# Patient Record
Sex: FEMALE | Race: WHITE | Employment: OTHER | URBAN - METROPOLITAN AREA
[De-identification: names, ages, dates, MRNs, and addresses within clinical notes are randomized per-mention and may not be internally consistent; named-entity substitution may affect disease eponyms.]

---

## 2017-01-12 ENCOUNTER — OFFICE VISIT (OUTPATIENT)
Dept: CARDIOLOGY CLINIC | Age: 77
End: 2017-01-12

## 2017-01-12 VITALS
DIASTOLIC BLOOD PRESSURE: 52 MMHG | SYSTOLIC BLOOD PRESSURE: 150 MMHG | HEART RATE: 54 BPM | WEIGHT: 190 LBS | BODY MASS INDEX: 33.66 KG/M2 | HEIGHT: 63 IN

## 2017-01-12 DIAGNOSIS — I27.20 PULMONARY HTN (HCC): ICD-10-CM

## 2017-01-12 DIAGNOSIS — R91.1 LUNG NODULE: ICD-10-CM

## 2017-01-12 DIAGNOSIS — C73 PAPILLARY THYROID CARCINOMA (HCC): ICD-10-CM

## 2017-01-12 DIAGNOSIS — I42.1 MILD HOCM (HYPERTROPHIC OBSTRUCTIVE CARDIOMYOPATHY) (HCC): Primary | ICD-10-CM

## 2017-01-12 DIAGNOSIS — I35.0 NONRHEUMATIC AORTIC VALVE STENOSIS: ICD-10-CM

## 2017-01-12 RX ORDER — LISINOPRIL 20 MG/1
TABLET ORAL DAILY
Status: ON HOLD | COMMUNITY
End: 2017-05-19

## 2017-01-12 NOTE — MR AVS SNAPSHOT
Visit Information Date & Time Provider Department Dept. Phone Encounter #  
 1/12/2017 12:15 PM Elisha Finley MD [facility] [address] [place] [address] [id number] Follow-up Instructions Return in about 1 week (around 1/19/2017). Your Appointments 1/13/2017  7:30 AM  
PROCEDURE with [place] ECHO [facility] [place] ([facility]) Appt Note: limited Dr Julio C Gonzalez [place] [address] [facility] [facility], [address] [facility] [address]  
[address], [address] [facility] [address] [place] [address] 1/17/2017 12:15 PM  
Office Visit with Elisha Finley MD  
[facility]) Appt Note: post Limited Echo  
 [address] [facility], [address] [facility] [address]  
[address], [address] [address] Upcoming Health Maintenance Date Due FOBT Q 1 YEAR AGE 50-75 3/12/1992 ZOSTER VACCINE AGE 60> 3/12/2002 GLAUCOMA SCREENING Q2Y 3/12/2007 Pneumococcal 65+ High/Highest Risk (1 of 2 - PCV13) 3/12/2007 MEDICARE YEARLY EXAM 3/12/2007 INFLUENZA AGE 9 TO ADULT 8/1/2016 DTaP/Tdap/Td series (2 - Td) 12/1/2022 Allergies as of 1/12/2017  Review Complete On: 1/12/2017 By: Elisha Finley MD  
 No Known Allergies Current Immunizations  Never Reviewed Name Date TDAP Vaccine 12/1/2012  4:50 PM  
  
 Not reviewed this visit You Were Diagnosed With   
  
 Codes Comments Mild HOCM (hypertrophic obstructive cardiomyopathy) ([facility] [address]    -  Primary ICD-10-CM: I42.1 ICD-9-CM: 425.11 Pulmonary HTN ([facility] [address]     ICD-10-CM: I27.2 ICD-9-CM: 416.8 Papillary thyroid carcinoma ([facility] [address]     ICD-10-CM: Z34 ICD-9-CM: 999 Lung nodule     ICD-10-CM: R91.1 ICD-9-CM: 793.11 Nonrheumatic aortic valve stenosis     ICD-10-CM: I35.0 ICD-9-CM: 424.1 mild to moderate Vitals BP Pulse Height(growth percentile) Weight(growth percentile) BMI Smoking Status 150/52 (!) 54 5' 3\" (1.6 m) 190 lb (86.2 kg) 33.66 kg/m2 Never Smoker Vitals History BMI and BSA Data Body Mass Index Body Surface Area  
 33.66 kg/m 2 1.96 m 2 Preferred Pharmacy Pharmacy Name Phone [facility] [phone] Your Updated Medication List  
  
   
This list is accurate as of: 1/12/17  1:05 PM.  Always use your most recent med list.  
  
  
  
  
 CeleBREX 200 mg capsule Generic drug:  celecoxib Take 100 mg by mouth daily. FISH OIL 1,000 mg Cap Generic drug:  omega-3 fatty acids-vitamin e Take 1 capsule by mouth. folic acid 1 mg tablet Commonly known as:  Keith Take  by mouth daily. hydroCHLOROthiazide 25 mg tablet Commonly known as:  HYDRODIURIL Take 25 mg by mouth daily. lisinopril 20 mg tablet Commonly known as:  Mis Flores Take  by mouth daily. losartan 50 mg tablet Commonly known as:  COZAAR Take  by mouth daily. meloxicam 15 mg tablet Commonly known as:  MOBIC  
TAKE 1 TABLET DAILY WITH FOOD  
  
 metoprolol succinate 50 mg XL tablet Commonly known as:  TOPROL-XL Take 75 mg by mouth two (2) times a day. montelukast 10 mg tablet Commonly known as:  SINGULAIR Take 10 mg by mouth daily. SIMVASTATIN PO Take  by mouth. 80 mg  
  
 VITAMIN B-12 500 mcg tablet Generic drug:  cyanocobalamin Take 500 mcg by mouth daily. ZYRTEC PO Take  by mouth. Follow-up Instructions Return in about 1 week (around 1/19/2017). To-Do List   
 01/19/2017 Cardiac Services:  2D ECHO LIMITED ADULT   
  
 05/10/2017 10:30 AM  
  Appointment with [facility] CT RM 2 at [facility] RAD CT ([phone]) GENERAL INSTRUCTIONS  This study does not require you to drink contrast prior to your study.   RELATED STUDY INFORMATION  Bring any films, CDs, and reports related with you on the day of your exam.  This only includes studies done outside of [facility], \[facility]", [facility], and [facility]. QUESTIONS  Notify the CT Department if you have any questions concerning your study. [place] - [phone] [facility] [place] - [phone] Please provide this summary of care documentation to your next provider. Your primary care clinician is listed as Juancarlos Pickens. If you have any questions after today's visit, please call [phone].

## 2017-01-12 NOTE — LETTER
Kenny Daugherty 1940 
 
1/12/2017 Dear Tania Balbuena MD 
 
I had the pleasure of evaluating  Ms. Daugherty in office today. Below are the relevant portions of my assessment and plan of care. ICD-10-CM ICD-9-CM 1. Mild HOCM (hypertrophic obstructive cardiomyopathy) (HCC) I42.1 425.11   
2. Pulmonary HTN (HCC) I27.2 416.8 2D ECHO LIMITED ADULT 3. Papillary thyroid carcinoma ([address] C73 193 4. Lung nodule R91.1 793.11   
5. Nonrheumatic aortic valve stenosis I35.0 424.1   
 mild to moderate Current Outpatient Prescriptions Medication Sig Dispense Refill  lisinopril (PRINIVIL, ZESTRIL) 20 mg tablet Take  by mouth daily.  meloxicam (MOBIC) 15 mg tablet TAKE 1 TABLET DAILY WITH FOOD 90 Tab 0  
 losartan (COZAAR) 50 mg tablet Take  by mouth daily.  montelukast (SINGULAIR) 10 mg tablet Take 10 mg by mouth daily.  cyanocobalamin (VITAMIN B-12) 500 mcg tablet Take 500 mcg by mouth daily.  folic acid (FOLVITE) 1 mg tablet Take  by mouth daily.  metoprolol succinate (TOPROL-XL) 50 mg XL tablet Take 75 mg by mouth two (2) times a day.  hydrochlorothiazide (HYDRODIURIL) 25 mg tablet Take 25 mg by mouth daily.  SIMVASTATIN PO Take  by mouth. 80 mg    
 CETIRIZINE HCL (ZYRTEC PO) Take  by mouth.  omega-3 fatty acids-vitamin e (FISH OIL) 1,000 mg cap Take 1 capsule by mouth.  celecoxib (CELEBREX) 200 mg capsule Take 100 mg by mouth daily. Orders Placed This Encounter  2D ECHO LIMITED ADULT Standing Status:   Future Standing Expiration Date:   7/12/2017 Order Specific Question:   Reason for Exam: Answer:   to evaluate pulm htn  lisinopril (PRINIVIL, ZESTRIL) 20 mg tablet Sig: Take  by mouth daily. If you have questions, please do not hesitate to call me. I look forward to following Ms. Daugherty along with you.  
 
Sincerely, 
Greg Tafoya MD

## 2017-01-13 ENCOUNTER — CLINICAL SUPPORT (OUTPATIENT)
Dept: CARDIOLOGY CLINIC | Age: 77
End: 2017-01-13

## 2017-01-13 DIAGNOSIS — I27.20 PULMONARY HTN (HCC): ICD-10-CM

## 2017-01-14 NOTE — PROGRESS NOTES
HISTORY OF PRESENT ILLNESS  Mariela Hardy is a 78 y.o. female. New Patient   The history is provided by the patient. This is a chronic problem. The current episode started more than 1 week ago. The problem occurs daily. The problem has not changed since onset. Associated symptoms include shortness of breath. Pertinent negatives include no chest pain, no abdominal pain and no headaches. Pre-op Exam   The history is provided by the patient. The problem has not changed since onset. Associated symptoms include shortness of breath. Pertinent negatives include no chest pain, no abdominal pain and no headaches. Shortness of Breath   The history is provided by the patient. This is a chronic problem. The problem occurs intermittently. The current episode started more than 1 week ago. The problem has not changed since onset. Pertinent negatives include no fever, no headaches, no ear pain, no neck pain, no cough, no sputum production, no hemoptysis, no wheezing, no PND, no orthopnea, no chest pain, no syncope, no vomiting, no abdominal pain, no rash, no leg pain, no leg swelling and no claudication. Associated medical issues do not include CAD or heart failure. Review of Systems   Constitutional: Positive for malaise/fatigue. Negative for chills, diaphoresis, fever and weight loss. HENT: Negative for ear discharge, ear pain, hearing loss, nosebleeds and tinnitus. Eyes: Negative for blurred vision. Respiratory: Positive for shortness of breath. Negative for cough, hemoptysis, sputum production, wheezing and stridor. Cardiovascular: Negative for chest pain, palpitations, orthopnea, claudication, leg swelling, syncope and PND. Gastrointestinal: Negative for abdominal pain, heartburn, nausea and vomiting. Musculoskeletal: Negative for myalgias and neck pain. Skin: Negative for itching and rash.    Neurological: Negative for dizziness, tingling, tremors, focal weakness, loss of consciousness, weakness and headaches. Psychiatric/Behavioral: Negative for depression and suicidal ideas. Family History   Problem Relation Age of Onset    Cancer Other     Heart Disease Other     Cancer Mother     Heart Disease Mother        Past Medical History   Diagnosis Date    Arthritis     Heart murmur     History of seasonal allergies     HTN (hypertension)     Hypercholesteremia        Past Surgical History   Procedure Laterality Date    Hx orthopaedic       right ankle    Hx knee arthroscopy       left and right       Social History   Substance Use Topics    Smoking status: Never Smoker    Smokeless tobacco: Never Used    Alcohol use No       No Known Allergies    Outpatient Prescriptions Marked as Taking for the 1/12/17 encounter (Office Visit) with Neelam Taylor MD   Medication Sig Dispense Refill    lisinopril (PRINIVIL, ZESTRIL) 20 mg tablet Take  by mouth daily.  meloxicam (MOBIC) 15 mg tablet TAKE 1 TABLET DAILY WITH FOOD 90 Tab 0    losartan (COZAAR) 50 mg tablet Take  by mouth daily.  montelukast (SINGULAIR) 10 mg tablet Take 10 mg by mouth daily.  cyanocobalamin (VITAMIN B-12) 500 mcg tablet Take 500 mcg by mouth daily.  folic acid (FOLVITE) 1 mg tablet Take  by mouth daily.  metoprolol succinate (TOPROL-XL) 50 mg XL tablet Take 75 mg by mouth two (2) times a day.  hydrochlorothiazide (HYDRODIURIL) 25 mg tablet Take 25 mg by mouth daily.  SIMVASTATIN PO Take  by mouth. 80 mg      CETIRIZINE HCL (ZYRTEC PO) Take  by mouth. Visit Vitals    /52    Pulse (!) 54    Ht 5' 3\" (1.6 m)    Wt 86.2 kg (190 lb)    BMI 33.66 kg/m2     Physical Exam   Constitutional: She is oriented to person, place, and time. She appears well-developed and well-nourished. No distress. HENT:   Head: Atraumatic. Mouth/Throat: No oropharyngeal exudate. Eyes: Conjunctivae are normal. Right eye exhibits no discharge. Left eye exhibits no discharge.  No scleral icterus. Neck: Normal range of motion. Neck supple. No JVD present. No tracheal deviation present. No thyromegaly present. Cardiovascular: Normal rate and regular rhythm. Exam reveals no gallop. Murmur (2/6 holosystolic murmur best heard at apex and left parasternal area with no radiation) heard. Pulmonary/Chest: Effort normal and breath sounds normal. No stridor. No respiratory distress. She has no wheezes. She has no rales. She exhibits no tenderness. Abdominal: Soft. There is no tenderness. There is no rebound and no guarding. Musculoskeletal: Normal range of motion. She exhibits no edema or tenderness. Lymphadenopathy:     She has no cervical adenopathy. Neurological: She is alert and oriented to person, place, and time. She exhibits normal muscle tone. Skin: Skin is warm. She is not diaphoretic. Psychiatric: She has a normal mood and affect. Her behavior is normal.     ekg sinus bradycardia with poor r wave progression. Echo report reviewed. ASSESSMENT and PLAN    ICD-10-CM ICD-9-CM    1. Mild HOCM (hypertrophic obstructive cardiomyopathy) (HCC) I42.1 425.11    2. Pulmonary HTN (HCC) I27.2 416.8 2D ECHO LIMITED ADULT   3. Papillary thyroid carcinoma ([facility] [address] C73 193    4. Lung nodule R91.1 793.11    5. Nonrheumatic aortic valve stenosis I35.0 424.1     mild to moderate     Orders Placed This Encounter    2D ECHO LIMITED ADULT     Standing Status:   Future     Number of Occurrences:   1     Standing Expiration Date:   7/12/2017     Order Specific Question:   Reason for Exam:     Answer:   to evaluate pulm htn     Follow-up Disposition:  Return in about 1 week (around 1/19/2017). current treatment plan is effective, no change in therapy  reviewed diet, exercise and weight control  use of aspirin to prevent MI and TIA's discussed. Patient seen for pre op evaluation prior to thyroid surgery for possible Ca. Had recent echo which revealed severe pulm htn- here for second opinion.   Has mild dyspnea with no signs of fluid overload. No chest pain. Will repeat limited echo to assess pulm htn. Meanwhile continue current meds.

## 2017-01-17 ENCOUNTER — OFFICE VISIT (OUTPATIENT)
Dept: CARDIOLOGY CLINIC | Age: 77
End: 2017-01-17

## 2017-01-17 VITALS
DIASTOLIC BLOOD PRESSURE: 49 MMHG | SYSTOLIC BLOOD PRESSURE: 149 MMHG | WEIGHT: 189 LBS | HEIGHT: 63 IN | BODY MASS INDEX: 33.49 KG/M2 | HEART RATE: 54 BPM

## 2017-01-17 DIAGNOSIS — I35.0 NONRHEUMATIC AORTIC VALVE STENOSIS: ICD-10-CM

## 2017-01-17 DIAGNOSIS — I42.1 MILD HOCM (HYPERTROPHIC OBSTRUCTIVE CARDIOMYOPATHY) (HCC): ICD-10-CM

## 2017-01-17 DIAGNOSIS — E66.9 OBESITY (BMI 30.0-34.9): ICD-10-CM

## 2017-01-17 DIAGNOSIS — C73 PAPILLARY THYROID CARCINOMA (HCC): ICD-10-CM

## 2017-01-17 DIAGNOSIS — I27.20 PULMONARY HTN (HCC): Primary | ICD-10-CM

## 2017-01-17 NOTE — PROGRESS NOTES
1. Have you been to the ER, urgent care clinic since your last visit? Hospitalized since your last visit? No    2. Have you seen or consulted any other health care providers outside of the [address] [place] [address] since your last visit? Include any pap smears or colon screening. No     3. Since your last visit, have you had any of the following symptoms? None    4. Have you had any blood work, X-rays or cardiac testing? None     5. Where do you normally have your labs drawn? 6. Do you need any refills today?   No    Medications confirmed today by patient

## 2017-01-17 NOTE — LETTER
Clif Daugherty 1940 
 
1/17/2017 Dear Jadyn Taylor MD 
 
I had the pleasure of evaluating  Ms. Daugherty in office today. Below are the relevant portions of my assessment and plan of care. {No Diagnosis Found} Current Outpatient Prescriptions Medication Sig Dispense Refill  lisinopril (PRINIVIL, ZESTRIL) 20 mg tablet Take  by mouth daily.  meloxicam (MOBIC) 15 mg tablet TAKE 1 TABLET DAILY WITH FOOD 90 Tab 0  
 losartan (COZAAR) 50 mg tablet Take  by mouth daily.  montelukast (SINGULAIR) 10 mg tablet Take 10 mg by mouth daily.  cyanocobalamin (VITAMIN B-12) 500 mcg tablet Take 500 mcg by mouth daily.  folic acid (FOLVITE) 1 mg tablet Take  by mouth daily.  metoprolol succinate (TOPROL-XL) 50 mg XL tablet Take 75 mg by mouth two (2) times a day.  hydrochlorothiazide (HYDRODIURIL) 25 mg tablet Take 25 mg by mouth daily.  SIMVASTATIN PO Take  by mouth. 80 mg    
 CETIRIZINE HCL (ZYRTEC PO) Take  by mouth.  omega-3 fatty acids-vitamin e (FISH OIL) 1,000 mg cap Take 1 capsule by mouth.  celecoxib (CELEBREX) 200 mg capsule Take 100 mg by mouth daily. No orders of the defined types were placed in this encounter. If you have questions, please do not hesitate to call me. I look forward to following Ms. Daugherty along with you.  
 
Sincerely, 
Danie Valdez MD

## 2017-01-17 NOTE — MR AVS SNAPSHOT
Visit Information Date & Time Provider Department Dept. Phone Encounter #  
 1/17/2017 12:15 PM Derik Eason MD [facility] [address] [place] [address] [id number] Follow-up Instructions Return in about 1 month (around 2/17/2017). Your Appointments 2/13/2017 11:00 AM  
ESTABLISHED PATIENT with Derik Eason MD  
[facility]) Appt Note: 1 month [address] [facility], [address] [place], [address] [place] [address] Upcoming Health Maintenance Date Due FOBT Q 1 YEAR AGE 50-75 3/12/1992 ZOSTER VACCINE AGE 60> 3/12/2002 GLAUCOMA SCREENING Q2Y 3/12/2007 Pneumococcal 65+ High/Highest Risk (1 of 2 - PCV13) 3/12/2007 MEDICARE YEARLY EXAM 3/12/2007 INFLUENZA AGE 9 TO ADULT 8/1/2016 DTaP/Tdap/Td series (2 - Td) 12/1/2022 Allergies as of 1/17/2017  Review Complete On: 1/17/2017 By: Lamin Cook LPN No Known Allergies Current Immunizations  Never Reviewed Name Date TDAP Vaccine 12/1/2012  4:50 PM  
  
 Not reviewed this visit Vitals BP Pulse Height(growth percentile) Weight(growth percentile) BMI Smoking Status 149/49 (!) 54 5' 3\" (1.6 m) 189 lb (85.7 kg) 33.48 kg/m2 Never Smoker Vitals History BMI and BSA Data Body Mass Index Body Surface Area  
 33.48 kg/m 2 1.95 m 2 [phone] Your Updated Medication List  
  
   
This list is accurate as of: 1/17/17  1:40 PM.  Always use your most recent med list.  
  
  
  
  
 CeleBREX 200 mg capsule Generic drug:  celecoxib Take 100 mg by mouth daily. FISH OIL 1,000 mg Cap Generic drug:  omega-3 fatty acids-vitamin e Take 1 capsule by mouth. folic acid 1 mg tablet Commonly known as:  Keith  
 Take  by mouth daily. hydroCHLOROthiazide 25 mg tablet Commonly known as:  HYDRODIURIL Take 25 mg by mouth daily. lisinopril 20 mg tablet Commonly known as:  Mis Flores Take  by mouth daily. losartan 50 mg tablet Commonly known as:  COZAAR Take  by mouth daily. meloxicam 15 mg tablet Commonly known as:  MOBIC  
TAKE 1 TABLET DAILY WITH FOOD  
  
 metoprolol succinate 50 mg XL tablet Commonly known as:  TOPROL-XL Take 75 mg by mouth two (2) times a day. montelukast 10 mg tablet Commonly known as:  SINGULAIR Take 10 mg by mouth daily. SIMVASTATIN PO Take  by mouth. 80 mg  
  
 VITAMIN B-12 500 mcg tablet Generic drug:  cyanocobalamin Take 500 mcg by mouth daily. ZYRTEC PO Take  by mouth. Follow-up Instructions Return in about 1 month (around 2/17/2017). To-Do List   
 05/10/2017 10:30 AM  
  Appointment with [facility] CT RM 2 at [facility] RAD CT ([phone]) GENERAL INSTRUCTIONS  This study does not require you to drink contrast prior to your study. RELATED STUDY INFORMATION  Bring any films, CDs, and reports related with you on the day of your exam.  This only includes studies done outside of [address] [place] [address], [facility], [address] [address], and [facility]. QUESTIONS  Notify the CT Department if you have any questions concerning your study. [address] - [phone] [facility] [place] - [phone] Please provide this summary of care documentation to your next provider. Your primary care clinician is listed as Karla North. If you have any questions after today's visit, please call [phone].

## 2017-01-17 NOTE — PROGRESS NOTES
1/17 Echocardiogram   SUMMARY:  Procedure information: The study included limited 2D imaging, M-mode,  limited spectral Doppler, and color Doppler. Left ventricle: Ejection fraction was estimated to be 65 %. There were no  regional wall motion abnormalities. There was asymmetric hypertrophy of  the septum. Right ventricle: The ventricle was dilated. Systolic function was normal.    Left atrium: The atrium was dilated. LA volume index was 60.20 ml/mï¾². Right atrium: Size was normal.    Mitral valve: There was moderate-marked thickening. Aortic valve: The valve was trileaflet. Leaflets exhibited calcification. There was mild to moderate regurgitation. Tricuspid valve: There was moderate regurgitation. Pulmonary artery  systolic pressure: 96 mmHg. There was severe pulmonary hypertension. Pulmonic valve: There was mild regurgitation. COMPARISONS:  Comparison was made with the previous study of 01-Dec-2016. Pulmonary  artery pressure has increased.

## 2017-01-24 ENCOUNTER — TELEPHONE (OUTPATIENT)
Dept: CARDIOLOGY CLINIC | Age: 77
End: 2017-01-24

## 2017-01-24 NOTE — PROGRESS NOTES
HISTORY OF PRESENT ILLNESS  Elder Arnie is a 78 y.o. female. Pre-op Exam   The history is provided by the patient. The problem has not changed since onset. Associated symptoms include shortness of breath. Pertinent negatives include no chest pain. Shortness of Breath   The history is provided by the patient. This is a chronic problem. The problem occurs intermittently. The current episode started more than 1 week ago. The problem has not changed since onset. Pertinent negatives include no fever, no ear pain, no neck pain, no cough, no sputum production, no hemoptysis, no wheezing, no PND, no orthopnea, no chest pain, no syncope, no vomiting, no rash, no leg pain, no leg swelling and no claudication. Associated medical issues do not include CAD or heart failure. Review of Systems   Constitutional: Positive for malaise/fatigue. Negative for chills, diaphoresis, fever and weight loss. HENT: Negative for ear discharge, ear pain, hearing loss, nosebleeds and tinnitus. Eyes: Negative for blurred vision. Respiratory: Positive for shortness of breath. Negative for cough, hemoptysis, sputum production, wheezing and stridor. Cardiovascular: Negative for chest pain, palpitations, orthopnea, claudication, leg swelling, syncope and PND. Gastrointestinal: Negative for heartburn, nausea and vomiting. Musculoskeletal: Negative for myalgias and neck pain. Skin: Negative for itching and rash. Neurological: Negative for dizziness, tingling, tremors, focal weakness, loss of consciousness and weakness. Psychiatric/Behavioral: Negative for depression and suicidal ideas.      Family History   Problem Relation Age of Onset    Cancer Other     Heart Disease Other     Cancer Mother     Heart Disease Mother        Past Medical History   Diagnosis Date    Arthritis     Heart murmur     History of seasonal allergies     HTN (hypertension)     Hypercholesteremia        Past Surgical History   Procedure Laterality Date    Hx orthopaedic       right ankle    Hx knee arthroscopy       left and right       Social History   Substance Use Topics    Smoking status: Never Smoker    Smokeless tobacco: Never Used    Alcohol use No       No Known Allergies    Outpatient Prescriptions Marked as Taking for the 1/17/17 encounter (Office Visit) with Ritesh Bailon MD   Medication Sig Dispense Refill    lisinopril (PRINIVIL, ZESTRIL) 20 mg tablet Take  by mouth daily.  meloxicam (MOBIC) 15 mg tablet TAKE 1 TABLET DAILY WITH FOOD 90 Tab 0    losartan (COZAAR) 50 mg tablet Take  by mouth daily.  montelukast (SINGULAIR) 10 mg tablet Take 10 mg by mouth daily.  cyanocobalamin (VITAMIN B-12) 500 mcg tablet Take 500 mcg by mouth daily.  folic acid (FOLVITE) 1 mg tablet Take  by mouth daily.  metoprolol succinate (TOPROL-XL) 50 mg XL tablet Take 75 mg by mouth two (2) times a day.  hydrochlorothiazide (HYDRODIURIL) 25 mg tablet Take 25 mg by mouth daily.  SIMVASTATIN PO Take  by mouth. 80 mg      CETIRIZINE HCL (ZYRTEC PO) Take  by mouth. Visit Vitals    /49    Pulse (!) 54    Ht 5' 3\" (1.6 m)    Wt 85.7 kg (189 lb)    BMI 33.48 kg/m2     Physical Exam   Constitutional: She is oriented to person, place, and time. She appears well-developed and well-nourished. No distress. HENT:   Head: Atraumatic. Mouth/Throat: No oropharyngeal exudate. Eyes: Conjunctivae are normal. Right eye exhibits no discharge. Left eye exhibits no discharge. No scleral icterus. Neck: Normal range of motion. Neck supple. No JVD present. No tracheal deviation present. No thyromegaly present. Cardiovascular: Normal rate and regular rhythm. Exam reveals no gallop. Murmur (2/6 holosystolic murmur best heard at apex and left parasternal area with no radiation) heard. Pulmonary/Chest: Effort normal and breath sounds normal. No stridor. No respiratory distress. She has no wheezes.  She has no rales. She exhibits no tenderness. Abdominal: Soft. There is no tenderness. There is no rebound and no guarding. Musculoskeletal: Normal range of motion. She exhibits no edema or tenderness. Lymphadenopathy:     She has no cervical adenopathy. Neurological: She is alert and oriented to person, place, and time. She exhibits normal muscle tone. Skin: Skin is warm. She is not diaphoretic. Psychiatric: She has a normal mood and affect. Her behavior is normal.     ekg sinus bradycardia with poor r wave progression. Echo report reviewed. ASSESSMENT and PLAN    ICD-10-CM ICD-9-CM    1. Pulmonary HTN (HCC) I27.2 416.8     severe pulmonary htn   2. Mild HOCM (hypertrophic obstructive cardiomyopathy) (HCC) I42.1 425.11    3. Nonrheumatic aortic valve stenosis I35.0 424.1    4. Obesity (BMI 30.0-34. 9) E66.9 278.00    5. Papillary thyroid carcinoma ([facility]) C73 193      No orders of the defined types were placed in this encounter. Follow-up Disposition:  Return in about 1 month (around 2/17/2017). current treatment plan is effective, no change in therapy  reviewed diet, exercise and weight control  use of aspirin to prevent MI and TIA's discussed. Patient seen for pre op evaluation prior to thyroid surgery for possible Ca. Had recent echo which revealed severe pulm htn- here for second opinion. Has mild dyspnea with no signs of fluid overload. No chest pain. Repeat echo reveals severe pulmonary htn- will need Pulmonary evaluation prior to surgery. Report discussed with patient.

## 2017-01-25 ENCOUNTER — TELEPHONE (OUTPATIENT)
Dept: PULMONOLOGY | Age: 77
End: 2017-01-25

## 2017-01-25 NOTE — TELEPHONE ENCOUNTER
Pt states that she went to Cardiology and they cleared her for surgery. Will Dr. Olivia Pena write something stating she is now cleared from pulm standpoint? Please call pt and let her know. They are trying to do surgery possibly Friday.

## 2017-01-26 NOTE — TELEPHONE ENCOUNTER
Contacted Dr. Delphia Lundborg office and spoke with Maury Rojas and she stated they will have to let Dr. Corey Roe know patient will be switching with Dr. Rakesh Cassidy    Earliest appointment will be 2/24

## 2017-02-08 ENCOUNTER — OFFICE VISIT (OUTPATIENT)
Dept: PULMONOLOGY | Age: 77
End: 2017-02-08

## 2017-02-08 VITALS
OXYGEN SATURATION: 96 % | HEART RATE: 68 BPM | WEIGHT: 188 LBS | DIASTOLIC BLOOD PRESSURE: 70 MMHG | SYSTOLIC BLOOD PRESSURE: 110 MMHG | TEMPERATURE: 97.9 F | HEIGHT: 63 IN | BODY MASS INDEX: 33.31 KG/M2 | RESPIRATION RATE: 16 BRPM

## 2017-02-08 DIAGNOSIS — I42.1 MILD HOCM (HYPERTROPHIC OBSTRUCTIVE CARDIOMYOPATHY) (HCC): ICD-10-CM

## 2017-02-08 DIAGNOSIS — I35.0 AORTIC VALVE STENOSIS, UNSPECIFIED ETIOLOGY: ICD-10-CM

## 2017-02-08 DIAGNOSIS — R91.1 LUNG NODULE: ICD-10-CM

## 2017-02-08 DIAGNOSIS — E66.9 OBESITY (BMI 30.0-34.9): ICD-10-CM

## 2017-02-08 DIAGNOSIS — C73 PAPILLARY THYROID CARCINOMA (HCC): ICD-10-CM

## 2017-02-08 DIAGNOSIS — I27.20 PULMONARY HTN (HCC): Primary | ICD-10-CM

## 2017-02-08 NOTE — PROGRESS NOTES
Chief Complaint   Patient presents with    Surgical Clearance    Hypertension     Pulmonary     Patient here for surgical clearance. Patient to have throid surgery. Not scheduled yet.

## 2017-02-08 NOTE — PROGRESS NOTES
HISTORY OF PRESENT ILLNESS  Lenore Reynolds is a 78 y.o. female. HPI Comments: Follow up for Pulmonary HTN found incidentally on CT chest. Pt with Papillary thyroid carcinoma. Pt starting complaining of hoarseness and pain in the R ear 10 weeks ago, unresponsive to antibiotics. She was sent to ENT where workup included CT chest which showed probable Pulmonary HTN and a solitary lung nodule. Rest of details are below. U/S of the thyroid also showed thyroid nodules, one with suspicious features. FNAB was done and showed Papillary thyroid carcinoma. Surgery is being planned for the end of the month. Pt noted some improvement in hoarseness. She does note SOB with moderate exertion, occasional orthopnea causing her to sleep in a recliner at times. She also has pedal edema but attributes this to varicose veins. Pt denies PND. Denies loud snoring , fractured sleep, daytime hypersomnolence, am headaches or non refreshing sleep. Hypertension    Associated symptoms include orthopnea, malaise/fatigue and shortness of breath. Pertinent negatives include no chest pain, no PND, no headaches, no tinnitus, no neck pain, no dizziness, no nausea and no vomiting. Shortness of Breath   The history is provided by the patient. This is a chronic problem. The problem occurs intermittently. The current episode started more than 1 week ago. The problem has not changed since onset. Associated symptoms include orthopnea and leg swelling. Pertinent negatives include no fever, no headaches, no coryza, no rhinorrhea, no sore throat, no swollen glands, no ear pain, no neck pain, no cough, no sputum production, no hemoptysis, no wheezing, no PND, no chest pain, no syncope, no vomiting, no abdominal pain, no rash, no leg pain and no claudication. The problem's precipitants include exercise. She has tried nothing for the symptoms. She has had no prior hospitalizations. She has had no prior ED visits. She has had no prior ICU admissions. Past Medical History   Diagnosis Date    Arthritis     Heart murmur     History of seasonal allergies     HTN (hypertension)     Hypercholesteremia      Past Surgical History   Procedure Laterality Date    Hx orthopaedic       right ankle    Hx knee arthroscopy       left and right     Current Outpatient Prescriptions on File Prior to Visit   Medication Sig Dispense Refill    lisinopril (PRINIVIL, ZESTRIL) 20 mg tablet Take  by mouth daily.  meloxicam (MOBIC) 15 mg tablet TAKE 1 TABLET DAILY WITH FOOD 90 Tab 0    losartan (COZAAR) 50 mg tablet Take  by mouth daily.  montelukast (SINGULAIR) 10 mg tablet Take 10 mg by mouth daily.  cyanocobalamin (VITAMIN B-12) 500 mcg tablet Take 500 mcg by mouth daily.  folic acid (FOLVITE) 1 mg tablet Take  by mouth daily.  metoprolol succinate (TOPROL-XL) 50 mg XL tablet Take 75 mg by mouth two (2) times a day.  hydrochlorothiazide (HYDRODIURIL) 25 mg tablet Take 25 mg by mouth daily.  SIMVASTATIN PO Take  by mouth. 80 mg      CETIRIZINE HCL (ZYRTEC PO) Take  by mouth.  omega-3 fatty acids-vitamin e (FISH OIL) 1,000 mg cap Take 1 capsule by mouth.  celecoxib (CELEBREX) 200 mg capsule Take 100 mg by mouth daily. No current facility-administered medications on file prior to visit. No Known Allergies  Family History   Problem Relation Age of Onset    Cancer Other     Heart Disease Other     Cancer Mother     Heart Disease Mother      Social History     Social History    Marital status:      Spouse name: N/A    Number of children: N/A    Years of education: N/A     Occupational History    Not on file.      Social History Main Topics    Smoking status: Never Smoker    Smokeless tobacco: Never Used    Alcohol use No    Drug use: No    Sexual activity: Not on file     Other Topics Concern    Not on file     Social History Narrative    Pt has a dog    Born in [facility] and moved to [place] years ago          Review of Systems   Constitutional: Positive for malaise/fatigue. Negative for chills, diaphoresis and fever. HENT: Negative for congestion, ear discharge, ear pain, hearing loss, nosebleeds, rhinorrhea, sore throat and tinnitus. Respiratory: Positive for shortness of breath. Negative for cough, hemoptysis, sputum production, wheezing and stridor. Cardiovascular: Positive for orthopnea and leg swelling. Negative for chest pain, claudication, syncope and PND. Gastrointestinal: Negative for abdominal pain, blood in stool, constipation, diarrhea, heartburn, melena, nausea and vomiting. Genitourinary: Negative for dysuria, frequency, hematuria and urgency. Musculoskeletal: Positive for joint pain. Negative for back pain, falls, myalgias and neck pain. Skin: Negative for itching and rash. Neurological: Negative for dizziness, tingling, tremors, sensory change, speech change, focal weakness, seizures, loss of consciousness, weakness and headaches. Endo/Heme/Allergies: Negative for environmental allergies. Bruises/bleeds easily. Psychiatric/Behavioral: Negative for depression, hallucinations, substance abuse and suicidal ideas. The patient is not nervous/anxious and does not have insomnia. Blood pressure 110/70, pulse 68, temperature 97.9 °F (36.6 °C), temperature source Oral, resp. rate 16, height 5' 3\" (1.6 m), weight 85.3 kg (188 lb), SpO2 96 %. ambulatory oximetry per nurse note    Physical Exam   Constitutional: She is oriented to person, place, and time. She appears well-developed. No distress. Overweight    HENT:   Head: Normocephalic and atraumatic. Ecchymosis, new at anterior base of neck    Eyes: Conjunctivae and EOM are normal. Pupils are equal, round, and reactive to light. Right eye exhibits no discharge. Left eye exhibits no discharge. No scleral icterus. Neck: No JVD present. Thyromegaly present.    Cardiovascular: Normal rate, regular rhythm and intact distal pulses. Exam reveals no gallop. Murmur (3/6 systolic radiating to apex) heard. Pulmonary/Chest: Effort normal and breath sounds normal. No stridor. No respiratory distress. She has no wheezes. She has no rales. Abdominal: Soft. She exhibits no mass. There is no tenderness. Musculoskeletal: She exhibits no tenderness. Edema: trace. Lymphadenopathy:     She has no cervical adenopathy. Neurological: She is alert and oriented to person, place, and time. Skin: Skin is warm and dry. No rash noted. She is not diaphoretic. No erythema. Psychiatric: She has a normal mood and affect. Her behavior is normal. Judgment and thought content normal.     CT Results (most recent):    Results from Hospital Encounter encounter on 10/24/16   CT CHEST WO CONT   Narrative CT CHEST WITHOUT CONTRAST        CPT CODE: 85133    HISTORY: Pulmonary hypertension, lung nodule follow-up. CORRELATION: CT 7/14/2016. TECHNIQUE: Axial images of the chest were obtained without intravenous contrast.  Coronal and sagittal reformations. CT dose reduction was achieved through use of  a standardized protocol tailored for this examination and automatic exposure  control for dose modulation. FINDINGS:     Visualized thyroid gland suggests there may be underlying nodules. Low-attenuation nodule in the right thyroid gland appears smaller from prior  examination and would be compatible with interval cyst aspiration. No axillary adenopathy. No gross mediastinal or hilar adenopathy. Dilated main pulmonary artery measuring up to 4.8 cm on image 22, stable. Atherosclerotic calcifications of the thoracic aorta. Slightly prominent  ascending aorta diameter of 3.7 cm is stable. Calcifications of the aortic and  mitral valves. There may be coronary artery calcifications as well. No  pericardial effusion. Slightly lobulated contour of the left ventricular apex. No associated  calcification.  Correlate with history for prior myocardial infarction, finding  could represent a small ventricular aneurysm. Nodule in the posterior medial left upper lobe on image 14 measures 5x4 mm,  stable. Image 14. No new lung nodules identified. Improved groundglass  attenuation to the upper lobes. Residual groundglass attenuation in the lingula. Septal thickening at the lung bases are grossly unchanged. No effusion. No  endobronchial filling defects. Left adrenal nodule not significantly changed in size measuring 2.0 x 1.9 cm. This has a Hounsfield unit measurement of 13 suggestive of an adenoma. Retrocrural lymph node on the right is stable measuring 1.6 x 0.9 cm. Nonspecific. Impression IMPRESSION:    Left upper lung nodule is stable. No new lung nodules identified. Continued  follow-up as per guidelines below. Some improved aeration of the lungs, with marked improvement of groundglass lung  densities. Relatively stable dilated main pulmonary artery indicative of pulmonary  hypertension. Subtle lobulated contour of the left ventricular apex. Correlate with history  for prior myocardial infarction, finding suggestive of a ventricular aneurysm. Consider echocardiogram correlation if clinically indicated. Atherosclerotic disease of the aorta. Suspect calcifications of the mitral and  aortic valve. Stable left adrenal nodule. Finding is favored to be benign, probable adenoma. [employer] RECOMMENDATIONS:    1. LOW SMOKING OR OTHER EXPOSURE RISK:   4 - 6mm: Follow-up CT at 12 months; if stable, no further follow-up. 2. HIGH SMOKING OR OTHER EXPOSURE RISK:   4 - 6mm: Follow-up CT at 6 - 12months: if stable, follow-up at 24 months. If  stable at 24 months, no further follow-up. US Results (most recent):    Results from [place] encounter on 10/12/16   US GUIDE FINE NDL ASP THYROID   Narrative PREOPERATIVE DIAGNOSIS: Right thyroid lobe nodule.     POSTOPERATIVE DIAGNOSIS: Same    INDICATION: 78years old female with history of right thyroid lobe nodule. ATTENDING: Dr. Ani Hernandez M.D.    Lalitha Snowden: None. PROCEDURES: Ultrasound guided fine needle aspiration biopsy of right thyroid  lobe nodule. ANESTHESIA: Local 1% lidocaine. CONTRAST: None. COMPLICATIONS: None    DRAIN: No    CATHETER: None. EBL: Minimal.    SPECIMEN: 5 passes were performed. Specimens were given to pathology on site. Sampling was adequate. TECHNIQUE: The risks, benefits, and alternatives were discussed. Written and  verbal consent obtained. Patient was brought to the ultrasonography suite and  placed supine on the table. Right neck region was prepped and draped in usual  sterile fashion. Maximum sterile barrier technique was used. 1% lidocaine was  given into the skin and subcutaneous soft tissues of the right lower neck. Under  direct ultrasonographic guidance with an assistance of 22-gauge needle 5 passes  were performed. Specimen were given to pathology on site. Sampling was adequate  per pathology. Postbiopsy imaging was obtained. FINDINGS: Ultrasonographic examination of the right thyroid lobe demonstrated  large heterogenously echogenic mass lesion with minimal peripheral as well as  internal flow. Sparse calcifications were seen. Good position of the needle was  also demonstrated. Cord biopsy imaging demonstrated no evidence of bleeding. Impression IMPRESSION:      1.  Successful, uncomplicated, ultrasound guided fine needle aspiration biopsy of  the right thyroid lobe nodule.            10/25/2016  2:46 PM - Omar, Card Result In       [facility], [address]  [phone]    Transthoracic Echocardiogram    Patient: Jericho Suárez  MRN: [id number]  ACCT #: [de-identified]  : 1940  Age: 78 years  Gender: Female  Height: 63 in  Weight: 182.6 lb  BSA: 1.86 mï¾²  BP: 150 / 60 mmHg  Study date: 24-Oct-2016  Status: Routine  Location: [facility] ACC #: [id number]    Allergies: NO KNOWN ALLERGIES    Referring_Ordering Physician: Heladio Lee MD  Interpreting Group:  [facility]  Interpreting Physician: Nickie Jauregui MD  Technologist: ALETHA Burleson    SUMMARY:  Left ventricle: Systolic function was normal by visual assessment. Ejection fraction was estimated in the range of 55 % to 60 %. No obvious  wall motion abnormalities identified in the views obtained. Wall thickness  was mildly increased. Right ventricle: Systolic pressure was markedly increased. Estimated peak  pressure was at least 75 mmHg. Left atrium: The atrium was severely dilated. Right atrium: The atrium was dilated. Mitral valve: There was marked annular calcification. The findings were  consistent with mild mitral stenosis. Peak transmitral gradient was 10  mmHg. Mean transmitral gradient was 5 mmHg. Aortic valve: The valve was trileaflet. Leaflets exhibited moderately  increased thickness and reduced mobility. There was mild to moderate  stenosis. There was mild to moderate regurgitation. Valve peak gradient  was 40 mmHg. Valve mean gradient was 26 mmHg. Estimated aortic valve area  (by VTI) was 1.5 cmï¾². INDICATIONS: Pulmonary hypertension. HISTORY: Prior history: Risk factors: hypertension, medication-treated  hypercholesterolemia, and morbid obesity. PROCEDURE: This was a routine study. The study included complete 2D  imaging, M-mode, complete spectral Doppler, and color Doppler. The heart  rate was 68 bpm, at the start of the study. Systolic blood pressure was  150 mmHg, at the start of the study. Diastolic blood pressure was 60 mmHg,  at the start of the study. Image quality was adequate. LEFT VENTRICLE: Size was normal. Systolic function was normal by visual  assessment. Ejection fraction was estimated in the range of 55 % to 60 %.   No obvious wall motion abnormalities identified in the views obtained. Wall thickness was mildly increased. DOPPLER: Indeterminate diastolic  function. VENTRICULAR SEPTUM: There was basal septal prominence. RIGHT VENTRICLE: The size was normal. Systolic function was normal.  DOPPLER: Systolic pressure was markedly increased. Estimated peak pressure  was at least 75 mmHg. LEFT ATRIUM: The atrium was severely dilated. LA volume index was 62  ml/mï¾². RIGHT ATRIUM: The atrium was dilated. MITRAL VALVE: There was marked annular calcification. Normal valve  structure. There was normal leaflet separation. DOPPLER: The findings were  consistent with mild mitral stenosis. There was no significant  regurgitation. AORTIC VALVE: The valve was trileaflet. Leaflets exhibited moderately  increased thickness and reduced mobility. DOPPLER: There was mild to  moderate stenosis. There was mild to moderate regurgitation. TRICUSPID VALVE: Normal valve structure. There was normal leaflet  separation. DOPPLER: There was no evidence for tricuspid stenosis. There  was mild regurgitation. Tricuspid regurgitation peak velocity: 4.2 m/sec. Right atrial pressure estimate: 3 mmHg. PULMONIC VALVE: Not well visualized, but normal Doppler findings. AORTA: The root exhibited normal size. SYSTEMIC VEINS: IVC: The inferior vena cava was normal in size. The  respirophasic change in diameter was more than 50%. PERICARDIUM: There was no pericardial effusion. MEASUREMENT TABLES    2D measurements  Right atrium   (Reference normals)  Area sys   24 cmï¾²   (8.3-19. 5)    Doppler measurements  Aortic valve   (Reference normals)  Peak gradient   40 mmHg   (--)  Mean gradient   26 mmHg   (--)  Valve area, VTI   1.5 cmï¾²   (--)  Mitral valve   (Reference normals)  Peak gradient   10 mmHg   (--)  Mean gradient   5 mmHg   (--)    SYSTEM MEASUREMENT TABLES    2D mode  AoR Diam (2D): 3 cm  IVS/LVPW (2D): 1.75  IVSd (2D): 2.1 cm  LVIDd (2D): 3.7 cm  LVIDs (2D): 2.6 cm  LVOT Area (2D): 3.14 cmï¾²  LVPWd (2D): 1.2 cm  SV (2D-Cubed): 33.1 cm3  RVIDd (2D): 4.3 cm            ASSESSMENT and PLAN  Encounter Diagnoses   Name Primary?  Pulmonary HTN ([address] Yes    Aortic valve stenosis, unspecified etiology     Mild HOCM (hypertrophic obstructive cardiomyopathy) (HCC)     Papillary thyroid carcinoma (HCC)     Lung nodule     Obesity (BMI 30.0-34. 9)         Pt with severe Pulmonary HTN likely due to valvular heart disease rather than Pulmonary causes. Differential diagnosis also includes CTD, chronic hypoxemia, HIV although less likely. CTD and HIV serology ordered, as well as overnight oximetry. Will discuss possible right and left heart cath with Dr. Carol Bhatt, also to guide possible therapy. Will repeat CT in about 6 months to follow lung nodule.

## 2017-02-08 NOTE — MR AVS SNAPSHOT
Visit Information Date & Time Provider Department Dept. Phone Encounter #  
 2/8/2017  8:45 AM Mundo Clark MD [facility] [place] [phone] [id number] Your Appointments 2/13/2017 11:00 AM  
ESTABLISHED PATIENT with Neelam Taylor MD  
[facility]) Appt Note: 1 month [address] [facility], [address], [address] [place] [address] Upcoming Health Maintenance Date Due FOBT Q 1 YEAR AGE 50-75 3/12/1992 ZOSTER VACCINE AGE 60> 3/12/2002 GLAUCOMA SCREENING Q2Y 3/12/2007 Pneumococcal 65+ High/Highest Risk (1 of 2 - PCV13) 3/12/2007 MEDICARE YEARLY EXAM 3/12/2007 INFLUENZA AGE 9 TO ADULT 8/1/2016 DTaP/Tdap/Td series (2 - Td) 12/1/2022 Allergies as of 2/8/2017  Review Complete On: 2/8/2017 By: Mundo Clark MD  
 No Known Allergies Current Immunizations  Never Reviewed Name Date TDAP Vaccine 12/1/2012  4:50 PM  
  
 Not reviewed this visit You Were Diagnosed With   
  
 Codes Comments [facility] ([facility]    -  Primary ICD-10-CM: I27.2 ICD-9-CM: 416.8 Aortic valve stenosis, unspecified etiology     ICD-10-CM: I35.0 ICD-9-CM: 424.1 Mild HOCM (hypertrophic obstructive cardiomyopathy) (HCC)     ICD-10-CM: I42.1 ICD-9-CM: 425.11 Papillary thyroid carcinoma ([facility]     ICD-10-CM: X08 ICD-9-CM: 160 Lung nodule     ICD-10-CM: R91.1 ICD-9-CM: 793.11 Obesity (BMI 30.0-34.9)     ICD-10-CM: U44.4 ICD-9-CM: 278.00 Vitals BP Pulse Temp Resp Height(growth percentile) Weight(growth percentile) 110/70 (BP 1 Location: Left arm, BP Patient Position: Sitting) 68 97.9 °F (36.6 °C) (Oral) 16 5' 3\" (1.6 m) 188 lb (85.3 kg) SpO2 BMI Smoking Status 96% 33.3 kg/m2 Never Smoker BMI and BSA Data  Body Mass Index Body Surface Area  
 33.3 kg/m 2 1.95 m 2  
  
  
 Preferred Pharmacy Pharmacy Name Phone [address], [facility] [phone] Your Updated Medication List  
  
   
This list is accurate as of: 2/8/17  9:31 AM.  Always use your most recent med list.  
  
  
  
  
 CeleBREX 200 mg capsule Generic drug:  celecoxib Take 100 mg by mouth daily. FISH OIL 1,000 mg Cap Generic drug:  omega-3 fatty acids-vitamin e Take 1 capsule by mouth. folic acid 1 mg tablet Commonly known as:  [employer] Take  by mouth daily. hydroCHLOROthiazide 25 mg tablet Commonly known as:  HYDRODIURIL Take 25 mg by mouth daily. lisinopril 20 mg tablet Commonly known as:  Arnold Files Take  by mouth daily. losartan 50 mg tablet Commonly known as:  COZAAR Take  by mouth daily. meloxicam 15 mg tablet Commonly known as:  MOBIC  
TAKE 1 TABLET DAILY WITH FOOD  
  
 metoprolol succinate 50 mg XL tablet Commonly known as:  TOPROL-XL Take 75 mg by mouth two (2) times a day. montelukast 10 mg tablet Commonly known as:  SINGULAIR Take 10 mg by mouth daily. SIMVASTATIN PO Take  by mouth. 80 mg  
  
 VITAMIN B-12 500 mcg tablet Generic drug:  cyanocobalamin Take 500 mcg by mouth daily. ZYRTEC PO Take  by mouth. To-Do List   
 02/08/2017 Lab:  RAJI QL, W/REFLEX CASCADE   
  
 02/08/2017 Lab:  ANCA PANEL   
  
 02/08/2017 Lab:  ANGIOTENSIN CONVERTING ENZYME   
  
 02/08/2017 Lab:  C REACTIVE PROTEIN, QT   
  
 02/08/2017 Lab:  CK   
  
 02/08/2017 Lab:  HIV 1/2 AG/AB, 4TH GENERATION,W RFLX CONFIRM   
  
 02/08/2017 Lab:  RHEUMATOID FACTOR, QL   
  
 02/08/2017 Lab:  SCLERODERMA (SCL-70) AB, IGG   
  
 02/08/2017 Lab:  SED RATE (ESR)   
  
 02/08/2017 Lab:  SJOGREN'S ABS, SSA AND SSB   
  
 02/09/2017 Procedures:   AMB POC PULSE OXIMETRY, CONTINUOUS   
  
 05/10/2017 10:30 AM  
 Appointment with [facility] CT RM 2 at [facility] RAD CT ([phone]) GENERAL INSTRUCTIONS  This study does not require you to drink contrast prior to your study. RELATED STUDY INFORMATION  Bring any films, CDs, and reports related with you on the day of your exam.  This only includes studies done outside of [address] [place] [address], [facility], [place], and [place]. QUESTIONS  Notify the CT Department if you have any questions concerning your study. [place] - [phone] [facility] - [phone] Please provide this summary of care documentation to your next provider. Your primary care clinician is listed as Sharad Medina. If you have any questions after today's visit, please call [phone].

## 2017-02-13 ENCOUNTER — OFFICE VISIT (OUTPATIENT)
Dept: CARDIOLOGY CLINIC | Age: 77
End: 2017-02-13

## 2017-02-13 VITALS
HEIGHT: 63 IN | SYSTOLIC BLOOD PRESSURE: 150 MMHG | HEART RATE: 50 BPM | DIASTOLIC BLOOD PRESSURE: 45 MMHG | BODY MASS INDEX: 32.96 KG/M2 | WEIGHT: 186 LBS

## 2017-02-13 DIAGNOSIS — R06.02 SOB (SHORTNESS OF BREATH): ICD-10-CM

## 2017-02-13 DIAGNOSIS — I27.20 PULMONARY HTN (HCC): ICD-10-CM

## 2017-02-13 DIAGNOSIS — I35.0 NONRHEUMATIC AORTIC VALVE STENOSIS: ICD-10-CM

## 2017-02-13 DIAGNOSIS — C73 PAPILLARY THYROID CARCINOMA (HCC): ICD-10-CM

## 2017-02-13 DIAGNOSIS — I42.1 MILD HOCM (HYPERTROPHIC OBSTRUCTIVE CARDIOMYOPATHY) (HCC): Primary | ICD-10-CM

## 2017-02-13 NOTE — LETTER
Laurie Daugherty 1940 
 
2/13/2017 Dear Malinda Myers MD 
 
I had the pleasure of evaluating  Ms. Daugherty in office today. Below are the relevant portions of my assessment and plan of care. ICD-10-CM ICD-9-CM 1. Mild HOCM (hypertrophic obstructive cardiomyopathy) (HCC) I42.1 425.11   
2. Nonrheumatic aortic valve stenosis I35.0 424.1 ECHO TRANSESOPHAGEAL (ANAND) W OR WO CONTR 3. Pulmonary HTN (HCC) I27.2 416.8 CARDIAC CATHETERIZATION 4. Papillary thyroid [facility] ([facility] C73 193   
5. SOB (shortness of breath) R06.02 786.05 CARDIAC CATHETERIZATION Current Outpatient Prescriptions Medication Sig Dispense Refill  lisinopril (PRINIVIL, ZESTRIL) 20 mg tablet Take  by mouth daily.  meloxicam (MOBIC) 15 mg tablet TAKE 1 TABLET DAILY WITH FOOD 90 Tab 0  
 losartan (COZAAR) 50 mg tablet Take  by mouth daily.  montelukast (SINGULAIR) 10 mg tablet Take 10 mg by mouth daily.  cyanocobalamin (VITAMIN B-12) 500 mcg tablet Take 500 mcg by mouth daily.  folic acid (FOLVITE) 1 mg tablet Take  by mouth daily.  metoprolol succinate (TOPROL-XL) 50 mg XL tablet Take 75 mg by mouth two (2) times a day.  celecoxib (CELEBREX) 200 mg capsule Take 100 mg by mouth daily.  hydrochlorothiazide (HYDRODIURIL) 25 mg tablet Take 25 mg by mouth daily.  SIMVASTATIN PO Take  by mouth. 80 mg    
 CETIRIZINE HCL (ZYRTEC PO) Take  by mouth.  omega-3 fatty acids-vitamin e (FISH OIL) 1,000 mg cap Take 1 capsule by mouth. Orders Placed This Encounter  CARDIAC CATHETERIZATION Standing Status:   Future Standing Expiration Date:   8/13/2017 Order Specific Question:   Reason for Exam: Answer:   sob/VHD  ECHO TRANSESOPHAGEAL (ANAND) W OR WO CONTR Standing Status:   Future Standing Expiration Date:   8/13/2017 Order Specific Question:   Reason for Exam: Answer:   VHD/ sob/ pulmonary htn Order Specific Question:   Contrast Enhancement (Bubble Study, Definity, Optison) may be used if criteria listed in established evidence-based protocol has been identified. Answer:   Yes If you have questions, please do not hesitate to call me. I look forward to following Ms. Daugherty along with you.  
 
Sincerely, 
Ronald Chi MD

## 2017-02-13 NOTE — PROGRESS NOTES
HISTORY OF PRESENT ILLNESS  Alice Silva is a 78 y.o. female. Valvular Heart Disease   This is a chronic problem. The current episode started more than 1 week ago. The problem occurs daily. The problem has been gradually worsening. Associated symptoms include shortness of breath. Pertinent negatives include no chest pain. Pre-op Exam   The history is provided by the patient. The problem has not changed since onset. Associated symptoms include shortness of breath. Pertinent negatives include no chest pain. Shortness of Breath   The history is provided by the patient. This is a chronic problem. The problem occurs intermittently. The current episode started more than 1 week ago. The problem has not changed since onset. Pertinent negatives include no fever, no ear pain, no neck pain, no cough, no sputum production, no hemoptysis, no wheezing, no PND, no orthopnea, no chest pain, no syncope, no vomiting, no rash, no leg pain, no leg swelling and no claudication. Associated medical issues do not include CAD or heart failure. Review of Systems   Constitutional: Positive for malaise/fatigue. Negative for chills, diaphoresis, fever and weight loss. HENT: Negative for ear discharge, ear pain, hearing loss, nosebleeds and tinnitus. Eyes: Negative for blurred vision. Respiratory: Positive for shortness of breath. Negative for cough, hemoptysis, sputum production, wheezing and stridor. Cardiovascular: Negative for chest pain, palpitations, orthopnea, claudication, leg swelling, syncope and PND. Gastrointestinal: Negative for heartburn, nausea and vomiting. Musculoskeletal: Negative for myalgias and neck pain. Skin: Negative for itching and rash. Neurological: Negative for dizziness, tingling, tremors, focal weakness, loss of consciousness and weakness. Psychiatric/Behavioral: Negative for depression and suicidal ideas.      Family History   Problem Relation Age of Onset    Cancer Other     Heart Disease Other     Cancer Mother     Heart Disease Mother        Past Medical History   Diagnosis Date    Arthritis     Heart murmur     History of seasonal allergies     HTN (hypertension)     Hypercholesteremia        Past Surgical History   Procedure Laterality Date    Hx orthopaedic       right ankle    Hx knee arthroscopy       left and right       Social History   Substance Use Topics    Smoking status: Never Smoker    Smokeless tobacco: Never Used    Alcohol use No       No Known Allergies    Outpatient Prescriptions Marked as Taking for the 2/13/17 encounter (Office Visit) with Cara Hector MD   Medication Sig Dispense Refill    lisinopril (PRINIVIL, ZESTRIL) 20 mg tablet Take  by mouth daily.  meloxicam (MOBIC) 15 mg tablet TAKE 1 TABLET DAILY WITH FOOD 90 Tab 0    losartan (COZAAR) 50 mg tablet Take  by mouth daily.  montelukast (SINGULAIR) 10 mg tablet Take 10 mg by mouth daily.  cyanocobalamin (VITAMIN B-12) 500 mcg tablet Take 500 mcg by mouth daily.  folic acid (FOLVITE) 1 mg tablet Take  by mouth daily.  metoprolol succinate (TOPROL-XL) 50 mg XL tablet Take 75 mg by mouth two (2) times a day.  celecoxib (CELEBREX) 200 mg capsule Take 100 mg by mouth daily.  hydrochlorothiazide (HYDRODIURIL) 25 mg tablet Take 25 mg by mouth daily.  SIMVASTATIN PO Take  by mouth. 80 mg      CETIRIZINE HCL (ZYRTEC PO) Take  by mouth. Visit Vitals    /45    Pulse (!) 50    Ht 5' 3\" (1.6 m)    Wt 84.4 kg (186 lb)    BMI 32.95 kg/m2     Physical Exam   Constitutional: She is oriented to person, place, and time. She appears well-developed and well-nourished. No distress. HENT:   Head: Atraumatic. Mouth/Throat: No oropharyngeal exudate. Eyes: Conjunctivae are normal. Right eye exhibits no discharge. Left eye exhibits no discharge. No scleral icterus. Neck: Normal range of motion. Neck supple. No JVD present.  No tracheal deviation present. No thyromegaly present. Cardiovascular: Normal rate and regular rhythm. Exam reveals no gallop. Murmur: 3/6 holosystolic murmur best heard at apex and left parasternal area with no radiationl. Pulmonary/Chest: Effort normal and breath sounds normal. No stridor. No respiratory distress. She has no wheezes. She has no rales. She exhibits no tenderness. Abdominal: Soft. There is no tenderness. There is no rebound and no guarding. Musculoskeletal: Normal range of motion. She exhibits no edema or tenderness. Lymphadenopathy:     She has no cervical adenopathy. Neurological: She is alert and oriented to person, place, and time. She exhibits normal muscle tone. Skin: Skin is warm. She is not diaphoretic. Psychiatric: She has a normal mood and affect. Her behavior is normal.     ekg sinus bradycardia with poor r wave progression. Echo report reviewed. ASSESSMENT and PLAN    ICD-10-CM ICD-9-CM    1. Mild HOCM (hypertrophic obstructive cardiomyopathy) (HCC) I42.1 425.11    2. Nonrheumatic aortic valve stenosis I35.0 424.1 ECHO TRANSESOPHAGEAL (ANAND) W OR WO CONTR   3. Pulmonary HTN (HCC) I27.2 416.8 CARDIAC CATHETERIZATION   4. Papillary thyroid carcinoma ([facility] C73 193    5. SOB (shortness of breath) R06.02 786.05 CARDIAC CATHETERIZATION     Orders Placed This Encounter    CARDIAC CATHETERIZATION     Standing Status:   Future     Standing Expiration Date:   8/13/2017     Order Specific Question:   Reason for Exam:     Answer:   sob/VHD    ECHO TRANSESOPHAGEAL (ANAND) W OR WO CONTR     Standing Status:   Future     Standing Expiration Date:   8/13/2017     Order Specific Question:   Reason for Exam:     Answer:   VHD/ sob/ pulmonary htn     Order Specific Question:   Contrast Enhancement (Bubble Study, Definity, Optison) may be used if criteria listed in established evidence-based protocol has been identified.      Answer:   Yes     Follow-up Disposition:  Return in about 3 weeks (around 3/6/2017). current treatment plan is effective, no change in therapy  reviewed diet, exercise and weight control  use of aspirin to prevent MI and TIA's discussed. Patient seen for pre op evaluation prior to thyroid surgery for possible Ca. Had recent echo which revealed severe pulm htn- here for second opinion. Has mild dyspnea with no signs of fluid overload. Repeat echo reveals severe pulmonary htn- will need Pulmonary evaluation prior to surgery. Patient seen by pulmonary - recommend LHC/RHC. Will also obtain ANAND to evaluate VHD.

## 2017-02-13 NOTE — PROGRESS NOTES
Patient didn't bring medications, verbally reviewed    1. Have you been to the ER, urgent care clinic since your last visit? Hospitalized since your last visit? No    2. Have you seen or consulted any other health care providers outside of the [address] [place] [address] since your last visit? Include any pap smears or colon screening. Yes Where: [facility]     3. Since your last visit, have you had any of the following symptoms? No    4. Have you had any blood work, X-rays or cardiac testing? No    5. Where do you normally have your labs drawn? [facility]    6. Do you need any refills today?    NO

## 2017-02-14 ENCOUNTER — HOSPITAL ENCOUNTER (OUTPATIENT)
Dept: LAB | Age: 77
Discharge: HOME | End: 2017-02-14
Payer: MEDICARE

## 2017-02-14 LAB
CK SERPL-CCNC: 60 U/L (ref 26–192)
CRP SERPL-MCNC: 0.4 MG/DL (ref 0–0.3)
ERYTHROCYTE [SEDIMENTATION RATE] IN BLOOD: 37 MM/HR (ref 0–30)
RHEUMATOID FACT SER QL LA: POSITIVE
RHEUMATOID FACT TITR SER LA: ABNORMAL {TITER}

## 2017-02-14 PROCEDURE — 86431 RHEUMATOID FACTOR QUANT: CPT | Performed by: INTERNAL MEDICINE

## 2017-02-14 PROCEDURE — 83520 IMMUNOASSAY QUANT NOS NONAB: CPT | Performed by: INTERNAL MEDICINE

## 2017-02-14 PROCEDURE — 86235 NUCLEAR ANTIGEN ANTIBODY: CPT | Performed by: INTERNAL MEDICINE

## 2017-02-14 PROCEDURE — 82164 ANGIOTENSIN I ENZYME TEST: CPT | Performed by: INTERNAL MEDICINE

## 2017-02-14 PROCEDURE — 86140 C-REACTIVE PROTEIN: CPT | Performed by: INTERNAL MEDICINE

## 2017-02-14 PROCEDURE — 86430 RHEUMATOID FACTOR TEST QUAL: CPT | Performed by: INTERNAL MEDICINE

## 2017-02-14 PROCEDURE — 85652 RBC SED RATE AUTOMATED: CPT | Performed by: INTERNAL MEDICINE

## 2017-02-14 PROCEDURE — 82550 ASSAY OF CK (CPK): CPT | Performed by: INTERNAL MEDICINE

## 2017-02-14 PROCEDURE — 86038 ANTINUCLEAR ANTIBODIES: CPT | Performed by: INTERNAL MEDICINE

## 2017-02-14 PROCEDURE — 86225 DNA ANTIBODY NATIVE: CPT | Performed by: INTERNAL MEDICINE

## 2017-02-14 PROCEDURE — 87389 HIV-1 AG W/HIV-1&-2 AB AG IA: CPT | Performed by: INTERNAL MEDICINE

## 2017-02-14 PROCEDURE — 36415 COLL VENOUS BLD VENIPUNCTURE: CPT | Performed by: INTERNAL MEDICINE

## 2017-02-15 LAB
ACE SERPL-CCNC: < 15 U/L (ref 14–82)
ANA SER QL: POSITIVE
ANTICHROMATIN ABS, ACHRLT: <0.2 AI (ref 0–0.9)
DSDNA AB SER-ACNC: 2 IU/ML (ref 0–9)
ENA SCL70 AB SER-ACNC: 0.3 AI (ref 0–0.9)
ENA SM AB SER-ACNC: <0.2 AI (ref 0–0.9)
ENA SM+RNP AB SER-ACNC: 0.2 AI (ref 0–0.9)
ENA SS-A AB SER-ACNC: >8 AI (ref 0–0.9)
ENA SS-A AB SER-ACNC: >8 AI (ref 0–0.9)
ENA SS-B AB SER-ACNC: 0.5 AI (ref 0–0.9)
HIV 1+2 AB+HIV1 P24 AG SERPL QL IA: NON REACTIVE
RNP ABS, RNPRLT: <0.2 AI (ref 0–0.9)
SEE BELOW:, 164879: ABNORMAL

## 2017-02-15 RX ORDER — MELOXICAM 15 MG/1
TABLET ORAL
Qty: 90 TAB | Refills: 2 | Status: SHIPPED | OUTPATIENT
Start: 2017-02-15 | End: 2018-05-04

## 2017-02-16 LAB
C-ANCA TITR SER IF: NORMAL TITER
MYELOPEROXIDASE AB SER IA-ACNC: <9 U/ML (ref 0–9)
P-ANCA ATYPICAL TITR SER IF: NORMAL TITER
P-ANCA TITR SER IF: NORMAL TITER
PROTEINASE3 AB SER IA-ACNC: <3.5 U/ML (ref 0–3.5)

## 2017-02-17 ENCOUNTER — CLINICAL SUPPORT (OUTPATIENT)
Dept: PULMONOLOGY | Age: 77
End: 2017-02-17

## 2017-02-17 DIAGNOSIS — I27.20 PULMONARY HTN (HCC): ICD-10-CM

## 2017-02-17 LAB
O2 AMOUNT, POCO2: NORMAL
POC PULSE OXIMETRY, POCSPO2: NORMAL %

## 2017-05-02 ENCOUNTER — OFFICE VISIT (OUTPATIENT)
Dept: CARDIOLOGY CLINIC | Age: 77
End: 2017-05-02

## 2017-05-02 VITALS
BODY MASS INDEX: 32.6 KG/M2 | WEIGHT: 184 LBS | DIASTOLIC BLOOD PRESSURE: 55 MMHG | HEIGHT: 63 IN | HEART RATE: 54 BPM | SYSTOLIC BLOOD PRESSURE: 182 MMHG

## 2017-05-02 DIAGNOSIS — R06.02 SOB (SHORTNESS OF BREATH): ICD-10-CM

## 2017-05-02 DIAGNOSIS — I42.1 MILD HOCM (HYPERTROPHIC OBSTRUCTIVE CARDIOMYOPATHY) (HCC): Primary | ICD-10-CM

## 2017-05-02 DIAGNOSIS — I35.0 NONRHEUMATIC AORTIC VALVE STENOSIS: ICD-10-CM

## 2017-05-02 DIAGNOSIS — C73 PAPILLARY THYROID CARCINOMA (HCC): ICD-10-CM

## 2017-05-02 DIAGNOSIS — I27.20 PULMONARY HTN (HCC): ICD-10-CM

## 2017-05-02 NOTE — LETTER
Josiah Daugherty 1940 5/2/2017 Dear MD Cassie Bingham MD Erna Costa, MD 
 
I had the pleasure of evaluating  Ms. Daugherty in office today. Below are the relevant portions of my assessment and plan of care. ICD-10-CM ICD-9-CM 1. Mild HOCM (hypertrophic obstructive cardiomyopathy) (HCC) I42.1 425.11   
2. Nonrheumatic aortic valve stenosis I35.0 424.1 ECHO TRANSESOPHAGEAL (ANAND) W OR WO CONTR 3. Pulmonary HTN (HCC) I27.2 416.8 CARDIAC CATHETERIZATION  
   MAGNESIUM  
   METABOLIC PANEL, COMPREHENSIVE  
   CBC W/O DIFF PROTHROMBIN TIME + INR  
   PTT  
   TSH 3RD GENERATION 4. Papillary thyroid carcinoma ([address] C73 193   
5. SOB (shortness of breath) R06.02 786.05   
 
 
Current Outpatient Prescriptions Medication Sig Dispense Refill  meloxicam (MOBIC) 15 mg tablet TAKE 1 TABLET DAILY WITH FOOD 90 Tab 2  
 losartan (COZAAR) 50 mg tablet Take  by mouth daily.  montelukast (SINGULAIR) 10 mg tablet Take 10 mg by mouth daily.  cyanocobalamin (VITAMIN B-12) 500 mcg tablet Take 500 mcg by mouth daily.  folic acid (FOLVITE) 1 mg tablet Take  by mouth daily.  omega-3 fatty acids-vitamin e (FISH OIL) 1,000 mg cap Take 1 capsule by mouth.  metoprolol succinate (TOPROL-XL) 50 mg XL tablet Take 50 mg by mouth two (2) times a day.  celecoxib (CELEBREX) 200 mg capsule Take 100 mg by mouth daily.  hydrochlorothiazide (HYDRODIURIL) 25 mg tablet Take 25 mg by mouth daily.  SIMVASTATIN PO Take  by mouth. 80 mg    
 CETIRIZINE HCL (ZYRTEC PO) Take  by mouth.  lisinopril (PRINIVIL, ZESTRIL) 20 mg tablet Take  by mouth daily. Orders Placed This Encounter  MAGNESIUM Standing Status:   Future Standing Expiration Date:   5/3/2018  METABOLIC PANEL, COMPREHENSIVE Standing Status:   Future Standing Expiration Date:   5/3/2018  CBC W/O DIFF Standing Status:   Future Standing Expiration Date:   5/3/2018  PROTHROMBIN TIME + INR Standing Status:   Future Standing Expiration Date:   5/3/2018  PTT Standing Status:   Future Standing Expiration Date:   5/3/2018  TSH 3RD GENERATION Standing Status:   Future Standing Expiration Date:   5/3/2018  CARDIAC CATHETERIZATION Standing Status:   Future Standing Expiration Date:   11/2/2017 Order Specific Question:   Reason for Exam: Answer:   sob/ pulmonary htn  ECHO TRANSESOPHAGEAL (ANAND) W OR WO CONTR Standing Status:   Future Standing Expiration Date:   11/2/2017 Order Specific Question:   Reason for Exam: Answer:   valvular heart disease Order Specific Question:   Contrast Enhancement (Bubble Study, Definity, Optison) may be used if criteria listed in established evidence-based protocol has been identified. Answer:   Yes If you have questions, please do not hesitate to call me. I look forward to following MsVarsha Willow along with you.  
 
Sincerely, 
Robert Bianchi MD

## 2017-05-02 NOTE — MR AVS SNAPSHOT
Visit Information Date & Time Provider Department Dept. Phone Encounter #  
 5/2/2017 11:15 AM Fernanda Poon MD [facility] [address] [place] [address] [id number] Follow-up Instructions Return in about 3 weeks (around 5/23/2017). Your Appointments 6/13/2017  1:00 PM  
ESTABLISHED PATIENT with Fernanda Poon MD  
[facility]) Appt Note: post cath/josh [address], [address] [place] [id number]  
[address], [address] [place] [address] Upcoming Health Maintenance Date Due ZOSTER VACCINE AGE 60> 3/12/2002 GLAUCOMA SCREENING Q2Y 3/12/2007 Pneumococcal 65+ High/Highest Risk (1 of 2 - PCV13) 3/12/2007 MEDICARE YEARLY EXAM 3/12/2007 INFLUENZA AGE 9 TO ADULT 8/1/2017 DTaP/Tdap/Td series (2 - Td) 12/1/2022 Allergies as of 5/2/2017  Review Complete On: 5/2/2017 By: [place] File No Known Allergies Current Immunizations  Never Reviewed Name Date TDAP Vaccine 12/1/2012  4:50 PM  
  
 Not reviewed this visit You Were Diagnosed With   
  
 Codes Comments Mild HOCM (hypertrophic obstructive cardiomyopathy) ([facility]    -  Primary ICD-10-CM: I42.1 ICD-9-CM: 425.11 Nonrheumatic aortic valve stenosis     ICD-10-CM: I35.0 ICD-9-CM: 424.1 Pulmonary HTN ([facility] [facility]     ICD-10-CM: I27.2 ICD-9-CM: 416.8 Papillary thyroid carcinoma ([facility] [address]     ICD-10-CM: J81 ICD-9-CM: 241 SOB (shortness of breath)     ICD-10-CM: R06.02 
ICD-9-CM: 786.05 Vitals BP Pulse Height(growth percentile) Weight(growth percentile) BMI Smoking Status 182/55 (!) 54 5' 3\" (1.6 m) 184 lb (83.5 kg) 32.59 kg/m2 Never Smoker Vitals History BMI and BSA Data Body Mass Index Body Surface Area 32.59 kg/m 2 1.93 m 2 Preferred Pharmacy Pharmacy Name Phone [address] [facility] [phone] Your Updated Medication List  
  
   
This list is accurate as of: 5/2/17  1:06 PM.  Always use your most recent med list.  
  
  
  
  
 CeleBREX 200 mg capsule Generic drug:  celecoxib Take 100 mg by mouth daily. FISH OIL 1,000 mg Cap Generic drug:  omega-3 fatty acids-vitamin e Take 1 capsule by mouth. folic acid 1 mg tablet Commonly known as:  [employer] Take  by mouth daily. hydroCHLOROthiazide 25 mg tablet Commonly known as:  HYDRODIURIL Take 25 mg by mouth daily. lisinopril 20 mg tablet Commonly known as:  Katty Shawl Take  by mouth daily. losartan 50 mg tablet Commonly known as:  COZAAR Take  by mouth daily. meloxicam 15 mg tablet Commonly known as:  MOBIC  
TAKE 1 TABLET DAILY WITH FOOD  
  
 metoprolol succinate 50 mg XL tablet Commonly known as:  TOPROL-XL Take 50 mg by mouth two (2) times a day. montelukast 10 mg tablet Commonly known as:  SINGULAIR Take 10 mg by mouth daily. SIMVASTATIN PO Take  by mouth. 80 mg  
  
 VITAMIN B-12 500 mcg tablet Generic drug:  cyanocobalamin Take 500 mcg by mouth daily. ZYRTEC PO Take  by mouth. Follow-up Instructions Return in about 3 weeks (around 5/23/2017). To-Do List   
 05/02/2017 Lab:  CBC W/O DIFF   
  
 05/02/2017 Lab:  MAGNESIUM   
  
 05/02/2017 Lab:  METABOLIC PANEL, COMPREHENSIVE   
  
 05/02/2017 Lab:  PROTHROMBIN TIME + INR   
  
 05/02/2017 Lab:  PTT   
  
 05/02/2017 Lab:  TSH 3RD GENERATION   
  
 05/10/2017 10:00 AM  
  Appointment with [facility] CT RM [facility] at [facility] ([phone]) GENERAL INSTRUCTIONS  This study does not require you to drink contrast prior to your study.   RELATED STUDY INFORMATION  Bring any films, CDs, and reports related with you on the day of your exam.  This only includes studies done outside of [facility], Ron[facility] Childress, and [facility]. QUESTIONS  Notify the CT Department if you have any questions concerning your study. Reilly Rasmussen [phone]  
  
 05/18/2017 8:00 AM  
  Appointment with [facility] ANESTHESIA 2; [facility] CATH SPECIAL PROCEDURE ROOM; [facility] CATH HOLDING; [facility] [address] LAB RM 1 at [facility] NON-INVASIVE CARD ([phone])  
  
 05/19/2017 Cardiac Services:  CARDIAC CATHETERIZATION   
  
 05/19/2017 ECHO:  ECHO TRANSESOPHAGEAL (ANAND) W OR WO CONTR Please provide this summary of care documentation to your next provider. Your primary care clinician is listed as Tom Rojas. If you have any questions after today's visit, please call [phone].

## 2017-05-03 NOTE — PROGRESS NOTES
HISTORY OF PRESENT ILLNESS  Elijah Fitzgerald is a 78 y.o. female. Valvular Heart Disease   This is a chronic problem. The current episode started more than 1 week ago. The problem occurs daily. The problem has been gradually worsening. Associated symptoms include shortness of breath. Pertinent negatives include no chest pain. Pre-op Exam   The history is provided by the patient. The problem has not changed since onset. Associated symptoms include shortness of breath. Pertinent negatives include no chest pain. Shortness of Breath   The history is provided by the patient. This is a chronic problem. The problem occurs intermittently. The current episode started more than 1 week ago. The problem has not changed since onset. Pertinent negatives include no fever, no ear pain, no neck pain, no cough, no sputum production, no hemoptysis, no wheezing, no PND, no orthopnea, no chest pain, no syncope, no vomiting, no rash, no leg pain, no leg swelling and no claudication. Associated medical issues do not include CAD or heart failure. Review of Systems   Constitutional: Positive for malaise/fatigue. Negative for chills, diaphoresis, fever and weight loss. HENT: Negative for ear discharge, ear pain, hearing loss, nosebleeds and tinnitus. Eyes: Negative for blurred vision. Respiratory: Positive for shortness of breath. Negative for cough, hemoptysis, sputum production, wheezing and stridor. Cardiovascular: Negative for chest pain, palpitations, orthopnea, claudication, leg swelling, syncope and PND. Gastrointestinal: Negative for heartburn, nausea and vomiting. Musculoskeletal: Negative for myalgias and neck pain. Skin: Negative for itching and rash. Neurological: Negative for dizziness, tingling, tremors, focal weakness, loss of consciousness and weakness. Psychiatric/Behavioral: Negative for depression and suicidal ideas.      Family History   Problem Relation Age of Onset    Cancer Other     Heart Disease Other     Cancer Mother     Heart Disease Mother        Past Medical History:   Diagnosis Date    Arthritis     Heart murmur     History of seasonal allergies     HTN (hypertension)     Hypercholesteremia        Past Surgical History:   Procedure Laterality Date    HX KNEE ARTHROSCOPY      left and right    HX ORTHOPAEDIC      right ankle       Social History   Substance Use Topics    Smoking status: Never Smoker    Smokeless tobacco: Never Used    Alcohol use No       No Known Allergies    Outpatient Prescriptions Marked as Taking for the 5/2/17 encounter (Office Visit) with Burak Kaur MD   Medication Sig Dispense Refill    meloxicam (MOBIC) 15 mg tablet TAKE 1 TABLET DAILY WITH FOOD 90 Tab 2    losartan (COZAAR) 50 mg tablet Take  by mouth daily.  montelukast (SINGULAIR) 10 mg tablet Take 10 mg by mouth daily.  cyanocobalamin (VITAMIN B-12) 500 mcg tablet Take 500 mcg by mouth daily.  folic acid (FOLVITE) 1 mg tablet Take  by mouth daily.  omega-3 fatty acids-vitamin e (FISH OIL) 1,000 mg cap Take 1 capsule by mouth.  metoprolol succinate (TOPROL-XL) 50 mg XL tablet Take 50 mg by mouth two (2) times a day.  celecoxib (CELEBREX) 200 mg capsule Take 100 mg by mouth daily.  hydrochlorothiazide (HYDRODIURIL) 25 mg tablet Take 25 mg by mouth daily.  SIMVASTATIN PO Take  by mouth. 80 mg      CETIRIZINE HCL (ZYRTEC PO) Take  by mouth. Visit Vitals    /55    Pulse (!) 54    Ht 5' 3\" (1.6 m)    Wt 83.5 kg (184 lb)    BMI 32.59 kg/m2     Physical Exam   Constitutional: She is oriented to person, place, and time. She appears well-developed and well-nourished. No distress. HENT:   Head: Atraumatic. Mouth/Throat: No oropharyngeal exudate. Eyes: Conjunctivae are normal. Right eye exhibits no discharge. Left eye exhibits no discharge. No scleral icterus. Neck: Normal range of motion. Neck supple. No JVD present.  No tracheal deviation present. No thyromegaly present. Cardiovascular: Normal rate and regular rhythm. Exam reveals no gallop. Murmur: 3/6 holosystolic murmur best heard at apex and left parasternal area with no radiationl. Pulmonary/Chest: Effort normal and breath sounds normal. No stridor. No respiratory distress. She has no wheezes. She has no rales. She exhibits no tenderness. Abdominal: Soft. There is no tenderness. There is no rebound and no guarding. Musculoskeletal: Normal range of motion. She exhibits no edema or tenderness. Lymphadenopathy:     She has no cervical adenopathy. Neurological: She is alert and oriented to person, place, and time. She exhibits normal muscle tone. Skin: Skin is warm. She is not diaphoretic. Psychiatric: She has a normal mood and affect. Her behavior is normal.     ekg sinus bradycardia with poor r wave progression. Echo report reviewed. ASSESSMENT and PLAN    ICD-10-CM ICD-9-CM    1. Mild HOCM (hypertrophic obstructive cardiomyopathy) (HCC) I42.1 425.11    2. Nonrheumatic aortic valve stenosis I35.0 424.1 ECHO TRANSESOPHAGEAL (ANAND) W OR WO CONTR   3. Pulmonary HTN (HCC) I27.2 416.8 CARDIAC CATHETERIZATION      MAGNESIUM      METABOLIC PANEL, COMPREHENSIVE      CBC W/O DIFF      PROTHROMBIN TIME + INR      PTT      TSH 3RD GENERATION   4. Papillary thyroid carcinoma ([address] C73 193    5.  SOB (shortness of breath) R06.02 786.05      Orders Placed This Encounter    MAGNESIUM     Standing Status:   Future     Standing Expiration Date:   7/8/2243    METABOLIC PANEL, COMPREHENSIVE     Standing Status:   Future     Standing Expiration Date:   5/3/2018    CBC W/O DIFF     Standing Status:   Future     Standing Expiration Date:   5/3/2018    PROTHROMBIN TIME + INR     Standing Status:   Future     Standing Expiration Date:   5/3/2018    PTT     Standing Status:   Future     Standing Expiration Date:   5/3/2018    TSH 3RD GENERATION     Standing Status:   Future Standing Expiration Date:   5/3/2018    CARDIAC CATHETERIZATION     Standing Status:   Future     Standing Expiration Date:   11/2/2017     Order Specific Question:   Reason for Exam:     Answer:   sob/ pulmonary htn    ECHO TRANSESOPHAGEAL (ANAND) W OR WO CONTR     Standing Status:   Future     Standing Expiration Date:   11/2/2017     Order Specific Question:   Reason for Exam:     Answer:   valvular heart disease     Order Specific Question:   Contrast Enhancement (Bubble Study, Definity, Optison) may be used if criteria listed in established evidence-based protocol has been identified. Answer:   Yes     Follow-up Disposition:  Return in about 3 weeks (around 5/23/2017). current treatment plan is effective, no change in therapy  reviewed diet, exercise and weight control  use of aspirin to prevent MI and TIA's discussed. Patient seen for pre op evaluation prior to thyroid surgery for possible Ca. Had recent echo which revealed severe pulm htn. Has mild dyspnea with no signs of fluid overload. Patient seen by pulmonary - recommend LHC/RHC. Will also obtain ANAND to evaluate VHD.

## 2017-05-09 ENCOUNTER — TELEPHONE (OUTPATIENT)
Dept: CARDIOLOGY CLINIC | Age: 77
End: 2017-05-09

## 2017-05-09 ENCOUNTER — HOSPITAL ENCOUNTER (OUTPATIENT)
Dept: LAB | Age: 77
Discharge: HOME | End: 2017-05-09
Payer: MEDICARE

## 2017-05-09 DIAGNOSIS — I27.20 PULMONARY HTN (HCC): ICD-10-CM

## 2017-05-09 LAB
ALBUMIN SERPL BCP-MCNC: 3.7 G/DL (ref 3.4–5)
ALBUMIN/GLOB SERPL: 0.8 {RATIO} (ref 0.8–1.7)
ALP SERPL-CCNC: 141 U/L (ref 45–117)
ALT SERPL-CCNC: 24 U/L (ref 13–56)
ANION GAP BLD CALC-SCNC: 3 MMOL/L (ref 3–18)
APTT PPP: 36.6 SEC (ref 23–36.4)
AST SERPL W P-5'-P-CCNC: 25 U/L (ref 15–37)
BILIRUB SERPL-MCNC: 0.7 MG/DL (ref 0.2–1)
BUN SERPL-MCNC: 42 MG/DL (ref 7–18)
BUN/CREAT SERPL: 31 (ref 12–20)
CALCIUM SERPL-MCNC: 10.1 MG/DL (ref 8.5–10.1)
CHLORIDE SERPL-SCNC: 105 MMOL/L (ref 100–108)
CO2 SERPL-SCNC: 31 MMOL/L (ref 21–32)
CREAT SERPL-MCNC: 1.36 MG/DL (ref 0.6–1.3)
ERYTHROCYTE [DISTWIDTH] IN BLOOD BY AUTOMATED COUNT: 15 % (ref 11.6–14.5)
GLOBULIN SER CALC-MCNC: 4.5 G/DL (ref 2–4)
GLUCOSE SERPL-MCNC: 100 MG/DL (ref 74–99)
HCT VFR BLD AUTO: 37.2 % (ref 35–45)
HGB BLD-MCNC: 11.7 G/DL (ref 12–16)
INR PPP: 1.1 (ref 0.8–1.2)
MAGNESIUM SERPL-MCNC: 2.3 MG/DL (ref 1.6–2.6)
MCH RBC QN AUTO: 27.8 PG (ref 24–34)
MCHC RBC AUTO-ENTMCNC: 31.5 G/DL (ref 31–37)
MCV RBC AUTO: 88.4 FL (ref 74–97)
PLATELET # BLD AUTO: 169 K/UL (ref 135–420)
PMV BLD AUTO: 10.8 FL (ref 9.2–11.8)
POTASSIUM SERPL-SCNC: 5 MMOL/L (ref 3.5–5.5)
PROT SERPL-MCNC: 8.2 G/DL (ref 6.4–8.2)
PROTHROMBIN TIME: 14.1 SEC (ref 11.5–15.2)
RBC # BLD AUTO: 4.21 M/UL (ref 4.2–5.3)
SODIUM SERPL-SCNC: 139 MMOL/L (ref 136–145)
TSH SERPL DL<=0.05 MIU/L-ACNC: 2.67 UIU/ML (ref 0.36–3.74)
WBC # BLD AUTO: 5.3 K/UL (ref 4.6–13.2)

## 2017-05-09 PROCEDURE — 85730 THROMBOPLASTIN TIME PARTIAL: CPT | Performed by: INTERNAL MEDICINE

## 2017-05-09 PROCEDURE — 36415 COLL VENOUS BLD VENIPUNCTURE: CPT | Performed by: INTERNAL MEDICINE

## 2017-05-09 PROCEDURE — 80053 COMPREHEN METABOLIC PANEL: CPT | Performed by: INTERNAL MEDICINE

## 2017-05-09 PROCEDURE — 85610 PROTHROMBIN TIME: CPT | Performed by: INTERNAL MEDICINE

## 2017-05-09 PROCEDURE — 83735 ASSAY OF MAGNESIUM: CPT | Performed by: INTERNAL MEDICINE

## 2017-05-09 PROCEDURE — 84443 ASSAY THYROID STIM HORMONE: CPT | Performed by: INTERNAL MEDICINE

## 2017-05-09 PROCEDURE — 85027 COMPLETE CBC AUTOMATED: CPT | Performed by: INTERNAL MEDICINE

## 2017-05-09 NOTE — TELEPHONE ENCOUNTER
----- Message from Tuyet Tran MD sent at 5/9/2017  2:55 PM EDT -----  Patient needs hydration prior to cardiac cath

## 2017-05-11 NOTE — TELEPHONE ENCOUNTER
Cath and ANAND scheduled for 5/18 @ 8AM  Due to IV hydration prior to cath, please confirm if we need to move cath to another day     5/18 you have scheduled patient's in the office starting at 1 - 3:45PM

## 2017-05-12 NOTE — TELEPHONE ENCOUNTER
This has been fully explained to the patient, who indicates understanding.   Patient is aware to arrive at 730 AM 5/18 for IV hydration

## 2017-05-18 RX ORDER — SODIUM CHLORIDE 0.9 % (FLUSH) 0.9 %
5-10 SYRINGE (ML) INJECTION AS NEEDED
Status: CANCELLED | OUTPATIENT
Start: 2017-05-18

## 2017-05-18 RX ORDER — LIDOCAINE HYDROCHLORIDE 10 MG/ML
0.1 INJECTION, SOLUTION EPIDURAL; INFILTRATION; INTRACAUDAL; PERINEURAL AS NEEDED
Status: CANCELLED | OUTPATIENT
Start: 2017-05-18

## 2017-05-18 RX ORDER — SODIUM CHLORIDE, SODIUM LACTATE, POTASSIUM CHLORIDE, CALCIUM CHLORIDE 600; 310; 30; 20 MG/100ML; MG/100ML; MG/100ML; MG/100ML
75 INJECTION, SOLUTION INTRAVENOUS CONTINUOUS
Status: CANCELLED | OUTPATIENT
Start: 2017-05-18 | End: 2017-05-19

## 2017-05-18 RX ORDER — SODIUM CHLORIDE 0.9 % (FLUSH) 0.9 %
5-10 SYRINGE (ML) INJECTION EVERY 8 HOURS
Status: CANCELLED | OUTPATIENT
Start: 2017-05-18

## 2017-05-18 RX ORDER — FAMOTIDINE 20 MG/1
20 TABLET, FILM COATED ORAL ONCE
Status: CANCELLED | OUTPATIENT
Start: 2017-05-18 | End: 2017-05-18

## 2017-05-18 RX ORDER — INSULIN LISPRO 100 [IU]/ML
INJECTION, SOLUTION INTRAVENOUS; SUBCUTANEOUS ONCE
Status: CANCELLED | OUTPATIENT
Start: 2017-05-18 | End: 2017-05-18

## 2017-05-19 ENCOUNTER — ANESTHESIA (OUTPATIENT)
Dept: CARDIAC CATH/INVASIVE PROCEDURES | Age: 77
End: 2017-05-19
Payer: MEDICARE

## 2017-05-19 ENCOUNTER — ANESTHESIA EVENT (OUTPATIENT)
Dept: CARDIAC CATH/INVASIVE PROCEDURES | Age: 77
End: 2017-05-19
Payer: MEDICARE

## 2017-05-19 ENCOUNTER — HOSPITAL ENCOUNTER (OUTPATIENT)
Dept: NON INVASIVE DIAGNOSTICS | Age: 77
Discharge: HOME | End: 2017-05-19
Attending: INTERNAL MEDICINE | Admitting: INTERNAL MEDICINE
Payer: MEDICARE

## 2017-05-19 VITALS
RESPIRATION RATE: 23 BRPM | SYSTOLIC BLOOD PRESSURE: 140 MMHG | OXYGEN SATURATION: 92 % | HEIGHT: 61 IN | TEMPERATURE: 98 F | BODY MASS INDEX: 35.3 KG/M2 | WEIGHT: 187 LBS | HEART RATE: 66 BPM | DIASTOLIC BLOOD PRESSURE: 47 MMHG

## 2017-05-19 VITALS
OXYGEN SATURATION: 100 % | DIASTOLIC BLOOD PRESSURE: 75 MMHG | SYSTOLIC BLOOD PRESSURE: 140 MMHG | RESPIRATION RATE: 26 BRPM | HEART RATE: 54 BPM

## 2017-05-19 PROCEDURE — 74011250637 HC RX REV CODE- 250/637: Performed by: INTERNAL MEDICINE

## 2017-05-19 PROCEDURE — 76060000032 HC ANESTHESIA 0.5 TO 1 HR

## 2017-05-19 PROCEDURE — 74011000250 HC RX REV CODE- 250

## 2017-05-19 PROCEDURE — 74011000250 HC RX REV CODE- 250: Performed by: INTERNAL MEDICINE

## 2017-05-19 PROCEDURE — 74011636320 HC RX REV CODE- 636/320: Performed by: INTERNAL MEDICINE

## 2017-05-19 PROCEDURE — 74011250636 HC RX REV CODE- 250/636: Performed by: INTERNAL MEDICINE

## 2017-05-19 PROCEDURE — 93460 R&L HRT ART/VENTRICLE ANGIO: CPT

## 2017-05-19 PROCEDURE — 74011250636 HC RX REV CODE- 250/636

## 2017-05-19 PROCEDURE — 93312 ECHO TRANSESOPHAGEAL: CPT

## 2017-05-19 RX ORDER — FAMOTIDINE 20 MG/1
20 TABLET, FILM COATED ORAL ONCE
Status: DISCONTINUED | OUTPATIENT
Start: 2017-05-19 | End: 2017-05-19 | Stop reason: HOSPADM

## 2017-05-19 RX ORDER — LIDOCAINE HYDROCHLORIDE 10 MG/ML
0.1 INJECTION, SOLUTION EPIDURAL; INFILTRATION; INTRACAUDAL; PERINEURAL AS NEEDED
Status: DISCONTINUED | OUTPATIENT
Start: 2017-05-19 | End: 2017-05-19 | Stop reason: HOSPADM

## 2017-05-19 RX ORDER — FENTANYL CITRATE 50 UG/ML
12.5-1 INJECTION, SOLUTION INTRAMUSCULAR; INTRAVENOUS
Status: DISCONTINUED | OUTPATIENT
Start: 2017-05-19 | End: 2017-05-19 | Stop reason: HOSPADM

## 2017-05-19 RX ORDER — GUAIFENESIN 100 MG/5ML
81 LIQUID (ML) ORAL ONCE
Status: COMPLETED | OUTPATIENT
Start: 2017-05-19 | End: 2017-05-19

## 2017-05-19 RX ORDER — SODIUM CHLORIDE 0.9 % (FLUSH) 0.9 %
5-10 SYRINGE (ML) INJECTION EVERY 8 HOURS
Status: DISCONTINUED | OUTPATIENT
Start: 2017-05-19 | End: 2017-05-19 | Stop reason: HOSPADM

## 2017-05-19 RX ORDER — LIDOCAINE HYDROCHLORIDE 10 MG/ML
1-90 INJECTION, SOLUTION EPIDURAL; INFILTRATION; INTRACAUDAL; PERINEURAL
Status: DISCONTINUED | OUTPATIENT
Start: 2017-05-19 | End: 2017-05-19 | Stop reason: HOSPADM

## 2017-05-19 RX ORDER — SODIUM CHLORIDE 0.9 % (FLUSH) 0.9 %
5-10 SYRINGE (ML) INJECTION AS NEEDED
Status: DISCONTINUED | OUTPATIENT
Start: 2017-05-19 | End: 2017-05-19 | Stop reason: HOSPADM

## 2017-05-19 RX ORDER — HEPARIN SODIUM 200 [USP'U]/100ML
500 INJECTION, SOLUTION INTRAVENOUS ONCE
Status: COMPLETED | OUTPATIENT
Start: 2017-05-19 | End: 2017-05-19

## 2017-05-19 RX ORDER — SODIUM CHLORIDE 9 MG/ML
75 INJECTION, SOLUTION INTRAVENOUS CONTINUOUS
Status: DISCONTINUED | OUTPATIENT
Start: 2017-05-19 | End: 2017-05-19 | Stop reason: HOSPADM

## 2017-05-19 RX ORDER — METOPROLOL SUCCINATE 100 MG/1
100 TABLET, EXTENDED RELEASE ORAL 2 TIMES DAILY
Qty: 60 TAB | Refills: 3 | Status: SHIPPED | OUTPATIENT
Start: 2017-05-19 | End: 2019-10-01 | Stop reason: ALTCHOICE

## 2017-05-19 RX ORDER — MIDAZOLAM HYDROCHLORIDE 1 MG/ML
.5-2 INJECTION, SOLUTION INTRAMUSCULAR; INTRAVENOUS
Status: DISCONTINUED | OUTPATIENT
Start: 2017-05-19 | End: 2017-05-19 | Stop reason: HOSPADM

## 2017-05-19 RX ORDER — INSULIN LISPRO 100 [IU]/ML
INJECTION, SOLUTION INTRAVENOUS; SUBCUTANEOUS ONCE
Status: DISCONTINUED | OUTPATIENT
Start: 2017-05-19 | End: 2017-05-19 | Stop reason: HOSPADM

## 2017-05-19 RX ORDER — PROPOFOL 10 MG/ML
INJECTION, EMULSION INTRAVENOUS AS NEEDED
Status: DISCONTINUED | OUTPATIENT
Start: 2017-05-19 | End: 2017-05-19 | Stop reason: HOSPADM

## 2017-05-19 RX ORDER — SODIUM CHLORIDE 9 MG/ML
250 INJECTION, SOLUTION INTRAVENOUS ONCE
Status: COMPLETED | OUTPATIENT
Start: 2017-05-19 | End: 2017-05-19

## 2017-05-19 RX ORDER — LABETALOL HCL 20 MG/4 ML
10 SYRINGE (ML) INTRAVENOUS ONCE
Status: COMPLETED | OUTPATIENT
Start: 2017-05-19 | End: 2017-05-19

## 2017-05-19 RX ORDER — HEPARIN SODIUM 1000 [USP'U]/ML
1000-10000 INJECTION, SOLUTION INTRAVENOUS; SUBCUTANEOUS
Status: DISCONTINUED | OUTPATIENT
Start: 2017-05-19 | End: 2017-05-19 | Stop reason: HOSPADM

## 2017-05-19 RX ORDER — LABETALOL HYDROCHLORIDE 5 MG/ML
INJECTION, SOLUTION INTRAVENOUS
Status: DISCONTINUED
Start: 2017-05-19 | End: 2017-05-19 | Stop reason: HOSPADM

## 2017-05-19 RX ORDER — HYDRALAZINE HYDROCHLORIDE 20 MG/ML
10 INJECTION INTRAMUSCULAR; INTRAVENOUS
Status: COMPLETED | OUTPATIENT
Start: 2017-05-19 | End: 2017-05-19

## 2017-05-19 RX ORDER — SODIUM CHLORIDE, SODIUM LACTATE, POTASSIUM CHLORIDE, CALCIUM CHLORIDE 600; 310; 30; 20 MG/100ML; MG/100ML; MG/100ML; MG/100ML
75 INJECTION, SOLUTION INTRAVENOUS CONTINUOUS
Status: DISCONTINUED | OUTPATIENT
Start: 2017-05-19 | End: 2017-05-19 | Stop reason: HOSPADM

## 2017-05-19 RX ORDER — LABETALOL HYDROCHLORIDE 5 MG/ML
10 INJECTION, SOLUTION INTRAVENOUS
Status: DISCONTINUED | OUTPATIENT
Start: 2017-05-19 | End: 2017-05-19 | Stop reason: HOSPADM

## 2017-05-19 RX ADMIN — MIDAZOLAM HYDROCHLORIDE 1 MG: 1 INJECTION, SOLUTION INTRAMUSCULAR; INTRAVENOUS at 14:29

## 2017-05-19 RX ADMIN — SODIUM CHLORIDE 250 ML: 900 INJECTION, SOLUTION INTRAVENOUS at 08:58

## 2017-05-19 RX ADMIN — PROPOFOL 20 MG: 10 INJECTION, EMULSION INTRAVENOUS at 11:04

## 2017-05-19 RX ADMIN — SODIUM CHLORIDE 75 ML/HR: 900 INJECTION, SOLUTION INTRAVENOUS at 09:14

## 2017-05-19 RX ADMIN — FENTANYL CITRATE 25 MCG: 50 INJECTION INTRAMUSCULAR; INTRAVENOUS at 14:28

## 2017-05-19 RX ADMIN — PROPOFOL 20 MG: 10 INJECTION, EMULSION INTRAVENOUS at 11:09

## 2017-05-19 RX ADMIN — PROPOFOL 30 MG: 10 INJECTION, EMULSION INTRAVENOUS at 10:59

## 2017-05-19 RX ADMIN — IOPAMIDOL 70 ML: 612 INJECTION, SOLUTION INTRAVENOUS at 15:34

## 2017-05-19 RX ADMIN — PROPOFOL 20 MG: 10 INJECTION, EMULSION INTRAVENOUS at 11:14

## 2017-05-19 RX ADMIN — HYDRALAZINE HYDROCHLORIDE 10 MG: 20 INJECTION INTRAMUSCULAR; INTRAVENOUS at 15:47

## 2017-05-19 RX ADMIN — HEPARIN SODIUM 1000 UNITS: 200 INJECTION, SOLUTION INTRAVENOUS at 14:29

## 2017-05-19 RX ADMIN — LABETALOL HYDROCHLORIDE 10 MG: 5 INJECTION, SOLUTION INTRAVENOUS at 15:35

## 2017-05-19 RX ADMIN — ASPIRIN 81 MG 81 MG: 81 TABLET ORAL at 14:45

## 2017-05-19 RX ADMIN — LIDOCAINE HYDROCHLORIDE 30 ML: 10 INJECTION, SOLUTION EPIDURAL; INFILTRATION; INTRACAUDAL; PERINEURAL at 14:31

## 2017-05-19 NOTE — ANESTHESIA PREPROCEDURE EVALUATION
Anesthetic History   No history of anesthetic complications       Comments:   Risk Factors for Postoperative nausea/vomiting:       History of postoperative nausea/vomiting? NO       Female? YES       Motion sickness? NO       Intended opioid administration for postoperative analgesia? NO      Smoking Abstinence  Current Smoker? NO  Elective Surgery? YES  Seen preoperatively by anesthesiologist or proxy prior to day of surgery? YES  Pt abstained from smoking 24 hours prior to anesthesia? YES     Review of Systems / Medical History  Patient summary reviewed and pertinent labs reviewed    Pulmonary          Shortness of breath and undiagnosed apnea      Comments: Pulmonary HT     Left upper lung nodule is stable. No new lung nodules identified. Continued follow-up as per guidelines below.     Some improved aeration of the lungs, with marked improvement of groundglass lung densities.     Relatively stable dilated main pulmonary artery indicative of pulmonary hypertension.     Subtle lobulated contour of the left ventricular apex. Correlate with history for prior myocardial infarction, finding suggestive of a ventricular aneurysm. Consider echocardiogram correlation if clinically indicated.     Atherosclerotic disease of the aorta. Suspect calcifications of the mitral and  aortic valve.     Stable left adrenal nodule. Finding is favored to be benign, probable adenoma   Neuro/Psych   Within defined limits           Cardiovascular    Hypertension: poorly controlled  Valvular problems/murmurs: aortic stenosis    CHF: NYHA Classification III, dyspnea on exertion    Hyperlipidemia    Exercise tolerance: <4 METS  Comments: Left ventricle: Systolic function was normal by visual assessment. Ejection fraction was estimated in the range of 55 % to 60 %. No obvious  wall motion abnormalities identified in the views obtained. Wall thickness  was mildly increased.     Right ventricle: Systolic pressure was markedly increased. Estimated peak  pressure was at least 75 mmHg. Left atrium: The atrium was severely dilated. Right atrium: The atrium was dilated. Mitral valve: There was marked annular calcification. The findings were  consistent with mild mitral stenosis. Peak transmitral gradient was 10  mmHg. Mean transmitral gradient was 5 mmHg. Aortic valve: The valve was trileaflet. Leaflets exhibited moderately  increased thickness and reduced mobility. There was mild to moderate  stenosis. There was mild to moderate regurgitation. Valve peak gradient  was 40 mmHg. Valve mean gradient was 26 mmHg. Estimated aortic valve area  (by VTI) was 1.5 cmï¾². GI/Hepatic/Renal         Renal disease: CRI       Endo/Other      Hypothyroidism: well controlled  Obesity, arthritis and anemia     Other Findings   Comments: 1. Multilevel degenerative disc disease as described above with resulting  spinal canal stenosis and foraminal narrowing.  Most severely affected level is  L3-4.  2. Anterolisthesis of L3 on L4 as described above         Physical Exam    Airway  Mallampati: I  TM Distance: 4 - 6 cm  Neck ROM: normal range of motion   Mouth opening: Normal     Cardiovascular    Rhythm: regular  Rate: normal  JVD  Murmur: Grade 4, Aortic area     Dental    Dentition: Edentulous, Full lower dentures and Full upper dentures     Pulmonary            Prolonged expiration     Abdominal  GI exam deferred       Other Findings            Anesthetic Plan    ASA: 4  Anesthesia type: MAC          Induction: Intravenous  Anesthetic plan and risks discussed with: Patient      Very high anesthetic risk   PA pressures 96 mmHg - severe pulmonary HTN - not addressed

## 2017-05-19 NOTE — PROGRESS NOTES
1615: SHEATH PULL NOTE:    Patient informed of procedure and questions answered with review. 6Fr  in RFV and 4Fr in RFA pulled at 1545 by Shellie Maldonado. Hand hold and good hemostasis, with manual compression to site. Handhold for 15 minutes. Gauze and transparent dressing applied to site. No bleeding, no hematoma, no pain/discomfort at site. Groin instructions provided with review. Patient verbalized understanding. 1700: Right groin site remains CDI. Pt tolerating PO liquids with no difficulty. 1755: Bedside and Verbal shift change report given to Estefanía Rob RN (oncoming nurse) by JORDEN Flores (offgoing nurse). Report included the following information SBAR, Intake/Output and MAR.

## 2017-05-19 NOTE — ANESTHESIA POSTPROCEDURE EVALUATION
Post-Anesthesia Evaluation and Assessment    Patient: Alysia Ortiz MRN: [id number]  SSN: [id number]    YOB: 1940  Age: 78 y.o. Sex: female       Cardiovascular Function/Vital Signs  Visit Vitals    /67    Pulse (!) 55    Temp 36.7 °C (98 °F)    Resp 16    Ht 5' 1\" (1.549 m)    Wt 84.8 kg (187 lb)    SpO2 100%    Breastfeeding No    BMI 35.33 kg/m2       Patient is status post MAC anesthesia for * No procedures listed *. Nausea/Vomiting: None    Postoperative hydration reviewed and adequate. Pain:  Pain Scale 1: Numeric (0 - 10) (05/19/17 0858)  Pain Intensity 1: 0 (05/19/17 0858)   Managed    Neurological Status: At baseline    Mental Status and Level of Consciousness: Arousable    Pulmonary Status:   O2 Device: Nasal cannula (05/19/17 1129)   Adequate oxygenation and airway patent    Complications related to anesthesia: None    Post-anesthesia assessment completed.  No concerns    Signed By: Gertrude Fox MD     May 19, 2017

## 2017-05-19 NOTE — H&P
H&P update    No new symptoms  Risks, benefits and alternatives of LHC/RHC explained to patient.   All questions answered

## 2017-05-19 NOTE — ROUTINE PROCESS
TRANSFER - OUT REPORT:    Verbal report given to [facility], RN. (name) on Dixie Broderick  being transferred to [facility] ([facility] for routine progression of care       Report consisted of patients Situation, Background, Assessment and   Recommendations(SBAR). Information from the following report(s) SBAR, Procedure Summary and MAR was reviewed with the receiving nurse. Lines:   Peripheral IV 05/19/17 Left Antecubital (Active)   Site Assessment Clean, dry, & intact 5/19/2017  9:02 AM   Phlebitis Assessment 0 5/19/2017  9:02 AM   Infiltration Assessment 0 5/19/2017  9:02 AM   Dressing Status Clean, dry, & intact 5/19/2017  9:02 AM   Dressing Type Transparent 5/19/2017  9:02 AM   Hub Color/Line Status Pink; Infusing 5/19/2017  9:02 AM        Opportunity for questions and clarification was provided.       Patient transported with:   [employer]

## 2017-05-19 NOTE — IP AVS SNAPSHOT
Jojo Leary 
 
 
 [address] Patient: Sonia Best MRN: [id number] TJZ:[id number] You are allergic to the following No active allergies Recent Documentation Height Weight Breastfeeding? BMI Smoking Status 1.549 m 84.8 kg No 35.33 kg/m2 Never Smoker Emergency Contacts Name Discharge Info Relation Home Work Mobile [employer] DISCHARGE CAREGIVER [3] Spouse [3] [phone] About your hospitalization You were admitted on:  May 19, 2017 You last received care in the:  [facility] You were discharged on:  May 19, 2017 Unit phone number:  [phone] Why you were hospitalized Your primary diagnosis was:  Not on File Providers Seen During Your Hospitalizations Provider Role Specialty Primary office phone Jacy Brumfield MD Attending Provider Cardiology [phone] Your Primary Care Physician (PCP) Primary Care Physician Office Phone Office Fax [phone] [facility], [address] [place] [address] [phone] Follow-up Information Follow up With Details Comments Contact Info Aristeo Sy MD   [address] [place] [address] [phone] Jacy Brumfield MD Schedule an appointment as soon as possible for a visit in 2 weeks  [address] [place] [address] [facility] [address] [phone] Your Appointments Tuesday June 13, 2017  1:00 PM EDT  
ESTABLISHED PATIENT with Jacy Brumfield MD  
[facility] ([address]) [address] [place] [address] [phone] Current Discharge Medication List  
  
CONTINUE these medications which have CHANGED Dose & Instructions Dispensing Information Comments Morning Noon Evening Bedtime  
 metoprolol succinate 100 mg tablet Commonly known as:  TOPROL-XL What changed:   
- medication strength 
- how much to take Your last dose was: Your next dose is:    
   
   
 Dose:  100 mg Take 1 Tab by mouth two (2) times a day. Quantity:  60 Tab Refills:  3 CONTINUE these medications which have NOT CHANGED Dose & Instructions Dispensing Information Comments Morning Noon Evening Bedtime CeleBREX 200 mg capsule Generic drug:  celecoxib Your last dose was: Your next dose is:    
   
   
 Dose:  100 mg Take 100 mg by mouth daily. Refills:  0  
     
   
   
   
  
 folic acid 1 mg tablet Commonly known as:  [employer] Your last dose was: Your next dose is: Take  by mouth daily. Refills:  0  
     
   
   
   
  
 hydroCHLOROthiazide 25 mg tablet Commonly known as:  HYDRODIURIL Your last dose was: Your next dose is:    
   
   
 Dose:  25 mg Take 25 mg by mouth daily. Refills:  0  
     
   
   
   
  
 losartan 50 mg tablet Commonly known as:  COZAAR Your last dose was: Your next dose is: Take  by mouth daily. Refills:  0  
     
   
   
   
  
 meloxicam 15 mg tablet Commonly known as:  MOBIC Your last dose was: Your next dose is: TAKE 1 TABLET DAILY WITH FOOD Quantity:  90 Tab Refills:  2  
     
   
   
   
  
 montelukast 10 mg tablet Commonly known as:  SINGULAIR Your last dose was: Your next dose is:    
   
   
 Dose:  10 mg Take 10 mg by mouth daily. Refills:  0 SIMVASTATIN PO Your last dose was: Your next dose is: Take  by mouth. 80 mg Refills:  0  
     
   
   
   
  
 VITAMIN B-12 500 mcg tablet Generic drug:  cyanocobalamin Your last dose was: Your next dose is:    
   
   
 Dose:  500 mcg Take 500 mcg by mouth daily. Refills:  0 ZYRTEC PO Your last dose was: Your next dose is: Take  by mouth. Refills:  0 Where to Get Your Medications These medications were sent to [address], [address] [address] Phone:  [phone]  
  metoprolol succinate 100 mg tablet Discharge Instructions HEART CATHETERIZATION/ANGIOGRAPHY DISCHARGE INSTRUCTIONS 1. Check puncture site frequently for swelling or bleeding. If there is any bleeding, lie down and apply pressure over the area with a clean towel or washcloth. Notify your doctor for any redness, swelling, drainage, or oozing from the puncture site. Notify your doctor for any fever or chills. 2. If the extremity becomes cold, numb, or painful call Dr. Randy Maxwell. 3. Activity should be limited for the next 48 hours. Climb stairs as little as possible and avoid any stooping, bending, or strenuous activity for 48 hours. No heavy lifting (anything over 10 pounds) for 3 days. 4. You may resume your usual diet. Drink more fluids than usual. 
5. Have a responsible person drive you home and stay with you for at least 24 hours after your heart catheterization/angiography. 6. You may remove bandage from your Right and Groin in 24 hours. You may shower in 24 hours. No tub baths, hot tubs, or swimming for 1 week. Do not place any lotions, creams, powders, or ointments over puncture site for 1 week. You may place a clean band-aid over the puncture site each day for 5 days. Change daily. DISCHARGE SUMMARY from Nurse The following personal items are in your possession at time of discharge: 
 
  
Visual Aid: Glasses, With patient PATIENT INSTRUCTIONS: 
 
 
F-face looks uneven A-arms unable to move or move unevenly S-speech slurred or non-existent T-time-call 911 as soon as signs and symptoms begin-DO NOT go  
 Back to bed or wait to see if you get better-TIME IS BRAIN. Warning Signs of HEART ATTACK Call 911 if you have these symptoms: 
? Chest discomfort. Most heart attacks involve discomfort in the center of the chest that lasts more than a few minutes, or that goes away and comes back. It can feel like uncomfortable pressure, squeezing, fullness, or pain. ? Discomfort in other areas of the upper body. Symptoms can include pain or discomfort in one or both arms, the back, neck, jaw, or stomach. ? Shortness of breath with or without chest discomfort. ? Other signs may include breaking out in a cold sweat, nausea, or lightheadedness. Don't wait more than five minutes to call [address]! Fast action can save your life. Calling 911 is almost always the fastest way to get lifesaving treatment. Emergency Medical Services staff can begin treatment when they arrive  up to an hour sooner than if someone gets to the hospital by car. The discharge information has been reviewed with the patient and spouse. The patient and spouse verbalized understanding. Discharge medications reviewed with the patient and spouse and appropriate educational materials and side effects teaching were provided. Patient armband removed and shredded Discharge Orders None General Information Please provide this summary of care documentation to your next provider. Patient Signature:  ____________________________________________________________ Date:  ____________________________________________________________  
  
\[facility] [place] [facility]\"" Provider Signature:  ____________________________________________________________ Date:  ____________________________________________________________

## 2017-05-19 NOTE — PROGRESS NOTES
PIV removed, dsd applied. Surgical site c/d/i;  at bedside. Pt denies pain, tolerated PO. Discharged home with .

## 2017-05-19 NOTE — DISCHARGE INSTRUCTIONS
HEART CATHETERIZATION/ANGIOGRAPHY DISCHARGE INSTRUCTIONS    1. Check puncture site frequently for swelling or bleeding. If there is any bleeding, lie down and apply pressure over the area with a clean towel or washcloth. Notify your doctor for any redness, swelling, drainage, or oozing from the puncture site. Notify your doctor for any fever or chills. 2. If the extremity becomes cold, numb, or painful call Dr. Randy Maxwell. 3. Activity should be limited for the next 48 hours. Climb stairs as little as possible and avoid any stooping, bending, or strenuous activity for 48 hours. No heavy lifting (anything over 10 pounds) for 3 days. 4. You may resume your usual diet. Drink more fluids than usual.  5. Have a responsible person drive you home and stay with you for at least 24 hours after your heart catheterization/angiography. 6. You may remove bandage from your Right and Groin in 24 hours. You may shower in 24 hours. No tub baths, hot tubs, or swimming for 1 week. Do not place any lotions, creams, powders, or ointments over puncture site for 1 week. You may place a clean band-aid over the puncture site each day for 5 days. Change daily. DISCHARGE SUMMARY from Nurse    The following personal items are in your possession at time of discharge:       Visual Aid: Glasses, With patient        PATIENT INSTRUCTIONS:    After general anesthesia or intravenous sedation, for 24 hours or while taking prescription Narcotics:  · Limit your activities  · Do not drive and operate hazardous machinery  · Do not make important personal or business decisions  · Do  not drink alcoholic beverages  · If you have not urinated within 8 hours after discharge, please contact your surgeon on call.     Report the following to your surgeon:  · Excessive pain, swelling, redness or odor of or around the surgical area  · Temperature over 100.5  · Nausea and vomiting lasting longer than 4 hours or if unable to take medications  · Any signs of decreased circulation or nerve impairment to extremity: change in color, persistent  numbness, tingling, coldness or increase pain  · Any questions    *  Please give a list of your current medications to your Primary Care Provider. *  Please update this list whenever your medications are discontinued, doses are      changed, or new medications (including over-the-counter products) are added. *  Please carry medication information at all times in case of emergency situations. These are general instructions for a healthy lifestyle:    No smoking/ No tobacco products/ Avoid exposure to second hand smoke    Surgeon General's Warning:  Quitting smoking now greatly reduces serious risk to your health. Obesity, smoking, and sedentary lifestyle greatly increases your risk for illness    A healthy diet, regular physical exercise & weight monitoring are important for maintaining a healthy lifestyle    You may be retaining fluid if you have a history of heart failure or if you experience any of the following symptoms:  Weight gain of 3 pounds or more overnight or 5 pounds in a week, increased swelling in our hands or feet or shortness of breath while lying flat in bed. Please call your doctor as soon as you notice any of these symptoms; do not wait until your next office visit. Recognize signs and symptoms of STROKE:    F-face looks uneven    A-arms unable to move or move unevenly    S-speech slurred or non-existent    T-time-call 911 as soon as signs and symptoms begin-DO NOT go       Back to bed or wait to see if you get better-TIME IS BRAIN. Warning Signs of HEART ATTACK     Call 911 if you have these symptoms:   Chest discomfort. Most heart attacks involve discomfort in the center of the chest that lasts more than a few minutes, or that goes away and comes back. It can feel like uncomfortable pressure, squeezing, fullness, or pain.  Discomfort in other areas of the upper body.  Symptoms can include pain or discomfort in one or both arms, the back, neck, jaw, or stomach.  Shortness of breath with or without chest discomfort.  Other signs may include breaking out in a cold sweat, nausea, or lightheadedness. Don't wait more than five minutes to call 911 - MINUTES MATTER! Fast action can save your life. Calling 911 is almost always the fastest way to get lifesaving treatment. Emergency Medical Services staff can begin treatment when they arrive -- up to an hour sooner than if someone gets to the hospital by car. The discharge information has been reviewed with the patient and spouse. The patient and spouse verbalized understanding. Discharge medications reviewed with the patient and spouse and appropriate educational materials and side effects teaching were provided.       Patient armband removed and shredded

## 2017-05-19 NOTE — ROUTINE PROCESS
Cath holding summary    0900: Patient escorted to cath holding from waiting area ambulatory, alert and oriented x 4, voicing no complaints. Changed into gown and placed on monitor. Sinus Maciej Sibley noted. NPO since MN. Lab results, med rec and H&P reviewed on chart. PIV x 2 inserted without difficulty. Family to bedside. 1130: ANAND completed. Pt tolerated well.  to bedside. 1415: TRANSFER - OUT REPORT:    Verbal report given to Floyd(name) on [employer]  being transferred to Cath Lab(unit) for ordered procedure       Report consisted of patients Situation, Background, Assessment and   Recommendations(SBAR). Information from the following report(s) SBAR, Intake/Output, MAR and Recent Results was reviewed with the receiving nurse. Lines:   Peripheral IV 05/19/17 Left Antecubital (Active)   Site Assessment Clean, dry, & intact 5/19/2017  9:02 AM   Phlebitis Assessment 0 5/19/2017  9:02 AM   Infiltration Assessment 0 5/19/2017  9:02 AM   Dressing Status Clean, dry, & intact 5/19/2017  9:02 AM   Dressing Type Transparent 5/19/2017  9:02 AM   Hub Color/Line Status Pink; Infusing 5/19/2017  9:02 AM        Opportunity for questions and clarification was provided.       Patient transported with:   [employer]

## 2017-05-20 NOTE — PROCEDURES
[facility] CATH LAB    Name:  Juancarlos Lovell  MR#:  [id number]  :  1940  Account #:  [de-identified]  Date of Adm:  2017  Date of Service:  2017      ESTIMATED BLOOD LOSS: none    SPECIMENS REMOVED: none    PROCEDURE PERFORMED BY: Zeyad Begum MD.    NAME OF PROCEDURE: Left heart catheterization, coronary  angiogram. Right heart catheterization with cardiac output  measurement. ENTRY: Right femoral artery and right femoral vein. COMPLICATIONS: None. SEDATION: Moderate sedation obtained using 1 mg of Versed and 25  mcg of fentanyl. Total sedation time was 75 minutes. PROCEDURE IN DETAIL: The patient was earlier seen in Cardiology  Clinic for preoperative evaluation before thyroid surgery. She was  noted to have severe pulmonary hypertension. Also, complains of  exertional dyspnea which could be due to pulmonary hypertension and  underlying coronary artery disease. We decided to proceed with right  and left heart catheterization prior to preoperative evaluation. At this  time, a written witnessed informed consent was obtained from the  patient. The patient was prepped and draped in a sterile fashion. Sedation obtained using IV Versed and fentanyl. Lidocaine 1% was  injected around the right groin and right femoral artery was accessed  using a 4-Angolan arterial sheath without any complication. Left heart  catheterization was performed using a 4-Angolan pigtail catheter. We  obtained pullback pressures to evaluate any gradient. Following this, a  right coronary angiogram was performed using a JR catheter. Left  coronary angiogram was performed initially using a JL4 catheter  followed by a JL5 catheter. Following this, supporting catheter and wire  were removed. Following this, we then accessed the right femoral vein  using a 6-[place]olan sheath. The [place] catheter was advanced with  balloon inflated.  The [place] catheter was then advanced to the level of  the pulmonary artery. Pulmonary artery pressures were obtained. Pulmonary capillary wedge pressures were obtained. We also obtained  O2 saturations. Following this, cardiac output measurements were  made. We then obtained right ventricular pressure by pullback  technique followed by right atrial pressure. Following this, the [place]  catheter was then removed. The patient was then transported to  holding area for further management and hemostasis. FINDINGS:  1. Left main is patent, is mildly calcific. It bifurcates into left anterior  descending artery and circumflex artery. 2. Left anterior descending artery has mild calcification with mild  diffuse 20% to 30% stenosis in the midportion. Diagonal 2 arteries and  small to medium caliber vessel with ostial 80% to 90% stenosis. 3. Circumflex artery is codominant with wall irregularities. It  is represented by a large obtuse marginal 2 branch. 4. Right coronary artery is codominant with proximal to mid diffuse  30% to 40% stenosis. 5. Left ventricular end-diastolic pressure was 22 mmHg. We obtained  pullback at the level of the apex, mid cavity and aorta. No significant  gradient was noted. 6. Pulmonary artery pressure was 105/ 38 mmHg. 7. Pulmonary capillary wedge pressure was 27 mmHg. 8. Right ventricular pressure was 112/ 7 mmHg. 9. Right atrial pressure was 16 mmHg. Cardiac output by  thermodilution was 5 liters per minute. Cardiac index was 2.72  liters per minute per meter squared. CONCLUSION: Nonsignificant coronary artery disease. Elevated left  ventricular end-diastolic pressure. Severe pulmonary hypertension. The patient will be evaluated by a pulmonary hypertension specialist  for further management.         Eugenio Carter MD    JV / [facility]  D:  05/19/2017   15:46  T:  05/19/2017   20:04  Job #:  [id number]

## 2017-06-13 ENCOUNTER — OFFICE VISIT (OUTPATIENT)
Dept: CARDIOLOGY CLINIC | Age: 77
End: 2017-06-13

## 2017-06-13 VITALS
HEIGHT: 61 IN | HEART RATE: 55 BPM | WEIGHT: 187 LBS | SYSTOLIC BLOOD PRESSURE: 139 MMHG | BODY MASS INDEX: 35.3 KG/M2 | DIASTOLIC BLOOD PRESSURE: 45 MMHG

## 2017-06-13 DIAGNOSIS — I27.20 PULMONARY HTN (HCC): ICD-10-CM

## 2017-06-13 DIAGNOSIS — C73 PAPILLARY THYROID CARCINOMA (HCC): ICD-10-CM

## 2017-06-13 DIAGNOSIS — I42.1 MILD HOCM (HYPERTROPHIC OBSTRUCTIVE CARDIOMYOPATHY) (HCC): Primary | ICD-10-CM

## 2017-06-13 DIAGNOSIS — I35.1 NONRHEUMATIC AORTIC VALVE INSUFFICIENCY: ICD-10-CM

## 2017-06-13 DIAGNOSIS — I34.0 NON-RHEUMATIC MITRAL REGURGITATION: ICD-10-CM

## 2017-06-13 DIAGNOSIS — R06.02 SOB (SHORTNESS OF BREATH): ICD-10-CM

## 2017-06-13 NOTE — PROGRESS NOTES
HISTORY OF PRESENT ILLNESS  Edson Gutiérrez is a 78 y.o. female. Valvular Heart Disease   This is a chronic problem. The current episode started more than 1 week ago. The problem occurs daily. The problem has been gradually worsening. Associated symptoms include shortness of breath. Pertinent negatives include no chest pain. Shortness of Breath   The history is provided by the patient. This is a chronic problem. The problem occurs intermittently. The current episode started more than 1 week ago. The problem has not changed since onset. Pertinent negatives include no fever, no ear pain, no neck pain, no cough, no sputum production, no hemoptysis, no wheezing, no PND, no orthopnea, no chest pain, no syncope, no vomiting, no rash, no leg pain, no leg swelling and no claudication. Associated medical issues do not include CAD or heart failure. Review of Systems   Constitutional: Positive for malaise/fatigue. Negative for chills, diaphoresis, fever and weight loss. HENT: Negative for ear discharge, ear pain, hearing loss, nosebleeds and tinnitus. Eyes: Negative for blurred vision. Respiratory: Positive for shortness of breath. Negative for cough, hemoptysis, sputum production, wheezing and stridor. Cardiovascular: Negative for chest pain, palpitations, orthopnea, claudication, leg swelling, syncope and PND. Gastrointestinal: Negative for heartburn, nausea and vomiting. Musculoskeletal: Negative for myalgias and neck pain. Skin: Negative for itching and rash. Neurological: Negative for dizziness, tingling, tremors, focal weakness, loss of consciousness and weakness. Psychiatric/Behavioral: Negative for depression and suicidal ideas.      Family History   Problem Relation Age of Onset    Cancer Other     Heart Disease Other     Cancer Mother     Heart Disease Mother        Past Medical History:   Diagnosis Date    Arthritis     Heart murmur     History of seasonal allergies     HTN (hypertension)     Hypercholesteremia        Past Surgical History:   Procedure Laterality Date    HX KNEE ARTHROSCOPY      left and right    HX ORTHOPAEDIC      right ankle       Social History   Substance Use Topics    Smoking status: Never Smoker    Smokeless tobacco: Never Used    Alcohol use No       No Known Allergies    Outpatient Prescriptions Marked as Taking for the 6/13/17 encounter (Office Visit) with Cheng Rich MD   Medication Sig Dispense Refill    metoprolol succinate (TOPROL-XL) 100 mg tablet Take 1 Tab by mouth two (2) times a day. 60 Tab 3    meloxicam (MOBIC) 15 mg tablet TAKE 1 TABLET DAILY WITH FOOD 90 Tab 2    losartan (COZAAR) 50 mg tablet Take  by mouth daily.  montelukast (SINGULAIR) 10 mg tablet Take 10 mg by mouth daily.  cyanocobalamin (VITAMIN B-12) 500 mcg tablet Take 500 mcg by mouth daily.  folic acid (FOLVITE) 1 mg tablet Take  by mouth daily.  celecoxib (CELEBREX) 200 mg capsule Take 100 mg by mouth daily.  hydrochlorothiazide (HYDRODIURIL) 25 mg tablet Take 25 mg by mouth daily.  SIMVASTATIN PO Take  by mouth. 80 mg      CETIRIZINE HCL (ZYRTEC PO) Take  by mouth. Visit Vitals    /45    Pulse (!) 55    Ht 5' 1\" (1.549 m)    Wt 84.8 kg (187 lb)    BMI 35.33 kg/m2     Physical Exam   Constitutional: She is oriented to person, place, and time. She appears well-developed and well-nourished. No distress. HENT:   Head: Atraumatic. Mouth/Throat: No oropharyngeal exudate. Eyes: Conjunctivae are normal. Right eye exhibits no discharge. Left eye exhibits no discharge. No scleral icterus. Neck: Normal range of motion. Neck supple. No JVD present. No tracheal deviation present. No thyromegaly present. Cardiovascular: Normal rate and regular rhythm. Exam reveals no gallop. Murmur: 3/6 holosystolic murmur best heard at apex and left parasternal area with no radiationl.   Pulmonary/Chest: Effort normal and breath sounds normal. No stridor. No respiratory distress. She has no wheezes. She has no rales. She exhibits no tenderness. Abdominal: Soft. There is no tenderness. There is no rebound and no guarding. Musculoskeletal: Normal range of motion. She exhibits no edema or tenderness. Lymphadenopathy:     She has no cervical adenopathy. Neurological: She is alert and oriented to person, place, and time. She exhibits normal muscle tone. Skin: Skin is warm. She is not diaphoretic. Psychiatric: She has a normal mood and affect. Her behavior is normal.     ekg sinus bradycardia with poor r wave progression. Echo report reviewed. ASSESSMENT and PLAN    ICD-10-CM ICD-9-CM    1. HOCM (hypertrophic obstructive cardiomyopathy) (HCC) I42.1 425.11    2. SOB (shortness of breath) R06.02 786.05    3. Pulmonary HTN (HCC) I27.2 416.8    4. Papillary thyroid carcinoma ([facility] [address] C73 193    5. Nonrheumatic aortic valve insufficiency I35.1 424.1    6. Non-rheumatic mitral regurgitation I34.0 424.0      No orders of the defined types were placed in this encounter. Follow-up Disposition:  Return in about 3 months (around 9/13/2017). current treatment plan is effective, no change in therapy  reviewed diet, exercise and weight control  use of aspirin to prevent MI and TIA's discussed. Patient seen for pre op evaluation prior to thyroid surgery for possible Ca. Had recent echo which revealed severe pulm htn. Has mild dyspnea with no signs of fluid overload. Recent ANAND reveals HOCM with mitral regurgitation and moderate aortic regurgitation. RHC shows severe Pulm HTN with elevated LVEDP. Will discuss case with pulmonary.   Might need appointment at [facility] or [facility] Eldon Adams

## 2017-06-13 NOTE — MR AVS SNAPSHOT
Visit Information Date & Time Provider Department Dept. Phone Encounter #  
 6/13/2017  1:00 PM Alvin Weeks MD [facility] [address] [place] [address] [id number] Follow-up Instructions Return in about 3 months (around 9/13/2017). Your Appointments 6/16/2017  2:30 PM  
PROBLEM VISIT with Reilly Ferraro MD  
[address] [facility], [address] [facility] [address] [facility] ([facility]) Appt Note: NEFTALI KNEE PAIN/NX/MEDICARE/BCBS  
 [address], [address] [facility]  
[phone] [address] [facility], [address] 9/12/2017 12:15 PM  
Office Visit with Alvin Weeks MD  
[facility]) Appt Note: 3 m [address] [facility], [address] [place] [id number]  
[address] [facility], located within [facility], [address] [place] [address] Upcoming Health Maintenance Date Due ZOSTER VACCINE AGE 60> 3/12/2002 GLAUCOMA SCREENING Q2Y 3/12/2007 Pneumococcal 65+ High/Highest Risk (1 of 2 - PCV13) 3/12/2007 MEDICARE YEARLY EXAM 3/12/2007 INFLUENZA AGE 9 TO ADULT 8/1/2017 DTaP/Tdap/Td series (2 - Td) 12/1/2022 Allergies as of 6/13/2017  Review Complete On: 6/13/2017 By: De Rapp. Nix No Known Allergies Current Immunizations  Never Reviewed Name Date TDAP Vaccine 12/1/2012  4:50 PM  
  
 Not reviewed this visit Vitals BP Pulse Height(growth percentile) Weight(growth percentile) BMI Smoking Status 139/45 (!) 55 5' 1\" (1.549 m) 187 lb (84.8 kg) 35.33 kg/m2 Never Smoker Vitals History BMI and BSA Data Body Mass Index Body Surface Area  
 35.33 kg/m 2 1.91 m 2 Preferred Pharmacy Pharmacy Name Phone [facility] [phone] Your Updated Medication List  
  
   
This list is accurate as of: 6/13/17  2:18 PM.  Always use your most recent med list.  
  
  
  
 CeleBREX 200 mg capsule Generic drug:  celecoxib Take 100 mg by mouth daily. folic acid 1 mg tablet Commonly known as:  Google Take  by mouth daily. hydroCHLOROthiazide 25 mg tablet Commonly known as:  HYDRODIURIL Take 25 mg by mouth daily. losartan 50 mg tablet Commonly known as:  COZAAR Take  by mouth daily. meloxicam 15 mg tablet Commonly known as:  MOBIC  
TAKE 1 TABLET DAILY WITH FOOD  
  
 metoprolol succinate 100 mg tablet Commonly known as:  TOPROL-XL Take 1 Tab by mouth two (2) times a day. montelukast 10 mg tablet Commonly known as:  SINGULAIR Take 10 mg by mouth daily. SIMVASTATIN PO Take  by mouth. 80 mg  
  
 VITAMIN B-12 500 mcg tablet Generic drug:  cyanocobalamin Take 500 mcg by mouth daily. ZYRTEC PO Take  by mouth. Follow-up Instructions Return in about 3 months (around 9/13/2017). Please provide this summary of care documentation to your next provider. Your primary care clinician is listed as Leann Alvarez. If you have any questions after today's visit, please call [phone].

## 2017-06-16 ENCOUNTER — OFFICE VISIT (OUTPATIENT)
Dept: ORTHOPEDIC SURGERY | Age: 77
End: 2017-06-16

## 2017-06-16 VITALS
BODY MASS INDEX: 35.3 KG/M2 | HEIGHT: 61 IN | HEART RATE: 48 BPM | TEMPERATURE: 97.2 F | WEIGHT: 187 LBS | SYSTOLIC BLOOD PRESSURE: 185 MMHG | DIASTOLIC BLOOD PRESSURE: 57 MMHG

## 2017-06-16 DIAGNOSIS — M25.561 PAIN IN BOTH KNEES, UNSPECIFIED CHRONICITY: Primary | ICD-10-CM

## 2017-06-16 DIAGNOSIS — M17.0 PRIMARY OSTEOARTHRITIS OF BOTH KNEES: ICD-10-CM

## 2017-06-16 DIAGNOSIS — M25.562 PAIN IN BOTH KNEES, UNSPECIFIED CHRONICITY: Primary | ICD-10-CM

## 2017-06-16 RX ORDER — TRIAMCINOLONE ACETONIDE 40 MG/ML
40 INJECTION, SUSPENSION INTRA-ARTICULAR; INTRAMUSCULAR ONCE
Qty: 1 ML | Refills: 0
Start: 2017-06-16 | End: 2017-06-16

## 2017-06-16 NOTE — PROGRESS NOTES
[address] [place] [address]  [phone]           Patient: Tom Townsend                MRN: [id number]       SSN: [id number]  YOB: 1940        AGE: 78 y.o. SEX: female  Body mass index is 35.33 kg/(m^2). PCP: Aleks Means MD  06/16/17      This office note has been dictated. REVIEW OF SYSTEMS:  Constitutional: Negative for fever, chills, weight loss and malaise/fatigue. HENT: Negative. Eyes: Negative. Respiratory: Negative. Cardiovascular: Negative. Gastrointestinal: No bowel incontinence or constipation. Genitourinary: No bladder incontinence or saddle anesthesia. Skin: Negative. Neurological: Negative. Endo/Heme/Allergies: Negative. Psychiatric/Behavioral: Negative. Musculoskeletal: As per HPI above. Past Medical History:   Diagnosis Date    Arthritis     Heart murmur     History of seasonal allergies     HTN (hypertension)     Hypercholesteremia          Current Outpatient Prescriptions:     metoprolol succinate (TOPROL-XL) 100 mg tablet, Take 1 Tab by mouth two (2) times a day., Disp: 60 Tab, Rfl: 3    meloxicam (MOBIC) 15 mg tablet, TAKE 1 TABLET DAILY WITH FOOD, Disp: 90 Tab, Rfl: 2    losartan (COZAAR) 50 mg tablet, Take  by mouth daily. , Disp: , Rfl:     montelukast (SINGULAIR) 10 mg tablet, Take 10 mg by mouth daily. , Disp: , Rfl:     cyanocobalamin (VITAMIN B-12) 500 mcg tablet, Take 500 mcg by mouth daily. , Disp: , Rfl:     folic acid (FOLVITE) 1 mg tablet, Take  by mouth daily. , Disp: , Rfl:     hydrochlorothiazide (HYDRODIURIL) 25 mg tablet, Take 25 mg by mouth daily. , Disp: , Rfl:     SIMVASTATIN PO, Take  by mouth. 80 mg, Disp: , Rfl:     CETIRIZINE HCL (ZYRTEC PO), Take  by mouth.  , Disp: , Rfl:     celecoxib (CELEBREX) 200 mg capsule, Take 100 mg by mouth daily.   , Disp: , Rfl:     No Known Allergies    Social History     Social History    Marital status:      Spouse name: N/A    Number of children: N/A    Years of education: N/A     Occupational History    Not on file. Social History Main Topics    Smoking status: Never Smoker    Smokeless tobacco: Never Used    Alcohol use No    Drug use: No    Sexual activity: Not on file     Other Topics Concern    Not on file     Social History Narrative    Pt has a dog    Born in [facility] and moved to [place] years ago        Past Surgical History:   Procedure Laterality Date    HX KNEE ARTHROSCOPY      left and right    HX ORTHOPAEDIC      right ankle           * Patient was identified by name and date of birth   * Agreement on procedure being performed was verified  * Risks and Benefits explained to the patient  * Procedure site verified and marked as necessary  * Patient was positioned for comfort  * Consent was signed and verified  3:35 PM    The patient was instructed on post injection care. We did see Ms. Daugherty for followup with regards to her bilateral knee osteoarthritis. She returns today for evaluation, as she does have worsening discomfort in each of her knees, worse on the left side. She does have decreased walking tolerance of less than 10 minutes and pain at night and trouble getting out of chairs, as well as going up and down stairs. The patient is really fed up and frustrated with her knees. However, cortisone injections still give good relief. The patient has had no fevers, chills, and no injuries to report. With regards to systemic changes, she does have [place], and has been referred down to 3125 Dr Kev Gonzalez for continued treatment options regarding her heart. PHYSICAL EXAMINATION: In general, the patient is alert and oriented x 3 and is in no acute distress. The patient is well-developed and well-nourished with a normal affect. The patient is afebrile. HEENT:  Head is normocephalic and atraumatic.   Pupils are equally round and reactive to light and accommodation. Extraocular eye movements are intact. Neck is supple. Trachea is midline. No JVD is present. Breathing is nonlabored. Examination of the lower extremities reveals pain-free range of motion of the hips. There is no pain to palpation of the trochanteric bursae. There is a negative straight leg raise, negative calf tenderness, and negative Alines sign. There are no signs of DVT present. There is no pain with rotation of the hips. Examination of each knee reveals the skin is intact. There is no ecchymosis and no warmth. There are no signs for infection or cellulitis. There is pain with palpation tricompartmentally and crepitus arising from the anterior compartment. RADIOGRAPHS:  Radiographs in the office today, including AP, tunnel, lateral, and skyline of each of the knees, confirms end-staged arthritis of each of the knees with bone-to-bone eburnation and patellofemoral irritation bilaterally, worse on the left side. ASSESSMENT:  Bilateral knee end-staged osteoarthritis. PLAN:  At this point, we are going to move forward with a cortisone injection for each of the knees. After informed and written consent, under aseptic conditions with ultrasound-guided assistance, each of the knees was prepped with Betadine and 6 mg of Celestone was injected to each knee without complications. The patient tolerated the injection well. She was instructed on post injection care. We will see her back in the office for three months time for evaluation and repeat injections.                     JR Tl GUTIÉRREZ PA-C, ATC

## 2017-07-27 ENCOUNTER — TELEPHONE (OUTPATIENT)
Dept: CARDIOLOGY CLINIC | Age: 77
End: 2017-07-27

## 2017-07-27 NOTE — TELEPHONE ENCOUNTER
Patient called and would like for you to call her about going to [facility].     [phone]

## 2017-08-07 NOTE — TELEPHONE ENCOUNTER
Per Dr Twyla Rowland, called and spoke with patient, [facility]

## 2017-08-25 ENCOUNTER — TELEPHONE (OUTPATIENT)
Dept: CARDIOLOGY CLINIC | Age: 77
End: 2017-08-25

## 2017-08-25 NOTE — TELEPHONE ENCOUNTER
Spoke to patient. She had to postpond visit to [facility] due to person reasons.  Advised her to keep appt with Dr Chico Rosales

## 2017-09-12 ENCOUNTER — OFFICE VISIT (OUTPATIENT)
Dept: CARDIOLOGY CLINIC | Age: 77
End: 2017-09-12

## 2017-09-12 VITALS
HEIGHT: 61 IN | DIASTOLIC BLOOD PRESSURE: 54 MMHG | SYSTOLIC BLOOD PRESSURE: 172 MMHG | WEIGHT: 179 LBS | BODY MASS INDEX: 33.79 KG/M2 | HEART RATE: 58 BPM

## 2017-09-12 DIAGNOSIS — I42.1 MILD HOCM (HYPERTROPHIC OBSTRUCTIVE CARDIOMYOPATHY) (HCC): Primary | ICD-10-CM

## 2017-09-12 DIAGNOSIS — R06.02 SOB (SHORTNESS OF BREATH): ICD-10-CM

## 2017-09-12 DIAGNOSIS — I27.20 PULMONARY HTN (HCC): ICD-10-CM

## 2017-09-12 DIAGNOSIS — I35.1 NONRHEUMATIC AORTIC VALVE INSUFFICIENCY: ICD-10-CM

## 2017-09-12 DIAGNOSIS — C73 PAPILLARY THYROID CARCINOMA (HCC): ICD-10-CM

## 2017-09-12 DIAGNOSIS — I34.0 NON-RHEUMATIC MITRAL REGURGITATION: ICD-10-CM

## 2017-09-12 RX ORDER — SIMVASTATIN 80 MG/1
80 TABLET, FILM COATED ORAL
Qty: 30 TAB | Refills: 2 | Status: SHIPPED | OUTPATIENT
Start: 2017-09-12 | End: 2017-12-06 | Stop reason: SDUPTHER

## 2017-09-12 NOTE — PROGRESS NOTES
1. Have you been to the ER, urgent care clinic since your last visit? Hospitalized since your last visit? No     2. Have you seen or consulted any other health care providers outside of the [address] [place] [address] since your last visit? Include any pap smears or colon screening. PCP    3. Since your last visit, have you had any of the following symptoms? No    4. Have you had any blood work, X-rays or cardiac testing?     no    5. Where do you normally have your labs drawn? Hospitals in [place]    6. Do you need any refills today? Yes    Requested Prescriptions     Pending Prescriptions Disp Refills    simvastatin (ZOCOR) 80 mg tablet 30 Tab 2     Sig: Take 1 Tab by mouth nightly.  80 mg

## 2017-09-12 NOTE — MR AVS SNAPSHOT
Visit Information Date & Time Provider Department Dept. Phone Encounter #  
 9/12/2017 12:15 PM Judah Joe MD [facility] [address] [place] [address] [id number] Follow-up Instructions Return in about 3 months (around 12/12/2017). Your Appointments 10/6/2017 11:20 AM  
Follow Up with Dipesh Elias PA-C  
[facility] ([facility]) Appt Note: tracy knee 3 mo fu; 9/6/17*ALL NUMBERS ON FILE FOR THE PT. HAVE BEEN CALLED AND VOICEMAILS HAVE BEEN LEFT TO TRY AND RESCHEDULE THIS APPT- DC; tracy knee 3 mo fu**PT WAS WAS RESCHEDULED  DUE TO PROVIDER OUT OF THE OFFICE ON 9/15/17- [facility] [address] [place] [address]  
[phone] [address] [place] [address]  
  
    
 12/12/2017  8:30 AM  
ESTABLISHED PATIENT with Judah Joe MD  
[facility]) Appt Note: 3 months [address] [facility], [address] [place] [id number] [facility], [address] [place] [address] Upcoming Health Maintenance Date Due ZOSTER VACCINE AGE 60> 1/12/2002 GLAUCOMA SCREENING Q2Y 3/12/2007 Pneumococcal 65+ High/Highest Risk (1 of 2 - PCV13) 3/12/2007 MEDICARE YEARLY EXAM 3/12/2007 INFLUENZA AGE 9 TO ADULT 8/1/2017 DTaP/Tdap/Td series (2 - Td) 12/1/2022 Allergies as of 9/12/2017  Review Complete On: 9/12/2017 By: Robby Bernard LPN No Known Allergies Current Immunizations  Never Reviewed Name Date TDAP Vaccine 12/1/2012  4:50 PM  
  
 Not reviewed this visit You Were Diagnosed With   
  
 Codes Comments Nonrheumatic aortic valve insufficiency    -  Primary ICD-10-CM: I35.1 ICD-9-CM: 424.1 Vitals BP Pulse Height(growth percentile) Weight(growth percentile) BMI Smoking Status 172/54 (!) 58 5' 1\" (1.549 m) 179 lb (81.2 kg) 33.82 kg/m2 Never Smoker Vitals History BMI and BSA Data Body Mass Index Body Surface Area  
 33.82 kg/m 2 1.87 m 2 Preferred Pharmacy Pharmacy Name Phone [address] [facility] [address], [address] Your Updated Medication List  
  
   
This list is accurate as of: 9/12/17  1:34 PM.  Always use your most recent med list.  
  
  
  
  
 CeleBREX 200 mg capsule Generic drug:  celecoxib Take 100 mg by mouth daily. folic acid 1 mg tablet Commonly known as:  Google Take  by mouth daily. hydroCHLOROthiazide 25 mg tablet Commonly known as:  HYDRODIURIL Take 25 mg by mouth daily. losartan 50 mg tablet Commonly known as:  COZAAR Take  by mouth daily. meloxicam 15 mg tablet Commonly known as:  MOBIC  
TAKE 1 TABLET DAILY WITH FOOD  
  
 metoprolol succinate 100 mg tablet Commonly known as:  TOPROL-XL Take 1 Tab by mouth two (2) times a day. montelukast 10 mg tablet Commonly known as:  SINGULAIR Take 10 mg by mouth daily. SIMVASTATIN PO Take  by mouth. 80 mg  
  
 VITAMIN B-12 500 mcg tablet Generic drug:  cyanocobalamin Take 500 mcg by mouth daily. ZYRTEC PO Take  by mouth. Follow-up Instructions Return in about 3 months (around 12/12/2017). Introducing [facility]! [employer] introduces GateMe patient portal. Now you can access parts of your medical record, email your doctor's office, and request medication refills online. 1. In your internet browser, go to [email or web address] 2. Click on the First Time User? Click Here link in the Sign In box. You will see the New Member Sign Up page. 3. Enter your GateMe Access Code exactly as it appears below. You will not need to use this code after youve completed the sign-up process. If you do not sign up before the expiration date, you must request a new code.  
 
· GateMe Access Code: [email or web address] 
 Expires: 9/14/2017  3:34 PM 
 
4. Enter the last four digits of your Social Security Number (xxxx) and Date of Birth (mm/dd/yyyy) as indicated and click Submit. You will be taken to the next sign-up page. 5. Create a Neptune.io ID. This will be your [employer] login ID and cannot be changed, so think of one that is secure and easy to remember. 6. Create a [employer] password. You can change your password at any time. 7. Enter your Password Reset Question and Answer. This can be used at a later time if you forget your password. 8. Enter your e-mail address. You will receive e-mail notification when new information is available in [address] 9. Click Sign Up. You can now view and download portions of your medical record. 10. Click the Download Summary menu link to download a portable copy of your medical information. If you have questions, please visit the Frequently Asked Questions section of the [email or web address] website. Remember, [email or web address] is NOT to be used for urgent needs. For medical emergencies, dial 911. Now available from your iPhone and Android! Please provide this summary of care documentation to your next provider. Your primary care clinician is listed as Kerry Morgan. If you have any questions after today's visit, please call [phone].

## 2017-09-12 NOTE — LETTER
Yolanda Daugherty 1940 9/12/2017 Dear Jessie Diaz MD 
 
I had the pleasure of evaluating  Ms. Daugherty in office today. Below are the relevant portions of my assessment and plan of care. ICD-10-CM ICD-9-CM 1. Nonrheumatic aortic valve insufficiency I35.1 424.1 simvastatin (ZOCOR) 80 mg tablet Current Outpatient Prescriptions Medication Sig Dispense Refill  metoprolol succinate (TOPROL-XL) 100 mg tablet Take 1 Tab by mouth two (2) times a day. 60 Tab 3  
 meloxicam (MOBIC) 15 mg tablet TAKE 1 TABLET DAILY WITH FOOD 90 Tab 2  
 losartan (COZAAR) 50 mg tablet Take  by mouth daily.  montelukast (SINGULAIR) 10 mg tablet Take 10 mg by mouth daily.  cyanocobalamin (VITAMIN B-12) 500 mcg tablet Take 500 mcg by mouth daily.  folic acid (FOLVITE) 1 mg tablet Take  by mouth daily.  hydrochlorothiazide (HYDRODIURIL) 25 mg tablet Take 25 mg by mouth daily.  SIMVASTATIN PO Take  by mouth. 80 mg    
 CETIRIZINE HCL (ZYRTEC PO) Take  by mouth.  celecoxib (CELEBREX) 200 mg capsule Take 100 mg by mouth daily. No orders of the defined types were placed in this encounter. If you have questions, please do not hesitate to call me. I look forward to following Ms. Daugherty along with you.  
 
Sincerely, 
Naresh Corona MD

## 2017-09-22 ENCOUNTER — HOSPITAL ENCOUNTER (OUTPATIENT)
Dept: MAMMOGRAPHY | Age: 77
Discharge: HOME | End: 2017-09-22
Attending: FAMILY MEDICINE

## 2017-09-22 DIAGNOSIS — Z12.31 VISIT FOR SCREENING MAMMOGRAM: ICD-10-CM

## 2017-10-12 ENCOUNTER — HOSPITAL ENCOUNTER (OUTPATIENT)
Dept: LAB | Age: 77
Discharge: HOME | End: 2017-10-12
Payer: MEDICARE

## 2017-10-12 ENCOUNTER — HOSPITAL ENCOUNTER (OUTPATIENT)
Dept: GENERAL RADIOLOGY | Age: 77
Discharge: HOME | End: 2017-10-12
Payer: MEDICARE

## 2017-10-12 DIAGNOSIS — J06.9 UPPER RESPIRATORY INFECTION: ICD-10-CM

## 2017-10-12 LAB
25(OH)D3 SERPL-MCNC: 38.3 NG/ML (ref 30–100)
ALBUMIN SERPL-MCNC: 3.5 G/DL (ref 3.4–5)
ALBUMIN/GLOB SERPL: 0.8 {RATIO} (ref 0.8–1.7)
ALP SERPL-CCNC: 115 U/L (ref 45–117)
ALT SERPL-CCNC: 23 U/L (ref 13–56)
ANION GAP SERPL CALC-SCNC: 8 MMOL/L (ref 3–18)
AST SERPL-CCNC: 23 U/L (ref 15–37)
BASOPHILS # BLD: 0 K/UL (ref 0–0.1)
BASOPHILS NFR BLD: 0 % (ref 0–2)
BILIRUB SERPL-MCNC: 0.7 MG/DL (ref 0.2–1)
BUN SERPL-MCNC: 36 MG/DL (ref 7–18)
BUN/CREAT SERPL: 28 (ref 12–20)
CALCIUM SERPL-MCNC: 9 MG/DL (ref 8.5–10.1)
CHLORIDE SERPL-SCNC: 95 MMOL/L (ref 100–108)
CO2 SERPL-SCNC: 28 MMOL/L (ref 21–32)
CREAT SERPL-MCNC: 1.27 MG/DL (ref 0.6–1.3)
DIFFERENTIAL METHOD BLD: ABNORMAL
EOSINOPHIL # BLD: 0.2 K/UL (ref 0–0.4)
EOSINOPHIL NFR BLD: 4 % (ref 0–5)
ERYTHROCYTE [DISTWIDTH] IN BLOOD BY AUTOMATED COUNT: 15 % (ref 11.6–14.5)
GLOBULIN SER CALC-MCNC: 4.2 G/DL (ref 2–4)
GLUCOSE SERPL-MCNC: 96 MG/DL (ref 74–99)
HBA1C MFR BLD: 5.6 % (ref 4.2–5.6)
HCT VFR BLD AUTO: 35.4 % (ref 35–45)
HGB BLD-MCNC: 11.2 G/DL (ref 12–16)
LYMPHOCYTES # BLD: 0.8 K/UL (ref 0.9–3.6)
LYMPHOCYTES NFR BLD: 18 % (ref 21–52)
MCH RBC QN AUTO: 27.7 PG (ref 24–34)
MCHC RBC AUTO-ENTMCNC: 31.6 G/DL (ref 31–37)
MCV RBC AUTO: 87.4 FL (ref 74–97)
MONOCYTES # BLD: 0.3 K/UL (ref 0.05–1.2)
MONOCYTES NFR BLD: 6 % (ref 3–10)
NEUTS SEG # BLD: 3.2 K/UL (ref 1.8–8)
NEUTS SEG NFR BLD: 72 % (ref 40–73)
PLATELET # BLD AUTO: 166 K/UL (ref 135–420)
PMV BLD AUTO: 11.3 FL (ref 9.2–11.8)
POTASSIUM SERPL-SCNC: 4.7 MMOL/L (ref 3.5–5.5)
PROT SERPL-MCNC: 7.7 G/DL (ref 6.4–8.2)
RBC # BLD AUTO: 4.05 M/UL (ref 4.2–5.3)
SODIUM SERPL-SCNC: 131 MMOL/L (ref 136–145)
TSH SERPL DL<=0.05 MIU/L-ACNC: 2.23 UIU/ML (ref 0.36–3.74)
WBC # BLD AUTO: 4.5 K/UL (ref 4.6–13.2)

## 2017-10-12 PROCEDURE — 83036 HEMOGLOBIN GLYCOSYLATED A1C: CPT | Performed by: FAMILY MEDICINE

## 2017-10-12 PROCEDURE — 80061 LIPID PANEL: CPT | Performed by: FAMILY MEDICINE

## 2017-10-12 PROCEDURE — 85025 COMPLETE CBC W/AUTO DIFF WBC: CPT | Performed by: FAMILY MEDICINE

## 2017-10-12 PROCEDURE — 80053 COMPREHEN METABOLIC PANEL: CPT | Performed by: FAMILY MEDICINE

## 2017-10-12 PROCEDURE — 36415 COLL VENOUS BLD VENIPUNCTURE: CPT | Performed by: FAMILY MEDICINE

## 2017-10-12 PROCEDURE — 82172 ASSAY OF APOLIPOPROTEIN: CPT | Performed by: FAMILY MEDICINE

## 2017-10-12 PROCEDURE — 82306 VITAMIN D 25 HYDROXY: CPT | Performed by: FAMILY MEDICINE

## 2017-10-12 PROCEDURE — 84443 ASSAY THYROID STIM HORMONE: CPT | Performed by: FAMILY MEDICINE

## 2017-10-12 PROCEDURE — 71020 XR CHEST PA LAT: CPT

## 2017-10-13 ENCOUNTER — TELEPHONE (OUTPATIENT)
Dept: ORTHOPEDIC SURGERY | Age: 77
End: 2017-10-13

## 2017-10-13 NOTE — TELEPHONE ENCOUNTER
Patient called to advise that insurance will no longer cover rx for Mobic due to a heart condition that she didn't know about - she has had some recent heart testing that may have led to this. Can we prescribe an alternate medication for her?   Please review and advise patient at [phone]

## 2017-10-15 LAB
APO B SERPL-MCNC: 85 MG/DL (ref 54–133)
CHOLEST SERPL-MCNC: 142 MG/DL (ref 100–199)
HDLC SERPL-MCNC: 43 MG/DL
LDLC SERPL CALC-MCNC: 82 MG/DL (ref 0–99)
LDLC/HDLC SERPL: 1.9 RATIO UNITS (ref 0–3.2)
NONHDLC SERPL-MCNC: 99 MG/DL (ref 0–129)
TRIGL SERPL-MCNC: 86 MG/DL (ref 0–149)

## 2017-12-06 DIAGNOSIS — I35.1 NONRHEUMATIC AORTIC VALVE INSUFFICIENCY: ICD-10-CM

## 2017-12-06 RX ORDER — SIMVASTATIN 80 MG/1
80 TABLET, FILM COATED ORAL
Qty: 30 TAB | Refills: 2 | Status: SHIPPED | OUTPATIENT
Start: 2017-12-06 | End: 2018-03-06 | Stop reason: SDUPTHER

## 2017-12-06 RX ORDER — SIMVASTATIN 80 MG/1
TABLET, FILM COATED ORAL
Qty: 30 TAB | Refills: 2 | Status: SHIPPED | OUTPATIENT
Start: 2017-12-06 | End: 2017-12-12 | Stop reason: SDUPTHER

## 2017-12-12 ENCOUNTER — OFFICE VISIT (OUTPATIENT)
Dept: CARDIOLOGY CLINIC | Age: 77
End: 2017-12-12

## 2017-12-12 VITALS
SYSTOLIC BLOOD PRESSURE: 178 MMHG | DIASTOLIC BLOOD PRESSURE: 51 MMHG | HEART RATE: 48 BPM | BODY MASS INDEX: 35.5 KG/M2 | HEIGHT: 61 IN | WEIGHT: 188 LBS

## 2017-12-12 DIAGNOSIS — I50.32 CHRONIC DIASTOLIC CONGESTIVE HEART FAILURE (HCC): Primary | ICD-10-CM

## 2017-12-12 DIAGNOSIS — C73 PAPILLARY THYROID CARCINOMA (HCC): ICD-10-CM

## 2017-12-12 DIAGNOSIS — I42.1 MILD HOCM (HYPERTROPHIC OBSTRUCTIVE CARDIOMYOPATHY) (HCC): ICD-10-CM

## 2017-12-12 DIAGNOSIS — I34.0 NON-RHEUMATIC MITRAL REGURGITATION: ICD-10-CM

## 2017-12-12 DIAGNOSIS — I27.20 PULMONARY HTN (HCC): ICD-10-CM

## 2017-12-12 RX ORDER — FUROSEMIDE 40 MG/1
40 TABLET ORAL DAILY
Qty: 30 TAB | Refills: 1 | Status: SHIPPED | OUTPATIENT
Start: 2017-12-12 | End: 2018-02-03 | Stop reason: SDUPTHER

## 2017-12-12 NOTE — PROGRESS NOTES
HISTORY OF PRESENT ILLNESS  Madiha Zamora is a 78 y.o. female. Valvular Heart Disease   This is a chronic problem. The current episode started more than 1 week ago. The problem occurs daily. The problem has been gradually worsening. Associated symptoms include shortness of breath. Pertinent negatives include no chest pain. Shortness of Breath   The history is provided by the patient. This is a chronic problem. The problem occurs intermittently. The current episode started more than 1 week ago. The problem has not changed since onset. Pertinent negatives include no fever, no ear pain, no neck pain, no cough, no sputum production, no hemoptysis, no wheezing, no PND, no orthopnea, no chest pain, no syncope, no vomiting, no rash, no leg pain, no leg swelling and no claudication. Associated medical issues include heart failure. Associated medical issues do not include CAD. Review of Systems   Constitutional: Positive for malaise/fatigue. Negative for chills, diaphoresis, fever and weight loss. HENT: Negative for ear discharge, ear pain, hearing loss, nosebleeds and tinnitus. Eyes: Negative for blurred vision. Respiratory: Positive for shortness of breath. Negative for cough, hemoptysis, sputum production, wheezing and stridor. Cardiovascular: Negative for chest pain, palpitations, orthopnea, claudication, leg swelling, syncope and PND. Gastrointestinal: Negative for heartburn, nausea and vomiting. Musculoskeletal: Negative for myalgias and neck pain. Skin: Negative for itching and rash. Neurological: Negative for dizziness, tingling, tremors, focal weakness, loss of consciousness and weakness. Psychiatric/Behavioral: Negative for depression and suicidal ideas.      Family History   Problem Relation Age of Onset    Cancer Other     Heart Disease Other     Cancer Mother     Heart Disease Mother        Past Medical History:   Diagnosis Date    Arthritis     Heart murmur     History of seasonal allergies     HTN (hypertension)     Hypercholesteremia        Past Surgical History:   Procedure Laterality Date    HX KNEE ARTHROSCOPY      left and right    HX ORTHOPAEDIC      right ankle       Social History   Substance Use Topics    Smoking status: Never Smoker    Smokeless tobacco: Never Used    Alcohol use No       No Known Allergies    Outpatient Prescriptions Marked as Taking for the 12/12/17 encounter (Office Visit) with Yulissa Malin MD   Medication Sig Dispense Refill    furosemide (LASIX) 40 mg tablet Take 1 Tab by mouth daily. 30 Tab 1    simvastatin (ZOCOR) 80 mg tablet Take 1 Tab by mouth nightly. 30 Tab 2    metoprolol succinate (TOPROL-XL) 100 mg tablet Take 1 Tab by mouth two (2) times a day. 60 Tab 3    losartan (COZAAR) 50 mg tablet Take 50 mg by mouth daily.  montelukast (SINGULAIR) 10 mg tablet Take 10 mg by mouth daily.  cyanocobalamin (VITAMIN B-12) 500 mcg tablet Take 500 mcg by mouth daily.  folic acid (FOLVITE) 1 mg tablet Take  by mouth daily.  CETIRIZINE HCL (ZYRTEC PO) Take  by mouth. Visit Vitals    /51    Pulse (!) 48    Ht 5' 1\" (1.549 m)    Wt 85.3 kg (188 lb)    BMI 35.52 kg/m2     Physical Exam   Constitutional: She is oriented to person, place, and time. She appears well-developed and well-nourished. No distress. HENT:   Head: Atraumatic. Mouth/Throat: No oropharyngeal exudate. Eyes: Conjunctivae are normal. Right eye exhibits no discharge. Left eye exhibits no discharge. No scleral icterus. Neck: Normal range of motion. Neck supple. No JVD present. No tracheal deviation present. No thyromegaly present. Cardiovascular: Normal rate and regular rhythm. Exam reveals no gallop. Murmur: 3/6 holosystolic murmur best heard at apex and left parasternal area with no radiationl. Pulmonary/Chest: Effort normal. No stridor. No respiratory distress. She has no wheezes. She has rales (mild basal).  She exhibits no tenderness. Abdominal: Soft. There is no tenderness. There is no rebound and no guarding. Musculoskeletal: Normal range of motion. She exhibits edema (edema extending to knee). She exhibits no tenderness. Lymphadenopathy:     She has no cervical adenopathy. Neurological: She is alert and oriented to person, place, and time. She exhibits normal muscle tone. Skin: Skin is warm. She is not diaphoretic. Psychiatric: She has a normal mood and affect. Her behavior is normal.     ekg sinus bradycardia with poor r wave progression. Echo report reviewed. ASSESSMENT and PLAN    ICD-10-CM ICD-9-CM    1. Chronic diastolic congestive heart failure (HCC) I50.32 428.32      428.0    2. Papillary thyroid carcinoma ([facility] [address] C73 193    3. Pulmonary HTN I27.20 416.8    4. 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 Non-rheumatic mitral regurgitation I34.0 424.0      Orders Placed This Encounter    furosemide (LASIX) 40 mg tablet     Sig: Take 1 Tab by mouth daily. Dispense:  30 Tab     Refill:  1     Follow-up Disposition:  Return in about 3 months (around 3/12/2018). current treatment plan is effective, no change in therapy  reviewed diet, exercise and weight control  use of aspirin to prevent MI and TIA's discussed. Patient seen for pre op evaluation prior to thyroid surgery for possible Ca. Had recent echo which revealed severe pulm htn. Recent ANAND reveals HOCM with mitral regurgitation and moderate aortic regurgitation. RHC shows severe Pulm HTN with elevated LVEDP. She had an appointment with Dr Sallyann Aase at [facility] is now scheduled for alchohol septal ablation. Currently in mild CHF- will discontinue hctz and add lasix 40mg daily for 3 days and then change to 20mg daily.

## 2017-12-12 NOTE — MR AVS SNAPSHOT
Visit Information Date & Time Provider Department Dept. Phone Encounter #  
 12/12/2017  8:30 AM Greg Tafoya MD [facility] [address] [place] [address] [id number] Follow-up Instructions Return in about 3 months (around 3/12/2018). Your Appointments 3/6/2018 10:30 AM  
ESTABLISHED PATIENT with Greg Tafoya MD  
[facility]) Appt Note: 3 months [address] [facility], [address] [facility] [address] [address] [facility], [address] [place] [address] Upcoming Health Maintenance Date Due ZOSTER VACCINE AGE 60> 1/12/2002 GLAUCOMA SCREENING Q2Y 3/12/2007 Pneumococcal 65+ High/Highest Risk (1 of 2 - PCV13) 3/12/2007 MEDICARE YEARLY EXAM 3/12/2007 DTaP/Tdap/Td series (2 - Td) 12/1/2022 Allergies as of 12/12/2017  Review Complete On: 12/12/2017 By: Kaela Fan LPN No Known Allergies Current Immunizations  Never Reviewed Name Date TDAP Vaccine 12/1/2012  4:50 PM  
  
 Not reviewed this visit You Were Diagnosed With   
  
 Codes Comments Chronic diastolic congestive heart failure (HCC)    -  Primary ICD-10-CM: I50.32 
ICD-9-CM: 428.32, 428.0 Papillary thyroid carcinoma ([facility] [address]     ICD-10-CM: D22 ICD-9-CM: 425 Pulmonary HTN     ICD-10-CM: I27.20 ICD-9-CM: 416.8 Mild HOCM (hypertrophic obstructive cardiomyopathy) (HCC)     ICD-10-CM: I42.1 ICD-9-CM: 425.11 Non-rheumatic mitral regurgitation     ICD-10-CM: I34.0 ICD-9-CM: 424.0 Vitals BP Pulse Height(growth percentile) Weight(growth percentile) BMI Smoking Status 178/51 (!) 48 5' 1\" (1.549 m) 188 lb (85.3 kg) 35.52 kg/m2 Never Smoker Vitals History BMI and BSA Data Body Mass Index Body Surface Area 35.52 kg/m 2 1.92 m 2 [facility] [address] [facility] Your Updated Medication List  
  
   
This list is accurate as of: 17  9:35 AM.  Always use your most recent med list.  
  
  
  
  
 CeleBREX 200 mg capsule Generic drug:  celecoxib Take 100 mg by mouth daily. Comp. Stocking,Thigh,Reg,Medium Misc  
1 Package by Does Not Apply route daily. folic acid 1 mg tablet Commonly known as:  Google Take  by mouth daily. furosemide 40 mg tablet Commonly known as:  LASIX Take 1 Tab by mouth daily. losartan 50 mg tablet Commonly known as:  COZAAR Take 50 mg by mouth daily. meloxicam 15 mg tablet Commonly known as:  MOBIC  
TAKE 1 TABLET DAILY WITH FOOD  
  
 metoprolol succinate 100 mg tablet Commonly known as:  TOPROL-XL Take 1 Tab by mouth two (2) times a day. montelukast 10 mg tablet Commonly known as:  SINGULAIR Take 10 mg by mouth daily. simvastatin 80 mg tablet Commonly known as:  ZOCOR Take 1 Tab by mouth nightly. VITAMIN B-12 500 mcg tablet Generic drug:  cyanocobalamin Take 500 mcg by mouth daily. ZYRTEC PO Take  by mouth. Prescriptions Printed Refills Comp. Stocking,Thigh,Reg,Medium misc 0 Si Package by Does Not Apply route daily. Class: Print Route: Does Not Apply Prescriptions Sent to Pharmacy Refills  
 furosemide (LASIX) 40 mg tablet 1 Sig: Take 1 Tab by mouth daily. Class: Normal  
 Pharmacy: [address] [place] [address] #: [phone] Route: Oral  
  
Follow-up Instructions Return in about 3 months (around 3/12/2018). Introducing [facility]! Aida Hugo introduces [employer] patient portal. Now you can access parts of your medical record, email your doctor's office, and request medication refills online. 1. In your internet browser, go to [email or web address] 2. Click on the First Time User? Click Here link in the Sign In box.  You will see the New Member Sign Up page. 3. Enter your Lala Access Code exactly as it appears below. You will not need to use this code after youve completed the sign-up process. If you do not sign up before the expiration date, you must request a new code. · Lala Access Code: [email or web address] Expires: 3/12/2018  8:22 AM 
 
4. Enter the last four digits of your Social Security Number (xxxx) and Date of Birth (mm/dd/yyyy) as indicated and click Submit. You will be taken to the next sign-up page. 5. Create a Lala ID. This will be your Lala login ID and cannot be changed, so think of one that is secure and easy to remember. 6. Create a Lala password. You can change your password at any time. 7. Enter your Password Reset Question and Answer. This can be used at a later time if you forget your password. 8. Enter your e-mail address. You will receive e-mail notification when new information is available in [address]. 9. Click Sign Up. You can now view and download portions of your medical record. 10. Click the Download Summary menu link to download a portable copy of your medical information. If you have questions, please visit the Frequently Asked Questions section of the Lala website. Remember, Lala is NOT to be used for urgent needs. For medical emergencies, dial 911. Now available from your iPhone and Android! Please provide this summary of care documentation to your next provider. Your primary care clinician is listed as Ludy Moore. If you have any questions after today's visit, please call [phone].

## 2017-12-12 NOTE — PROGRESS NOTES
1. Have you been to the ER, urgent care clinic since your last visit? Hospitalized since your last visit? No    2. Have you seen or consulted any other health care providers outside of the [address] [place] [address] since your last visit? Include any pap smears or colon screening.  heart Dr in [facility]       Medication list provided by patient      Patient's bp this morning is without bp meds

## 2018-01-18 ENCOUNTER — HOSPITAL ENCOUNTER (EMERGENCY)
Age: 78
Discharge: HOME | End: 2018-01-18
Attending: EMERGENCY MEDICINE
Payer: MEDICARE

## 2018-01-18 VITALS
HEIGHT: 62 IN | DIASTOLIC BLOOD PRESSURE: 51 MMHG | HEART RATE: 55 BPM | SYSTOLIC BLOOD PRESSURE: 180 MMHG | RESPIRATION RATE: 22 BRPM | OXYGEN SATURATION: 95 % | TEMPERATURE: 97.5 F | WEIGHT: 181 LBS | BODY MASS INDEX: 33.31 KG/M2

## 2018-01-18 DIAGNOSIS — R04.0 EPISTAXIS: Primary | ICD-10-CM

## 2018-01-18 PROCEDURE — 99284 EMERGENCY DEPT VISIT MOD MDM: CPT

## 2018-01-18 PROCEDURE — 17250 CHEM CAUT OF GRANLTJ TISSUE: CPT

## 2018-01-18 RX ORDER — SILVER NITRATE 38.21; 12.74 MG/1; MG/1
1 STICK TOPICAL
Status: DISCONTINUED | OUTPATIENT
Start: 2018-01-18 | End: 2018-01-18 | Stop reason: HOSPADM

## 2018-01-18 RX ORDER — OXYMETAZOLINE HCL 0.05 %
2 SPRAY, NON-AEROSOL (ML) NASAL
Status: DISCONTINUED | OUTPATIENT
Start: 2018-01-18 | End: 2018-01-18 | Stop reason: HOSPADM

## 2018-01-18 RX ORDER — LIDOCAINE HYDROCHLORIDE 20 MG/ML
15 SOLUTION OROPHARYNGEAL
Status: DISCONTINUED | OUTPATIENT
Start: 2018-01-18 | End: 2018-01-18 | Stop reason: HOSPADM

## 2018-01-18 RX ORDER — SILVER NITRATE 38.21; 12.74 MG/1; MG/1
STICK TOPICAL
Status: DISCONTINUED
Start: 2018-01-18 | End: 2018-01-18 | Stop reason: HOSPADM

## 2018-01-18 NOTE — ED TRIAGE NOTES
Pt BIBA for nose bleed times 30 minutes , pt reports its chronic but never need any  meds or balloons in nose, pt aao*4, no c/o CP and respirations even and unlabored, pt ambulatory strong steady gait

## 2018-01-18 NOTE — DISCHARGE INSTRUCTIONS
IF YOU HAVE HEAVY BLEEDING, DIZZINESS OR LIGHTHEADEDNESS, BLEEDING FROM THE NOSE THAT DOES NOT STOP AFTER PUTTING PRESSURE ON IT FOR 10 MINUTES, OR ANY OTHER WORRYING SIGNS THEN RETURN TO THE ER RIGHT AWAY. Nosebleeds: Care Instructions  Your Care Instructions    Nosebleeds are common, especially if you have colds or allergies. Many things can cause a nosebleed. Some nosebleeds stop on their own with pressure. Others need packing. Some get cauterized (sealed). If you have gauze or other packing materials in your nose, you will need to follow up with your doctor to have the packing removed. You may need more treatment if you get nosebleeds a lot. The doctor has checked you carefully, but problems can develop later. If you notice any problems or new symptoms, get medical treatment right away. Follow-up care is a key part of your treatment and safety. Be sure to make and go to all appointments, and call your doctor if you are having problems. It's also a good idea to know your test results and keep a list of the medicines you take. How can you care for yourself at home? · If you get another nosebleed:  ¨ Sit up and tilt your head slightly forward. This keeps blood from going down your throat. ¨ Use your thumb and index finger to pinch your nose shut for 10 minutes. Use a clock. Do not check to see if the bleeding has stopped before the 10 minutes are up. If the bleeding has not stopped, pinch your nose shut for another 10 minutes. ¨ When the bleeding has stopped, try not to pick, rub, or blow your nose for 12 hours. Avoiding these things helps keep your nose from bleeding again. · If your doctor prescribed antibiotics, take them as directed. Do not stop taking them just because you feel better. You need to take the full course of antibiotics. To prevent nosebleeds  · Do not blow your nose too hard. · Try not to lift or strain after a nosebleed. · Raise your head on a pillow while you sleep.   · Put a thin layer of a saline- or water-based nasal gel, such as NasoGel, inside your nose. Put it on the septum, which divides your nostrils. This will prevent dryness that can cause nosebleeds. · Use a vaporizer or humidifier to add moisture to your bedroom. Follow the directions for cleaning the machine. · Do not use aspirin, ibuprofen (Advil, Motrin), or naproxen (Aleve) for 36 to 48 hours after a nosebleed unless your doctor tells you to. You can use acetaminophen (Tylenol) for pain relief. · Talk to your doctor about stopping any other medicines you are taking. Some medicines may make you more likely to get a nosebleed. · Do not use cold medicines or nasal sprays without first talking to your doctor. They can make your nose dry. When should you call for help? Call 911 anytime you think you may need emergency care. For example, call if:  ? · You passed out (lost consciousness). ?Call your doctor now or seek immediate medical care if:  ? · You get another nosebleed and your nose is still bleeding after you have applied pressure 3 times for 10 minutes each time (30 minutes total). ? · There is a lot of blood running down the back of your throat even after you pinch your nose and tilt your head forward. ? · You have a fever. ? · You have sinus pain. ? Watch closely for changes in your health, and be sure to contact your doctor if:  ? · You get nosebleeds often, even if they stop. ? · You do not get better as expected. Where can you learn more? Go to http://fermin-lizbeth.info/. Enter S156 in the search box to learn more about \"Nosebleeds: Care Instructions. \"  Current as of: March 20, 2017  Content Version: 11.4  © 8110-1243 [employer]. Care instructions adapted under license by [employer] (which disclaims liability or warranty for this information).  If you have questions about a medical condition or this instruction, always ask your healthcare professional. [employer], [employer] disclaims any warranty or liability for your use of this information. Nose Cautery for Nosebleeds: What to Expect at [address] [place] [address] cautery can help prevent nosebleeds. The doctor uses a chemical swab or an electric current to cauterize the inside of the nose. This seals the blood vessels and builds scar tissue to help prevent more bleeding. For this procedure, your doctor made the inside of your nose numb. After the procedure, you may feel itching and pain in your nose for 3 to 5 days. Over-the-counter pain medicines can help with pain. You may feel like you want to touch, scratch, or pick at the inside of your nose. But doing this may cause more nosebleeds. This care sheet gives you a general idea about how long it will take for you to recover. But each person recovers at a different pace. Follow the steps below to feel better as quickly as possible. How can you care for yourself at home? ? Nose care  ? · Don't touch the part of your nose that was treated. ? · Try not to bump your nose. ? · To avoid irritating your nose, do not blow your nose for 2 weeks. Gently wipe it one nostril at a time. ? · If you get another nosebleed:  ¨ Sit up and tilt your head slightly forward. This keeps blood from going down your throat. ¨ Use your thumb and index finger to pinch your nose shut for 10 minutes. Use a clock. Do not check to see if the bleeding has stopped before the 10 minutes are up. If the bleeding has not stopped, pinch your nose shut for another 10 minutes. ? · Apply antibacterial ointment or saline nasal spray to the inside of your nose several times a day for 10 days. This will help keep the area moist.   Activity  ? · For the first 2 to 3 hours after the procedure:  ¨ Don't bend over or lift anything heavy. ¨ Avoid heavy exercise or activity. ? · You can do your normal activities when it feels okay to do so. Medicines  ?  · Your doctor will tell you if and when you can restart your medicines. He or she will also give you instructions about taking any new medicines. ? · If you take blood thinners, such as warfarin (Coumadin), clopidogrel (Plavix), or aspirin, be sure to talk to your doctor. He or she will tell you if and when to start taking those medicines again. Make sure that you understand exactly what your doctor wants you to do. ? · Be safe with medicines. Read and follow all instructions on the label. ¨ If the doctor gave you a prescription medicine for pain, take it as prescribed. ¨ If you are not taking a prescription pain medicine, ask your doctor if you can take an over-the-counter medicine. ¨ Avoid aspirin and NSAIDs like ibuprofen (Advil, Motrin) and naproxen (Aleve) while your nose is healing. They can increase the risk of bleeding. Follow-up care is a key part of your treatment and safety. Be sure to make and go to all appointments, and call your doctor if you are having problems. It's also a good idea to know your test results and keep a list of the medicines you take. When should you call for help? Call your doctor now or seek immediate medical care if:  ? · You have pain that does not get better after you take pain medicine. ? · You get another nosebleed and your nose is still bleeding after you have pinched your nose shut 3 times for 10 minutes each time (30 minutes total). ? · There is a lot of blood running down the back of your throat even after you pinch your nose and tilt your head forward. ? · You have a fever. ? Watch closely for any changes in your health, and be sure to contact your doctor if:  ? · You still get nosebleeds often, even if they don't last long. ? · You do not get better as expected. Where can you learn more? Go to http://fermin-lizbeth.info/. Enter W887 in the search box to learn more about \"Nose Cautery for Nosebleeds: What to Expect at Home. \"  Current as of: March 20, 2017  Content Version: 11.4  © 3009-3304 [employer]. Care instructions adapted under license by [employer] (which disclaims liability or warranty for this information). If you have questions about a medical condition or this instruction, always ask your healthcare professional. [address] any warranty or liability for your use of this information.

## 2018-01-18 NOTE — ED PROVIDER NOTES
EMERGENCY DEPARTMENT HISTORY AND PHYSICAL EXAM    10:32 AM      Date: 1/18/2018  Patient Name: Sagar Caro    History of Presenting Illness     Chief Complaint   Patient presents with    Epistaxis         History Provided By: Patient    Chief Complaint: Epistaxis  Duration: 39 Minutes  Timing:  Acute  Location: left nostril  Quality: n/a  Severity: Moderate  Modifying Factors: unable to stop at home with direct pressure  Associated Symptoms: denies any other associated signs or symptoms      Additional History (Context): Sagar Caro is a 78 y.o. female with hypertension who presents with acute onset 45 minutes ago of moderate epistaxis from left nostril that could not be stopped at home with direct pressure. Pt states she has issues with frequent nose bleeds from her left nostril and has been see by ENT before for this issue. She regularly applies Vasoline to the area. Has never needed packing or cautery in the past.  She is not on any anticoagulation. Denies dizziness or lightheadedness. Pt denies tobacco use, ETOH use, drug use. No other concerns or symptoms at this time. PCP: Adry Turner MD    Current Outpatient Prescriptions   Medication Sig Dispense Refill    furosemide (LASIX) 40 mg tablet Take 1 Tab by mouth daily. 30 Tab 1    Comp. Stocking,Thigh,Reg,Medium misc 1 Package by Does Not Apply route daily. 2 Package 0    simvastatin (ZOCOR) 80 mg tablet Take 1 Tab by mouth nightly. 30 Tab 2    metoprolol succinate (TOPROL-XL) 100 mg tablet Take 1 Tab by mouth two (2) times a day. 60 Tab 3    meloxicam (MOBIC) 15 mg tablet TAKE 1 TABLET DAILY WITH FOOD 90 Tab 2    losartan (COZAAR) 50 mg tablet Take 50 mg by mouth daily.  montelukast (SINGULAIR) 10 mg tablet Take 10 mg by mouth daily.  cyanocobalamin (VITAMIN B-12) 500 mcg tablet Take 500 mcg by mouth daily.  folic acid (FOLVITE) 1 mg tablet Take  by mouth daily.       celecoxib (CELEBREX) 200 mg capsule Take 100 mg by mouth daily.  CETIRIZINE HCL (ZYRTEC PO) Take  by mouth. Past History     Past Medical History:  Past Medical History:   Diagnosis Date    Arthritis     Heart murmur     History of seasonal allergies     HTN (hypertension)     Hypercholesteremia        Past Surgical History:  Past Surgical History:   Procedure Laterality Date    HX KNEE ARTHROSCOPY      left and right    HX ORTHOPAEDIC      right ankle       Family History:  Family History   Problem Relation Age of Onset    Cancer Other     Heart Disease Other     Cancer Mother     Heart Disease Mother        Social History:  Social History   Substance Use Topics    Smoking status: Never Smoker    Smokeless tobacco: Never Used    Alcohol use No       Allergies:  No Known Allergies      Review of Systems       Review of Systems   Constitutional: Negative for chills, fatigue and fever. HENT: Negative for congestion, ear pain, rhinorrhea and sore throat. Positive for epistaxis    Eyes: Negative for pain, redness and itching. Respiratory: Negative for cough, chest tightness and shortness of breath. Cardiovascular: Negative for chest pain, palpitations and leg swelling. Gastrointestinal: Negative for abdominal pain, diarrhea, nausea and vomiting. Genitourinary: Negative for decreased urine volume, dysuria, flank pain, hematuria and pelvic pain. Musculoskeletal: Negative for arthralgias, back pain, joint swelling and myalgias. Skin: Negative for color change, pallor and rash. Neurological: Negative for dizziness, weakness and headaches. Hematological: Negative for adenopathy. Does not bruise/bleed easily. All other systems reviewed and are negative.         Physical Exam     Visit Vitals    /59 (BP 1 Location: Left arm, BP Patient Position: At rest)    Pulse 63    Temp 97.5 °F (36.4 °C)    Resp 16    Ht 5' 2\" (1.575 m)    Wt 82.1 kg (181 lb)    SpO2 95%    BMI 33.11 kg/m2         Physical Exam Constitutional: No distress. HENT:   Head: Normocephalic and atraumatic. Nose: Epistaxis (left nostril, small amount of blood oozing from vessel on anterior septum, + clots, ) is observed. Mouth/Throat: Oropharynx is clear and moist.   Eyes: Conjunctivae and EOM are normal. Pupils are equal, round, and reactive to light. Neck: Normal range of motion. Neck supple. Cardiovascular: Normal rate, regular rhythm and normal heart sounds. No murmur heard. Pulmonary/Chest: Effort normal and breath sounds normal. She has no wheezes. She has no rales. Abdominal: Soft. Bowel sounds are normal. She exhibits no distension. There is no tenderness. Musculoskeletal: Normal range of motion. She exhibits no edema or deformity. Lymphadenopathy:     She has no cervical adenopathy. Neurological: She is alert. She exhibits normal muscle tone. Coordination normal.   Skin: Skin is warm and dry. No rash noted. She is not diaphoretic. No erythema. Psychiatric: She has a normal mood and affect. Her behavior is normal.         Diagnostic Study Results     Labs -  No results found for this or any previous visit (from the past 12 hour(s)). Radiologic Studies -   No orders to display         Medical Decision Making   I am the first provider for this patient. I reviewed the vital signs, available nursing notes, past medical history, past surgical history, family history and social history. Vital Signs-Reviewed the patient's vital signs. Pulse Oximetry Analysis -  96% on room air (Interpretation) normal      Records Reviewed: Nursing Notes (Time of Review: 10:32 AM)    ED Course: Progress Notes, Reevaluation, and Consults:    11:43 AM  Bleeding has resolved with direct pressure and meds. Apparent vessel on anterior septum. Will apply silver nitrate cautery. Continue vasoline application. Patient given instructions on direct pressure application and return precautions. Will otherwise see her ENT.   Advised on BP elevation and to continue home meds, have re-check by primary care        Procedures:   Epistaxis Management  Date/Time: 1/18/2018 11:50 AM  Performed by: Fannie Rae  Authorized by: Fannie Rae     Consent:     Consent obtained:  Verbal    Consent given by:  Patient    Risks discussed:  Bleeding, nasal injury and pain    Alternatives discussed:  No treatment, delayed treatment and alternative treatment  Anesthesia (see MAR for exact dosages): Anesthesia method:  None  Procedure details:     Treatment site:  L anterior    Treatment method:  Silver nitrate    Treatment complexity:  Limited    Treatment episode: initial    Post-procedure details:     Assessment:  Bleeding stopped    Patient tolerance of procedure: Tolerated well, no immediate complications              Diagnosis     Clinical Impression:   1. Epistaxis        Disposition: Discharge    Follow-up Information     None           Patient's Medications   Start Taking    No medications on file   Continue Taking    CELECOXIB (CELEBREX) 200 MG CAPSULE    Take 100 mg by mouth daily. CETIRIZINE HCL (ZYRTEC PO)    Take  by mouth. COMP. [address] [place] [address] Package by Does Not Apply route daily. CYANOCOBALAMIN (VITAMIN B-12) 500 MCG TABLET    Take 500 mcg by mouth daily. FOLIC ACID (FOLVITE) 1 MG TABLET    Take  by mouth daily. FUROSEMIDE (LASIX) 40 MG TABLET    Take 1 Tab by mouth daily. LOSARTAN (COZAAR) 50 MG TABLET    Take 50 mg by mouth daily. MELOXICAM (MOBIC) 15 MG TABLET    TAKE 1 TABLET DAILY WITH FOOD    METOPROLOL SUCCINATE (TOPROL-XL) 100 MG TABLET    Take 1 Tab by mouth two (2) times a day. MONTELUKAST (SINGULAIR) 10 MG TABLET    Take 10 mg by mouth daily. SIMVASTATIN (ZOCOR) 80 MG TABLET    Take 1 Tab by mouth nightly.    These Medications have changed    No medications on file   Stop Taking    No medications on file     _______________________________    Attestations:  Destiny Attestation     Rowdy Thurman acting as a scribe for and in the presence of Nahomy Bourgeois MD      January 18, 2018 at 10:32 AM       Provider Attestation:      I personally performed the services described in the documentation, reviewed the documentation, as recorded by the scribe in my presence, and it accurately and completely records my words and actions.  January 18, 2018 at 10:32 AM - Nahomy Bourgeois MD    _______________________________

## 2018-02-05 ENCOUNTER — HOSPITAL ENCOUNTER (OUTPATIENT)
Dept: LAB | Age: 78
Discharge: HOME | End: 2018-02-05
Payer: MEDICARE

## 2018-02-05 LAB
ALBUMIN SERPL-MCNC: 3.5 G/DL (ref 3.4–5)
ALBUMIN/GLOB SERPL: 0.7 {RATIO} (ref 0.8–1.7)
ALP SERPL-CCNC: 208 U/L (ref 45–117)
ALT SERPL-CCNC: 39 U/L (ref 13–56)
ANION GAP SERPL CALC-SCNC: 9 MMOL/L (ref 3–18)
AST SERPL-CCNC: 32 U/L (ref 15–37)
BASOPHILS # BLD: 0 K/UL (ref 0–0.1)
BASOPHILS NFR BLD: 0 % (ref 0–2)
BILIRUB SERPL-MCNC: 0.6 MG/DL (ref 0.2–1)
BUN SERPL-MCNC: 36 MG/DL (ref 7–18)
BUN/CREAT SERPL: 27 (ref 12–20)
CALCIUM SERPL-MCNC: 9.3 MG/DL (ref 8.5–10.1)
CHLORIDE SERPL-SCNC: 96 MMOL/L (ref 100–108)
CO2 SERPL-SCNC: 29 MMOL/L (ref 21–32)
CREAT SERPL-MCNC: 1.35 MG/DL (ref 0.6–1.3)
DIFFERENTIAL METHOD BLD: ABNORMAL
EOSINOPHIL # BLD: 0.1 K/UL (ref 0–0.4)
EOSINOPHIL NFR BLD: 3 % (ref 0–5)
ERYTHROCYTE [DISTWIDTH] IN BLOOD BY AUTOMATED COUNT: 14.6 % (ref 11.6–14.5)
GLOBULIN SER CALC-MCNC: 4.8 G/DL (ref 2–4)
GLUCOSE SERPL-MCNC: 82 MG/DL (ref 74–99)
HCT VFR BLD AUTO: 37.9 % (ref 35–45)
HGB BLD-MCNC: 11.7 G/DL (ref 12–16)
LYMPHOCYTES # BLD: 0.7 K/UL (ref 0.9–3.6)
LYMPHOCYTES NFR BLD: 18 % (ref 21–52)
MCH RBC QN AUTO: 27.3 PG (ref 24–34)
MCHC RBC AUTO-ENTMCNC: 30.9 G/DL (ref 31–37)
MCV RBC AUTO: 88.6 FL (ref 74–97)
MONOCYTES # BLD: 0.3 K/UL (ref 0.05–1.2)
MONOCYTES NFR BLD: 9 % (ref 3–10)
NEUTS SEG # BLD: 2.7 K/UL (ref 1.8–8)
NEUTS SEG NFR BLD: 70 % (ref 40–73)
PLATELET # BLD AUTO: 204 K/UL (ref 135–420)
PMV BLD AUTO: 11.1 FL (ref 9.2–11.8)
POTASSIUM SERPL-SCNC: 4.4 MMOL/L (ref 3.5–5.5)
PROT SERPL-MCNC: 8.3 G/DL (ref 6.4–8.2)
RBC # BLD AUTO: 4.28 M/UL (ref 4.2–5.3)
SODIUM SERPL-SCNC: 134 MMOL/L (ref 136–145)
T4 FREE SERPL-MCNC: 1.2 NG/DL (ref 0.7–1.5)
TSH SERPL DL<=0.05 MIU/L-ACNC: 3.67 UIU/ML (ref 0.36–3.74)
WBC # BLD AUTO: 3.8 K/UL (ref 4.6–13.2)

## 2018-02-05 PROCEDURE — 80053 COMPREHEN METABOLIC PANEL: CPT | Performed by: FAMILY MEDICINE

## 2018-02-05 PROCEDURE — 84439 ASSAY OF FREE THYROXINE: CPT | Performed by: FAMILY MEDICINE

## 2018-02-05 PROCEDURE — 36415 COLL VENOUS BLD VENIPUNCTURE: CPT | Performed by: FAMILY MEDICINE

## 2018-02-05 PROCEDURE — 85025 COMPLETE CBC W/AUTO DIFF WBC: CPT | Performed by: FAMILY MEDICINE

## 2018-02-05 RX ORDER — FUROSEMIDE 40 MG/1
TABLET ORAL
Qty: 30 TAB | Refills: 1 | Status: SHIPPED | OUTPATIENT
Start: 2018-02-05 | End: 2018-04-07 | Stop reason: SDUPTHER

## 2018-03-06 ENCOUNTER — OFFICE VISIT (OUTPATIENT)
Dept: CARDIOLOGY CLINIC | Age: 78
End: 2018-03-06

## 2018-03-06 VITALS
SYSTOLIC BLOOD PRESSURE: 149 MMHG | DIASTOLIC BLOOD PRESSURE: 47 MMHG | BODY MASS INDEX: 32.76 KG/M2 | HEIGHT: 62 IN | WEIGHT: 178 LBS | HEART RATE: 49 BPM

## 2018-03-06 DIAGNOSIS — I34.0 NON-RHEUMATIC MITRAL REGURGITATION: ICD-10-CM

## 2018-03-06 DIAGNOSIS — I42.1 MILD HOCM (HYPERTROPHIC OBSTRUCTIVE CARDIOMYOPATHY) (HCC): Primary | ICD-10-CM

## 2018-03-06 DIAGNOSIS — M79.669 PAIN AND SWELLING OF LOWER LEG, UNSPECIFIED LATERALITY: ICD-10-CM

## 2018-03-06 DIAGNOSIS — C73 PAPILLARY THYROID CARCINOMA (HCC): ICD-10-CM

## 2018-03-06 DIAGNOSIS — I27.20 PULMONARY HTN (HCC): ICD-10-CM

## 2018-03-06 DIAGNOSIS — I35.1 NONRHEUMATIC AORTIC VALVE INSUFFICIENCY: ICD-10-CM

## 2018-03-06 DIAGNOSIS — I50.32 CHRONIC DIASTOLIC CONGESTIVE HEART FAILURE (HCC): ICD-10-CM

## 2018-03-06 DIAGNOSIS — M79.89 PAIN AND SWELLING OF LOWER LEG, UNSPECIFIED LATERALITY: ICD-10-CM

## 2018-03-06 RX ORDER — TRAMADOL HYDROCHLORIDE 50 MG/1
50 TABLET ORAL
Qty: 30 TAB | Refills: 0 | Status: SHIPPED | OUTPATIENT
Start: 2018-03-06 | End: 2018-04-23 | Stop reason: SDUPTHER

## 2018-03-06 RX ORDER — HYDROCHLOROTHIAZIDE 25 MG/1
25 TABLET ORAL DAILY
COMMUNITY
End: 2018-03-06 | Stop reason: SDUPTHER

## 2018-03-06 RX ORDER — SIMVASTATIN 80 MG/1
80 TABLET, FILM COATED ORAL
Qty: 90 TAB | Refills: 1 | Status: SHIPPED | COMMUNITY
Start: 2018-03-06 | End: 2018-09-26 | Stop reason: SDUPTHER

## 2018-03-06 NOTE — MR AVS SNAPSHOT
[facility] 
 
 
 [address], [address] [facility] [address] 
[phone] Patient: Yayo Reed MRN: [id number] Visit Information Date & Time Provider Department Dept. Phone Encounter #  
 3/6/2018 10:30 AM Gal Sosa MD Cardiology [facility] [address] [place] [phone] [id number] Follow-up Instructions Return in about 4 months (around 7/6/2018). Your Appointments 7/10/2018 10:30 AM  
Office Visit with Gal Sosa MD  
[facility]) Appt Note: 4 months [address], [address] [facility] [address] [id number] [facility], [address] [facility] [place] [address] [place] Upcoming Health Maintenance Date Due ZOSTER VACCINE AGE 60> 1/12/2002 GLAUCOMA SCREENING Q2Y 3/12/2007 Pneumococcal 65+ High/Highest Risk (1 of 2 - PCV13) 3/12/2007 MEDICARE YEARLY EXAM 3/12/2007 DTaP/Tdap/Td series (2 - Td) 12/1/2022 Allergies as of 3/6/2018  Review Complete On: 3/6/2018 By: Ramona Vega LPN No Known Allergies Current Immunizations  Never Reviewed Name Date TDAP Vaccine 12/1/2012  4:50 PM  
  
 Not reviewed this visit You Were Diagnosed With   
  
 Codes Comments Mild HOCM (hypertrophic obstructive cardiomyopathy) ([facility] [address]    -  Primary ICD-10-CM: I42.1 ICD-9-CM: 425.11 Chronic diastolic congestive heart failure (HCC)     ICD-10-CM: I50.32 
ICD-9-CM: 428.32, 428.0 Nonrheumatic aortic valve insufficiency     ICD-10-CM: I35.1 ICD-9-CM: 424.1 Non-rheumatic mitral regurgitation     ICD-10-CM: I34.0 ICD-9-CM: 424.0 Pulmonary HTN     ICD-10-CM: I27.20 ICD-9-CM: 416.8 Papillary thyroid carcinoma ([facility] [address]     ICD-10-CM: I63 ICD-9-CM: 151 Vitals BP Pulse Height(growth percentile) Weight(growth percentile) BMI Smoking Status 149/47 (!) 49 5' 2\" (1.575 m) 178 lb (80.7 kg) 32.56 kg/m2 Never Smoker Vitals History BMI and BSA Data Body Mass Index Body Surface Area 32.56 kg/m 2 1.88 m 2 Preferred Pharmacy Pharmacy Name Phone [address] [facility] [phone] Your Updated Medication List  
  
   
This list is accurate as of 3/6/18 11:25 AM.  Always use your most recent med list.  
  
  
  
  
 CeleBREX 200 mg capsule Generic drug:  celecoxib Take 100 mg by mouth daily. Comp. Stocking,Thigh,Reg,Medium Misc  
1 Package by Does Not Apply route daily. folic acid 1 mg tablet Commonly known as:  Google Take  by mouth daily. furosemide 40 mg tablet Commonly known as:  LASIX  
take 1 tablet by mouth once daily  
  
 losartan 50 mg tablet Commonly known as:  COZAAR Take 50 mg by mouth daily. meloxicam 15 mg tablet Commonly known as:  MOBIC  
TAKE 1 TABLET DAILY WITH FOOD  
  
 metoprolol succinate 100 mg tablet Commonly known as:  TOPROL-XL Take 1 Tab by mouth two (2) times a day. montelukast 10 mg tablet Commonly known as:  SINGULAIR Take 10 mg by mouth daily. simvastatin 80 mg tablet Commonly known as:  ZOCOR Take 1 Tab by mouth nightly. VITAMIN B-12 500 mcg tablet Generic drug:  cyanocobalamin Take 500 mcg by mouth daily. ZYRTEC PO Take  by mouth. Prescriptions Sent to Mail Order Refills  
 simvastatin (ZOCOR) 80 mg tablet 1 Sig: Take 1 Tab by mouth nightly. Class: Mail Order Pharmacy: [address], [address] Ph #: [phone] Route: Oral  
  
Follow-up Instructions Return in about 4 months (around 7/6/2018). Introducing [facility]! Jose L Sullivan introduces [employer] patient portal. Now you can access parts of your medical record, email your doctor's office, and request medication refills online. 1. In your internet browser, go to [email or web address] 2. Click on the First Time User? Click Here link in the Sign In box. You will see the New Member Sign Up page. 3. Enter your [employer] Access Code exactly as it appears below. You will not need to use this code after youve completed the sign-up process. If you do not sign up before the expiration date, you must request a new code. · [employer] Access Code: [email or web address] Expires: 3/12/2018  8:22 AM 
 
4. Enter the last four digits of your Social Security Number (xxxx) and Date of Birth (mm/dd/yyyy) as indicated and click Submit. You will be taken to the next sign-up page. 5. Create a [employer] ID. This will be your [employer] login ID and cannot be changed, so think of one that is secure and easy to remember. 6. Create a [employer] password. You can change your password at any time. 7. Enter your Password Reset Question and Answer. This can be used at a later time if you forget your password. 8. Enter your e-mail address. You will receive e-mail notification when new information is available in [address] 9. Click Sign Up. You can now view and download portions of your medical record. 10. Click the Download Summary menu link to download a portable copy of your medical information. If you have questions, please visit the Frequently Asked Questions section of the [employer] website. Remember, [employer] is NOT to be used for urgent needs. For medical emergencies, dial 911. Now available from your iPhone and Android! Please provide this summary of care documentation to your next provider. Your primary care clinician is listed as Linda Ludwig. If you have any questions after today's visit, please call [phone].

## 2018-03-06 NOTE — LETTER
Kourtney Daugherty 1940 
 
3/6/2018 Dear Heidi Siemens, MD 
 
I had the pleasure of evaluating  Ms. Daugherty in office today. Below are the relevant portions of my assessment and plan of care. ICD-10-CM ICD-9-CM 1. HOCM (hypertrophic obstructive cardiomyopathy) (HCC) I42.1 425.11   
2. Chronic diastolic congestive heart failure (HCC) I50.32 428.32   
  428.0 3. Nonrheumatic aortic valve insufficiency I35.1 424.1 simvastatin (ZOCOR) 80 mg tablet 4. Non-rheumatic mitral regurgitation I34.0 424.0 5. Pulmonary HTN I27.20 416.8 6. Papillary thyroid carcinoma ([facility] [address] [id number] Current Outpatient Prescriptions Medication Sig Dispense Refill  Comp. Stocking,Thigh,Reg,Medium misc 1 Package by Does Not Apply route daily. 2 Package 0  
 simvastatin (ZOCOR) 80 mg tablet Take 1 Tab by mouth nightly. 30 Tab 2  
 metoprolol succinate (TOPROL-XL) 100 mg tablet Take 1 Tab by mouth two (2) times a day. (Patient taking differently: Take 75 mg by mouth two (2) times a day.) 60 Tab 3  
 losartan (COZAAR) 50 mg tablet Take 50 mg by mouth daily.  cyanocobalamin (VITAMIN B-12) 500 mcg tablet Take 500 mcg by mouth daily.  folic acid (FOLVITE) 1 mg tablet Take  by mouth daily.  furosemide (LASIX) 40 mg tablet take 1 tablet by mouth once daily 30 Tab 1  
 meloxicam (MOBIC) 15 mg tablet TAKE 1 TABLET DAILY WITH FOOD 90 Tab 2  
 montelukast (SINGULAIR) 10 mg tablet Take 10 mg by mouth daily.  celecoxib (CELEBREX) 200 mg capsule Take 100 mg by mouth daily.  CETIRIZINE HCL (ZYRTEC PO) Take  by mouth. Orders Placed This Encounter  DISCONTD: hydroCHLOROthiazide (HYDRODIURIL) 25 mg tablet Sig: Take 25 mg by mouth daily. If you have questions, please do not hesitate to call me. I look forward to following Ms. Daugherty along with you.  
 
Sincerely, 
Irene Magdaleno MD

## 2018-03-06 NOTE — PROGRESS NOTES
1. Have you been to the ER, urgent care clinic since your last visit? Hospitalized since your last visit? ER for nose bleed [facility]     2. Have you seen or consulted any other health care providers outside of the [address] [place] [address] since your last visit? Include any pap smears or colon screening.  yes pcp    Medication list provided by patient    No symptoms reported by patient

## 2018-03-06 NOTE — PROGRESS NOTES
HISTORY OF PRESENT ILLNESS  Cristina Wiley is a 78 y.o. female. CHF   The history is provided by the patient. This is a chronic problem. The problem occurs every several days. The problem has been gradually improving. Associated symptoms include shortness of breath. Pertinent negatives include no chest pain. Shortness of Breath   The history is provided by the patient. This is a chronic problem. The problem occurs intermittently. The current episode started more than 1 week ago. The problem has been gradually improving. Pertinent negatives include no fever, no ear pain, no neck pain, no cough, no sputum production, no hemoptysis, no wheezing, no PND, no orthopnea, no chest pain, no syncope, no vomiting, no rash, no leg pain, no leg swelling and no claudication. Associated medical issues include heart failure. Associated medical issues do not include CAD. Review of Systems   Constitutional: Positive for malaise/fatigue. Negative for chills, diaphoresis, fever and weight loss. HENT: Negative for ear discharge, ear pain, hearing loss, nosebleeds and tinnitus. Eyes: Negative for blurred vision. Respiratory: Positive for shortness of breath. Negative for cough, hemoptysis, sputum production, wheezing and stridor. Cardiovascular: Negative for chest pain, palpitations, orthopnea, claudication, leg swelling, syncope and PND. Gastrointestinal: Negative for heartburn, nausea and vomiting. Musculoskeletal: Negative for myalgias and neck pain. Skin: Negative for itching and rash. Neurological: Negative for dizziness, tingling, tremors, focal weakness, loss of consciousness and weakness. Psychiatric/Behavioral: Negative for depression and suicidal ideas.      Family History   Problem Relation Age of Onset    Cancer Other     Heart Disease Other     Cancer Mother     Heart Disease Mother        Past Medical History:   Diagnosis Date    Arthritis     Heart murmur     History of seasonal allergies     HTN (hypertension)     Hypercholesteremia        Past Surgical History:   Procedure Laterality Date    HX KNEE ARTHROSCOPY      left and right    HX ORTHOPAEDIC      right ankle       Social History   Substance Use Topics    Smoking status: Never Smoker    Smokeless tobacco: Never Used    Alcohol use No       No Known Allergies    Outpatient Prescriptions Marked as Taking for the 3/6/18 encounter (Office Visit) with Emily Truong MD   Medication Sig Dispense Refill    Comp. Stocking,Thigh,Reg,Medium misc 1 Package by Does Not Apply route daily. 2 Package 0    simvastatin (ZOCOR) 80 mg tablet Take 1 Tab by mouth nightly. 30 Tab 2    metoprolol succinate (TOPROL-XL) 100 mg tablet Take 1 Tab by mouth two (2) times a day. (Patient taking differently: Take 75 mg by mouth two (2) times a day.) 60 Tab 3    losartan (COZAAR) 50 mg tablet Take 50 mg by mouth daily.  cyanocobalamin (VITAMIN B-12) 500 mcg tablet Take 500 mcg by mouth daily.  folic acid (FOLVITE) 1 mg tablet Take  by mouth daily. Visit Vitals    /47    Pulse (!) 49    Ht 5' 2\" (1.575 m)    Wt 80.7 kg (178 lb)    BMI 32.56 kg/m2     Physical Exam   Constitutional: She is oriented to person, place, and time. She appears well-developed and well-nourished. No distress. HENT:   Head: Atraumatic. Mouth/Throat: No oropharyngeal exudate. Eyes: Conjunctivae are normal. Right eye exhibits no discharge. Left eye exhibits no discharge. No scleral icterus. Neck: Normal range of motion. Neck supple. No JVD present. No tracheal deviation present. No thyromegaly present. Cardiovascular: Normal rate and regular rhythm. Exam reveals no gallop. Murmur (3/6 holosystolic murmur best heard at apex and left parasternal area with no radiationl) heard. Pulmonary/Chest: Effort normal. No stridor. No respiratory distress. She has no wheezes. She has no rales. She exhibits no tenderness. Abdominal: Soft.  There is no tenderness. There is no rebound and no guarding. Musculoskeletal: Normal range of motion. She exhibits edema (mild edema). She exhibits no tenderness. Lymphadenopathy:     She has no cervical adenopathy. Neurological: She is alert and oriented to person, place, and time. She exhibits normal muscle tone. Skin: Skin is warm. She is not diaphoretic. Psychiatric: She has a normal mood and affect. Her behavior is normal.     ekg sinus bradycardia with poor r wave progression. Echo report reviewed. ASSESSMENT and PLAN    ICD-10-CM ICD-9-CM    1. HOCM (hypertrophic obstructive cardiomyopathy) (HCC) I42.1 425.11    2. Chronic diastolic congestive heart failure (HCC) I50.32 428.32      428.0    3. Nonrheumatic aortic valve insufficiency I35.1 424.1    4. Non-rheumatic mitral regurgitation I34.0 424.0    5. Pulmonary HTN I27.20 416.8    6. Papillary thyroid carcinoma (HCC) C73 [id number]      No orders of the defined types were placed in this encounter. Follow-up Disposition:  Return in about 4 months (around 7/6/2018). current treatment plan is effective, no change in therapy  reviewed diet, exercise and weight control  use of aspirin to prevent MI and TIA's discussed. Patient seen for pre op evaluation prior to thyroid surgery for possible Ca. Had recent echo which revealed severe pulm htn. Recent ANAND reveals HOCM with mitral regurgitation and moderate aortic regurgitation. RHC shows severe Pulm HTN with elevated LVEDP. Seen by Dr Srinivas Menjivar at [facility] is now scheduled for alchohol septal ablation.  Recent cardiac cath revealed normal coronaries  CHF improved- continue lasix 20mg po daily

## 2018-04-07 RX ORDER — FUROSEMIDE 40 MG/1
TABLET ORAL
Qty: 30 TAB | Refills: 1 | Status: SHIPPED | OUTPATIENT
Start: 2018-04-07 | End: 2019-10-01 | Stop reason: SDUPTHER

## 2018-04-23 DIAGNOSIS — M79.669 PAIN AND SWELLING OF LOWER LEG, UNSPECIFIED LATERALITY: ICD-10-CM

## 2018-04-23 DIAGNOSIS — M79.89 PAIN AND SWELLING OF LOWER LEG, UNSPECIFIED LATERALITY: ICD-10-CM

## 2018-04-23 DIAGNOSIS — I35.1 NONRHEUMATIC AORTIC VALVE INSUFFICIENCY: ICD-10-CM

## 2018-04-24 RX ORDER — TRAMADOL HYDROCHLORIDE 50 MG/1
TABLET ORAL
Qty: 30 TAB | Refills: 0 | Status: SHIPPED | OUTPATIENT
Start: 2018-04-24 | End: 2018-07-31 | Stop reason: SDUPTHER

## 2018-04-24 RX ORDER — SIMVASTATIN 80 MG/1
TABLET, FILM COATED ORAL
Qty: 30 TAB | Refills: 2 | Status: SHIPPED | OUTPATIENT
Start: 2018-04-24 | End: 2018-05-04 | Stop reason: SDUPTHER

## 2018-05-04 ENCOUNTER — OFFICE VISIT (OUTPATIENT)
Dept: ORTHOPEDIC SURGERY | Age: 78
End: 2018-05-04

## 2018-05-04 VITALS
HEART RATE: 51 BPM | RESPIRATION RATE: 16 BRPM | WEIGHT: 175.8 LBS | HEIGHT: 62 IN | BODY MASS INDEX: 32.35 KG/M2 | TEMPERATURE: 96.6 F | SYSTOLIC BLOOD PRESSURE: 164 MMHG | OXYGEN SATURATION: 91 % | DIASTOLIC BLOOD PRESSURE: 48 MMHG

## 2018-05-04 DIAGNOSIS — M17.0 PRIMARY OSTEOARTHRITIS OF BOTH KNEES: Primary | ICD-10-CM

## 2018-05-04 RX ORDER — TRAMADOL HYDROCHLORIDE 50 MG/1
50 TABLET ORAL
Qty: 30 TAB | Refills: 1 | Status: SHIPPED | OUTPATIENT
Start: 2018-05-04 | End: 2018-06-27 | Stop reason: SDUPTHER

## 2018-05-04 RX ORDER — BETAMETHASONE SODIUM PHOSPHATE AND BETAMETHASONE ACETATE 3; 3 MG/ML; MG/ML
6 INJECTION, SUSPENSION INTRA-ARTICULAR; INTRALESIONAL; INTRAMUSCULAR; SOFT TISSUE ONCE
Qty: 2 ML | Refills: 0
Start: 2018-05-04 | End: 2018-05-04

## 2018-05-04 NOTE — PROGRESS NOTES
[address] [facility], [address] [facility]  [phone]           Patient: Janiya Seo                MRN: [id number]       SSN: [id number]  YOB: 1940        AGE: 70 y.o. SEX: female  Body mass index is 32.15 kg/(m^2). PCP: Kendal Bey MD  05/04/18      This office note has been dictated. REVIEW OF SYSTEMS:  Constitutional: Negative for fever, chills, weight loss and malaise/fatigue. HENT: Negative. Eyes: Negative. Respiratory: Negative. Cardiovascular: Negative. Gastrointestinal: No bowel incontinence or constipation. Genitourinary: No bladder incontinence or saddle anesthesia. Skin: Negative. Neurological: Negative. Endo/Heme/Allergies: Negative. Psychiatric/Behavioral: Negative. Musculoskeletal: As per HPI above. Past Medical History:   Diagnosis Date    Arthritis     Heart murmur     History of seasonal allergies     HTN (hypertension)     Hypercholesteremia          Current Outpatient Prescriptions:     traMADol (ULTRAM) 50 mg tablet, take 1 tablet by mouth every 6 hours if needed for pain maximum daily dose of 4, Disp: 30 Tab, Rfl: 0    furosemide (LASIX) 40 mg tablet, take 1 tablet by mouth once daily, Disp: 30 Tab, Rfl: 1    simvastatin (ZOCOR) 80 mg tablet, Take 1 Tab by mouth nightly., Disp: 90 Tab, Rfl: 1    Comp. Stocking,Thigh,Reg,Medium misc, 1 Package by Does Not Apply route daily. , Disp: 2 Package, Rfl: 0    metoprolol succinate (TOPROL-XL) 100 mg tablet, Take 1 Tab by mouth two (2) times a day. (Patient taking differently: Take 75 mg by mouth two (2) times a day.), Disp: 60 Tab, Rfl: 3    losartan (COZAAR) 50 mg tablet, Take 50 mg by mouth daily. , Disp: , Rfl:     montelukast (SINGULAIR) 10 mg tablet, Take 10 mg by mouth daily. , Disp: , Rfl:     cyanocobalamin (VITAMIN B-12) 500 mcg tablet, Take 500 mcg by mouth daily. , Disp: , Rfl:     folic acid (FOLVITE) 1 mg tablet, Take  by mouth daily. , Disp: , Rfl:     celecoxib (CELEBREX) 200 mg capsule, Take 100 mg by mouth daily. , Disp: , Rfl:     CETIRIZINE HCL (ZYRTEC PO), Take  by mouth.  , Disp: , Rfl:     No Known Allergies    Social History     Social History    Marital status:      Spouse name: N/A    Number of children: N/A    Years of education: N/A     Occupational History    Not on file. Social History Main Topics    Smoking status: Never Smoker    Smokeless tobacco: Never Used    Alcohol use No    Drug use: No    Sexual activity: Not on file     Other Topics Concern    Not on file     Social History Narrative    Pt has a dog    Born [facility] and moved to [place] years ago        Past Surgical History:   Procedure Laterality Date    HX KNEE ARTHROSCOPY      left and right    HX ORTHOPAEDIC      right ankle           * Patient was identified by name and date of birth   * Agreement on procedure being performed was verified  * Risks and Benefits explained to the patient  * Procedure site verified and marked as necessary  * Patient was positioned for comfort  * Consent was signed and verified  2:55 PM    The patient was instructed on post injection care. We did see Ms. Daugherty for followup with regards to bilateral knee end-staged arthritis. It has been a while since she has had injections in her knees. She is requesting repeat injection today. She has had no recent fevers, chills, systemic changes, or injuries to report, and no chest pain or shortness of breath. She does have decreased walking tolerance, trouble getting up from a chair, and going up and down stairs. PHYSICAL EXAMINATION:  In general, the patient is alert and oriented x 3 in no acute distress. The patient is well-developed, well-nourished, with a normal affect. The patient is afebrile. HEENT:  Head is normocephalic and atraumatic. Pupils are equally round and reactive to light and accommodation. Extraocular eye movements are intact. Neck is supple. Trachea is midline. No JVD is present. Breathing is nonlabored. Examination of the lower extremities reveals pain-free range of motion of the hips. There is no pain to palpation of the greater trochanteric bursae. There is negative straight leg raise. There is negative calf tenderness. There is negative Alines. There is no evidence of DVT present. Examination of each of the knees reveals the skin is intact. There is no ecchymosis, no warmth, and no signs of infection or cellulitis present. She does have pain to palpation tricompartmentally about the knees and crepitus arising from the anterior compartment. Findings are consistent with advanced arthritis of each of the knees. RADIOGRAPHS:  Review of previous radiographs does confirm end-staged arthritis with bone-to-bone eburnation to the medial and patellofemoral articulations and varus deformity present. ASSESSMENT:  Bilateral knee end-stage osteoarthritis. PLAN:  At this point, we are going to move forward with a cortisone injection for each of the knees today. Under aseptic conditions, after informed and written consent, and appropriate time out performed, with ultrasound-guided assistance, each knee was prepped with Betadine and 6 mg of Celestone was injected to each knee without complications. The patient tolerated the injection well. The patient was instructed on post injection care. We will plan on seeing her back in the office in about three months time for evaluation. We will plan on repeat x-rays of each of her knees upon her return.                  JR Tl GUTIÉRREZ, ALTON, ATC

## 2018-06-26 ENCOUNTER — APPOINTMENT (OUTPATIENT)
Dept: GENERAL RADIOLOGY | Age: 78
End: 2018-06-26
Attending: EMERGENCY MEDICINE
Payer: MEDICARE

## 2018-06-26 ENCOUNTER — HOSPITAL ENCOUNTER (EMERGENCY)
Age: 78
Discharge: HOME | End: 2018-06-26
Attending: EMERGENCY MEDICINE
Payer: MEDICARE

## 2018-06-26 VITALS
TEMPERATURE: 98.2 F | WEIGHT: 160 LBS | BODY MASS INDEX: 29.44 KG/M2 | DIASTOLIC BLOOD PRESSURE: 63 MMHG | RESPIRATION RATE: 18 BRPM | OXYGEN SATURATION: 97 % | HEART RATE: 58 BPM | SYSTOLIC BLOOD PRESSURE: 150 MMHG | HEIGHT: 62 IN

## 2018-06-26 DIAGNOSIS — S80.02XA TRAUMATIC ECCHYMOSIS OF LEFT KNEE, INITIAL ENCOUNTER: ICD-10-CM

## 2018-06-26 DIAGNOSIS — S89.92XA LEFT KNEE INJURY, INITIAL ENCOUNTER: ICD-10-CM
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
## 2018-06-26 LAB
ANION GAP SERPL CALC-SCNC: 3 MMOL/L (ref 3–18)
BASOPHILS # BLD: 0 K/UL (ref 0–0.06)
BASOPHILS NFR BLD: 0 % (ref 0–2)
BUN SERPL-MCNC: 44 MG/DL (ref 7–18)
BUN/CREAT SERPL: 28 (ref 12–20)
CALCIUM SERPL-MCNC: 10.3 MG/DL (ref 8.5–10.1)
CHLORIDE SERPL-SCNC: 100 MMOL/L (ref 100–108)
CO2 SERPL-SCNC: 33 MMOL/L (ref 21–32)
CREAT SERPL-MCNC: 1.56 MG/DL (ref 0.6–1.3)
DIFFERENTIAL METHOD BLD: ABNORMAL
EOSINOPHIL # BLD: 0.1 K/UL (ref 0–0.4)
EOSINOPHIL NFR BLD: 3 % (ref 0–5)
ERYTHROCYTE [DISTWIDTH] IN BLOOD BY AUTOMATED COUNT: 14.9 % (ref 11.6–14.5)
ERYTHROCYTE [SEDIMENTATION RATE] IN BLOOD: 75 MM/HR (ref 0–30)
GLUCOSE SERPL-MCNC: 137 MG/DL (ref 74–99)
HCT VFR BLD AUTO: 36.1 % (ref 35–45)
HGB BLD-MCNC: 11.2 G/DL (ref 12–16)
LYMPHOCYTES # BLD: 0.8 K/UL (ref 0.9–3.6)
LYMPHOCYTES NFR BLD: 19 % (ref 21–52)
MCH RBC QN AUTO: 27.8 PG (ref 24–34)
MCHC RBC AUTO-ENTMCNC: 31 G/DL (ref 31–37)
MCV RBC AUTO: 89.6 FL (ref 74–97)
MONOCYTES # BLD: 0.4 K/UL (ref 0.05–1.2)
MONOCYTES NFR BLD: 10 % (ref 3–10)
NEUTS SEG # BLD: 3 K/UL (ref 1.8–8)
NEUTS SEG NFR BLD: 68 % (ref 40–73)
PLATELET # BLD AUTO: 151 K/UL (ref 135–420)
PMV BLD AUTO: 10.8 FL (ref 9.2–11.8)
POTASSIUM SERPL-SCNC: 4.5 MMOL/L (ref 3.5–5.5)
RBC # BLD AUTO: 4.03 M/UL (ref 4.2–5.3)
SODIUM SERPL-SCNC: 136 MMOL/L (ref 136–145)
WBC # BLD AUTO: 4.3 K/UL (ref 4.6–13.2)

## 2018-06-26 PROCEDURE — 73564 X-RAY EXAM KNEE 4 OR MORE: CPT

## 2018-06-26 PROCEDURE — 80048 BASIC METABOLIC PNL TOTAL CA: CPT

## 2018-06-26 PROCEDURE — 85652 RBC SED RATE AUTOMATED: CPT

## 2018-06-26 PROCEDURE — 99284 EMERGENCY DEPT VISIT MOD MDM: CPT

## 2018-06-26 PROCEDURE — 85025 COMPLETE CBC W/AUTO DIFF WBC: CPT

## 2018-06-26 RX ORDER — GLUCOSAMINE SULFATE 1500 MG
POWDER IN PACKET (EA) ORAL DAILY
COMMUNITY
End: 2020-10-30

## 2018-06-26 RX ORDER — IBUPROFEN 600 MG/1
600 TABLET ORAL
Qty: 20 TAB | Refills: 0 | Status: SHIPPED | OUTPATIENT
Start: 2018-06-26 | End: 2019-03-14 | Stop reason: SINTOL

## 2018-06-26 RX ORDER — HYDROCHLOROTHIAZIDE 25 MG/1
25 TABLET ORAL DAILY
COMMUNITY
End: 2019-09-25

## 2018-06-26 NOTE — DISCHARGE INSTRUCTIONS
Contusion: Care Instructions  Your Care Instructions  Contusion is the medical term for a bruise. It is the result of a direct blow or an impact, such as a fall. Contusions are common sports injuries. Most people think of a bruise as a black-and-blue spot. This happens when small blood vessels get torn and leak blood under the skin. But bones, muscles, and organs can also get bruised. This may damage deep tissues but not cause a bruise you can see. The doctor will do a physical exam to find the location of your contusion. You may also have tests to make sure you do not have a more serious injury, such as a broken bone or nerve damage. These may include X-rays or other imaging tests like a CT scan or MRI. Deep-tissue contusions may cause pain and swelling. But if there is no serious damage, they will often get better in a few weeks with home treatment. The doctor has checked you carefully, but problems can develop later. If you notice any problems or new symptoms, get medical treatment right away. Follow-up care is a key part of your treatment and safety. Be sure to make and go to all appointments, and call your doctor if you are having problems. It's also a good idea to know your test results and keep a list of the medicines you take. How can you care for yourself at home? · Put ice or a cold pack on the sore area for 10 to 20 minutes at a time to stop swelling. Put a thin cloth between the ice pack and your skin. · Be safe with medicines. Read and follow all instructions on the label. ¨ If the doctor gave you a prescription medicine for pain, take it as prescribed. ¨ If you are not taking a prescription pain medicine, ask your doctor if you can take an over-the-counter medicine. · If you can, prop up the sore area on pillows as much as possible for the next few days. Try to keep the sore area above the level of your heart. When should you call for help?   Call your doctor now or seek immediate medical care if:  · Your pain gets worse. · You have new or worse swelling. · You have tingling, weakness, or numbness in the area near the contusion. · The area near the contusion is cold or pale. Watch closely for changes in your health, and be sure to contact your doctor if:  · You do not get better as expected. Where can you learn more? Go to [employer]  Enter [id number] in the search box to learn more about \"Contusion: Care Instructions. \"   © 0966-7861 [employer]. Care instructions adapted under license by Nishant Murray (which disclaims liability or warranty for this information). This care instruction is for use with your licensed healthcare professional. If you have questions about a medical condition or this instruction, always ask your healthcare professional. [address] any warranty or liability for your use of this information. Content Version: 48.2.848002; Current as of: May 22, 2015             Bruises: Care Instructions  Your Care Instructions    Bruises occur when small blood vessels under the skin tear or rupture, most often from a twist, bump, or fall. Blood leaks into tissues under the skin and causes a black-and-blue spot that often turns colors, including purplish black, reddish blue, or yellowish green, as the bruise heals. Bruises hurt, but most are not serious and will go away on their own within 2 to 4 weeks. Sometimes, gravity causes them to spread down the body. A leg bruise usually will take longer to heal than a bruise on the face or arms. Follow-up care is a key part of your treatment and safety. Be sure to make and go to all appointments, and call your doctor if you are having problems. It's also a good idea to know your test results and keep a list of the medicines you take. How can you care for yourself at home? · Take pain medicines exactly as directed.   ¨ If the doctor gave you a prescription medicine for pain, take it as prescribed. ¨ If you are not taking a prescription pain medicine, ask your doctor if you can take an over-the-counter medicine. · Put ice or a cold pack on the area for 10 to 20 minutes at a time. Put a thin cloth between the ice and your skin. · If you can, prop up the bruised area on pillows as much as possible for the next few days. Try to keep the bruise above the level of your heart. When should you call for help? Call your doctor now or seek immediate medical care if:  ? · You have signs of infection, such as:  ¨ Increased pain, swelling, warmth, or redness. ¨ Red streaks leading from the bruise. ¨ Pus draining from the bruise. ¨ A fever. ? · You have a bruise on your leg and signs of a blood clot, such as:  ¨ Increasing redness and swelling along with warmth, tenderness, and pain in the bruised area. ¨ Pain in your calf, back of the knee, thigh, or groin. ¨ Redness and swelling in your leg or groin. ? · Your pain gets worse. ? Watch closely for changes in your health, and be sure to contact your doctor if:  ? · You do not get better as expected. Where can you learn more? Go to http://fermin-lizbeth.info/. Enter [phone] [id number] in the search box to learn more about \"Bruises: Care Instructions. \"  Current as of: March 20, 2017  Content Version: 11.4  © 6961-2264 [employer]. Care instructions adapted under license by [employer] (which disclaims liability or warranty for this information). If you have questions about a medical condition or this instruction, always ask your healthcare professional. [place] [address] any warranty or liability for your use of this information.

## 2018-06-26 NOTE — PROGRESS NOTES
I offered support to Ms. Daugherty and her , who were waiting for some tests. They did not have any immediate concerns, but I assured them that chaplains are always available if they have any concerns.      [address] [facility] [address], M.Div, CPE Resident   Pager: [phone]  Phone: [phone]

## 2018-06-26 NOTE — ED TRIAGE NOTES
C/O left knee pain, bruising and swelling since fall on Sun. Pt ambulatory with cane. Declined wheelchair.

## 2018-06-26 NOTE — ED PROVIDER NOTES
EMERGENCY DEPARTMENT HISTORY AND PHYSICAL EXAM    Date: 6/26/2018  Patient Name: Jann Barillas    History of Presenting Illness     Chief Complaint   Patient presents with    Knee Pain    Fall         History Provided By: Patient and Patient's     Chief Complaint: Fall, left knee pain  Duration: 3 days  Timing:  Acute and Worsening  Location: left knee   Quality: Aching  Severity: Moderate  Modifying Factors: worse when walking, nothing makes it better  Associated Symptoms: none       Additional History (Context): Jann Barillas is a 70 y.o. female with a history of HTN, hypercholesteremia, arthritis who presents with a 3 day history of mechanical fall onto bilateral knees after going up 3 concrete stairs. Reports she has tried nothing for it, she is able to walk with a cane for assistance. Denies fevers or chills. Pt denies any fevers or chills, headache, dizziness or light headedness, ENT issues, CP or discomfort, SOB, cough, n/v/d/c, abd pain, back pain, diaphoresis, melena/hematochezia, dysuria, hematuria, frequency, focal weakness/numbness/tingling, or rash. Patient has no other complaints at this time. PCP: Ruma Stratton MD    Current Outpatient Prescriptions   Medication Sig Dispense Refill    hydroCHLOROthiazide (HYDRODIURIL) 25 mg tablet Take 25 mg by mouth daily.  cholecalciferol (VITAMIN D3) 1,000 unit cap Take  by mouth daily.  ibuprofen (MOTRIN) 600 mg tablet Take 1 Tab by mouth every six (6) hours as needed for Pain. 20 Tab 0    traMADol (ULTRAM) 50 mg tablet Take 1 Tab by mouth every six (6) hours as needed for Pain. Max Daily Amount: 200 mg. 30 Tab 1    furosemide (LASIX) 40 mg tablet take 1 tablet by mouth once daily 30 Tab 1    simvastatin (ZOCOR) 80 mg tablet Take 1 Tab by mouth nightly. 90 Tab 1    metoprolol succinate (TOPROL-XL) 100 mg tablet Take 1 Tab by mouth two (2) times a day.  (Patient taking differently: Take 75 mg by mouth two (2) times a day.) 60 Tab 3    losartan (COZAAR) 50 mg tablet Take 50 mg by mouth daily.  montelukast (SINGULAIR) 10 mg tablet Take 10 mg by mouth daily.  cyanocobalamin (VITAMIN B-12) 500 mcg tablet Take 500 mcg by mouth daily.  folic acid (FOLVITE) 1 mg tablet Take  by mouth daily.  CETIRIZINE HCL (ZYRTEC PO) Take  by mouth.  traMADol (ULTRAM) 50 mg tablet take 1 tablet by mouth every 6 hours if needed for pain maximum daily dose of 4 30 Tab 0    Comp. Stocking,Thigh,Reg,Medium misc 1 Package by Does Not Apply route daily. 2 Package 0       Past History     Past Medical History:  Past Medical History:   Diagnosis Date    Arthritis     Heart murmur     History of seasonal allergies     HTN (hypertension)     Hypercholesteremia        Past Surgical History:  Past Surgical History:   Procedure Laterality Date    HX KNEE ARTHROSCOPY      left and right    HX ORTHOPAEDIC      right ankle       Family History:  Family History   Problem Relation Age of Onset    Cancer Other     Heart Disease Other     Cancer Mother     Heart Disease Mother        Social History:  Social History   Substance Use Topics    Smoking status: Never Smoker    Smokeless tobacco: Never Used    Alcohol use No       Allergies:  No Known Allergies      Review of Systems   Review of Systems   Constitutional: Negative for chills and fever. HENT: Negative for congestion, rhinorrhea and sore throat. Respiratory: Negative for cough and shortness of breath. Cardiovascular: Negative for chest pain. Gastrointestinal: Negative for abdominal pain, blood in stool, constipation, diarrhea, nausea and vomiting. Genitourinary: Negative for dysuria, frequency and hematuria. Musculoskeletal: Negative for back pain and myalgias. Left knee pain/injury/ecchymosis     Skin: Negative for rash and wound. Neurological: Negative for dizziness and headaches. All other systems reviewed and are negative.     All Other Systems Negative  Physical Exam     Vitals:    06/26/18 1020   BP: 150/63   Pulse: (!) 58   Resp: 18   Temp: 98.2 °F (36.8 °C)   SpO2: 97%   Weight: 72.6 kg (160 lb)   Height: 5' 2\" (1.575 m)     Physical Exam   Constitutional: She is oriented to person, place, and time. She appears well-developed and well-nourished. No distress. HENT:   Head: Normocephalic and atraumatic. Eyes: Conjunctivae are normal.   Neck: Normal range of motion. Neck supple. Cardiovascular: Normal rate, regular rhythm and normal heart sounds. Pulmonary/Chest: Effort normal and breath sounds normal. No respiratory distress. She exhibits no tenderness. Abdominal: Soft. Bowel sounds are normal. She exhibits no distension. There is no tenderness. There is no rebound and no guarding. Musculoskeletal: She exhibits no edema or deformity. Left knee: She exhibits decreased range of motion, swelling, ecchymosis and erythema. She exhibits no deformity, no laceration, normal alignment and no LCL laxity. Neurological: She is alert and oriented to person, place, and time. Skin: Skin is warm and dry. She is not diaphoretic. Psychiatric: She has a normal mood and affect. Her behavior is normal.   Nursing note and vitals reviewed. Diagnostic Study Results     Labs -     Recent Results (from the past 12 hour(s))   CBC WITH AUTOMATED DIFF    Collection Time: 06/26/18 11:35 AM   Result Value Ref Range    WBC 4.3 (L) 4.6 - 13.2 K/uL    RBC 4.03 (L) 4.20 - 5.30 M/uL    HGB 11.2 (L) 12.0 - 16.0 g/dL    HCT 36.1 35.0 - 45.0 %    MCV 89.6 74.0 - 97.0 FL    MCH 27.8 24.0 - 34.0 PG    MCHC 31.0 31.0 - 37.0 g/dL    RDW 14.9 (H) 11.6 - 14.5 %    PLATELET 257 618 - 980 K/uL    MPV 10.8 9.2 - 11.8 FL    NEUTROPHILS 68 40 - 73 %    LYMPHOCYTES 19 (L) 21 - 52 %    MONOCYTES 10 3 - 10 %    EOSINOPHILS 3 0 - 5 %    BASOPHILS 0 0 - 2 %    ABS. NEUTROPHILS 3.0 1.8 - 8.0 K/UL    ABS. LYMPHOCYTES 0.8 (L) 0.9 - 3.6 K/UL    ABS.  MONOCYTES 0.4 0.05 - 1.2 K/UL ABS. EOSINOPHILS 0.1 0.0 - 0.4 K/UL    ABS. BASOPHILS 0.0 0.0 - 0.06 K/UL    DF AUTOMATED     METABOLIC PANEL, BASIC    Collection Time: 06/26/18 11:35 AM   Result Value Ref Range    Sodium 136 136 - 145 mmol/L    Potassium 4.5 3.5 - 5.5 mmol/L    Chloride 100 100 - 108 mmol/L    CO2 33 (H) 21 - 32 mmol/L    Anion gap 3 3.0 - 18 mmol/L    Glucose 137 (H) 74 - 99 mg/dL    BUN 44 (H) 7.0 - 18 MG/DL    Creatinine 1.56 (H) 0.6 - 1.3 MG/DL    BUN/Creatinine ratio 28 (H) 12 - 20      GFR est AA 39 (L) >60 ml/min/1.73m2    GFR est non-AA 32 (L) >60 ml/min/1.73m2    Calcium 10.3 (H) 8.5 - 10.1 MG/DL   SED RATE (ESR)    Collection Time: 06/26/18 11:35 AM   Result Value Ref Range    Sed rate, automated 75 (H) 0 - 30 mm/hr       Radiologic Studies -   XR KNEE LT MIN 4 V   Final Result        CT Results  (Last 48 hours)    None        CXR Results  (Last 48 hours)    None            Medical Decision Making   I am the first provider for this patient. I reviewed the vital signs, available nursing notes, past medical history, past surgical history, family history and social history. Vital Signs-Reviewed the patient's vital signs. Pulse Oximetry Analysis -  100 % RA    Records Reviewed: Nursing Notes and Old Medical Records     Procedures: None   Procedures    Provider Notes (Medical Decision Making):     Differential: fracture, dislocation, abrasion, sprain, contusion, laceration, septic joint, gout, hemarthrosis    Plan: Will order basic labs with sed rate and xray     12:47 PM  Concern for overlying erythema and elevated Sed Rate, Discussed case with Dr. Ravinder Poon with [facility] who agrees to see patient for further evaluation today in office, patient agrees with plan. MED RECONCILIATION:  No current facility-administered medications for this encounter. Current Outpatient Prescriptions   Medication Sig    hydroCHLOROthiazide (HYDRODIURIL) 25 mg tablet Take 25 mg by mouth daily.     cholecalciferol (VITAMIN D3) 1,000 unit cap Take  by mouth daily.  ibuprofen (MOTRIN) 600 mg tablet Take 1 Tab by mouth every six (6) hours as needed for Pain.  traMADol (ULTRAM) 50 mg tablet Take 1 Tab by mouth every six (6) hours as needed for Pain. Max Daily Amount: 200 mg.  furosemide (LASIX) 40 mg tablet take 1 tablet by mouth once daily    simvastatin (ZOCOR) 80 mg tablet Take 1 Tab by mouth nightly.  metoprolol succinate (TOPROL-XL) 100 mg tablet Take 1 Tab by mouth two (2) times a day. (Patient taking differently: Take 75 mg by mouth two (2) times a day.)    losartan (COZAAR) 50 mg tablet Take 50 mg by mouth daily.  montelukast (SINGULAIR) 10 mg tablet Take 10 mg by mouth daily.  cyanocobalamin (VITAMIN B-12) 500 mcg tablet Take 500 mcg by mouth daily.  folic acid (FOLVITE) 1 mg tablet Take  by mouth daily.  CETIRIZINE HCL (ZYRTEC PO) Take  by mouth.  traMADol (ULTRAM) 50 mg tablet take 1 tablet by mouth every 6 hours if needed for pain maximum daily dose of 4    Comp. Stocking,Thigh,Reg,Medium misc 1 Package by Does Not Apply route daily. Disposition:  Home     DISCHARGE NOTE:   Pt has been reexamined. Patient has no new complaints, changes, or physical findings. Care plan outlined and precautions discussed. Results of workup were reviewed with the patient. All medications were reviewed with the patient; will d/c home with motrin. All of pt's questions and concerns were addressed. Patient was instructed and agrees to follow up with today with Ortho, as well as to return to the ED upon further deterioration. Patient is ready to go home.     Follow-up Information     Follow up With Details Comments Contact Info    [facility] EM[facility]  As needed [address] Laverne Thorpe MD In 2 days As needed [address] [facility] [address] Arabella Pate MD Today  [id number] [facility] [address]   [phone]            Current Discharge Medication List      START taking these medications    Details   ibuprofen (MOTRIN) 600 mg tablet Take 1 Tab by mouth every six (6) hours as needed for Pain. Qty: 20 Tab, Refills: 0             Diagnosis     Clinical Impression:   1. Fall, initial encounter    2. Left knee injury, initial encounter    3.  Traumatic ecchymosis of left knee, initial encounter

## 2018-06-27 ENCOUNTER — OFFICE VISIT (OUTPATIENT)
Dept: ORTHOPEDIC SURGERY | Facility: CLINIC | Age: 78
End: 2018-06-27

## 2018-06-27 VITALS
RESPIRATION RATE: 16 BRPM | OXYGEN SATURATION: 92 % | DIASTOLIC BLOOD PRESSURE: 44 MMHG | SYSTOLIC BLOOD PRESSURE: 144 MMHG | HEIGHT: 62 IN | TEMPERATURE: 96.1 F | HEART RATE: 54 BPM

## 2018-06-27 DIAGNOSIS — M25.562 ACUTE PAIN OF LEFT KNEE: Primary | ICD-10-CM

## 2018-06-27 NOTE — PROGRESS NOTES
[address] [facility], [address] [facility]  [phone]           Patient: Charbel Jeronimo                MRN: [id number]       SSN: [id number]  YOB: 1940        AGE: 70 y.o. SEX: female  There is no height or weight on file to calculate BMI. PCP: Ry Anthony MD  06/27/18      This office note has been dictated. REVIEW OF SYSTEMS:  Constitutional: Negative for fever, chills, weight loss and malaise/fatigue. HENT: Negative. Eyes: Negative. Respiratory: Negative. Cardiovascular: Negative. Gastrointestinal: No bowel incontinence or constipation. Genitourinary: No bladder incontinence or saddle anesthesia. Skin: Negative. Neurological: Negative. Endo/Heme/Allergies: Negative. Psychiatric/Behavioral: Negative. Musculoskeletal: As per HPI above. Past Medical History:   Diagnosis Date    Arthritis     Heart murmur     History of seasonal allergies     HTN (hypertension)     Hypercholesteremia          Current Outpatient Prescriptions:     hydroCHLOROthiazide (HYDRODIURIL) 25 mg tablet, Take 25 mg by mouth daily. , Disp: , Rfl:     cholecalciferol (VITAMIN D3) 1,000 unit cap, Take  by mouth daily. , Disp: , Rfl:     traMADol (ULTRAM) 50 mg tablet, take 1 tablet by mouth every 6 hours if needed for pain maximum daily dose of 4, Disp: 30 Tab, Rfl: 0    furosemide (LASIX) 40 mg tablet, take 1 tablet by mouth once daily, Disp: 30 Tab, Rfl: 1    simvastatin (ZOCOR) 80 mg tablet, Take 1 Tab by mouth nightly., Disp: 90 Tab, Rfl: 1    Comp. Stocking,Thigh,Reg,Medium misc, 1 Package by Does Not Apply route daily. , Disp: 2 Package, Rfl: 0    metoprolol succinate (TOPROL-XL) 100 mg tablet, Take 1 Tab by mouth two (2) times a day. (Patient taking differently: Take 75 mg by mouth two (2) times a day.), Disp: 60 Tab, Rfl: 3    losartan (COZAAR) 50 mg tablet, Take 50 mg by mouth daily. , Disp: , Rfl:    montelukast (SINGULAIR) 10 mg tablet, Take 10 mg by mouth daily. , Disp: , Rfl:     cyanocobalamin (VITAMIN B-12) 500 mcg tablet, Take 500 mcg by mouth daily. , Disp: , Rfl:     folic acid (FOLVITE) 1 mg tablet, Take  by mouth daily. , Disp: , Rfl:     CETIRIZINE HCL (ZYRTEC PO), Take  by mouth.  , Disp: , Rfl:     ibuprofen (MOTRIN) 600 mg tablet, Take 1 Tab by mouth every six (6) hours as needed for Pain., Disp: 20 Tab, Rfl: 0    No Known Allergies    Social History     Social History    Marital status:      Spouse name: N/A    Number of children: N/A    Years of education: N/A     Occupational History    Not on file. Social History Main Topics    Smoking status: Never Smoker    Smokeless tobacco: Never Used    Alcohol use No    Drug use: No    Sexual activity: Not on file     Other Topics Concern    Not on file     Social History Narrative    Pt has a dog    Born in [facility] and moved to [place] years ago        Past Surgical History:   Procedure Laterality Date    HX KNEE ARTHROSCOPY      left and right    HX ORTHOPAEDIC      right ankle             SUBJECTIVE:   We did see Ms. Daugherty today in the office for followup in regard to her left knee. The patient had a fall about a week ago, Sunday, landing on her left knee when she missed a step. She has some significant bruising to the left knee, eventually went to the emergency room yesterday, had x-rays done, fortunately, negative for fracture. She is able to bear weight. She has been icing. She has been taking a little ibuprofen. She did not take it yesterday. She is taking Ultram at night which helps her discomfort. PHYSICAL EXAMINATION: In general, the patient is alert and oriented x3 and is in no acute distress. The patient is well-developed and well-nourished. The patient is afebrile. HEENT:  Head is normocephalic and atraumatic. Extraocular eye movements are intact. Neck is supple. Trachea is midline. No JVD is present. Breathing is nonlabored. Examination of the lower extremities, pain-free range of motion of the hips. There is no pain with palpation to the trochanteric bursa. Negative straight leg raise. No calf tenderness. No Homans. No evidence of DVT present. Left knee reveals skin intact. She does have ecchymosis noted as well as swelling. She does have a subcutaneous hematoma present. Skin is soft. Range of motion of the knee without troubles. She can hold a straight leg raise against gravity. She can flex to about 90 degrees. In ambulation, she displays no antalgic component to the left side. RADIOGRAPHS:  Review of the radiographs done at [facility], 4 views of the left knee, shows no acute bony abnormalities. She does have advanced degenerative arthritis noted, however. ASSESSMENT:    1. Left knee contusion. 2. Left knee osteoarthritis. PLAN:  At this point, we are going to move forward and obtain an MRI of the left knee to further evaluation and rule out occult fractures. She does have significant bruising noted. We will see her back in the office afterwards for review. I have asked her to hold her anti-inflammatories. She will continue with icing 20 minutes every hour.       CC: MD Sivakumar Estrada, ALTON, ATC

## 2018-07-14 ENCOUNTER — HOSPITAL ENCOUNTER (OUTPATIENT)
Age: 78
Discharge: HOME | End: 2018-07-14
Attending: PHYSICIAN ASSISTANT
Payer: MEDICARE

## 2018-07-14 DIAGNOSIS — M25.562 ACUTE PAIN OF LEFT KNEE: ICD-10-CM

## 2018-07-14 PROCEDURE — 73721 MRI JNT OF LWR EXTRE W/O DYE: CPT

## 2018-07-31 DIAGNOSIS — M79.89 PAIN AND SWELLING OF LOWER LEG, UNSPECIFIED LATERALITY: ICD-10-CM

## 2018-07-31 DIAGNOSIS — M79.669 PAIN AND SWELLING OF LOWER LEG, UNSPECIFIED LATERALITY: ICD-10-CM

## 2018-08-01 RX ORDER — TRAMADOL HYDROCHLORIDE 50 MG/1
TABLET ORAL
Qty: 30 TAB | Refills: 1 | Status: SHIPPED | OUTPATIENT
Start: 2018-08-01 | End: 2019-09-18

## 2018-08-03 ENCOUNTER — OFFICE VISIT (OUTPATIENT)
Dept: ORTHOPEDIC SURGERY | Age: 78
End: 2018-08-03

## 2018-08-03 VITALS
SYSTOLIC BLOOD PRESSURE: 185 MMHG | BODY MASS INDEX: 30.59 KG/M2 | DIASTOLIC BLOOD PRESSURE: 55 MMHG | OXYGEN SATURATION: 91 % | WEIGHT: 166.2 LBS | HEART RATE: 58 BPM | TEMPERATURE: 97 F | RESPIRATION RATE: 16 BRPM | HEIGHT: 62 IN

## 2018-08-03 DIAGNOSIS — M25.562 LEFT KNEE PAIN, UNSPECIFIED CHRONICITY: Primary | ICD-10-CM

## 2018-08-03 RX ORDER — TRAMADOL HYDROCHLORIDE 50 MG/1
50 TABLET ORAL
Qty: 30 TAB | Refills: 0 | Status: SHIPPED | OUTPATIENT
Start: 2018-08-03 | End: 2018-10-23 | Stop reason: SDUPTHER

## 2018-08-03 NOTE — PROGRESS NOTES
[address] [facility], [address] [facility]  [phone]           Patient: Lukasz Xiao                MRN: [id number]       SSN: [id number]  YOB: 1940        AGE: 70 y.o. SEX: female  Body mass index is 30.4 kg/(m^2). PCP: Humberto Unger MD  08/03/18      This office note has been dictated. REVIEW OF SYSTEMS:  Constitutional: Negative for fever, chills, weight loss and malaise/fatigue. HENT: Negative. Eyes: Negative. Respiratory: Negative. Cardiovascular: Negative. Gastrointestinal: No bowel incontinence or constipation. Genitourinary: No bladder incontinence or saddle anesthesia. Skin: Negative. Neurological: Negative. Endo/Heme/Allergies: Negative. Psychiatric/Behavioral: Negative. Musculoskeletal: As per HPI above. Past Medical History:   Diagnosis Date    Arthritis     Heart murmur     History of seasonal allergies     HTN (hypertension)     Hypercholesteremia          Current Outpatient Prescriptions:     traMADol (ULTRAM) 50 mg tablet, TAKE 1 TABLET BY MOUTH EVERY 6 HOURS AS NEEDED FOR PAIN, Disp: 30 Tab, Rfl: 1    ibuprofen (MOTRIN) 600 mg tablet, Take 1 Tab by mouth every six (6) hours as needed for Pain., Disp: 20 Tab, Rfl: 0    furosemide (LASIX) 40 mg tablet, take 1 tablet by mouth once daily, Disp: 30 Tab, Rfl: 1    simvastatin (ZOCOR) 80 mg tablet, Take 1 Tab by mouth nightly., Disp: 90 Tab, Rfl: 1    Comp. Stocking,Thigh,Reg,Medium misc, 1 Package by Does Not Apply route daily. , Disp: 2 Package, Rfl: 0    metoprolol succinate (TOPROL-XL) 100 mg tablet, Take 1 Tab by mouth two (2) times a day. (Patient taking differently: Take 75 mg by mouth two (2) times a day.), Disp: 60 Tab, Rfl: 3    hydroCHLOROthiazide (HYDRODIURIL) 25 mg tablet, Take 25 mg by mouth daily. , Disp: , Rfl:     cholecalciferol (VITAMIN D3) 1,000 unit cap, Take  by mouth daily. , Disp: , Rfl:    losartan (COZAAR) 50 mg tablet, Take 50 mg by mouth daily. , Disp: , Rfl:     montelukast (SINGULAIR) 10 mg tablet, Take 10 mg by mouth daily. , Disp: , Rfl:     cyanocobalamin (VITAMIN B-12) 500 mcg tablet, Take 500 mcg by mouth daily. , Disp: , Rfl:     folic acid (FOLVITE) 1 mg tablet, Take  by mouth daily. , Disp: , Rfl:     CETIRIZINE HCL (ZYRTEC PO), Take  by mouth.  , Disp: , Rfl:     No Known Allergies    Social History     Social History    Marital status:      Spouse name: N/A    Number of children: N/A    Years of education: N/A     Occupational History    Not on file. Social History Main Topics    Smoking status: Never Smoker    Smokeless tobacco: Never Used    Alcohol use No    Drug use: No    Sexual activity: Not on file     Other Topics Concern    Not on file     Social History Narrative    Pt has a dog    Born in [facility] and moved to [place] years ago        Past Surgical History:   Procedure Laterality Date    HX KNEE ARTHROSCOPY      left and right    HX ORTHOPAEDIC      right ankle           We did see Ms. Daugherty for followup with regards to her left knee. The patient did have a fall on the left knee and had substantial bruising. I sent her for an MRI to rule out occult fracture. She returns today for reevaluation. She states the knee is feeling much better. The bruising has nearly resolved. There is still just a little bit of stiffness in the front of the knee. There is no redness. She denies any fevers or chills. She is requesting a refill of her Ultram.    PHYSICAL EXAMINATION:  In general, the patient is alert and oriented x 3 in no acute distress. The patient is well-developed, well-nourished, with a normal affect. The patient is afebrile. HEENT:  Head is normocephalic and atraumatic. Pupils are equally round and reactive to light and accommodation. Extraocular eye movements are intact. Neck is supple. Trachea is midline. No JVD is present.   Breathing is nonlabored. Examination of the lower extremities reveals pain-free range of motion of the hips. There is no pain to palpation of the greater trochanteric bursae. There is negative straight leg raise. There is negative calf tenderness. There is negative Alines. There is no evidence of DVT present. The left knee reveals the skin is intact. She does have resolving prepatellar hematoma. There is no surrounding erythema. There are no signs for infection or cellulitis present. The skin is nontense, and the compartments are soft. RADIOGRAPHS:  Review of the MRI shows advanced degenerative joint disease to the medial compartments and surrounding marrow edema without a fracture line. There is a tear of the medial meniscus noted. She does have a prepatellar hematoma noted. ASSESSMENT:  Left knee prepatellar traumatic hematoma. PLAN:  At this point, fortunately, things are improving. She will continue with ice, rest, and elevation. She was given a refill of her Ultram.  We will see her back in three to four weeks time for reevaluation. She will call with any questions or concerns that shall arise.                  JR Tl GUTIÉRREZ, ALTON, ATC

## 2018-09-26 DIAGNOSIS — I35.1 NONRHEUMATIC AORTIC VALVE INSUFFICIENCY: ICD-10-CM

## 2018-09-26 RX ORDER — SIMVASTATIN 80 MG/1
TABLET, FILM COATED ORAL
Qty: 90 TAB | Refills: 1 | Status: SHIPPED | OUTPATIENT
Start: 2018-09-26 | End: 2020-10-30

## 2018-10-23 ENCOUNTER — OFFICE VISIT (OUTPATIENT)
Dept: CARDIOLOGY CLINIC | Age: 78
End: 2018-10-23

## 2018-10-23 VITALS
HEART RATE: 62 BPM | BODY MASS INDEX: 31.61 KG/M2 | DIASTOLIC BLOOD PRESSURE: 53 MMHG | WEIGHT: 161 LBS | SYSTOLIC BLOOD PRESSURE: 174 MMHG | HEIGHT: 60 IN

## 2018-10-23 DIAGNOSIS — I34.0 NON-RHEUMATIC MITRAL REGURGITATION: ICD-10-CM

## 2018-10-23 DIAGNOSIS — C73 PAPILLARY THYROID CARCINOMA (HCC): ICD-10-CM

## 2018-10-23 DIAGNOSIS — I50.32 CHRONIC DIASTOLIC CONGESTIVE HEART FAILURE (HCC): ICD-10-CM

## 2018-10-23 DIAGNOSIS — I42.1 MILD HOCM (HYPERTROPHIC OBSTRUCTIVE CARDIOMYOPATHY) (HCC): ICD-10-CM

## 2018-10-23 DIAGNOSIS — I35.1 NONRHEUMATIC AORTIC VALVE INSUFFICIENCY: Primary | ICD-10-CM

## 2018-10-23 DIAGNOSIS — I27.20 PULMONARY HTN (HCC): ICD-10-CM

## 2018-10-23 RX ORDER — LISINOPRIL 20 MG/1
TABLET ORAL DAILY
COMMUNITY
End: 2019-09-25

## 2018-10-23 NOTE — PROGRESS NOTES
Patient didn't bring medications, verbally reviewed    1. Have you been to the ER, urgent care clinic since your last visit? Hospitalized since your last visit? No    2. Have you seen or consulted any other health care providers outside of the [address] [place] [address] since your last visit? Include any pap smears or colon screening.  No

## 2018-10-23 NOTE — PROGRESS NOTES
HISTORY OF PRESENT ILLNESS  Javad Bustillo is a 70 y.o. female. Dizziness   The history is provided by the patient. This is a recurrent problem. The problem occurs rarely. Associated symptoms include shortness of breath. Pertinent negatives include no chest pain. CHF   The history is provided by the patient. This is a chronic problem. The problem occurs every several days. The problem has been gradually improving. Associated symptoms include shortness of breath. Pertinent negatives include no chest pain. Shortness of Breath   The history is provided by the patient. This is a chronic problem. The problem occurs intermittently. The current episode started more than 1 week ago. The problem has been gradually improving. Pertinent negatives include no fever, no ear pain, no neck pain, no cough, no sputum production, no hemoptysis, no wheezing, no PND, no orthopnea, no chest pain, no syncope, no vomiting, no rash, no leg pain, no leg swelling and no claudication. Associated medical issues include heart failure. Associated medical issues do not include CAD. Review of Systems   Constitutional: Positive for malaise/fatigue. Negative for chills, diaphoresis, fever and weight loss. HENT: Negative for ear discharge, ear pain, hearing loss, nosebleeds and tinnitus. Eyes: Negative for blurred vision. Respiratory: Positive for shortness of breath. Negative for cough, hemoptysis, sputum production, wheezing and stridor. Cardiovascular: Negative for chest pain, palpitations, orthopnea, claudication, leg swelling, syncope and PND. Gastrointestinal: Negative for heartburn, nausea and vomiting. Musculoskeletal: Negative for myalgias and neck pain. Skin: Negative for itching and rash. Neurological: Positive for dizziness. Negative for tingling, tremors, focal weakness, loss of consciousness and weakness. Psychiatric/Behavioral: Negative for depression and suicidal ideas.      Family History   Problem Relation Age of Onset    Cancer Other     Heart Disease Other     Cancer Mother     Heart Disease Mother        Past Medical History:   Diagnosis Date    Arthritis     Heart murmur     History of seasonal allergies     HTN (hypertension)     Hypercholesteremia        Past Surgical History:   Procedure Laterality Date    HX KNEE ARTHROSCOPY      left and right    HX ORTHOPAEDIC      right ankle       Social History     Tobacco Use    Smoking status: Never Smoker    Smokeless tobacco: Never Used   Substance Use Topics    Alcohol use: No       No Known Allergies    Outpatient Medications Marked as Taking for the 10/23/18 encounter (Office Visit) with Marsha Ribeiro MD   Medication Sig Dispense Refill    lisinopril (PRINIVIL, ZESTRIL) 20 mg tablet Take  by mouth daily.  Calcium-Cholecalciferol, D3, (CALCIUM 600 WITH VITAMIN D3) 600 mg(1,500mg) -400 unit chew Take  by mouth.  simvastatin (ZOCOR) 80 mg tablet TAKE 1 TABLET NIGHTLY 90 Tab 1    traMADol (ULTRAM) 50 mg tablet TAKE 1 TABLET BY MOUTH EVERY 6 HOURS AS NEEDED FOR PAIN 30 Tab 1    hydroCHLOROthiazide (HYDRODIURIL) 25 mg tablet Take 25 mg by mouth daily.  cholecalciferol (VITAMIN D3) 1,000 unit cap Take  by mouth daily.  ibuprofen (MOTRIN) 600 mg tablet Take 1 Tab by mouth every six (6) hours as needed for Pain. 20 Tab 0    furosemide (LASIX) 40 mg tablet take 1 tablet by mouth once daily (Patient taking differently: take 1 tablet by mouth as needed) 30 Tab 1    Comp. Stocking,Thigh,Reg,Medium misc 1 Package by Does Not Apply route daily. 2 Package 0    metoprolol succinate (TOPROL-XL) 100 mg tablet Take 1 Tab by mouth two (2) times a day. (Patient taking differently: Take 75 mg by mouth two (2) times a day.) 60 Tab 3    losartan (COZAAR) 100 mg tablet Take 100 mg by mouth daily.  cyanocobalamin (VITAMIN B-12) 500 mcg tablet Take 500 mcg by mouth daily.       folic acid (FOLVITE) 1 mg tablet Take  by mouth daily.      CETIRIZINE HCL (ZYRTEC PO) Take  by mouth. Visit Vitals  /53   Pulse 62   Ht 5' (1.524 m)   Wt 73 kg (161 lb)   BMI 31.44 kg/m²     Physical Exam   Constitutional: She is oriented to person, place, and time. She appears well-developed and well-nourished. No distress. HENT:   Head: Atraumatic. Mouth/Throat: No oropharyngeal exudate. Eyes: Conjunctivae are normal. Right eye exhibits no discharge. Left eye exhibits no discharge. No scleral icterus. Neck: Normal range of motion. Neck supple. No JVD present. No tracheal deviation present. No thyromegaly present. Cardiovascular: Normal rate and regular rhythm. Exam reveals no gallop. Murmur (3/6 holosystolic murmur best heard at apex and left parasternal area with no radiationl) heard. Pulmonary/Chest: Effort normal. No stridor. No respiratory distress. She has no wheezes. She has no rales. She exhibits no tenderness. Abdominal: Soft. There is no tenderness. There is no rebound and no guarding. Musculoskeletal: Normal range of motion. She exhibits edema (mild edema). She exhibits no tenderness. Lymphadenopathy:     She has no cervical adenopathy. Neurological: She is alert and oriented to person, place, and time. She exhibits normal muscle tone. Skin: Skin is warm. She is not diaphoretic. Psychiatric: She has a normal mood and affect. Her behavior is normal.     ekg sinus bradycardia with poor r wave progression. Echo report reviewed. ASSESSMENT and PLAN    ICD-10-CM ICD-9-CM    1. Nonrheumatic aortic valve insufficiency I35.1 424.1    2. Chronic diastolic congestive heart failure (HCC) I50.32 428.32      428.0    3. Non-rheumatic mitral regurgitation I34.0 424.0    4. Pulmonary HTN (HCC) I27.20 416.8    5. Papillary thyroid carcinoma ([facility] [address] C73 [id number]    6. HOCM (hypertrophic obstructive cardiomyopathy) (HCC) I42.1 425.11      No orders of the defined types were placed in this encounter.     Follow-up Disposition:  Return in about 4 months (around 2/23/2019). current treatment plan is effective, no change in therapy  reviewed diet, exercise and weight control  use of aspirin to prevent MI and TIA's discussed. Patient seen for pre op evaluation prior to thyroid surgery for possible Ca. Had recent echo which revealed severe pulm htn. Recent ANAND reveals HOCM with mitral regurgitation and moderate aortic regurgitation. RHC shows severe Pulm HTN with elevated LVEDP. Seen by Dr Cristin Schwartz at [facility] was scheduled for alchohol septal ablation- however she didn't go there due to personal reasons.  Recent cardiac cath revealed normal coronaries    Will get appointment with [facility] team to reschedule her procedure

## 2018-12-07 ENCOUNTER — HOSPITAL ENCOUNTER (OUTPATIENT)
Dept: LAB | Age: 78
Discharge: HOME | End: 2018-12-07

## 2018-12-07 LAB — XX-LABCORP SPECIMEN COL,LCBCF: NORMAL

## 2018-12-07 PROCEDURE — 99001 SPECIMEN HANDLING PT-LAB: CPT

## 2019-03-13 ENCOUNTER — TELEPHONE (OUTPATIENT)
Dept: CARDIOLOGY CLINIC | Age: 79
End: 2019-03-13

## 2019-03-13 NOTE — TELEPHONE ENCOUNTER
Patient states she went for f/u at [facility] yesterday 3/12/19, and was told that she is in afib. I made an appt for her to see you on 3/1519. Is this ok or should she be seen tomorrow with Dr Al Gannon ?

## 2019-03-14 ENCOUNTER — CLINICAL SUPPORT (OUTPATIENT)
Dept: CARDIOLOGY CLINIC | Age: 79
End: 2019-03-14

## 2019-03-14 DIAGNOSIS — I48.91 ATRIAL FIBRILLATION, UNSPECIFIED TYPE (HCC): Primary | ICD-10-CM

## 2019-03-14 RX ORDER — WARFARIN SODIUM 5 MG/1
5 TABLET ORAL DAILY
Qty: 90 TAB | Refills: 1 | Status: SHIPPED | OUTPATIENT
Start: 2019-03-14 | End: 2019-09-05 | Stop reason: SDUPTHER

## 2019-03-15 ENCOUNTER — OFFICE VISIT (OUTPATIENT)
Dept: CARDIOLOGY CLINIC | Age: 79
End: 2019-03-15

## 2019-03-15 VITALS
HEIGHT: 60 IN | SYSTOLIC BLOOD PRESSURE: 144 MMHG | HEART RATE: 67 BPM | BODY MASS INDEX: 31.92 KG/M2 | WEIGHT: 162.6 LBS | DIASTOLIC BLOOD PRESSURE: 61 MMHG

## 2019-03-15 DIAGNOSIS — I48.91 ATRIAL FIBRILLATION, UNSPECIFIED TYPE (HCC): ICD-10-CM

## 2019-03-15 DIAGNOSIS — I50.32 CHRONIC DIASTOLIC CONGESTIVE HEART FAILURE (HCC): ICD-10-CM

## 2019-03-15 DIAGNOSIS — C73 PAPILLARY THYROID CARCINOMA (HCC): ICD-10-CM

## 2019-03-15 DIAGNOSIS — Z95.0 PACEMAKER: ICD-10-CM

## 2019-03-15 DIAGNOSIS — I35.1 NONRHEUMATIC AORTIC VALVE INSUFFICIENCY: Primary | ICD-10-CM

## 2019-03-15 DIAGNOSIS — I34.0 NON-RHEUMATIC MITRAL REGURGITATION: ICD-10-CM

## 2019-03-15 DIAGNOSIS — I42.1 MILD HOCM (HYPERTROPHIC OBSTRUCTIVE CARDIOMYOPATHY) (HCC): ICD-10-CM

## 2019-03-15 DIAGNOSIS — I27.20 PULMONARY HTN (HCC): ICD-10-CM

## 2019-03-15 RX ORDER — DIGOXIN 125 MCG
0.12 TABLET ORAL DAILY
Qty: 30 TAB | Refills: 4 | Status: SHIPPED | OUTPATIENT
Start: 2019-03-15 | End: 2019-08-24 | Stop reason: SDUPTHER

## 2019-03-15 NOTE — PROGRESS NOTES
1. Have you been to the ER, urgent care clinic since your last visit? Hospitalized since your last visit? No    2. Have you seen or consulted any other health care providers outside of the [address] [place] [address] since your last visit? Include any pap smears or colon screening.  Yes Where: PCP Reason for visit: Routine

## 2019-03-18 ENCOUNTER — ANTI-COAG VISIT (OUTPATIENT)
Dept: CARDIOLOGY CLINIC | Age: 79
End: 2019-03-18

## 2019-03-18 DIAGNOSIS — I48.91 ATRIAL FIBRILLATION, UNSPECIFIED TYPE (HCC): Primary | ICD-10-CM

## 2019-03-18 LAB
INR BLD: 1.3
PT POC: NORMAL SECONDS
VALID INTERNAL CONTROL?: YES

## 2019-03-21 NOTE — PROGRESS NOTES
HISTORY OF PRESENT ILLNESS  Aamir Berg is a 70 y.o. female. Palpitations    The history is provided by the patient. This is a new problem. The problem occurs every several days. Associated symptoms include malaise/fatigue. Pertinent negatives include no diaphoresis, no fever, no claudication, no orthopnea, no PND, no syncope, no nausea, no vomiting, no leg pain, no weakness, no cough, no hemoptysis and no sputum production. CHF   The history is provided by the patient. This is a chronic problem. The problem occurs every several days. The problem has been gradually improving. Shortness of Breath   The history is provided by the patient. This is a chronic problem. The problem occurs intermittently. The current episode started more than 1 week ago. The problem has been gradually improving. Pertinent negatives include no fever, no ear pain, no neck pain, no cough, no sputum production, no hemoptysis, no wheezing, no PND, no orthopnea, no syncope, no vomiting, no rash, no leg pain, no leg swelling and no claudication. Associated medical issues include heart failure. Associated medical issues do not include CAD. Review of Systems   Constitutional: Positive for malaise/fatigue. Negative for chills, diaphoresis, fever and weight loss. HENT: Negative for ear discharge, ear pain, hearing loss, nosebleeds and tinnitus. Eyes: Negative for blurred vision. Respiratory: Negative for cough, hemoptysis, sputum production, wheezing and stridor. Cardiovascular: Positive for palpitations. Negative for orthopnea, claudication, leg swelling, syncope and PND. Gastrointestinal: Negative for heartburn, nausea and vomiting. Musculoskeletal: Negative for myalgias and neck pain. Skin: Negative for itching and rash. Neurological: Negative for tingling, tremors, focal weakness, loss of consciousness and weakness. Psychiatric/Behavioral: Negative for depression and suicidal ideas.      Family History   Problem Relation Age of Onset    Cancer Other     Heart Disease Other     Cancer Mother     Heart Disease Mother        Past Medical History:   Diagnosis Date    Arthritis     Heart murmur     History of seasonal allergies     HTN (hypertension)     Hypercholesteremia        Past Surgical History:   Procedure Laterality Date    HX KNEE ARTHROSCOPY      left and right    HX ORTHOPAEDIC      right ankle       Social History     Tobacco Use    Smoking status: Never Smoker    Smokeless tobacco: Never Used   Substance Use Topics    Alcohol use: No       No Known Allergies    Outpatient Medications Marked as Taking for the 3/15/19 encounter (Office Visit) with De Lee MD   Medication Sig Dispense Refill    digoxin (LANOXIN) 0.125 mg tablet Take 1 Tab by mouth daily. 30 Tab 4    warfarin (COUMADIN) 5 mg tablet Take 1 Tab by mouth daily. 90 Tab 1    lisinopril (PRINIVIL, ZESTRIL) 20 mg tablet Take  by mouth daily.  Calcium-Cholecalciferol, D3, (CALCIUM 600 WITH VITAMIN D3) 600 mg(1,500mg) -400 unit chew Take  by mouth.  simvastatin (ZOCOR) 80 mg tablet TAKE 1 TABLET NIGHTLY 90 Tab 1    traMADol (ULTRAM) 50 mg tablet TAKE 1 TABLET BY MOUTH EVERY 6 HOURS AS NEEDED FOR PAIN 30 Tab 1    hydroCHLOROthiazide (HYDRODIURIL) 25 mg tablet Take 25 mg by mouth daily.  cholecalciferol (VITAMIN D3) 1,000 unit cap Take  by mouth daily.  furosemide (LASIX) 40 mg tablet take 1 tablet by mouth once daily (Patient taking differently: take 1 tablet by mouth as needed) 30 Tab 1    Comp. Stocking,Thigh,Reg,Medium misc 1 Package by Does Not Apply route daily. 2 Package 0    metoprolol succinate (TOPROL-XL) 100 mg tablet Take 1 Tab by mouth two (2) times a day. (Patient taking differently: Take 75 mg by mouth two (2) times a day.) 60 Tab 3    losartan (COZAAR) 100 mg tablet Take 100 mg by mouth daily.  montelukast (SINGULAIR) 10 mg tablet Take 10 mg by mouth daily.       cyanocobalamin (VITAMIN B-12) 500 mcg tablet Take 500 mcg by mouth daily.  folic acid (FOLVITE) 1 mg tablet Take  by mouth daily.  CETIRIZINE HCL (ZYRTEC PO) Take  by mouth. Visit Vitals  /61   Pulse 67   Ht 5' (1.524 m)   Wt 73.8 kg (162 lb 9.6 oz)   BMI 31.76 kg/m²     Physical Exam   Constitutional: She is oriented to person, place, and time. She appears well-developed and well-nourished. No distress. HENT:   Head: Atraumatic. Mouth/Throat: No oropharyngeal exudate. Eyes: Conjunctivae are normal. Right eye exhibits no discharge. Left eye exhibits no discharge. No scleral icterus. Neck: Normal range of motion. Neck supple. No JVD present. No tracheal deviation present. No thyromegaly present. Cardiovascular: Normal rate. An irregularly irregular rhythm present. Exam reveals no gallop. Murmur (3/6 holosystolic murmur best heard at apex and left parasternal area with no radiationl) heard. Pulmonary/Chest: Effort normal. No stridor. No respiratory distress. She has no wheezes. She has no rales. She exhibits no tenderness. Abdominal: Soft. There is no tenderness. There is no rebound and no guarding. Musculoskeletal: Normal range of motion. She exhibits edema (mild edema). She exhibits no tenderness. Lymphadenopathy:     She has no cervical adenopathy. Neurological: She is alert and oriented to person, place, and time. She exhibits normal muscle tone. Skin: Skin is warm. She is not diaphoretic. Psychiatric: She has a normal mood and affect. Her behavior is normal.     ekg sinus bradycardia with poor r wave progression. Echo report reviewed. ASSESSMENT and PLAN    ICD-10-CM ICD-9-CM    1. Nonrheumatic aortic valve insufficiency I35.1 424.1    2. Atrial fibrillation, unspecified type ([address] I48.91 427.31    3. Chronic diastolic congestive heart failure (HCC) I50.32 428.32      428.0    4. Non-rheumatic mitral regurgitation I34.0 424.0    5. Pulmonary HTN (HCC) I27.20 416.8    6.  Papillary thyroid carcinoma ([facility] [address] C73 193    7. HOCM (hypertrophic obstructive cardiomyopathy) (HCC) I42.1 425.11     s/p septal ablation   8. Pacemaker Z95.0 V45.01      Orders Placed This Encounter    digoxin (LANOXIN) 0.125 mg tablet     Sig: Take 1 Tab by mouth daily. Dispense:  30 Tab     Refill:  4     Follow-up and Dispositions    · Return in about 3 weeks (around 4/5/2019). current treatment plan is effective, no change in therapy  reviewed diet, exercise and weight control  use of aspirin to prevent MI and TIA's discussed. Patient seen for pre op evaluation prior to thyroid surgery for possible Ca. Had recent echo which revealed severe pulm htn. ANAND reveals HOCM with mitral regurgitation and moderate aortic regurgitation. Underwent recent septal ablation followed by pacemaker insertion at [facility]. She is feels significantly better since the procedure. EKG reveals atrial fibrillation- given high risk features we will start her on Coumadin. Target INR is 2.0-3.0  Follow-up in 3 weeks.

## 2019-03-22 ENCOUNTER — ANTI-COAG VISIT (OUTPATIENT)
Dept: CARDIOLOGY CLINIC | Age: 79
End: 2019-03-22

## 2019-03-22 DIAGNOSIS — I48.91 ATRIAL FIBRILLATION, UNSPECIFIED TYPE (HCC): ICD-10-CM

## 2019-03-22 LAB
INR BLD: 2.5
PT POC: NORMAL SECONDS
VALID INTERNAL CONTROL?: YES

## 2019-03-22 NOTE — PATIENT INSTRUCTIONS
Left voice message on patients home phone with Coumadin dosing, advised to give the office a call with any questions or concerns.

## 2019-03-27 ENCOUNTER — ANTI-COAG VISIT (OUTPATIENT)
Dept: CARDIOLOGY CLINIC | Age: 79
End: 2019-03-27

## 2019-03-27 DIAGNOSIS — I48.91 ATRIAL FIBRILLATION, UNSPECIFIED TYPE (HCC): ICD-10-CM

## 2019-03-27 LAB
INR BLD: 4
PT POC: NORMAL SECONDS
VALID INTERNAL CONTROL?: YES

## 2019-04-03 ENCOUNTER — ANTI-COAG VISIT (OUTPATIENT)
Dept: CARDIOLOGY CLINIC | Age: 79
End: 2019-04-03

## 2019-04-03 DIAGNOSIS — I48.91 ATRIAL FIBRILLATION, UNSPECIFIED TYPE (HCC): ICD-10-CM

## 2019-04-03 LAB
INR BLD: 2.9
PT POC: NORMAL SECONDS
VALID INTERNAL CONTROL?: YES

## 2019-04-03 NOTE — PATIENT INSTRUCTIONS
This has been fully explained to the patient over the phone to take 2.5 mg of Coumadin today and alternate with 5 mg until next inr check on 4/17/2019. Patient indicates understanding.

## 2019-04-17 ENCOUNTER — ANTI-COAG VISIT (OUTPATIENT)
Dept: CARDIOLOGY CLINIC | Age: 79
End: 2019-04-17

## 2019-04-17 DIAGNOSIS — I48.91 ATRIAL FIBRILLATION, UNSPECIFIED TYPE (HCC): ICD-10-CM

## 2019-04-17 LAB
INR BLD: 5.1
PT POC: NORMAL SECONDS
VALID INTERNAL CONTROL?: YES

## 2019-04-17 NOTE — PATIENT INSTRUCTIONS
This has been fully explained to the patient over the phone to hold Coumadin tonight and tomorrow night, on Friday take 2.5 mg alternating with 5 mg every other day until next inr check on 04/24/2019. Patient indicates understanding.

## 2019-04-18 ENCOUNTER — OFFICE VISIT (OUTPATIENT)
Dept: CARDIOLOGY CLINIC | Age: 79
End: 2019-04-18

## 2019-04-18 VITALS
SYSTOLIC BLOOD PRESSURE: 123 MMHG | BODY MASS INDEX: 31.18 KG/M2 | HEART RATE: 78 BPM | HEIGHT: 60 IN | WEIGHT: 158.8 LBS | DIASTOLIC BLOOD PRESSURE: 61 MMHG

## 2019-04-18 DIAGNOSIS — Z95.0 PACEMAKER: ICD-10-CM

## 2019-04-18 DIAGNOSIS — I42.1 MILD HOCM (HYPERTROPHIC OBSTRUCTIVE CARDIOMYOPATHY) (HCC): ICD-10-CM

## 2019-04-18 DIAGNOSIS — I48.0 PAF (PAROXYSMAL ATRIAL FIBRILLATION) (HCC): Primary | ICD-10-CM

## 2019-04-18 DIAGNOSIS — I50.32 CHRONIC DIASTOLIC CONGESTIVE HEART FAILURE (HCC): ICD-10-CM

## 2019-04-18 DIAGNOSIS — I27.20 PULMONARY HTN (HCC): ICD-10-CM

## 2019-04-18 DIAGNOSIS — I35.1 NONRHEUMATIC AORTIC VALVE INSUFFICIENCY: ICD-10-CM

## 2019-04-18 NOTE — PROGRESS NOTES
HISTORY OF PRESENT ILLNESS  Wei Parekh is a 70 y.o. female. CHF   The history is provided by the patient. This is a chronic problem. The problem occurs every several days. The problem has been gradually improving. Palpitations    The history is provided by the patient. This is a new problem. The problem occurs every several days. Associated symptoms include malaise/fatigue. Pertinent negatives include no diaphoresis, no fever, no claudication, no orthopnea, no PND, no syncope, no nausea, no vomiting, no leg pain, no weakness, no cough, no hemoptysis and no sputum production. Shortness of Breath   The history is provided by the patient. This is a chronic problem. The problem occurs intermittently. The current episode started more than 1 week ago. The problem has been gradually improving. Pertinent negatives include no fever, no ear pain, no neck pain, no cough, no sputum production, no hemoptysis, no wheezing, no PND, no orthopnea, no syncope, no vomiting, no rash, no leg pain, no leg swelling and no claudication. Associated medical issues include heart failure. Associated medical issues do not include CAD. Review of Systems   Constitutional: Positive for malaise/fatigue. Negative for chills, diaphoresis, fever and weight loss. HENT: Negative for ear discharge, ear pain, hearing loss, nosebleeds and tinnitus. Eyes: Negative for blurred vision. Respiratory: Negative for cough, hemoptysis, sputum production, wheezing and stridor. Cardiovascular: Positive for palpitations. Negative for orthopnea, claudication, leg swelling, syncope and PND. Gastrointestinal: Negative for heartburn, nausea and vomiting. Musculoskeletal: Negative for myalgias and neck pain. Skin: Negative for itching and rash. Neurological: Negative for tingling, tremors, focal weakness, loss of consciousness and weakness. Psychiatric/Behavioral: Negative for depression and suicidal ideas.      Family History   Problem Relation Age of Onset    Cancer Other     Heart Disease Other     Cancer Mother     Heart Disease Mother        Past Medical History:   Diagnosis Date    Arthritis     Heart murmur     History of seasonal allergies     HTN (hypertension)     Hypercholesteremia        Past Surgical History:   Procedure Laterality Date    HX KNEE ARTHROSCOPY      left and right    HX ORTHOPAEDIC      right ankle       Social History     Tobacco Use    Smoking status: Never Smoker    Smokeless tobacco: Never Used   Substance Use Topics    Alcohol use: No       No Known Allergies    Outpatient Medications Marked as Taking for the 4/18/19 encounter (Office Visit) with Hellen Chu MD   Medication Sig Dispense Refill    warfarin (COUMADIN) 5 mg tablet Take 1 Tab by mouth daily. 90 Tab 1    lisinopril (PRINIVIL, ZESTRIL) 20 mg tablet Take  by mouth daily.  Calcium-Cholecalciferol, D3, (CALCIUM 600 WITH VITAMIN D3) 600 mg(1,500mg) -400 unit chew Take  by mouth.  simvastatin (ZOCOR) 80 mg tablet TAKE 1 TABLET NIGHTLY 90 Tab 1    traMADol (ULTRAM) 50 mg tablet TAKE 1 TABLET BY MOUTH EVERY 6 HOURS AS NEEDED FOR PAIN 30 Tab 1    hydroCHLOROthiazide (HYDRODIURIL) 25 mg tablet Take 25 mg by mouth daily.  cholecalciferol (VITAMIN D3) 1,000 unit cap Take  by mouth daily.  furosemide (LASIX) 40 mg tablet take 1 tablet by mouth once daily (Patient taking differently: take 1 tablet by mouth as needed) 30 Tab 1    Comp. Stocking,Thigh,Reg,Medium misc 1 Package by Does Not Apply route daily. 2 Package 0    metoprolol succinate (TOPROL-XL) 100 mg tablet Take 1 Tab by mouth two (2) times a day. (Patient taking differently: Take 75 mg by mouth two (2) times a day.) 60 Tab 3    losartan (COZAAR) 100 mg tablet Take 100 mg by mouth daily.  montelukast (SINGULAIR) 10 mg tablet Take 10 mg by mouth daily.  cyanocobalamin (VITAMIN B-12) 500 mcg tablet Take 500 mcg by mouth daily.       folic acid (FOLVITE) 1 mg tablet Take  by mouth daily.  CETIRIZINE HCL (ZYRTEC PO) Take  by mouth. Visit Vitals  /61   Pulse 78   Ht 5' (1.524 m)   Wt 72 kg (158 lb 12.8 oz)   BMI 31.01 kg/m²     Physical Exam   Constitutional: She is oriented to person, place, and time. She appears well-developed and well-nourished. No distress. HENT:   Head: Atraumatic. Mouth/Throat: No oropharyngeal exudate. Eyes: Conjunctivae are normal. Right eye exhibits no discharge. Left eye exhibits no discharge. No scleral icterus. Neck: Normal range of motion. Neck supple. No JVD present. No tracheal deviation present. No thyromegaly present. Cardiovascular: Normal rate. An irregularly irregular rhythm present. Exam reveals no gallop. Murmur (3/6 holosystolic murmur best heard at apex and left parasternal area with no radiationl) heard. Pulmonary/Chest: Effort normal. No stridor. No respiratory distress. She has no wheezes. She has no rales. She exhibits no tenderness. Abdominal: Soft. There is no tenderness. There is no rebound and no guarding. Musculoskeletal: Normal range of motion. She exhibits edema (mild edema). She exhibits no tenderness. Lymphadenopathy:     She has no cervical adenopathy. Neurological: She is alert and oriented to person, place, and time. She exhibits normal muscle tone. Skin: Skin is warm. She is not diaphoretic. Psychiatric: She has a normal mood and affect. Her behavior is normal.     ekg sinus bradycardia with poor r wave progression. Echo report reviewed. ASSESSMENT and PLAN    ICD-10-CM ICD-9-CM    1. PAF (paroxysmal atrial fibrillation) ([facility]) I48.0 427.31    2. Pacemaker Z95.0 V45.01    3. HOCM (hypertrophic obstructive cardiomyopathy) ([facility]) I42.1 425.11    4. Nonrheumatic aortic valve insufficiency I35.1 424.1    5. Chronic diastolic congestive heart failure ([facility]) I50.32 428.32      428.0    6.  Pulmonary HTN ([facility]) I27.20 416.8      No orders of the defined types were placed in this encounter. Follow-up and Dispositions    · Return in about 4 months (around 8/18/2019). current treatment plan is effective, no change in therapy  reviewed diet, exercise and weight control  use of aspirin to prevent MI and TIA's discussed. Patient seen for pre op evaluation prior to thyroid surgery for possible Ca. Had recent echo which revealed severe pulm htn. ANAND reveals HOCM with mitral regurgitation and moderate aortic regurgitation. Underwent recent septal ablation followed by pacemaker insertion at [facility]. She is feels significantly better since the procedure. Today- she is on NSR- continue current dose of BB. Continue coumadin for oral anticoagulation.

## 2019-04-18 NOTE — PROGRESS NOTES
1. Have you been to the ER, urgent care clinic since your last visit? Hospitalized since your last visit? No    2. Have you seen or consulted any other health care providers outside of the [address] [place] [address] since your last visit? Include any pap smears or colon screening.  No

## 2019-04-25 ENCOUNTER — ANTI-COAG VISIT (OUTPATIENT)
Dept: CARDIOLOGY CLINIC | Age: 79
End: 2019-04-25

## 2019-04-25 DIAGNOSIS — I48.91 ATRIAL FIBRILLATION, UNSPECIFIED TYPE (HCC): ICD-10-CM

## 2019-04-25 LAB
INR BLD: 6.3
PT POC: NORMAL SECONDS
VALID INTERNAL CONTROL?: YES

## 2019-05-02 ENCOUNTER — TELEPHONE (OUTPATIENT)
Dept: CARDIOLOGY CLINIC | Age: 79
End: 2019-05-02

## 2019-05-02 ENCOUNTER — ANTI-COAG VISIT (OUTPATIENT)
Dept: CARDIOLOGY CLINIC | Age: 79
End: 2019-05-02

## 2019-05-02 VITALS — WEIGHT: 167 LBS | BODY MASS INDEX: 32.61 KG/M2

## 2019-05-02 DIAGNOSIS — I48.91 ATRIAL FIBRILLATION, UNSPECIFIED TYPE (HCC): Primary | ICD-10-CM

## 2019-05-02 DIAGNOSIS — I48.91 ATRIAL FIBRILLATION, UNSPECIFIED TYPE (HCC): ICD-10-CM

## 2019-05-02 LAB
INR BLD: 2.4
PT POC: NORMAL SECONDS
VALID INTERNAL CONTROL?: YES

## 2019-05-02 NOTE — TELEPHONE ENCOUNTER
Patient in office stating having a lot of bilateral leg swelling with SOB.  wanted to see what can be done.

## 2019-05-02 NOTE — PATIENT INSTRUCTIONS
This has been fully explained to the patient in office as well as calender given, who indicates understanding.

## 2019-05-06 NOTE — TELEPHONE ENCOUNTER
Spoke with patients , let him know to increase lasix BID for 5 days, low sodium diet and have a BMP done on Thursday. Varsha Alona Sandoval verbalized understanding and stated will be in office on Thursday for inr check but if does not improve within 1-2 days will take to the ED.

## 2019-05-09 ENCOUNTER — ANTI-COAG VISIT (OUTPATIENT)
Dept: CARDIOLOGY CLINIC | Age: 79
End: 2019-05-09

## 2019-05-09 DIAGNOSIS — I48.91 ATRIAL FIBRILLATION, UNSPECIFIED TYPE (HCC): ICD-10-CM

## 2019-05-09 LAB
INR BLD: 2.9
PT POC: NORMAL SECONDS
VALID INTERNAL CONTROL?: YES

## 2019-05-16 ENCOUNTER — OFFICE VISIT (OUTPATIENT)
Dept: ORTHOPEDIC SURGERY | Age: 79
End: 2019-05-16

## 2019-05-16 VITALS
HEART RATE: 68 BPM | SYSTOLIC BLOOD PRESSURE: 142 MMHG | HEIGHT: 60 IN | TEMPERATURE: 96.9 F | OXYGEN SATURATION: 95 % | DIASTOLIC BLOOD PRESSURE: 62 MMHG | BODY MASS INDEX: 30.74 KG/M2 | RESPIRATION RATE: 12 BRPM | WEIGHT: 156.6 LBS

## 2019-05-16 DIAGNOSIS — M25.561 PAIN IN BOTH KNEES, UNSPECIFIED CHRONICITY: Primary | ICD-10-CM

## 2019-05-16 DIAGNOSIS — M25.562 LEFT KNEE PAIN, UNSPECIFIED CHRONICITY: ICD-10-CM

## 2019-05-16 DIAGNOSIS — M85.80 BONE LOSS: ICD-10-CM

## 2019-05-16 DIAGNOSIS — M17.12 PRIMARY OSTEOARTHRITIS OF LEFT KNEE: ICD-10-CM

## 2019-05-16 DIAGNOSIS — M25.562 PAIN IN BOTH KNEES, UNSPECIFIED CHRONICITY: Primary | ICD-10-CM

## 2019-05-16 DIAGNOSIS — R70.1 ABNORMAL PLASMA VISCOSITY: ICD-10-CM

## 2019-05-16 DIAGNOSIS — R11.0 NAUSEA: ICD-10-CM

## 2019-05-16 DIAGNOSIS — R60.9 EDEMA, UNSPECIFIED TYPE: ICD-10-CM

## 2019-05-16 RX ORDER — PROMETHAZINE HYDROCHLORIDE 25 MG/1
25 TABLET ORAL
Qty: 30 TAB | Refills: 1 | Status: CANCELLED | OUTPATIENT
Start: 2019-05-16

## 2019-05-16 RX ORDER — ONDANSETRON 4 MG/1
4 TABLET, FILM COATED ORAL
Qty: 30 TAB | Refills: 2 | Status: SHIPPED | OUTPATIENT
Start: 2019-05-16

## 2019-05-16 RX ORDER — BETAMETHASONE SODIUM PHOSPHATE AND BETAMETHASONE ACETATE 3; 3 MG/ML; MG/ML
6 INJECTION, SUSPENSION INTRA-ARTICULAR; INTRALESIONAL; INTRAMUSCULAR; SOFT TISSUE ONCE
Qty: 1 ML | Refills: 0
Start: 2019-05-16 | End: 2019-05-16

## 2019-05-16 NOTE — PROGRESS NOTES
1. Have you been to the ER, urgent care clinic since your last visit? Hospitalized since your last visit? No    2. Have you seen or consulted any other health care providers outside of the [address] [place] [address] since your last visit? Include any pap smears or colon screening.  No

## 2019-05-16 NOTE — PROGRESS NOTES
[address] [facility], [address] [facility]  [phone]           Patient: Cristopher Duffy                MRN: [id number]       SSN: [id number]  YOB: 1940        AGE: 70 y.o. SEX: female  Body mass index is 30.58 kg/m². PCP: Shavonne Koch MD  05/16/19      This office note has been dictated. REVIEW OF SYSTEMS:  Constitutional: Negative for fever, chills, weight loss and malaise/fatigue. HENT: Negative. Eyes: Negative. Respiratory: Negative. Cardiovascular: Negative. Gastrointestinal: No bowel incontinence or constipation. Genitourinary: No bladder incontinence or saddle anesthesia. Skin: Negative. Neurological: Negative. Endo/Heme/Allergies: Negative. Psychiatric/Behavioral: Negative. Musculoskeletal: As per HPI above. Past Medical History:   Diagnosis Date    Arthritis     Heart murmur     History of seasonal allergies     HTN (hypertension)     Hypercholesteremia          Current Outpatient Medications:     betamethasone (CELESTONE SOLUSPAN) 6 mg/mL injection, 1 mL by Intra artICUlar route once for 1 dose., Disp: 1 mL, Rfl: 0    ondansetron hcl (ZOFRAN) 4 mg tablet, Take 1 Tab by mouth every eight (8) hours as needed for Nausea., Disp: 30 Tab, Rfl: 2    digoxin (LANOXIN) 0.125 mg tablet, Take 1 Tab by mouth daily. , Disp: 30 Tab, Rfl: 4    warfarin (COUMADIN) 5 mg tablet, Take 1 Tab by mouth daily. , Disp: 90 Tab, Rfl: 1    lisinopril (PRINIVIL, ZESTRIL) 20 mg tablet, Take  by mouth daily. , Disp: , Rfl:     Calcium-Cholecalciferol, D3, (CALCIUM 600 WITH VITAMIN D3) 600 mg(1,500mg) -400 unit chew, Take  by mouth., Disp: , Rfl:     simvastatin (ZOCOR) 80 mg tablet, TAKE 1 TABLET NIGHTLY, Disp: 90 Tab, Rfl: 1    traMADol (ULTRAM) 50 mg tablet, TAKE 1 TABLET BY MOUTH EVERY 6 HOURS AS NEEDED FOR PAIN, Disp: 30 Tab, Rfl: 1    cholecalciferol (VITAMIN D3) 1,000 unit cap, Take  by mouth daily., Disp: , Rfl:     furosemide (LASIX) 40 mg tablet, take 1 tablet by mouth once daily (Patient taking differently: take 1 tablet by mouth as needed), Disp: 30 Tab, Rfl: 1    Comp. Stocking,Thigh,Reg,Medium misc, 1 Package by Does Not Apply route daily. , Disp: 2 Package, Rfl: 0    metoprolol succinate (TOPROL-XL) 100 mg tablet, Take 1 Tab by mouth two (2) times a day. (Patient taking differently: Take 75 mg by mouth two (2) times a day.), Disp: 60 Tab, Rfl: 3    losartan (COZAAR) 100 mg tablet, Take 100 mg by mouth daily. , Disp: , Rfl:     montelukast (SINGULAIR) 10 mg tablet, Take 10 mg by mouth daily. , Disp: , Rfl:     cyanocobalamin (VITAMIN B-12) 500 mcg tablet, Take 500 mcg by mouth daily. , Disp: , Rfl:     folic acid (FOLVITE) 1 mg tablet, Take  by mouth daily. , Disp: , Rfl:     CETIRIZINE HCL (ZYRTEC PO), Take  by mouth.  , Disp: , Rfl:     hydroCHLOROthiazide (HYDRODIURIL) 25 mg tablet, Take 25 mg by mouth daily. , Disp: , Rfl:     No Known Allergies    Social History     Socioeconomic History    Marital status:      Spouse name: Not on file    Number of children: Not on file    Years of education: Not on file    Highest education level: Not on file   Occupational History    Not on file   Social Needs    Financial resource strain: Not on file    Food insecurity:     Worry: Not on file     Inability: Not on file    Transportation needs:     Medical: Not on file     Non-medical: Not on file   Tobacco Use    Smoking status: Never Smoker    Smokeless tobacco: Never Used   Substance and Sexual Activity    Alcohol use: No    Drug use: No    Sexual activity: Not on file   Lifestyle    Physical activity:     Days per week: Not on file     Minutes per session: Not on file    Stress: Not on file   Relationships    Social connections:     Talks on phone: Not on file     Gets together: Not on file     Attends Hinduism service: Not on file     Active member of club or organization: Not on file     Attends meetings of clubs or organizations: Not on file     Relationship status: Not on file    Intimate partner violence:     Fear of current or ex partner: Not on file     Emotionally abused: Not on file     Physically abused: Not on file     Forced sexual activity: Not on file   Other Topics Concern    Not on file   Social History Narrative    Pt has a dog    Born in [facility] and moved to [place] years ago        Past Surgical History:   Procedure Laterality Date    HX KNEE ARTHROSCOPY      left and right    HX ORTHOPAEDIC      right ankle           * Patient was identified by name and date of birth   * Agreement on procedure being performed was verified  * Risks and Benefits explained to the patient  * Procedure site verified and marked as necessary  * Patient was positioned for comfort  * Consent was signed and verified  2:58 PM    The patient was instructed on post injection care. We did see Ms. Daugherty today for followup with regards to mainly her left knee. She is having increasing left knee pain without injury. She has pain with ambulation, pain at night, and trouble getting up from a chair and going up and down stairs. She has been having some troubles with her legs with swelling. She has been followed by Vascular. She has some venous insufficiency. There are no fever, chills, or systemic changes otherwise to report currently. PHYSICAL EXAMINATION:  In general, the patient is alert and oriented x 3 in no acute distress. The patient is well-developed, well-nourished, with a normal affect. The patient is afebrile. Examination of the lower extremities reveals pain-free range of motion of the hips. There is no pain to palpation of the greater trochanteric bursae. There is negative straight leg raise. There is negative calf tenderness. There is negative Aline's. There is no evidence of DVT present.  Each of the legs show she does have venous insufficiency with chronic changes to the skin at the lower extremities. There is mild edema present without calf tenderness. There is a negative Aline's sign and no signs for DVT present. Examination of the left knee reveals the skin is intact. There is no ecchymosis, no warmth, and no signs of infection or cellulitis present. She does have findings consistent with advanced arthritis of the left knee with pain to palpation tricompartmentally and crepitus arising from the anterior compartment. RADIOGRAPHS:  Radiographs in the office, including AP, tunnel, lateral, and skyline of each of the knees, show she does have end-staged arthritis with bone-to-bone eburnation of each of the knees worse on the left side. ASSESSMENT:  Bilateral knee end-staged arthritis, left greater than right. PLAN:  At this point, we discussed treatment options. We are going to move forward with a cortisone injection for the left knee today. Under aseptic conditions, and after informed and written consent, with ultrasound-guided assistance, the left knee was prepped with Betadine and 6 mg of Celestone was injected without complications. The patient tolerated the injection well. The patient was instructed on post injection care. We are going to move forward with obtaining a bone density test, as she does have some osteopenia noted on x-ray. She is also reporting a little bit of nausea with car rides. I have given her a prescription for some Zofran to see if we can help with this. We will see her back in about three months' time for evaluation. If the right one does act up, she understands we can do a cortisone injection for that as well.                       JR Tl GUTIÉRREZ, PA-C, ATC

## 2019-05-23 ENCOUNTER — ANTI-COAG VISIT (OUTPATIENT)
Dept: CARDIOLOGY CLINIC | Age: 79
End: 2019-05-23

## 2019-05-23 DIAGNOSIS — I48.91 ATRIAL FIBRILLATION, UNSPECIFIED TYPE (HCC): ICD-10-CM

## 2019-05-23 LAB
INR BLD: 3.4
PT POC: NORMAL SECONDS
VALID INTERNAL CONTROL?: YES

## 2019-05-23 RX ORDER — WARFARIN 1 MG/1
1 TABLET ORAL DAILY
Qty: 30 TAB | Refills: 3 | Status: SHIPPED | OUTPATIENT
Start: 2019-05-23 | End: 2019-09-25

## 2019-05-23 NOTE — PROGRESS NOTES
Ms. Flaca Carranza is here today for anticoagulation monitoring for the diagnosis of Atrial Fibrillation. Her INR goal is 2.0-3.0 and her current Coumadin dose is 1 mg and 2.5 mg. Today's findings include an INR of 3.4     Considering Ms. Daugherty's past history, todays findings, and per the coumadin policy/protocol, Ms. Daugherty was instructed to take Coumadin as follows,  Take 1 mg every Mon,Thur and 2.5 mg rest of the days. She was also instructed to come back in 1 weeks for an INR check. A full discussion of the nature of anticoagulants has been carried out. A full discussion of the need for frequent and regular monitoring, precise dosage adjustment and compliance was stressed. Side effects of potential bleeding were discussed and Ms. Daugherty was instructed to call [phone] if there are any signs of abnormal bleeding. Ms. Baldo Kilpatrick was instructed to avoid any OTC items containing aspirin or ibuprofen and prior to starting any new OTC products to consult with her physician or pharmacist to ensure no drug interactions are present. Ms. Baldo Kilpatrick was instructed to avoid any major changes in her general diet and to avoid alcohol consumption. .      Ms. Daugherty verbalized her understanding of all instructions and will call the office with any questions, concerns, or signs of abnormal bleeding or blood clot.

## 2019-05-31 ENCOUNTER — ANTI-COAG VISIT (OUTPATIENT)
Dept: CARDIOLOGY CLINIC | Age: 79
End: 2019-05-31

## 2019-05-31 DIAGNOSIS — I48.91 ATRIAL FIBRILLATION, UNSPECIFIED TYPE (HCC): ICD-10-CM

## 2019-05-31 LAB
INR BLD: 2.8
PT POC: NORMAL SECONDS
VALID INTERNAL CONTROL?: YES

## 2019-06-14 ENCOUNTER — ANTI-COAG VISIT (OUTPATIENT)
Dept: CARDIOLOGY CLINIC | Age: 79
End: 2019-06-14

## 2019-06-14 DIAGNOSIS — I48.91 ATRIAL FIBRILLATION, UNSPECIFIED TYPE (HCC): ICD-10-CM

## 2019-06-14 LAB
INR BLD: 1.6
PT POC: NORMAL SECONDS
VALID INTERNAL CONTROL?: YES

## 2019-06-21 ENCOUNTER — ANTI-COAG VISIT (OUTPATIENT)
Dept: CARDIOLOGY CLINIC | Age: 79
End: 2019-06-21

## 2019-06-21 DIAGNOSIS — I48.91 ATRIAL FIBRILLATION, UNSPECIFIED TYPE (HCC): ICD-10-CM

## 2019-06-21 LAB
INR BLD: 2
PT POC: NORMAL SECONDS
VALID INTERNAL CONTROL?: YES

## 2019-06-21 NOTE — PROGRESS NOTES
Ms. Arley Opitz is here today for anticoagulation monitoring for the diagnosis of Atrial Fibrillation. Her INR goal is 2.0-3.0 and her current Coumadin dose is 2.5 mg. Today's findings include an INR of 2.0 Considering Ms. Daugherty's past history, todays findings, and per the coumadin policy/protocol, Ms. Daugherty was instructed to take Coumadin as follows,  Take 2.5 mg daily. She was also instructed to come back in 1 weeks for an INR check on 06/28/2019. A full discussion of the nature of anticoagulants has been carried out. A full discussion of the need for frequent and regular monitoring, precise dosage adjustment and compliance was stressed. Side effects of potential bleeding were discussed and Ms. Daugherty was instructed to call [phone] if there are any signs of abnormal bleeding. Ms. Ophelia Browne was instructed to avoid any OTC items containing aspirin or ibuprofen and prior to starting any new OTC products to consult with her physician or pharmacist to ensure no drug interactions are present. Ms. Ophelia Browne was instructed to avoid any major changes in her general diet and to avoid alcohol consumption. . 
 
 
Ms. Daugherty verbalized her understanding of all instructions and will call the office with any questions, concerns, or signs of abnormal bleeding or blood clot.

## 2019-06-21 NOTE — PATIENT INSTRUCTIONS
This has been fully explained to the patient and calender has been given, who indicates understanding.

## 2019-07-09 ENCOUNTER — ANTI-COAG VISIT (OUTPATIENT)
Dept: CARDIOLOGY CLINIC | Age: 79
End: 2019-07-09

## 2019-07-09 DIAGNOSIS — I48.91 ATRIAL FIBRILLATION, UNSPECIFIED TYPE (HCC): ICD-10-CM

## 2019-07-09 LAB
INR BLD: 2
PT POC: NORMAL SECONDS
VALID INTERNAL CONTROL?: YES

## 2019-07-09 NOTE — PROGRESS NOTES
Ms. Galindo Monreal is here today for anticoagulation monitoring for the diagnosis of Atrial Fibrillation. Her INR goal is 2.0-3.0 and her current Coumadin dose is 2.5 mg. Today's findings include an INR of 2.0 Considering Ms. Daugherty's past history, todays findings, and per the coumadin policy/protocol, Ms. Daugherty was instructed to take Coumadin as follows,  Take 2.5 mg daily. She was also instructed to come back in 4 weeks for an INR check. A full discussion of the nature of anticoagulants has been carried out. A full discussion of the need for frequent and regular monitoring, precise dosage adjustment and compliance was stressed. Side effects of potential bleeding were discussed and Ms. Daugherty was instructed to call [phone] if there are any signs of abnormal bleeding. Ms. Flaca Chou was instructed to avoid any OTC items containing aspirin or ibuprofen and prior to starting any new OTC products to consult with her physician or pharmacist to ensure no drug interactions are present. Ms. Flaca Chou was instructed to avoid any major changes in her general diet and to avoid alcohol consumption. . 
 
 
Ms. Daugherty verbalized her understanding of all instructions and will call the office with any questions, concerns, or signs of abnormal bleeding or blood clot.

## 2019-08-01 NOTE — TELEPHONE ENCOUNTER
Earliest appointment 2/8 @ 1130 with Dr. Garcia Pontiff   This date okay?      Per Dr. Moraima Le to follow up and make sure patient follows up with Pulmonary for pulmonary HTN ASAP      Patient's not yet scheduled for Thyroid surgery no

## 2019-08-19 ENCOUNTER — CLINICAL SUPPORT (OUTPATIENT)
Dept: CARDIOLOGY CLINIC | Age: 79
End: 2019-08-19

## 2019-08-19 ENCOUNTER — ANTI-COAG VISIT (OUTPATIENT)
Dept: CARDIOLOGY CLINIC | Age: 79
End: 2019-08-19

## 2019-08-19 DIAGNOSIS — Z95.0 PACEMAKER: Primary | ICD-10-CM

## 2019-08-19 DIAGNOSIS — I48.91 ATRIAL FIBRILLATION, UNSPECIFIED TYPE (HCC): ICD-10-CM

## 2019-08-19 LAB
INR BLD: 2.1
PT POC: NORMAL
VALID INTERNAL CONTROL?: YES

## 2019-08-19 NOTE — PATIENT INSTRUCTIONS
This has been fully explained to the patient to continue current dose of 2.5 mg daily and recheck 09/16/2019, who indicates understanding.

## 2019-08-19 NOTE — PROGRESS NOTES
Ms. Mook Parker is here today for anticoagulation monitoring for the diagnosis of Atrial Fib. .  Her INR goal is 2.0-3.0 and her current Coumadin dose is 2.5 mg. Today's findings include an INR of 2.1     Considering Ms. Daugherty's past history, todays findings, and per the coumadin policy/protocol, Ms. Duagherty was instructed to take Coumadin as follows,  Take 2.5 mg daily. She was also instructed to come back in 4 weeks for an INR check. A full discussion of the nature of anticoagulants has been carried out. A full discussion of the need for frequent and regular monitoring, precise dosage adjustment and compliance was stressed. Side effects of potential bleeding were discussed and Ms. Daugherty was instructed to call [phone] if there are any signs of abnormal bleeding. Ms. Rachel Petersen was instructed to avoid any OTC items containing aspirin or ibuprofen and prior to starting any new OTC products to consult with her physician or pharmacist to ensure no drug interactions are present. Ms. Rachel Petersen was instructed to avoid any major changes in her general diet and to avoid alcohol consumption. .      Ms. Daugherty verbalized her understanding of all instructions and will call the office with any questions, concerns, or signs of abnormal bleeding or blood clot.

## 2019-08-26 RX ORDER — ROSUVASTATIN CALCIUM 10 MG/1
TABLET, FILM COATED ORAL
Qty: 30 TAB | Refills: 4 | Status: SHIPPED | OUTPATIENT
Start: 2019-08-26 | End: 2019-09-25

## 2019-09-05 RX ORDER — WARFARIN SODIUM 5 MG/1
TABLET ORAL
Qty: 90 TAB | Refills: 1 | Status: SHIPPED | OUTPATIENT
Start: 2019-09-05 | End: 2020-03-03

## 2019-09-17 ENCOUNTER — OFFICE VISIT (OUTPATIENT)
Dept: CARDIOLOGY CLINIC | Age: 79
End: 2019-09-17

## 2019-09-17 ENCOUNTER — ANTI-COAG VISIT (OUTPATIENT)
Dept: CARDIOLOGY CLINIC | Age: 79
End: 2019-09-17

## 2019-09-17 VITALS
HEART RATE: 83 BPM | HEIGHT: 60 IN | BODY MASS INDEX: 30.47 KG/M2 | SYSTOLIC BLOOD PRESSURE: 172 MMHG | WEIGHT: 155.2 LBS | DIASTOLIC BLOOD PRESSURE: 74 MMHG

## 2019-09-17 DIAGNOSIS — I27.20 PULMONARY HTN (HCC): ICD-10-CM

## 2019-09-17 DIAGNOSIS — I50.32 CHRONIC DIASTOLIC CONGESTIVE HEART FAILURE (HCC): Primary | ICD-10-CM

## 2019-09-17 DIAGNOSIS — Z95.0 PACEMAKER: ICD-10-CM

## 2019-09-17 DIAGNOSIS — I42.1 MILD HOCM (HYPERTROPHIC OBSTRUCTIVE CARDIOMYOPATHY) (HCC): ICD-10-CM

## 2019-09-17 DIAGNOSIS — I34.0 NON-RHEUMATIC MITRAL REGURGITATION: ICD-10-CM

## 2019-09-17 DIAGNOSIS — I48.91 ATRIAL FIBRILLATION, UNSPECIFIED TYPE (HCC): ICD-10-CM

## 2019-09-17 DIAGNOSIS — I48.0 PAF (PAROXYSMAL ATRIAL FIBRILLATION) (HCC): ICD-10-CM

## 2019-09-17 DIAGNOSIS — I35.1 NONRHEUMATIC AORTIC VALVE INSUFFICIENCY: ICD-10-CM

## 2019-09-17 LAB
INR BLD: 2.3
PT POC: NORMAL
VALID INTERNAL CONTROL?: YES

## 2019-09-17 RX ORDER — TELMISARTAN 40 MG/1
40 TABLET ORAL DAILY
COMMUNITY
End: 2019-09-25

## 2019-09-17 NOTE — PROGRESS NOTES
1. Have you been to the ER, urgent care clinic since your last visit? Hospitalized since your last visit? Yes When: 08/2019 Where: [facility] Reason for visit: Leg Pain    2. Have you seen or consulted any other health care providers outside of the [address] [place] [address] since your last visit? Include any pap smears or colon screening.  No

## 2019-09-17 NOTE — PROGRESS NOTES
Ms. Rachel Tellez is here today for anticoagulation monitoring for the diagnosis of Atrial Fibrillation. Her INR goal is 2.0-3.0 and her current Coumadin dose is 2.5 mg. Today's findings include an INR of 2.3     Considering Ms. Daugherty's past history, todays findings, and per the coumadin policy/protocol, Ms. Daugherty was instructed to take Coumadin as follows,  Take 2.5 mg daily. She was also instructed to come back in 4 weeks for an INR check. A full discussion of the nature of anticoagulants has been carried out. A full discussion of the need for frequent and regular monitoring, precise dosage adjustment and compliance was stressed. Side effects of potential bleeding were discussed and Ms. Daugherty was instructed to call [phone] if there are any signs of abnormal bleeding. Ms. Esha Bullard was instructed to avoid any OTC items containing aspirin or ibuprofen and prior to starting any new OTC products to consult with her physician or pharmacist to ensure no drug interactions are present. Ms. Esha Bullard was instructed to avoid any major changes in her general diet and to avoid alcohol consumption. .      Ms. Daugherty verbalized her understanding of all instructions and will call the office with any questions, concerns, or signs of abnormal bleeding or blood clot.

## 2019-09-17 NOTE — PATIENT INSTRUCTIONS
This has been fully explained to the patient to take 2.5 mg daily until next inr check on 10/15/19, who indicates understanding.

## 2019-09-17 NOTE — PROGRESS NOTES
HISTORY OF PRESENT ILLNESS  Sincere Vyas is a 70 y.o. female. CHF   The history is provided by the patient. This is a chronic problem. The problem occurs every several days. The problem has been gradually worsening. Associated symptoms include shortness of breath. Palpitations    The history is provided by the patient. This is a new problem. The problem occurs every several days. Associated symptoms include malaise/fatigue and shortness of breath. Pertinent negatives include no diaphoresis, no fever, no claudication, no orthopnea, no PND, no syncope, no nausea, no vomiting, no leg pain, no weakness, no cough, no hemoptysis and no sputum production. Shortness of Breath   The history is provided by the patient. This is a chronic problem. The problem occurs intermittently. The current episode started more than 1 week ago. The problem has been gradually worsening. Associated symptoms include leg swelling. Pertinent negatives include no fever, no ear pain, no neck pain, no cough, no sputum production, no hemoptysis, no wheezing, no PND, no orthopnea, no syncope, no vomiting, no rash, no leg pain and no claudication. Associated medical issues include heart failure. Associated medical issues do not include CAD. Review of Systems   Constitutional: Positive for malaise/fatigue. Negative for chills, diaphoresis, fever and weight loss. HENT: Negative for ear discharge, ear pain, hearing loss, nosebleeds and tinnitus. Eyes: Negative for blurred vision. Respiratory: Positive for shortness of breath. Negative for cough, hemoptysis, sputum production, wheezing and stridor. Cardiovascular: Positive for palpitations and leg swelling. Negative for orthopnea, claudication, syncope and PND. Gastrointestinal: Negative for heartburn, nausea and vomiting. Musculoskeletal: Negative for myalgias and neck pain. Skin: Negative for itching and rash.    Neurological: Negative for tingling, tremors, focal weakness, loss of consciousness and weakness. Psychiatric/Behavioral: Negative for depression and suicidal ideas. Family History   Problem Relation Age of Onset    Cancer Other     Heart Disease Other     Cancer Mother     Heart Disease Mother        Past Medical History:   Diagnosis Date    Arthritis     Heart murmur     History of seasonal allergies     HTN (hypertension)     Hypercholesteremia        Past Surgical History:   Procedure Laterality Date    HX KNEE ARTHROSCOPY      left and right    HX ORTHOPAEDIC      right ankle       Social History     Tobacco Use    Smoking status: Never Smoker    Smokeless tobacco: Never Used   Substance Use Topics    Alcohol use: No       No Known Allergies    Outpatient Medications Marked as Taking for the 9/17/19 encounter (Office Visit) with Tony Dorantes MD   Medication Sig Dispense Refill    telmisartan (MICARDIS) 40 mg tablet Take 40 mg by mouth daily.  warfarin (COUMADIN) 5 mg tablet take 1 tablet by mouth once daily 90 Tab 1    DIGITEK 125 mcg tablet take 1 tablet by mouth once daily 30 Tab 4    warfarin (COUMADIN) 1 mg tablet Take 1 Tab by mouth daily. 30 Tab 3    Calcium-Cholecalciferol, D3, (CALCIUM 600 WITH VITAMIN D3) 600 mg(1,500mg) -400 unit chew Take  by mouth.  simvastatin (ZOCOR) 80 mg tablet TAKE 1 TABLET NIGHTLY 90 Tab 1    traMADol (ULTRAM) 50 mg tablet TAKE 1 TABLET BY MOUTH EVERY 6 HOURS AS NEEDED FOR PAIN 30 Tab 1    cholecalciferol (VITAMIN D3) 1,000 unit cap Take  by mouth daily.  furosemide (LASIX) 40 mg tablet take 1 tablet by mouth once daily (Patient taking differently: take 1 tablet by mouth as needed) 30 Tab 1    Comp. Stocking,Thigh,Reg,Medium misc 1 Package by Does Not Apply route daily. 2 Package 0    montelukast (SINGULAIR) 10 mg tablet Take 10 mg by mouth daily.  cyanocobalamin (VITAMIN B-12) 500 mcg tablet Take 500 mcg by mouth daily.  folic acid (FOLVITE) 1 mg tablet Take  by mouth daily.  CETIRIZINE HCL (ZYRTEC PO) Take  by mouth. Visit Vitals  /74   Pulse 83   Ht 5' (1.524 m)   Wt 70.4 kg (155 lb 3.2 oz)   BMI 30.31 kg/m²     Physical Exam   Constitutional: She is oriented to person, place, and time. She appears well-developed and well-nourished. No distress. HENT:   Head: Atraumatic. Mouth/Throat: No oropharyngeal exudate. Eyes: Conjunctivae are normal. Right eye exhibits no discharge. Left eye exhibits no discharge. No scleral icterus. Neck: Normal range of motion. Neck supple. No JVD present. No tracheal deviation present. No thyromegaly present. Cardiovascular: Normal rate. An irregularly irregular rhythm present. Exam reveals no gallop. Murmur (3/6 holosystolic murmur best heard at apex and left parasternal area with no radiationl) heard. Pulmonary/Chest: Effort normal. No stridor. No respiratory distress. She has no wheezes. She has no rales. She exhibits no tenderness. Abdominal: Soft. There is no tenderness. There is no rebound and no guarding. Musculoskeletal: Normal range of motion. She exhibits edema (3+ edema extending to thigh). She exhibits no tenderness. Lymphadenopathy:     She has no cervical adenopathy. Neurological: She is alert and oriented to person, place, and time. She exhibits normal muscle tone. Skin: Skin is warm. She is not diaphoretic. Psychiatric: She has a normal mood and affect. Her behavior is normal.     ekg sinus bradycardia with poor r wave progression. Echo report reviewed. ASSESSMENT and PLAN    ICD-10-CM ICD-9-CM    1. Chronic diastolic congestive heart failure (HCC) I50.32 428.32      428.0     NYHA class III   2. Pacemaker Z95.0 V45.01    3. HOCM (hypertrophic obstructive cardiomyopathy) ([facility]) I42.1 425.11    4. Nonrheumatic aortic valve insufficiency I35.1 424.1    5. Pulmonary HTN ([facility]) I27.20 416.8    6. PAF (paroxysmal atrial fibrillation) ([facility]) I48.0 427.31    7.  Non-rheumatic mitral regurgitation I34.0 424.0      No orders of the defined types were placed in this encounter. Follow-up and Dispositions    · Return in about 2 weeks (around 10/1/2019). current treatment plan is effective, no change in therapy  reviewed diet, exercise and weight control  use of aspirin to prevent MI and TIA's discussed. Patient seen for pre op evaluation prior to thyroid surgery for possible Ca. Had recent echo which revealed severe pulm htn. ANAND reveals HOCM with mitral regurgitation and moderate aortic regurgitation. Underwent recent septal ablation followed by pacemaker insertion at [facility]. Patient seen for follow-up. Complains of worsening lower extremity edema and shortness of breath. Also has coexisting cellulitis. Discussed at length regarding further management. Recommend hospitalization for IV Lasix.

## 2019-09-18 ENCOUNTER — HOSPITAL ENCOUNTER (EMERGENCY)
Age: 79
Discharge: HOME | DRG: 292 | End: 2019-09-18
Attending: EMERGENCY MEDICINE
Payer: MEDICARE

## 2019-09-18 VITALS
BODY MASS INDEX: 28.52 KG/M2 | WEIGHT: 155 LBS | RESPIRATION RATE: 16 BRPM | OXYGEN SATURATION: 99 % | DIASTOLIC BLOOD PRESSURE: 79 MMHG | HEIGHT: 62 IN | SYSTOLIC BLOOD PRESSURE: 156 MMHG | TEMPERATURE: 98 F | HEART RATE: 52 BPM

## 2019-09-18 DIAGNOSIS — M79.669 PAIN AND SWELLING OF LOWER LEG, UNSPECIFIED LATERALITY: ICD-10-CM

## 2019-09-18 DIAGNOSIS — L97.929 VENOUS STASIS ULCERS OF BOTH LOWER EXTREMITIES (HCC): Primary | ICD-10-CM

## 2019-09-18 DIAGNOSIS — M79.89 PAIN AND SWELLING OF LOWER LEG, UNSPECIFIED LATERALITY: ICD-10-CM

## 2019-09-18 DIAGNOSIS — I83.029 VENOUS STASIS ULCERS OF BOTH LOWER EXTREMITIES (HCC): Primary | ICD-10-CM

## 2019-09-18 DIAGNOSIS — L97.919 VENOUS STASIS ULCERS OF BOTH LOWER EXTREMITIES (HCC): Primary | ICD-10-CM

## 2019-09-18 DIAGNOSIS — I83.019 VENOUS STASIS ULCERS OF BOTH LOWER EXTREMITIES (HCC): Primary | ICD-10-CM

## 2019-09-18 LAB
ANION GAP SERPL CALC-SCNC: 4 MMOL/L (ref 3–18)
BASOPHILS # BLD: 0 K/UL (ref 0–0.1)
BASOPHILS NFR BLD: 0 % (ref 0–2)
BUN SERPL-MCNC: 48 MG/DL (ref 7–18)
BUN/CREAT SERPL: 41 (ref 12–20)
CALCIUM SERPL-MCNC: 10.2 MG/DL (ref 8.5–10.1)
CHLORIDE SERPL-SCNC: 101 MMOL/L (ref 100–111)
CO2 SERPL-SCNC: 30 MMOL/L (ref 21–32)
CREAT SERPL-MCNC: 1.18 MG/DL (ref 0.6–1.3)
DIFFERENTIAL METHOD BLD: ABNORMAL
EOSINOPHIL # BLD: 0 K/UL (ref 0–0.4)
EOSINOPHIL NFR BLD: 0 % (ref 0–5)
ERYTHROCYTE [DISTWIDTH] IN BLOOD BY AUTOMATED COUNT: 15.7 % (ref 11.6–14.5)
GLUCOSE SERPL-MCNC: 108 MG/DL (ref 74–99)
HCT VFR BLD AUTO: 37.5 % (ref 35–45)
HGB BLD-MCNC: 11.8 G/DL (ref 12–16)
LACTATE BLD-SCNC: 0.79 MMOL/L (ref 0.4–2)
LYMPHOCYTES # BLD: 0.8 K/UL (ref 0.9–3.6)
LYMPHOCYTES NFR BLD: 15 % (ref 21–52)
MCH RBC QN AUTO: 26.6 PG (ref 24–34)
MCHC RBC AUTO-ENTMCNC: 31.5 G/DL (ref 31–37)
MCV RBC AUTO: 84.7 FL (ref 74–97)
MONOCYTES # BLD: 0.6 K/UL (ref 0.05–1.2)
MONOCYTES NFR BLD: 10 % (ref 3–10)
NEUTS SEG # BLD: 4.1 K/UL (ref 1.8–8)
NEUTS SEG NFR BLD: 75 % (ref 40–73)
PLATELET # BLD AUTO: 239 K/UL (ref 135–420)
PMV BLD AUTO: 10.5 FL (ref 9.2–11.8)
POTASSIUM SERPL-SCNC: 4.3 MMOL/L (ref 3.5–5.5)
RBC # BLD AUTO: 4.43 M/UL (ref 4.2–5.3)
SODIUM SERPL-SCNC: 135 MMOL/L (ref 136–145)
WBC # BLD AUTO: 5.5 K/UL (ref 4.6–13.2)

## 2019-09-18 PROCEDURE — 85025 COMPLETE CBC W/AUTO DIFF WBC: CPT

## 2019-09-18 PROCEDURE — 80048 BASIC METABOLIC PNL TOTAL CA: CPT

## 2019-09-18 PROCEDURE — 99283 EMERGENCY DEPT VISIT LOW MDM: CPT

## 2019-09-18 PROCEDURE — 93005 ELECTROCARDIOGRAM TRACING: CPT

## 2019-09-18 PROCEDURE — 83605 ASSAY OF LACTIC ACID: CPT

## 2019-09-18 RX ORDER — TRAMADOL HYDROCHLORIDE 50 MG/1
50 TABLET ORAL
Qty: 12 TAB | Refills: 0 | Status: SHIPPED | OUTPATIENT
Start: 2019-09-18 | End: 2019-09-25

## 2019-09-18 NOTE — ED TRIAGE NOTES
Dr. Alona Velasquez sent patient for evaluation of BLE edema. Onset of symptoms x \"60 days\". Denies chest pain or shortness of breath.

## 2019-09-18 NOTE — ED NOTES
Patient was given discharge instructions and prescriptions by the provider ED was called by patient's cardiologist to inform the department that patient was to be admitted. This was never relayed to me. I attempted to contact the patient, however, both numbers that she has provided in her demographics are not working properly. I recontacted the cardiologist who made the request, and he will contact her in the morning

## 2019-09-18 NOTE — ED PROVIDER NOTES
[employer]  Emergency Department Ary Lerma is a 70 y.o. female seen on 9/19/2019 at 11:59 AM in the [facility] EMERGENCY DEPT in room FT5/F5. Chief Complaint Patient presents with  Leg Swelling  
  bilateral  
 Wound Infection  
  lower extremities/redness/drainage HPI:  
Ary Lerma is a 70 y.o. female who complaints of bilateral lower extremity edema, chronic, but recently has become more severe. For the past month or so, the legs have become hyperemic and now have \"sores\" and a very painful. Is actually been seen by [facility] 5 days ago, who referred her to wound clinic and initiated Keflex 500 mg 3 times daily. She also saw her cardiologist Dr Jimenez More yesterday who changed her diuretics and schedule a follow-up appointment in 2 weeks. He also wanted her to be seen here in the emergency department for evaluation. She denies any noted fevers, nausea, vomiting or flulike symptoms. History of similar issues with her lower extremities. Review of Systems: 
 
CONST:  Denies: fever, chills, weakness ENT: Denies: sore throat, earache, nasal congestion EYES: Denies: vision changes, double vision, discharge CARDIO: Denies: chest pain, palpitations RESP: Denies: cough, shortness of breath, dyspnea on exertion GI: Denies: abdominal pain, nausea, vomiting, diarrhea, melena, hematochezia : Denies: dysuria, hematuria, urinary frequency MUSC: Denies: muscle aches, joint pain PSYCH: Denies: anxiety, depression ENDO: Denies: polyuria, polydipsia Heme/Lymph:      Denies: easy bruising, bleeding NEURO: Denies: headache, confusion, change in behavior, extremity,weakness, paresthesias Past Medical History: Primary Care Doctor: Syed Galvan MD  
 
Past Medical History:  
Diagnosis Date  Arthritis  Heart murmur  History of seasonal allergies  HTN (hypertension)  Hypercholesteremia Past Surgical History:  
Procedure Laterality Date  HX KNEE ARTHROSCOPY    
 left and right  HX ORTHOPAEDIC    
 right ankle Social History Socioeconomic History  Marital status:  Spouse name: Not on file  Number of children: Not on file  Years of education: Not on file  Highest education level: Not on file Tobacco Use  Smoking status: Never Smoker  Smokeless tobacco: Never Used Substance and Sexual Activity  Alcohol use: No  
 Drug use: No  
Social History Narrative Pt has a dog Born in [facility] and moved to [place] years ago No current facility-administered medications on file prior to encounter. Current Outpatient Medications on File Prior to Encounter Medication Sig Dispense Refill  telmisartan (MICARDIS) 40 mg tablet Take 40 mg by mouth daily.  warfarin (COUMADIN) 5 mg tablet take 1 tablet by mouth once daily 90 Tab 1  
 DIGITEK 125 mcg tablet take 1 tablet by mouth once daily 30 Tab 4  warfarin (COUMADIN) 1 mg tablet Take 1 Tab by mouth daily. 30 Tab 3  
 ondansetron hcl (ZOFRAN) 4 mg tablet Take 1 Tab by mouth every eight (8) hours as needed for Nausea. 30 Tab 2  
 lisinopril (PRINIVIL, ZESTRIL) 20 mg tablet Take  by mouth daily.  Calcium-Cholecalciferol, D3, (CALCIUM 600 WITH VITAMIN D3) 600 mg(1,500mg) -400 unit chew Take  by mouth.  simvastatin (ZOCOR) 80 mg tablet TAKE 1 TABLET NIGHTLY 90 Tab 1  
 hydroCHLOROthiazide (HYDRODIURIL) 25 mg tablet Take 25 mg by mouth daily.  cholecalciferol (VITAMIN D3) 1,000 unit cap Take  by mouth daily.  furosemide (LASIX) 40 mg tablet take 1 tablet by mouth once daily (Patient taking differently: take 1 tablet by mouth as needed) 30 Tab 1  Comp. Stocking,Thigh,Reg,Medium misc 1 Package by Does Not Apply route daily. 2 Package 0  
 metoprolol succinate (TOPROL-XL) 100 mg tablet Take 1 Tab by mouth two (2) times a day.  (Patient taking differently: Take 75 mg by mouth two (2) times a day.) 60 Tab 3  
  losartan (COZAAR) 100 mg tablet Take 100 mg by mouth daily.  montelukast (SINGULAIR) 10 mg tablet Take 10 mg by mouth daily.  cyanocobalamin (VITAMIN B-12) 500 mcg tablet Take 500 mcg by mouth daily.  folic acid (FOLVITE) 1 mg tablet Take  by mouth daily.  CETIRIZINE HCL (ZYRTEC PO) Take  by mouth. No Known Allergies Physical Exam:   
Vitals:  
 09/18/19 1058 BP: 156/79 Pulse: (!) 52 Resp: 16 Temp: 98 °F (36.7 °C) SpO2: 99% Vital signs were reviewed. Constitutional: well appearing, nontoxic Head: Atraumatic, normo-cephalic Ears/Nose/Throat: throat clear, mucous membranes moist 
Eyes: PERRL, EOMI Neck: supple, FROM, no lymphadenopathy, no meningismus Cardiovascular: regular rate and rhythm, no murmur, 2+ radial pulses Respiratory: clear to auscultation with no wheezes, rales, ronchi Back:  FROM, no CVA tenderness Abdomen: soft, non-tender, no guarding/rebound, no percussion tenderness Musculoskeletal: no deformities, bilateral +3 edema lower extremities Skin: Bilateral lower extremities demonstrate marked hyperemia with several skin ulcerations consistent with stasis dermatitis, extending two thirds up the tibia neurologic: alert, oriented, answers questions follows commands Psychiatric: Speech pattern and content normal  
 
_________________________________________________________ Medical Decision Making: Patient with bilateral lower extremity edema and cardiac history who is already seen her cardiologist who has adjusted her diuretics. She has lower extremity endings consistent with skin breakdown from the edema and likely additional stasis dermatitis ulcers. She has already been referred to the wound clinic and has an appointment with the wound clinic in 3 days. Also she is already on an and so for the past 4 days.   Given that she has already been managed as noted I feel she is stable enough to return home with strict instructions to follow-up with her cardiologist as he recommended. Also agrees to make sure she sees the wound clinic as previously scheduled and continue to take her antibiotics Differential Diagnosis for this patient includes: Lower extremity edema, CHF, stasis dermatitis, stasis ulcers 
 
 
 
 
 
_________________________________________________________ 
ED Evaluation Labs:  
No results found for this or any previous visit (from the past 24 hour(s)). see reported labs Labs were reviewed and interpreted by me. Medications - No data to display 
_________________________________________________________ Procedures 
====================================================== 
ASSESSMENT: Lower extremity edema secondary to CHF, with ulcerations secondary to the skin breakdown. See medical decision making above PLAN: Discharge patient home with follow-up as previously scheduled in the wound clinic and per instructions of her cardiologist with medication changes. _________________________________________________________ Condition: Stable Disposition: Home  
diagnosis: Lower extremity edema, stasis ulcers, CHF JUAN Sharif; 9/19/2019 @11:59 AM================================

## 2019-09-19 DIAGNOSIS — I50.32 CHRONIC DIASTOLIC CONGESTIVE HEART FAILURE (HCC): Primary | ICD-10-CM

## 2019-09-19 LAB
ATRIAL RATE: 83 BPM
CALCULATED R AXIS, ECG10: 126 DEGREES
CALCULATED T AXIS, ECG11: 0 DEGREES
DIAGNOSIS, 93000: NORMAL
Q-T INTERVAL, ECG07: 374 MS
QRS DURATION, ECG06: 130 MS
QTC CALCULATION (BEZET), ECG08: 439 MS
VENTRICULAR RATE, ECG03: 83 BPM

## 2019-09-19 RX ORDER — METOLAZONE 2.5 MG/1
5 TABLET ORAL AS NEEDED
Qty: 45 TAB | Refills: 2 | Status: SHIPPED | OUTPATIENT
Start: 2019-09-19 | End: 2019-09-25

## 2019-09-19 NOTE — TELEPHONE ENCOUNTER
Per Dr Consuelo Haines patient was sent home from [facility] ER, advised patient to return to be admitted. Called and spoke with patient, patient states that she has an appointment tomorrow( 9/20/19) and prefers not to return to ER at this time. Spoke with Dr Consuelo Haines, per Dr Consuelo Haines v/o start metolazone 5mg every day x3 days then 2.5 mg every day , get BMP and Mag, get Echocardiogram and follow up in office in 2 weeks.

## 2019-09-20 ENCOUNTER — APPOINTMENT (OUTPATIENT)
Dept: GENERAL RADIOLOGY | Age: 79
DRG: 292 | End: 2019-09-20
Attending: EMERGENCY MEDICINE
Payer: MEDICARE

## 2019-09-20 ENCOUNTER — HOSPITAL ENCOUNTER (INPATIENT)
Age: 79
LOS: 5 days | Discharge: HOME HEALTH | DRG: 292 | End: 2019-09-25
Attending: EMERGENCY MEDICINE | Admitting: FAMILY MEDICINE
Payer: MEDICARE

## 2019-09-20 DIAGNOSIS — L97.929 VENOUS STASIS ULCERS OF BOTH LOWER EXTREMITIES (HCC): ICD-10-CM

## 2019-09-20 DIAGNOSIS — I48.20 CHRONIC ATRIAL FIBRILLATION (HCC): Chronic | ICD-10-CM

## 2019-09-20 DIAGNOSIS — N39.0 URINARY TRACT INFECTION WITHOUT HEMATURIA, SITE UNSPECIFIED: ICD-10-CM

## 2019-09-20 DIAGNOSIS — I83.019 VENOUS STASIS ULCERS OF BOTH LOWER EXTREMITIES (HCC): ICD-10-CM

## 2019-09-20 DIAGNOSIS — L97.919 VENOUS STASIS ULCERS OF BOTH LOWER EXTREMITIES (HCC): ICD-10-CM

## 2019-09-20 DIAGNOSIS — I50.9 CHRONIC CONGESTIVE HEART FAILURE, UNSPECIFIED HEART FAILURE TYPE (HCC): ICD-10-CM

## 2019-09-20 DIAGNOSIS — M79.669 PAIN AND SWELLING OF LOWER LEG, UNSPECIFIED LATERALITY: Primary | ICD-10-CM

## 2019-09-20 DIAGNOSIS — I83.029 VENOUS STASIS ULCERS OF BOTH LOWER EXTREMITIES (HCC): ICD-10-CM

## 2019-09-20 DIAGNOSIS — M79.89 PAIN AND SWELLING OF LOWER LEG, UNSPECIFIED LATERALITY: Primary | ICD-10-CM

## 2019-09-20 LAB
ALBUMIN SERPL-MCNC: 3 G/DL (ref 3.4–5)
ALBUMIN/GLOB SERPL: 0.5 {RATIO} (ref 0.8–1.7)
ALP SERPL-CCNC: 274 U/L (ref 45–117)
ALT SERPL-CCNC: 33 U/L (ref 13–56)
ANION GAP SERPL CALC-SCNC: 4 MMOL/L (ref 3–18)
APPEARANCE UR: CLEAR
AST SERPL-CCNC: 30 U/L (ref 10–38)
BACTERIA URNS QL MICRO: ABNORMAL /HPF
BASOPHILS # BLD: 0 K/UL (ref 0–0.1)
BASOPHILS NFR BLD: 0 % (ref 0–2)
BILIRUB SERPL-MCNC: 0.7 MG/DL (ref 0.2–1)
BILIRUB UR QL: NEGATIVE
BNP SERPL-MCNC: ABNORMAL PG/ML (ref 0–1800)
BUN SERPL-MCNC: 45 MG/DL (ref 7–18)
BUN/CREAT SERPL: 39 (ref 12–20)
CALCIUM SERPL-MCNC: 9.7 MG/DL (ref 8.5–10.1)
CHLORIDE SERPL-SCNC: 100 MMOL/L (ref 100–111)
CO2 SERPL-SCNC: 28 MMOL/L (ref 21–32)
COLOR UR: YELLOW
CREAT SERPL-MCNC: 1.14 MG/DL (ref 0.6–1.3)
DIFFERENTIAL METHOD BLD: ABNORMAL
EOSINOPHIL # BLD: 0.1 K/UL (ref 0–0.4)
EOSINOPHIL NFR BLD: 2 % (ref 0–5)
EPITH CASTS URNS QL MICRO: NEGATIVE /LPF (ref 0–5)
ERYTHROCYTE [DISTWIDTH] IN BLOOD BY AUTOMATED COUNT: 15.6 % (ref 11.6–14.5)
GLOBULIN SER CALC-MCNC: 5.7 G/DL (ref 2–4)
GLUCOSE SERPL-MCNC: 79 MG/DL (ref 74–99)
GLUCOSE UR STRIP.AUTO-MCNC: NEGATIVE MG/DL
HCT VFR BLD AUTO: 36.6 % (ref 35–45)
HGB BLD-MCNC: 11.8 G/DL (ref 12–16)
HGB UR QL STRIP: NEGATIVE
HYALINE CASTS URNS QL MICRO: ABNORMAL /LPF (ref 0–2)
KETONES UR QL STRIP.AUTO: NEGATIVE MG/DL
LEUKOCYTE ESTERASE UR QL STRIP.AUTO: ABNORMAL
LYMPHOCYTES # BLD: 1.1 K/UL (ref 0.9–3.6)
LYMPHOCYTES NFR BLD: 19 % (ref 21–52)
MAGNESIUM SERPL-MCNC: 2.4 MG/DL (ref 1.6–2.6)
MCH RBC QN AUTO: 27.1 PG (ref 24–34)
MCHC RBC AUTO-ENTMCNC: 32.2 G/DL (ref 31–37)
MCV RBC AUTO: 83.9 FL (ref 74–97)
MONOCYTES # BLD: 0.7 K/UL (ref 0.05–1.2)
MONOCYTES NFR BLD: 11 % (ref 3–10)
NEUTS SEG # BLD: 3.9 K/UL (ref 1.8–8)
NEUTS SEG NFR BLD: 68 % (ref 40–73)
NITRITE UR QL STRIP.AUTO: NEGATIVE
PH UR STRIP: 5 [PH] (ref 5–8)
PLATELET # BLD AUTO: 225 K/UL (ref 135–420)
PMV BLD AUTO: 10.2 FL (ref 9.2–11.8)
POTASSIUM SERPL-SCNC: 4.6 MMOL/L (ref 3.5–5.5)
PROT SERPL-MCNC: 8.7 G/DL (ref 6.4–8.2)
PROT UR STRIP-MCNC: 30 MG/DL
RBC # BLD AUTO: 4.36 M/UL (ref 4.2–5.3)
RBC #/AREA URNS HPF: NEGATIVE /HPF (ref 0–5)
SODIUM SERPL-SCNC: 132 MMOL/L (ref 136–145)
SP GR UR REFRACTOMETRY: 1.02 (ref 1–1.03)
TROPONIN I SERPL-MCNC: 0.22 NG/ML (ref 0–0.04)
UROBILINOGEN UR QL STRIP.AUTO: 1 EU/DL (ref 0.2–1)
WBC # BLD AUTO: 5.8 K/UL (ref 4.6–13.2)
WBC URNS QL MICRO: ABNORMAL /HPF (ref 0–4)

## 2019-09-20 PROCEDURE — 93005 ELECTROCARDIOGRAM TRACING: CPT

## 2019-09-20 PROCEDURE — 81001 URINALYSIS AUTO W/SCOPE: CPT

## 2019-09-20 PROCEDURE — 29580 STRAPPING UNNA BOOT: CPT

## 2019-09-20 PROCEDURE — 83735 ASSAY OF MAGNESIUM: CPT

## 2019-09-20 PROCEDURE — 84484 ASSAY OF TROPONIN QUANT: CPT

## 2019-09-20 PROCEDURE — 85025 COMPLETE CBC W/AUTO DIFF WBC: CPT

## 2019-09-20 PROCEDURE — 71045 X-RAY EXAM CHEST 1 VIEW: CPT

## 2019-09-20 PROCEDURE — 2W1LX6Z COMPRESSION OF RIGHT LOWER EXTREMITY USING PRESSURE DRESSING: ICD-10-PCS | Performed by: EMERGENCY MEDICINE

## 2019-09-20 PROCEDURE — 99283 EMERGENCY DEPT VISIT LOW MDM: CPT

## 2019-09-20 PROCEDURE — 80053 COMPREHEN METABOLIC PANEL: CPT

## 2019-09-20 PROCEDURE — 74011250637 HC RX REV CODE- 250/637: Performed by: STUDENT IN AN ORGANIZED HEALTH CARE EDUCATION/TRAINING PROGRAM

## 2019-09-20 PROCEDURE — 96374 THER/PROPH/DIAG INJ IV PUSH: CPT

## 2019-09-20 PROCEDURE — 83880 ASSAY OF NATRIURETIC PEPTIDE: CPT

## 2019-09-20 PROCEDURE — 74011250636 HC RX REV CODE- 250/636: Performed by: STUDENT IN AN ORGANIZED HEALTH CARE EDUCATION/TRAINING PROGRAM

## 2019-09-20 PROCEDURE — 2W1MX6Z COMPRESSION OF LEFT LOWER EXTREMITY USING PRESSURE DRESSING: ICD-10-PCS | Performed by: EMERGENCY MEDICINE

## 2019-09-20 PROCEDURE — 65660000000 HC RM CCU STEPDOWN

## 2019-09-20 PROCEDURE — 87086 URINE CULTURE/COLONY COUNT: CPT

## 2019-09-20 RX ORDER — FUROSEMIDE 10 MG/ML
40 INJECTION INTRAMUSCULAR; INTRAVENOUS
Status: COMPLETED | OUTPATIENT
Start: 2019-09-20 | End: 2019-09-20

## 2019-09-20 RX ORDER — CEPHALEXIN 250 MG/1
500 CAPSULE ORAL EVERY 8 HOURS
Status: DISCONTINUED | OUTPATIENT
Start: 2019-09-20 | End: 2019-09-23

## 2019-09-20 RX ORDER — NITROFURANTOIN MACROCRYSTALS 50 MG/1
100 CAPSULE ORAL 2 TIMES DAILY
Status: DISCONTINUED | OUTPATIENT
Start: 2019-09-20 | End: 2019-09-23

## 2019-09-20 RX ADMIN — CEPHALEXIN 500 MG: 250 CAPSULE ORAL at 21:24

## 2019-09-20 RX ADMIN — FUROSEMIDE 40 MG: 10 INJECTION, SOLUTION INTRAMUSCULAR; INTRAVENOUS at 21:24

## 2019-09-20 RX ADMIN — NITROFURANTOIN MACROCRYSTALS 100 MG: 50 CAPSULE ORAL at 23:39

## 2019-09-20 NOTE — WOUND CARE
Physical Exam  
Musculoskeletal:  
     Legs: 
Seen by wound care skin assessment and compression application performed at this time. Tissue injury noted during skin assessment. Treatment plan explained to bedside and Pt agreeable to continue current treatment. Patient needs to follow up with Davion New for compression dressing changes and progression to compression socks and lymphedema sleeves. At this time lower extremity wounds covered with xeroform gauze, except right lateral which medi-honey and aqua harriett Ag was applied. Well padded dressing to secure in place. Tree layer compression wraps applied from mpj to popliteal space in compression manner. Wound care education provided to pt at this time. Plan of care reviewed with nursing. Will turn over care to nursing staff at this time Kraig Carrel, Wound Care Department

## 2019-09-20 NOTE — ED PROVIDER NOTES
EMERGENCY DEPARTMENT HISTORY AND PHYSICAL EXAM 
 
5:27 PM 
 
 
Date: 9/20/2019 Patient Name: Erum Joseph History of Presenting Illness Chief Complaint Patient presents with  Swelling History Provided By: Patient Additional History (Context): Erum Joseph is a 70 y.o. female with Pulmonary hypertension, papillary thyroid carcinoma, HOCM s/p septal ablation 3months ago, aortic stenosis, aortic regurg, mitral regurg, chronic diastolic CHF, A. fib on xeralto who presents with bilateral lower extremity edema associated with cellulitis and possible venous stasis ulcers via her cardiologist, Dr. Hilaria Hernandez, who wants patient admitted for IV Lasix. Patient seen by cardiologist on 17 September 2019, and in his visit note he recommended hospital admission for IV Lasix. Patient came to [facility] ED on 18 September 2019, was discharged home with LE wounds dressed, wound care follow-up today 20 September, already on p.o. Keflex for cellulitis, with diuretic regimen from her cardiologist.  On 18 September, patient's lab results were within normal limits. Patient states that she feels well aside for the swelling on her legs and some minor pain due to the wounds, she is still able to ambulate at home, and has had no change in her health or symptoms since she was seen in this ED on Wednesday 18 September. The patient is unsure of why her  brought her back, believes it is because her cardiologist wanted her admitted for IV medicine. Approximately 3 months ago patient had septal ablation for HOCM, patient said that her lower extremity swelling began after that surgery and has been constant, possibly worsening, over the course of the summer. The wounds on the lower extremities started to form a couple weeks ago per patient, have gotten progressively worse.   Was started on Keflex via [facility].  Prescription was for 500 mg 3 times daily; however, per patient she has only been taking 1 pill twice a day. PCP: Cindi Ochoa MD 
 
Chief Complaint: Bilateral lower extremity swelling Duration:  Months Timing:  Constant Location: Lateral lower extremities from ankles to mid thigh Quality: N/A Severity: N/A Modifying Factors: Minor to no improvement on patient's normal diuretic regimen at home Associated Symptoms: Wounds forming on skin of lower leg of bilateral lower extremities Current Outpatient Medications Medication Sig Dispense Refill  metOLazone (ZAROXOLYN) 2.5 mg tablet Take 2 Tabs by mouth as needed (5 mg x 3 days then 2.5 mg qd). 45 Tab 2  
 traMADol (ULTRAM) 50 mg tablet Take 1 Tab by mouth every six (6) hours as needed for Pain for up to 3 days. Max Daily Amount: 200 mg. 12 Tab 0  
 telmisartan (MICARDIS) 40 mg tablet Take 40 mg by mouth daily.  warfarin (COUMADIN) 5 mg tablet take 1 tablet by mouth once daily 90 Tab 1  
 DIGITEK 125 mcg tablet take 1 tablet by mouth once daily 30 Tab 4  warfarin (COUMADIN) 1 mg tablet Take 1 Tab by mouth daily. 30 Tab 3  
 ondansetron hcl (ZOFRAN) 4 mg tablet Take 1 Tab by mouth every eight (8) hours as needed for Nausea. 30 Tab 2  
 lisinopril (PRINIVIL, ZESTRIL) 20 mg tablet Take  by mouth daily.  Calcium-Cholecalciferol, D3, (CALCIUM 600 WITH VITAMIN D3) 600 mg(1,500mg) -400 unit chew Take  by mouth.  simvastatin (ZOCOR) 80 mg tablet TAKE 1 TABLET NIGHTLY 90 Tab 1  
 hydroCHLOROthiazide (HYDRODIURIL) 25 mg tablet Take 25 mg by mouth daily.  cholecalciferol (VITAMIN D3) 1,000 unit cap Take  by mouth daily.  furosemide (LASIX) 40 mg tablet take 1 tablet by mouth once daily (Patient taking differently: take 1 tablet by mouth as needed) 30 Tab 1  Comp. Stocking,Thigh,Reg,Medium misc 1 Package by Does Not Apply route daily.  2 Package 0  
 metoprolol succinate (TOPROL-XL) 100 mg tablet Take 1 Tab by mouth two (2) times a day. (Patient taking differently: Take 75 mg by mouth two (2) times a day.) 60 Tab 3  
 losartan (COZAAR) 100 mg tablet Take 100 mg by mouth daily.  montelukast (SINGULAIR) 10 mg tablet Take 10 mg by mouth daily.  cyanocobalamin (VITAMIN B-12) 500 mcg tablet Take 500 mcg by mouth daily.  folic acid (FOLVITE) 1 mg tablet Take  by mouth daily.  CETIRIZINE HCL (ZYRTEC PO) Take  by mouth. Past History Past Medical History: 
Past Medical History:  
Diagnosis Date  Arthritis  Heart murmur  History of seasonal allergies  HTN (hypertension)  Hypercholesteremia Past Surgical History: 
Past Surgical History:  
Procedure Laterality Date  HX KNEE ARTHROSCOPY    
 left and right  HX ORTHOPAEDIC    
 right ankle Family History: 
Family History Problem Relation Age of Onset  Cancer Other  Heart Disease Other  Cancer Mother  Heart Disease Mother Social History: 
Social History Tobacco Use  Smoking status: Never Smoker  Smokeless tobacco: Never Used Substance Use Topics  Alcohol use: No  
 Drug use: No  
 
 
Allergies: 
No Known Allergies Review of Systems Review of Systems Constitutional: Negative for chills, fatigue and fever. HENT: Negative for congestion, rhinorrhea and trouble swallowing. Eyes: Negative for visual disturbance. Respiratory: Negative for cough, chest tightness and shortness of breath. Cardiovascular: Positive for leg swelling. Negative for chest pain. Gastrointestinal: Negative for abdominal distention, abdominal pain, constipation, diarrhea, nausea and vomiting. Genitourinary: Negative for difficulty urinating, dysuria and hematuria. Musculoskeletal: Negative for gait problem and neck stiffness. Skin: Positive for rash and wound. Negative for pallor. Neurological: Negative for dizziness, syncope, light-headedness and numbness.   
 
 
 
Physical Exam  
 
 Visit Vitals /69 (BP 1 Location: Left arm, BP Patient Position: At rest) Pulse 79 Temp 98.2 °F (36.8 °C) Resp 16 Ht 5' 2\" (1.575 m) Wt 70.3 kg (155 lb) SpO2 98% BMI 28.35 kg/m² Physical Exam  
Constitutional: She is oriented to person, place, and time. She appears well-developed and well-nourished. No distress. HENT:  
Head: Normocephalic and atraumatic. Eyes: Pupils are equal, round, and reactive to light. Conjunctivae and EOM are normal.  
Neck: Normal range of motion. Cardiovascular: Normal rate and normal pulses. An irregularly irregular rhythm present. Exam reveals no gallop. Murmur heard. 3/6 harsh systolic murmur at upper left sternal border, 3/6 blowing holosystolic murmur at left sternal border radiating to axilla Pulmonary/Chest: Effort normal and breath sounds normal. No respiratory distress. She has no wheezes. She has no rales. She exhibits no tenderness. Abdominal: Soft. She exhibits no distension. There is no tenderness. There is no rebound and no guarding. Musculoskeletal: She exhibits edema and tenderness. Bilateral lower extremity 3+ pitting edema to the mid thigh, associated with several venous stasis type ulcers on the bilateral shins. Ulceration on lateral aspect of right shin with some purulence, possible infection; otherwise only serous drainage from other ulcerations without other signs of infection. Neurological: She is alert and oriented to person, place, and time. Skin: Skin is warm and dry. She is not diaphoretic. Bilateral lower extremity edema, 3+ to the mid thigh. Diagnostic Study Results Labs - Recent Results (from the past 12 hour(s)) URINALYSIS W/ RFLX MICROSCOPIC Collection Time: 09/20/19  8:37 PM  
Result Value Ref Range Color YELLOW Appearance CLEAR Specific gravity 1.016 1.005 - 1.030    
 pH (UA) 5.0 5.0 - 8.0 Protein 30 (A) NEG mg/dL Glucose NEGATIVE  NEG mg/dL Ketone NEGATIVE  NEG mg/dL Bilirubin NEGATIVE  NEG Blood NEGATIVE  NEG Urobilinogen 1.0 0.2 - 1.0 EU/dL Nitrites NEGATIVE  NEG Leukocyte Esterase TRACE (A) NEG URINE MICROSCOPIC ONLY Collection Time: 09/20/19  8:37 PM  
Result Value Ref Range WBC 10 to 20 0 - 4 /hpf  
 RBC NEGATIVE  0 - 5 /hpf Epithelial cells NEGATIVE  0 - 5 /lpf Bacteria FEW (A) NEG /hpf Hyaline cast 0 to 2 0 - 2 /lpf  
CBC WITH AUTOMATED DIFF Collection Time: 09/20/19  8:59 PM  
Result Value Ref Range WBC 5.8 4.6 - 13.2 K/uL  
 RBC 4.36 4.20 - 5.30 M/uL  
 HGB 11.8 (L) 12.0 - 16.0 g/dL HCT 36.6 35.0 - 45.0 % MCV 83.9 74.0 - 97.0 FL  
 MCH 27.1 24.0 - 34.0 PG  
 MCHC 32.2 31.0 - 37.0 g/dL  
 RDW 15.6 (H) 11.6 - 14.5 % PLATELET 922 820 - 825 K/uL MPV 10.2 9.2 - 11.8 FL  
 NEUTROPHILS 68 40 - 73 % LYMPHOCYTES 19 (L) 21 - 52 % MONOCYTES 11 (H) 3 - 10 % EOSINOPHILS 2 0 - 5 % BASOPHILS 0 0 - 2 %  
 ABS. NEUTROPHILS 3.9 1.8 - 8.0 K/UL  
 ABS. LYMPHOCYTES 1.1 0.9 - 3.6 K/UL  
 ABS. MONOCYTES 0.7 0.05 - 1.2 K/UL  
 ABS. EOSINOPHILS 0.1 0.0 - 0.4 K/UL  
 ABS. BASOPHILS 0.0 0.0 - 0.1 K/UL  
 DF AUTOMATED METABOLIC PANEL, COMPREHENSIVE Collection Time: 09/20/19  8:59 PM  
Result Value Ref Range Sodium 132 (L) 136 - 145 mmol/L Potassium 4.6 3.5 - 5.5 mmol/L Chloride 100 100 - 111 mmol/L  
 CO2 28 21 - 32 mmol/L Anion gap 4 3.0 - 18 mmol/L Glucose 79 74 - 99 mg/dL BUN 45 (H) 7.0 - 18 MG/DL Creatinine 1.14 0.6 - 1.3 MG/DL  
 BUN/Creatinine ratio 39 (H) 12 - 20 GFR est AA 56 (L) >60 ml/min/1.73m2 GFR est non-AA 46 (L) >60 ml/min/1.73m2 Calcium 9.7 8.5 - 10.1 MG/DL Bilirubin, total 0.7 0.2 - 1.0 MG/DL  
 ALT (SGPT) 33 13 - 56 U/L  
 AST (SGOT) 30 10 - 38 U/L Alk. phosphatase 274 (H) 45 - 117 U/L Protein, total 8.7 (H) 6.4 - 8.2 g/dL Albumin 3.0 (L) 3.4 - 5.0 g/dL Globulin 5.7 (H) 2.0 - 4.0 g/dL A-G Ratio 0.5 (L) 0.8 - 1.7 NT-PRO BNP Collection Time: 09/20/19  8:59 PM  
Result Value Ref Range NT pro-BNP 11,796 (H) 0 - 1,800 PG/ML  
TROPONIN I Collection Time: 09/20/19  8:59 PM  
Result Value Ref Range Troponin-I, QT 0.22 (H) 0.0 - 0.045 NG/ML  
MAGNESIUM Collection Time: 09/20/19  8:59 PM  
Result Value Ref Range Magnesium 2.4 1.6 - 2.6 mg/dL EKG, 12 LEAD, INITIAL Collection Time: 09/20/19 10:01 PM  
Result Value Ref Range Ventricular Rate 72 BPM  
 Atrial Rate 77 BPM  
 QRS Duration 128 ms Q-T Interval 396 ms QTC Calculation (Bezet) 433 ms Calculated R Axis 121 degrees Calculated T Axis -24 degrees Diagnosis Atrial fibrillation Right bundle branch block T wave abnormality, consider inferior ischemia Abnormal ECG When compared with ECG of 18-SEP-2019 11:03, 
Previous ECG has undetermined rhythm, needs review Radiologic Studies -  
XR CHEST SNGL V Final Result IMPRESSION:  
  
Stable cardiomegaly. Atherosclerosis. Medical Decision Making I am the first provider for this patient. I reviewed the vital signs, available nursing notes, past medical history, past surgical history, family history and social history. Vital Signs-Reviewed the patient's vital signs. Pulse Oximetry Analysis -  98% on room air (Interpretation)normal 
 
ECG: 
Interpreted by Dr. Griselda Perches Rate: 72 Rhythm: atrial fibrillation Impression: No STEMI Comparison: 18 Sept 2019, no significant change Records Reviewed: Nursing Notes, Old Medical Records, Previous electrocardiograms, Previous Radiology Studies and Previous Laboratory Studies (Time of Review: 5:27 PM) ED Course: Progress Notes, Reevaluation, and Consults: 
6:12 PM 
Spoke with Dr. Leonarda Tse, on-call physician for [facility], regarding patient. He will talk with Dr. Mainor Martini regarding his reasoning for admission on IV Lasix and call back.  
 
6:19 PM 
 Patient wound care nurse to come in and look at patient's bilateral lower extremity wounds/pressure ulcers. 7:02 PM 
Nurse Grace Vitale, wound care specialist, saw the patient and states that patient has classic lymphedema with lymphedema ulcers. Says only the ulcer on the lateral lower right leg appears to be infected, recommended medi-honey and antibiotics, but says other ulcers can be brought with Xeroform. She will place an Unna boot on each lower extremity to improve venous return. 9:36 PM 
Elevated initial troponin 0.22, will trend and obtain an initial EKG. UA with trace leuk site esterase, 10-20 white blood cells, few bacteria, sending for urinary tract infection. Will treat with Macrobid as patient can tolerate p.o. 
 
11:00 PM 
Consulted Dr. Marixa Triplett, he will admit patient to telemetry. Provider Notes (Medical Decision Making): MDM Number of Diagnoses or Management Options Diagnosis management comments: 68-year-old female with past medical history of pulmonary hypertension, papillary thyroid carcinoma, who comes status post septal ablation 3 months ago, aortic stenosis, aortic regurg, mitral regurg, chronic diastolic CHF, A. fib on Xarelto who presents to the ED with bilateral lower extremity edema with skin ulcerations from her cardiologist for admission for CHF exacerbation and IV Lasix. Patient states that she feels well aside from the swelling in her lower extremities which sometimes give her pain, otherwise she has no chest pain, chest tightness, shortness of breath, worsening orthopnea, nausea, vomiting or other symptoms. Patient's vital signs are within normal limits and stable.   Exam significant for systolic murmurs consistent with aortic stenosis and mitral regurg, clear lung sounds bilaterally, 3+ bilateral lower extremity edema to the mid thigh, bilateral ulcerations on her lower legs on the shins, one ulceration on the lateral aspect of right shin concerning for possible infection. Patient's labs were unremarkable aside from a mildly elevated troponin 0.22 (will trend) and elevated BNP 11,796 stent was CHF. UA significant for trace leukocyte esterase, 10-20 white blood cells, few bacteria consistent with UTI, started Macrobid. Patient will be admitted for CHF exacerbation and IV Lasix, as well as ulcers on b/l lower extremities and UTI. For Hospitalized Patients: 
 
1. Hospitalization Decision Time: The decision to hospitalize the patient was made by Dr. Clif Danielle at 10:30PM on 9/20/2019 2. Aspirin: Aspirin was not given because the patient did not present with a stroke at the time of their Emergency Department evaluation Diagnosis Clinical Impression: 1. Chronic congestive heart failure, unspecified heart failure type ([facility] 2. Urinary tract infection without hematuria, site unspecified 3. Venous stasis ulcers of both lower extremities ([facility] 4. Chronic atrial fibrillation (HCC) Disposition: Admitted to Dr. Clif Danielle on telemetry. Patient's Medications Start Taking No medications on file Continue Taking CALCIUM-CHOLECALCIFEROL, D3, (CALCIUM 600 WITH VITAMIN D3) 600 MG(1,500MG) -400 UNIT CHEW    Take  by mouth. CETIRIZINE HCL (ZYRTEC PO)    Take  by mouth. CHOLECALCIFEROL (VITAMIN D3) 1,000 UNIT CAP    Take  by mouth daily. COMP. [address] [place] [address] Package by Does Not Apply route daily. CYANOCOBALAMIN (VITAMIN B-12) 500 MCG TABLET    Take 500 mcg by mouth daily. DIGITEK 125 MCG TABLET    take 1 tablet by mouth once daily FOLIC ACID (FOLVITE) 1 MG TABLET    Take  by mouth daily. FUROSEMIDE (LASIX) 40 MG TABLET    take 1 tablet by mouth once daily HYDROCHLOROTHIAZIDE (HYDRODIURIL) 25 MG TABLET    Take 25 mg by mouth daily. LISINOPRIL (PRINIVIL, ZESTRIL) 20 MG TABLET    Take  by mouth daily. LOSARTAN (COZAAR) 100 MG TABLET    Take 100 mg by mouth daily. METOLAZONE (ZAROXOLYN) 2.5 MG TABLET    Take 2 Tabs by mouth as needed (5 mg x 3 days then 2.5 mg qd). METOPROLOL SUCCINATE (TOPROL-XL) 100 MG TABLET    Take 1 Tab by mouth two (2) times a day. MONTELUKAST (SINGULAIR) 10 MG TABLET    Take 10 mg by mouth daily. ONDANSETRON HCL (ZOFRAN) 4 MG TABLET    Take 1 Tab by mouth every eight (8) hours as needed for Nausea. SIMVASTATIN (ZOCOR) 80 MG TABLET    TAKE 1 TABLET NIGHTLY TELMISARTAN (MICARDIS) 40 MG TABLET    Take 40 mg by mouth daily. TRAMADOL (ULTRAM) 50 MG TABLET    Take 1 Tab by mouth every six (6) hours as needed for Pain for up to 3 days. Max Daily Amount: 200 mg. WARFARIN (COUMADIN) 1 MG TABLET    Take 1 Tab by mouth daily. WARFARIN (COUMADIN) 5 MG TABLET    take 1 tablet by mouth once daily These Medications have changed No medications on file Stop Taking No medications on file  
 
_______________________________ Resident [facility] Sera Jara MD acting as a resident for and in the presence of Promise Peoples MD     
September 20, 2019 at 5:27 PM 
    
_______________________________

## 2019-09-20 NOTE — ED TRIAGE NOTES
Patient was sent by Dr. Chanel Farrell sent patient here for fluid overload and for possible admission. Denies CP or shortness of breath.

## 2019-09-21 LAB
ANION GAP SERPL CALC-SCNC: 7 MMOL/L (ref 3–18)
BUN SERPL-MCNC: 42 MG/DL (ref 7–18)
BUN/CREAT SERPL: 32 (ref 12–20)
CALCIUM SERPL-MCNC: 9.1 MG/DL (ref 8.5–10.1)
CHLORIDE SERPL-SCNC: 98 MMOL/L (ref 100–111)
CK MB CFR SERPL CALC: 3.5 % (ref 0–4)
CK MB CFR SERPL CALC: 4.6 % (ref 0–4)
CK MB SERPL-MCNC: 1.1 NG/ML (ref 5–25)
CK MB SERPL-MCNC: 1.3 NG/ML (ref 5–25)
CK SERPL-CCNC: 28 U/L (ref 26–192)
CK SERPL-CCNC: 31 U/L (ref 26–192)
CO2 SERPL-SCNC: 27 MMOL/L (ref 21–32)
CREAT SERPL-MCNC: 1.33 MG/DL (ref 0.6–1.3)
DIGOXIN SERPL-MCNC: 1.2 NG/ML (ref 0.9–2)
GLUCOSE SERPL-MCNC: 201 MG/DL (ref 74–99)
INR PPP: 4.1 (ref 0.8–1.2)
POTASSIUM SERPL-SCNC: 4.8 MMOL/L (ref 3.5–5.5)
PROTHROMBIN TIME: 40.2 SEC (ref 11.5–15.2)
SODIUM SERPL-SCNC: 132 MMOL/L (ref 136–145)
TROPONIN I SERPL-MCNC: 0.2 NG/ML (ref 0–0.04)
TROPONIN I SERPL-MCNC: 0.2 NG/ML (ref 0–0.04)

## 2019-09-21 PROCEDURE — 74011000258 HC RX REV CODE- 258: Performed by: INTERNAL MEDICINE

## 2019-09-21 PROCEDURE — 87040 BLOOD CULTURE FOR BACTERIA: CPT

## 2019-09-21 PROCEDURE — 80162 ASSAY OF DIGOXIN TOTAL: CPT

## 2019-09-21 PROCEDURE — 74011250636 HC RX REV CODE- 250/636: Performed by: FAMILY MEDICINE

## 2019-09-21 PROCEDURE — 80048 BASIC METABOLIC PNL TOTAL CA: CPT

## 2019-09-21 PROCEDURE — 85610 PROTHROMBIN TIME: CPT

## 2019-09-21 PROCEDURE — 74011250636 HC RX REV CODE- 250/636: Performed by: INTERNAL MEDICINE

## 2019-09-21 PROCEDURE — 82550 ASSAY OF CK (CPK): CPT

## 2019-09-21 PROCEDURE — 65660000000 HC RM CCU STEPDOWN

## 2019-09-21 PROCEDURE — 74011250637 HC RX REV CODE- 250/637: Performed by: FAMILY MEDICINE

## 2019-09-21 PROCEDURE — 36415 COLL VENOUS BLD VENIPUNCTURE: CPT

## 2019-09-21 PROCEDURE — 74011250637 HC RX REV CODE- 250/637: Performed by: STUDENT IN AN ORGANIZED HEALTH CARE EDUCATION/TRAINING PROGRAM

## 2019-09-21 RX ORDER — FOLIC ACID 1 MG/1
1 TABLET ORAL DAILY
Status: DISCONTINUED | OUTPATIENT
Start: 2019-09-21 | End: 2019-09-25 | Stop reason: HOSPADM

## 2019-09-21 RX ORDER — MONTELUKAST SODIUM 10 MG/1
10 TABLET ORAL DAILY
Status: DISCONTINUED | OUTPATIENT
Start: 2019-09-21 | End: 2019-09-25 | Stop reason: HOSPADM

## 2019-09-21 RX ORDER — FAMOTIDINE 20 MG/1
20 TABLET, FILM COATED ORAL DAILY
Status: DISCONTINUED | OUTPATIENT
Start: 2019-09-21 | End: 2019-09-25 | Stop reason: HOSPADM

## 2019-09-21 RX ORDER — FUROSEMIDE 10 MG/ML
40 INJECTION INTRAMUSCULAR; INTRAVENOUS 2 TIMES DAILY
Status: DISCONTINUED | OUTPATIENT
Start: 2019-09-21 | End: 2019-09-21

## 2019-09-21 RX ORDER — SIMVASTATIN 40 MG/1
80 TABLET, FILM COATED ORAL
Status: DISCONTINUED | OUTPATIENT
Start: 2019-09-21 | End: 2019-09-25 | Stop reason: HOSPADM

## 2019-09-21 RX ORDER — LANOLIN ALCOHOL/MO/W.PET/CERES
500 CREAM (GRAM) TOPICAL DAILY
Status: DISCONTINUED | OUTPATIENT
Start: 2019-09-21 | End: 2019-09-25 | Stop reason: HOSPADM

## 2019-09-21 RX ORDER — METOPROLOL SUCCINATE 100 MG/1
100 TABLET, EXTENDED RELEASE ORAL 2 TIMES DAILY
Status: DISCONTINUED | OUTPATIENT
Start: 2019-09-21 | End: 2019-09-25 | Stop reason: HOSPADM

## 2019-09-21 RX ADMIN — FOLIC ACID 1 MG: 1 TABLET ORAL at 09:17

## 2019-09-21 RX ADMIN — CEPHALEXIN 500 MG: 250 CAPSULE ORAL at 22:44

## 2019-09-21 RX ADMIN — MONTELUKAST 10 MG: 10 TABLET, FILM COATED ORAL at 09:17

## 2019-09-21 RX ADMIN — NITROFURANTOIN MACROCRYSTALS 100 MG: 50 CAPSULE ORAL at 22:43

## 2019-09-21 RX ADMIN — CEPHALEXIN 500 MG: 250 CAPSULE ORAL at 05:53

## 2019-09-21 RX ADMIN — NITROFURANTOIN MACROCRYSTALS 100 MG: 50 CAPSULE ORAL at 09:17

## 2019-09-21 RX ADMIN — METOPROLOL SUCCINATE 100 MG: 100 TABLET, EXTENDED RELEASE ORAL at 09:17

## 2019-09-21 RX ADMIN — CEPHALEXIN 500 MG: 250 CAPSULE ORAL at 13:00

## 2019-09-21 RX ADMIN — SIMVASTATIN 80 MG: 40 TABLET, FILM COATED ORAL at 22:44

## 2019-09-21 RX ADMIN — FUROSEMIDE 5 MG/HR: 10 INJECTION, SOLUTION INTRAMUSCULAR; INTRAVENOUS at 17:46

## 2019-09-21 RX ADMIN — FUROSEMIDE 40 MG: 10 INJECTION, SOLUTION INTRAVENOUS at 09:18

## 2019-09-21 RX ADMIN — CYANOCOBALAMIN TAB 500 MCG 500 MCG: 500 TAB at 09:17

## 2019-09-21 RX ADMIN — FAMOTIDINE 20 MG: 20 TABLET, FILM COATED ORAL at 17:34

## 2019-09-21 RX ADMIN — METOPROLOL SUCCINATE 100 MG: 100 TABLET, EXTENDED RELEASE ORAL at 17:34

## 2019-09-21 NOTE — PROGRESS NOTES
Bedside and Verbal shift change report given to Carroll Irvin (oncoming nurse) by Matti Hall (offgoing nurse). Report included the following information SBAR, Intake/Output, MAR, Recent Results and Cardiac Rhythm Paced.

## 2019-09-21 NOTE — CONSULTS
[facility], [address], [address] [place] Phone:[phone] Fax: [phone] Cardiology Consult Note Admission Date & Time 9/20/2019  2:53 PM 
 
Requesting Physician: Dr. Sushila Aldana Reason for Consult: Congestive heart failure HPI: Name:     Tadeo Villagran Age:         70 y.o. Gender:   female Ethnicity:   Ms. Daugherty is followed by Dr. Geo Staton . Ms. Daugherty c/o 2 pillow orthopnea and progressive lower extremity edema, onset 3 to 4 weeks. She denies dyspnea on exertion, chest pain, subjective palpitations or recent syncope. She reportedly lives with her  cooks, cleans and vacuums. The clinical work up including EKG showed Atrial fibrillation HR 72 RBBB; CXR: The cardiac silhouette is moderately enlarged. There is tortuosity of the aorta with calcified plaque. Enlargement of the main pulmonary artery. No change in the dual leads of the left upper chest cardiac pacemaker. The lungs are clear. Pulmonary vascularity is normal. The costophrenic angles are sharply defined. No bony abnormalities are seen; Laboratory: BUN 45 Cr 1.14 K 4.6 CK 28 Troponin I 0.22 pro BNP 11,796, Hgb 11.8 Hct 37 Plt 225 WBC 5.8.  
  
 
 
 
Current Medications: 
Current Facility-Administered Medications Medication Dose Route Frequency  folic acid (FOLVITE) tablet 1 mg  1 mg Oral DAILY  cyanocobalamin (VITAMIN B12) tablet 500 mcg  500 mcg Oral DAILY  metoprolol succinate (TOPROL-XL) tablet 100 mg  100 mg Oral BID  montelukast (SINGULAIR) tablet 10 mg  10 mg Oral DAILY  simvastatin (ZOCOR) tablet 80 mg  80 mg Oral QHS  furosemide (LASIX) injection 40 mg  40 mg IntraVENous BID  cephALEXin (KEFLEX) capsule 500 mg  500 mg Oral Q8H  
 nitrofurantoin (MACRODANTIN) capsule 100 mg  100 mg Oral BID Allergies: 
No Known Allergies Past History: 
Past Medical History:  
Diagnosis Date  Arthritis  Heart murmur  History of seasonal allergies  HTN (hypertension)  Hypercholesteremia Past Surgical History:  
Procedure Laterality Date  HX KNEE ARTHROSCOPY    
 left and right  HX ORTHOPAEDIC    
 right ankle Family History Problem Relation Age of Onset  Cancer Other  Heart Disease Other  Cancer Mother  Heart Disease Mother Social History Socioeconomic History  Marital status:  Spouse name: Not on file  Number of children: Not on file  Years of education: Not on file  Highest education level: Not on file Tobacco Use  Smoking status: Never Smoker  Smokeless tobacco: Never Used Substance and Sexual Activity  Alcohol use: No  
 Drug use: No  
Social History Narrative Pt has a dog Born in [facility] and [place] [facility] [place] Review of Symptoms:  
  
Constitutional: Negative fever, chills. Head: Negative headache. Eyes: Negative blurred vision, diplopia. ENT: Negative epistaxis. Respiratory: Cough. Cardiovascular: See HPI. Gastrointestinal: Negative hematochezia, hematemesis. Genitourinary: Negative hematuria. Musculoskeletal: Negative for joint pain or muscle pain. Neurological: Negative focal weakness. Skin: Rash upper extremity. Vital Signs:   
Visit Vitals /78 (BP 1 Location: Left arm, BP Patient Position: At rest) Pulse 73 Temp 97.7 °F (36.5 °C) Resp 17 Ht 5' 2\" (1.575 m) Wt 70.7 kg (155 lb 12.8 oz) SpO2 95% Breastfeeding? No  
BMI 28.50 kg/m² Physical Assessment: 
 
General: No acute distress. Skin: Warm and dry. Ecchymosis. HEENT: Head is normocephalic and atraumatic. Neck: Supple. No thyromegaly or jugular venous distension. Chest: Symmetric Back: No presacral edema. Lungs: Clear bilaterally. Heart:  S1 and S2 are normal. III/SM. Increased with Valsava. Abdomen: Soft. Genitourinary: Deferred. Extremities: 4+ LE edema. Thigh edema. Wrapped below knee. Neurologic: Cranial nerves II-XII are grossly intact. Musculoskeletal: No obvious muscle or joint deformities. Psychiatric: Pt. Is awake, alert and oriented X3. Affect seems normal. 
 
Laboratory Data: 
 
Labs: Results:  
   
Chemistry Recent Labs  
  09/20/19 2059 GLU 79 * K 4.6  CO2 28 BUN 45* CREA 1.14  
CA 9.7 MG 2.4 AGAP 4  
BUCR 39* * TP 8.7* ALB 3.0*  
GLOB 5.7* AGRAT 0.5* CBC w/Diff Recent Labs  
  09/20/19 2059 WBC 5.8  
RBC 4.36  
HGB 11.8* HCT 36.6  GRANS 68  
LYMPH 19* EOS 2 Cardiac Enzymes Recent Labs  
  09/21/19 
0503 CPK 28 CKND1 4.6* Coagulation Recent Labs  
  09/21/19 
0503 PTP 40.2* INR 4.1* Lipid Panel Lab Results Component Value Date/Time Cholesterol, total 142 10/12/2017 10:40 AM  
 HDL Cholesterol 43 10/12/2017 10:40 AM  
 LDL, calculated 82 10/12/2017 10:40 AM  
 VLDL, calculated 14.6 11/20/2009 10:37 AM  
 Triglyceride 86 10/12/2017 10:40 AM  
 CHOL/HDL Ratio 3.9 11/20/2009 10:37 AM  
  
BNP No results for input(s): BNPP in the last 72 hours. Liver Enzymes Recent Labs  
  09/20/19 2059  
TP 8.7* ALB 3.0*  
* SGOT 30 Digoxin Thyroid Studies Lab Results Component Value Date/Time TSH 3.67 02/05/2018 10:14 AM  
    
 
 
Impressions:  
Acute on chronic diastolic heart failure Severe lower extremity edema Hypertrophic obstructive cardiomyopathy · S/p Septal Ablation · S/p permanent pacemaker Atrial fibrillation Mitral regurgitation Plan:  
Telemetry Metoprolol  mg bid/Simvastatin 80 mg qhs 
D/C Furosemide 40 mg IV bid Furosemide 5 mg/hr Daily BMP, Mg 
TSH pending Echocardiogram 
Leg elevation Thank you for calling. Julian Moore, [address] [place] [address], [address] [place] [address] Road Office [phone] Pager 9/21/2019, 12:39 PM

## 2019-09-21 NOTE — H&P
History & Physical 
 
Patient: Kiara Ames MRN: [id number]  CSN: [id number] YOB: 1940  Age: 70 y.o. Sex: female DOA: 9/20/2019 Chief Complaint:  
Chief Complaint Patient presents with  Swelling HPI:  
 
Kiara Ames is a 70 y.o.  female who has h/o hypertension cardiomyopathy thyroid cancer PAF venous stasis ulcer . 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 Pt was sent to ER wednesday  By Dr Jenifer Waters due to increase swelling lower extremities and orthopnea for 2-3 weeks  , Pt was seen Janell MARTINEZ referred to wound clinic and started on keflex for venous  stasis  Ulcer. Pt was sent again yesterday to ER by Dr Keturah Montes for admission due to worsening heart failure. Pt has h/o septal ablation for HOCM Pt reported her lower extremities swelling getting worse for the alst 2 weeks  And venous stasis also has been getting worse Pt in the ER started on Macrobid due to UTI and admitted for further evaluation. Pt had EKG CXR Given one dose of lasix . Troponin I 0.22 EKG Atrial fibrillation Right bundle branch block T wave abnormality, consider inferior ischemia CXR Stable cardiomegaly. Atherosclerosis. Past Medical History:  
Diagnosis Date  Arthritis  Heart murmur  History of seasonal allergies  HTN (hypertension)  Hypercholesteremia Past Surgical History:  
Procedure Laterality Date  HX KNEE ARTHROSCOPY    
 left and right  HX ORTHOPAEDIC    
 right ankle Family History Problem Relation Age of Onset  Cancer Other  Heart Disease Other  Cancer Mother  Heart Disease Mother Social History Socioeconomic History  Marital status:  Spouse name: Not on file  Number of children: Not on file  Years of education: Not on file  Highest education level: Not on file Tobacco Use  Smoking status: Never Smoker  Smokeless tobacco: Never Used Substance and Sexual Activity  Alcohol use: No  
 Drug use: No  
Social History Narrative Pt has a dog Born in [facility] and moved to [place] years ago Prior to Admission medications Medication Sig Start Date End Date Taking? Authorizing Provider  
metOLazone (ZAROXOLYN) 2.5 mg tablet Take 2 Tabs by mouth as needed (5 mg x 3 days then 2.5 mg qd). 9/19/19   Yesenia Herring MD  
traMADol Pushpa [place]) 50 mg tablet Take 1 Tab by mouth every six (6) hours as needed for Pain for up to 3 days. Max Daily Amount: 200 mg. 9/18/19 9/21/19  JUAN Bryan  
telmisartan (MICARDIS) 40 mg tablet Take 40 mg by mouth daily. Provider, Historical  
warfarin (COUMADIN) 5 mg tablet take 1 tablet by mouth once daily 9/5/19   Linda Salazar MD  
DIGITEK 125 mcg tablet take 1 tablet by mouth once daily 8/26/19   Yesenia Herring MD  
warfarin (COUMADIN) 1 mg tablet Take 1 Tab by mouth daily. 5/23/19   Linda Salazar MD  
ondansetron hcl (ZOFRAN) 4 mg tablet Take 1 Tab by mouth every eight (8) hours as needed for Nausea. 5/16/19   Kala Schaefer PA-C  
lisinopril (PRINIVIL, ZESTRIL) 20 mg tablet Take  by mouth daily. Provider, Historical  
Calcium-Cholecalciferol, D3, (CALCIUM 600 WITH VITAMIN D3) 600 mg(1,500mg) -400 unit chew Take  by mouth. Provider, Historical  
simvastatin (ZOCOR) 80 mg tablet TAKE 1 TABLET NIGHTLY 9/26/18   Yesenia Herring MD  
hydroCHLOROthiazide (HYDRODIURIL) 25 mg tablet Take 25 mg by mouth daily. Other, MD Chinyere  
cholecalciferol (VITAMIN D3) 1,000 unit cap Take  by mouth daily. Chinyere Astorga MD  
furosemide (LASIX) 40 mg tablet take 1 tablet by mouth once daily Patient taking differently: take 1 tablet by mouth as needed 4/7/18   Yesenia Herring MD  
 Comp.Stocking,Thigh,Reg,Medium misc 1 Package by Does Not Apply route daily. 12/12/17   Vera Quan MD  
metoprolol succinate (TOPROL-XL) 100 mg tablet Take 1 Tab by mouth two (2) times a day. Patient taking differently: Take 75 mg by mouth two (2) times a day. 5/19/17   Vera Quan MD  
losartan (COZAAR) 100 mg tablet Take 100 mg by mouth daily. Provider, Historical  
montelukast (SINGULAIR) 10 mg tablet Take 10 mg by mouth daily. Provider, Historical  
cyanocobalamin (VITAMIN B-12) 500 mcg tablet Take 500 mcg by mouth daily. Provider, Historical  
folic acid (FOLVITE) 1 mg tablet Take  by mouth daily. Provider, Historical  
CETIRIZINE HCL (ZYRTEC PO) Take  by mouth. Other, MD Chinyere  
 
 
No Known Allergies Review of Systems GENERAL: Patient alert, awake and oriented times 3, able to communicate full sentences and not in distress. HEENT: No change in vision, no earache, tinnitus, sore throat or sinus congestion. NECK: No pain or stiffness. CARDIOVASCULAR: No chest pain or pressure. positive  palpitations. PULMONARY: positive shortness of breath,  No cough or wheeze. GASTROINTESTINAL: No abdominal pain, nausea, vomiting or diarrhea, melena or       bright red blood per rectum. GENITOURINARY: No urinary frequency, urgency, hesitancy or dysuria. MUSCULOSKELETAL:  Chronic arthritis pain DERMATOLOGIC:  Venous stasis ulcer with swelling lower extremities ENDOCRINE: No polyuria, polydipsia, no heat or cold intolerance. No recent change in    weight. HEMATOLOGICAL: No anemia or easy bruising or bleeding. NEUROLOGIC: No headache, seizures, numbness, tingling or weakness. PSYCHIATRIC: No depression, anxiety, mood disorder, no loss of interest in normal       activity or change in sleep pattern. Physical Exam:  
 
Physical Exam: 
Visit Vitals /72 (BP 1 Location: Left arm, BP Patient Position: At rest) Pulse 83 Temp 97.1 °F (36.2 °C) Resp 18 Ht 5' 2\" (1.575 m) Wt 70.7 kg (155 lb 12.8 oz) SpO2 94% Breastfeeding? No  
BMI 28.50 kg/m² O2 Device: Room air Temp (24hrs), Av.8 °F (36.6 °C), Min:97.1 °F (36.2 °C), Max:98.2 °F (36.8 °C) 
  1901 -  0700 In: -  
Out: 600 [Urine:600]   No intake/output data recorded. General:  Alert, no distress, appears stated age. Head:  Normocephalic, without obvious abnormality, atraumatic. Eyes:  Conjunctivae/corneas clear. PERRL, EOMs intact. Nose: Nares normal. No drainage or sinus tenderness. Throat: Lips, mucosa, and tongue normal.   
Neck: Supple, symmetrical, trachea midline, no adenopathy, thyroid: no enlargement/tenderness/nodules, no carotid bruit and no JVD. Back:   ROM normal. No CVA tenderness. Lungs:   Clear to auscultation bilaterally. Chest wall:  No tenderness or deformity. Heart:  Regular rate and rhythm, S1, S2 normal, systolic  Murmur aortic area, click, rub or gallop. Abdomen: Soft, non-tender. Bowel sounds normal. No masses,  No organomegaly. Extremities: Extremities normal,  Ace bandage B/L Pulses: 2+ and symmetric all extremities. Skin: Skin color, texture, turgor normal.  Venous stasis ulcer b/L Leg Neurologic: CNII-XII intact. No focal motor or sensory deficit. Labs Reviewed: All lab results for the last 24 hours reviewed. CXR and EKG Procedures/imaging: see electronic medical records for all procedures/Xrays and details which were not copied into this note but were reviewed prior to creation of Plan Assessment/Plan Active Problems: 
  CHF (congestive heart failure) ([address] (2019) UTI (urinary tract infection) (2019) Plan Acute systolic CHF on top chronic diastolic CHF Pt will be admitted to telemetry Lasix IV fluid cardiology to f/u 
pt and INR  Recheck this morning 4.1 Pt not sure about her current dose of coumadin will check Dr Aida Nguyen most recent dose Wound care consult Repeat cardiac enzymes Check TSH, Not clear if pt still on digoxin , check digoxin level 1.2 Hypertension Pt used to be on Cozaar and HCTZ Hold med and monitor Bp Continue toprol XL with holding parameter Hyperlipidemia  
zocor 80 mg QHS 
F/u liver function UTI 
continue Macrobid F/u urine culture Venous stasis ulcer Keflex 500 mg q8h Wound care consult Cbc, cmp, mag in AM, pt and INR daily DVT/GI Prophylaxis: Coumadin and H2B/PPI Time for admission for this pt included review record , discussion with ER  
prder enetry discussion with pt and nursing staff documentation more than 65 minutes Barrett Purcell MD 
9/21/2019 16:15

## 2019-09-22 LAB
ALBUMIN SERPL-MCNC: 2.7 G/DL (ref 3.4–5)
ALBUMIN/GLOB SERPL: 0.5 {RATIO} (ref 0.8–1.7)
ALP SERPL-CCNC: 264 U/L (ref 45–117)
ALT SERPL-CCNC: 28 U/L (ref 13–56)
ANION GAP SERPL CALC-SCNC: 6 MMOL/L (ref 3–18)
AST SERPL-CCNC: 28 U/L (ref 10–38)
ATRIAL RATE: 77 BPM
BACTERIA SPEC CULT: ABNORMAL
BASOPHILS # BLD: 0 K/UL (ref 0–0.1)
BASOPHILS NFR BLD: 0 % (ref 0–2)
BILIRUB SERPL-MCNC: 1.1 MG/DL (ref 0.2–1)
BUN SERPL-MCNC: 36 MG/DL (ref 7–18)
BUN/CREAT SERPL: 29 (ref 12–20)
CALCIUM SERPL-MCNC: 9.1 MG/DL (ref 8.5–10.1)
CALCULATED R AXIS, ECG10: 121 DEGREES
CALCULATED T AXIS, ECG11: -24 DEGREES
CHLORIDE SERPL-SCNC: 95 MMOL/L (ref 100–111)
CO2 SERPL-SCNC: 31 MMOL/L (ref 21–32)
CREAT SERPL-MCNC: 1.24 MG/DL (ref 0.6–1.3)
DIAGNOSIS, 93000: NORMAL
DIFFERENTIAL METHOD BLD: ABNORMAL
EOSINOPHIL # BLD: 0.1 K/UL (ref 0–0.4)
EOSINOPHIL NFR BLD: 2 % (ref 0–5)
ERYTHROCYTE [DISTWIDTH] IN BLOOD BY AUTOMATED COUNT: 15.4 % (ref 11.6–14.5)
GLOBULIN SER CALC-MCNC: 5.4 G/DL (ref 2–4)
GLUCOSE SERPL-MCNC: 98 MG/DL (ref 74–99)
HCT VFR BLD AUTO: 35.5 % (ref 35–45)
HGB BLD-MCNC: 11.4 G/DL (ref 12–16)
INR PPP: 3.4 (ref 0.8–1.2)
LYMPHOCYTES # BLD: 1 K/UL (ref 0.9–3.6)
LYMPHOCYTES NFR BLD: 20 % (ref 21–52)
MAGNESIUM SERPL-MCNC: 1.9 MG/DL (ref 1.6–2.6)
MCH RBC QN AUTO: 26.7 PG (ref 24–34)
MCHC RBC AUTO-ENTMCNC: 32.1 G/DL (ref 31–37)
MCV RBC AUTO: 83.1 FL (ref 74–97)
MONOCYTES # BLD: 0.4 K/UL (ref 0.05–1.2)
MONOCYTES NFR BLD: 8 % (ref 3–10)
NEUTS SEG # BLD: 3.6 K/UL (ref 1.8–8)
NEUTS SEG NFR BLD: 70 % (ref 40–73)
PLATELET # BLD AUTO: 243 K/UL (ref 135–420)
PMV BLD AUTO: 10 FL (ref 9.2–11.8)
POTASSIUM SERPL-SCNC: 3.5 MMOL/L (ref 3.5–5.5)
PROT SERPL-MCNC: 8.1 G/DL (ref 6.4–8.2)
PROTHROMBIN TIME: 34.9 SEC (ref 11.5–15.2)
Q-T INTERVAL, ECG07: 396 MS
QRS DURATION, ECG06: 128 MS
QTC CALCULATION (BEZET), ECG08: 433 MS
RBC # BLD AUTO: 4.27 M/UL (ref 4.2–5.3)
SERVICE CMNT-IMP: ABNORMAL
SODIUM SERPL-SCNC: 132 MMOL/L (ref 136–145)
TSH SERPL DL<=0.05 MIU/L-ACNC: 2.32 UIU/ML (ref 0.36–3.74)
VENTRICULAR RATE, ECG03: 72 BPM
WBC # BLD AUTO: 5.1 K/UL (ref 4.6–13.2)

## 2019-09-22 PROCEDURE — 85610 PROTHROMBIN TIME: CPT

## 2019-09-22 PROCEDURE — 83735 ASSAY OF MAGNESIUM: CPT

## 2019-09-22 PROCEDURE — 85025 COMPLETE CBC W/AUTO DIFF WBC: CPT

## 2019-09-22 PROCEDURE — 74011250637 HC RX REV CODE- 250/637: Performed by: INTERNAL MEDICINE

## 2019-09-22 PROCEDURE — 36415 COLL VENOUS BLD VENIPUNCTURE: CPT

## 2019-09-22 PROCEDURE — 74011250637 HC RX REV CODE- 250/637: Performed by: STUDENT IN AN ORGANIZED HEALTH CARE EDUCATION/TRAINING PROGRAM

## 2019-09-22 PROCEDURE — 65660000000 HC RM CCU STEPDOWN

## 2019-09-22 PROCEDURE — 84443 ASSAY THYROID STIM HORMONE: CPT

## 2019-09-22 PROCEDURE — 80053 COMPREHEN METABOLIC PANEL: CPT

## 2019-09-22 PROCEDURE — 74011250637 HC RX REV CODE- 250/637: Performed by: FAMILY MEDICINE

## 2019-09-22 RX ORDER — GABAPENTIN 100 MG/1
100 CAPSULE ORAL
Status: DISCONTINUED | OUTPATIENT
Start: 2019-09-22 | End: 2019-09-25 | Stop reason: HOSPADM

## 2019-09-22 RX ADMIN — CYANOCOBALAMIN TAB 500 MCG 500 MCG: 500 TAB at 08:57

## 2019-09-22 RX ADMIN — METOPROLOL SUCCINATE 100 MG: 100 TABLET, EXTENDED RELEASE ORAL at 17:36

## 2019-09-22 RX ADMIN — FOLIC ACID 1 MG: 1 TABLET ORAL at 08:57

## 2019-09-22 RX ADMIN — SIMVASTATIN 80 MG: 40 TABLET, FILM COATED ORAL at 21:12

## 2019-09-22 RX ADMIN — GABAPENTIN 100 MG: 100 CAPSULE ORAL at 21:12

## 2019-09-22 RX ADMIN — FAMOTIDINE 20 MG: 20 TABLET, FILM COATED ORAL at 08:57

## 2019-09-22 RX ADMIN — METOPROLOL SUCCINATE 100 MG: 100 TABLET, EXTENDED RELEASE ORAL at 08:57

## 2019-09-22 RX ADMIN — CEPHALEXIN 500 MG: 250 CAPSULE ORAL at 21:13

## 2019-09-22 RX ADMIN — MONTELUKAST 10 MG: 10 TABLET, FILM COATED ORAL at 08:57

## 2019-09-22 RX ADMIN — NITROFURANTOIN MACROCRYSTALS 100 MG: 50 CAPSULE ORAL at 21:12

## 2019-09-22 RX ADMIN — CEPHALEXIN 500 MG: 250 CAPSULE ORAL at 07:11

## 2019-09-22 RX ADMIN — NITROFURANTOIN MACROCRYSTALS 100 MG: 50 CAPSULE ORAL at 08:57

## 2019-09-22 RX ADMIN — CEPHALEXIN 500 MG: 250 CAPSULE ORAL at 15:49

## 2019-09-22 NOTE — PROGRESS NOTES
Bedside and Verbal shift change report given to Wanda Story RN (oncoming nurse) by Daryle Quan, RN 
 (offgoing nurse). Report included the following information SBAR, Kardex, Intake/Output, MAR, Recent Results, Med Rec Status and Cardiac Rhythm A. FIB.

## 2019-09-22 NOTE — PROGRESS NOTES
Progress Note Patient: Inessa Kilpatrick MRN: [id number]  CSN: [id number] YOB: 1940  Age: 70 y.o. Sex: female DOA: 9/20/2019 LOS:  LOS: 2 days Subjective:  
Pt is feeling better no chest pain NO SOB dressing lower extremities clean . Didn't have wound care consult yet Pt on Lasix drip with good response  Cr slightly off had good urine output 5500 Chief Complaint:  
Chief Complaint Patient presents with  Swelling Review of systems General: No fevers or chills. Cardiovascular: No chest pain or pressure. No palpitations. Pulmonary: No shortness of breath, cough or wheeze. Gastrointestinal: No abdominal pain, nausea, vomiting or diarrhea. Genitourinary: positive  urinary frequency, urgency, hesitancy or dysuria. Musculoskeletal: No joint or muscle pain, no back pain, no recent trauma. Neurologic: No headache no seizure Objective:  
 
Physical Exam: 
Visit Vitals /65 (BP 1 Location: Left arm) Pulse 75 Temp 98.3 °F (36.8 °C) Resp 18 Ht 5' 2\" (1.575 m) Wt 70.2 kg (154 lb 12.8 oz) SpO2 97% Breastfeeding? No  
BMI 28.31 kg/m² General:         Alert, no acute distress   
HEENT: NC, Atraumatic. Lungs: CTA Bilaterally. No Wheezing/Rhonchi/Rales. Heart:  Regular  rhythm,  systolic  Murmur, aortic area No Rubs, No Gallops Abdomen: Soft, Non distended, Non tender.   
Extremities:  venous stasis ulcer dressing clean Psych:   Not anxious or agitated. Neurologic:  CN 2-12 grossly intact, Alert and oriented X 3. No acute neurological       
                         Deficits, Intake and Output: 
Current Shift:  09/22 0701 - 09/22 1900 In: -  
Out: [place] Last three shifts:  09/20 1901 - 09/22 0700 In: 66.2 [I.V.:66.2] Out: 5500 [Urine:5500] Labs: Results:  
   
Chemistry Recent Labs  
  09/22/19 
0677 09/21/19 
1321 09/20/19 
2059 GLU 98 201* 79 * 132* 132* K 3.5 4.8 4.6 CL 95* 98* 100 CO2 31 27 28 BUN 36* 42* 45* CREA 1.24 1.33* 1.14  
CA 9.1 9.1 9.7 AGAP 6 7 4 BUCR 29* 32* 39* *  --  274* TP 8.1  --  8.7* ALB 2.7*  --  3.0*  
GLOB 5.4*  --  5.7* AGRAT 0.5*  --  0.5* CBC w/Diff Recent Labs  
  09/22/19 
0607 09/20/19 
2059 WBC 5.1 5.8  
RBC 4.27 4.36  
HGB 11.4* 11.8* HCT 35.5 36.6  225 GRANS 70 68 LYMPH 20* 19* EOS 2 2 Cardiac Enzymes Recent Labs  
  09/21/19 
1321 09/21/19 
0503 CPK 31 28 CKND1 3.5 4.6* Coagulation Recent Labs  
  09/22/19 
0607 09/21/19 
0503 PTP 34.9* 40.2* INR 3.4* 4.1* Lipid Panel Lab Results Component Value Date/Time Cholesterol, total 142 10/12/2017 10:40 AM  
 HDL Cholesterol 43 10/12/2017 10:40 AM  
 LDL, calculated 82 10/12/2017 10:40 AM  
 VLDL, calculated 14.6 11/20/2009 10:37 AM  
 Triglyceride 86 10/12/2017 10:40 AM  
 CHOL/HDL Ratio 3.9 11/20/2009 10:37 AM  
  
BNP No results for input(s): BNPP in the last 72 hours. Liver Enzymes Recent Labs  
  09/22/19 
8477 TP 8.1 ALB 2.7* * SGOT 28 Thyroid Studies Lab Results Component Value Date/Time TSH 2.32 09/22/2019 06:07 AM  
    
 
Procedures/imaging: see electronic medical records for all procedures/Xrays and details which were not copied into this note but were reviewed prior to creation of Plan Medications:  
Current Facility-Administered Medications Medication Dose Route Frequency  gabapentin (NEURONTIN) capsule 100 mg  100 mg Oral QHS  folic acid (FOLVITE) tablet 1 mg  1 mg Oral DAILY  cyanocobalamin (VITAMIN B12) tablet 500 mcg  500 mcg Oral DAILY  metoprolol succinate (TOPROL-XL) tablet 100 mg  100 mg Oral BID  montelukast (SINGULAIR) tablet 10 mg  10 mg Oral DAILY  simvastatin (ZOCOR) tablet 80 mg  80 mg Oral QHS  furosemide (LASIX) 100 mg in 0.9% sodium chloride 100 mL infusion  3 mg/hr IntraVENous CONTINUOUS  
 famotidine (PEPCID) tablet 20 mg  20 mg Oral DAILY  cephALEXin (KEFLEX) capsule 500 mg  500 mg Oral Q8H  
 nitrofurantoin (MACRODANTIN) capsule 100 mg  100 mg Oral BID Assessment/Plan Active Problems: 
  CHF (congestive heart failure) ([address] (9/20/2019) UTI (urinary tract infection) (9/20/2019) Acute systolic CHF on top chronic diastolic CHF Pt will be admitted to telemetry Lasix IV fluid cardiology to f/u 
pt and INR  Recheck this morning 3.4 Pt not sure about her current dose of coumadin will check Dr Ha Garner most recent dose Wound care consult Repeat cardiac enzymes negative Check TSH within normal 
Not clear if pt still on digoxin , check digoxin level 1.2  
  
Hypertension Pt used to be on Cozaar and HCTZ Hold med and monitor Bp Continue toprol XL with holding parameter 
  
Hyperlipidemia  
zocor 80 mg QHS 
F/u liver function  
  
UTI 
continue Macrobid F/u urine culture so far negative D/C Macrobid  
  
Venous stasis ulcer Keflex 500 mg q8h Wound care consult Cbc, cmp,mag in AM 
Pt and INR Discussed with Dr Charline Ling will decrease Lasix drip to 3 mg /h Sadie Britton MD 
9/22/2019 1:59 PM

## 2019-09-22 NOTE — PROGRESS NOTES
[facility], [address] [place]qu[place] Phone:[phone] Fax: [phone] CARDIOLOGY PROGRESS NOTE Impressions:  
Acute on chronic diastolic heart failure Neg 5.4 Liters Severe lower extremity edema Hypertrophic obstructive cardiomyopathy · S/p Septal Ablation · S/p permanent pacemaker Atrial fibrillation Mitral regurgitation 
  
Plan:  
Telemetry Metoprolol  mg bid/Simvastatin 80 mg qhs Reduce Furosemide 3 mg/hr Daily BMP, Mg 
Echocardiogram 
Leg elevation Gabapentin 100 mg qhs.  
 
 
ASSESSMENT: 
Hospital Problems  Date Reviewed: 5/16/2019 Codes Class Noted POA  
 CH[facility]congestive heart failure) ([facility] ICD-10-CM: I50.9 ICD-9-CM: 428.0  9/20/2019 Unknown UTI (urinary tract infection) ICD-10-CM: N39.0 ICD-9-CM: 599.0  9/20/2019 Unknown SUBJECTIVE: 
No c/o dyspnea. C/o burning leg pain. OBJECTIVE: 
 
VS:  
Visit Vitals /77 (BP 1 Location: Left arm, BP Patient Position: At rest) Pulse 71 Temp 98.3 °F (36.8 °C) Resp 18 Ht 5' 2\" (1.575 m) Wt 70.2 kg (154 lb 12.8 oz) SpO2 98% Breastfeeding? No  
BMI 28.31 kg/m² Intake/Output Summary (Last 24 hours) at 9/22/2019 1149 Last data filed at 9/22/2019 0700 Gross per 24 hour Intake 66.16 ml Output 4100 ml Net -4033.84 ml TELE: Ventricular paced. General: No acute distress HENT: Normocephalic, atraumatic. Neck :  Supple Cardiac:  Normal S1/S2 Chest/Lungs:Clear to auscultation Abdomen: Soft Extremities:  No edema Labs: Results:  
   
Chemistry Recent Labs  
  09/22/19 
8962 09/21/19 
1321 09/20/19 2059 GLU 98 201* 79 * 132* 132* K 3.5 4.8 4.6 CL 95* 98* 100 CO2 31 27 28 BUN 36* 42* 45* CREA 1.24 1.33* 1.14  
CA 9.1 9.1 9.7 MG 1.9  --  2.4 AGAP 6 7 4 BUCR 29* 32* 39* *  --  274* TP 8.1  --  8.7* ALB 2.7*  --  3.0*  
GLOB 5.4*  --  5.7*  
 AGRAT 0.5*  --  0.5* CBC w/Diff Recent Labs  
  09/22/19 
0607 09/20/19 
2059 WBC 5.1 5.8  
RBC 4.27 4.36  
HGB 11.4* 11.8* HCT 35.5 36.6  225 GRANS 70 68 LYMPH 20* 19* EOS 2 2 Cardiac Enzymes Recent Labs  
  09/21/19 
1321 09/21/19 
0503 CPK 31 28 CKND1 3.5 4.6* Coagulation Recent Labs  
  09/22/19 
0607 09/21/19 
0503 PTP 34.9* 40.2* INR 3.4* 4.1* Lipid Panel Lab Results Component Value Date/Time Cholesterol, total 142 10/12/2017 10:40 AM  
 HDL Cholesterol 43 10/12/2017 10:40 AM  
 LDL, calculated 82 10/12/2017 10:40 AM  
 VLDL, calculated 14.6 11/20/2009 10:37 AM  
 Triglyceride 86 10/12/2017 10:40 AM  
 CHOL/HDL Ratio 3.9 11/20/2009 10:37 AM  
  
BNP No results for input(s): BNPP in the last 72 hours. Liver Enzymes Recent Labs  
  09/22/19 
7601 TP 8.1 ALB 2.7* * SGOT 28 Digoxin Thyroid Studies Lab Results Component Value Date/Time TSH 2.32 09/22/2019 06:07 AM  
    
 
 
 
Loco Velasco MD   Pager # [phone]

## 2019-09-23 LAB
ALBUMIN SERPL-MCNC: 3 G/DL (ref 3.4–5)
ALBUMIN/GLOB SERPL: 0.5 {RATIO} (ref 0.8–1.7)
ALP SERPL-CCNC: 273 U/L (ref 45–117)
ALT SERPL-CCNC: 30 U/L (ref 13–56)
ANION GAP SERPL CALC-SCNC: 4 MMOL/L (ref 3–18)
AST SERPL-CCNC: 27 U/L (ref 10–38)
BASOPHILS # BLD: 0 K/UL (ref 0–0.1)
BASOPHILS NFR BLD: 0 % (ref 0–2)
BILIRUB SERPL-MCNC: 0.8 MG/DL (ref 0.2–1)
BUN SERPL-MCNC: 31 MG/DL (ref 7–18)
BUN/CREAT SERPL: 26 (ref 12–20)
CALCIUM SERPL-MCNC: 9.9 MG/DL (ref 8.5–10.1)
CHLORIDE SERPL-SCNC: 98 MMOL/L (ref 100–111)
CO2 SERPL-SCNC: 34 MMOL/L (ref 21–32)
CREAT SERPL-MCNC: 1.18 MG/DL (ref 0.6–1.3)
DIFFERENTIAL METHOD BLD: ABNORMAL
EOSINOPHIL # BLD: 0.1 K/UL (ref 0–0.4)
EOSINOPHIL NFR BLD: 2 % (ref 0–5)
ERYTHROCYTE [DISTWIDTH] IN BLOOD BY AUTOMATED COUNT: 15.4 % (ref 11.6–14.5)
GLOBULIN SER CALC-MCNC: 5.8 G/DL (ref 2–4)
GLUCOSE SERPL-MCNC: 93 MG/DL (ref 74–99)
HCT VFR BLD AUTO: 38.8 % (ref 35–45)
HGB BLD-MCNC: 12.3 G/DL (ref 12–16)
INR PPP: 2.1 (ref 0.8–1.2)
LYMPHOCYTES # BLD: 1.5 K/UL (ref 0.9–3.6)
LYMPHOCYTES NFR BLD: 24 % (ref 21–52)
MAGNESIUM SERPL-MCNC: 2.1 MG/DL (ref 1.6–2.6)
MCH RBC QN AUTO: 26.3 PG (ref 24–34)
MCHC RBC AUTO-ENTMCNC: 31.7 G/DL (ref 31–37)
MCV RBC AUTO: 83.1 FL (ref 74–97)
MONOCYTES # BLD: 0.6 K/UL (ref 0.05–1.2)
MONOCYTES NFR BLD: 10 % (ref 3–10)
NEUTS SEG # BLD: 3.9 K/UL (ref 1.8–8)
NEUTS SEG NFR BLD: 64 % (ref 40–73)
PLATELET # BLD AUTO: 304 K/UL (ref 135–420)
PMV BLD AUTO: 10.5 FL (ref 9.2–11.8)
POTASSIUM SERPL-SCNC: 3.7 MMOL/L (ref 3.5–5.5)
PROT SERPL-MCNC: 8.8 G/DL (ref 6.4–8.2)
PROTHROMBIN TIME: 23.7 SEC (ref 11.5–15.2)
RBC # BLD AUTO: 4.67 M/UL (ref 4.2–5.3)
SODIUM SERPL-SCNC: 136 MMOL/L (ref 136–145)
WBC # BLD AUTO: 6.1 K/UL (ref 4.6–13.2)

## 2019-09-23 PROCEDURE — 74011250637 HC RX REV CODE- 250/637: Performed by: FAMILY MEDICINE

## 2019-09-23 PROCEDURE — 2W1MX6Z COMPRESSION OF LEFT LOWER EXTREMITY USING PRESSURE DRESSING: ICD-10-PCS | Performed by: FAMILY MEDICINE

## 2019-09-23 PROCEDURE — 29580 STRAPPING UNNA BOOT: CPT

## 2019-09-23 PROCEDURE — 74011250637 HC RX REV CODE- 250/637: Performed by: STUDENT IN AN ORGANIZED HEALTH CARE EDUCATION/TRAINING PROGRAM

## 2019-09-23 PROCEDURE — 36415 COLL VENOUS BLD VENIPUNCTURE: CPT

## 2019-09-23 PROCEDURE — 97162 PT EVAL MOD COMPLEX 30 MIN: CPT

## 2019-09-23 PROCEDURE — 80053 COMPREHEN METABOLIC PANEL: CPT

## 2019-09-23 PROCEDURE — 74011250637 HC RX REV CODE- 250/637: Performed by: NURSE PRACTITIONER

## 2019-09-23 PROCEDURE — 85610 PROTHROMBIN TIME: CPT

## 2019-09-23 PROCEDURE — 77030011255 HC DSG AQUACEL AG BMS -A

## 2019-09-23 PROCEDURE — 85025 COMPLETE CBC W/AUTO DIFF WBC: CPT

## 2019-09-23 PROCEDURE — 83735 ASSAY OF MAGNESIUM: CPT

## 2019-09-23 PROCEDURE — 74011250637 HC RX REV CODE- 250/637: Performed by: INTERNAL MEDICINE

## 2019-09-23 PROCEDURE — 97116 GAIT TRAINING THERAPY: CPT

## 2019-09-23 PROCEDURE — 2W1LX6Z COMPRESSION OF RIGHT LOWER EXTREMITY USING PRESSURE DRESSING: ICD-10-PCS | Performed by: FAMILY MEDICINE

## 2019-09-23 PROCEDURE — 65660000000 HC RM CCU STEPDOWN

## 2019-09-23 PROCEDURE — 77030029211 HC GEL MEDIH TU INLC -B

## 2019-09-23 RX ORDER — CEPHALEXIN 250 MG/1
500 CAPSULE ORAL EVERY 6 HOURS
Status: DISCONTINUED | OUTPATIENT
Start: 2019-09-23 | End: 2019-09-25 | Stop reason: HOSPADM

## 2019-09-23 RX ORDER — WARFARIN 2.5 MG/1
2.5 TABLET ORAL EVERY EVENING
Status: COMPLETED | OUTPATIENT
Start: 2019-09-23 | End: 2019-09-24

## 2019-09-23 RX ADMIN — CEPHALEXIN 500 MG: 250 CAPSULE ORAL at 17:32

## 2019-09-23 RX ADMIN — CEPHALEXIN 500 MG: 250 CAPSULE ORAL at 13:18

## 2019-09-23 RX ADMIN — CEPHALEXIN 500 MG: 250 CAPSULE ORAL at 23:14

## 2019-09-23 RX ADMIN — FOLIC ACID 1 MG: 1 TABLET ORAL at 10:33

## 2019-09-23 RX ADMIN — FAMOTIDINE 20 MG: 20 TABLET, FILM COATED ORAL at 10:34

## 2019-09-23 RX ADMIN — CYANOCOBALAMIN TAB 500 MCG 500 MCG: 500 TAB at 10:34

## 2019-09-23 RX ADMIN — Medication 1 CAPSULE: at 13:18

## 2019-09-23 RX ADMIN — MONTELUKAST 10 MG: 10 TABLET, FILM COATED ORAL at 10:34

## 2019-09-23 RX ADMIN — METOPROLOL SUCCINATE 100 MG: 100 TABLET, EXTENDED RELEASE ORAL at 10:34

## 2019-09-23 RX ADMIN — WARFARIN SODIUM 2.5 MG: 2.5 TABLET ORAL at 17:32

## 2019-09-23 RX ADMIN — CEPHALEXIN 500 MG: 250 CAPSULE ORAL at 05:05

## 2019-09-23 RX ADMIN — METOPROLOL SUCCINATE 100 MG: 100 TABLET, EXTENDED RELEASE ORAL at 17:32

## 2019-09-23 RX ADMIN — SIMVASTATIN 80 MG: 40 TABLET, FILM COATED ORAL at 21:20

## 2019-09-23 RX ADMIN — GABAPENTIN 100 MG: 100 CAPSULE ORAL at 21:20

## 2019-09-23 NOTE — PROGRESS NOTES
conducted an initial consultation and Spiritual Assessment for Shari Gorman, who is a 70 y.o.,female. Patients Primary Language is: [place]. According to the patients EMR Judaism Affiliation is: [place]. The reason the Patient came to the hospital is:  
Patient Active Problem List  
 Diagnosis Date Noted  CHF (congestive heart failure) ([facility] 09/20/2019  UTI (urinary tract infection) 09/20/2019  A-fib ([facility] 03/18/2019  Chronic diastolic congestive [facility] failure ([facility] 12/12/2017  Aortic regurgitation 06/13/2017  Mitral regurgitation 06/13/2017  
 SOB (shortness of breath) 02/13/2017  Mild HOCM (hypertrophic obstructive cardiomyopathy) ([facility] 01/12/2017  Aortic stenosis 01/12/2017  Pulmonary HTN ([facility] 12/21/2016  Papillary thyroid carcinoma ([facility] 12/21/2016  Lung nodule 11/04/2016  Obesity (BMI 30.0-34.9) 10/13/2016  Pain and swelling of lower leg 06/29/2015 The  provided the following Interventions: 
Initiated a relationship of care and support. Provided information about Spiritual Care Services. Chart reviewed. Assessment: 
Patient does not have any Episcopalian/cultural needs that will affect patients preferences in health care. Plan: 
Chaplains will continue to follow and will provide pastoral care on an as needed/requested basis.  recommends bedside caregivers page  on duty if patient shows signs of acute spiritual or emotional distress. Jayde Franklin Spiritual Care 
([phone])

## 2019-09-23 NOTE — PROGRESS NOTES
Bedside and Verbal shift change report given to JORDEN French (oncoming nurse) by Elsie Bronson RN (offgoing nurse). Report included the following information SBAR, Kardex and Recent Results.

## 2019-09-23 NOTE — PROGRESS NOTES
Problem: Mobility Impaired (Adult and Pediatric) Goal: *Acute Goals and Plan of Care (Insert Text) Description Physical Therapy Goals Initiated 9/23/2019 and to be accomplished within 7 day(s) 1. Patient will move from supine to sit and sit to supine  in bed with independence. 2.  Patient will transfer from bed to chair and chair to bed with supervision/set-up using the least restrictive device. 3.  Patient will perform sit to stand with supervision/set-up. 4.  Patient will ambulate with supervision/set-up for 150 feet with the least restrictive device. 5.  Patient will ascend/descend 3 stairs with bilateral handrail(s) with supervision/set-up. To prepare pt for home mobility. PLOF:  Pt lives with her  in a 1 story home with 3 stairs to enter, bilateral railings. Pt ambulated with a Hurry-cane prior to this admission but own a RW. Outcome: Progressing Towards Goal 
PHYSICAL THERAPY EVALUATION Patient: Ed Naranjo (7 y.o. female) Date: 9/23/2019 Primary Diagnosis: CHF (congestive heart failure) ([facility] [address] [I50.9] UTI (urinary tract infection) [N39.0] Precautions:   Fall PLOF: See goals section above ASSESSMENT : 
Based on the objective data described below, the patient presents with impaired gait, decreased bilateral LE strength, impaired balance, decreased functional activity tolerance following admission for CHF and UTI. Pt was cleared by nursing to work with therapy. Pt was received sitting up on side of bed, agreeable to participate in PT . Pt performed sit-stand with min A , requiring > 1 attempt. Pt displayed good sitting balance and good static/fair dynamic standing balance supported by walker. Pt ambulated 80 feet with RW at slow gait speed with decreased bilateral LE clearance. She had no loss of balance, and reported no shortness of breath.   At conclusion of session, pt was left resting comfortably on bed, needs met, call bell in reach, nurse notified, handed off to physician. Pt's  was in room with her. Patient will benefit from skilled intervention to address the above impairments. Patient's rehabilitation potential is considered to be Good Factors which may influence rehabilitation potential include:  
? None noted ? Mental ability/status ? Medical condition ? Home/family situation and support systems ? Safety awareness 
? Pain tolerance/management 
? Other: PLAN : 
Recommendations and Planned Interventions:  
?           Bed Mobility Training             ? Neuromuscular Re-Education ? Transfer Training                   ? Orthotic/Prosthetic Training 
? Gait Training                          ? Modalities ? Therapeutic Exercises           ? Edema Management/Control ? Therapeutic Activities            ? Family Training/Education ? Patient Education ? Other (comment): Frequency/Duration: Patient will be followed by physical therapy 1-2 times per day to address goals. Discharge Recommendations: Home Health Further Equipment Recommendations for Discharge: N/A  
 
SUBJECTIVE:  
Patient stated  I went from 155 pounds to 140. My legs feel so much better.  OBJECTIVE DATA SUMMARY:  
 
Past Medical History:  
Diagnosis Date Arthritis Heart murmur History of seasonal allergies HTN (hypertension) Hypercholesteremia Past Surgical History:  
Procedure Laterality Date HX KNEE ARTHROSCOPY    
 left and right HX ORTHOPAEDIC    
 right ankle Barriers to Learning/Limitations: None Compensate with: N/A Home Situation: 
Home Situation Home Environment: Private residence # Steps to Enter: 3 Rails to Enter: Yes Hand Rails : Bilateral 
One/Two Story Residence: One story Living Alone: No 
 Support Systems: Family member(s), Friends \ neighbors Patient Expects to be Discharged to[de-identified] Private residence Current DME Used/Available at Home: Cane, quad, Safety frame toliet, Walker, rolling Critical Behavior: 
Neurologic State: Alert Orientation Level: Oriented X4 Cognition: Follows commands Psychosocial 
Patient Behaviors: Calm; Cooperative Purposeful Interaction: Yes Pt Identified Daily Priority: Clinical issues (comment) Caritas Process: Create healing environment;Nurture spiritual self;Enable roberto/hope Caring Interventions: Therapeutic modalities; Reassure Reassure: Instilling roberto and hope; Informing;Prayer Therapeutic Modalities: Intentional therapeutic touch Strength:   
Strength: Generally decreased, functional 
 
Tone & Sensation:  
Tone: Normal 
 
Sensation: Intact Range Of Motion: 
AROM: Within functional limits Functional Mobility: 
Bed Mobility: 
 
Transfers: 
Sit to Stand: Minimum assistance Stand to Sit: Contact guard assistance Balance:  
Sitting: Intact Standing: Impaired; Without support Standing - Static: Good Standing - Dynamic : Fair Ambulation/Gait Training: 
Distance (ft): 80 Feet (ft) Assistive Device: Walker, rolling Ambulation - Level of Assistance: Stand-by assistance Gait Description (WDL): Exceptions to [facility] Gait Abnormalities: Decreased step clearance Speed/Nithya: Pace decreased (<100 feet/min); Shuffled Pain: 
Pain level pre-treatment: 0/10 Pain level post-treatment: 0/10 Pain Intervention(s) : N/A Activity Tolerance:  
Good Please refer to the flowsheet for vital signs taken during this treatment. After treatment:  
?         Patient left in no apparent distress sitting up in chair ? Patient left in no apparent distress in bed 
? Call bell left within reach ? Nursing notified ? Caregiver present ? Bed alarm activated ? SCDs applied COMMUNICATION/EDUCATION:  
 ?         Role of Physical Therapy in the acute care setting. ?         Fall prevention education was provided and the patient/caregiver indicated understanding. ? Patient/family have participated as able in goal setting and plan of care. ?         Patient/family agree to work toward stated goals and plan of care. ?         Patient understands intent and goals of therapy, but is neutral about his/her participation. ? Patient is unable to participate in goal setting/plan of care: ongoing with therapy staff. ?         Other: Thank you for this referral. 
Gulshan Odonnell, PT Time Calculation: 23 mins Eval Complexity: History: MEDIUM  Complexity : 1-2 comorbidities / personal factors will impact the outcome/ POC Exam:MEDIUM Complexity : 3 Standardized tests and measures addressing body structure, function, activity limitation and / or participation in recreation  Presentation: MEDIUM Complexity : Evolving with changing characteristics  Clinical Decision Making:Medium Complexity    Overall Complexity:MEDIUM

## 2019-09-23 NOTE — PROGRESS NOTES
Progress Note Patient: Ed Naranjo MRN: [id number]  CSN: [id number] YOB: 1940  Age: 70 y.o. Sex: female DOA: 9/20/2019 LOS:  LOS: 3 days Subjective:  
Pt is feeling better she is on Lasix 3 mg /h good diuresis Was able to remove bandage today and recheck wound pt has significant erythema B/L Leg more in the Rt but  The border of erythema looks like receding . Open wound in the med lacerated area in the middle both leg no drainage  Discussed with ID will get wound care continue Keflex and continue to monitor INR coming down discussed with cardiology will restart coumadin coumadin has been manged by cardiology pt not sure how much coumadin she has been taking Urine culture not siginificant will D/c Macrobid HPI: 
 
Ed Naranjo is a 70 y.o.  female who has h/o hypertension cardiomyopathy thyroid cancer PAF venous stasis ulcer . 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  
  
Pt has been monitor by cardiology Dr Heather Long has been on coumadin for PAF.  
  
Pt was sent to ER wednesday  By Dr Trevin Brunner due to increase swelling lower extremities and orthopnea for 2-3 weeks  , Pt was seen Logan MARTINEZ referred to wound clinic and started on keflex for venous  stasis  Ulcer. 
  
Pt was sent again yesterday to ER by Dr Heather Long for admission due to worsening heart failure. 
  
Pt has h/o septal ablation for HOCM 
  
Pt reported her lower extremities swelling getting worse for the alst 2 weeks  And venous stasis also has been getting worse 
  
Pt in the ER started on Macrobid due to UTI and admitted for further evaluation. Pt had EKG CXR Given one dose of lasix . Troponin I 0.22 
  
EKG Atrial fibrillation Right bundle branch block T wave abnormality, consider inferior ischemia  
  
CXR Stable cardiomegaly. Atherosclerosis.  
  
 
 Chief Complaint:  
Chief Complaint Patient presents with  Swelling Review of systems General: No fevers or chills. cellulitis both leg Cardiovascular: No chest pain or pressure. No palpitations. Pulmonary: No shortness of breath, cough or wheeze. Gastrointestinal: No abdominal pain, nausea, vomiting or diarrhea. Genitourinary: No  dysuria. Musculoskeletal: chronic arthritis pain on and off Neurologic: No headache, numbness, tingling or weakness. Objective:  
 
Physical Exam: 
Visit Vitals /70 (BP 1 Location: Left arm, BP Patient Position: At rest) Pulse 72 Temp 98.3 °F (36.8 °C) Resp 18 Ht 5' 2\" (1.575 m) Wt 63.8 kg (140 lb 9.6 oz) SpO2 98% Breastfeeding? No  
BMI 25.72 kg/m² General:  Alert, no distress, appears stated age. Head:  Normocephalic, without obvious abnormality, atraumatic. Eyes:  Conjunctivae/corneas clear. PERRL, EOMs intact. Nose: Nares normal. No drainage or sinus tenderness. Throat: Lips, mucosa, and tongue normal.   
Neck: Supple, symmetrical, trachea midline, no adenopathy, thyroid: no enlargement/tenderness/nodules, no carotid bruit and no JVD. Back:   ROM normal. No CVA tenderness. Lungs:   Clear to auscultation bilaterally. Chest wall:  No tenderness or deformity. Heart:  Regular rate and rhythm, S1, S2 normal, systolic  Murmur aortic area, click, rub or gallop. Abdomen: Soft, non-tender. Bowel sounds normal. No masses,  No organomegaly. Extremities: Extremities normal,   B/L venous stasis ulcer and Rt leg cellulitis Pulses: 2+ and symmetric all extremities. Skin: Skin color, texture, turgor normal.  Venous stasis ulcer b/L Leg Neurologic: CNII-XII intact. No focal motor or sensory deficit. Intake and Output: 
Current Shift:  No intake/output data recorded. Last three shifts:  09/21 1901 - 09/23 0700 In: 1232 [P.O.:1120; I.V.:112] Out: 6050 [IGTFJ:[id number]] Labs: Results:  
   
Chemistry Recent Labs 09/23/19 
5458 09/22/19 
2169 09/21/19 
1321 09/20/19 2059 GLU 93 98 201* 79  132* 132* 132* K 3.7 3.5 4.8 4.6 CL 98* 95* 98* 100 CO2 34* 31 27 28 BUN 31* 36* 42* 45* CREA 1.18 1.24 1.33* 1.14  
CA 9.9 9.1 9.1 9.7 AGAP 4 6 7 4 BUCR 26* 29* 32* 39* * 264*  --  274* TP 8.8* 8.1  --  8.7* ALB 3.0* 2.7*  --  3.0*  
GLOB 5.8* 5.4*  --  5.7* AGRAT 0.5* 0.5*  --  0.5* CBC w/Diff Recent Labs  
  09/23/19 0313 09/22/19 
0607 09/20/19 2059 WBC 6.1 5.1 5.8  
RBC 4.67 4.27 4.36  
HGB 12.3 11.4* 11.8* HCT 38.8 35.5 36.6  243 225 GRANS 64 70 68 LYMPH 24 20* 19* EOS 2 2 2 Cardiac Enzymes Recent Labs  
  09/21/19 
1321 09/21/19 
0503 CPK 31 28 CKND1 3.5 4.6* Coagulation Recent Labs  
  09/23/19 
0313 09/22/19 
9449 PTP 23.7* 34.9* INR 2.1* 3.4* Lipid Panel Lab Results Component Value Date/Time Cholesterol, total 142 10/12/2017 10:40 AM  
 HDL Cholesterol 43 10/12/2017 10:40 AM  
 LDL, calculated 82 10/12/2017 10:40 AM  
 VLDL, calculated 14.6 11/20/2009 10:37 AM  
 Triglyceride 86 10/12/2017 10:40 AM  
 CHOL/HDL Ratio 3.9 11/20/2009 10:37 AM  
  
BNP No results for input(s): BNPP in the last 72 hours. Liver Enzymes Recent Labs  
  09/23/19 
0313 TP 8.8* ALB 3.0*  
* SGOT 27 Thyroid Studies Lab Results Component Value Date/Time TSH 2.32 09/22/2019 06:07 AM  
    
 
Procedures/imaging: see electronic medical records for all procedures/Xrays and details which were not copied into this note but were reviewed prior to creation of Plan Medications:  
Current Facility-Administered Medications Medication Dose Route Frequency  cephALEXin (KEFLEX) capsule 500 mg  500 mg Oral Q6H  
 lactobacillus sp. 50 billion cpu (BIO-K PLUS) capsule 1 Cap  1 Cap Oral DAILY  warfarin (COUMADIN) tablet 2.5 mg  2.5 mg Oral QPM  
 WARFARIN INFORMATION NOTE (COUMADIN)= Physician to dose   Other Q24H  gabapentin (NEURONTIN) capsule 100 mg  100 mg Oral QHS  folic acid (FOLVITE) tablet 1 mg  1 mg Oral DAILY  cyanocobalamin (VITAMIN B12) tablet 500 mcg  500 mcg Oral DAILY  metoprolol succinate (TOPROL-XL) tablet 100 mg  100 mg Oral BID  montelukast (SINGULAIR) tablet 10 mg  10 mg Oral DAILY  simvastatin (ZOCOR) tablet 80 mg  80 mg Oral QHS  furosemide (LASIX) 100 mg in 0.9% sodium chloride 100 mL infusion  3 mg/hr IntraVENous CONTINUOUS  
 famotidine (PEPCID) tablet 20 mg  20 mg Oral DAILY Assessment/Plan Active Problems: 
  CHF (congestive heart failure) ([address] (9/20/2019) UTI (urinary tract infection) (9/20/2019) Plan Acute systolic CHF on top chronic diastolic CHF Pt will be admitted to telemetry  
 continue IV drip switch to oral per cardiology 
pt and INR  Recheck this morning  2.1 Pt not sure about her current dose of coumadin will check Dr Juvenal Sadler most recent dose Discussed with cardiology will restart coumadin Wound care consult Repeat cardiac enzymes negative Check TSH 2.32 Not clear if pt still on digoxin , check digoxin level 1.2  
  
Hypertension Pt used to be on Cozaar and HCTZ Hold med and monitor Bp Continue toprol XL with holding parameter 
  
Hyperlipidemia  
zocor 80 mg QHS 
F/u liver function  
  
UTI 
 D/C Macrobid F/u urine culture less than 10,000 proteus  
  
Venous stasis ulcer Keflex 500 mg q8h Wound care consult Continue to monitor lab cbc, cmp, mag Pt and ot evaluation and treatment Discussed with Dr Juvenal Sadler will repeat echo h/o hypertrophic cardiomyopathy 
  
Edwin Schuster MD 
9/23/2019 2:04 PM

## 2019-09-23 NOTE — PROGRESS NOTES
Bedside and Verbal shift change report given to Oralia Nunez RN (oncoming nurse) by Nahid Nagel (offgoing nurse). Report included the following information SBAR, Kardex, Intake/Output, MAR, Recent Results, Med Rec Status and Cardiac Rhythm A. FIB.

## 2019-09-23 NOTE — CDMP QUERY
Patient admitted with acute systolic CHF and UTI. Patient noted to have Proteus species per urine culture. If possible, please clarify the causative organism  in the progress notes and d/c summary. ? Urinary Tract Infection (UTI)-Proteus species ? UTI unknown  
? Other, please specify ? Clinically unable to determine The medical record reflects the following: 
  Risk Factors: UTI Clinical Indicators: Culture result: Abnormal Final <10,000 COLONIES/mL PROTEUS SPECIES Treatment: Keflex Thank you, Pastor Nick RN/CCDS 
[phone]

## 2019-09-23 NOTE — WOUND CARE
Physical Exam  
Musculoskeletal:  
     Legs: 
Seen by wound care for follow up compression and dressing application, performed at this time. Lower extremity status listed above noted during skin assessment. At this time lower legs cleaned and moisturizing lotion applied. Medi-honey with aqua harriett Ag applied over wounds, adaptic over macerated areas, three layer compression wrap applied from mpj to popliteal space in compression manner. Treatment plan explained to bedside nurse and Pt agreeable to continue current treatment. Wound care and edema control education provided to pt at this time. Plan of care reviewed with nursing. Will turn over care to nursing staff at this time Kevin Weeks, Wound Care Department

## 2019-09-23 NOTE — PROGRESS NOTES
[facility] 
 
 
CARDIOLOGY PROGRESS NOTE 
RECS: 
 
 
1. Acute on chronic diastolic congestive heart failure- improving with diuresis. SOB slowly improving. Continue lasix drip. Monitor I&O closely. Daily weight. Monitor electrolytes. 2. Severe lower extremity edema- continue Lasix drip 3 mg/hr 3. Hypertension- stable on bb.  monitor with diuresis 4. Hypertrophic obstructive cardiomyopathy-S/p Septal Ablation. S/p permanent pacemaker 7/19 5. Persistent Atrial fibrillation-rate controlled. resume coumadin 2.5 mg daily. 6. Mitral regurgitation 7. Pulmonary hypertension- check echo 8. BLE edema with venous stasis- continue wound care also on keflex per medical team 
  
Patient with acute on chronic diastolic CHF- diuresing well on lasix drip. Consider po from tomorrow ASSESSMENT: 
Hospital Problems  Date Reviewed: 5/16/2019 Codes Class Noted POA  
 [facility]est[facility] ICD-10-CM: I50.9 ICD-9-CM: 428.0  9/20/2019 Unknown UTI (urinary tract infection) ICD-10-CM: N39.0 ICD-9-CM: 599.0  9/20/2019 Unknown SUBJECTIVE: 
No CP Improving SOB OBJECTIVE: 
 
VS:  
Visit Vitals /70 (BP 1 Location: Left arm, BP Patient Position: At rest) Pulse 69 Temp 98.2 °F (36.8 °C) Resp 18 Ht 5' 2\" (1.575 m) Wt 63.8 kg (140 lb 9.6 oz) SpO2 96% Breastfeeding? No  
BMI 25.72 kg/m² Intake/Output Summary (Last 24 hours) at 9/23/2019 1045 Last data filed at 9/23/2019 9673 Gross per 24 hour Intake 931.95 ml Output 3650 ml Net -2718.05 ml TELE: AFIB rate controlled. General: alert, well developed, dyspneic, pleasant and in no apparent distress HENT: Normocephalic, atraumatic. Normal external eye. Neck :  increased JVP Cardiac:  irregularly irregular rhythm, S1, S2 normal, no click, no rub Lungs: diminished breath sounds b/l. Abdomen: Soft, nontender, no masses Extremities: 2+ BLE  peripheral pulses present Labs: Results:  
   
Chemistry Recent Labs  
  09/23/19 
1155 09/22/19 
7174 09/21/19 
1321 09/20/19 2059 GLU 93 98 201* 79  132* 132* 132* K 3.7 3.5 4.8 4.6 CL 98* 95* 98* 100 CO2 34* 31 27 28 BUN 31* 36* 42* 45* CREA 1.18 1.24 1.33* 1.14  
CA 9.9 9.1 9.1 9.7 AGAP 4 6 7 4 BUCR 26* 29* 32* 39* * 264*  --  274* TP 8.8* 8.1  --  8.7* ALB 3.0* 2.7*  --  3.0*  
GLOB 5.8* 5.4*  --  5.7* AGRAT 0.5* 0.5*  --  0.5* CBC w/Diff Recent Labs  
  09/23/19 
0313 09/22/19 
0607 09/20/19 2059 WBC 6.1 5.1 5.8  
RBC 4.67 4.27 4.36  
HGB 12.3 11.4* 11.8* HCT 38.8 35.5 36.6  243 225 GRANS 64 70 68 LYMPH 24 20* 19* EOS 2 2 2 Cardiac Enzymes Recent Labs  
  09/21/19 
1321 09/21/19 
0503 CPK 31 28 CKND1 3.5 4.6* Coagulation Recent Labs  
  09/23/19 
0313 09/22/19 
3012 PTP 23.7* 34.9* INR 2.1* 3.4* Lipid Panel Lab Results Component Value Date/Time Cholesterol, total 142 10/12/2017 10:40 AM  
 HDL Cholesterol 43 10/12/2017 10:40 AM  
 LDL, calculated 82 10/12/2017 10:40 AM  
 VLDL, calculated 14.6 11/20/2009 10:37 AM  
 Triglyceride 86 10/12/2017 10:40 AM  
 CHOL/HDL Ratio 3.9 11/20/2009 10:37 AM  
  
BNP No results for input(s): BNPP in the last 72 hours. Liver Enzymes Recent Labs  
  09/23/19 
0313 TP 8.8* ALB 3.0*  
* SGOT 27 Thyroid Studies Lab Results Component Value Date/Time TSH 2.32 09/22/2019 06:07 AM  
    
 
 
 
Nieves E Halecki NP-C Melinda:[phone] supervised I have independently evaluated and examined the patient. All relevant labs and testing data's are reviewed. Care plan discussed and updated after review.  
 
Kong Johnson MD

## 2019-09-23 NOTE — CDMP QUERY
Patient admitted with acute systolic CHF, noted to have abnormal troponins. If possible, please document in progress notes and d/c summary if you are evaluating and/or treating any of the following:  Demand ischemia in the setting of acute systolic CHF  Other -please specify  Unable to Determine The medical record reflects the following: 
  Risk Factors:   Hx CHF; cardiomyopathy; MR 
  Clinical Indicators: Serial troponins 0.22, 0.20, 0.20 Treatment: Cardiac enzymes followed; Lasix gtt Thank you, Sumit Rhodes RN/CCDS 
[phone]

## 2019-09-23 NOTE — PROGRESS NOTES
Reason for Admission:  CHF (congestive heart failure) ([facility] [address] [I50.9] UTI (urinary tract infection) [N39.0] RRAT Score:    12 Plan for utilizing home health:    yes Likelihood of Readmission:   LOW Transition of Care Plan:         
 
 
Initial assessment completed with patient. Cognitive status of patient: oriented to time, place, person and situation. Face sheet information confirmed:  yes. The patient designates Spouse Eben Miller [phone] to participate in her discharge plan and to receive any needed information. This patient lives in a single family home with patient and spouse. Patient is able to navigate steps as needed. Prior to hospitalization, patient was considered to be independent with ADLs/IADLS : yes . Patient has a current ACP document on file: no The patient and spouse will be available to transport patient home upon discharge. The patient already has Caterina Perez, [facility],  medical equipment available in the home. Patient is not currently active with home health. Patient has not stayed in a skilled nursing facility or rehab. This patient is on dialysis :no 
 
List of available Home Health agencies were provided and reviewed with the patient prior to discharge. Freedom of choice signed: yes, for [facility]. Currently, the discharge plan is Home with  [facility]. The patient states that she can obtain her medications from the pharmacy, and take her medications as directed. Patient's current insurance is Medicare Care Management Interventions PCP Verified by CM: Yes Last Visit to PCP: 09/23/19 Mode of Transport at Discharge: Self Physical Therapy Consult: Yes Occupational Therapy Consult: Yes Current Support Network: Lives with Spouse Confirm Follow Up Transport: Family Freedom of Choice Offered: Yes Discharge Location Discharge Placement: Home with home health Laurena Merlin, RN BSN Care Manager [phone]

## 2019-09-23 NOTE — CONSULTS
Infectious Disease Consultation Note Requested by: dr. Zhane Truong Reason: cellulitis LE Current abx Prior abx Cephalexin, nitrofurantoin since 9/20/19 Lines:  
 
 
Assessment : 
 
 
70 y.o.  female who has h/o hypertension, cardiomyopathy, thyroid cancer, PAF, venous stasis ulcer admitted to [facility] on 9/20/19 for shortness of breath, increased LE swelling. Clinical picture c/w right leg cellulitis superimposed on LE edema due to CHF/venous stasis. Patient seems to be responding to current antibiotics. <10,000 proteus in urine cultures 9/20 likely colonizer. No dysuria or s/s to suggest cystitis. Recommendations: 1. Continue po cephalexin till 9/27/19. D/c macrodantin 2. F/u wound care recommendations Advance Care planning: full code: discussed  with patient/surrogate decision maker: eder garcia: [phone] Thank you for consultation request. Above plan was discussed in details with patient, family and dr Zhane Truong. Please call me if any further questions or concerns. Will continue to participate in the care of this patient. HPI: 
 
70 y.o.  female who has h/o hypertension, cardiomyopathy, thyroid cancer, PAF, venous stasis ulcer admitted to [facility] on 9/20/19 for shortness of breath, increased LE swelling. I obtained history by talking to patient and dr. Zhane Truong. Pt  was sent to ER on 9/18/19  By Dr Ghanshyam Chapman due to increased swelling lower extremities and orthopnea for 2-3 weeks . Pt was seen Drea MARTINEZ referred to wound clinic for swelling of legs/drainage and started on keflex for venous  stasis  Ulcer. Pt was sent again to ER by Dr Antoine Ballesteros on 9/20/19 for admission due to worsening heart failure. Pt has h/o septal ablation for HOCM. Pt reported her lower extremities swelling getting worse for the almost 2 weeks  and venous stasis also has been getting worse.  Pt in the ER started on Macrobid since had pyuria and concern for UTI and admitted for further evaluation. Pt was continued on cephalexin. Patient's edema of LE and sob improved with diuresis. I have been consulted for further abx recommendations. Patient states that she did have increased burning pain in right leg few days prior to admission. As per wound care nurse, her right leg was warmer and more erythematous on initial evaluation. Patient feels better now. Improved pain right leg. Patient denies headaches, visual disturbances, sore throat, runny nose, earaches, cp, sob, chills, cough, abdominal pain, diarrhea, burning micturition,  or weakness in extremities. He denies back pain/flank pain. He denies recent sick contacts. No h/o recent travel. No known h/o MRSA colonization or infection in the past. 
  
 
 
Past Medical History:  
Diagnosis Date  Arthritis  Heart murmur  History of seasonal allergies  HTN (hypertension)  Hypercholesteremia Past Surgical History:  
Procedure Laterality Date  HX KNEE ARTHROSCOPY    
 left and right  HX ORTHOPAEDIC    
 right ankle  
 
 
home Medication List  
 Details  
metOLazone (ZAROXOLYN) 2.5 mg tablet Take 2 Tabs by mouth as needed (5 mg x 3 days then 2.5 mg qd). Qty: 45 Tab, Refills: 2  
  
telmisartan (MICARDIS) 40 mg tablet Take 40 mg by mouth daily. !! warfarin (COUMADIN) 5 mg tablet take 1 tablet by mouth once daily 
Qty: 90 Tab, Refills: 1 DIGITEK 125 mcg tablet take 1 tablet by mouth once daily 
Qty: 30 Tab, Refills: 4  
  
!! warfarin (COUMADIN) 1 mg tablet Take 1 Tab by mouth daily. Qty: 30 Tab, Refills: 3  
  
ondansetron hcl (ZOFRAN) 4 mg tablet Take 1 Tab by mouth every eight (8) hours as needed for Nausea. Qty: 30 Tab, Refills: 2 Associated Diagnoses: Nausea  
  
lisinopril (PRINIVIL, ZESTRIL) 20 mg tablet Take  by mouth daily. Calcium-Cholecalciferol, D3, (CALCIUM 600 WITH VITAMIN D3) 600 mg(1,500mg) -400 unit chew Take  by mouth. simvastatin (ZOCOR) 80 mg tablet TAKE 1 TABLET NIGHTLY Qty: 90 Tab, Refills: 1 Associated Diagnoses: Nonrheumatic aortic valve insufficiency  
  
hydroCHLOROthiazide (HYDRODIURIL) 25 mg tablet Take 25 mg by mouth daily. cholecalciferol (VITAMIN D3) 1,000 unit cap Take  by mouth daily. furosemide (LASIX) 40 mg tablet take 1 tablet by mouth once daily 
Qty: 30 Tab, Refills: 1 Comp. Stocking,Thigh,Reg,Medium misc 1 Package by Does Not Apply route daily. Qty: 2 Package, Refills: 0  
  
metoprolol succinate (TOPROL-XL) 100 mg tablet Take 1 Tab by mouth two (2) times a day. Qty: 60 Tab, Refills: 3  
  
losartan (COZAAR) 100 mg tablet Take 100 mg by mouth daily. montelukast (SINGULAIR) 10 mg tablet Take 10 mg by mouth daily. cyanocobalamin (VITAMIN B-12) 500 mcg tablet Take 500 mcg by mouth daily. folic acid (FOLVITE) 1 mg tablet Take  by mouth daily. CETIRIZINE HCL (ZYRTEC PO) Take  by mouth. Current Facility-Administered Medications Medication Dose Route Frequency  gabapentin (NEURONTIN) capsule 100 mg  100 mg Oral QHS  folic acid (FOLVITE) tablet 1 mg  1 mg Oral DAILY  cyanocobalamin (VITAMIN B12) tablet 500 mcg  500 mcg Oral DAILY  metoprolol succinate (TOPROL-XL) tablet 100 mg  100 mg Oral BID  montelukast (SINGULAIR) tablet 10 mg  10 mg Oral DAILY  simvastatin (ZOCOR) tablet 80 mg  80 mg Oral QHS  furosemide (LASIX) 100 mg in 0.9% sodium chloride 100 mL infusion  3 mg/hr IntraVENous CONTINUOUS  
 famotidine (PEPCID) tablet 20 mg  20 mg Oral DAILY  cephALEXin (KEFLEX) capsule 500 mg  500 mg Oral Q8H  
 nitrofurantoin (MACRODANTIN) capsule 100 mg  100 mg Oral BID Allergies: Patient has no known allergies. Family History Problem Relation Age of Onset  Cancer Other  Heart Disease Other  Cancer Mother  Heart Disease Mother Social History Socioeconomic History  Marital status:   
 Spouse name: Not on file  Number of children: Not on file  Years of education: Not on file  Highest education level: Not on file Occupational History  Not on file Social Needs  Financial resource strain: Not on file  Food insecurity:  
  Worry: Not on file Inability: Not on file  Transportation needs:  
  Medical: Not on file Non-medical: Not on file Tobacco Use  Smoking status: Never Smoker  Smokeless tobacco: Never Used Substance and Sexual Activity  Alcohol use: No  
 Drug use: No  
 Sexual activity: Not on file Lifestyle  Physical activity:  
  Days per week: Not on file Minutes per session: Not on file  Stress: Not on file Relationships  Social connections:  
  Talks on phone: Not on file Gets together: Not on file Attends Taoism service: Not on file Active member of club or organization: Not on file Attends meetings of clubs or organizations: Not on file Relationship status: Not on file  Intimate partner violence:  
  Fear of current or ex partner: Not on file Emotionally abused: Not on file Physically abused: Not on file Forced sexual activity: Not on file Other Topics Concern  Not on file Social History Narrative Pt has a dog Born in [facility] [place] and moved to [place] years ago Social History Tobacco Use Smoking Status Never Smoker Smokeless Tobacco Never Used Temp (24hrs), Av.4 °F (36.9 °C), Min:97.9 °F (36.6 °C), Max:99.1 °F (37.3 °C) Visit Vitals /70 (BP 1 Location: Left arm, BP Patient Position: At rest) Pulse 69 Temp 98.2 °F (36.8 °C) Resp 18 Ht 5' 2\" (1.575 m) Wt 63.8 kg (140 lb 9.6 oz) SpO2 96% Breastfeeding? No  
BMI 25.72 kg/m² ROS: 12 point ROS obtained in details. Pertinent positives as mentioned in HPI,  
otherwise negative Physical Exam: 
 
 
General:  Alert, no distress, appears stated age. Head:  Normocephalic, without obvious abnormality, atraumatic. Eyes:  Conjunctivae/corneas clear. PERRL, EOMs intact. Nose: Nares normal. No drainage or sinus tenderness. Throat: Lips, mucosa, and tongue normal.   
Neck: Supple, symmetrical, trachea midline, no adenopathy, thyroid: no enlargement/tenderness/nodules, no carotid bruit and no JVD. Back:   ROM normal. No CVA tenderness. Lungs:   Clear to auscultation bilaterally. Chest wall:  No tenderness or deformity. Heart:  Regular rate and rhythm, S1, S2 normal, systolic  Murmur aortic area, no click, rub or gallop. Abdomen: Soft, non-tender. Bowel sounds normal. No masses,  No organomegaly. Extremities:  chronic skin changes both legs. Minimal tenderness right leg with dried blood. Erythema receded from previously marked margins. Pulses: 2+ and symmetric all extremities. Skin: Skin color, texture, turgor normal.  Venous stasis ulcer b/L Leg Neurologic: CNII-XII intact. No focal motor or sensory deficit.  
  
 
 
Labs: Results:  
Chemistry Recent Labs  
  09/23/19 0313 09/22/19 
1010 09/21/19 
1321 09/20/19 2059 GLU 93 98 201* 79  132* 132* 132* K 3.7 3.5 4.8 4.6 CL 98* 95* 98* 100 CO2 34* 31 27 28 BUN 31* 36* 42* 45* CREA 1.18 1.24 1.33* 1.14  
CA 9.9 9.1 9.1 9.7 AGAP 4 6 7 4 BUCR 26* 29* 32* 39* * 264*  --  274* TP 8.8* 8.1  --  8.7* ALB 3.0* 2.7*  --  3.0*  
GLOB 5.8* 5.4*  --  5.7* AGRAT 0.5* 0.5*  --  0.5* CBC w/Diff Recent Labs  
  09/23/19 0313 09/22/19 
0607 09/20/19 2059 WBC 6.1 5.1 5.8  
RBC 4.67 4.27 4.36  
HGB 12.3 11.4* 11.8* HCT 38.8 35.5 36.6  243 225 GRANS 64 70 68 LYMPH 24 20* 19* EOS 2 2 2 Microbiology Recent Labs  
  09/21/19 
0715 09/21/19 
0710 09/20/19 2037 CULT NO GROWTH 2 DAYS NO GROWTH 2 DAYS <10,000 COLONIES/mL PROTEUS SPECIES* RADIOLOGY: 
 
All available imaging studies/reports in [facility] care for this admission were reviewed Dr. Quinton Garces, Infectious Disease Specialist 
[phone] September 23, 2019 
10:07 AM

## 2019-09-24 LAB
ALBUMIN SERPL-MCNC: 2.4 G/DL (ref 3.4–5)
ALBUMIN/GLOB SERPL: 0.5 {RATIO} (ref 0.8–1.7)
ALP SERPL-CCNC: 228 U/L (ref 45–117)
ALT SERPL-CCNC: 27 U/L (ref 13–56)
ANION GAP SERPL CALC-SCNC: 2 MMOL/L (ref 3–18)
AST SERPL-CCNC: 24 U/L (ref 10–38)
BASOPHILS # BLD: 0 K/UL (ref 0–0.1)
BASOPHILS NFR BLD: 0 % (ref 0–2)
BILIRUB SERPL-MCNC: 0.5 MG/DL (ref 0.2–1)
BUN SERPL-MCNC: 41 MG/DL (ref 7–18)
BUN/CREAT SERPL: 28 (ref 12–20)
CALCIUM SERPL-MCNC: 8.9 MG/DL (ref 8.5–10.1)
CHLORIDE SERPL-SCNC: 96 MMOL/L (ref 100–111)
CO2 SERPL-SCNC: 34 MMOL/L (ref 21–32)
CREAT SERPL-MCNC: 1.47 MG/DL (ref 0.6–1.3)
DIFFERENTIAL METHOD BLD: ABNORMAL
EOSINOPHIL # BLD: 0.1 K/UL (ref 0–0.4)
EOSINOPHIL NFR BLD: 2 % (ref 0–5)
ERYTHROCYTE [DISTWIDTH] IN BLOOD BY AUTOMATED COUNT: 15.2 % (ref 11.6–14.5)
GLOBULIN SER CALC-MCNC: 4.9 G/DL (ref 2–4)
GLUCOSE SERPL-MCNC: 109 MG/DL (ref 74–99)
HCT VFR BLD AUTO: 33.7 % (ref 35–45)
HGB BLD-MCNC: 10.9 G/DL (ref 12–16)
INR PPP: 1.9 (ref 0.8–1.2)
LYMPHOCYTES # BLD: 1.7 K/UL (ref 0.9–3.6)
LYMPHOCYTES NFR BLD: 25 % (ref 21–52)
MAGNESIUM SERPL-MCNC: 1.9 MG/DL (ref 1.6–2.6)
MCH RBC QN AUTO: 26.8 PG (ref 24–34)
MCHC RBC AUTO-ENTMCNC: 32.3 G/DL (ref 31–37)
MCV RBC AUTO: 83 FL (ref 74–97)
MONOCYTES # BLD: 0.7 K/UL (ref 0.05–1.2)
MONOCYTES NFR BLD: 11 % (ref 3–10)
NEUTS SEG # BLD: 4.2 K/UL (ref 1.8–8)
NEUTS SEG NFR BLD: 62 % (ref 40–73)
PLATELET # BLD AUTO: 273 K/UL (ref 135–420)
PMV BLD AUTO: 10.1 FL (ref 9.2–11.8)
POTASSIUM SERPL-SCNC: 3.7 MMOL/L (ref 3.5–5.5)
PROT SERPL-MCNC: 7.3 G/DL (ref 6.4–8.2)
PROTHROMBIN TIME: 21.3 SEC (ref 11.5–15.2)
RBC # BLD AUTO: 4.06 M/UL (ref 4.2–5.3)
SODIUM SERPL-SCNC: 132 MMOL/L (ref 136–145)
WBC # BLD AUTO: 6.7 K/UL (ref 4.6–13.2)

## 2019-09-24 PROCEDURE — 74011250637 HC RX REV CODE- 250/637: Performed by: INTERNAL MEDICINE

## 2019-09-24 PROCEDURE — 97530 THERAPEUTIC ACTIVITIES: CPT

## 2019-09-24 PROCEDURE — 36415 COLL VENOUS BLD VENIPUNCTURE: CPT

## 2019-09-24 PROCEDURE — 97535 SELF CARE MNGMENT TRAINING: CPT

## 2019-09-24 PROCEDURE — 74011250637 HC RX REV CODE- 250/637: Performed by: FAMILY MEDICINE

## 2019-09-24 PROCEDURE — 85610 PROTHROMBIN TIME: CPT

## 2019-09-24 PROCEDURE — 65660000000 HC RM CCU STEPDOWN

## 2019-09-24 PROCEDURE — 83735 ASSAY OF MAGNESIUM: CPT

## 2019-09-24 PROCEDURE — 97165 OT EVAL LOW COMPLEX 30 MIN: CPT

## 2019-09-24 PROCEDURE — 74011000258 HC RX REV CODE- 258: Performed by: INTERNAL MEDICINE

## 2019-09-24 PROCEDURE — 94762 N-INVAS EAR/PLS OXIMTRY CONT: CPT

## 2019-09-24 PROCEDURE — 97116 GAIT TRAINING THERAPY: CPT

## 2019-09-24 PROCEDURE — 74011250637 HC RX REV CODE- 250/637: Performed by: NURSE PRACTITIONER

## 2019-09-24 PROCEDURE — 85025 COMPLETE CBC W/AUTO DIFF WBC: CPT

## 2019-09-24 PROCEDURE — 74011250636 HC RX REV CODE- 250/636: Performed by: INTERNAL MEDICINE

## 2019-09-24 PROCEDURE — 80053 COMPREHEN METABOLIC PANEL: CPT

## 2019-09-24 RX ORDER — FUROSEMIDE 40 MG/1
40 TABLET ORAL DAILY
Status: DISCONTINUED | OUTPATIENT
Start: 2019-09-25 | End: 2019-09-25 | Stop reason: HOSPADM

## 2019-09-24 RX ADMIN — WARFARIN SODIUM 2.5 MG: 2.5 TABLET ORAL at 17:03

## 2019-09-24 RX ADMIN — CEPHALEXIN 500 MG: 250 CAPSULE ORAL at 23:04

## 2019-09-24 RX ADMIN — GABAPENTIN 100 MG: 100 CAPSULE ORAL at 21:39

## 2019-09-24 RX ADMIN — FUROSEMIDE 3 MG/HR: 10 INJECTION, SOLUTION INTRAMUSCULAR; INTRAVENOUS at 03:51

## 2019-09-24 RX ADMIN — MONTELUKAST 10 MG: 10 TABLET, FILM COATED ORAL at 08:12

## 2019-09-24 RX ADMIN — CEPHALEXIN 500 MG: 250 CAPSULE ORAL at 11:15

## 2019-09-24 RX ADMIN — METOPROLOL SUCCINATE 100 MG: 100 TABLET, EXTENDED RELEASE ORAL at 17:03

## 2019-09-24 RX ADMIN — CEPHALEXIN 500 MG: 250 CAPSULE ORAL at 17:03

## 2019-09-24 RX ADMIN — CEPHALEXIN 500 MG: 250 CAPSULE ORAL at 05:18

## 2019-09-24 RX ADMIN — Medication 1 CAPSULE: at 09:00

## 2019-09-24 RX ADMIN — SIMVASTATIN 80 MG: 40 TABLET, FILM COATED ORAL at 21:39

## 2019-09-24 RX ADMIN — METOPROLOL SUCCINATE 100 MG: 100 TABLET, EXTENDED RELEASE ORAL at 08:11

## 2019-09-24 RX ADMIN — FOLIC ACID 1 MG: 1 TABLET ORAL at 08:12

## 2019-09-24 RX ADMIN — CYANOCOBALAMIN TAB 500 MCG 500 MCG: 500 TAB at 08:11

## 2019-09-24 RX ADMIN — FAMOTIDINE 20 MG: 20 TABLET, FILM COATED ORAL at 08:12

## 2019-09-24 NOTE — ROUTINE PROCESS
Bedside shift change report given to JORDEN French (oncoming nurse) by Gerson Scott RN (offgoing nurse). Report included the following information SBAR, Kardex, Intake/Output, Recent Results and Cardiac Rhythm NSR.

## 2019-09-24 NOTE — PROGRESS NOTES
Problem: Mobility Impaired (Adult and Pediatric) Goal: *Acute Goals and Plan of Care (Insert Text) Description Physical Therapy Goals Initiated 9/23/2019 and to be accomplished within 7 day(s) 1. Patient will move from supine to sit and sit to supine  in bed with independence. 2.  Patient will transfer from bed to chair and chair to bed with supervision/set-up using the least restrictive device. 3.  Patient will perform sit to stand with supervision/set-up. 4.  Patient will ambulate with supervision/set-up for 150 feet with the least restrictive device. 5.  Patient will ascend/descend 3 stairs with bilateral handrail(s) with supervision/set-up. To prepare pt for home mobility. PLOF:  Pt lives with her  in a 1 story home with 3 stairs to enter, bilateral railings. Pt ambulated with a Hurry-cane prior to this admission but own a RW. Outcome: Progressing Towards Goal 
PHYSICAL THERAPY TREATMENT Patient: Cristina Hurst (67 y.o. female) Date: 9/24/2019 Diagnosis: CHF (congestive heart failure) ([facility] [I50.9] UTI (urinary tract infection) [N39.0] <principal problem not specified> Precautions: Fall PLOF: See goals section above ASSESSMENT: 
Pt was cleared by nursing to participate in therapy. Pt was received sitting up in chair, agreeable to work with PT . Pt performed sit-stand transfer with SBA and ambulated x 100 feet with Hurry-cane in hallway at slow pace, with decreased bilateral step length. Pt displayed no loss of balance or shortness of breath during ambulation. She performed several changes of direction and negotiated obstacles without LOB . Pt's pace slowed even more as she fatigued but pt displays good awareness of her limitations and knows when she requires a rest break. Pt performed stand-sit with SBA.   Pt then decided she wanted to sit on EOB to eat lunch, so transferred with supervision and ambulated x 10 feet with no AD to bed. Pt displayed good balance sitting EOB. At conclusion of session, pt was left sitting safely on bed, call bell in reach, tray table in reach, needs met, nurse notified. Progression toward goals:  
?      Improving appropriately and progressing toward goals ? Improving slowly and progressing toward goals ? Not making progress toward goals and plan of care will be adjusted PLAN: 
Patient continues to benefit from skilled intervention to address the above impairments. Continue treatment per established plan of care. Discharge Recommendations:  Home Health Further Equipment Recommendations for Discharge:  N/A  
 
SUBJECTIVE:  
Patient stated  My legs don't hurt today, they just feel tingly.  OBJECTIVE DATA SUMMARY:  
Critical Behavior: 
Neurologic State: Alert Orientation Level: Oriented X4 Cognition: Appropriate decision making, Follows commands Safety/Judgement: Fall prevention Functional Mobility Training: 
Bed Mobility: 
Supine to Sit: Modified independent Scooting: Modified independent Transfers: 
Sit to Stand: Stand-by assistance Stand to Sit: Stand-by assistance Balance: 
Sitting: Intact Standing: Impaired; With support Standing - Static: Good Standing - Dynamic : Fair Range Of Motion: 
 AROM: Within functional limits Ambulation/Gait Training: 
Distance (ft): 100 Feet (ft) Assistive Device: Cane, straight((Hurri-cane)) Ambulation - Level of Assistance: Stand-by assistance Gait Abnormalities: Decreased step clearance Speed/Nithya: Pace decreased (<100 feet/min) Pain: 
Pain level pre-treatment: 0/10 Pain level post-treatment: 0/10 Pain Intervention(s): N/A Activity Tolerance:  
Good Please refer to the flowsheet for vital signs taken during this treatment. After treatment:  
? Patient left in no apparent distress sitting up in chair ? Patient left in no apparent distress on bed 
? Call bell left within reach ? Nursing notified ? Caregiver present ? Bed alarm activated ? SCDs applied COMMUNICATION/EDUCATION:  
?         Role of Physical Therapy in the acute care setting. ?         Fall prevention education was provided and the patient/caregiver indicated understanding. ? Patient/family have participated as able in working toward goals and plan of care. ?         Patient/family agree to work toward stated goals and plan of care. ?         Patient understands intent and goals of therapy, but is neutral about his/her participation. ? Patient is unable to participate in stated goals/plan of care: ongoing with therapy staff. ?         Other: 
 
   
Freddie Miller, PT Time Calculation: 23 mins

## 2019-09-24 NOTE — PROGRESS NOTES
Bedside and Verbal shift change report given to Jordin Morrow RN (oncoming nurse) by Roseanne Humphries (offgoing nurse). Report included the following information SBAR, Kardex, Intake/Output, MAR, Recent Results, Med Rec Status and Cardiac Rhythm A. FIB.

## 2019-09-24 NOTE — PROGRESS NOTES
[facility] 
 
 
CARDIOLOGY PROGRESS NOTE 
RECS: 
 
 
1. Acute on chronic diastolic congestive heart failure-SOB has improved. Good diuresis. Will change lasix drip to po, as renal creatinine and BUN in rising Monitor I&O closely. Daily weight. Monitor electrolytes. 2. Severe lower extremity edema- better . 3. Hypertension- stable on bb. will  monitor with diuresis 4. Hypertrophic obstructive cardiomyopathy-S/p Septal Ablation. S/p permanent pacemaker 7/19 5. Persistent Atrial fibrillation-rate controlled. On coumadin. anticoagulation per medical team  
6. Mitral regurgitation 7. Pulmonary hypertension-check echo 8. BLE edema with venous stasis- continue wound care also on keflex per medical team 
 
 
ASSESSMENT: 
Hospital Problems  Date Reviewed: 5/16/2019 Codes Class Noted POA  
 [facility] ICD-10-CM: I50.9 ICD-9-CM: 428.0  9/20/2019 Unknown UTI (urinary tract infection) ICD-10-CM: N39.0 ICD-9-CM: 599.0  9/20/2019 Unknown SUBJECTIVE: 
No chest pain or chest pressure Improving SOB OBJECTIVE: 
 
VS:  
Visit Vitals /55 (BP 1 Location: Left arm, BP Patient Position: At rest) Pulse 74 Temp 98.2 °F (36.8 °C) Resp 16 Ht 5' 2\" (1.575 m) Wt 64.5 kg (142 lb 3.2 oz) SpO2 94% Breastfeeding? No  
BMI 26.01 kg/m² Intake/Output Summary (Last 24 hours) at 9/24/2019 1041 Last data filed at 9/24/2019 1026 Gross per 24 hour Intake 1200 ml Output 1200 ml Net 0 ml TELE: AFIB rate controlled. General: alert, well developed, dyspneic, pleasant and in no apparent distress HENT: Normocephalic, atraumatic. Normal external eye. Neck :  increased JVP Cardiac:  irregularly irregular rhythm, S1, S2 normal, no click, no rub Lungs: diminished breath sounds b/l. Abdomen: Soft, nontender, no masses Extremities: 1+ to  2+ BLE  peripheral pulses present Labs: Results: Chemistry Recent Labs  
  09/24/19 0328 09/23/19 
4161 09/22/19 
4929 * 93 98 * 136 132* K 3.7 3.7 3.5 CL 96* 98* 95* CO2 34* 34* 31 BUN 41* 31* 36* CREA 1.47* 1.18 1.24  
CA 8.9 9.9 9.1 AGAP 2* 4 6 BUCR 28* 26* 29* * 273* 264* TP 7.3 8.8* 8.1 ALB 2.4* 3.0* 2.7*  
GLOB 4.9* 5.8* 5.4* AGRAT 0.5* 0.5* 0.5* CBC w/Diff Recent Labs  
  09/24/19 0328 09/23/19 
0313 09/22/19 
9570 WBC 6.7 6.1 5.1  
RBC 4.06* 4.67 4.27 HGB 10.9* 12.3 11.4* HCT 33.7* 38.8 35.5  304 243 GRANS 62 64 70 LYMPH 25 24 20* EOS 2 2 2 Cardiac Enzymes Recent Labs  
  09/21/19 
1321 CPK 31 CKND1 3.5 Coagulation Recent Labs  
  09/24/19 0328 09/23/19 
1351 PTP 21.3* 23.7* INR 1.9* 2.1* Lipid Panel Lab Results Component Value Date/Time Cholesterol, total 142 10/12/2017 10:40 AM  
 HDL Cholesterol 43 10/12/2017 10:40 AM  
 LDL, calculated 82 10/12/2017 10:40 AM  
 VLDL, calculated 14.6 11/20/2009 10:37 AM  
 Triglyceride 86 10/12/2017 10:40 AM  
 CHOL/HDL Ratio 3.9 11/20/2009 10:37 AM  
  
BNP No results for input(s): BNPP in the last 72 hours. Liver Enzymes Recent Labs  
  09/24/19 0328 TP 7.3 ALB 2.4* * SGOT 24 Thyroid Studies Lab Results Component Value Date/Time TSH 2.32 09/22/2019 06:07 AM  
    
 
 
 
[address] NP-C Melinda:[phone]

## 2019-09-24 NOTE — PROGRESS NOTES
[facility] 
 
 
CARDIOLOGY PROGRESS NOTE 
RECS: 
 
 
1. Acute on chronic diastolic congestive heart failure-improving well with diuresis. 2. Severe lower extremity edema-improved significantly. Change IV to p.o. Lasix. 3. Hypertension- stable on bb.  monitor with diuresis 4. Hypertrophic obstructive cardiomyopathy-S/p Septal Ablation. S/p permanent pacemaker 7/19 5. Persistent Atrial fibrillation-rate controlled. resume coumadin. 6. Mitral regurgitation 7. Pulmonary hypertension- check echo 8. BLE edema with venous stasis- continue wound care also on keflex per medical team 
  
Echo is pending. CHF improving well clinically. Possible home tomorrow. ASSESSMENT: 
Hospital Problems  Date Reviewed: 5/16/2019 Codes Class Noted POA  
 [facility] ICD-10-CM: I50.9 ICD-9-CM: 428.0  9/20/2019 Unknown UTI (urinary tract infection) ICD-10-CM: N39.0 ICD-9-CM: 599.0  9/20/2019 Unknown SUBJECTIVE: 
No CP Improving SOB significantly. Improving edema quite well. OBJECTIVE: 
 
VS:  
Visit Vitals /55 (BP 1 Location: Left arm, BP Patient Position: At rest) Pulse 74 Temp 98.2 °F (36.8 °C) Resp 16 Ht 5' 2\" (1.575 m) Wt 64.5 kg (142 lb 3.2 oz) SpO2 94% Breastfeeding? No  
BMI 26.01 kg/m² Intake/Output Summary (Last 24 hours) at 9/24/2019 1047 Last data filed at 9/24/2019 1026 Gross per 24 hour Intake 1200 ml Output 1200 ml Net 0 ml TELE: AFIB rate controlled. General: alert, well developed, dyspneic, pleasant and in no apparent distress HENT: Normocephalic, atraumatic. Normal external eye. Neck :  increased JVP Cardiac:  irregularly irregular rhythm, S1, S2 normal, no click, no rub Lungs: Clear without any rales or rhonchi. Abdomen: Soft, nontender, no masses Extremities: 1-2+ BLE  peripheral pulses present Labs: Results:  
   
Chemistry Recent Labs  
  09/24/19 
0328 09/23/19 7639 09/22/19 
4771 * 93 98 * 136 132* K 3.7 3.7 3.5 CL 96* 98* 95* CO2 34* 34* 31 BUN 41* 31* 36* CREA 1.47* 1.18 1.24  
CA 8.9 9.9 9.1 AGAP 2* 4 6 BUCR 28* 26* 29* * 273* 264* TP 7.3 8.8* 8.1 ALB 2.4* 3.0* 2.7*  
GLOB 4.9* 5.8* 5.4* AGRAT 0.5* 0.5* 0.5* CBC w/Diff Recent Labs  
  09/24/19 
0328 09/23/19 
0313 09/22/19 
9801 WBC 6.7 6.1 5.1  
RBC 4.06* 4.67 4.27 HGB 10.9* 12.3 11.4* HCT 33.7* 38.8 35.5  304 243 GRANS 62 64 70 LYMPH 25 24 20* EOS 2 2 2 Cardiac Enzymes Recent Labs  
  09/21/19 
1321 CPK 31 CKND1 3.5 Coagulation Recent Labs  
  09/24/19 
0328 09/23/19 
4202 PTP 21.3* 23.7* INR 1.9* 2.1* Lipid Panel Lab Results Component Value Date/Time Cholesterol, total 142 10/12/2017 10:40 AM  
 HDL Cholesterol 43 10/12/2017 10:40 AM  
 LDL, calculated 82 10/12/2017 10:40 AM  
 VLDL, calculated 14.6 11/20/2009 10:37 AM  
 Triglyceride 86 10/12/2017 10:40 AM  
 CHOL/HDL Ratio 3.9 11/20/2009 10:37 AM  
  
BNP No results for input(s): BNPP in the last 72 hours. Liver Enzymes Recent Labs  
  09/24/19 
0328 TP 7.3 ALB 2.4* * SGOT 24 Thyroid Studies Lab Results Component Value Date/Time  TSH 2.32 09/22/2019 06:07 AM  
    
 
 
 
Vic Dorman MD

## 2019-09-24 NOTE — PROGRESS NOTES
Progress Note Patient: Joya Bowen MRN: [id number]  CSN: [id number] YOB: 1940  Age: 70 y.o. Sex: female DOA: 9/20/2019 LOS:  LOS: 4 days Subjective:  
Pt continues on Lasix 3 mg /h, -240ml for past 24hrs recorded however -8.151L for this hospitalization. Denies shortness of breath or chest pain, reports lower extremity edema has improved and able to walk better with PT. INR coming down discussed with cardiology, had been given 2.5mg daily x 2 days (last dose today), monitor. Reviewed increased creatinine today with cardiology NP [facility], plan to transition patient to PO lasix, still awaiting follow up echo to be performed, was ordered yesterday. Hgb decreased today, patient denies obvious signs of blood loss. Urine culture not siginificant will  Macrobid discontinued. Continues keflex for RLE cellulitis per ID recs, continue through 9/27/19 HPI: 
 
Joya Bowen is a 70 y.o.  female who has h/o hypertension cardiomyopathy thyroid cancer PAF venous stasis ulcer . 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  
  
Pt has been monitor by cardiology Dr Zia Stockton has been on coumadin for PAF.  
  
Pt was sent to ER wednesday  By Dr Inez Motley due to increase swelling lower extremities and orthopnea for 2-3 weeks  , Pt was seen Shakir MARTINEZ referred to wound clinic and started on keflex for venous  stasis  Ulcer. 
  
Pt was sent again yesterday to ER by Dr Zia Stockton for admission due to worsening heart failure. 
  
Pt has h/o septal ablation for HOCM 
  
Pt reported her lower extremities swelling getting worse for the alst 2 weeks  And venous stasis also has been getting worse 
  
Pt in the ER started on Macrobid due to UTI and admitted for further evaluation. Pt had EKG CXR Given one dose of lasix .  Troponin I 0.22 
  
EKG 
 Atrial fibrillation Right bundle branch block T wave abnormality, consider inferior ischemia  
  
CXR Stable cardiomegaly. Atherosclerosis. 
  
 
Chief Complaint:  
Chief Complaint Patient presents with  Swelling Review of systems General: No fevers or chills. cellulitis both leg Cardiovascular: No chest pain or pressure. No palpitations. Pulmonary: No shortness of breath, cough or wheeze. Gastrointestinal: No abdominal pain, nausea, vomiting or diarrhea. Genitourinary: No  dysuria. Musculoskeletal: chronic arthritis pain on and off Neurologic: No headache, numbness, tingling or weakness. Objective:  
 
Physical Exam: 
Visit Vitals /70 (BP 1 Location: Left arm, BP Patient Position: At rest) Pulse 66 Temp 98.2 °F (36.8 °C) Resp 20 Ht 5' 2\" (1.575 m) Wt 64.5 kg (142 lb 3.2 oz) SpO2 95% Breastfeeding? No  
BMI 26.01 kg/m² General:  Alert, no distress, appears stated age. Head:  Normocephalic, without obvious abnormality, atraumatic. Eyes:  Conjunctivae/corneas clear. PERRL, EOMs intact. Nose: Nares normal. No drainage or sinus tenderness. Throat: Lips, mucosa, and tongue normal.   
Neck: Supple, symmetrical, trachea midline, no adenopathy, thyroid: no enlargement/tenderness/nodules, no carotid bruit and no JVD. Back:   ROM normal. No CVA tenderness. Lungs:   Clear to auscultation bilaterally. Chest wall:  No tenderness or deformity. Heart:   Irregular, holosystolic murmur Abdomen: Soft, non-tender. Bowel sounds normal. No masses,  No organomegaly. Extremities: B/L venous stasis ulcer and Rt leg cellulitis Pulses: 2+ and symmetric all extremities. Skin: Skin color, texture, turgor normal.  Venous stasis ulcer b/L Leg Neurologic: CNII-XII intact. No focal motor or sensory deficit. Intake and Output: 
Current Shift:  No intake/output data recorded. Last three shifts:  09/22 1901 - 09/24 0700 In: 1360 [P.O.:1360] Out: 2750 [Urine:2750] Labs: Results:  
   
Chemistry Recent Labs  
  09/24/19 
1619 09/23/19 
8953 09/22/19 
7143 * 93 98 * 136 132* K 3.7 3.7 3.5 CL 96* 98* 95* CO2 34* 34* 31 BUN 41* 31* 36* CREA 1.47* 1.18 1.24  
CA 8.9 9.9 9.1 AGAP 2* 4 6 BUCR 28* 26* 29* * 273* 264* TP 7.3 8.8* 8.1 ALB 2.4* 3.0* 2.7*  
GLOB 4.9* 5.8* 5.4* AGRAT 0.5* 0.5* 0.5* CBC w/Diff Recent Labs  
  09/24/19 0328 09/23/19 
0313 09/22/19 
8596 WBC 6.7 6.1 5.1  
RBC 4.06* 4.67 4.27 HGB 10.9* 12.3 11.4* HCT 33.7* 38.8 35.5  304 243 GRANS 62 64 70 LYMPH 25 24 20* EOS 2 2 2 Cardiac Enzymes Recent Labs  
  09/21/19 
1321 CPK 31 CKND1 3.5 Coagulation Recent Labs  
  09/24/19 0328 09/23/19 
0757 PTP 21.3* 23.7* INR 1.9* 2.1* Lipid Panel Lab Results Component Value Date/Time Cholesterol, total 142 10/12/2017 10:40 AM  
 HDL Cholesterol 43 10/12/2017 10:40 AM  
 LDL, calculated 82 10/12/2017 10:40 AM  
 VLDL, calculated 14.6 11/20/2009 10:37 AM  
 Triglyceride 86 10/12/2017 10:40 AM  
 CHOL/HDL Ratio 3.9 11/20/2009 10:37 AM  
  
BNP No results for input(s): BNPP in the last 72 hours. Liver Enzymes Recent Labs  
  09/24/19 0328 TP 7.3 ALB 2.4* * SGOT 24 Thyroid Studies Lab Results Component Value Date/Time TSH 2.32 09/22/2019 06:07 AM  
    
 
Procedures/imaging: see electronic medical records for all procedures/Xrays and details which were not copied into this note but were reviewed prior to creation of Plan Medications:  
Current Facility-Administered Medications Medication Dose Route Frequency  cephALEXin (KEFLEX) capsule 500 mg  500 mg Oral Q6H  
 lactobacillus sp. 50 billion cpu (BIO-K PLUS) capsule 1 Cap  1 Cap Oral DAILY  warfarin (COUMADIN) tablet 2.5 mg  2.5 mg Oral QPM  
 WARFARIN INFORMATION NOTE (COUMADIN)= Physician to dose   Other Q24H  
 gabapentin (NEURONTIN) capsule 100 mg  100 mg Oral QHS  folic acid (FOLVITE) tablet 1 mg  1 mg Oral DAILY  cyanocobalamin (VITAMIN B12) tablet 500 mcg  500 mcg Oral DAILY  metoprolol succinate (TOPROL-XL) tablet 100 mg  100 mg Oral BID  montelukast (SINGULAIR) tablet 10 mg  10 mg Oral DAILY  simvastatin (ZOCOR) tablet 80 mg  80 mg Oral QHS  furosemide (LASIX) 100 mg in 0.9% sodium chloride 100 mL infusion  3 mg/hr IntraVENous CONTINUOUS  
 famotidine (PEPCID) tablet 20 mg  20 mg Oral DAILY Assessment/Plan Active Problems: 
  CHF (congestive heart failure) ([address] (9/20/2019) UTI (urinary tract infection) (9/20/2019) Plan Acute systolic CHF on top chronic diastolic CHF, mildly elevated troponins likely due to demand ischemia in setting of Acute systolic CHF; Afib, Mitral Regurgitation, Pulm HTN Continue to monitor on telemetry, lasix to be transitioned from IV drip to PO per cardiology today Follow up echo, patient with history of hypertrophic obstructive cardiomyopathy, MR, Pulm HTN Coumadin 2.5mg po x1 this evening ordered per cardiology for CVA prophylaxis for Afib, f/u INR; discussed with MARQUITA Valadez, patient was on coumadin 2.5mg po daily as outpatient. Pt not sure about her current dose of coumadin will check Dr Juvenal Sadler most recent dose Wound care for LE venous stasis ulcers Repeat cardiac enzymes negative TSH 2.32 Patient digoxin level 1.2, not receiving digoxin here, cardiology to evaluate if needed 
  
Hypertension Pt used to be on Cozaar and HCTZ Hold med and monitor Bp Continue toprol XL with holding parameter 
  
Hyperlipidemia  
zocor 80 mg QHS 
LFTs stable 
  
UTI ruled out 
urine culture less than 10,000 proteus, macrobid discontinued 
  
Venous stasis ulcers, RLE cellulitis Continues keflex for RLE cellulitis per ID recs, continue through 9/27/19 Wound care Griselda Knight MD 
9/24/2019 10:14am

## 2019-09-24 NOTE — PROGRESS NOTES
Problem: Self Care Deficits Care Plan (Adult) Goal: *Acute Goals and Plan of Care (Insert Text) Description Occupational Therapy Goals Initiated 9/24/2019 within 7 day(s). 1.  Patient will perform lower body dressing with modified independence. 2.  Patient will perform toileting with modified independence. 3.  Patient will perform toilet transfer with modified independence. 4.  Patient will perform a functional activity in standing with modified independence for 5 minutes, with G standing balance. 5.  Patient will participate in upper extremity therapeutic exercise/activities with modified independence for 8 minutes. Prior Level of Function: Pt reports she lives with her  in a [facility] with 3STE. Pt was (I) with basic self-care/ADLs and shared IADLs with  PTA. Pt ambulated with a hurrycane. Outcome: Progressing Towards Goal 
 OCCUPATIONAL THERAPY EVALUATION Patient: Kiara Ames (84 y.o. female) Date: 9/24/2019 Primary Diagnosis: CHF (congestive heart failure) ([facility] [address] [I50.9] UTI (urinary tract infection) [N39.0] Precautions:   Fall ASSESSMENT : 
Pt cleared to participate in OT evaluation by RN. Upon entering room, pt supine with HOB elevated, alert, and agreeable to therapy session. Based on the objective data described below, the patient presents with decreased strength, decreased functional balance, and decreased functional mobility affecting her safety and performance in basic self-care/ADL tasks. Pt is able to perform bed mobility with Mod(I) to participate in further self care. Sitting EOB, pt presents she is only able to reach to B ankles, requiring max assist to don socks but min assist to don slip shoes with back; pt may benefit from use of a reacher to doff socks, sock aid to don socks, and long handled shoe horn to don shoes with back. Pt is able transfer from bed-[facility] and complete toilet hygiene with CGA in standing.  Pt agreeable to transferring to toilet in bathroom with CGA, using Rw. Pt ambulated from bathroom to hallway ~4 minute to simulate IADL task of grocery shopping. Cueing required for pt to reach back for arm rest to ease self into recliner for safety. Educated pt on the role of OT, evaluation process, walker management, goals for therapy, and to not get up without assistance of nursing with pt demonstrating good understanding. The pt will benefit from further OT services, in order to maximize her ADL performance and decrease the risk for complications associated with decreased functional activity. At the end of the session, pt left sitting up in recliner, call-bell in reach, with all needs met. Patient will benefit from skilled intervention to address the above impairments. Patient's rehabilitation potential is considered to be Good Factors which may influence rehabilitation potential include: ? None noted ? Mental ability/status ? Medical condition ? Home/family situation and support systems ? Safety awareness ? Pain tolerance/management ? Other: PLAN : 
Recommendations and Planned Interventions:  
?               Self Care Training                  ? Therapeutic Activities ? Functional Mobility Training   ? Cognitive Retraining 
? Therapeutic Exercises           ? Endurance Activities ? Balance Training                    ? Neuromuscular Re-Education ? Visual/Perceptual Training     ? Home Safety Training 
? Patient Education                   ? Family Training/Education ? Other (comment): Frequency/Duration: Patient will be followed by occupational therapy 1-2 times per day/4-7 days per week to address goals. Discharge Recommendations: Home Health Further Equipment Recommendations for Discharge: N/A; Pt reports she has a shower chair, raised toilet seat, BSC, Rw, and hurrycane. SUBJECTIVE:  
Patient stated I want to walk in the [address] OBJECTIVE DATA SUMMARY:  
 
Past Medical History:  
Diagnosis Date  Arthritis  Heart murmur  History of seasonal allergies  HTN (hypertension)  Hypercholesteremia Past Surgical History:  
Procedure Laterality Date  HX KNEE ARTHROSCOPY    
 left and right  HX ORTHOPAEDIC    
 right ankle Barriers to Learning/Limitations: None Compensate with: visual, verbal, tactile, kinesthetic cues/model Home Situation:  
Home Situation Home Environment: Private residence # Steps to Enter: 3 Rails to Enter: Yes Hand Rails : Bilateral 
One/Two Story Residence: One story Living Alone: No 
Support Systems: Family member(s), Friends \ neighbors Patient Expects to be Discharged to[de-identified] Private residence Current DME Used/Available at Home: Cane, quad, Safety frame toliet, Walker, rolling Tub or Shower Type: Tub/Shower combination ? Right hand dominant   ? Left hand dominant Cognitive/Behavioral Status: 
Neurologic State: Alert Orientation Level: Oriented X4 Cognition: Appropriate decision making; Follows commands Safety/Judgement: Fall prevention Skin: Visible skin appeared intact. Edema: None noted on BUEs Coordination: BUE Coordination: Within functional limits Fine Motor Skills-Upper: Left Intact; Right Intact Gross Motor Skills-Upper: Left Intact; Right Intact Balance: 
Sitting: Intact Standing: Impaired; With support Standing - Static: Good Standing - Dynamic : Fair Strength: BUE Strength: Generally decreased, functional 
 
Tone & Sensation: BUE Tone: Normal 
Sensation: Intact Range of Motion: BUE 
AROM: Within functional limits Functional Mobility and Transfers for ADLs: 
Bed Mobility: 
Supine to Sit: Modified independent Scooting: Modified independent Transfers: 
Sit to Stand: Contact guard assistance Stand to Sit: Contact guard assistance Pt is able to ambulate from toilet, down the hallway ~4 mins with slow gait using a Rw, to simulate IADL task of grocery shopping. ADL Assessment:  
Feeding: Independent Oral Facial Hygiene/Grooming: Independent Bathing: Minimum assistance Upper Body Dressing: Independent Lower Body Dressing: Minimum assistance Toileting: Contact guard assistance ADL Intervention: Lower Body Dressing Assistance Socks: Maximum assistance Slip on Shoes with Back: Minimum assistance Position Performed: Seated edge of bed Toileting Toileting Assistance: Contact guard assistance Bladder Hygiene: Contact guard assistance Clothing Management: Contact guard assistance Cognitive Retraining Safety/Judgement: Fall prevention Pain: 
Pain level pre-treatment: 0/10 Pain level post-treatment: 0/10 Pain Intervention(s): Medication (see MAR); Rest, Ice, Repositioning Response to intervention: Nurse notified, See doc flow Activity Tolerance:  
Good Please refer to the flowsheet for vital signs taken during this treatment. After treatment:  
? Patient left in no apparent distress sitting up in chair ? Patient left in no apparent distress in bed 
? Call bell left within reach ? Nursing notified ? Caregiver present ? Bed alarm activated COMMUNICATION/EDUCATION:  
? Role of Occupational Therapy in the acute care setting 
? Home safety education was provided and the patient/caregiver indicated understanding. ? Patient/family have participated as able in goal setting and plan of care. ? Patient/family agree to work toward stated goals and plan of care. ? Patient understands intent and goals of therapy, but is neutral about his/her participation. ? Patient is unable to participate in goal setting and plan of care.  
 
Thank you for this referral. 
 Sintia Olvera MS, OTR/L Time Calculation: 39 mins Eval Complexity: History: MEDIUM Complexity : Expanded review of history including physical, cognitive and psychosocial  history ; Examination: LOW Complexity : 1-3 performance deficits relating to physical, cognitive , or psychosocial skils that result in activity limitations and / or participation restrictions ; Decision Making:LOW Complexity : No comorbidities that affect functional and no verbal or physical assistance needed to complete eval tasks

## 2019-09-25 ENCOUNTER — APPOINTMENT (OUTPATIENT)
Dept: NON INVASIVE DIAGNOSTICS | Age: 79
DRG: 292 | End: 2019-09-25
Attending: NURSE PRACTITIONER
Payer: MEDICARE

## 2019-09-25 ENCOUNTER — HOME HEALTH ADMISSION (OUTPATIENT)
Dept: HOME HEALTH SERVICES | Facility: HOME HEALTH | Age: 79
End: 2019-09-25
Payer: MEDICARE

## 2019-09-25 VITALS
BODY MASS INDEX: 26.5 KG/M2 | WEIGHT: 144 LBS | RESPIRATION RATE: 18 BRPM | SYSTOLIC BLOOD PRESSURE: 117 MMHG | HEIGHT: 62 IN | OXYGEN SATURATION: 96 % | TEMPERATURE: 98.3 F | DIASTOLIC BLOOD PRESSURE: 71 MMHG | HEART RATE: 77 BPM

## 2019-09-25 LAB
ALBUMIN SERPL-MCNC: 2.5 G/DL (ref 3.4–5)
ALBUMIN/GLOB SERPL: 0.5 {RATIO} (ref 0.8–1.7)
ALP SERPL-CCNC: 214 U/L (ref 45–117)
ALT SERPL-CCNC: 24 U/L (ref 13–56)
ANION GAP SERPL CALC-SCNC: 6 MMOL/L (ref 3–18)
AST SERPL-CCNC: 25 U/L (ref 10–38)
BASOPHILS # BLD: 0 K/UL (ref 0–0.1)
BASOPHILS NFR BLD: 0 % (ref 0–2)
BILIRUB SERPL-MCNC: 0.6 MG/DL (ref 0.2–1)
BUN SERPL-MCNC: 40 MG/DL (ref 7–18)
BUN/CREAT SERPL: 29 (ref 12–20)
CALCIUM SERPL-MCNC: 8.9 MG/DL (ref 8.5–10.1)
CHLORIDE SERPL-SCNC: 94 MMOL/L (ref 100–111)
CO2 SERPL-SCNC: 30 MMOL/L (ref 21–32)
CREAT SERPL-MCNC: 1.36 MG/DL (ref 0.6–1.3)
DIFFERENTIAL METHOD BLD: ABNORMAL
ECHO AO ROOT DIAM: 2.95 CM
ECHO AV AREA PEAK VELOCITY: 0.8 CM2
ECHO AV AREA VTI: 0.9 CM2
ECHO AV AREA/BSA PEAK VELOCITY: 0.5 CM2/M2
ECHO AV AREA/BSA VTI: 0.5 CM2/M2
ECHO AV MEAN GRADIENT: 28.5 MMHG
ECHO AV PEAK GRADIENT: 47.2 MMHG
ECHO AV PEAK VELOCITY: 343.38 CM/S
ECHO AV REGURGITANT PHT: 483.3 CM
ECHO AV VTI: 77.12 CM
ECHO LA AREA 4C: 27.5 CM2
ECHO LA VOL 2C: 78.28 ML (ref 22–52)
ECHO LA VOL 4C: 90.61 ML (ref 22–52)
ECHO LA VOL BP: 92.86 ML (ref 22–52)
ECHO LA VOL/BSA BIPLANE: 55.86 ML/M2 (ref 16–28)
ECHO LA VOLUME INDEX A2C: 47.09 ML/M2 (ref 16–28)
ECHO LA VOLUME INDEX A4C: 54.5 ML/M2 (ref 16–28)
ECHO LV GLOBAL LONGITUDINAL STRAIN (GLS): -11.1 %
ECHO LV INTERNAL DIMENSION DIASTOLIC: 3.65 CM (ref 3.9–5.3)
ECHO LV INTERNAL DIMENSION SYSTOLIC: 2.48 CM
ECHO LV IVSD: 1.43 CM (ref 0.6–0.9)
ECHO LV MASS 2D: 225.7 G (ref 67–162)
ECHO LV MASS INDEX 2D: 135.8 G/M2 (ref 43–95)
ECHO LV POSTERIOR WALL DIASTOLIC: 1.45 CM (ref 0.6–0.9)
ECHO LVOT DIAM: 1.95 CM
ECHO LVOT PEAK GRADIENT: 3.8 MMHG
ECHO LVOT PEAK VELOCITY: 97.27 CM/S
ECHO LVOT VTI: 23.13 CM
ECHO MV AREA VTI: 1.7 CM2
ECHO MV E VELOCITY: 148.12 CM/S
ECHO MV MAX VELOCITY: 152.07 CM/S
ECHO MV MEAN GRADIENT: 3.4 MMHG
ECHO MV PEAK GRADIENT: 9.3 MMHG
ECHO MV VTI: 40.41 CM
ECHO TV REGURGITANT MAX VELOCITY: 365.07 CM/S
ECHO TV REGURGITANT PEAK GRADIENT: 53.3 MMHG
EOSINOPHIL # BLD: 0.1 K/UL (ref 0–0.4)
EOSINOPHIL NFR BLD: 3 % (ref 0–5)
ERYTHROCYTE [DISTWIDTH] IN BLOOD BY AUTOMATED COUNT: 15.3 % (ref 11.6–14.5)
GLOBULIN SER CALC-MCNC: 5.1 G/DL (ref 2–4)
GLUCOSE SERPL-MCNC: 86 MG/DL (ref 74–99)
HCT VFR BLD AUTO: 34.7 % (ref 35–45)
HGB BLD-MCNC: 11 G/DL (ref 12–16)
INR PPP: 1.7 (ref 0.8–1.2)
LYMPHOCYTES # BLD: 1.2 K/UL (ref 0.9–3.6)
LYMPHOCYTES NFR BLD: 24 % (ref 21–52)
MAGNESIUM SERPL-MCNC: 2 MG/DL (ref 1.6–2.6)
MCH RBC QN AUTO: 26.1 PG (ref 24–34)
MCHC RBC AUTO-ENTMCNC: 31.7 G/DL (ref 31–37)
MCV RBC AUTO: 82.4 FL (ref 74–97)
MONOCYTES # BLD: 0.6 K/UL (ref 0.05–1.2)
MONOCYTES NFR BLD: 12 % (ref 3–10)
NEUTS SEG # BLD: 3.2 K/UL (ref 1.8–8)
NEUTS SEG NFR BLD: 61 % (ref 40–73)
PISA AR MAX VEL: 373.21 CM/S
PLATELET # BLD AUTO: 278 K/UL (ref 135–420)
PMV BLD AUTO: 10 FL (ref 9.2–11.8)
POTASSIUM SERPL-SCNC: 3.7 MMOL/L (ref 3.5–5.5)
PROT SERPL-MCNC: 7.6 G/DL (ref 6.4–8.2)
PROTHROMBIN TIME: 19.5 SEC (ref 11.5–15.2)
RBC # BLD AUTO: 4.21 M/UL (ref 4.2–5.3)
SODIUM SERPL-SCNC: 130 MMOL/L (ref 136–145)
WBC # BLD AUTO: 5.1 K/UL (ref 4.6–13.2)

## 2019-09-25 PROCEDURE — 93306 TTE W/DOPPLER COMPLETE: CPT

## 2019-09-25 PROCEDURE — 36415 COLL VENOUS BLD VENIPUNCTURE: CPT

## 2019-09-25 PROCEDURE — 74011250637 HC RX REV CODE- 250/637: Performed by: FAMILY MEDICINE

## 2019-09-25 PROCEDURE — 85025 COMPLETE CBC W/AUTO DIFF WBC: CPT

## 2019-09-25 PROCEDURE — 83735 ASSAY OF MAGNESIUM: CPT

## 2019-09-25 PROCEDURE — 74011250637 HC RX REV CODE- 250/637: Performed by: NURSE PRACTITIONER

## 2019-09-25 PROCEDURE — 85610 PROTHROMBIN TIME: CPT

## 2019-09-25 PROCEDURE — 80053 COMPREHEN METABOLIC PANEL: CPT

## 2019-09-25 PROCEDURE — 74011250637 HC RX REV CODE- 250/637: Performed by: INTERNAL MEDICINE

## 2019-09-25 RX ORDER — CEPHALEXIN 500 MG/1
500 CAPSULE ORAL EVERY 6 HOURS
Qty: 12 CAP | Refills: 0 | Status: SHIPPED | OUTPATIENT
Start: 2019-09-25 | End: 2019-10-29 | Stop reason: ALTCHOICE

## 2019-09-25 RX ORDER — GABAPENTIN 100 MG/1
100 CAPSULE ORAL
Qty: 30 CAP | Refills: 0 | Status: SHIPPED | OUTPATIENT
Start: 2019-09-25 | End: 2020-10-30

## 2019-09-25 RX ORDER — WARFARIN SODIUM 5 MG/1
5 TABLET ORAL ONCE
Status: DISCONTINUED | OUTPATIENT
Start: 2019-09-25 | End: 2019-09-25 | Stop reason: HOSPADM

## 2019-09-25 RX ORDER — FAMOTIDINE 20 MG/1
20 TABLET, FILM COATED ORAL DAILY
Qty: 30 TAB | Refills: 0 | Status: SHIPPED | OUTPATIENT
Start: 2019-09-26 | End: 2020-10-30

## 2019-09-25 RX ADMIN — FOLIC ACID 1 MG: 1 TABLET ORAL at 10:13

## 2019-09-25 RX ADMIN — CEPHALEXIN 500 MG: 250 CAPSULE ORAL at 05:16

## 2019-09-25 RX ADMIN — FUROSEMIDE 40 MG: 40 TABLET ORAL at 10:13

## 2019-09-25 RX ADMIN — CEPHALEXIN 500 MG: 250 CAPSULE ORAL at 11:41

## 2019-09-25 RX ADMIN — CYANOCOBALAMIN TAB 500 MCG 500 MCG: 500 TAB at 10:13

## 2019-09-25 RX ADMIN — Medication 1 CAPSULE: at 10:13

## 2019-09-25 RX ADMIN — MONTELUKAST 10 MG: 10 TABLET, FILM COATED ORAL at 10:13

## 2019-09-25 RX ADMIN — METOPROLOL SUCCINATE 100 MG: 100 TABLET, EXTENDED RELEASE ORAL at 10:13

## 2019-09-25 RX ADMIN — FAMOTIDINE 20 MG: 20 TABLET, FILM COATED ORAL at 10:13

## 2019-09-25 NOTE — DISCHARGE INSTRUCTIONS
Patient Education        Atrial Fibrillation: Care Instructions  Your Care Instructions    Atrial fibrillation is an irregular and often fast heartbeat. Treating this condition is important for several reasons. It can cause blood clots, which can travel from your heart to your brain and cause a stroke. If you have a fast heartbeat, you may feel lightheaded, dizzy, and weak. An irregular heartbeat can also increase your risk for heart failure. Atrial fibrillation is often the result of another heart condition, such as high blood pressure or coronary artery disease. Making changes to improve your heart condition will help you stay healthy and active. Follow-up care is a key part of your treatment and safety. Be sure to make and go to all appointments, and call your doctor if you are having problems. It's also a good idea to know your test results and keep a list of the medicines you take. How can you care for yourself at home? Medicines    · Take your medicines exactly as prescribed. Call your doctor if you think you are having a problem with your medicine. You will get more details on the specific medicines your doctor prescribes.     · If your doctor has given you a blood thinner to prevent a stroke, be sure you get instructions about how to take your medicine safely. Blood thinners can cause serious bleeding problems.     · Do not take any vitamins, over-the-counter drugs, or herbal products without talking to your doctor first.    Lifestyle changes    · Do not smoke. Smoking can increase your chance of a stroke and heart attack. If you need help quitting, talk to your doctor about stop-smoking programs and medicines. These can increase your chances of quitting for good.     · Eat a heart-healthy diet.     · Stay at a healthy weight. Lose weight if you need to.     · Limit alcohol to 2 drinks a day for men and 1 drink a day for women. Too much alcohol can cause health problems.     · Avoid colds and flu.  Get a pneumococcal vaccine shot. If you have had one before, ask your doctor whether you need another dose. Get a flu shot every year. If you must be around people with colds or flu, wash your hands often. Activity    · If your doctor recommends it, get more exercise. Walking is a good choice. Bit by bit, increase the amount you walk every day. Try for at least 30 minutes on most days of the week. You also may want to swim, bike, or do other activities. Your doctor may suggest that you join a cardiac rehabilitation program so that you can have help increasing your physical activity safely.     · Start light exercise if your doctor says it is okay. Even a small amount will help you get stronger, have more energy, and manage stress. Walking is an easy way to get exercise. Start out by walking a little more than you did in the hospital. Gradually increase the amount you walk.     · When you exercise, watch for signs that your heart is working too hard. You are pushing too hard if you cannot talk while you are exercising. If you become short of breath or dizzy or have chest pain, sit down and rest immediately.     · Check your pulse regularly. Place two fingers on the artery at the palm side of your wrist, in line with your thumb. If your heartbeat seems uneven or fast, talk to your doctor. When should you call for help? Call 911 anytime you think you may need emergency care. For example, call if:    · You have symptoms of a heart attack. These may include:  ? Chest pain or pressure, or a strange feeling in the chest.  ? Sweating. ? Shortness of breath. ? Nausea or vomiting. ? Pain, pressure, or a strange feeling in the back, neck, jaw, or upper belly or in one or both shoulders or arms. ? Lightheadedness or sudden weakness. ? A fast or irregular heartbeat. After you call 911, the  may tell you to chew 1 adult-strength or 2 to 4 low-dose aspirin. Wait for an ambulance.  Do not try to drive yourself.     · You have symptoms of a stroke. These may include:  ? Sudden numbness, tingling, weakness, or loss of movement in your face, arm, or leg, especially on only one side of your body. ? Sudden vision changes. ? Sudden trouble speaking. ? Sudden confusion or trouble understanding simple statements. ? Sudden problems with walking or balance. ? A sudden, severe headache that is different from past headaches.     · You passed out (lost consciousness).    Call your doctor now or seek immediate medical care if:    · You have new or increased shortness of breath.     · You feel dizzy or lightheaded, or you feel like you may faint.     · Your heart rate becomes irregular.     · You can feel your heart flutter in your chest or skip heartbeats. Tell your doctor if these symptoms are new or worse.    Watch closely for changes in your health, and be sure to contact your doctor if you have any problems. Where can you learn more? Go to http://fermin-lizbeth.info/. Enter U020 in the search box to learn more about \"Atrial Fibrillation: Care Instructions. \"  Current as of: April 9, 2019  Content Version: 12.2  © 8625-3755 [employer]. Care instructions adapted under license by [employer] (which disclaims liability or warranty for this information). If you have questions about a medical condition or this instruction, always ask your healthcare professional. [address] any warranty or liability for your use of this information. Patient Education        Heart Failure: Care Instructions  Your Care Instructions    Heart failure occurs when your heart does not pump as much blood as the body needs. Failure does not mean that the heart has stopped pumping but rather that it is not pumping as well as it should. Over time, this causes fluid buildup in your lungs and other parts of your body.  Fluid buildup can cause shortness of breath, fatigue, swollen ankles, and other problems. By taking medicines regularly, reducing sodium (salt) in your diet, checking your weight every day, and making lifestyle changes, you can feel better and live longer. Follow-up care is a key part of your treatment and safety. Be sure to make and go to all appointments, and call your doctor if you are having problems. It's also a good idea to know your test results and keep a list of the medicines you take. How can you care for yourself at home? Medicines    · Be safe with medicines. Take your medicines exactly as prescribed. Call your doctor if you think you are having a problem with your medicine.     · Do not take any vitamins, over-the-counter medicine, or herbal products without talking to your doctor first. Marques Gal not take ibuprofen (Advil or Motrin) and naproxen (Aleve) without talking to your doctor first. They could make your heart failure worse.     · You may take some of the following medicine. ? Angiotensin-converting enzyme inhibitors (ACEIs) or angiotensin II receptor blockers (ARBs) reduce the heart's workload, lower blood pressure, and reduce swelling. Taking an ACEI or ARB may lower your chance of needing to be hospitalized. ? Beta-blockers can slow heart rate, decrease blood pressure, and improve your condition. Taking a beta-blocker may lower your chance of needing to be hospitalized. ? Diuretics, also called water pills, reduce swelling.    You will get more details on the specific medicines your doctor prescribes. Diet    · Your doctor may suggest that you limit sodium. Your doctor can tell you how much sodium is right for you. An example is less than 3,000 mg a day. This includes all the salt you eat in cooking or in packaged foods. People get most of their sodium from processed foods. Fast food and restaurant meals also tend to be very high in sodium.     · Ask your doctor how much liquid you can drink each day.  You may have to limit liquids.   Carla Tapia    · Weigh yourself without clothing at the same time each day. Record your weight. Call your doctor if you have a sudden weight gain, such as more than 2 to 3 pounds in a day or 5 pounds in a week. (Your doctor may suggest a different range of weight gain.) A sudden weight gain may mean that your heart failure is getting worse.    Activity level    · Start light exercise (if your doctor says it is okay). Even if you can only do a small amount, exercise will help you get stronger, have more energy, and manage your weight and your stress. Walking is an easy way to get exercise. Start out by walking a little more than you did before. Bit by bit, increase the amount you walk.     · When you exercise, watch for signs that your heart is working too hard. You are pushing yourself too hard if you cannot talk while you are exercising. If you become short of breath or dizzy or have chest pain, stop, sit down, and rest.     · If you feel \"wiped out\" the day after you exercise, walk slower or for a shorter distance until you can work up to a better pace.     · Get enough rest at night. Sleeping with 1 or 2 pillows under your upper body and head may help you breathe easier.    Lifestyle changes    · Do not smoke. Smoking can make a heart condition worse. If you need help quitting, talk to your doctor about stop-smoking programs and medicines. These can increase your chances of quitting for good. Quitting smoking may be the most important step you can take to protect your heart.     · Limit alcohol to 2 drinks a day for men and 1 drink a day for women. Too much alcohol can cause health problems.     · Avoid getting sick from colds and the flu. Get a pneumococcal vaccine shot. If you have had one before, ask your doctor whether you need another dose. Get a flu shot each year. If you must be around people with colds or the flu, wash your hands often. When should you call for help?   Call 911 if you have symptoms of sudden heart failure such as:    · You have severe trouble breathing.     · You cough up pink, foamy mucus.     · You have a new irregular or rapid heartbeat.    Call your doctor now or seek immediate medical care if:    · You have new or increased shortness of breath.     · You are dizzy or lightheaded, or you feel like you may faint.     · You have sudden weight gain, such as more than 2 to 3 pounds in a day or 5 pounds in a week. (Your doctor may suggest a different range of weight gain.)     · You have increased swelling in your legs, ankles, or feet.     · You are suddenly so tired or weak that you cannot do your usual activities.    Watch closely for changes in your health, and be sure to contact your doctor if you develop new symptoms. Where can you learn more? Go to http://fermin-lizbeth.info/. Enter I672 in the search box to learn more about \"Heart Failure: Care Instructions. \"  Current as of: April 9, 2019  Content Version: 12.2  © 1566-6523 [employer]. Care instructions adapted under license by [employer] (which disclaims liability or warranty for this information). If you have questions about a medical condition or this instruction, always ask your healthcare professional. [address] any warranty or liability for your use of this information.

## 2019-09-25 NOTE — HOME CARE
Received HH referral, Discharge order noted for today, [facility] will follow for SN for wound care,PT/INR checks,PT,OT,HH Aide; pt states he has RW,cane,BSC and shower chair, pt's  Joanna Disla) states he's willing to learn wound care, [facility] will follow. CHIKIS CARVER.

## 2019-09-25 NOTE — PROGRESS NOTES
Home health orders noted. Office called, pt put in que. [place] of choice already on file for [employer]. Aliya Sawyer RN BSN Care Manager [phone]

## 2019-09-25 NOTE — DISCHARGE SUMMARY
Discharge Summary Patient: Joya Bowen MRN: [id number]  CSN: [id number] YOB: 1940  Age: 70 y.o. Sex: female DOA: 9/20/2019 LOS:  LOS: 5 days   Discharge Date:   
 
Admission Diagnoses: CHF (congestive heart failure) ([facility] [I50.9] UTI (urinary tract infection) [N39.0] Discharge Diagnoses:   
Problem List as of 9/25/2019 Date Reviewed: 5/16/2019 Codes Class Noted - Resolved CHF (congestive heart failure) (HCC) ICD-10-CM: I50.9 ICD-9-CM: 428.0  9/20/2019 - Present UTI (urinary tract infection) ICD-10-CM: N39.0 ICD-9-CM: 599.0  9/20/2019 - Present A-fib [facility]) ICD-10-CM: I48.91 
ICD-9-CM: 427.31  3/18/2019 - Present Chronic diastolic congestive heart failure ([facility] ICD-10-CM: I50.32 
ICD-9-CM: 428.32, 428.0  12/12/2017 - Present Aortic regurgitation ICD-10-CM: I35.1 ICD-9-CM: 424.1  6/13/2017 - Present Mitral regurgitation ICD-10-CM: I34.0 ICD-9-CM: 424.0  6/13/2017 - Present SOB (shortness of breath) ICD-10-CM: R06.02 
ICD-9-CM: 786.05  2/13/2017 - Present Mild HOCM (hypertrophic obstructive cardiomyopathy) ([facility]) ICD-10-CM: I42.1 ICD-9-CM: 425.11  1/12/2017 - Present Aortic stenosis ICD-10-CM: I35.0 ICD-9-CM: 424.1  1/12/2017 - Present Pulmonary HTN ([facility] ICD-10-CM: I27.20 ICD-9-CM: 416.8  12/21/2016 - Present Papillary thyroid carcinoma ([facility] ICD-10-CM: X70 ICD-9-CM: 021  12/21/2016 - Present Lung nodule ICD-10-CM: R91.1 ICD-9-CM: 793.11  11/4/2016 - Present Obesity (BMI 30.0-34.9) ICD-10-CM: C85.2 ICD-9-CM: 278.00  10/13/2016 - Present Pain and swelling of lower leg ICD-10-CM: M79.669, M79.89 ICD-9-CM: 729.5, 729.81  6/29/2015 - Present Discharge Condition: Stable PHYSICAL EXAM 
Visit Vitals /70 Pulse 68 Temp 97.3 °F (36.3 °C) Resp 20 Ht 5' 2\" (1.575 m) Wt 65.3 kg (144 lb) SpO2 97% Breastfeeding? No  
BMI 26.34 kg/m² General: A&Ox3, NAD. HEENT: Normocephalic, atraumatic. Cardiac:  Irregularly irregular rhythm. Harsh systolic murmur. No heaves or lifts. Lungs: Clear without any rales or rhonchi. Abdomen: Soft, nontender, no masses Extremities: 1+ BLE edema, 2+ DP pulses B Neuro: No acute neurologic deficit Hospital Course: *** Consults: Cardiology, Infectious disease, PT, OT Significant Diagnostic Studies:  
Echo (9/25/19) EKG (9/20/19) Atrial fibrillation Right bundle branch block T wave abnormality, consider inferior ischemia  
  
CXR (9/20/19) Stable cardiomegaly. Atherosclerosis. Discharge Medications:    
Current Discharge Medication List  
  
CONTINUE these medications which have NOT CHANGED Details  
metOLazone (ZAROXOLYN) 2.5 mg tablet Take 2 Tabs by mouth as needed (5 mg x 3 days then 2.5 mg qd). Qty: 45 Tab, Refills: 2  
  
telmisartan (MICARDIS) 40 mg tablet Take 40 mg by mouth daily. !! warfarin (COUMADIN) 5 mg tablet take 1 tablet by mouth once daily 
Qty: 90 Tab, Refills: 1 DIGITEK 125 mcg tablet take 1 tablet by mouth once daily 
Qty: 30 Tab, Refills: 4  
  
!! warfarin (COUMADIN) 1 mg tablet Take 1 Tab by mouth daily. Qty: 30 Tab, Refills: 3  
  
ondansetron hcl (ZOFRAN) 4 mg tablet Take 1 Tab by mouth every eight (8) hours as needed for Nausea. Qty: 30 Tab, Refills: 2 Associated Diagnoses: Nausea  
  
lisinopril (PRINIVIL, ZESTRIL) 20 mg tablet Take  by mouth daily. Calcium-Cholecalciferol, D3, (CALCIUM 600 WITH VITAMIN D3) 600 mg(1,500mg) -400 unit chew Take  by mouth. simvastatin (ZOCOR) 80 mg tablet TAKE 1 TABLET NIGHTLY Qty: 90 Tab, Refills: 1 Associated Diagnoses: Nonrheumatic aortic valve insufficiency  
  
hydroCHLOROthiazide (HYDRODIURIL) 25 mg tablet Take 25 mg by mouth daily. cholecalciferol (VITAMIN D3) 1,000 unit cap Take  by mouth daily. furosemide (LASIX) 40 mg tablet take 1 tablet by mouth once daily Qty: 30 Tab, Refills: 1 Comp. Stocking,Thigh,Reg,Medium misc 1 Package by Does Not Apply route daily. Qty: 2 Package, Refills: 0  
  
metoprolol succinate (TOPROL-XL) 100 mg tablet Take 1 Tab by mouth two (2) times a day. Qty: 60 Tab, Refills: 3  
  
losartan (COZAAR) 100 mg tablet Take 100 mg by mouth daily. montelukast (SINGULAIR) 10 mg tablet Take 10 mg by mouth daily. cyanocobalamin (VITAMIN B-12) 500 mcg tablet Take 500 mcg by mouth daily. folic acid (FOLVITE) 1 mg tablet Take  by mouth daily. CETIRIZINE HCL (ZYRTEC PO) Take  by mouth. !! - Potential duplicate medications found. Please discuss with provider. STOP taking these medications  
  
 traMADol (ULTRAM) 50 mg tablet Comments:  
Reason for Stopping:   
   
  
 
 
Activity: activity as tolerated Diet: Cardiac Diet Wound Care: Home health for wound care Follow-up: with PCP, Isis Reynolds MD in 7-10days

## 2019-09-25 NOTE — PROGRESS NOTES
Discharge order noted for today. Pt has been accepted to [facility] agency. Met with patient  and are agreeable to the transition plan today. Transport has been arranged through spouse. Patient's discharge summary and home health  orders have been forwarded to [address] via [facility] [address]. Updated bedside RN,  to the transition plan. Discharge information has been documented on the AVS. Meaghan Lomas RN BSN Care Manager [phone]

## 2019-09-25 NOTE — PROGRESS NOTES
Infectious Disease progress Note Requested by: dr. Mala Castellanos Reason: cellulitis LE Current abx Prior abx Cephalexin, nitrofurantoin since 9/20/19 Lines:  
 
 
Assessment : 
 
 
70 y.o.  female who has h/o hypertension, cardiomyopathy, thyroid cancer, PAF, venous stasis ulcer admitted to [facility] on 9/20/19 for shortness of breath, increased LE swelling. Clinical picture c/w right leg cellulitis superimposed on LE edema due to CHF/venous stasis. Patient seems to be responding to current antibiotics. <10,000 proteus in urine cultures 9/20 likely colonizer. No dysuria or s/s to suggest cystitis. Clinically better Recommendations: 1. Continue po cephalexin till 9/27/19.  
2. F/u wound care recommendations Ok to d/c patient from Id standpoint Advance Care planning: full code: discussed  with patient/surrogate decision maker: baileydaviseder taylor: [phone] Above plan was discussed in details with patient, family and dr Mala Castellanos. Please call me if any further questions or concerns. Will continue to participate in the care of this patient. HPI: 
 
 Patient feels better now. Improved pain right leg. Patient denies headaches, visual disturbances, sore throat, runny nose, earaches, cp, sob, chills, cough, abdominal pain, diarrhea, burning micturition,  or weakness in extremities. He denies back pain/flank pain. home Medication List  
 Details  
metOLazone (ZAROXOLYN) 2.5 mg tablet Take 2 Tabs by mouth as needed (5 mg x 3 days then 2.5 mg qd). Qty: 45 Tab, Refills: 2  
  
telmisartan (MICARDIS) 40 mg tablet Take 40 mg by mouth daily. !! warfarin (COUMADIN) 5 mg tablet take 1 tablet by mouth once daily 
Qty: 90 Tab, Refills: 1 DIGITEK 125 mcg tablet take 1 tablet by mouth once daily 
Qty: 30 Tab, Refills: 4  
  
!! warfarin (COUMADIN) 1 mg tablet Take 1 Tab by mouth daily. Qty: 30 Tab, Refills: 3 ondansetron hcl (ZOFRAN) 4 mg tablet Take 1 Tab by mouth every eight (8) hours as needed for Nausea. Qty: 30 Tab, Refills: 2 Associated Diagnoses: Nausea  
  
lisinopril (PRINIVIL, ZESTRIL) 20 mg tablet Take  by mouth daily. Calcium-Cholecalciferol, D3, (CALCIUM 600 WITH VITAMIN D3) 600 mg(1,500mg) -400 unit chew Take  by mouth. simvastatin (ZOCOR) 80 mg tablet TAKE 1 TABLET NIGHTLY Qty: 90 Tab, Refills: 1 Associated Diagnoses: Nonrheumatic aortic valve insufficiency  
  
hydroCHLOROthiazide (HYDRODIURIL) 25 mg tablet Take 25 mg by mouth daily. cholecalciferol (VITAMIN D3) 1,000 unit cap Take  by mouth daily. furosemide (LASIX) 40 mg tablet take 1 tablet by mouth once daily 
Qty: 30 Tab, Refills: 1 Comp. Stocking,Thigh,Reg,Medium misc 1 Package by Does Not Apply route daily. Qty: 2 Package, Refills: 0  
  
metoprolol succinate (TOPROL-XL) 100 mg tablet Take 1 Tab by mouth two (2) times a day. Qty: 60 Tab, Refills: 3  
  
losartan (COZAAR) 100 mg tablet Take 100 mg by mouth daily. montelukast (SINGULAIR) 10 mg tablet Take 10 mg by mouth daily. cyanocobalamin (VITAMIN B-12) 500 mcg tablet Take 500 mcg by mouth daily. folic acid (FOLVITE) 1 mg tablet Take  by mouth daily. CETIRIZINE HCL (ZYRTEC PO) Take  by mouth. Current Facility-Administered Medications Medication Dose Route Frequency  furosemide (LASIX) tablet 40 mg  40 mg Oral DAILY  cephALEXin (KEFLEX) capsule 500 mg  500 mg Oral Q6H  
 lactobacillus sp. 50 billion cpu (BIO-K PLUS) capsule 1 Cap  1 Cap Oral DAILY  WARFARIN INFORMATION NOTE (COUMADIN)= Physician to dose   Other Q24H  
 gabapentin (NEURONTIN) capsule 100 mg  100 mg Oral QHS  folic acid (FOLVITE) tablet 1 mg  1 mg Oral DAILY  cyanocobalamin (VITAMIN B12) tablet 500 mcg  500 mcg Oral DAILY  metoprolol succinate (TOPROL-XL) tablet 100 mg  100 mg Oral BID  montelukast (SINGULAIR) tablet 10 mg  10 mg Oral DAILY  simvastatin (ZOCOR) tablet 80 mg  80 mg Oral QHS  famotidine (PEPCID) tablet 20 mg  20 mg Oral DAILY Allergies: Patient has no known allergies. Temp (24hrs), Av.8 °F (36.6 °C), Min:97 °F (36.1 °C), Max:98.2 °F (36.8 °C) Visit Vitals /70 Pulse 68 Temp 97.3 °F (36.3 °C) Resp 20 Ht 5' 2\" (1.575 m) Wt 65.3 kg (144 lb) SpO2 97% Breastfeeding? No  
BMI 26.34 kg/m² ROS: 12 point ROS obtained in details. Pertinent positives as mentioned in HPI,  
otherwise negative Physical Exam: 
 
 
General:  Alert, no distress, appears stated age. Head:  Normocephalic, without obvious abnormality, atraumatic. Eyes:  Conjunctivae/corneas clear. PERRL, EOMs intact. Nose: Nares normal. No drainage or sinus tenderness. Throat: Lips, mucosa, and tongue normal.   
Neck: Supple, symmetrical, trachea midline, no adenopathy, thyroid: no enlargement/tenderness/nodules, no carotid bruit and no JVD. Back:   ROM normal. No CVA tenderness. Lungs:   Clear to auscultation bilaterally. Chest wall:  No tenderness or deformity. Heart:  Regular rate and rhythm, S1, S2 normal, systolic  Murmur aortic area, no click, rub or gallop. Abdomen: Soft, non-tender. Bowel sounds normal. No masses,  No organomegaly. Extremities:  chronic skin changes both legs. Minimal tenderness right leg with dried blood. Erythema receded from previously marked margins. Pulses: 2+ and symmetric all extremities. Skin: Skin color, texture, turgor normal.  Venous stasis ulcer b/L Leg Neurologic: CNII-XII intact. No focal motor or sensory deficit.  
  
 
 
Labs: Results:  
Chemistry Recent Labs  
  19 
0404 19 
1633 19 
4708 GLU 86 109* 93 * 132* 136  
K 3.7 3.7 3.7 CL 94* 96* 98* CO2 30 34* 34* BUN 40* 41* 31* CREA 1.36* 1.47* 1.18  
CA 8.9 8.9 9.9 AGAP 6 2* 4 BUCR 29* 28* 26* * 228* 273* TP 7.6 7.3 8.8* ALB 2.5* 2.4* 3.0*  
GLOB 5.1* 4.9* 5.8*  
 AGRAT 0.5* 0.5* 0.5* CBC w/Diff Recent Labs  
  09/25/19 
0404 09/24/19 
0328 09/23/19 
0898 WBC 5.1 6.7 6.1 RBC 4.21 4.06* 4.67 HGB 11.0* 10.9* 12.3 HCT 34.7* 33.7* 38.8  273 304 GRANS 61 62 64 LYMPH 24 25 24 EOS 3 2 2 Microbiology No results for input(s): CULT in the last 72 hours. RADIOLOGY: 
 
All available imaging studies/reports in [facility] for this admission were reviewed Dr. Karla Best, Infectious Disease Specialist 
[phone] September 25, 2019 
10:07 AM

## 2019-09-25 NOTE — PROGRESS NOTES
Bedside and Verbal shift change report given to Donal Flores RN (oncoming nurse) by Jet Dubois (offgoing nurse). Report included the following information SBAR, Kardex, Intake/Output, MAR, Recent Results, Med Rec Status and Cardiac Rhythm A. FIB.

## 2019-09-25 NOTE — PROGRESS NOTES
[facility] 
 
 
CARDIOLOGY PROGRESS NOTE 
RECS: 
 
 
1. Acute on chronic diastolic congestive heart failure-improving well with diuresis. 2. Severe lower extremity edema-improved significantly. Continue Lasix po.  
3. Hypertension- stable on bb.  monitor with diuresis 4. Hypertrophic obstructive cardiomyopathy-S/p Septal Ablation. S/p permanent pacemaker 7/19 5. Persistent Atrial fibrillation-rate controlled. Continue  coumadin. Follow INR in office on Friday 6. Mitral regurgitation 7. Pulmonary hypertension- recent echo PAP 69 mmHg 8. BLE edema with venous stasis- continue wound care also on keflex per medical team 
9. Aortic stenosis - Moderate with Aortic valve mean gradient is 28.5 mmHg. Aortic valve area is 0.9 cm2 
  
AS progressing moderate. Considering CHF, severe pulmonary hypertension, it is prudent to consider aortic valve replacement. Discussed with patient and she will follow-up with Dr. Ferdinand Gomez in the outpatient setting and consider it. In the meantime continue treatment with aggressive diuresis. Follow pulmonary hypertension closely and consider right heart cath after TAVR. Okay for discharge today from cardiac standpoint. Discussed with Dr. Shanna Miller. Echo 9/25/19 · Left Ventricle: Normal cavity size and systolic function (ejection fraction normal). Moderate concentric hypertrophy. Estimated left ventricular ejection fraction is 61 - 65%. Abnormal left ventricular septal motion consistent with right ventricular pacing. Interventricular septal \"bounce\". Abnormal left ventricular strain. Inconclusive left ventricular diastolic function. · Right Ventricle: Mildly reduced systolic function. Abnormal right ventricular septal motion. Diastolic flattening of interventricular septum consistent with right ventricle volume overload. · Left Atrium: Severely dilated left atrium. · Right Atrium: Dilated right atrium. · Pulmonic Valve: Mild pulmonic valve regurgitation is present. · Tricuspid Valve: Moderate tricuspid valve regurgitation is present. · Pulmonary Artery: Moderate pulmonary hypertension. Pulmonary arterial systolic pressure is 69 mmHg. · Aortic Valve: Probably trileaflet aortic valve. Aortic valve leaflet calcification present with reduced excursion. Aortic valve peak gradient is 47.2 mmHg. Aortic valve mean gradient is 28.5 mmHg. Aortic valve area is 0.9 cm2. Moderate aortic valve stenosis is present. Mild aortic valve regurgitation is present. · Moderate mitral annular calcification ASSESSMENT: 
Hospital Problems  Date Reviewed: 5/16/2019 Codes Class Noted POA  
 CHF (congestive heart failure) ([facility] [address] ICD-10-CM: I50.9 ICD-9-CM: 428.0  9/20/2019 Unknown UTI (urinary tract infection) ICD-10-CM: N39.0 ICD-9-CM: 599.0  9/20/2019 Unknown SUBJECTIVE: 
No CP Improving SOB significantly. Improving edema quite well. OBJECTIVE: 
 
VS:  
Visit Vitals /70 (BP 1 Location: Left arm, BP Patient Position: At rest) Pulse 76 Temp 97.2 °F (36.2 °C) Resp 20 Ht 5' 2\" (1.575 m) Wt 65.3 kg (144 lb) SpO2 98% Breastfeeding? No  
BMI 26.34 kg/m² Intake/Output Summary (Last 24 hours) at 9/25/2019 1210 Last data filed at 9/25/2019 3187 Gross per 24 hour Intake 1360 ml Output 2500 ml Net -1140 ml  
 
TELE: AFIB rate controlled. General: alert, well developed, dyspneic, pleasant and in no apparent distress HENT: Normocephalic, atraumatic. Normal external eye. Neck :  increased JVP Cardiac:  irregularly irregular rhythm, S1, S2 normal, no click, no rub Lungs: Clear without any rales or rhonchi. Abdomen: Soft, nontender, no masses Extremities: 1+ BLE, improving well,  peripheral pulses present Labs: Results:  
   
Chemistry Recent Labs  
  09/25/19 
0404 09/24/19 
0261 09/23/19 
4343 GLU 86 109* 93 * 132* 136  
K 3.7 3.7 3.7 CL 94* 96* 98* CO2 30 34* 34* BUN 40* 41* 31* CREA 1.36* 1.47* 1.18  
CA 8.9 8.9 9.9 AGAP 6 2* 4 BUCR 29* 28* 26* * 228* 273* TP 7.6 7.3 8.8* ALB 2.5* 2.4* 3.0*  
GLOB 5.1* 4.9* 5.8* AGRAT 0.5* 0.5* 0.5* CBC w/Diff Recent Labs  
  09/25/19 
0404 09/24/19 
0328 09/23/19 
3792 WBC 5.1 6.7 6.1 RBC 4.21 4.06* 4.67 HGB 11.0* 10.9* 12.3 HCT 34.7* 33.7* 38.8  273 304 GRANS 61 62 64 LYMPH 24 25 24 EOS 3 2 2 Cardiac Enzymes No results for input(s): CPK, CKND1, JONI in the last 72 hours. No lab exists for component: Marchia Kulwinder Coagulation Recent Labs  
  09/25/19 
0404 09/24/19 
0328 PTP 19.5* 21.3* INR 1.7* 1.9* Lipid Panel Lab Results Component Value Date/Time Cholesterol, total 142 10/12/2017 10:40 AM  
 HDL Cholesterol 43 10/12/2017 10:40 AM  
 LDL, calculated 82 10/12/2017 10:40 AM  
 VLDL, calculated 14.6 11/20/2009 10:37 AM  
 Triglyceride 86 10/12/2017 10:40 AM  
 CHOL/HDL Ratio 3.9 11/20/2009 10:37 AM  
  
BNP No results for input(s): BNPP in the last 72 hours. Liver Enzymes Recent Labs  
  09/25/19 
0404 TP 7.6 ALB 2.5* * SGOT 25 Thyroid Studies Lab Results Component Value Date/Time TSH 2.32 09/22/2019 06:07 AM  
    
 
 
 
NEO Gillis I have independently evaluated and examined the patient. All relevant labs and testing data's are reviewed. Care plan discussed and updated after review.  
Talisha Smith MD

## 2019-09-25 NOTE — DISCHARGE SUMMARY
Discharge Summary Patient: Kiara Ames MRN: [id number]  SSN: [id number] YOB: 1940  Age: 70 y.o. Sex: female Admit Date: 9/20/2019 Discharge Date: 9/25/2019 Admission Diagnoses: CHF (congestive heart failure) ([facility] [I50.9] UTI (urinary tract infection) [N39.0] Discharge Diagnoses:  
Problem List as of 9/25/2019 Date Reviewed: 5/16/2019 Codes Class Noted - Resolved CHF (congestive heart failure) ([facility]) ICD-10-CM: I50.9 ICD-9-CM: 428.0  9/20/2019 - Present UTI (urinary tract infection) ICD-10-CM: N39.0 ICD-9-CM: 599.0  9/20/2019 - Present A-fib [facility]) ICD-10-CM: I48.91 
ICD-9-CM: 427.31  3/18/2019 - Present Chronic diastolic congestive heart failure ([facility] ICD-10-CM: I50.32 
ICD-9-CM: 428.32, 428.0  12/12/2017 - Present Aortic regurgitation ICD-10-CM: I35.1 ICD-9-CM: 424.1  6/13/2017 - Present Mitral regurgitation ICD-10-CM: I34.0 ICD-9-CM: 424.0  6/13/2017 - Present SOB (shortness of breath) ICD-10-CM: R06.02 
ICD-9-CM: 786.05  2/13/2017 - Present Mild HOCM (hypertrophic obstructive cardiomyopathy) ([facility]) ICD-10-CM: I42.1 ICD-9-CM: 425.11  1/12/2017 - Present Aortic stenosis ICD-10-CM: I35.0 ICD-9-CM: 424.1  1/12/2017 - Present Pulmonary HTN ([facility] ICD-10-CM: I27.20 ICD-9-CM: 416.8  12/21/2016 - Present Papillary thyroid carcinoma ([facility] ICD-10-CM: Y25 ICD-9-CM: 436  12/21/2016 - Present Lung nodule ICD-10-CM: R91.1 ICD-9-CM: 793.11  11/4/2016 - Present Obesity (BMI 30.0-34.9) ICD-10-CM: Z45.8 ICD-9-CM: 278.00  10/13/2016 - Present Pain and swelling of lower leg ICD-10-CM: M79.669, M79.89 ICD-9-CM: 729.5, 729.81  6/29/2015 - Present Discharge Condition: Stable Hospital Course: Dahlia Em a 70 y.o.  female who has h/o hypertension cardiomyopathy thyroid cancer PAF venous stasis ulcer . 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  
  
Pt has been monitor by cardiology Dr Dk Sibley has been on coumadin for PAF.  
  
Pt was sent to ER wednesday  By Dr Yvonne Moran due to increase swelling lower extremities and orthopnea for 2-3 weeks  , Pt was seen Dutch MARTINEZ referred to wound clinic and started on keflex for venous  stasis  Ulcer. 
  
Pt was sent again yesterday to ER by Dr Dk Sibley for admission due to worsening heart failure. 
  
Pt has h/o septal ablation for HOCM 
  
Pt reported her lower extremities swelling getting worse for the alst 2 weeks  And venous stasis also has been getting worse 
  
Pt in the ER started on Macrobid due to UTI and admitted for further evaluation. Pt had EKG CXR Given one dose of lasix . Troponin I 0.22 
  
 pt started on Lasix 3 mg /h, -240ml for past 24hrs recorded however -8.151L for this hospitalization. Denies shortness of breath or chest pain, reports lower extremity edema has improved and able to walk better with PT. 
  
INR coming down discussed with cardiology, had been given 2.5mg daily  
 
cardiology NP [facility], plan to transition patient to PO lasix, cardiology has been monitor pt and INR she advised 5 mg x 2 days f/u coumadin clinic Friday  to adjust coumadin dose  
 
 
  
Urine culture not siginificant will  Macrobid discontinued. pt had ID consult with Dr Susu ibarra for RLE cellulitis per ID for 3 more days along with probiotics. 
  
Acute systolic CHF on top chronic diastolic CHF, mildly elevated troponins likely due to demand ischemia in setting of Acute systolic CHF; Afib, Mitral Regurgitation, Pulm HTN Lasix was switched to oral lasix Follow up echo, patient with history of hypertrophic obstructive cardiomyopathy, MR, Pulm HTN 
 INR 1.7 today  
 discussed with cardiology NP Will give Coumadin 5 mg po x 2 days then check INR with with coumadin clinic to adjust coumadin dose Repeat cardiac enzymes negative TSH 2.32 
 discussed with cardiology NP we need to stop digoxin 
  
Hypertension Pt used to be on Cozaar and HCTZ Hold med and monitor Bp Continue toprol XL continue to monitor bp 
  
Hyperlipidemia  
zocor 80 mg QHS 
LFTs stable 
  
UTI ruled out 
urine culture less than 10,000 proteus, macrobid discontinued 
  
Venous stasis ulcers, RLE cellulitis Continues keflex for RLE cellulitis per ID Discussed with Dr Aisha Victoria before discharge will continue Keflex for 3 more days f/u wound care as outpatinet Wound care Consults: Cardiology and Infectious Disease Significant Diagnostic Studies:  
 
Stable cardiomegaly. Atherosclerosis. EKG Atrial fibrillation Right bundle branch block T wave abnormality, consider inferior ischemia  
  
 
Echo result pending/ cardiology will check pt before discharge Physical Examination:  
General:  Alert, no distress. Head:  Normocephalic, without obvious abnormality, atraumatic. Eyes:  Conjunctivae/corneas clear. Nose: Nares normal. No drainage or sinus tenderness. Throat: Lips, mucosa, and tongue normal.   
Neck: Supple, symmetrical, trachea midline, no adenopathy. Back:   ROM normal. No CVA tenderness. Lungs:   Clear to auscultation bilaterally. Chest wall:  No tenderness or deformity. Heart:   Irregular, holosystolic murmur  
  Abdomen: Soft, non-tender. Bowel sounds normal.  
Extremities: B/L venous stasis ulcer and Rt leg cellulitis Pulses: 2+ and symmetric all extremities. Skin: Skin color, texture, turgor normal.  Venous stasis ulcer b/L Leg Neurologic: CNII-XII intact. No focal motor or sensory deficit.  
  
 
Disposition: Home with home health Discharge Medications:  
Current Discharge Medication List  
  
START taking these medications Details  
cephALEXin (KEFLEX) 500 mg capsule Take 1 Cap by mouth every six (6) hours. Qty: 12 Cap, Refills: 0  
  
famotidine (PEPCID) 20 mg tablet Take 1 Tab by mouth daily. Qty: 30 Tab, Refills: 0  
  
gabapentin (NEURONTIN) 100 mg capsule Take 1 Cap by mouth nightly. Max Daily Amount: 100 mg. Qty: 30 Cap, Refills: 0 Associated Diagnoses: Pain and swelling of lower leg, unspecified laterality  
  
lactobacillus sp. 50 billion cpu (BIO-K PLUS) 50 billion cell -375 mg cap capsule Take 1 Cap by mouth daily. Qty: 10 Cap, Refills: 0 CONTINUE these medications which have NOT CHANGED Details  
warfarin (COUMADIN) 5 mg tablet take 1 tablet by mouth once daily 
Qty: 90 Tab, Refills: 1  
  
ondansetron hcl (ZOFRAN) 4 mg tablet Take 1 Tab by mouth every eight (8) hours as needed for Nausea. Qty: 30 Tab, Refills: 2 Associated Diagnoses: Nausea Calcium-Cholecalciferol, D3, (CALCIUM 600 WITH VITAMIN D3) 600 mg(1,500mg) -400 unit chew Take  by mouth. simvastatin (ZOCOR) 80 mg tablet TAKE 1 TABLET NIGHTLY Qty: 90 Tab, Refills: 1 Associated Diagnoses: Nonrheumatic aortic valve insufficiency  
  
cholecalciferol (VITAMIN D3) 1,000 unit cap Take  by mouth daily. furosemide (LASIX) 40 mg tablet take 1 tablet by mouth once daily 
Qty: 30 Tab, Refills: 1  
  
metoprolol succinate (TOPROL-XL) 100 mg tablet Take 1 Tab by mouth two (2) times a day. Qty: 60 Tab, Refills: 3  
  
losartan (COZAAR) 100 mg tablet Take 100 mg by mouth daily. montelukast (SINGULAIR) 10 mg tablet Take 10 mg by mouth daily. cyanocobalamin (VITAMIN B-12) 500 mcg tablet Take 500 mcg by mouth daily. folic acid (FOLVITE) 1 mg tablet Take  by mouth daily. CETIRIZINE HCL (ZYRTEC PO) Take  by mouth. STOP taking these medications  
  
 metOLazone (ZAROXOLYN) 2.5 mg tablet Comments:  
Reason for Stopping:   
   
 traMADol (ULTRAM) 50 mg tablet Comments:  
Reason for Stopping:   
   
 telmisartan (MICARDIS) 40 mg tablet Comments:  
Reason for Stopping: DIGITEK 125 mcg tablet Comments:  
Reason for Stopping:   
   
 lisinopril (PRINIVIL, ZESTRIL) 20 mg tablet Comments:  
Reason for Stopping:   
   
 hydroCHLOROthiazide (HYDRODIURIL) 25 mg tablet Comments:  
Reason for Stopping:   
   
 Comp. Stocking,Thigh,Reg,Medium misc Comments:  
Reason for Stopping:   
   
  
 
 
Activity: Activity as tolerated Diet: Cardiac Diet Wound Care: As directed Follow-up Appointments Procedures  FOLLOW UP VISIT Appointment in: 3 - 5 Days F/u Dr. Bernardo Hawkins Crimes within one week she needs f/u thyroid ultrasound ENT H/o abnormal thyroid test F/u coumadin clinic within 48 h per cardiology NP recommendations to adjust coumadin dose  F... F/u Dr. Bernardo Hawkins Crimes within one week she needs f/u thyroid ultrasound ENT H/o abnormal thyroid test 
F/u coumadin clinic within 48 h per cardiology NP recommendations to adjust coumadin dose  F/u Dr Shruthi Rodriguez in 1-2 weeks cbc, cmp, mag , tsh and free t4 within 48h . F/u wound care with home health pt and ot medication mangement Standing Status:   Standing Number of Occurrences:   1 Order Specific Question:   Appointment in Answer:   3 - 5 Days  
time for discharge pt more than 40 minutes included discussion with pt , nursing staff medication reconciliation discussion with ID cardiology NP documentations case manger Signed By: Aidee Tate MD   
 September 25, 2019

## 2019-09-26 ENCOUNTER — HOME CARE VISIT (OUTPATIENT)
Dept: SCHEDULING | Facility: HOME HEALTH | Age: 79
End: 2019-09-26
Payer: MEDICARE

## 2019-09-26 ENCOUNTER — TELEPHONE ANTICOAG (OUTPATIENT)
Dept: CARDIOLOGY CLINIC | Age: 79
End: 2019-09-26

## 2019-09-26 VITALS
TEMPERATURE: 97.8 F | RESPIRATION RATE: 16 BRPM | HEART RATE: 77 BPM | SYSTOLIC BLOOD PRESSURE: 125 MMHG | OXYGEN SATURATION: 94 % | DIASTOLIC BLOOD PRESSURE: 71 MMHG

## 2019-09-26 DIAGNOSIS — I48.20 CHRONIC ATRIAL FIBRILLATION (HCC): ICD-10-CM

## 2019-09-26 LAB
INR BLD: 1.5 (ref 0.9–1.1)
INR, EXTERNAL: 1.5 (ref 2–3)
PT POC: 17.9 SECONDS (ref 11.8–14.9)

## 2019-09-26 PROCEDURE — 3331090001 HH PPS REVENUE CREDIT

## 2019-09-26 PROCEDURE — G0299 HHS/HOSPICE OF RN EA 15 MIN: HCPCS

## 2019-09-26 PROCEDURE — 400013 HH SOC

## 2019-09-26 PROCEDURE — 3331090002 HH PPS REVENUE DEBIT

## 2019-09-26 NOTE — PROGRESS NOTES
Ms. Pricila Vitale is here today for anticoagulation monitoring for the diagnosis of Atrial Fibrillation. Her INR goal is 2.0-3.0 and her current Coumadin dose is 5 mg and 2.5 mg. Today's findings include an INR of 1.5 Considering Ms. Daugherty's past history, todays findings, and per the coumadin policy/protocol, Ms. Daugherty was instructed to take Coumadin as follows,  Take 5 mg tonight, 2.5 mg tomorrow and keep alternating. She was also instructed to come back on 09/30/19. A full discussion of the nature of anticoagulants has been carried out. A full discussion of the need for frequent and regular monitoring, precise dosage adjustment and compliance was stressed. Side effects of potential bleeding were discussed and Ms. Daugherty was instructed to call [phone] if there are any signs of abnormal bleeding. Ms. Kamran Ramon was instructed to avoid any OTC items containing aspirin or ibuprofen and prior to starting any new OTC products to consult with her physician or pharmacist to ensure no drug interactions are present. Ms. Kamran Ramon was instructed to avoid any major changes in her general diet and to avoid alcohol consumption. . 
 
 
Ms. Daugherty verbalized her understanding of all instructions and will call the office with any questions, concerns, or signs of abnormal bleeding or blood clot.

## 2019-09-27 ENCOUNTER — PATIENT OUTREACH (OUTPATIENT)
Dept: CARDIOLOGY CLINIC | Age: 79
End: 2019-09-27

## 2019-09-27 ENCOUNTER — HOME CARE VISIT (OUTPATIENT)
Dept: SCHEDULING | Facility: HOME HEALTH | Age: 79
End: 2019-09-27
Payer: MEDICARE

## 2019-09-27 ENCOUNTER — HOME CARE VISIT (OUTPATIENT)
Dept: HOME HEALTH SERVICES | Facility: HOME HEALTH | Age: 79
End: 2019-09-27
Payer: MEDICARE

## 2019-09-27 VITALS
HEART RATE: 76 BPM | SYSTOLIC BLOOD PRESSURE: 120 MMHG | TEMPERATURE: 97.4 F | RESPIRATION RATE: 16 BRPM | DIASTOLIC BLOOD PRESSURE: 80 MMHG

## 2019-09-27 LAB
BACTERIA SPEC CULT: NORMAL
BACTERIA SPEC CULT: NORMAL
SERVICE CMNT-IMP: NORMAL
SERVICE CMNT-IMP: NORMAL

## 2019-09-27 PROCEDURE — 3331090001 HH PPS REVENUE CREDIT

## 2019-09-27 PROCEDURE — 3331090002 HH PPS REVENUE DEBIT

## 2019-09-27 PROCEDURE — G0151 HHCP-SERV OF PT,EA 15 MIN: HCPCS

## 2019-09-27 NOTE — PROGRESS NOTES
Heart Failure Follow Up Call NN contacted the patient by telephone to perform CHF Follow Up. Verified  and address as identifiers. Mateo Daugherty reports feeling much better since she's gotten home. States swelling in her legs has continued to decrease and her breathing has remained good. Daily Weight:  Amount:   Pt reports that she does not weigh daily. Educated on importance of daily weights and logging, pt stated understanding. Zone:(Pt Reported)  green Goals  Knowledge and adherence medication (ie. action, side effects, missed dose, etc.)   
  19 Pt will take all medications prescribed to be evaluated on each outreach through  19  Maintains daily weight.   
  19  Pt will weight themselves daily and log the results to be evaluated on each outreach through 19  Prepare patients and caregivers for end of life decisions (ie. need for hospice, pain management, symptom relief, advance directives etc.)   
  19 Pt will complete an ACP and have it scanned into their EMR by 19 Needs addressed from pathway:   
24-48 Hours- or initial contact Review/Verification: 
? Identified care team 
? Disposition: Home ? Reviewed DC Instructions, Education, and HF Zones ? Verified [address] in place. ? Evaluated DME needs; arrange home equipment: No needs at this time ? Reviewed Advanced care planning ? Labs/Diagnostics to follow per MD office ? Follow-up apts are arranged ? Identified transportation Med Rec ? Ace/Arb: Losartan 100 mg qd  
? Diuretic: Lasix 40 mg qd ? Beta Blocker: Toprol  mg BID ? Potassium: none ? Anticoagulant: Coumadin 5 mg per cardiology clinic 
? Other: Zocor 80 mg qhs 
 
Baseline Labs available in ERM 
? BMP 
? BUN/Creat. 
? PT/INR 
? Hgb/Hct ? WBC 
? NT Pro-BNP ? Pt declined med rec, stated completed w/ HH on call today. TONY Documentation: 
? Goals ? Challenges/Plan ? Weight   
? Edema ? Symptoms Education Disease Management: 
? Reviewed Cardiac Low-sodium Diet ? Reviewed importance of Fluid restriction ? Comorbidity Management ? Reviewed importance of Daily Weights ? Advance medical directive status PCP/Specialist follow up:  
Future Appointments Date Time Provider Harman Kruger 9/30/2019 To Be Determined Isidoro DANIELS, RN [address] [facility]  
10/1/2019 To Be Determined [address]  
10/1/2019  1:15 PM Marc Vincent MD [facility] LEANNE SCHED  
10/3/2019 To Be Determined Isidoro DANIELS, RN [address] [facility]  
10/4/2019 To Be Determined [address]  
10/7/2019 To Be Determined Isidoro DANIELS RN [address] [facility]  
10/8/2019 To Be Determined [address]  
10/10/2019 To Be Determined Isidoro DANIELS RN [address] [facility]  
10/11/2019 To Be Determined [place] [address]  
10/15/2019 To Be Determined [address]  
10/15/2019 To Be Determined Isidoro DANIELS RN [address] [facility]  
10/15/2019  1:10 PM CA PTINR [facility] LEANNE SCHED  
10/18/2019 To Be Determined [place] [address]  
10/22/2019 To Be Determined [address]  
10/22/2019 To Be Determined Isidoro DANIELS RN [address] [facility]  
10/25/2019 To Be Determined [place] [address]  
10/29/2019 To Be Determined [address]  
10/29/2019 To Be Determined Isidoro DANIELS RN [address] [facility]  
11/1/2019 To Be Determined [address]  
11/5/2019 To Be Determined [address]  
11/5/2019 To Be Determined Isidoro DANIELS RN [address] [facility]  
11/8/2019 To Be Determined [address]  
11/12/2019 To Be Determined Isidoro DANIELS, RN [address] [facility]  
11/18/2019  8:45 AM [facility]  
11/21/2019 To Be Determined Kimberly Greenwood, RN [address]  
 2/18/2020  9:00 AM [facility]ANOS LEANNE SRINIVASAN Cardiologist consult while IP: yes Palliative consult while IP:    No  
 
EF: 61-65% on 9/25/19 Type of HF:   HFpEF Cardiac Device present: pacemaker Disposition of patient:  Home, Home Health [facility]/Tele Monitoring:  Home Health/H Social support: spouse/family Does patient have a Scale:    No  
How often do they weigh:  no 
 
Does patient have an Advance Directive:  not on file , pt educated on imporance Advance Directive scanned into patients chart:  No  
 
Patient reminded that there are physicians on call 24 hours a day / 7 days a week (M-F 5pm to 8am and from Friday 5pm until Monday 8a for the weekend) should the patient have questions or concerns. Patient reminded to call 911 if situation is emergent or patient feels the situation is emergent. Pt verbalizes understanding.

## 2019-09-28 PROCEDURE — 3331090001 HH PPS REVENUE CREDIT

## 2019-09-28 PROCEDURE — 3331090002 HH PPS REVENUE DEBIT

## 2019-09-29 PROCEDURE — 3331090001 HH PPS REVENUE CREDIT

## 2019-09-29 PROCEDURE — 3331090002 HH PPS REVENUE DEBIT

## 2019-09-30 ENCOUNTER — HOME CARE VISIT (OUTPATIENT)
Dept: SCHEDULING | Facility: HOME HEALTH | Age: 79
End: 2019-09-30
Payer: MEDICARE

## 2019-09-30 ENCOUNTER — TELEPHONE ANTICOAG (OUTPATIENT)
Dept: CARDIOLOGY CLINIC | Age: 79
End: 2019-09-30

## 2019-09-30 ENCOUNTER — HOME CARE VISIT (OUTPATIENT)
Dept: HOME HEALTH SERVICES | Facility: HOME HEALTH | Age: 79
End: 2019-09-30
Payer: MEDICARE

## 2019-09-30 DIAGNOSIS — I48.0 PAROXYSMAL ATRIAL FIBRILLATION (HCC): ICD-10-CM

## 2019-09-30 LAB — INR, EXTERNAL: 1.6

## 2019-09-30 PROCEDURE — 3331090002 HH PPS REVENUE DEBIT

## 2019-09-30 PROCEDURE — G0299 HHS/HOSPICE OF RN EA 15 MIN: HCPCS

## 2019-09-30 PROCEDURE — 3331090001 HH PPS REVENUE CREDIT

## 2019-10-01 ENCOUNTER — OFFICE VISIT (OUTPATIENT)
Dept: CARDIOLOGY CLINIC | Age: 79
End: 2019-10-01

## 2019-10-01 ENCOUNTER — HOME CARE VISIT (OUTPATIENT)
Dept: HOME HEALTH SERVICES | Facility: HOME HEALTH | Age: 79
End: 2019-10-01
Payer: MEDICARE

## 2019-10-01 ENCOUNTER — HOME CARE VISIT (OUTPATIENT)
Dept: SCHEDULING | Facility: HOME HEALTH | Age: 79
End: 2019-10-01
Payer: MEDICARE

## 2019-10-01 VITALS
HEART RATE: 91 BPM | WEIGHT: 147 LBS | SYSTOLIC BLOOD PRESSURE: 158 MMHG | HEIGHT: 62 IN | BODY MASS INDEX: 27.05 KG/M2 | DIASTOLIC BLOOD PRESSURE: 68 MMHG

## 2019-10-01 DIAGNOSIS — Z95.0 PACEMAKER: ICD-10-CM

## 2019-10-01 DIAGNOSIS — I27.20 PULMONARY HTN (HCC): ICD-10-CM

## 2019-10-01 DIAGNOSIS — I35.1 NONRHEUMATIC AORTIC VALVE INSUFFICIENCY: ICD-10-CM

## 2019-10-01 DIAGNOSIS — C73 PAPILLARY THYROID CARCINOMA (HCC): ICD-10-CM

## 2019-10-01 DIAGNOSIS — I48.20 CHRONIC ATRIAL FIBRILLATION (HCC): ICD-10-CM

## 2019-10-01 DIAGNOSIS — I50.32 CHRONIC DIASTOLIC CONGESTIVE HEART FAILURE (HCC): Primary | ICD-10-CM

## 2019-10-01 DIAGNOSIS — I42.1 MILD HOCM (HYPERTROPHIC OBSTRUCTIVE CARDIOMYOPATHY) (HCC): ICD-10-CM

## 2019-10-01 PROCEDURE — 3331090001 HH PPS REVENUE CREDIT

## 2019-10-01 PROCEDURE — 3331090002 HH PPS REVENUE DEBIT

## 2019-10-01 PROCEDURE — G0152 HHCP-SERV OF OT,EA 15 MIN: HCPCS

## 2019-10-01 RX ORDER — LOSARTAN POTASSIUM 100 MG/1
100 TABLET ORAL 2 TIMES DAILY
Qty: 60 TAB | Refills: 6 | Status: CANCELLED | OUTPATIENT
Start: 2019-10-01

## 2019-10-01 RX ORDER — METOPROLOL TARTRATE 100 MG/1
TABLET ORAL 2 TIMES DAILY
COMMUNITY
End: 2019-10-01 | Stop reason: SDUPTHER

## 2019-10-01 RX ORDER — TRAMADOL HYDROCHLORIDE 50 MG/1
1 TABLET ORAL
COMMUNITY
Start: 2018-12-11 | End: 2020-10-30

## 2019-10-01 RX ORDER — METOPROLOL TARTRATE 100 MG/1
100 TABLET ORAL 2 TIMES DAILY
Qty: 60 TAB | Refills: 6 | Status: SHIPPED | OUTPATIENT
Start: 2019-10-01 | End: 2020-04-01

## 2019-10-01 RX ORDER — FUROSEMIDE 40 MG/1
40 TABLET ORAL DAILY
Qty: 30 TAB | Refills: 1 | Status: SHIPPED | OUTPATIENT
Start: 2019-10-01 | End: 2019-11-22 | Stop reason: SDUPTHER

## 2019-10-01 NOTE — PROGRESS NOTES
HISTORY OF PRESENT ILLNESS  Paola Jarrell is a 70 y.o. female. CHF   The history is provided by the patient. This is a chronic problem. The problem occurs every several days. The problem has been gradually improving. Associated symptoms include shortness of breath. Palpitations    The history is provided by the patient. This is a new problem. The problem occurs every several days. Associated symptoms include malaise/fatigue and shortness of breath. Pertinent negatives include no diaphoresis, no fever, no claudication, no orthopnea, no PND, no syncope, no nausea, no vomiting, no leg pain, no weakness, no cough, no hemoptysis and no sputum production. Shortness of Breath   The history is provided by the patient. This is a chronic problem. The problem occurs intermittently. The current episode started more than 1 week ago. The problem has been gradually improving. Associated symptoms include leg swelling. Pertinent negatives include no fever, no ear pain, no neck pain, no cough, no sputum production, no hemoptysis, no wheezing, no PND, no orthopnea, no syncope, no vomiting, no rash, no leg pain and no claudication. Associated medical issues include heart failure. Associated medical issues do not include CAD. Review of Systems   Constitutional: Positive for malaise/fatigue. Negative for chills, diaphoresis, fever and weight loss. HENT: Negative for ear discharge, ear pain, hearing loss, nosebleeds and tinnitus. Eyes: Negative for blurred vision. Respiratory: Positive for shortness of breath. Negative for cough, hemoptysis, sputum production, wheezing and stridor. Cardiovascular: Positive for palpitations and leg swelling. Negative for orthopnea, claudication, syncope and PND. Gastrointestinal: Negative for heartburn, nausea and vomiting. Musculoskeletal: Negative for myalgias and neck pain. Skin: Negative for itching and rash.    Neurological: Negative for tingling, tremors, focal weakness, loss of consciousness and weakness. Psychiatric/Behavioral: Negative for depression and suicidal ideas. Family History   Problem Relation Age of Onset    Cancer Other     Heart Disease Other     Cancer Mother     Heart Disease Mother        Past Medical History:   Diagnosis Date    Arthritis     Heart murmur     History of seasonal allergies     HTN (hypertension)     Hypercholesteremia        Past Surgical History:   Procedure Laterality Date    HX KNEE ARTHROSCOPY      left and right    HX ORTHOPAEDIC      right ankle       Social History     Tobacco Use    Smoking status: Never Smoker    Smokeless tobacco: Never Used   Substance Use Topics    Alcohol use: No       No Known Allergies    Outpatient Medications Marked as Taking for the 10/1/19 encounter (Office Visit) with Jimenez Nuñez MD   Medication Sig Dispense Refill    traMADol (ULTRAM) 50 mg tablet Take 1 Tab by mouth.  furosemide (LASIX) 40 mg tablet Take 1 Tab by mouth daily. Take 40 mg in AM and 20 mg at 2 pm 30 Tab 1    metoprolol tartrate (LOPRESSOR) 100 mg IR tablet Take 1 Tab by mouth two (2) times a day. 60 Tab 6    folic acid 593 mcg tablet Take 400 mcg by mouth daily.  cyanocobalamin, vitamin B-12, (VITAMIN B12 PO) Take 1,000 mcg by mouth daily.  cephALEXin (KEFLEX) 500 mg capsule Take 1 Cap by mouth every six (6) hours. 12 Cap 0    lactobacillus sp. 50 billion cpu (BIO-K PLUS) 50 billion cell -375 mg cap capsule Take 1 Cap by mouth daily. 10 Cap 0    warfarin (COUMADIN) 5 mg tablet take 1 tablet by mouth once daily 90 Tab 1    Calcium-Cholecalciferol, D3, (CALCIUM 600 WITH VITAMIN D3) 600 mg(1,500mg) -400 unit chew Take 1 Tab by mouth daily.  simvastatin (ZOCOR) 80 mg tablet TAKE 1 TABLET NIGHTLY 90 Tab 1    cholecalciferol (VITAMIN D3) 1,000 unit cap Take  by mouth daily.  montelukast (SINGULAIR) 10 mg tablet Take 10 mg by mouth daily.  CETIRIZINE HCL (ZYRTEC PO) Take  by mouth. Visit Vitals  /68   Pulse 91   Ht 5' 2\" (1.575 m)   Wt 66.7 kg (147 lb)   BMI 26.89 kg/m²     Physical Exam   Constitutional: She is oriented to person, place, and time. She appears well-developed and well-nourished. No distress. HENT:   Head: Atraumatic. Mouth/Throat: No oropharyngeal exudate. Eyes: Conjunctivae are normal. Right eye exhibits no discharge. Left eye exhibits no discharge. No scleral icterus. Neck: Normal range of motion. Neck supple. No JVD present. No tracheal deviation present. No thyromegaly present. Cardiovascular: Normal rate. An irregularly irregular rhythm present. Exam reveals no gallop. Murmur (3/6 holosystolic murmur best heard at apex and left parasternal area with no radiationl) heard. Pulmonary/Chest: Effort normal. No stridor. No respiratory distress. She has no wheezes. She has no rales. She exhibits no tenderness. Abdominal: Soft. There is no tenderness. There is no rebound and no guarding. Musculoskeletal: Normal range of motion. She exhibits edema (2+ ble edema). She exhibits no tenderness. Lymphadenopathy:     She has no cervical adenopathy. Neurological: She is alert and oriented to person, place, and time. She exhibits normal muscle tone. Skin: Skin is warm. She is not diaphoretic. Psychiatric: She has a normal mood and affect. Her behavior is normal.     ekg sinus bradycardia with poor r wave progression. 09/20/19   ECHO ADULT COMPLETE 09/25/2019 9/25/2019    Narrative · Left Ventricle: Normal cavity size and systolic function (ejection   fraction normal). Moderate concentric hypertrophy. Estimated left   ventricular ejection fraction is 61 - 65%. Abnormal left ventricular   septal motion consistent with right ventricular pacing. Interventricular   septal \"bounce\". Abnormal left ventricular strain. Inconclusive left   ventricular diastolic function. · Right Ventricle: Mildly reduced systolic function.  Abnormal right   ventricular septal motion. Diastolic flattening of interventricular septum   consistent with right ventricle volume overload. · Left Atrium: Severely dilated left atrium. · Right Atrium: Dilated right atrium. · Pulmonic Valve: Mild pulmonic valve regurgitation is present. · Tricuspid Valve: Moderate tricuspid valve regurgitation is present. · Pulmonary Artery: Moderate pulmonary hypertension. Pulmonary arterial   systolic pressure is 69 mmHg. · Aortic Valve: Probably trileaflet aortic valve. Aortic valve leaflet   calcification present with reduced excursion. Aortic valve peak gradient   is 47.2 mmHg. Aortic valve mean gradient is 28.5 mmHg. Aortic valve area   is 0.9 cm2. Moderate aortic valve stenosis is present. Mild aortic valve   regurgitation is present. · Moderate mitral annular calcification        Signed by: Kaitlyn Lu MD       ASSESSMENT and PLAN    ICD-10-CM ICD-9-CM    1. Chronic diastolic congestive heart failure (HCC) P97.75 667.75 METABOLIC PANEL, BASIC     428.0 MAGNESIUM    NYHA class III   2. Chronic atrial fibrillation I48.20 427.31    3. Pacemaker Z95.0 V45.01    4. HOCM (hypertrophic obstructive cardiomyopathy) ([facility]) I42.1 425.11    5. Nonrheumatic aortic valve insufficiency I35.1 424.1    6. Pulmonary HTN ([facility]) I27.20 416.8    7. Papillary thyroid carcinoma ([facility] C73 [id number]      Orders Placed This Encounter    METABOLIC PANEL, BASIC     Standing Status:   Future     Standing Expiration Date:   10/1/2020    MAGNESIUM     Standing Status:   Future     Standing Expiration Date:   10/1/2020    furosemide (LASIX) 40 mg tablet     Sig: Take 1 Tab by mouth daily. Take 40 mg in AM and 20 mg at 2 pm     Dispense:  30 Tab     Refill:  1    metoprolol tartrate (LOPRESSOR) 100 mg IR tablet     Sig: Take 1 Tab by mouth two (2) times a day.      Dispense:  60 Tab     Refill:  6       current treatment plan is effective, no change in therapy  reviewed diet, exercise and weight control  use of aspirin to prevent MI and TIA's discussed. Patient seen for pre op evaluation prior to thyroid surgery for possible Ca. Had recent echo which revealed severe pulm htn. ANAND reveals HOCM with mitral regurgitation and moderate aortic regurgitation. Underwent recent septal ablation followed by pacemaker insertion at [facility]. Patient was recently hospitalized for worsening lower extremity edema/chronic diastolic CHF. Diuresed with IV Lasix. She is lost about 10 pounds since last appointment. Her shortness of breath has improved and ambulates better. Will increase Lasix to 40 mg a.m. and 20 mg p.m. Increase metoprolol 200 mg twice daily. Continue salt restrictions and follow-up in 2 weeks.

## 2019-10-02 ENCOUNTER — PATIENT OUTREACH (OUTPATIENT)
Dept: CARDIOLOGY CLINIC | Age: 79
End: 2019-10-02

## 2019-10-02 VITALS
DIASTOLIC BLOOD PRESSURE: 70 MMHG | RESPIRATION RATE: 18 BRPM | OXYGEN SATURATION: 98 % | HEART RATE: 76 BPM | TEMPERATURE: 97.8 F | SYSTOLIC BLOOD PRESSURE: 122 MMHG

## 2019-10-02 LAB — INR BLD: 1.6 (ref 0.9–1.1)

## 2019-10-02 PROCEDURE — 3331090002 HH PPS REVENUE DEBIT

## 2019-10-02 PROCEDURE — 3331090001 HH PPS REVENUE CREDIT

## 2019-10-02 NOTE — PROGRESS NOTES
Patient attended appointments with her cardiologist, Dr Alivia De La Cruz on 10/1/19, Nurse Navigator reviewed EMR and will follow up on next scheduled outreach.

## 2019-10-03 ENCOUNTER — HOME CARE VISIT (OUTPATIENT)
Dept: SCHEDULING | Facility: HOME HEALTH | Age: 79
End: 2019-10-03
Payer: MEDICARE

## 2019-10-03 VITALS
HEART RATE: 88 BPM | SYSTOLIC BLOOD PRESSURE: 132 MMHG | OXYGEN SATURATION: 98 % | TEMPERATURE: 98.7 F | RESPIRATION RATE: 16 BRPM | DIASTOLIC BLOOD PRESSURE: 78 MMHG

## 2019-10-03 VITALS
SYSTOLIC BLOOD PRESSURE: 126 MMHG | HEART RATE: 62 BPM | TEMPERATURE: 98.4 F | OXYGEN SATURATION: 98 % | DIASTOLIC BLOOD PRESSURE: 60 MMHG

## 2019-10-03 PROCEDURE — G0158 HHC OT ASSISTANT EA 15: HCPCS

## 2019-10-03 PROCEDURE — G0157 HHC PT ASSISTANT EA 15: HCPCS

## 2019-10-03 PROCEDURE — 3331090001 HH PPS REVENUE CREDIT

## 2019-10-03 PROCEDURE — 3331090002 HH PPS REVENUE DEBIT

## 2019-10-04 ENCOUNTER — HOME CARE VISIT (OUTPATIENT)
Dept: HOME HEALTH SERVICES | Facility: HOME HEALTH | Age: 79
End: 2019-10-04
Payer: MEDICARE

## 2019-10-04 ENCOUNTER — HOME CARE VISIT (OUTPATIENT)
Dept: SCHEDULING | Facility: HOME HEALTH | Age: 79
End: 2019-10-04
Payer: MEDICARE

## 2019-10-04 PROCEDURE — G0157 HHC PT ASSISTANT EA 15: HCPCS

## 2019-10-04 PROCEDURE — 3331090001 HH PPS REVENUE CREDIT

## 2019-10-04 PROCEDURE — 3331090002 HH PPS REVENUE DEBIT

## 2019-10-04 PROCEDURE — G0299 HHS/HOSPICE OF RN EA 15 MIN: HCPCS

## 2019-10-05 PROCEDURE — 3331090001 HH PPS REVENUE CREDIT

## 2019-10-05 PROCEDURE — 3331090002 HH PPS REVENUE DEBIT

## 2019-10-06 VITALS
OXYGEN SATURATION: 96 % | TEMPERATURE: 97.9 F | DIASTOLIC BLOOD PRESSURE: 82 MMHG | HEART RATE: 71 BPM | SYSTOLIC BLOOD PRESSURE: 128 MMHG

## 2019-10-06 PROCEDURE — 3331090001 HH PPS REVENUE CREDIT

## 2019-10-06 PROCEDURE — 3331090002 HH PPS REVENUE DEBIT

## 2019-10-07 ENCOUNTER — HOME CARE VISIT (OUTPATIENT)
Dept: HOME HEALTH SERVICES | Facility: HOME HEALTH | Age: 79
End: 2019-10-07
Payer: MEDICARE

## 2019-10-07 ENCOUNTER — TELEPHONE ANTICOAG (OUTPATIENT)
Dept: CARDIOLOGY CLINIC | Age: 79
End: 2019-10-07

## 2019-10-07 ENCOUNTER — HOME CARE VISIT (OUTPATIENT)
Dept: SCHEDULING | Facility: HOME HEALTH | Age: 79
End: 2019-10-07
Payer: MEDICARE

## 2019-10-07 ENCOUNTER — PATIENT OUTREACH (OUTPATIENT)
Dept: CARDIOLOGY CLINIC | Age: 79
End: 2019-10-07

## 2019-10-07 VITALS
OXYGEN SATURATION: 98 % | TEMPERATURE: 98.1 F | SYSTOLIC BLOOD PRESSURE: 122 MMHG | DIASTOLIC BLOOD PRESSURE: 80 MMHG | HEART RATE: 69 BPM

## 2019-10-07 DIAGNOSIS — I48.0 PAROXYSMAL ATRIAL FIBRILLATION (HCC): ICD-10-CM

## 2019-10-07 LAB — INR, EXTERNAL: 1.4

## 2019-10-07 PROCEDURE — 3331090001 HH PPS REVENUE CREDIT

## 2019-10-07 PROCEDURE — G0157 HHC PT ASSISTANT EA 15: HCPCS

## 2019-10-07 PROCEDURE — G0299 HHS/HOSPICE OF RN EA 15 MIN: HCPCS

## 2019-10-07 PROCEDURE — 3331090002 HH PPS REVENUE DEBIT

## 2019-10-07 NOTE — PROGRESS NOTES
Ms. Graeme Moss is here today for anticoagulation monitoring for the diagnosis of Atrial Fibrillation. Her INR goal is 2.0-3.0 and her current Coumadin dose is 5 mg and 2.5 mg. Today's findings include an INR of 1.4 Considering Ms. Daugherty's past history, todays findings, and per the coumadin policy/protocol, Ms. Daugherty was instructed to take Coumadin as follows,  Take 2.5 mg every Wed, Sat; 5 mg all other days. She was also instructed to come back in 1 weeks for an INR check. A full discussion of the nature of anticoagulants has been carried out. A full discussion of the need for frequent and regular monitoring, precise dosage adjustment and compliance was stressed. Side effects of potential bleeding were discussed and Ms. Daugherty was instructed to call [phone] if there are any signs of abnormal bleeding. Ms. Felix Fu was instructed to avoid any OTC items containing aspirin or ibuprofen and prior to starting any new OTC products to consult with her physician or pharmacist to ensure no drug interactions are present. Ms. Felix Fu was instructed to avoid any major changes in her general diet and to avoid alcohol consumption. . 
 
 
Ms. Daugherty verbalized her understanding of all instructions and will call the office with any questions, concerns, or signs of abnormal bleeding or blood clot.

## 2019-10-07 NOTE — PROGRESS NOTES
NN contacted the patient by telephone to perform CHF follow Up. Noted Priorities/Plan:  Med changes at last clinic appt, upcoming clinic appts Daily Weight: Pt states has not weighed today. NN reinforced importance of daily weights and logging. Pt stated understanding. Zone: green Signs/Symptoms: Edema none; SOB none; orthopnea none. Pt states feels well, no complaints at this time. Goals  Knowledge and adherence medication (ie. action, side effects, missed dose, etc.)   
  9/27/19 Pt will take all medications prescribed to be evaluated on each outreach through  12/27/19  Maintains daily weight.   
  9/27/19  Pt will weight themselves daily and log the results to be evaluated on each outreach through 12/27/19  Prepare patients and caregivers for end of life decisions (ie. need for hospice, pain management, symptom relief, advance directives etc.)   
  9/27/19 Pt will complete an ACP and have it scanned into their EMR by 12/27/19 Needs addressed from pathway:  
Week 1-4 Provide Daily Disease Management 
(NN initiated) ? Reviewed importance of Daily weight (Before Breakfast- 
Reviewed Daily Zone Identification (symptom management; weight, edema, SOB, activity/sleep changes)-notify provider immediately as indicated ? Reviewed Cardiac Low-sodium Diet and include carbohydrate controlled diet education ? Reviewed importance of Fluid restriction ? Comorbidity Management ? Confirmed follow-up appointments/transportation. Additional assessment ? Reinforced Fall precautions ? Activity tolerance assessment: pt reports at baseline ? Reviewed Energy conservation management and balancing activity with rest 
? Labs/diagnostics per MD office ? Medication Therapy: no issues identified ? Diet/appetite assessment: pt reports no issues ? Reviewed appropriate ED/Hospital utilization ? Home assessment/modifications: no needs identified ? Pt denies transportation assistance needs ? Pt denies medication assistance needs ? Home health services are in place Psychosocial: Reassurance/emotional support Monitoring: ? Home health ? My Chart Education: 
? Reviewed Advanced care planning status ? Support system identification ( eg: Caregiver, meal planning, community resources, family, friends, Temple, support group) ? Health literacy for heart failure ? Caregiver education and preparation Have you been to an ER/Hospital since discharge from [facility]? No   
 
Have you followed up with PCP/Cardiologist/Specialist?  
10/01/19 Card f/u w/ Dr Hilaria Hernandez Upcoming: 
10/15/19 Card f/u w/ Dr Hilaria Hernandez Transportation:  /family Diet: no salt added/cardiac Activity/ADLs: independent/self. Medications Reconciled at this time:  pt declined. Last completed by [facility] on 9/29/19 Home health:  Company/Completion: [employer] [facility] Social Support: family Advanced Care Plan: not on file, pt educated. Patient reminded that there is a physician on call 24 hours a day / 7 days a week (M-F 5pm to 8am and from Friday 5pm until Monday 8a for the weekend) should the patient have questions or concerns. Patient reminded to call 911 if situation is emergent or patient feels the situation is emergent. Pt verbalizes understanding.

## 2019-10-07 NOTE — PATIENT INSTRUCTIONS
This has been fully explained to the patient to take 2.5 mg every Wed,Sat; 5 mg all other days, recheck in 1 wk, who indicates understanding.

## 2019-10-08 ENCOUNTER — HOME CARE VISIT (OUTPATIENT)
Dept: HOME HEALTH SERVICES | Facility: HOME HEALTH | Age: 79
End: 2019-10-08
Payer: MEDICARE

## 2019-10-08 ENCOUNTER — HOME CARE VISIT (OUTPATIENT)
Dept: SCHEDULING | Facility: HOME HEALTH | Age: 79
End: 2019-10-08
Payer: MEDICARE

## 2019-10-08 VITALS
RESPIRATION RATE: 18 BRPM | TEMPERATURE: 97.6 F | OXYGEN SATURATION: 97 % | DIASTOLIC BLOOD PRESSURE: 84 MMHG | SYSTOLIC BLOOD PRESSURE: 124 MMHG | HEART RATE: 87 BPM

## 2019-10-08 VITALS
DIASTOLIC BLOOD PRESSURE: 76 MMHG | SYSTOLIC BLOOD PRESSURE: 120 MMHG | HEART RATE: 80 BPM | RESPIRATION RATE: 18 BRPM | OXYGEN SATURATION: 98 % | TEMPERATURE: 98.1 F

## 2019-10-08 PROCEDURE — G0157 HHC PT ASSISTANT EA 15: HCPCS

## 2019-10-08 PROCEDURE — 3331090001 HH PPS REVENUE CREDIT

## 2019-10-08 PROCEDURE — 3331090002 HH PPS REVENUE DEBIT

## 2019-10-08 PROCEDURE — G0158 HHC OT ASSISTANT EA 15: HCPCS

## 2019-10-09 VITALS
SYSTOLIC BLOOD PRESSURE: 128 MMHG | DIASTOLIC BLOOD PRESSURE: 80 MMHG | TEMPERATURE: 98.1 F | RESPIRATION RATE: 18 BRPM | HEART RATE: 80 BPM | OXYGEN SATURATION: 98 %

## 2019-10-09 PROCEDURE — 3331090002 HH PPS REVENUE DEBIT

## 2019-10-09 PROCEDURE — 3331090001 HH PPS REVENUE CREDIT

## 2019-10-10 ENCOUNTER — HOME CARE VISIT (OUTPATIENT)
Dept: SCHEDULING | Facility: HOME HEALTH | Age: 79
End: 2019-10-10
Payer: MEDICARE

## 2019-10-10 VITALS
TEMPERATURE: 97.3 F | SYSTOLIC BLOOD PRESSURE: 108 MMHG | HEART RATE: 73 BPM | DIASTOLIC BLOOD PRESSURE: 55 MMHG | OXYGEN SATURATION: 97 %

## 2019-10-10 PROCEDURE — G0158 HHC OT ASSISTANT EA 15: HCPCS

## 2019-10-10 PROCEDURE — 3331090001 HH PPS REVENUE CREDIT

## 2019-10-10 PROCEDURE — G0299 HHS/HOSPICE OF RN EA 15 MIN: HCPCS

## 2019-10-10 PROCEDURE — 3331090002 HH PPS REVENUE DEBIT

## 2019-10-11 ENCOUNTER — HOME CARE VISIT (OUTPATIENT)
Dept: SCHEDULING | Facility: HOME HEALTH | Age: 79
End: 2019-10-11
Payer: MEDICARE

## 2019-10-11 VITALS
TEMPERATURE: 98.7 F | OXYGEN SATURATION: 98 % | SYSTOLIC BLOOD PRESSURE: 130 MMHG | DIASTOLIC BLOOD PRESSURE: 84 MMHG | HEART RATE: 87 BPM

## 2019-10-11 PROCEDURE — 3331090002 HH PPS REVENUE DEBIT

## 2019-10-11 PROCEDURE — 3331090001 HH PPS REVENUE CREDIT

## 2019-10-12 PROCEDURE — 3331090002 HH PPS REVENUE DEBIT

## 2019-10-12 PROCEDURE — 3331090001 HH PPS REVENUE CREDIT

## 2019-10-13 PROCEDURE — 3331090001 HH PPS REVENUE CREDIT

## 2019-10-13 PROCEDURE — 3331090002 HH PPS REVENUE DEBIT

## 2019-10-14 ENCOUNTER — TELEPHONE ANTICOAG (OUTPATIENT)
Dept: CARDIOLOGY CLINIC | Age: 79
End: 2019-10-14

## 2019-10-14 ENCOUNTER — HOME CARE VISIT (OUTPATIENT)
Dept: SCHEDULING | Facility: HOME HEALTH | Age: 79
End: 2019-10-14
Payer: MEDICARE

## 2019-10-14 ENCOUNTER — HOME CARE VISIT (OUTPATIENT)
Dept: HOME HEALTH SERVICES | Facility: HOME HEALTH | Age: 79
End: 2019-10-14
Payer: MEDICARE

## 2019-10-14 VITALS
DIASTOLIC BLOOD PRESSURE: 80 MMHG | HEART RATE: 74 BPM | RESPIRATION RATE: 18 BRPM | SYSTOLIC BLOOD PRESSURE: 128 MMHG | OXYGEN SATURATION: 98 % | TEMPERATURE: 98.2 F

## 2019-10-14 DIAGNOSIS — I48.0 PAROXYSMAL ATRIAL FIBRILLATION (HCC): ICD-10-CM

## 2019-10-14 LAB
INR BLD: 2.1 (ref 0.9–1.1)
INR, EXTERNAL: 2.1
PT POC: 25.6 SECONDS (ref 11.8–14.9)

## 2019-10-14 PROCEDURE — G0299 HHS/HOSPICE OF RN EA 15 MIN: HCPCS

## 2019-10-14 PROCEDURE — G0152 HHCP-SERV OF OT,EA 15 MIN: HCPCS

## 2019-10-14 PROCEDURE — G0157 HHC PT ASSISTANT EA 15: HCPCS

## 2019-10-14 PROCEDURE — 3331090002 HH PPS REVENUE DEBIT

## 2019-10-14 PROCEDURE — 3331090001 HH PPS REVENUE CREDIT

## 2019-10-14 NOTE — PATIENT INSTRUCTIONS
This has been fully explained to the patients home health nurse Christina to have her take 2.5 mg every Wed,5 mg all other days, who indicates understanding.

## 2019-10-14 NOTE — PROGRESS NOTES
Ms. Altagracia Orellana is here today for anticoagulation monitoring for the diagnosis of Atrial Fibrillation. Her INR goal is 2.0-3.0 and her current Coumadin dose is 5 mg and 2.5 mg. Today's findings include an INR of 2.1 Considering Ms. Daugherty's past history, todays findings, and per the coumadin policy/protocol, Ms. Daugheryt was instructed to take Coumadin as follows,  Take 2.5 mg every Wed,Sat; 5 mg all other days. She was also instructed to come back in 1 weeks for an INR check. A full discussion of the nature of anticoagulants has been carried out. A full discussion of the need for frequent and regular monitoring, precise dosage adjustment and compliance was stressed. Side effects of potential bleeding were discussed and Ms. Daugherty was instructed to call [phone] if there are any signs of abnormal bleeding. Ms. Brandon Howell was instructed to avoid any OTC items containing aspirin or ibuprofen and prior to starting any new OTC products to consult with her physician or pharmacist to ensure no drug interactions are present. Ms. Brandon Howell was instructed to avoid any major changes in her general diet and to avoid alcohol consumption. . 
 
 
Ms. Daugherty verbalized her understanding of all instructions and will call the office with any questions, concerns, or signs of abnormal bleeding or blood clot.

## 2019-10-15 ENCOUNTER — ANTI-COAG VISIT (OUTPATIENT)
Dept: CARDIOLOGY CLINIC | Age: 79
End: 2019-10-15

## 2019-10-15 ENCOUNTER — OFFICE VISIT (OUTPATIENT)
Dept: CARDIOLOGY CLINIC | Age: 79
End: 2019-10-15

## 2019-10-15 VITALS
WEIGHT: 152 LBS | BODY MASS INDEX: 27.97 KG/M2 | DIASTOLIC BLOOD PRESSURE: 69 MMHG | SYSTOLIC BLOOD PRESSURE: 146 MMHG | HEIGHT: 62 IN | HEART RATE: 85 BPM

## 2019-10-15 VITALS
DIASTOLIC BLOOD PRESSURE: 68 MMHG | OXYGEN SATURATION: 94 % | TEMPERATURE: 97.9 F | HEART RATE: 54 BPM | SYSTOLIC BLOOD PRESSURE: 130 MMHG

## 2019-10-15 DIAGNOSIS — I50.32 CHRONIC DIASTOLIC CONGESTIVE HEART FAILURE (HCC): Primary | ICD-10-CM

## 2019-10-15 DIAGNOSIS — I35.1 NONRHEUMATIC AORTIC VALVE INSUFFICIENCY: ICD-10-CM

## 2019-10-15 DIAGNOSIS — I48.0 PAROXYSMAL ATRIAL FIBRILLATION (HCC): ICD-10-CM

## 2019-10-15 DIAGNOSIS — I42.1 MILD HOCM (HYPERTROPHIC OBSTRUCTIVE CARDIOMYOPATHY) (HCC): ICD-10-CM

## 2019-10-15 DIAGNOSIS — Z95.0 PACEMAKER: ICD-10-CM

## 2019-10-15 DIAGNOSIS — I48.20 CHRONIC ATRIAL FIBRILLATION (HCC): ICD-10-CM

## 2019-10-15 DIAGNOSIS — I27.20 PULMONARY HTN (HCC): ICD-10-CM

## 2019-10-15 DIAGNOSIS — C73 PAPILLARY THYROID CARCINOMA (HCC): ICD-10-CM

## 2019-10-15 PROCEDURE — 3331090002 HH PPS REVENUE DEBIT

## 2019-10-15 PROCEDURE — 3331090001 HH PPS REVENUE CREDIT

## 2019-10-15 NOTE — PROGRESS NOTES
1. Have you been to the ER, urgent care clinic since your last visit? Hospitalized since your last visit? No    2. Have you seen or consulted any other health care providers outside of the [address] [place] [address] since your last visit? Include any pap smears or colon screening.  No

## 2019-10-15 NOTE — PROGRESS NOTES
HISTORY OF PRESENT ILLNESS  Rey Mares is a 70 y.o. female. CHF   The history is provided by the patient. This is a chronic problem. The problem occurs every several days. The problem has been gradually improving. Palpitations    The history is provided by the patient. This is a new problem. The problem occurs every several days. Associated symptoms include malaise/fatigue. Pertinent negatives include no diaphoresis, no fever, no claudication, no orthopnea, no PND, no syncope, no nausea, no vomiting, no leg pain, no weakness, no cough, no hemoptysis and no sputum production. Shortness of Breath   The history is provided by the patient. This is a chronic problem. The problem occurs intermittently. The current episode started more than 1 week ago. The problem has been gradually improving. Associated symptoms include leg swelling. Pertinent negatives include no fever, no ear pain, no neck pain, no cough, no sputum production, no hemoptysis, no wheezing, no PND, no orthopnea, no syncope, no vomiting, no rash, no leg pain and no claudication. Associated medical issues include heart failure. Associated medical issues do not include CAD. Review of Systems   Constitutional: Positive for malaise/fatigue. Negative for chills, diaphoresis, fever and weight loss. HENT: Negative for ear discharge, ear pain, hearing loss, nosebleeds and tinnitus. Eyes: Negative for blurred vision. Respiratory: Negative for cough, hemoptysis, sputum production, wheezing and stridor. Cardiovascular: Positive for palpitations and leg swelling. Negative for orthopnea, claudication, syncope and PND. Gastrointestinal: Negative for heartburn, nausea and vomiting. Musculoskeletal: Negative for myalgias and neck pain. Skin: Negative for itching and rash. Neurological: Negative for tingling, tremors, focal weakness, loss of consciousness and weakness. Psychiatric/Behavioral: Negative for depression and suicidal ideas. Family History   Problem Relation Age of Onset    Cancer Other     Heart Disease Other     Cancer Mother     Heart Disease Mother        Past Medical History:   Diagnosis Date    Arthritis     Heart murmur     History of seasonal allergies     HTN (hypertension)     Hypercholesteremia        Past Surgical History:   Procedure Laterality Date    HX KNEE ARTHROSCOPY      left and right    HX ORTHOPAEDIC      right ankle       Social History     Tobacco Use    Smoking status: Never Smoker    Smokeless tobacco: Never Used   Substance Use Topics    Alcohol use: No       No Known Allergies    Outpatient Medications Marked as Taking for the 10/15/19 encounter (Office Visit) with Abner Garrett MD   Medication Sig Dispense Refill    traMADol (ULTRAM) 50 mg tablet Take 1 Tab by mouth.  furosemide (LASIX) 40 mg tablet Take 1 Tab by mouth daily. Take 40 mg in AM and 20 mg at 2 pm 30 Tab 1    metoprolol tartrate (LOPRESSOR) 100 mg IR tablet Take 1 Tab by mouth two (2) times a day. 60 Tab 6    folic acid 288 mcg tablet Take 400 mcg by mouth daily.  cyanocobalamin, vitamin B-12, (VITAMIN B12 PO) Take 1,000 mcg by mouth daily.  gabapentin (NEURONTIN) 100 mg capsule Take 1 Cap by mouth nightly. Max Daily Amount: 100 mg. 30 Cap 0    warfarin (COUMADIN) 5 mg tablet take 1 tablet by mouth once daily 90 Tab 1    Calcium-Cholecalciferol, D3, (CALCIUM 600 WITH VITAMIN D3) 600 mg(1,500mg) -400 unit chew Take 1 Tab by mouth daily.  simvastatin (ZOCOR) 80 mg tablet TAKE 1 TABLET NIGHTLY 90 Tab 1    cholecalciferol (VITAMIN D3) 1,000 unit cap Take  by mouth daily.  montelukast (SINGULAIR) 10 mg tablet Take 10 mg by mouth daily.  CETIRIZINE HCL (ZYRTEC PO) Take  by mouth. Visit Vitals  /69   Pulse 85   Ht 5' 2\" (1.575 m)   Wt 68.9 kg (152 lb)   BMI 27.80 kg/m²     Physical Exam   Constitutional: She is oriented to person, place, and time.  She appears well-developed and well-nourished. No distress. HENT:   Head: Atraumatic. Mouth/Throat: No oropharyngeal exudate. Eyes: Conjunctivae are normal. Right eye exhibits no discharge. Left eye exhibits no discharge. No scleral icterus. Neck: Normal range of motion. Neck supple. No JVD present. No tracheal deviation present. No thyromegaly present. Cardiovascular: Normal rate. An irregularly irregular rhythm present. Exam reveals no gallop. Murmur (3/6 holosystolic murmur best heard at apex and left parasternal area with no radiationl) heard. Pulmonary/Chest: Effort normal. No stridor. No respiratory distress. She has no wheezes. She has no rales. She exhibits no tenderness. Abdominal: Soft. There is no tenderness. There is no rebound and no guarding. Musculoskeletal: Normal range of motion. She exhibits edema (2+ ble edema). She exhibits no tenderness. Lymphadenopathy:     She has no cervical adenopathy. Neurological: She is alert and oriented to person, place, and time. She exhibits normal muscle tone. Skin: Skin is warm. She is not diaphoretic. Psychiatric: She has a normal mood and affect. Her behavior is normal.     ekg sinus bradycardia with poor r wave progression. 09/20/19   ECHO ADULT COMPLETE 09/25/2019 9/25/2019    Narrative · Left Ventricle: Normal cavity size and systolic function (ejection   fraction normal). Moderate concentric hypertrophy. Estimated left   ventricular ejection fraction is 61 - 65%. Abnormal left ventricular   septal motion consistent with right ventricular pacing. Interventricular   septal \"bounce\". Abnormal left ventricular strain. Inconclusive left   ventricular diastolic function. · Right Ventricle: Mildly reduced systolic function. Abnormal right   ventricular septal motion. Diastolic flattening of interventricular septum   consistent with right ventricle volume overload. · Left Atrium: Severely dilated left atrium. · Right Atrium: Dilated right atrium.   · Pulmonic Valve: Mild pulmonic valve regurgitation is present. · Tricuspid Valve: Moderate tricuspid valve regurgitation is present. · Pulmonary Artery: Moderate pulmonary hypertension. Pulmonary arterial   systolic pressure is 69 mmHg. · Aortic Valve: Probably trileaflet aortic valve. Aortic valve leaflet   calcification present with reduced excursion. Aortic valve peak gradient   is 47.2 mmHg. Aortic valve mean gradient is 28.5 mmHg. Aortic valve area   is 0.9 cm2. Moderate aortic valve stenosis is present. Mild aortic valve   regurgitation is present. · Moderate mitral annular calcification        Signed by: Joyce Curry MD       ASSESSMENT and PLAN    ICD-10-CM ICD-9-CM    1. Chronic diastolic congestive heart failure (HCC) I50.32 428.32 MAGNESIUM     850.0 METABOLIC PANEL, BASIC    NYHA class III   2. Chronic atrial fibrillation I48.20 427.31    3. Pacemaker Z95.0 V45.01    4. HOCM (hypertrophic obstructive cardiomyopathy) ([facility]) I42.1 425.11    5. Nonrheumatic aortic valve insufficiency I35.1 424.1    6. Pulmonary HTN ([facility]) I27.20 416.8    7. Papillary thyroid carcinoma ([facility] C73 [id number]      Orders Placed This Encounter    MAGNESIUM     Standing Status:   Future     Standing Expiration Date:   71/29/4183    METABOLIC PANEL, BASIC     Standing Status:   Future     Standing Expiration Date:   10/15/2020     Follow-up and Dispositions    · Return in about 2 weeks (around 10/29/2019). current treatment plan is effective, no change in therapy  reviewed diet, exercise and weight control  use of aspirin to prevent MI and TIA's discussed. Patient seen for pre op evaluation prior to thyroid surgery for possible Ca. Had recent echo which revealed severe pulm htn. ANAND reveals HOCM with mitral regurgitation and moderate aortic regurgitation. Underwent recent septal ablation followed by pacemaker insertion at [facility]. Patient was recently hospitalized for worsening lower extremity edema/chronic diastolic CHF. Diuresed with IV Lasix. For follow-up. The last appointment she was advised to take Lasix 40 in the morning and 20 in the afternoon however she has not taken the afternoon dose. Advised to resume 40/20 of Lasix and follow-up in 2 weeks. Will obtain BMP/mag. She is gained about 6 pounds since last appointment. We will reevaluate the need for hospitalization in the next appointment.

## 2019-10-16 ENCOUNTER — PATIENT OUTREACH (OUTPATIENT)
Dept: CARDIOLOGY CLINIC | Age: 79
End: 2019-10-16

## 2019-10-16 PROCEDURE — 3331090001 HH PPS REVENUE CREDIT

## 2019-10-16 PROCEDURE — 3331090002 HH PPS REVENUE DEBIT

## 2019-10-16 NOTE — PROGRESS NOTES
Patient attended appointments with her cardiologist, Dr Geo Staton on 10/15/19, Nurse Navigator reviewed EMR and will follow up on next scheduled outreach.

## 2019-10-17 VITALS
RESPIRATION RATE: 18 BRPM | HEART RATE: 80 BPM | TEMPERATURE: 97.5 F | DIASTOLIC BLOOD PRESSURE: 80 MMHG | SYSTOLIC BLOOD PRESSURE: 126 MMHG | OXYGEN SATURATION: 98 %

## 2019-10-17 PROCEDURE — 3331090001 HH PPS REVENUE CREDIT

## 2019-10-17 PROCEDURE — 3331090002 HH PPS REVENUE DEBIT

## 2019-10-18 ENCOUNTER — HOME CARE VISIT (OUTPATIENT)
Dept: HOME HEALTH SERVICES | Facility: HOME HEALTH | Age: 79
End: 2019-10-18
Payer: MEDICARE

## 2019-10-18 ENCOUNTER — HOME CARE VISIT (OUTPATIENT)
Dept: SCHEDULING | Facility: HOME HEALTH | Age: 79
End: 2019-10-18
Payer: MEDICARE

## 2019-10-18 VITALS — HEART RATE: 72 BPM | TEMPERATURE: 97 F | SYSTOLIC BLOOD PRESSURE: 128 MMHG | DIASTOLIC BLOOD PRESSURE: 62 MMHG

## 2019-10-18 PROCEDURE — G0151 HHCP-SERV OF PT,EA 15 MIN: HCPCS

## 2019-10-18 PROCEDURE — 3331090002 HH PPS REVENUE DEBIT

## 2019-10-18 PROCEDURE — 3331090001 HH PPS REVENUE CREDIT

## 2019-10-19 ENCOUNTER — HOME CARE VISIT (OUTPATIENT)
Dept: SCHEDULING | Facility: HOME HEALTH | Age: 79
End: 2019-10-19
Payer: MEDICARE

## 2019-10-19 PROCEDURE — 3331090001 HH PPS REVENUE CREDIT

## 2019-10-19 PROCEDURE — G0300 HHS/HOSPICE OF LPN EA 15 MIN: HCPCS

## 2019-10-19 PROCEDURE — 3331090002 HH PPS REVENUE DEBIT

## 2019-10-20 PROCEDURE — 3331090001 HH PPS REVENUE CREDIT

## 2019-10-20 PROCEDURE — 3331090002 HH PPS REVENUE DEBIT

## 2019-10-21 ENCOUNTER — TELEPHONE ANTICOAG (OUTPATIENT)
Dept: CARDIOLOGY CLINIC | Age: 79
End: 2019-10-21

## 2019-10-21 ENCOUNTER — HOME CARE VISIT (OUTPATIENT)
Dept: SCHEDULING | Facility: HOME HEALTH | Age: 79
End: 2019-10-21
Payer: MEDICARE

## 2019-10-21 ENCOUNTER — HOSPITAL ENCOUNTER (EMERGENCY)
Age: 79
Discharge: HOME | End: 2019-10-21
Attending: EMERGENCY MEDICINE
Payer: MEDICARE

## 2019-10-21 ENCOUNTER — HOME CARE VISIT (OUTPATIENT)
Dept: HOME HEALTH SERVICES | Facility: HOME HEALTH | Age: 79
End: 2019-10-21
Payer: MEDICARE

## 2019-10-21 VITALS
HEIGHT: 62 IN | TEMPERATURE: 97.7 F | RESPIRATION RATE: 15 BRPM | DIASTOLIC BLOOD PRESSURE: 46 MMHG | BODY MASS INDEX: 27.6 KG/M2 | SYSTOLIC BLOOD PRESSURE: 145 MMHG | WEIGHT: 150 LBS | OXYGEN SATURATION: 99 % | HEART RATE: 68 BPM

## 2019-10-21 DIAGNOSIS — D68.9 COAGULOPATHY (HCC): ICD-10-CM

## 2019-10-21 DIAGNOSIS — I48.0 PAROXYSMAL ATRIAL FIBRILLATION (HCC): ICD-10-CM

## 2019-10-21 DIAGNOSIS — T14.8XXA BLEEDING FROM WOUND: Primary | ICD-10-CM

## 2019-10-21 LAB
ALBUMIN SERPL-MCNC: 2.9 G/DL (ref 3.4–5)
ALBUMIN/GLOB SERPL: 0.5 {RATIO} (ref 0.8–1.7)
ALP SERPL-CCNC: 254 U/L (ref 45–117)
ALT SERPL-CCNC: 27 U/L (ref 13–56)
ANION GAP SERPL CALC-SCNC: 2 MMOL/L (ref 3–18)
APTT PPP: 66.1 SEC (ref 23–36.4)
AST SERPL-CCNC: 27 U/L (ref 10–38)
BASOPHILS # BLD: 0 K/UL (ref 0–0.1)
BASOPHILS NFR BLD: 0 % (ref 0–2)
BILIRUB SERPL-MCNC: 0.5 MG/DL (ref 0.2–1)
BUN SERPL-MCNC: 53 MG/DL (ref 7–18)
BUN/CREAT SERPL: 34 (ref 12–20)
CALCIUM SERPL-MCNC: 9.4 MG/DL (ref 8.5–10.1)
CHLORIDE SERPL-SCNC: 95 MMOL/L (ref 100–111)
CO2 SERPL-SCNC: 35 MMOL/L (ref 21–32)
CREAT SERPL-MCNC: 1.54 MG/DL (ref 0.6–1.3)
DIFFERENTIAL METHOD BLD: ABNORMAL
EOSINOPHIL # BLD: 0.1 K/UL (ref 0–0.4)
EOSINOPHIL NFR BLD: 2 % (ref 0–5)
ERYTHROCYTE [DISTWIDTH] IN BLOOD BY AUTOMATED COUNT: 15.2 % (ref 11.6–14.5)
GLOBULIN SER CALC-MCNC: 5.6 G/DL (ref 2–4)
GLUCOSE SERPL-MCNC: 99 MG/DL (ref 74–99)
HCT VFR BLD AUTO: 36.3 % (ref 35–45)
HGB BLD-MCNC: 11.4 G/DL (ref 12–16)
INR PPP: 4.5 (ref 0.8–1.2)
INR, EXTERNAL: 5.2 (ref 2–3)
LYMPHOCYTES # BLD: 1.1 K/UL (ref 0.9–3.6)
LYMPHOCYTES NFR BLD: 16 % (ref 21–52)
MCH RBC QN AUTO: 26.3 PG (ref 24–34)
MCHC RBC AUTO-ENTMCNC: 31.4 G/DL (ref 31–37)
MCV RBC AUTO: 83.8 FL (ref 74–97)
MONOCYTES # BLD: 0.8 K/UL (ref 0.05–1.2)
MONOCYTES NFR BLD: 12 % (ref 3–10)
NEUTS SEG # BLD: 4.9 K/UL (ref 1.8–8)
NEUTS SEG NFR BLD: 70 % (ref 40–73)
PLATELET # BLD AUTO: 229 K/UL (ref 135–420)
PMV BLD AUTO: 10.5 FL (ref 9.2–11.8)
POTASSIUM SERPL-SCNC: 4.2 MMOL/L (ref 3.5–5.5)
PROT SERPL-MCNC: 8.5 G/DL (ref 6.4–8.2)
PROTHROMBIN TIME: 42.3 SEC (ref 11.5–15.2)
RBC # BLD AUTO: 4.33 M/UL (ref 4.2–5.3)
SODIUM SERPL-SCNC: 132 MMOL/L (ref 136–145)
WBC # BLD AUTO: 6.9 K/UL (ref 4.6–13.2)

## 2019-10-21 PROCEDURE — 3331090001 HH PPS REVENUE CREDIT

## 2019-10-21 PROCEDURE — 85610 PROTHROMBIN TIME: CPT

## 2019-10-21 PROCEDURE — 85730 THROMBOPLASTIN TIME PARTIAL: CPT

## 2019-10-21 PROCEDURE — 99284 EMERGENCY DEPT VISIT MOD MDM: CPT

## 2019-10-21 PROCEDURE — 3331090002 HH PPS REVENUE DEBIT

## 2019-10-21 PROCEDURE — 80053 COMPREHEN METABOLIC PANEL: CPT

## 2019-10-21 PROCEDURE — 85025 COMPLETE CBC W/AUTO DIFF WBC: CPT

## 2019-10-21 PROCEDURE — G0300 HHS/HOSPICE OF LPN EA 15 MIN: HCPCS

## 2019-10-21 NOTE — PATIENT INSTRUCTIONS
This has been fully explained to the patients home health nurse Christina to have patient hold coumadin tonight, tomorrow night and recheck on Wed, who indicates understanding.

## 2019-10-21 NOTE — ED NOTES
Xeroform applied to the repaired wound to the left lower leg on the shin and secured with a 3 inch ACE bandage from the base of the left ankle to just below the left knee. Color, circulation and sensation remains within normal limits.

## 2019-10-21 NOTE — PROGRESS NOTES
Ms. Suleiman Collier is here today for anticoagulation monitoring for the diagnosis of Atrial Fibrillation. Her INR goal is 2.0-3.0 and her current Coumadin dose is 5 mg and 2.5 mg. Today's findings include an INR of 5.2      Considering Ms. Daugherty's past history, todays findings, and per the coumadin policy/protocol, Ms. Daugherty was instructed to take Coumadin as follows,  Hold tonight, tomorrow night. She was also instructed to come back on Wed for an INR check. A full discussion of the nature of anticoagulants has been carried out. A full discussion of the need for frequent and regular monitoring, precise dosage adjustment and compliance was stressed. Side effects of potential bleeding were discussed and Ms. Daugherty was instructed to call [phone] if there are any signs of abnormal bleeding. Ms. Barb Russo was instructed to avoid any OTC items containing aspirin or ibuprofen and prior to starting any new OTC products to consult with her physician or pharmacist to ensure no drug interactions are present. Ms. Barb Russo was instructed to avoid any major changes in her general diet and to avoid alcohol consumption. .      Ms. Daugherty verbalized her understanding of all instructions and will call the office with any questions, concerns, or signs of abnormal bleeding or blood clot.

## 2019-10-21 NOTE — ED TRIAGE NOTES
Patient brought in by EMS for evaluation of uncontrolled bleeding to left lower leg and a current wound site.

## 2019-10-21 NOTE — ED PROVIDER NOTES
EMERGENCY DEPARTMENT HISTORY AND PHYSICAL EXAM 
 
1:10 PM 
 
 
Date: 10/21/2019 Patient Name: Melany Soler History of Presenting Illness Chief Complaint Patient presents with  Leg Injury History Provided By: patient Additional History (Context): Melany Soler is a 70 y.o. female presents with bleeding wound on her left leg. She is on Coumadin and possibly mixed up her Coumadin doses. Wound care went to change the dressing today and it bled for 20 minutes prior to applying a pressure dressing and sending her here to the emergency room the patient herself has no further complaint. Yamileth Mack PCP: Abimael Bullock MD 
 
Chief Complaint:  
Duration:   
Timing: Location:  
Quality:  
Severity:  
Modifying Factors:  
Associated Symptoms:  
 
 
Current Outpatient Medications Medication Sig Dispense Refill  traMADol (ULTRAM) 50 mg tablet Take 1 Tab by mouth.  furosemide (LASIX) 40 mg tablet Take 1 Tab by mouth daily. Take 40 mg in AM and 20 mg at 2 pm 30 Tab 1  
 metoprolol tartrate (LOPRESSOR) 100 mg IR tablet Take 1 Tab by mouth two (2) times a day. 60 Tab 6  
 folic acid 888 mcg tablet Take 400 mcg by mouth daily.  cyanocobalamin, vitamin B-12, (VITAMIN B12 PO) Take 1,000 mcg by mouth daily.  cephALEXin (KEFLEX) 500 mg capsule Take 1 Cap by mouth every six (6) hours. 12 Cap 0  
 famotidine (PEPCID) 20 mg tablet Take 1 Tab by mouth daily. 30 Tab 0  
 gabapentin (NEURONTIN) 100 mg capsule Take 1 Cap by mouth nightly. Max Daily Amount: 100 mg. 30 Cap 0  
 lactobacillus sp. 50 billion cpu (BIO-K PLUS) 50 billion cell -375 mg cap capsule Take 1 Cap by mouth daily. 10 Cap 0  
 warfarin (COUMADIN) 5 mg tablet take 1 tablet by mouth once daily 90 Tab 1  
 ondansetron hcl (ZOFRAN) 4 mg tablet Take 1 Tab by mouth every eight (8) hours as needed for Nausea.  30 Tab 2  
 Calcium-Cholecalciferol, D3, (CALCIUM 600 WITH VITAMIN D3) 600 mg(1,500mg) -400 unit chew Take 1 Tab by mouth daily.  simvastatin (ZOCOR) 80 mg tablet TAKE 1 TABLET NIGHTLY 90 Tab 1  cholecalciferol (VITAMIN D3) 1,000 unit cap Take  by mouth daily.  losartan (COZAAR) 100 mg tablet Take 100 mg by mouth daily.  montelukast (SINGULAIR) 10 mg tablet Take 10 mg by mouth daily.  CETIRIZINE HCL (ZYRTEC PO) Take  by mouth. Past History Past Medical History: 
Past Medical History:  
Diagnosis Date  Arthritis  Heart murmur  History of seasonal allergies  HTN (hypertension)  Hypercholesteremia Past Surgical History: 
Past Surgical History:  
Procedure Laterality Date  HX KNEE ARTHROSCOPY    
 left and right  HX ORTHOPAEDIC    
 right ankle Family History: 
Family History Problem Relation Age of Onset  Cancer Other  Heart Disease Other  Cancer Mother  Heart Disease Mother Social History: 
Social History Tobacco Use  Smoking status: Never Smoker  Smokeless tobacco: Never Used Substance Use Topics  Alcohol use: No  
 Drug use: No  
 
 
Allergies: 
No Known Allergies Review of Systems Review of Systems Constitutional: Negative for diaphoresis and fever. HENT: Negative for congestion and sore throat. Eyes: Negative for pain and itching. Respiratory: Negative for cough and shortness of breath. Cardiovascular: Negative for chest pain and palpitations. Gastrointestinal: Negative for abdominal pain and diarrhea. Endocrine: Negative for polydipsia and polyuria. Genitourinary: Negative for dysuria and hematuria. Musculoskeletal: Negative for arthralgias and myalgias. Skin: Positive for wound. Negative for rash. Neurological: Negative for seizures and syncope. Hematological: Does not bruise/bleed easily. Psychiatric/Behavioral: Negative for agitation and hallucinations. Physical Exam  
 
 
Patient Vitals for the past 12 hrs: Temp Pulse Resp BP SpO2  
10/21/19 1500  68 15 146/55 99 % 10/21/19 1430  69 19 157/58 98 % 10/21/19 1400  67 14 157/60 98 % 10/21/19 1316 97.9 °F (36.6 °C) 69 16 161/60 96 % Physical Exam  
Constitutional: She appears well-developed and well-nourished. HENT:  
Head: Normocephalic and atraumatic. Eyes: Conjunctivae are normal. No scleral icterus. Neck: Normal range of motion. Neck supple. No JVD present. Cardiovascular: Normal rate, regular rhythm and intact distal pulses. Pulmonary/Chest: Effort normal. No respiratory distress. Musculoskeletal: Normal range of motion. Neurological: She is alert. Skin: Skin is warm and dry. Bilateral lower extremities are bandaged around the lower leg, left leg with pressure dressing Psychiatric: Judgment and thought content normal.  
Nursing note and vitals reviewed. Diagnostic Study Results Labs - Recent Results (from the past 12 hour(s)) AMB PT/INR EXTERNAL Collection Time: 10/21/19 11:50 AM  
Result Value Ref Range INR, External 5.2 (A) 2.0 - 3.0  
CBC WITH AUTOMATED DIFF Collection Time: 10/21/19  1:48 PM  
Result Value Ref Range WBC 6.9 4.6 - 13.2 K/uL  
 RBC 4.33 4.20 - 5.30 M/uL  
 HGB 11.4 (L) 12.0 - 16.0 g/dL HCT 36.3 35.0 - 45.0 % MCV 83.8 74.0 - 97.0 FL  
 MCH 26.3 24.0 - 34.0 PG  
 MCHC 31.4 31.0 - 37.0 g/dL  
 RDW 15.2 (H) 11.6 - 14.5 % PLATELET 748 592 - 620 K/uL MPV 10.5 9.2 - 11.8 FL  
 NEUTROPHILS 70 40 - 73 % LYMPHOCYTES 16 (L) 21 - 52 % MONOCYTES 12 (H) 3 - 10 % EOSINOPHILS 2 0 - 5 % BASOPHILS 0 0 - 2 %  
 ABS. NEUTROPHILS 4.9 1.8 - 8.0 K/UL  
 ABS. LYMPHOCYTES 1.1 0.9 - 3.6 K/UL  
 ABS. MONOCYTES 0.8 0.05 - 1.2 K/UL  
 ABS. EOSINOPHILS 0.1 0.0 - 0.4 K/UL  
 ABS. BASOPHILS 0.0 0.0 - 0.1 K/UL  
 DF AUTOMATED METABOLIC PANEL, COMPREHENSIVE Collection Time: 10/21/19  1:48 PM  
Result Value Ref Range Sodium 132 (L) 136 - 145 mmol/L  Potassium 4.2 3.5 - 5.5 mmol/L  
 Chloride 95 (L) 100 - 111 mmol/L  
 CO2 35 (H) 21 - 32 mmol/L Anion gap 2 (L) 3.0 - 18 mmol/L Glucose 99 74 - 99 mg/dL BUN 53 (H) 7.0 - 18 MG/DL Creatinine 1.54 (H) 0.6 - 1.3 MG/DL  
 BUN/Creatinine ratio 34 (H) 12 - 20 GFR est AA 40 (L) >60 ml/min/1.73m2 GFR est non-AA 33 (L) >60 ml/min/1.73m2 Calcium 9.4 8.5 - 10.1 MG/DL Bilirubin, total 0.5 0.2 - 1.0 MG/DL  
 ALT (SGPT) 27 13 - 56 U/L  
 AST (SGOT) 27 10 - 38 U/L Alk. phosphatase 254 (H) 45 - 117 U/L Protein, total 8.5 (H) 6.4 - 8.2 g/dL Albumin 2.9 (L) 3.4 - 5.0 g/dL Globulin 5.6 (H) 2.0 - 4.0 g/dL A-G Ratio 0.5 (L) 0.8 - 1.7 Radiologic Studies - No orders to display No results found. Medications ordered:  
Medications - No data to display Medical Decision Making Initial Medical Decision Making and DDx: 
1:33 PM with Surgicel at bedside we unwrapped the leg and found steady rather high pressure nonpulsatile stream of blood, likely a arterial.  Unable to control bleeding with a single finger pressure. I am unable to occlude the vessel elsewhere to gain control while I place a stitch. Will call surgery. We applied a pressure dressing with a balled up 4 x 4 placing direct pressure on the culprit bleeder successfully. Patient tolerated with minimal discomfort from pushing pressure on her wound. Calling surgery Surgery did return call and fortunately he is tied up with an unstable patient at another facility he is unable to get away at this point he will come over when he has the opportunity. Dr Eva Barajas ED Course: Progress Notes, Reevaluation, and Consults: 
  
2:27 PM procedure, ligation of bleeding superficial vessel. Dressing was removed, injected with 1% lighter without epi with aspiration to ensure no intravascular injection.  
Double running stitch performed with 3 oh polyester which was available to us with a large needle, small strip of Surgicel applied to the wound and suture drawn tight with good results and bleeding controlled patient tolerated procedure well without complication. Of note INR is 5. We will manage this by having her discontinue her Coumadin for 2 days. I do not think she will benefit from emergent reversal nor vitamin K. I am the first provider for this patient. I reviewed the vital signs, available nursing notes, past medical history, past surgical history, family history and social history. Patient Vitals for the past 12 hrs: 
 Temp Pulse Resp BP SpO2  
10/21/19 1500  68 15 146/55 99 % 10/21/19 1430  69 19 157/58 98 % 10/21/19 1400  67 14 157/60 98 % 10/21/19 1316 97.9 °F (36.6 °C) 69 16 161/60 96 %  
 
3:03 PM Dr Cody Morales saw and evaluated the patient at bedside with me bleeding is well controlled. He recommends compression wrap with Ace wrap to reduce the size of varicose vessels and vascular follow-up. Vital Signs-Reviewed the patient's vital signs. Pulse Oximetry Analysis, Cardiac Monitor, 12 lead ekg: Interpreted by the EP. Records Reviewed: Nursing notes reviewed (Time of Review: 1:10 PM) Procedures:  
Critical Care Time:  
Aspirin: (was aspirin given for stroke?) Diagnosis Clinical Impression: 1. Bleeding from wound 2. Coagulopathy ([address] Disposition:  
 
 
Follow-up Information Follow up With Specialties Details Why Contact Info Ha Slade MD Vascular Surgery In 2 days  [address] 
[phone] Patient's Medications Start Taking No medications on file Continue Taking CALCIUM-CHOLECALCIFEROL, D3, (CALCIUM 600 WITH VITAMIN D3) 600 MG(1,500MG) -400 UNIT CHEW    Take 1 Tab by mouth daily. CEPHALEXIN (KEFLEX) 500 MG CAPSULE    Take 1 Cap by mouth every six (6) hours. CETIRIZINE HCL (ZYRTEC PO)    Take  by mouth. CHOLECALCIFEROL (VITAMIN D3) 1,000 UNIT CAP    Take  by mouth daily. CYANOCOBALAMIN, VITAMIN B-12, (VITAMIN B12 PO)    Take 1,000 mcg by mouth daily. FAMOTIDINE (PEPCID) 20 MG TABLET    Take 1 Tab by mouth daily. FOLIC ACID 901 MCG TABLET    Take 400 mcg by mouth daily. FUROSEMIDE (LASIX) 40 MG TABLET    Take 1 Tab by mouth daily. Take 40 mg in AM and 20 mg at 2 pm  
 GABAPENTIN (NEURONTIN) 100 MG CAPSULE    Take 1 Cap by mouth nightly. Max Daily Amount: 100 mg. LACTOBACILLUS SP. 50 BILLION CPU (BIO-K PLUS) 50 BILLION CELL -375 MG CAP CAPSULE    Take 1 Cap by mouth daily. LOSARTAN (COZAAR) 100 MG TABLET    Take 100 mg by mouth daily. METOPROLOL TARTRATE (LOPRESSOR) 100 MG IR TABLET    Take 1 Tab by mouth two (2) times a day. MONTELUKAST (SINGULAIR) 10 MG TABLET    Take 10 mg by mouth daily. ONDANSETRON HCL (ZOFRAN) 4 MG TABLET    Take 1 Tab by mouth every eight (8) hours as needed for Nausea. SIMVASTATIN (ZOCOR) 80 MG TABLET    TAKE 1 TABLET NIGHTLY  
 TRAMADOL (ULTRAM) 50 MG TABLET    Take 1 Tab by mouth. WARFARIN (COUMADIN) 5 MG TABLET    take 1 tablet by mouth once daily These Medications have changed No medications on file Stop Taking No medications on file  
 
_______________________________ Notes:   
Hernando Mcclendon MD using Dragon dictation     
_______________________________

## 2019-10-21 NOTE — ED NOTES
I have reviewed discharge instructions with the patient. The patient verbalized understanding. Asisted patient to the ED lobby exit via wheelchair. No obvious distress noted.

## 2019-10-21 NOTE — ED NOTES
Patient resting on stretcher with eyes open and spouse at bedside. No acute distress noted. Patient's needs addressed.

## 2019-10-21 NOTE — DISCHARGE INSTRUCTIONS
Hold Coumadin for the next 4 days, then have INR rechecked. Return to emergency department for any new or alarming symptoms. Follow-up with vascular surgery. With each dressing change, Ace wrap the left lower leg from ankle to just below the knee for light compression.

## 2019-10-22 PROCEDURE — 3331090001 HH PPS REVENUE CREDIT

## 2019-10-22 PROCEDURE — 3331090002 HH PPS REVENUE DEBIT

## 2019-10-23 ENCOUNTER — HOME CARE VISIT (OUTPATIENT)
Dept: SCHEDULING | Facility: HOME HEALTH | Age: 79
End: 2019-10-23
Payer: MEDICARE

## 2019-10-23 ENCOUNTER — HOME CARE VISIT (OUTPATIENT)
Dept: HOME HEALTH SERVICES | Facility: HOME HEALTH | Age: 79
End: 2019-10-23
Payer: MEDICARE

## 2019-10-23 VITALS
DIASTOLIC BLOOD PRESSURE: 67 MMHG | SYSTOLIC BLOOD PRESSURE: 142 MMHG | RESPIRATION RATE: 18 BRPM | OXYGEN SATURATION: 99 % | TEMPERATURE: 97 F | HEART RATE: 65 BPM

## 2019-10-23 PROCEDURE — G0300 HHS/HOSPICE OF LPN EA 15 MIN: HCPCS

## 2019-10-23 PROCEDURE — G0157 HHC PT ASSISTANT EA 15: HCPCS

## 2019-10-23 PROCEDURE — 3331090001 HH PPS REVENUE CREDIT

## 2019-10-23 PROCEDURE — 3331090002 HH PPS REVENUE DEBIT

## 2019-10-24 ENCOUNTER — HOME CARE VISIT (OUTPATIENT)
Dept: SCHEDULING | Facility: HOME HEALTH | Age: 79
End: 2019-10-24
Payer: MEDICARE

## 2019-10-24 ENCOUNTER — HOME CARE VISIT (OUTPATIENT)
Dept: HOME HEALTH SERVICES | Facility: HOME HEALTH | Age: 79
End: 2019-10-24
Payer: MEDICARE

## 2019-10-24 ENCOUNTER — TELEPHONE ANTICOAG (OUTPATIENT)
Dept: CARDIOLOGY CLINIC | Age: 79
End: 2019-10-24

## 2019-10-24 ENCOUNTER — OFFICE VISIT (OUTPATIENT)
Dept: VASCULAR SURGERY | Age: 79
End: 2019-10-24

## 2019-10-24 VITALS
SYSTOLIC BLOOD PRESSURE: 132 MMHG | HEART RATE: 72 BPM | DIASTOLIC BLOOD PRESSURE: 70 MMHG | TEMPERATURE: 97.1 F | OXYGEN SATURATION: 95 %

## 2019-10-24 VITALS
WEIGHT: 150 LBS | SYSTOLIC BLOOD PRESSURE: 140 MMHG | HEIGHT: 62 IN | RESPIRATION RATE: 16 BRPM | HEART RATE: 70 BPM | BODY MASS INDEX: 27.6 KG/M2 | DIASTOLIC BLOOD PRESSURE: 60 MMHG

## 2019-10-24 DIAGNOSIS — I87.2 VENOUS (PERIPHERAL) INSUFFICIENCY: Primary | ICD-10-CM

## 2019-10-24 DIAGNOSIS — I48.0 PAROXYSMAL ATRIAL FIBRILLATION (HCC): ICD-10-CM

## 2019-10-24 DIAGNOSIS — L97.201 VENOUS STASIS ULCER OF CALF LIMITED TO BREAKDOWN OF SKIN WITH VARICOSE VEINS, UNSPECIFIED LATERALITY (HCC): ICD-10-CM

## 2019-10-24 DIAGNOSIS — I83.899 BLEEDING FROM VARICOSE VEIN: ICD-10-CM

## 2019-10-24 DIAGNOSIS — I83.002 VENOUS STASIS ULCER OF CALF LIMITED TO BREAKDOWN OF SKIN WITH VARICOSE VEINS, UNSPECIFIED LATERALITY (HCC): ICD-10-CM

## 2019-10-24 DIAGNOSIS — I83.893 VARICOSE VEINS OF BILATERAL LOWER EXTREMITIES WITH OTHER COMPLICATIONS: ICD-10-CM

## 2019-10-24 DIAGNOSIS — Z79.01 ANTICOAGULATED: ICD-10-CM

## 2019-10-24 LAB
INR, EXTERNAL: 12.4
INR, EXTERNAL: 2.4

## 2019-10-24 PROCEDURE — 3331090001 HH PPS REVENUE CREDIT

## 2019-10-24 PROCEDURE — G0157 HHC PT ASSISTANT EA 15: HCPCS

## 2019-10-24 PROCEDURE — 3331090002 HH PPS REVENUE DEBIT

## 2019-10-24 PROCEDURE — G0300 HHS/HOSPICE OF LPN EA 15 MIN: HCPCS

## 2019-10-24 NOTE — PATIENT INSTRUCTIONS
This has been fully explained to the patient's nurse Heth to take 2.5 mg tonight, 5 mg tomorrow night and keep alternating, recheck in 1 week who indicates understanding.

## 2019-10-24 NOTE — PROGRESS NOTES
Teagan Villalobos    Chief Complaint   Patient presents with    New Patient    Leg Pain    Varicose Veins       BRANDON Villalobos is a 70 y.o. female with history of chronic venous insufficiency and venous stasis ulcers. She was last seen in our office in 2015. She had a recent ED visit for a bleeding varicosity of the left leg which was treated with Epi injection and sutures. She does have history of A Fib and is on Coumadin. Her INR was noted to be 5 in the ED. She states that the bleeding occurred when home health took down her dressing which was dry and stuck to her wound. Once the dressing was pulled off the bleeding occurred and could not be stopped. She had been recommended for a closure of the left GSV in 2015 but decided to continue with conservative management. She has had Jimbo Castellanos coming twice weekly for wound care and compression wraps as she has bilateral wounds. She does have pain at the ulcer sites. She has no claudication. No rest pain. No fever/chills. She presents to the office today with her . Past Medical History:   Diagnosis Date    Arthritis     Heart murmur     History of seasonal allergies     HTN (hypertension)     Hypercholesteremia      Patient Active Problem List   Diagnosis Code    Pain and swelling of lower leg M79.669, M79.89    Obesity (BMI 30.0-34. 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  furosemide (LASIX) 40 mg tablet Take 1 Tab by mouth daily. Take 40 mg in AM and 20 mg at 2 pm 30 Tab 1    metoprolol tartrate (LOPRESSOR) 100 mg IR tablet Take 1 Tab by mouth two (2) times a day. 60 Tab 6    folic acid 240 mcg tablet Take 400 mcg by mouth daily.  cyanocobalamin, vitamin B-12, (VITAMIN B12 PO) Take 1,000 mcg by mouth daily.  cephALEXin (KEFLEX) 500 mg capsule Take 1 Cap by mouth every six (6) hours. 12 Cap 0    famotidine (PEPCID) 20 mg tablet Take 1 Tab by mouth daily. 30 Tab 0    gabapentin (NEURONTIN) 100 mg capsule Take 1 Cap by mouth nightly. Max Daily Amount: 100 mg. 30 Cap 0    lactobacillus sp. 50 billion cpu (BIO-K PLUS) 50 billion cell -375 mg cap capsule Take 1 Cap by mouth daily. 10 Cap 0    warfarin (COUMADIN) 5 mg tablet take 1 tablet by mouth once daily 90 Tab 1    ondansetron hcl (ZOFRAN) 4 mg tablet Take 1 Tab by mouth every eight (8) hours as needed for Nausea. 30 Tab 2    Calcium-Cholecalciferol, D3, (CALCIUM 600 WITH VITAMIN D3) 600 mg(1,500mg) -400 unit chew Take 1 Tab by mouth daily.  simvastatin (ZOCOR) 80 mg tablet TAKE 1 TABLET NIGHTLY 90 Tab 1    cholecalciferol (VITAMIN D3) 1,000 unit cap Take  by mouth daily.  losartan (COZAAR) 100 mg tablet Take 100 mg by mouth daily.  montelukast (SINGULAIR) 10 mg tablet Take 10 mg by mouth daily.  CETIRIZINE HCL (ZYRTEC PO) Take  by mouth.          No Known Allergies  Social History     Socioeconomic History    Marital status:      Spouse name: Not on file    Number of children: Not on file    Years of education: Not on file    Highest education level: Not on file   Occupational History    Not on file   Social Needs    Financial resource strain: Not on file    Food insecurity:     Worry: Not on file     Inability: Not on file    Transportation needs:     Medical: Not on file     Non-medical: Not on file   Tobacco Use    Smoking status: Never Smoker    Smokeless tobacco: Never Used   Substance and Sexual Activity    Alcohol use: No    Drug use: No    Sexual activity: Not on file   Lifestyle    Physical activity:     Days per week: Not on file     Minutes per session: Not on file    Stress: Not on file   Relationships    Social connections:     Talks on phone: Not on file     Gets together: Not on file     Attends Latter day service: Not on file     Active member of club or organization: Not on file     Attends meetings of clubs or organizations: Not on file     Relationship status: Not on file    Intimate partner violence:     Fear of current or ex partner: Not on file     Emotionally abused: Not on file     Physically abused: Not on file     Forced sexual activity: Not on file   Other Topics Concern    Not on file   Social History Narrative    Pt has a dog    Born in [facility] and moved to [place] years ago       Family History   Problem Relation Age of Onset    Cancer Other     Heart Disease Other     Cancer Mother     Heart Disease Mother        Review of Systems    Constitutional: negative   HEENT: negative   Respiratory: negative   Cardiovascular: negative   Gastrointestinal: negative   Genitourinary:negative   Hematologic/lymphatic: negative   Musculoskeletal: positive for BLE edema, varicose veins, leg ulcers  Neurological: negative   Behavioral/Psych: negative   Endocrine: negative   Allergic/Immunologic: negative      Physical Exam:    Visit Vitals  /60 (BP 1 Location: Left arm, BP Patient Position: Sitting)   Pulse 70   Resp 16   Ht 5' 2\" (1.575 m)   Wt 150 lb (68 kg)   BMI 27.44 kg/m²      General: female in no acute distress   HEENT: EOMI, no scleral icterus is noted. Cardiovascular: RRR, palpable pedal pulses bilaterally. Pulmonary: No increased work or breathing is noted. Abdomen: soft, nondistended.    Extremities: Warm and well perfused bilaterally. +BLE edema, she has extensive varicose veins in the bilaterally lower extremities all the way to the feet. She has multiple open ulcers on her shins bilaterally. Neuro: Cranial nerves II through XII are grossly intact   Integument: No other skin ulcerations are identified       Impression and Plan:  Casey Campa is a 70 y.o. female with chronic venous insufficiency and varicose veins of the bilateral lower extremities. Unfortunately she currently has open ulcers to both lower legs. She presents today after recent emergency room visit for bleeding ulceration to her left lower leg. Wound care was provided in the office today and she was placed in bilateral Unna boots. New wound care orders was sent to her home health care company. I discussed that we will have her back in 2 to 3 weeks to reassess. If her tenderness is improved we will order venous reflux studies at that time. I did discuss the possibility of closure procedures with her and she expresses understanding to all of this. Further surgical discussion pending her ultrasound results. She is certainly understanding to call the office sooner if needed otherwise we will see her back in 2 to 3 weeks for wound check. Plan was discussed. Patient expresses understanding and agrees. PLEASE NOTE:  This document has been produced using voice recognition software. Unrecognized errors in transcription may be present.

## 2019-10-24 NOTE — PROGRESS NOTES
Ms. Florecita Machuca is here today for anticoagulation monitoring for the diagnosis of Atrial Fibrillation. Her INR goal is 2.0-3.0 and her current Coumadin dose is 2.5 mg and 5 mg. Today's findings include an INR of 2.4 Considering Ms. Daugherty's past history, todays findings, and per the coumadin policy/protocol, Ms. Daugherty was instructed to take Coumadin as follows,  Take 2.5 mg tonight, 5 mg tomorrow night and keep alternating . She was also instructed to come back in 1 weeks for an INR check. A full discussion of the nature of anticoagulants has been carried out. A full discussion of the need for frequent and regular monitoring, precise dosage adjustment and compliance was stressed. Side effects of potential bleeding were discussed and Ms. Daugherty was instructed to call [phone] if there are any signs of abnormal bleeding. Ms. Bertin Duarte was instructed to avoid any OTC items containing aspirin or ibuprofen and prior to starting any new OTC products to consult with her physician or pharmacist to ensure no drug interactions are present. Ms. Bertin Duarte was instructed to avoid any major changes in her general diet and to avoid alcohol consumption. . 
 
 
Ms. Daugherty verbalized her understanding of all instructions and will call the office with any questions, concerns, or signs of abnormal bleeding or blood clot.

## 2019-10-24 NOTE — PROGRESS NOTES
1. Have you been to an emergency room or urgent care clinic since your last visit? No    Hospitalized since your last visit? If yes, where, when, and reason for visit? NO  2. Have you seen or consulted any other health care providers outside of the [facility] since your last visit including any procedures, health maintenance items. If yes, where, when and reason for visit?  NO

## 2019-10-25 ENCOUNTER — PATIENT OUTREACH (OUTPATIENT)
Dept: CARDIOLOGY CLINIC | Age: 79
End: 2019-10-25

## 2019-10-25 VITALS
SYSTOLIC BLOOD PRESSURE: 112 MMHG | HEART RATE: 69 BPM | OXYGEN SATURATION: 91 % | RESPIRATION RATE: 17 BRPM | DIASTOLIC BLOOD PRESSURE: 92 MMHG | TEMPERATURE: 97.3 F

## 2019-10-25 PROCEDURE — 3331090002 HH PPS REVENUE DEBIT

## 2019-10-25 PROCEDURE — 3331090001 HH PPS REVENUE CREDIT

## 2019-10-25 NOTE — PROGRESS NOTES
Patient attended appointments with her vascular surgeon's PA, PHILLIP Jones on 10/24/19, Nurse Navigator reviewed EMR and will follow up on next scheduled outreach.

## 2019-10-26 VITALS
TEMPERATURE: 98 F | HEART RATE: 57 BPM | OXYGEN SATURATION: 96 % | DIASTOLIC BLOOD PRESSURE: 73 MMHG | SYSTOLIC BLOOD PRESSURE: 135 MMHG

## 2019-10-26 PROCEDURE — 3331090001 HH PPS REVENUE CREDIT

## 2019-10-26 PROCEDURE — 3331090002 HH PPS REVENUE DEBIT

## 2019-10-27 PROCEDURE — 3331090002 HH PPS REVENUE DEBIT

## 2019-10-27 PROCEDURE — 3331090001 HH PPS REVENUE CREDIT

## 2019-10-28 ENCOUNTER — HOME CARE VISIT (OUTPATIENT)
Dept: SCHEDULING | Facility: HOME HEALTH | Age: 79
End: 2019-10-28
Payer: MEDICARE

## 2019-10-28 VITALS
SYSTOLIC BLOOD PRESSURE: 130 MMHG | HEART RATE: 62 BPM | TEMPERATURE: 97.5 F | DIASTOLIC BLOOD PRESSURE: 68 MMHG | OXYGEN SATURATION: 94 %

## 2019-10-28 VITALS
SYSTOLIC BLOOD PRESSURE: 135 MMHG | HEART RATE: 57 BPM | TEMPERATURE: 98 F | DIASTOLIC BLOOD PRESSURE: 73 MMHG | OXYGEN SATURATION: 96 %

## 2019-10-28 PROCEDURE — G0157 HHC PT ASSISTANT EA 15: HCPCS

## 2019-10-28 PROCEDURE — 3331090001 HH PPS REVENUE CREDIT

## 2019-10-28 PROCEDURE — 3331090002 HH PPS REVENUE DEBIT

## 2019-10-29 ENCOUNTER — OFFICE VISIT (OUTPATIENT)
Dept: CARDIOLOGY CLINIC | Age: 79
End: 2019-10-29

## 2019-10-29 VITALS
HEIGHT: 62 IN | SYSTOLIC BLOOD PRESSURE: 132 MMHG | HEART RATE: 68 BPM | BODY MASS INDEX: 24.84 KG/M2 | WEIGHT: 135 LBS | DIASTOLIC BLOOD PRESSURE: 58 MMHG

## 2019-10-29 DIAGNOSIS — I35.1 NONRHEUMATIC AORTIC VALVE INSUFFICIENCY: ICD-10-CM

## 2019-10-29 DIAGNOSIS — C73 PAPILLARY THYROID CARCINOMA (HCC): ICD-10-CM

## 2019-10-29 DIAGNOSIS — Z95.0 PACEMAKER: ICD-10-CM

## 2019-10-29 DIAGNOSIS — I50.32 CHRONIC DIASTOLIC CONGESTIVE HEART FAILURE (HCC): Primary | ICD-10-CM

## 2019-10-29 DIAGNOSIS — I42.1 MILD HOCM (HYPERTROPHIC OBSTRUCTIVE CARDIOMYOPATHY) (HCC): ICD-10-CM

## 2019-10-29 DIAGNOSIS — I48.20 CHRONIC ATRIAL FIBRILLATION (HCC): ICD-10-CM

## 2019-10-29 DIAGNOSIS — I27.20 PULMONARY HTN (HCC): ICD-10-CM

## 2019-10-29 PROCEDURE — 3331090001 HH PPS REVENUE CREDIT

## 2019-10-29 PROCEDURE — 3331090002 HH PPS REVENUE DEBIT

## 2019-10-29 NOTE — PROGRESS NOTES
1. Have you been to the ER, urgent care clinic since your last visit? Hospitalized since your last visit? No    2. Have you seen or consulted any other health care providers outside of the [address] [place] [address] since your last visit? Include any pap smears or colon screening.  No

## 2019-10-30 PROCEDURE — 3331090002 HH PPS REVENUE DEBIT

## 2019-10-30 PROCEDURE — 3331090001 HH PPS REVENUE CREDIT

## 2019-10-31 ENCOUNTER — HOME CARE VISIT (OUTPATIENT)
Dept: HOME HEALTH SERVICES | Facility: HOME HEALTH | Age: 79
End: 2019-10-31
Payer: MEDICARE

## 2019-10-31 ENCOUNTER — HOME CARE VISIT (OUTPATIENT)
Dept: SCHEDULING | Facility: HOME HEALTH | Age: 79
End: 2019-10-31
Payer: MEDICARE

## 2019-10-31 ENCOUNTER — PATIENT OUTREACH (OUTPATIENT)
Dept: CARDIOLOGY CLINIC | Age: 79
End: 2019-10-31

## 2019-10-31 ENCOUNTER — TELEPHONE ANTICOAG (OUTPATIENT)
Dept: CARDIOLOGY CLINIC | Age: 79
End: 2019-10-31

## 2019-10-31 VITALS — SYSTOLIC BLOOD PRESSURE: 140 MMHG | TEMPERATURE: 97.4 F | HEART RATE: 74 BPM | DIASTOLIC BLOOD PRESSURE: 58 MMHG

## 2019-10-31 DIAGNOSIS — I48.0 PAROXYSMAL ATRIAL FIBRILLATION (HCC): ICD-10-CM

## 2019-10-31 LAB — INR, EXTERNAL: 1.2

## 2019-10-31 PROCEDURE — G0299 HHS/HOSPICE OF RN EA 15 MIN: HCPCS

## 2019-10-31 PROCEDURE — 3331090002 HH PPS REVENUE DEBIT

## 2019-10-31 PROCEDURE — G0151 HHCP-SERV OF PT,EA 15 MIN: HCPCS

## 2019-10-31 PROCEDURE — 3331090001 HH PPS REVENUE CREDIT

## 2019-10-31 NOTE — PROGRESS NOTES
Patient attended appointments with her cardiologist, Dr Polina Barros. on 10/29/19, Nurse Navigator reviewed EMR and will follow up on next scheduled outreach.

## 2019-11-01 PROCEDURE — 3331090001 HH PPS REVENUE CREDIT

## 2019-11-01 PROCEDURE — 3331090002 HH PPS REVENUE DEBIT

## 2019-11-02 PROCEDURE — 3331090001 HH PPS REVENUE CREDIT

## 2019-11-02 PROCEDURE — 3331090002 HH PPS REVENUE DEBIT

## 2019-11-03 VITALS
OXYGEN SATURATION: 98 % | HEART RATE: 72 BPM | TEMPERATURE: 97.9 F | DIASTOLIC BLOOD PRESSURE: 80 MMHG | SYSTOLIC BLOOD PRESSURE: 126 MMHG | RESPIRATION RATE: 18 BRPM

## 2019-11-03 PROCEDURE — 3331090001 HH PPS REVENUE CREDIT

## 2019-11-03 PROCEDURE — 3331090002 HH PPS REVENUE DEBIT

## 2019-11-04 ENCOUNTER — TELEPHONE ANTICOAG (OUTPATIENT)
Dept: CARDIOLOGY CLINIC | Age: 79
End: 2019-11-04

## 2019-11-04 ENCOUNTER — HOME CARE VISIT (OUTPATIENT)
Dept: HOME HEALTH SERVICES | Facility: HOME HEALTH | Age: 79
End: 2019-11-04
Payer: MEDICARE

## 2019-11-04 ENCOUNTER — HOME CARE VISIT (OUTPATIENT)
Dept: SCHEDULING | Facility: HOME HEALTH | Age: 79
End: 2019-11-04
Payer: MEDICARE

## 2019-11-04 DIAGNOSIS — I48.0 PAROXYSMAL ATRIAL FIBRILLATION (HCC): ICD-10-CM

## 2019-11-04 LAB
INR BLD: 1.2 (ref 0.9–1.1)
INR, EXTERNAL: 1.3 (ref 2–3)

## 2019-11-04 PROCEDURE — 3331090001 HH PPS REVENUE CREDIT

## 2019-11-04 PROCEDURE — 3331090002 HH PPS REVENUE DEBIT

## 2019-11-04 PROCEDURE — G0299 HHS/HOSPICE OF RN EA 15 MIN: HCPCS

## 2019-11-04 NOTE — PROGRESS NOTES
Ms. Jen Tracy is here today for anticoagulation monitoring for the diagnosis of Atrial Fibrillation. Her INR goal is 2.0-3.0 and her current Coumadin dose is 5 mg. Today's findings include an INR of 1.3     Considering Ms. Daugherty's past history, todays findings, and per the coumadin policy/protocol, Ms. Daugherty was instructed to take Coumadin as follows,  Take 5 mg daily. She was also instructed to come back on Fri 11/08 for an INR check. A full discussion of the nature of anticoagulants has been carried out. A full discussion of the need for frequent and regular monitoring, precise dosage adjustment and compliance was stressed. Side effects of potential bleeding were discussed and Ms. Daugherty was instructed to call [phone] if there are any signs of abnormal bleeding. Ms. Sherri Oliver was instructed to avoid any OTC items containing aspirin or ibuprofen and prior to starting any new OTC products to consult with her physician or pharmacist to ensure no drug interactions are present. Ms. Sherri Oliver was instructed to avoid any major changes in her general diet and to avoid alcohol consumption. .      Ms. Daugherty verbalized her understanding of all instructions and will call the office with any questions, concerns, or signs of abnormal bleeding or blood clot.

## 2019-11-05 ENCOUNTER — PATIENT OUTREACH (OUTPATIENT)
Dept: CARDIOLOGY CLINIC | Age: 79
End: 2019-11-05

## 2019-11-05 VITALS
RESPIRATION RATE: 18 BRPM | SYSTOLIC BLOOD PRESSURE: 122 MMHG | TEMPERATURE: 97.9 F | DIASTOLIC BLOOD PRESSURE: 80 MMHG | OXYGEN SATURATION: 98 % | HEART RATE: 66 BPM

## 2019-11-05 LAB — INR BLD: 1.3 (ref 0.9–1.1)

## 2019-11-05 PROCEDURE — 3331090002 HH PPS REVENUE DEBIT

## 2019-11-05 PROCEDURE — 3331090001 HH PPS REVENUE CREDIT

## 2019-11-06 PROCEDURE — 3331090002 HH PPS REVENUE DEBIT

## 2019-11-06 PROCEDURE — 3331090001 HH PPS REVENUE CREDIT

## 2019-11-07 ENCOUNTER — TELEPHONE ANTICOAG (OUTPATIENT)
Dept: CARDIOLOGY CLINIC | Age: 79
End: 2019-11-07

## 2019-11-07 ENCOUNTER — HOME CARE VISIT (OUTPATIENT)
Dept: HOME HEALTH SERVICES | Facility: HOME HEALTH | Age: 79
End: 2019-11-07
Payer: MEDICARE

## 2019-11-07 ENCOUNTER — HOME CARE VISIT (OUTPATIENT)
Dept: SCHEDULING | Facility: HOME HEALTH | Age: 79
End: 2019-11-07
Payer: MEDICARE

## 2019-11-07 DIAGNOSIS — I48.0 PAROXYSMAL ATRIAL FIBRILLATION (HCC): ICD-10-CM

## 2019-11-07 LAB — INR, EXTERNAL: 1.6

## 2019-11-07 PROCEDURE — G0299 HHS/HOSPICE OF RN EA 15 MIN: HCPCS

## 2019-11-07 PROCEDURE — 3331090002 HH PPS REVENUE DEBIT

## 2019-11-07 PROCEDURE — 3331090001 HH PPS REVENUE CREDIT

## 2019-11-07 NOTE — PROGRESS NOTES
HISTORY OF PRESENT ILLNESS  Carolyn Elizabeth is a 70 y.o. female. CHF   The history is provided by the patient. This is a chronic problem. The problem occurs every several days. The problem has been gradually improving. Palpitations    The history is provided by the patient. This is a new problem. The problem occurs every several days. Associated symptoms include malaise/fatigue. Pertinent negatives include no diaphoresis, no fever, no claudication, no orthopnea, no PND, no syncope, no nausea, no vomiting, no leg pain, no weakness, no cough, no hemoptysis and no sputum production. Shortness of Breath   The history is provided by the patient. This is a chronic problem. The problem occurs intermittently. The current episode started more than 1 week ago. The problem has been gradually improving. Associated symptoms include leg swelling. Pertinent negatives include no fever, no ear pain, no neck pain, no cough, no sputum production, no hemoptysis, no wheezing, no PND, no orthopnea, no syncope, no vomiting, no rash, no leg pain and no claudication. Associated medical issues include heart failure. Associated medical issues do not include CAD. Review of Systems   Constitutional: Positive for malaise/fatigue. Negative for chills, diaphoresis, fever and weight loss. HENT: Negative for ear discharge, ear pain, hearing loss, nosebleeds and tinnitus. Eyes: Negative for blurred vision. Respiratory: Negative for cough, hemoptysis, sputum production, wheezing and stridor. Cardiovascular: Positive for palpitations and leg swelling. Negative for orthopnea, claudication, syncope and PND. Gastrointestinal: Negative for heartburn, nausea and vomiting. Musculoskeletal: Negative for myalgias and neck pain. Skin: Negative for itching and rash. Neurological: Negative for tingling, tremors, focal weakness, loss of consciousness and weakness. Psychiatric/Behavioral: Negative for depression and suicidal ideas. Family History   Problem Relation Age of Onset    Cancer Other     Heart Disease Other     Cancer Mother     Heart Disease Mother        Past Medical History:   Diagnosis Date    Arthritis     Heart murmur     History of seasonal allergies     HTN (hypertension)     Hypercholesteremia        Past Surgical History:   Procedure Laterality Date    HX KNEE ARTHROSCOPY      left and right    HX ORTHOPAEDIC      right ankle       Social History     Tobacco Use    Smoking status: Never Smoker    Smokeless tobacco: Never Used   Substance Use Topics    Alcohol use: No       No Known Allergies    Outpatient Medications Marked as Taking for the 10/29/19 encounter (Office Visit) with Yoshi Elder MD   Medication Sig Dispense Refill    traMADol (ULTRAM) 50 mg tablet Take 1 Tab by mouth.  furosemide (LASIX) 40 mg tablet Take 1 Tab by mouth daily. Take 40 mg in AM and 20 mg at 2 pm 30 Tab 1    metoprolol tartrate (LOPRESSOR) 100 mg IR tablet Take 1 Tab by mouth two (2) times a day. 60 Tab 6    folic acid 494 mcg tablet Take 400 mcg by mouth daily.  cyanocobalamin, vitamin B-12, (VITAMIN B12 PO) Take 1,000 mcg by mouth daily.  famotidine (PEPCID) 20 mg tablet Take 1 Tab by mouth daily. 30 Tab 0    gabapentin (NEURONTIN) 100 mg capsule Take 1 Cap by mouth nightly. Max Daily Amount: 100 mg. 30 Cap 0    lactobacillus sp. 50 billion cpu (BIO-K PLUS) 50 billion cell -375 mg cap capsule Take 1 Cap by mouth daily. 10 Cap 0    warfarin (COUMADIN) 5 mg tablet take 1 tablet by mouth once daily (Patient taking differently: 2.5mg 11/1 and 11/3, 5mg 10/31 and 11/2) 90 Tab 1    ondansetron hcl (ZOFRAN) 4 mg tablet Take 1 Tab by mouth every eight (8) hours as needed for Nausea. 30 Tab 2    Calcium-Cholecalciferol, D3, (CALCIUM 600 WITH VITAMIN D3) 600 mg(1,500mg) -400 unit chew Take 1 Tab by mouth daily.       simvastatin (ZOCOR) 80 mg tablet TAKE 1 TABLET NIGHTLY 90 Tab 1    cholecalciferol (VITAMIN D3) 1,000 unit cap Take  by mouth daily.  losartan (COZAAR) 100 mg tablet Take 100 mg by mouth daily.  montelukast (SINGULAIR) 10 mg tablet Take 10 mg by mouth daily.  CETIRIZINE HCL (ZYRTEC PO) Take  by mouth. Visit Vitals  /58   Pulse 68   Ht 5' 2\" (1.575 m)   Wt 61.2 kg (135 lb)   BMI 24.69 kg/m²     Physical Exam   Constitutional: She is oriented to person, place, and time. She appears well-developed and well-nourished. No distress. HENT:   Head: Atraumatic. Mouth/Throat: No oropharyngeal exudate. Eyes: Conjunctivae are normal. Right eye exhibits no discharge. Left eye exhibits no discharge. No scleral icterus. Neck: Normal range of motion. Neck supple. No JVD present. No tracheal deviation present. No thyromegaly present. Cardiovascular: Normal rate. An irregularly irregular rhythm present. Exam reveals no gallop. Murmur (3/6 holosystolic murmur best heard at apex and left parasternal area with no radiationl) heard. Pulmonary/Chest: Effort normal. No stridor. No respiratory distress. She has no wheezes. She has no rales. She exhibits no tenderness. Abdominal: Soft. There is no tenderness. There is no rebound and no guarding. Musculoskeletal: Normal range of motion. She exhibits no tenderness. Edema: 1+ ble edema. Lymphadenopathy:     She has no cervical adenopathy. Neurological: She is alert and oriented to person, place, and time. She exhibits normal muscle tone. Skin: Skin is warm. She is not diaphoretic. Psychiatric: She has a normal mood and affect. Her behavior is normal.     ekg sinus bradycardia with poor r wave progression. 09/20/19   ECHO ADULT COMPLETE 09/25/2019 9/25/2019    Narrative · Left Ventricle: Normal cavity size and systolic function (ejection   fraction normal). Moderate concentric hypertrophy. Estimated left   ventricular ejection fraction is 61 - 65%.  Abnormal left ventricular   septal motion consistent with right ventricular pacing. Interventricular   septal \"bounce\". Abnormal left ventricular strain. Inconclusive left   ventricular diastolic function. · Right Ventricle: Mildly reduced systolic function. Abnormal right   ventricular septal motion. Diastolic flattening of interventricular septum   consistent with right ventricle volume overload. · Left Atrium: Severely dilated left atrium. · Right Atrium: Dilated right atrium. · Pulmonic Valve: Mild pulmonic valve regurgitation is present. · Tricuspid Valve: Moderate tricuspid valve regurgitation is present. · Pulmonary Artery: Moderate pulmonary hypertension. Pulmonary arterial   systolic pressure is 69 mmHg. · Aortic Valve: Probably trileaflet aortic valve. Aortic valve leaflet   calcification present with reduced excursion. Aortic valve peak gradient   is 47.2 mmHg. Aortic valve mean gradient is 28.5 mmHg. Aortic valve area   is 0.9 cm2. Moderate aortic valve stenosis is present. Mild aortic valve   regurgitation is present. · Moderate mitral annular calcification        Signed by: Twan Chamberlain MD       ASSESSMENT and PLAN    ICD-10-CM ICD-9-CM    1. Chronic diastolic congestive heart failure (HCC) I50.32 428.32      428.0     NYHA class II/III   2. Pacemaker Z95.0 V45.01    3. Chronic atrial fibrillation I48.20 427.31    4. HOCM (hypertrophic obstructive cardiomyopathy) (HCC) I42.1 425.11    5. Nonrheumatic aortic valve insufficiency I35.1 424.1    6. Pulmonary HTN (HCC) I27.20 416.8    7. Papillary thyroid carcinoma (HCC) C73 193      No orders of the defined types were placed in this encounter. Follow-up and Dispositions    · Return in about 1 month (around 11/29/2019). current treatment plan is effective, no change in therapy  reviewed diet, exercise and weight control  use of aspirin to prevent MI and TIA's discussed. Patient seen for pre op evaluation prior to thyroid surgery for possible Ca.   Had recent echo which revealed severe pulm htn.      ANAND reveals HOCM with mitral regurgitation and moderate aortic regurgitation. Underwent recent septal ablation followed by pacemaker insertion at [facility]. Seen for follow-up. CHF is significantly improved since increasing the dose of Lasix. Renal indicis stable. Continue current medications and follow-up in 1 month.

## 2019-11-08 ENCOUNTER — HOME CARE VISIT (OUTPATIENT)
Dept: HOME HEALTH SERVICES | Facility: HOME HEALTH | Age: 79
End: 2019-11-08
Payer: MEDICARE

## 2019-11-08 PROCEDURE — 3331090001 HH PPS REVENUE CREDIT

## 2019-11-08 PROCEDURE — 3331090002 HH PPS REVENUE DEBIT

## 2019-11-09 PROCEDURE — 3331090002 HH PPS REVENUE DEBIT

## 2019-11-09 PROCEDURE — 3331090001 HH PPS REVENUE CREDIT

## 2019-11-10 VITALS
TEMPERATURE: 97.9 F | DIASTOLIC BLOOD PRESSURE: 70 MMHG | OXYGEN SATURATION: 98 % | RESPIRATION RATE: 18 BRPM | SYSTOLIC BLOOD PRESSURE: 122 MMHG | HEART RATE: 80 BPM

## 2019-11-10 LAB — INR BLD: 1.6 (ref 0.9–1.1)

## 2019-11-10 PROCEDURE — 3331090002 HH PPS REVENUE DEBIT

## 2019-11-10 PROCEDURE — 3331090001 HH PPS REVENUE CREDIT

## 2019-11-11 PROCEDURE — 3331090001 HH PPS REVENUE CREDIT

## 2019-11-11 PROCEDURE — 3331090002 HH PPS REVENUE DEBIT

## 2019-11-12 PROCEDURE — 3331090002 HH PPS REVENUE DEBIT

## 2019-11-12 PROCEDURE — 3331090001 HH PPS REVENUE CREDIT

## 2019-11-13 ENCOUNTER — PATIENT OUTREACH (OUTPATIENT)
Dept: CARDIOLOGY CLINIC | Age: 79
End: 2019-11-13

## 2019-11-13 PROCEDURE — 3331090002 HH PPS REVENUE DEBIT

## 2019-11-13 PROCEDURE — 3331090001 HH PPS REVENUE CREDIT

## 2019-11-13 NOTE — PROGRESS NOTES
Patient has graduated from the Disease Specific Care Management  program on 11/13/19. Patient's symptoms are stable at this time. Patient/family has the ability to self-manage. Care management goals have been completed at this time. No further nurse navigator follow up scheduled. Goals Addressed This Visit's Progress  COMPLETED: Knowledge and adherence medication (ie. action, side effects, missed dose, etc.)     
  9/27/19 Pt will take all medications prescribed to be evaluated on each outreach through  12/27/19  COMPLETED: Maintains daily weight.     
  9/27/19  Pt will weight themselves daily and log the results to be evaluated on each outreach through 12/27/19  COMPLETED: Prepare patients and caregivers for end of life decisions (ie. need for hospice, pain management, symptom relief, advance directives etc.)     
  9/27/19 Pt will complete an ACP and have it scanned into their EMR by 12/27/19 Not completed Pt has nurse navigator's contact information for any further questions, concerns, or needs. Patients upcoming visits:   
Future Appointments Date Time Provider Harman Kruger 11/14/2019  8:30 AM Franca DANIELS RN [facility]  
11/18/2019  8:45 AM [facility] SCHED  
11/21/2019 To Be Determined Franca DANIELS RN [address] [facility]  
11/22/2019 12:45 PM [facility] SCHED  
12/5/2019  1:45 PM [facility] [place]  
2/18/2020  9:00 AM [facility].

## 2019-11-14 ENCOUNTER — HOME CARE VISIT (OUTPATIENT)
Dept: SCHEDULING | Facility: HOME HEALTH | Age: 79
End: 2019-11-14
Payer: MEDICARE

## 2019-11-14 ENCOUNTER — TELEPHONE ANTICOAG (OUTPATIENT)
Dept: CARDIOLOGY CLINIC | Age: 79
End: 2019-11-14

## 2019-11-14 ENCOUNTER — HOME CARE VISIT (OUTPATIENT)
Dept: HOME HEALTH SERVICES | Facility: HOME HEALTH | Age: 79
End: 2019-11-14
Payer: MEDICARE

## 2019-11-14 DIAGNOSIS — I48.0 PAROXYSMAL ATRIAL FIBRILLATION (HCC): ICD-10-CM

## 2019-11-14 LAB — INR, EXTERNAL: 2.5

## 2019-11-14 PROCEDURE — 3331090001 HH PPS REVENUE CREDIT

## 2019-11-14 PROCEDURE — 3331090002 HH PPS REVENUE DEBIT

## 2019-11-14 PROCEDURE — G0299 HHS/HOSPICE OF RN EA 15 MIN: HCPCS

## 2019-11-15 PROCEDURE — 3331090002 HH PPS REVENUE DEBIT

## 2019-11-15 PROCEDURE — 3331090001 HH PPS REVENUE CREDIT

## 2019-11-16 VITALS
DIASTOLIC BLOOD PRESSURE: 80 MMHG | SYSTOLIC BLOOD PRESSURE: 124 MMHG | TEMPERATURE: 98.1 F | RESPIRATION RATE: 18 BRPM | HEART RATE: 80 BPM | OXYGEN SATURATION: 98 %

## 2019-11-16 PROCEDURE — 3331090001 HH PPS REVENUE CREDIT

## 2019-11-16 PROCEDURE — 3331090002 HH PPS REVENUE DEBIT

## 2019-11-17 PROCEDURE — 3331090001 HH PPS REVENUE CREDIT

## 2019-11-17 PROCEDURE — 3331090002 HH PPS REVENUE DEBIT

## 2019-11-18 PROCEDURE — 3331090002 HH PPS REVENUE DEBIT

## 2019-11-18 PROCEDURE — 3331090001 HH PPS REVENUE CREDIT

## 2019-11-19 PROCEDURE — 3331090002 HH PPS REVENUE DEBIT

## 2019-11-19 PROCEDURE — 3331090001 HH PPS REVENUE CREDIT

## 2019-11-20 PROCEDURE — 3331090002 HH PPS REVENUE DEBIT

## 2019-11-20 PROCEDURE — 3331090001 HH PPS REVENUE CREDIT

## 2019-11-21 ENCOUNTER — TELEPHONE ANTICOAG (OUTPATIENT)
Dept: CARDIOLOGY CLINIC | Age: 79
End: 2019-11-21

## 2019-11-21 ENCOUNTER — HOME CARE VISIT (OUTPATIENT)
Dept: SCHEDULING | Facility: HOME HEALTH | Age: 79
End: 2019-11-21
Payer: MEDICARE

## 2019-11-21 ENCOUNTER — HOME CARE VISIT (OUTPATIENT)
Dept: HOME HEALTH SERVICES | Facility: HOME HEALTH | Age: 79
End: 2019-11-21
Payer: MEDICARE

## 2019-11-21 DIAGNOSIS — I48.0 PAROXYSMAL ATRIAL FIBRILLATION (HCC): ICD-10-CM

## 2019-11-21 LAB — INR, EXTERNAL: 4.7

## 2019-11-21 PROCEDURE — G0299 HHS/HOSPICE OF RN EA 15 MIN: HCPCS

## 2019-11-21 PROCEDURE — 3331090002 HH PPS REVENUE DEBIT

## 2019-11-21 PROCEDURE — 3331090001 HH PPS REVENUE CREDIT

## 2019-11-22 PROCEDURE — 3331090001 HH PPS REVENUE CREDIT

## 2019-11-22 PROCEDURE — 3331090002 HH PPS REVENUE DEBIT

## 2019-11-22 RX ORDER — FUROSEMIDE 40 MG/1
TABLET ORAL
Qty: 45 TAB | Refills: 1 | Status: SHIPPED | OUTPATIENT
Start: 2019-11-22 | End: 2020-01-22

## 2019-11-23 LAB — INR BLD: 4.7 (ref 0.9–1.1)

## 2019-11-23 PROCEDURE — 3331090001 HH PPS REVENUE CREDIT

## 2019-11-23 PROCEDURE — 3331090002 HH PPS REVENUE DEBIT

## 2019-11-24 PROCEDURE — 3331090002 HH PPS REVENUE DEBIT

## 2019-11-24 PROCEDURE — 3331090001 HH PPS REVENUE CREDIT

## 2019-11-25 VITALS
OXYGEN SATURATION: 98 % | RESPIRATION RATE: 18 BRPM | DIASTOLIC BLOOD PRESSURE: 80 MMHG | HEART RATE: 80 BPM | TEMPERATURE: 98.1 F | SYSTOLIC BLOOD PRESSURE: 126 MMHG

## 2019-11-25 PROCEDURE — 3331090002 HH PPS REVENUE DEBIT

## 2019-11-25 PROCEDURE — 3331090001 HH PPS REVENUE CREDIT

## 2019-11-26 PROCEDURE — 3331090002 HH PPS REVENUE DEBIT

## 2019-11-26 PROCEDURE — 3331090001 HH PPS REVENUE CREDIT

## 2019-11-27 PROCEDURE — 3331090001 HH PPS REVENUE CREDIT

## 2019-11-27 PROCEDURE — 3331090002 HH PPS REVENUE DEBIT

## 2019-11-28 ENCOUNTER — HOME CARE VISIT (OUTPATIENT)
Dept: SCHEDULING | Facility: HOME HEALTH | Age: 79
End: 2019-11-28
Payer: MEDICARE

## 2019-11-28 LAB
INR BLD: 4.5 (ref 0.9–1.1)
PT POC: 53.5 SECONDS (ref 11.8–14.9)

## 2019-11-28 PROCEDURE — 400014 HH F/U

## 2019-11-28 PROCEDURE — 3331090002 HH PPS REVENUE DEBIT

## 2019-11-28 PROCEDURE — 3331090001 HH PPS REVENUE CREDIT

## 2019-11-28 PROCEDURE — G0300 HHS/HOSPICE OF LPN EA 15 MIN: HCPCS

## 2019-11-29 PROCEDURE — 3331090002 HH PPS REVENUE DEBIT

## 2019-11-29 PROCEDURE — 3331090001 HH PPS REVENUE CREDIT

## 2019-11-30 ENCOUNTER — HOME CARE VISIT (OUTPATIENT)
Dept: HOME HEALTH SERVICES | Facility: HOME HEALTH | Age: 79
End: 2019-11-30
Payer: MEDICARE

## 2019-11-30 PROCEDURE — 3331090001 HH PPS REVENUE CREDIT

## 2019-11-30 PROCEDURE — 3331090002 HH PPS REVENUE DEBIT

## 2019-12-01 PROCEDURE — 3331090002 HH PPS REVENUE DEBIT

## 2019-12-01 PROCEDURE — 3331090001 HH PPS REVENUE CREDIT

## 2019-12-02 ENCOUNTER — HOME CARE VISIT (OUTPATIENT)
Dept: SCHEDULING | Facility: HOME HEALTH | Age: 79
End: 2019-12-02
Payer: MEDICARE

## 2019-12-02 PROCEDURE — 3331090001 HH PPS REVENUE CREDIT

## 2019-12-02 PROCEDURE — 3331090002 HH PPS REVENUE DEBIT

## 2019-12-03 PROCEDURE — 3331090001 HH PPS REVENUE CREDIT

## 2019-12-03 PROCEDURE — 3331090002 HH PPS REVENUE DEBIT

## 2019-12-04 PROCEDURE — 3331090001 HH PPS REVENUE CREDIT

## 2019-12-04 PROCEDURE — 3331090002 HH PPS REVENUE DEBIT

## 2019-12-05 ENCOUNTER — HOME CARE VISIT (OUTPATIENT)
Dept: HOME HEALTH SERVICES | Facility: HOME HEALTH | Age: 79
End: 2019-12-05
Payer: MEDICARE

## 2019-12-05 ENCOUNTER — TELEPHONE ANTICOAG (OUTPATIENT)
Dept: CARDIOLOGY CLINIC | Age: 79
End: 2019-12-05

## 2019-12-05 ENCOUNTER — TELEPHONE (OUTPATIENT)
Dept: CARDIOLOGY CLINIC | Age: 79
End: 2019-12-05

## 2019-12-05 DIAGNOSIS — I48.0 PAROXYSMAL ATRIAL FIBRILLATION (HCC): ICD-10-CM

## 2019-12-05 LAB — INR, EXTERNAL: 2.7 (ref 2–3)

## 2019-12-05 PROCEDURE — 3331090002 HH PPS REVENUE DEBIT

## 2019-12-05 PROCEDURE — G0300 HHS/HOSPICE OF LPN EA 15 MIN: HCPCS

## 2019-12-05 PROCEDURE — 3331090001 HH PPS REVENUE CREDIT

## 2019-12-05 NOTE — TELEPHONE ENCOUNTER
Home health nurse Judson Cao will be back to check Mrs. Willow on 12/09 and 12/16, was making sure if okay to recheck patients inr on 12/16 instead of 12/12.

## 2019-12-05 NOTE — PROGRESS NOTES
Ms. Parag Johnson is here today for anticoagulation monitoring for the diagnosis of Atrial Fibrillation. Her INR goal is 2.0-3.0 and her current Coumadin dose is 5 mg. Today's findings include an INR of 2.7     Considering Ms. Daugherty's past history, todays findings, and per the coumadin policy/protocol, Ms. Daugherty was instructed to take Coumadin as follows,  Take 5 mg daily. She was also instructed to come back in 2 weeks for an INR check. A full discussion of the nature of anticoagulants has been carried out. A full discussion of the need for frequent and regular monitoring, precise dosage adjustment and compliance was stressed. Side effects of potential bleeding were discussed and Ms. Daugherty was instructed to call [phone] if there are any signs of abnormal bleeding. Ms. Abdulaziz Reed was instructed to avoid any OTC items containing aspirin or ibuprofen and prior to starting any new OTC products to consult with her physician or pharmacist to ensure no drug interactions are present. Ms. Abdulaziz Reed was instructed to avoid any major changes in her general diet and to avoid alcohol consumption. .      Ms. Daugherty verbalized her understanding of all instructions and will call the office with any questions, concerns, or signs of abnormal bleeding or blood clot.

## 2019-12-05 NOTE — TELEPHONE ENCOUNTER
Patient let Cece Bonilla the home health nurse that she was not taking 5 mg of coumadin daily but instead 5 mg on Mon, 2.5 mg Tue, 5 mg Wed.

## 2019-12-06 PROCEDURE — 3331090002 HH PPS REVENUE DEBIT

## 2019-12-06 PROCEDURE — 3331090001 HH PPS REVENUE CREDIT

## 2019-12-07 PROCEDURE — 3331090001 HH PPS REVENUE CREDIT

## 2019-12-07 PROCEDURE — 3331090002 HH PPS REVENUE DEBIT

## 2019-12-08 PROCEDURE — 3331090001 HH PPS REVENUE CREDIT

## 2019-12-08 PROCEDURE — 3331090002 HH PPS REVENUE DEBIT

## 2019-12-09 PROCEDURE — 3331090002 HH PPS REVENUE DEBIT

## 2019-12-09 PROCEDURE — 3331090001 HH PPS REVENUE CREDIT

## 2019-12-10 ENCOUNTER — HOME CARE VISIT (OUTPATIENT)
Dept: SCHEDULING | Facility: HOME HEALTH | Age: 79
End: 2019-12-10
Payer: MEDICARE

## 2019-12-10 PROCEDURE — 3331090002 HH PPS REVENUE DEBIT

## 2019-12-10 PROCEDURE — 3331090001 HH PPS REVENUE CREDIT

## 2019-12-11 ENCOUNTER — HOME CARE VISIT (OUTPATIENT)
Dept: SCHEDULING | Facility: HOME HEALTH | Age: 79
End: 2019-12-11
Payer: MEDICARE

## 2019-12-11 PROCEDURE — 3331090001 HH PPS REVENUE CREDIT

## 2019-12-11 PROCEDURE — 3331090002 HH PPS REVENUE DEBIT

## 2019-12-11 RX ORDER — METOLAZONE 2.5 MG/1
TABLET ORAL
Qty: 45 TAB | Refills: 0 | Status: SHIPPED | OUTPATIENT
Start: 2019-12-11 | End: 2020-01-13

## 2019-12-12 PROCEDURE — 3331090001 HH PPS REVENUE CREDIT

## 2019-12-12 PROCEDURE — 3331090002 HH PPS REVENUE DEBIT

## 2019-12-13 PROCEDURE — 3331090001 HH PPS REVENUE CREDIT

## 2019-12-13 PROCEDURE — 3331090002 HH PPS REVENUE DEBIT

## 2019-12-14 PROCEDURE — 3331090002 HH PPS REVENUE DEBIT

## 2019-12-14 PROCEDURE — 3331090001 HH PPS REVENUE CREDIT

## 2019-12-15 PROCEDURE — 3331090002 HH PPS REVENUE DEBIT

## 2019-12-15 PROCEDURE — 3331090001 HH PPS REVENUE CREDIT

## 2019-12-16 ENCOUNTER — HOME CARE VISIT (OUTPATIENT)
Dept: SCHEDULING | Facility: HOME HEALTH | Age: 79
End: 2019-12-16
Payer: MEDICARE

## 2019-12-16 VITALS
OXYGEN SATURATION: 98 % | TEMPERATURE: 97.7 F | DIASTOLIC BLOOD PRESSURE: 76 MMHG | HEART RATE: 67 BPM | SYSTOLIC BLOOD PRESSURE: 144 MMHG | RESPIRATION RATE: 18 BRPM

## 2019-12-16 PROCEDURE — 3331090001 HH PPS REVENUE CREDIT

## 2019-12-16 PROCEDURE — 3331090002 HH PPS REVENUE DEBIT

## 2019-12-17 ENCOUNTER — HOME CARE VISIT (OUTPATIENT)
Dept: HOME HEALTH SERVICES | Facility: HOME HEALTH | Age: 79
End: 2019-12-17
Payer: MEDICARE

## 2019-12-17 PROCEDURE — 3331090002 HH PPS REVENUE DEBIT

## 2019-12-17 PROCEDURE — 3331090001 HH PPS REVENUE CREDIT

## 2019-12-18 PROCEDURE — 3331090001 HH PPS REVENUE CREDIT

## 2019-12-18 PROCEDURE — 3331090002 HH PPS REVENUE DEBIT

## 2019-12-19 ENCOUNTER — HOME CARE VISIT (OUTPATIENT)
Dept: HOME HEALTH SERVICES | Facility: HOME HEALTH | Age: 79
End: 2019-12-19
Payer: MEDICARE

## 2019-12-19 PROCEDURE — 3331090002 HH PPS REVENUE DEBIT

## 2019-12-19 PROCEDURE — 3331090001 HH PPS REVENUE CREDIT

## 2019-12-20 ENCOUNTER — TELEPHONE ANTICOAG (OUTPATIENT)
Dept: CARDIOLOGY CLINIC | Age: 79
End: 2019-12-20

## 2019-12-20 ENCOUNTER — HOME CARE VISIT (OUTPATIENT)
Dept: SCHEDULING | Facility: HOME HEALTH | Age: 79
End: 2019-12-20
Payer: MEDICARE

## 2019-12-20 DIAGNOSIS — I48.0 PAROXYSMAL ATRIAL FIBRILLATION (HCC): ICD-10-CM

## 2019-12-20 LAB
INR, EXTERNAL: 8 (ref 2–3)
INR, EXTERNAL: 8 (ref 2–3)

## 2019-12-20 PROCEDURE — G0300 HHS/HOSPICE OF LPN EA 15 MIN: HCPCS

## 2019-12-20 PROCEDURE — 3331090002 HH PPS REVENUE DEBIT

## 2019-12-20 PROCEDURE — 3331090001 HH PPS REVENUE CREDIT

## 2019-12-20 NOTE — PROGRESS NOTES
Ms. Sylvia Valencia is here today for anticoagulation monitoring for the diagnosis of Atrial Fibrillation. Her INR goal is 2.0-3.0 and her current Coumadin dose is 5 mg and 2.5 mg. Today's findings include an INR of >8 Considering Ms. Daugherty's past history, todays findings, and per the coumadin policy/protocol, Ms. Daugherty was instructed to take Coumadin as follows,  Hold daily. She was also instructed to recheck on Monday 12/23/19. A full discussion of the nature of anticoagulants has been carried out. A full discussion of the need for frequent and regular monitoring, precise dosage adjustment and compliance was stressed. Side effects of potential bleeding were discussed and Ms. Daugherty was instructed to call [phone] if there are any signs of abnormal bleeding. Ms. Kavin Walters was instructed to avoid any OTC items containing aspirin or ibuprofen and prior to starting any new OTC products to consult with her physician or pharmacist to ensure no drug interactions are present. Ms. Kavin Walters was instructed to avoid any major changes in her general diet and to avoid alcohol consumption. . 
 
 
Ms. Daugherty verbalized her understanding of all instructions and will call the office with any questions, concerns, or signs of abnormal bleeding or blood clot.

## 2019-12-20 NOTE — PATIENT INSTRUCTIONS
Ms. Rivas Marie is here today for anticoagulation monitoring for the diagnosis of Atrial Fibrillation. Her INR goal is 2.0-3.0 and her current Coumadin dose is 5 mg and 2.5 mg. Today's findings include an INR of >8 Considering Ms. Daugherty's past history, todays findings, and per the coumadin policy/protocol, Ms. Daugherty was instructed to take Coumadin as follows,  Hold daily. She was also instructed to recheck on Monday 12/23/19. A full discussion of the nature of anticoagulants has been carried out. A full discussion of the need for frequent and regular monitoring, precise dosage adjustment and compliance was stressed. Side effects of potential bleeding were discussed and Ms. Daugherty was instructed to call [phone] if there are any signs of abnormal bleeding. Ms. Jewell Ulrich was instructed to avoid any OTC items containing aspirin or ibuprofen and prior to starting any new OTC products to consult with her physician or pharmacist to ensure no drug interactions are present. Ms. Jewell Ulrich was instructed to avoid any major changes in her general diet and to avoid alcohol consumption. . 
 
 
Ms. Daugherty verbalized her understanding of all instructions and will call the office with any questions, concerns, or signs of abnormal bleeding or blood clot.

## 2019-12-21 PROCEDURE — 3331090001 HH PPS REVENUE CREDIT

## 2019-12-21 PROCEDURE — 3331090002 HH PPS REVENUE DEBIT

## 2019-12-22 PROCEDURE — 3331090001 HH PPS REVENUE CREDIT

## 2019-12-22 PROCEDURE — 3331090002 HH PPS REVENUE DEBIT

## 2019-12-23 ENCOUNTER — TELEPHONE ANTICOAG (OUTPATIENT)
Dept: CARDIOLOGY CLINIC | Age: 79
End: 2019-12-23

## 2019-12-23 ENCOUNTER — HOME CARE VISIT (OUTPATIENT)
Dept: SCHEDULING | Facility: HOME HEALTH | Age: 79
End: 2019-12-23
Payer: MEDICARE

## 2019-12-23 ENCOUNTER — HOME CARE VISIT (OUTPATIENT)
Dept: HOME HEALTH SERVICES | Facility: HOME HEALTH | Age: 79
End: 2019-12-23
Payer: MEDICARE

## 2019-12-23 DIAGNOSIS — I48.91 ATRIAL FIBRILLATION, UNSPECIFIED TYPE (HCC): ICD-10-CM

## 2019-12-23 LAB — INR, EXTERNAL: 3.3

## 2019-12-23 PROCEDURE — G0300 HHS/HOSPICE OF LPN EA 15 MIN: HCPCS

## 2019-12-23 PROCEDURE — 3331090001 HH PPS REVENUE CREDIT

## 2019-12-23 PROCEDURE — 3331090002 HH PPS REVENUE DEBIT

## 2019-12-23 NOTE — PATIENT INSTRUCTIONS
Spoke with Christa Leon. Ms. Monae Starr is here today for anticoagulation monitoring for the diagnosis of Atrial Fibrillation. Her INR goal is 2.0-3.0 and her current Coumadin dose is 2.5 and 5 mg. Today's findings include an INR of 3.3 Considering Ms. Daugherty's past history, todays findings, and per the coumadin policy/protocol, Ms. Daugherty was instructed to take Coumadin as follows,  Hold today then 2.5 mg daily except on Saturday take 5 mg. She was also instructed to come back in 1 weeks for an INR check. A full discussion of the nature of anticoagulants has been carried out. A full discussion of the need for frequent and regular monitoring, precise dosage adjustment and compliance was stressed. Side effects of potential bleeding were discussed and Ms. Daugherty was instructed to call [phone] if there are any signs of abnormal bleeding. Ms. Sweta Rapp was instructed to avoid any OTC items containing aspirin or ibuprofen and prior to starting any new OTC products to consult with her physician or pharmacist to ensure no drug interactions are present. Ms. Sweta aRpp was instructed to avoid any major changes in her general diet and to avoid alcohol consumption. . 
 
 
Ms. Daugherty verbalized her understanding of all instructions and will call the office with any questions, concerns, or signs of abnormal bleeding or blood clot.

## 2019-12-24 PROCEDURE — 3331090001 HH PPS REVENUE CREDIT

## 2019-12-24 PROCEDURE — 3331090002 HH PPS REVENUE DEBIT

## 2019-12-25 PROCEDURE — 3331090002 HH PPS REVENUE DEBIT

## 2019-12-25 PROCEDURE — 3331090001 HH PPS REVENUE CREDIT

## 2019-12-26 PROCEDURE — 3331090002 HH PPS REVENUE DEBIT

## 2019-12-26 PROCEDURE — 3331090001 HH PPS REVENUE CREDIT

## 2019-12-27 VITALS
SYSTOLIC BLOOD PRESSURE: 122 MMHG | TEMPERATURE: 97.1 F | DIASTOLIC BLOOD PRESSURE: 88 MMHG | HEART RATE: 62 BPM | OXYGEN SATURATION: 98 %

## 2019-12-27 PROCEDURE — 3331090001 HH PPS REVENUE CREDIT

## 2019-12-27 PROCEDURE — 3331090002 HH PPS REVENUE DEBIT

## 2019-12-28 PROCEDURE — 3331090002 HH PPS REVENUE DEBIT

## 2019-12-28 PROCEDURE — 3331090001 HH PPS REVENUE CREDIT

## 2019-12-29 PROCEDURE — 3331090001 HH PPS REVENUE CREDIT

## 2019-12-29 PROCEDURE — 3331090002 HH PPS REVENUE DEBIT

## 2019-12-30 ENCOUNTER — HOME CARE VISIT (OUTPATIENT)
Dept: SCHEDULING | Facility: HOME HEALTH | Age: 79
End: 2019-12-30
Payer: MEDICARE

## 2019-12-30 ENCOUNTER — TELEPHONE ANTICOAG (OUTPATIENT)
Dept: CARDIOLOGY CLINIC | Age: 79
End: 2019-12-30

## 2019-12-30 ENCOUNTER — HOME CARE VISIT (OUTPATIENT)
Dept: HOME HEALTH SERVICES | Facility: HOME HEALTH | Age: 79
End: 2019-12-30
Payer: MEDICARE

## 2019-12-30 VITALS — HEART RATE: 64 BPM | TEMPERATURE: 98.3 F | SYSTOLIC BLOOD PRESSURE: 118 MMHG | DIASTOLIC BLOOD PRESSURE: 58 MMHG

## 2019-12-30 DIAGNOSIS — I48.91 ATRIAL FIBRILLATION, UNSPECIFIED TYPE (HCC): ICD-10-CM

## 2019-12-30 LAB
INR BLD: 1.3 (ref 0.9–1.1)
INR, EXTERNAL: 1.3 (ref 2–3)
PT POC: 15.5 SECONDS (ref 11.8–14.9)

## 2019-12-30 PROCEDURE — G0300 HHS/HOSPICE OF LPN EA 15 MIN: HCPCS

## 2019-12-30 PROCEDURE — 3331090002 HH PPS REVENUE DEBIT

## 2019-12-30 PROCEDURE — 3331090001 HH PPS REVENUE CREDIT

## 2019-12-31 PROCEDURE — 3331090002 HH PPS REVENUE DEBIT

## 2019-12-31 PROCEDURE — 3331090001 HH PPS REVENUE CREDIT

## 2020-01-01 PROCEDURE — 3331090001 HH PPS REVENUE CREDIT

## 2020-01-01 PROCEDURE — 3331090002 HH PPS REVENUE DEBIT

## 2020-01-02 PROCEDURE — 3331090002 HH PPS REVENUE DEBIT

## 2020-01-02 PROCEDURE — 3331090001 HH PPS REVENUE CREDIT

## 2020-01-03 PROCEDURE — 3331090001 HH PPS REVENUE CREDIT

## 2020-01-03 PROCEDURE — 3331090002 HH PPS REVENUE DEBIT

## 2020-01-04 PROCEDURE — 3331090001 HH PPS REVENUE CREDIT

## 2020-01-04 PROCEDURE — 3331090002 HH PPS REVENUE DEBIT

## 2020-01-05 PROCEDURE — 3331090002 HH PPS REVENUE DEBIT

## 2020-01-05 PROCEDURE — 3331090001 HH PPS REVENUE CREDIT

## 2020-01-06 ENCOUNTER — HOME CARE VISIT (OUTPATIENT)
Dept: SCHEDULING | Facility: HOME HEALTH | Age: 80
End: 2020-01-06
Payer: MEDICARE

## 2020-01-06 ENCOUNTER — TELEPHONE ANTICOAG (OUTPATIENT)
Dept: CARDIOLOGY CLINIC | Age: 80
End: 2020-01-06

## 2020-01-06 ENCOUNTER — HOME CARE VISIT (OUTPATIENT)
Dept: HOME HEALTH SERVICES | Facility: HOME HEALTH | Age: 80
End: 2020-01-06
Payer: MEDICARE

## 2020-01-06 DIAGNOSIS — I48.91 ATRIAL FIBRILLATION, UNSPECIFIED TYPE (HCC): ICD-10-CM

## 2020-01-06 LAB — INR, EXTERNAL: 1.9

## 2020-01-06 PROCEDURE — G0299 HHS/HOSPICE OF RN EA 15 MIN: HCPCS

## 2020-01-06 PROCEDURE — 3331090002 HH PPS REVENUE DEBIT

## 2020-01-06 PROCEDURE — 3331090001 HH PPS REVENUE CREDIT

## 2020-01-06 NOTE — PROGRESS NOTES
Ms. Solomon Kaur is here today for anticoagulation monitoring for the diagnosis of Atrial Fibrillation. Her INR goal is 2.0-3.0 and her current Coumadin dose is 5 mg. Today's findings include an INR of 1.9     Considering Ms. Daugherty's past history, todays findings, and per the coumadin policy/protocol, Ms. Daugherty was instructed to take Coumadin as follows,  Take 2.5 mg every Wed,Fri,Sun; 5 mg all other days. She was also instructed to come back in 1 weeks for an INR check. A full discussion of the nature of anticoagulants has been carried out. A full discussion of the need for frequent and regular monitoring, precise dosage adjustment and compliance was stressed. Side effects of potential bleeding were discussed and Ms. Daugherty was instructed to call [phone] if there are any signs of abnormal bleeding. Ms. Jeevan Arteaga was instructed to avoid any OTC items containing aspirin or ibuprofen and prior to starting any new OTC products to consult with her physician or pharmacist to ensure no drug interactions are present. Ms. Jeevan Arteaga was instructed to avoid any major changes in her general diet and to avoid alcohol consumption. .      Ms. Daugherty verbalized her understanding of all instructions and will call the office with any questions, concerns, or signs of abnormal bleeding or blood clot.

## 2020-01-07 VITALS
HEART RATE: 68 BPM | OXYGEN SATURATION: 98 % | DIASTOLIC BLOOD PRESSURE: 68 MMHG | TEMPERATURE: 97 F | SYSTOLIC BLOOD PRESSURE: 128 MMHG | RESPIRATION RATE: 20 BRPM

## 2020-01-07 PROCEDURE — 3331090001 HH PPS REVENUE CREDIT

## 2020-01-07 PROCEDURE — 3331090002 HH PPS REVENUE DEBIT

## 2020-01-08 PROCEDURE — 3331090002 HH PPS REVENUE DEBIT

## 2020-01-08 PROCEDURE — 3331090001 HH PPS REVENUE CREDIT

## 2020-01-09 PROCEDURE — 3331090002 HH PPS REVENUE DEBIT

## 2020-01-09 PROCEDURE — 3331090001 HH PPS REVENUE CREDIT

## 2020-01-10 PROCEDURE — 3331090002 HH PPS REVENUE DEBIT

## 2020-01-10 PROCEDURE — 3331090001 HH PPS REVENUE CREDIT

## 2020-01-11 PROCEDURE — 3331090002 HH PPS REVENUE DEBIT

## 2020-01-11 PROCEDURE — 3331090001 HH PPS REVENUE CREDIT

## 2020-01-12 PROCEDURE — 3331090001 HH PPS REVENUE CREDIT

## 2020-01-12 PROCEDURE — 3331090002 HH PPS REVENUE DEBIT

## 2020-01-13 ENCOUNTER — HOME CARE VISIT (OUTPATIENT)
Dept: SCHEDULING | Facility: HOME HEALTH | Age: 80
End: 2020-01-13
Payer: MEDICARE

## 2020-01-13 ENCOUNTER — TELEPHONE ANTICOAG (OUTPATIENT)
Dept: CARDIOLOGY CLINIC | Age: 80
End: 2020-01-13

## 2020-01-13 ENCOUNTER — HOME CARE VISIT (OUTPATIENT)
Dept: HOME HEALTH SERVICES | Facility: HOME HEALTH | Age: 80
End: 2020-01-13
Payer: MEDICARE

## 2020-01-13 DIAGNOSIS — I48.0 PAROXYSMAL ATRIAL FIBRILLATION (HCC): ICD-10-CM

## 2020-01-13 LAB — INR, EXTERNAL: 4.4

## 2020-01-13 PROCEDURE — 3331090002 HH PPS REVENUE DEBIT

## 2020-01-13 PROCEDURE — 3331090001 HH PPS REVENUE CREDIT

## 2020-01-13 PROCEDURE — G0300 HHS/HOSPICE OF LPN EA 15 MIN: HCPCS

## 2020-01-13 RX ORDER — METOLAZONE 2.5 MG/1
TABLET ORAL
Qty: 45 TAB | Refills: 0 | Status: SHIPPED | OUTPATIENT
Start: 2020-01-13 | End: 2020-02-05

## 2020-01-13 NOTE — PROGRESS NOTES
Ms. Shania Peña is here today for anticoagulation monitoring for the diagnosis of Atrial Fibrillation. Her INR goal is 2.0-3.0 and her current Coumadin dose is 5 mg and 2.5 m g. Today's findings include an INR of 4.4     Considering Ms. Daugherty's past history, todays findings, and per the coumadin policy/protocol, Ms. Daugherty was instructed to take Coumadin as follows,  Hold tonight, tomorrow night; take 5 mg Wed,2.5 mg Thur and keep alternating daily. She was also instructed to come back in 1 weeks for an INR check. A full discussion of the nature of anticoagulants has been carried out. A full discussion of the need for frequent and regular monitoring, precise dosage adjustment and compliance was stressed. Side effects of potential bleeding were discussed and Ms. Daugherty was instructed to call [phone] if there are any signs of abnormal bleeding. Ms. Italia Jacobs was instructed to avoid any OTC items containing aspirin or ibuprofen and prior to starting any new OTC products to consult with her physician or pharmacist to ensure no drug interactions are present. Ms. Italia Jacobs was instructed to avoid any major changes in her general diet and to avoid alcohol consumption. .      Ms. Daugherty verbalized her understanding of all instructions and will call the office with any questions, concerns, or signs of abnormal bleeding or blood clot.

## 2020-01-14 PROCEDURE — 3331090001 HH PPS REVENUE CREDIT

## 2020-01-14 PROCEDURE — 3331090002 HH PPS REVENUE DEBIT

## 2020-01-15 VITALS
TEMPERATURE: 98.2 F | SYSTOLIC BLOOD PRESSURE: 123 MMHG | HEART RATE: 60 BPM | OXYGEN SATURATION: 98 % | DIASTOLIC BLOOD PRESSURE: 59 MMHG

## 2020-01-15 PROCEDURE — 3331090002 HH PPS REVENUE DEBIT

## 2020-01-15 PROCEDURE — 3331090001 HH PPS REVENUE CREDIT

## 2020-01-16 PROCEDURE — 3331090002 HH PPS REVENUE DEBIT

## 2020-01-16 PROCEDURE — 3331090001 HH PPS REVENUE CREDIT

## 2020-01-17 PROCEDURE — 3331090002 HH PPS REVENUE DEBIT

## 2020-01-17 PROCEDURE — 3331090001 HH PPS REVENUE CREDIT

## 2020-01-18 PROCEDURE — 3331090002 HH PPS REVENUE DEBIT

## 2020-01-18 PROCEDURE — 3331090001 HH PPS REVENUE CREDIT

## 2020-01-19 PROCEDURE — 3331090002 HH PPS REVENUE DEBIT

## 2020-01-19 PROCEDURE — 3331090001 HH PPS REVENUE CREDIT

## 2020-01-20 ENCOUNTER — HOME CARE VISIT (OUTPATIENT)
Dept: HOME HEALTH SERVICES | Facility: HOME HEALTH | Age: 80
End: 2020-01-20
Payer: MEDICARE

## 2020-01-20 ENCOUNTER — HOME CARE VISIT (OUTPATIENT)
Dept: SCHEDULING | Facility: HOME HEALTH | Age: 80
End: 2020-01-20
Payer: MEDICARE

## 2020-01-20 ENCOUNTER — TELEPHONE ANTICOAG (OUTPATIENT)
Dept: CARDIOLOGY CLINIC | Age: 80
End: 2020-01-20

## 2020-01-20 DIAGNOSIS — I48.0 PAROXYSMAL ATRIAL FIBRILLATION (HCC): ICD-10-CM

## 2020-01-20 LAB
INR BLD: 2 (ref 0.9–1.1)
INR, EXTERNAL: 2 (ref 2–3)
PT POC: 23.6 SECONDS (ref 11.8–14.9)

## 2020-01-20 PROCEDURE — G0299 HHS/HOSPICE OF RN EA 15 MIN: HCPCS

## 2020-01-20 PROCEDURE — 3331090001 HH PPS REVENUE CREDIT

## 2020-01-20 PROCEDURE — 3331090002 HH PPS REVENUE DEBIT

## 2020-01-20 NOTE — PROGRESS NOTES
Ms. Nini Hartley is here today for anticoagulation monitoring for the diagnosis of Atrial Fibrillation. Her INR goal is 2.0-3.0 and her current Coumadin dose is 5 mg. Today's findings include an INR of 2.0     Considering Ms. Daugherty's past history, todays findings, and per the coumadin policy/protocol, Ms. Daugherty was instructed to take Coumadin as follows,  Take 2.5 mg tonight, 5 mg tomorrow night and keep alternating daily. She was also instructed to come back in 1 weeks for an INR check. A full discussion of the nature of anticoagulants has been carried out. A full discussion of the need for frequent and regular monitoring, precise dosage adjustment and compliance was stressed. Side effects of potential bleeding were discussed and Ms. Daugherty was instructed to call [phone] if there are any signs of abnormal bleeding. Ms. Ceasar Lopez was instructed to avoid any OTC items containing aspirin or ibuprofen and prior to starting any new OTC products to consult with her physician or pharmacist to ensure no drug interactions are present. Ms. Ceasar Lopez was instructed to avoid any major changes in her general diet and to avoid alcohol consumption. .      Ms. Daugherty verbalized her understanding of all instructions and will call the office with any questions, concerns, or signs of abnormal bleeding or blood clot.

## 2020-01-21 PROCEDURE — 3331090001 HH PPS REVENUE CREDIT

## 2020-01-21 PROCEDURE — 3331090002 HH PPS REVENUE DEBIT

## 2020-01-22 PROCEDURE — 3331090002 HH PPS REVENUE DEBIT

## 2020-01-22 PROCEDURE — 3331090001 HH PPS REVENUE CREDIT

## 2020-01-22 RX ORDER — FUROSEMIDE 40 MG/1
TABLET ORAL
Qty: 45 TAB | Refills: 1 | Status: SHIPPED | OUTPATIENT
Start: 2020-01-22 | End: 2020-03-03

## 2020-01-23 ENCOUNTER — HOME CARE VISIT (OUTPATIENT)
Dept: SCHEDULING | Facility: HOME HEALTH | Age: 80
End: 2020-01-23
Payer: MEDICARE

## 2020-01-23 VITALS
HEART RATE: 80 BPM | RESPIRATION RATE: 18 BRPM | TEMPERATURE: 98.1 F | OXYGEN SATURATION: 98 % | SYSTOLIC BLOOD PRESSURE: 124 MMHG | DIASTOLIC BLOOD PRESSURE: 70 MMHG

## 2020-01-23 PROCEDURE — 3331090002 HH PPS REVENUE DEBIT

## 2020-01-23 PROCEDURE — G0299 HHS/HOSPICE OF RN EA 15 MIN: HCPCS

## 2020-01-23 PROCEDURE — 3331090001 HH PPS REVENUE CREDIT

## 2020-01-23 PROCEDURE — 3331090003 HH PPS REVENUE ADJ

## 2020-01-24 PROCEDURE — 3331090002 HH PPS REVENUE DEBIT

## 2020-01-24 PROCEDURE — 3331090001 HH PPS REVENUE CREDIT

## 2020-01-25 PROCEDURE — 3331090001 HH PPS REVENUE CREDIT

## 2020-01-25 PROCEDURE — 3331090002 HH PPS REVENUE DEBIT

## 2020-01-26 VITALS
SYSTOLIC BLOOD PRESSURE: 122 MMHG | HEART RATE: 76 BPM | RESPIRATION RATE: 18 BRPM | OXYGEN SATURATION: 97 % | TEMPERATURE: 98.1 F | DIASTOLIC BLOOD PRESSURE: 80 MMHG

## 2020-01-26 PROCEDURE — 3331090002 HH PPS REVENUE DEBIT

## 2020-01-26 PROCEDURE — 3331090001 HH PPS REVENUE CREDIT

## 2020-01-27 ENCOUNTER — TELEPHONE ANTICOAG (OUTPATIENT)
Dept: CARDIOLOGY CLINIC | Age: 80
End: 2020-01-27

## 2020-01-27 ENCOUNTER — HOME CARE VISIT (OUTPATIENT)
Dept: HOME HEALTH SERVICES | Facility: HOME HEALTH | Age: 80
End: 2020-01-27
Payer: MEDICARE

## 2020-01-27 ENCOUNTER — HOME CARE VISIT (OUTPATIENT)
Dept: SCHEDULING | Facility: HOME HEALTH | Age: 80
End: 2020-01-27
Payer: MEDICARE

## 2020-01-27 DIAGNOSIS — I48.91 ATRIAL FIBRILLATION, UNSPECIFIED TYPE (HCC): ICD-10-CM

## 2020-01-27 LAB — INR, EXTERNAL: 2.6

## 2020-01-27 PROCEDURE — G0299 HHS/HOSPICE OF RN EA 15 MIN: HCPCS

## 2020-01-27 PROCEDURE — 3331090002 HH PPS REVENUE DEBIT

## 2020-01-27 PROCEDURE — 400014 HH F/U

## 2020-01-27 PROCEDURE — 3331090001 HH PPS REVENUE CREDIT

## 2020-01-27 NOTE — PATIENT INSTRUCTIONS
Ms. Dominique Montesinos is here today for anticoagulation monitoring for the diagnosis of Atrial Fibrillation. Her INR goal is 2.0-3.0 and her current Coumadin dose is 2.5 mg and 5 mg. Today's findings include an INR of 2.6     Considering Ms. Daugherty's past history, todays findings, and per the coumadin policy/protocol, Ms. Daugherty was instructed to take Coumadin as follows,  5 mg alternating 2.5 mg everyother day. She was also instructed to come back in 1 weeks for an INR check. A full discussion of the nature of anticoagulants has been carried out. A full discussion of the need for frequent and regular monitoring, precise dosage adjustment and compliance was stressed. Side effects of potential bleeding were discussed and Ms. Daugherty was instructed to call [phone] if there are any signs of abnormal bleeding. Ms. Balbir Alexis was instructed to avoid any OTC items containing aspirin or ibuprofen and prior to starting any new OTC products to consult with her physician or pharmacist to ensure no drug interactions are present. Ms. Balbir Alexis was instructed to avoid any major changes in her general diet and to avoid alcohol consumption. .      Ms. Daugherty verbalized her understanding of all instructions and will call the office with any questions, concerns, or signs of abnormal bleeding or blood clot.

## 2020-01-28 PROCEDURE — 3331090001 HH PPS REVENUE CREDIT

## 2020-01-28 PROCEDURE — 3331090002 HH PPS REVENUE DEBIT

## 2020-01-29 PROCEDURE — 3331090002 HH PPS REVENUE DEBIT

## 2020-01-29 PROCEDURE — 3331090001 HH PPS REVENUE CREDIT

## 2020-01-30 PROCEDURE — 3331090002 HH PPS REVENUE DEBIT

## 2020-01-30 PROCEDURE — 3331090001 HH PPS REVENUE CREDIT

## 2020-01-31 ENCOUNTER — HOME CARE VISIT (OUTPATIENT)
Dept: HOME HEALTH SERVICES | Facility: HOME HEALTH | Age: 80
End: 2020-01-31
Payer: MEDICARE

## 2020-01-31 PROCEDURE — 3331090002 HH PPS REVENUE DEBIT

## 2020-01-31 PROCEDURE — 3331090001 HH PPS REVENUE CREDIT

## 2020-02-01 PROCEDURE — 3331090002 HH PPS REVENUE DEBIT

## 2020-02-01 PROCEDURE — 3331090001 HH PPS REVENUE CREDIT

## 2020-02-02 PROCEDURE — 3331090001 HH PPS REVENUE CREDIT

## 2020-02-02 PROCEDURE — 3331090002 HH PPS REVENUE DEBIT

## 2020-02-03 ENCOUNTER — HOME CARE VISIT (OUTPATIENT)
Dept: SCHEDULING | Facility: HOME HEALTH | Age: 80
End: 2020-02-03
Payer: MEDICARE

## 2020-02-03 ENCOUNTER — TELEPHONE ANTICOAG (OUTPATIENT)
Dept: CARDIOLOGY CLINIC | Age: 80
End: 2020-02-03

## 2020-02-03 VITALS
DIASTOLIC BLOOD PRESSURE: 80 MMHG | TEMPERATURE: 97.9 F | SYSTOLIC BLOOD PRESSURE: 122 MMHG | RESPIRATION RATE: 18 BRPM | HEART RATE: 80 BPM | OXYGEN SATURATION: 98 %

## 2020-02-03 DIAGNOSIS — I48.91 ATRIAL FIBRILLATION, UNSPECIFIED TYPE (HCC): ICD-10-CM

## 2020-02-03 LAB — INR, EXTERNAL: 5.6 (ref 2–3)

## 2020-02-03 PROCEDURE — G0299 HHS/HOSPICE OF RN EA 15 MIN: HCPCS

## 2020-02-03 PROCEDURE — 3331090002 HH PPS REVENUE DEBIT

## 2020-02-03 PROCEDURE — 3331090001 HH PPS REVENUE CREDIT

## 2020-02-03 NOTE — PROGRESS NOTES
Left HH message. Ms. Hector Pickett is here today for anticoagulation monitoring for the diagnosis of Atrial Fibrillation. Her INR goal is 2.0-3.0 and her current Coumadin dose is 5 mg and 2.5 mg. Today's findings include an INR of 5.6 Considering Ms. Daugherty's past history, todays findings, and per the coumadin policy/protocol, Ms. Daugherty was instructed to take Coumadin as follows,  Hold tonight, tomorrow night Wed take 2.5 mg . She was also instructed to come back on [facility] 02/06 A full discussion of the nature of anticoagulants has been carried out. A full discussion of the need for frequent and regular monitoring, precise dosage adjustment and compliance was stressed. Side effects of potential bleeding were discussed and Ms. Daugherty was instructed to call [phone] if there are any signs of abnormal bleeding. Ms. Christian Faustin was instructed to avoid any OTC items containing aspirin or ibuprofen and prior to starting any new OTC products to consult with her physician or pharmacist to ensure no drug interactions are present. Ms. Christian Faustin was instructed to avoid any major changes in her general diet and to avoid alcohol consumption. . 
 
 
Ms. Daugherty verbalized her understanding of all instructions and will call the office with any questions, concerns, or signs of abnormal bleeding or blood clot.

## 2020-02-04 PROCEDURE — 3331090001 HH PPS REVENUE CREDIT

## 2020-02-04 PROCEDURE — 3331090002 HH PPS REVENUE DEBIT

## 2020-02-05 PROCEDURE — 3331090001 HH PPS REVENUE CREDIT

## 2020-02-05 PROCEDURE — 3331090002 HH PPS REVENUE DEBIT

## 2020-02-05 RX ORDER — METOLAZONE 2.5 MG/1
TABLET ORAL
Qty: 45 TAB | Refills: 0 | Status: SHIPPED | OUTPATIENT
Start: 2020-02-05 | End: 2020-02-06

## 2020-02-06 ENCOUNTER — HOME CARE VISIT (OUTPATIENT)
Dept: HOME HEALTH SERVICES | Facility: HOME HEALTH | Age: 80
End: 2020-02-06
Payer: MEDICARE

## 2020-02-06 ENCOUNTER — HOME CARE VISIT (OUTPATIENT)
Dept: SCHEDULING | Facility: HOME HEALTH | Age: 80
End: 2020-02-06
Payer: MEDICARE

## 2020-02-06 PROCEDURE — 3331090002 HH PPS REVENUE DEBIT

## 2020-02-06 PROCEDURE — 3331090001 HH PPS REVENUE CREDIT

## 2020-02-06 PROCEDURE — G0299 HHS/HOSPICE OF RN EA 15 MIN: HCPCS

## 2020-02-06 NOTE — TELEPHONE ENCOUNTER
Called and advised patient to stop  Metolazone patient states understanding. Will call if any problems.

## 2020-02-07 ENCOUNTER — HOME CARE VISIT (OUTPATIENT)
Dept: HOME HEALTH SERVICES | Facility: HOME HEALTH | Age: 80
End: 2020-02-07
Payer: MEDICARE

## 2020-02-07 ENCOUNTER — HOME CARE VISIT (OUTPATIENT)
Dept: SCHEDULING | Facility: HOME HEALTH | Age: 80
End: 2020-02-07
Payer: MEDICARE

## 2020-02-07 ENCOUNTER — TELEPHONE ANTICOAG (OUTPATIENT)
Dept: CARDIOLOGY CLINIC | Age: 80
End: 2020-02-07

## 2020-02-07 VITALS
HEART RATE: 89 BPM | RESPIRATION RATE: 16 BRPM | TEMPERATURE: 97.7 F | DIASTOLIC BLOOD PRESSURE: 76 MMHG | SYSTOLIC BLOOD PRESSURE: 120 MMHG | OXYGEN SATURATION: 98 %

## 2020-02-07 VITALS
OXYGEN SATURATION: 98 % | SYSTOLIC BLOOD PRESSURE: 122 MMHG | TEMPERATURE: 98.2 F | RESPIRATION RATE: 18 BRPM | HEART RATE: 76 BPM | DIASTOLIC BLOOD PRESSURE: 78 MMHG

## 2020-02-07 DIAGNOSIS — I48.91 ATRIAL FIBRILLATION, UNSPECIFIED TYPE (HCC): ICD-10-CM

## 2020-02-07 LAB
INR BLD: 1.5 (ref 0.9–1.1)
INR, EXTERNAL: 1.5

## 2020-02-07 PROCEDURE — 3331090001 HH PPS REVENUE CREDIT

## 2020-02-07 PROCEDURE — G0299 HHS/HOSPICE OF RN EA 15 MIN: HCPCS

## 2020-02-07 PROCEDURE — 3331090002 HH PPS REVENUE DEBIT

## 2020-02-07 NOTE — PROGRESS NOTES
Ms. Vaishali Rowan is here today for anticoagulation monitoring for the diagnosis of Atrial Fibrillation. Her INR goal is 2.0-3.0 and her current Coumadin dose is 5 mg and 2.5 mg. Today's findings include an INR of 1.5     Considering Ms. Daugherty's past history, todays findings, and per the coumadin policy/protocol, Ms. Daugherty was instructed to take Coumadin as follows,  Take 2.5 mg tonight, 5 mg tomorrow night and keep alternating daily. She was also instructed to come back in 1 weeks for an INR check. A full discussion of the nature of anticoagulants has been carried out. A full discussion of the need for frequent and regular monitoring, precise dosage adjustment and compliance was stressed. Side effects of potential bleeding were discussed and Ms. Daugherty was instructed to call [phone] if there are any signs of abnormal bleeding. Ms. Varsha Herr was instructed to avoid any OTC items containing aspirin or ibuprofen and prior to starting any new OTC products to consult with her physician or pharmacist to ensure no drug interactions are present. Ms. Varsha Herr was instructed to avoid any major changes in her general diet and to avoid alcohol consumption. .      Ms. Daugherty verbalized her understanding of all instructions and will call the office with any questions, concerns, or signs of abnormal bleeding or blood clot.

## 2020-02-08 PROCEDURE — 3331090002 HH PPS REVENUE DEBIT

## 2020-02-08 PROCEDURE — 3331090001 HH PPS REVENUE CREDIT

## 2020-02-09 PROCEDURE — 3331090001 HH PPS REVENUE CREDIT

## 2020-02-09 PROCEDURE — 3331090002 HH PPS REVENUE DEBIT

## 2020-02-10 PROCEDURE — 3331090001 HH PPS REVENUE CREDIT

## 2020-02-10 PROCEDURE — 3331090002 HH PPS REVENUE DEBIT

## 2020-02-11 ENCOUNTER — HOME CARE VISIT (OUTPATIENT)
Dept: SCHEDULING | Facility: HOME HEALTH | Age: 80
End: 2020-02-11
Payer: MEDICARE

## 2020-02-11 VITALS
SYSTOLIC BLOOD PRESSURE: 122 MMHG | TEMPERATURE: 97.8 F | DIASTOLIC BLOOD PRESSURE: 80 MMHG | HEART RATE: 72 BPM | OXYGEN SATURATION: 98 % | RESPIRATION RATE: 18 BRPM

## 2020-02-11 PROCEDURE — G0299 HHS/HOSPICE OF RN EA 15 MIN: HCPCS

## 2020-02-11 PROCEDURE — 3331090001 HH PPS REVENUE CREDIT

## 2020-02-11 PROCEDURE — 3331090002 HH PPS REVENUE DEBIT

## 2020-02-12 PROCEDURE — 3331090001 HH PPS REVENUE CREDIT

## 2020-02-12 PROCEDURE — 3331090002 HH PPS REVENUE DEBIT

## 2020-02-13 PROCEDURE — 3331090001 HH PPS REVENUE CREDIT

## 2020-02-13 PROCEDURE — 3331090002 HH PPS REVENUE DEBIT

## 2020-02-14 ENCOUNTER — HOME CARE VISIT (OUTPATIENT)
Dept: HOME HEALTH SERVICES | Facility: HOME HEALTH | Age: 80
End: 2020-02-14
Payer: MEDICARE

## 2020-02-14 ENCOUNTER — HOME CARE VISIT (OUTPATIENT)
Dept: SCHEDULING | Facility: HOME HEALTH | Age: 80
End: 2020-02-14
Payer: MEDICARE

## 2020-02-14 ENCOUNTER — TELEPHONE ANTICOAG (OUTPATIENT)
Dept: CARDIOLOGY CLINIC | Age: 80
End: 2020-02-14

## 2020-02-14 DIAGNOSIS — I48.91 ATRIAL FIBRILLATION, UNSPECIFIED TYPE (HCC): ICD-10-CM

## 2020-02-14 LAB — INR, EXTERNAL: 1.5 (ref 2–4)

## 2020-02-14 PROCEDURE — 3331090001 HH PPS REVENUE CREDIT

## 2020-02-14 PROCEDURE — 3331090002 HH PPS REVENUE DEBIT

## 2020-02-14 PROCEDURE — G0300 HHS/HOSPICE OF LPN EA 15 MIN: HCPCS

## 2020-02-15 PROCEDURE — 3331090002 HH PPS REVENUE DEBIT

## 2020-02-15 PROCEDURE — 3331090001 HH PPS REVENUE CREDIT

## 2020-02-16 VITALS
DIASTOLIC BLOOD PRESSURE: 76 MMHG | OXYGEN SATURATION: 98 % | HEART RATE: 67 BPM | SYSTOLIC BLOOD PRESSURE: 122 MMHG | TEMPERATURE: 97.2 F | RESPIRATION RATE: 18 BRPM

## 2020-02-16 PROCEDURE — 3331090001 HH PPS REVENUE CREDIT

## 2020-02-16 PROCEDURE — 3331090002 HH PPS REVENUE DEBIT

## 2020-02-17 PROCEDURE — 3331090001 HH PPS REVENUE CREDIT

## 2020-02-17 PROCEDURE — 3331090002 HH PPS REVENUE DEBIT

## 2020-02-18 PROCEDURE — 3331090002 HH PPS REVENUE DEBIT

## 2020-02-18 PROCEDURE — 3331090001 HH PPS REVENUE CREDIT

## 2020-02-19 ENCOUNTER — HOME CARE VISIT (OUTPATIENT)
Dept: SCHEDULING | Facility: HOME HEALTH | Age: 80
End: 2020-02-19
Payer: MEDICARE

## 2020-02-19 PROCEDURE — 3331090001 HH PPS REVENUE CREDIT

## 2020-02-19 PROCEDURE — 3331090002 HH PPS REVENUE DEBIT

## 2020-02-20 ENCOUNTER — HOME CARE VISIT (OUTPATIENT)
Dept: HOME HEALTH SERVICES | Facility: HOME HEALTH | Age: 80
End: 2020-02-20
Payer: MEDICARE

## 2020-02-20 ENCOUNTER — HOME CARE VISIT (OUTPATIENT)
Dept: SCHEDULING | Facility: HOME HEALTH | Age: 80
End: 2020-02-20
Payer: MEDICARE

## 2020-02-20 ENCOUNTER — TELEPHONE ANTICOAG (OUTPATIENT)
Dept: CARDIOLOGY CLINIC | Age: 80
End: 2020-02-20

## 2020-02-20 DIAGNOSIS — I48.91 ATRIAL FIBRILLATION, UNSPECIFIED TYPE (HCC): ICD-10-CM

## 2020-02-20 LAB — INR, EXTERNAL: 2.1

## 2020-02-20 PROCEDURE — 3331090001 HH PPS REVENUE CREDIT

## 2020-02-20 PROCEDURE — G0299 HHS/HOSPICE OF RN EA 15 MIN: HCPCS

## 2020-02-20 PROCEDURE — 3331090002 HH PPS REVENUE DEBIT

## 2020-02-21 PROCEDURE — 3331090001 HH PPS REVENUE CREDIT

## 2020-02-21 PROCEDURE — 3331090002 HH PPS REVENUE DEBIT

## 2020-02-22 LAB
INR BLD: 2.1 (ref 0.9–1.1)
PT POC: 22.7 SECONDS (ref 11.8–14.9)

## 2020-02-22 PROCEDURE — 3331090001 HH PPS REVENUE CREDIT

## 2020-02-22 PROCEDURE — 3331090003 HH PPS REVENUE ADJ

## 2020-02-22 PROCEDURE — 3331090002 HH PPS REVENUE DEBIT

## 2020-02-23 PROCEDURE — 3331090001 HH PPS REVENUE CREDIT

## 2020-02-23 PROCEDURE — 3331090002 HH PPS REVENUE DEBIT

## 2020-02-24 PROCEDURE — 3331090002 HH PPS REVENUE DEBIT

## 2020-02-24 PROCEDURE — 3331090001 HH PPS REVENUE CREDIT

## 2020-02-25 PROCEDURE — 3331090001 HH PPS REVENUE CREDIT

## 2020-02-25 PROCEDURE — 3331090002 HH PPS REVENUE DEBIT

## 2020-02-26 PROCEDURE — 3331090001 HH PPS REVENUE CREDIT

## 2020-02-26 PROCEDURE — 3331090002 HH PPS REVENUE DEBIT

## 2020-02-27 ENCOUNTER — HOME CARE VISIT (OUTPATIENT)
Dept: HOME HEALTH SERVICES | Facility: HOME HEALTH | Age: 80
End: 2020-02-27
Payer: MEDICARE

## 2020-02-27 ENCOUNTER — TELEPHONE ANTICOAG (OUTPATIENT)
Dept: CARDIOLOGY CLINIC | Age: 80
End: 2020-02-27

## 2020-02-27 ENCOUNTER — HOME CARE VISIT (OUTPATIENT)
Dept: SCHEDULING | Facility: HOME HEALTH | Age: 80
End: 2020-02-27
Payer: MEDICARE

## 2020-02-27 DIAGNOSIS — I48.91 ATRIAL FIBRILLATION, UNSPECIFIED TYPE (HCC): ICD-10-CM

## 2020-02-27 LAB
INR BLD: 2.2 (ref 0.9–1.1)
INR, EXTERNAL: 2.2 (ref 2–3)
PT POC: 27 SECONDS (ref 11.8–14.9)

## 2020-02-27 PROCEDURE — 400014 HH F/U

## 2020-02-27 PROCEDURE — G0300 HHS/HOSPICE OF LPN EA 15 MIN: HCPCS

## 2020-02-27 PROCEDURE — 3331090002 HH PPS REVENUE DEBIT

## 2020-02-27 PROCEDURE — 3331090001 HH PPS REVENUE CREDIT

## 2020-02-27 NOTE — PROGRESS NOTES
Ms. Candice Miranda is here today for anticoagulation monitoring for the diagnosis of Atrial Fibrillation. Her INR goal is 2.0-3.0 and her current Coumadin dose is 5 mg. Today's findings include an INR of 2.2     Considering Ms. Daugherty's past history, todays findings, and per the coumadin policy/protocol, Ms. Daugherty was instructed to take Coumadin as follows,  Take 2.5 mg Thur,Mon;5 mg all other dasy. She was also instructed to come back in 1 weeks for an INR check. A full discussion of the nature of anticoagulants has been carried out. A full discussion of the need for frequent and regular monitoring, precise dosage adjustment and compliance was stressed. Side effects of potential bleeding were discussed and Ms. Daugherty was instructed to call [phone] if there are any signs of abnormal bleeding. Ms. Shari Solis was instructed to avoid any OTC items containing aspirin or ibuprofen and prior to starting any new OTC products to consult with her physician or pharmacist to ensure no drug interactions are present. Ms. Shari Solis was instructed to avoid any major changes in her general diet and to avoid alcohol consumption. .      Ms. Daugherty verbalized her understanding of all instructions and will call the office with any questions, concerns, or signs of abnormal bleeding or blood clot.

## 2020-02-28 VITALS
RESPIRATION RATE: 18 BRPM | SYSTOLIC BLOOD PRESSURE: 130 MMHG | HEART RATE: 70 BPM | TEMPERATURE: 97.4 F | DIASTOLIC BLOOD PRESSURE: 68 MMHG | OXYGEN SATURATION: 100 %

## 2020-02-28 PROCEDURE — 3331090001 HH PPS REVENUE CREDIT

## 2020-02-28 PROCEDURE — 3331090002 HH PPS REVENUE DEBIT

## 2020-02-29 PROCEDURE — 3331090001 HH PPS REVENUE CREDIT

## 2020-02-29 PROCEDURE — 3331090002 HH PPS REVENUE DEBIT

## 2020-03-01 PROCEDURE — 3331090001 HH PPS REVENUE CREDIT

## 2020-03-01 PROCEDURE — 3331090002 HH PPS REVENUE DEBIT

## 2020-03-02 PROCEDURE — 3331090002 HH PPS REVENUE DEBIT

## 2020-03-02 PROCEDURE — 3331090001 HH PPS REVENUE CREDIT

## 2020-03-02 NOTE — TELEPHONE ENCOUNTER
We have been refilling the medications but patient has never seen anybody since discharge. She was supposed to see Dr. Rory Whittington. Check with him and the patient where they want to follow. I am not writing any refills for now.

## 2020-03-03 PROCEDURE — 3331090002 HH PPS REVENUE DEBIT

## 2020-03-03 PROCEDURE — 3331090001 HH PPS REVENUE CREDIT

## 2020-03-03 RX ORDER — FUROSEMIDE 40 MG/1
TABLET ORAL
Qty: 45 TAB | Refills: 1 | Status: SHIPPED | OUTPATIENT
Start: 2020-03-03 | End: 2020-03-23

## 2020-03-03 RX ORDER — WARFARIN SODIUM 5 MG/1
TABLET ORAL
Qty: 90 TAB | Refills: 1 | Status: SHIPPED | OUTPATIENT
Start: 2020-03-03 | End: 2020-06-12 | Stop reason: ALTCHOICE

## 2020-03-04 PROCEDURE — 3331090001 HH PPS REVENUE CREDIT

## 2020-03-04 PROCEDURE — 3331090002 HH PPS REVENUE DEBIT

## 2020-03-05 ENCOUNTER — HOME CARE VISIT (OUTPATIENT)
Dept: HOME HEALTH SERVICES | Facility: HOME HEALTH | Age: 80
End: 2020-03-05
Payer: MEDICARE

## 2020-03-05 PROCEDURE — 3331090001 HH PPS REVENUE CREDIT

## 2020-03-05 PROCEDURE — 3331090002 HH PPS REVENUE DEBIT

## 2020-03-06 ENCOUNTER — TELEPHONE ANTICOAG (OUTPATIENT)
Dept: CARDIOLOGY CLINIC | Age: 80
End: 2020-03-06

## 2020-03-06 ENCOUNTER — HOME CARE VISIT (OUTPATIENT)
Dept: SCHEDULING | Facility: HOME HEALTH | Age: 80
End: 2020-03-06
Payer: MEDICARE

## 2020-03-06 DIAGNOSIS — I48.91 ATRIAL FIBRILLATION, UNSPECIFIED TYPE (HCC): ICD-10-CM

## 2020-03-06 LAB — INR, EXTERNAL: 3.4 (ref 2–3)

## 2020-03-06 PROCEDURE — 3331090002 HH PPS REVENUE DEBIT

## 2020-03-06 PROCEDURE — 3331090001 HH PPS REVENUE CREDIT

## 2020-03-06 PROCEDURE — G0299 HHS/HOSPICE OF RN EA 15 MIN: HCPCS

## 2020-03-06 NOTE — PROGRESS NOTES
Ms. Shari Veras is here today for anticoagulation monitoring for the diagnosis of Atrial Fibrillation. Her INR goal is 2.0-3.0 and her current Coumadin dose is 5 mg and 2.5 mg. Today's findings include an INR of 3.4     Considering Ms. Daugherty's past history, todays findings, and per the coumadin policy/protocol, Ms. Daugherty was instructed to take Coumadin as follows,  Hold tonight, take 2.5 mg starting tomorrow daily. She was also instructed to come back in 1 weeks for an INR check. A full discussion of the nature of anticoagulants has been carried out. A full discussion of the need for frequent and regular monitoring, precise dosage adjustment and compliance was stressed. Side effects of potential bleeding were discussed and Ms. Daugherty was instructed to call [phone] if there are any signs of abnormal bleeding. Ms. Ahmet Loredo was instructed to avoid any OTC items containing aspirin or ibuprofen and prior to starting any new OTC products to consult with her physician or pharmacist to ensure no drug interactions are present. Ms. Ahmet Loredo was instructed to avoid any major changes in her general diet and to avoid alcohol consumption. .      Ms. Daugherty verbalized her understanding of all instructions and will call the office with any questions, concerns, or signs of abnormal bleeding or blood clot.

## 2020-03-07 VITALS
RESPIRATION RATE: 20 BRPM | TEMPERATURE: 98.1 F | DIASTOLIC BLOOD PRESSURE: 78 MMHG | HEART RATE: 74 BPM | SYSTOLIC BLOOD PRESSURE: 140 MMHG | OXYGEN SATURATION: 98 %

## 2020-03-07 LAB
INR BLD: 3.4 (ref 0.9–1.1)
PT POC: 41 SECONDS (ref 11.8–14.9)

## 2020-03-07 PROCEDURE — 3331090001 HH PPS REVENUE CREDIT

## 2020-03-07 PROCEDURE — 3331090002 HH PPS REVENUE DEBIT

## 2020-03-08 PROCEDURE — 3331090002 HH PPS REVENUE DEBIT

## 2020-03-08 PROCEDURE — 3331090001 HH PPS REVENUE CREDIT

## 2020-03-09 ENCOUNTER — HOME CARE VISIT (OUTPATIENT)
Dept: HOME HEALTH SERVICES | Facility: HOME HEALTH | Age: 80
End: 2020-03-09
Payer: MEDICARE

## 2020-03-09 PROCEDURE — 3331090002 HH PPS REVENUE DEBIT

## 2020-03-09 PROCEDURE — 3331090001 HH PPS REVENUE CREDIT

## 2020-03-10 ENCOUNTER — TELEPHONE (OUTPATIENT)
Dept: CARDIOLOGY CLINIC | Age: 80
End: 2020-03-10

## 2020-03-10 PROCEDURE — 3331090002 HH PPS REVENUE DEBIT

## 2020-03-10 PROCEDURE — 3331090001 HH PPS REVENUE CREDIT

## 2020-03-10 NOTE — TELEPHONE ENCOUNTER
Nurse Practitioner Sukhwinder Loaiza called, Dr. Otf Marquez (breast surgeon) office. Patient needs breast biopsy due to possible cancer. Would like to know when she can stop Coumadin for biopsy.  Last INR done on 03/06/2020 was 3.4 (range 2.0-3.0) for Atrial fibrillation

## 2020-03-11 PROCEDURE — 3331090002 HH PPS REVENUE DEBIT

## 2020-03-11 PROCEDURE — 3331090001 HH PPS REVENUE CREDIT

## 2020-03-12 ENCOUNTER — HOME CARE VISIT (OUTPATIENT)
Dept: SCHEDULING | Facility: HOME HEALTH | Age: 80
End: 2020-03-12
Payer: MEDICARE

## 2020-03-12 ENCOUNTER — TELEPHONE (OUTPATIENT)
Dept: VASCULAR SURGERY | Age: 80
End: 2020-03-12

## 2020-03-12 ENCOUNTER — TELEPHONE ANTICOAG (OUTPATIENT)
Dept: CARDIOLOGY CLINIC | Age: 80
End: 2020-03-12

## 2020-03-12 DIAGNOSIS — I48.91 ATRIAL FIBRILLATION, UNSPECIFIED TYPE (HCC): ICD-10-CM

## 2020-03-12 LAB
INR BLD: 1.9 (ref 0.9–1.1)
INR, EXTERNAL: 1.9 (ref 2–3)

## 2020-03-12 PROCEDURE — G0299 HHS/HOSPICE OF RN EA 15 MIN: HCPCS

## 2020-03-12 PROCEDURE — 3331090002 HH PPS REVENUE DEBIT

## 2020-03-12 PROCEDURE — 3331090001 HH PPS REVENUE CREDIT

## 2020-03-12 NOTE — TELEPHONE ENCOUNTER
Kimberly called and states that when she went to see the patient today on the left leg back of patients calf is swollen and red, but not hot. She suspects cellulitis but unsure due to the area not feeling warm. Pt denies pain in the area. Pt is on chronic coumadin due to afib and is therapeutic and does not expect her to have dvt. nurse states that [facility] and with the state of flu and other viruses didn't know if it was boateng for her to go there or here. Discussed with geoffrey and she states need to see pt to see what is going on. Advised Kimberly that we could see patient this afternoon if she can come in but that the next available would be Monday. She states she will give patient a call and have her call.

## 2020-03-12 NOTE — PROGRESS NOTES
Left message for Kimberly, [facility] nurse. Ms. Kaela Yu is here today for anticoagulation monitoring for the diagnosis of Atrial Fibrillation. Her INR goal is 2.0-3.0 and her current Coumadin dose is 5 mg and 2.5 mg . Today's findings include an INR of 1.9     Considering Ms. Daugherty's past history, todays findings, and per the coumadin policy/protocol, Ms. Daugherty was instructed to take Coumadin as follows,  Take 2.5 mg daily. She was also instructed to come back in 1 weeks for an INR check. A full discussion of the nature of anticoagulants has been carried out. A full discussion of the need for frequent and regular monitoring, precise dosage adjustment and compliance was stressed. Side effects of potential bleeding were discussed and Ms. Daugherty was instructed to call [phone] if there are any signs of abnormal bleeding. Ms. Nu Tafoya was instructed to avoid any OTC items containing aspirin or ibuprofen and prior to starting any new OTC products to consult with her physician or pharmacist to ensure no drug interactions are present. Ms. Nu Tafoya was instructed to avoid any major changes in her general diet and to avoid alcohol consumption. .      Ms. Daugherty verbalized her understanding of all instructions and will call the office with any questions, concerns, or signs of abnormal bleeding or blood clot.

## 2020-03-13 VITALS
RESPIRATION RATE: 18 BRPM | SYSTOLIC BLOOD PRESSURE: 124 MMHG | TEMPERATURE: 98.2 F | OXYGEN SATURATION: 98 % | DIASTOLIC BLOOD PRESSURE: 70 MMHG | HEART RATE: 76 BPM

## 2020-03-13 PROCEDURE — 3331090002 HH PPS REVENUE DEBIT

## 2020-03-13 PROCEDURE — 3331090001 HH PPS REVENUE CREDIT

## 2020-03-14 PROCEDURE — 3331090002 HH PPS REVENUE DEBIT

## 2020-03-14 PROCEDURE — 3331090001 HH PPS REVENUE CREDIT

## 2020-03-15 PROCEDURE — 3331090002 HH PPS REVENUE DEBIT

## 2020-03-15 PROCEDURE — 3331090001 HH PPS REVENUE CREDIT

## 2020-03-16 PROCEDURE — 3331090002 HH PPS REVENUE DEBIT

## 2020-03-16 PROCEDURE — 3331090001 HH PPS REVENUE CREDIT

## 2020-03-17 ENCOUNTER — OFFICE VISIT (OUTPATIENT)
Dept: VASCULAR SURGERY | Age: 80
End: 2020-03-17

## 2020-03-17 VITALS
DIASTOLIC BLOOD PRESSURE: 70 MMHG | WEIGHT: 135 LBS | SYSTOLIC BLOOD PRESSURE: 140 MMHG | RESPIRATION RATE: 17 BRPM | HEIGHT: 62 IN | BODY MASS INDEX: 24.84 KG/M2

## 2020-03-17 DIAGNOSIS — I50.9 CONGESTIVE HEART FAILURE, UNSPECIFIED HF CHRONICITY, UNSPECIFIED HEART FAILURE TYPE (HCC): ICD-10-CM

## 2020-03-17 DIAGNOSIS — Z79.01 ANTICOAGULATED: ICD-10-CM

## 2020-03-17 DIAGNOSIS — I48.0 PAROXYSMAL ATRIAL FIBRILLATION (HCC): ICD-10-CM

## 2020-03-17 DIAGNOSIS — I87.2 VENOUS (PERIPHERAL) INSUFFICIENCY: ICD-10-CM

## 2020-03-17 DIAGNOSIS — I83.893 VARICOSE VEINS OF BILATERAL LOWER EXTREMITIES WITH OTHER COMPLICATIONS: ICD-10-CM

## 2020-03-17 DIAGNOSIS — L97.201 VENOUS STASIS ULCER OF CALF LIMITED TO BREAKDOWN OF SKIN WITH VARICOSE VEINS, UNSPECIFIED LATERALITY (HCC): Primary | ICD-10-CM

## 2020-03-17 DIAGNOSIS — I83.002 VENOUS STASIS ULCER OF CALF LIMITED TO BREAKDOWN OF SKIN WITH VARICOSE VEINS, UNSPECIFIED LATERALITY (HCC): Primary | ICD-10-CM

## 2020-03-17 PROCEDURE — 3331090001 HH PPS REVENUE CREDIT

## 2020-03-17 PROCEDURE — 3331090002 HH PPS REVENUE DEBIT

## 2020-03-17 RX ORDER — CEPHALEXIN 500 MG/1
500 CAPSULE ORAL 3 TIMES DAILY
Qty: 15 CAP | Refills: 0 | Status: SHIPPED | OUTPATIENT
Start: 2020-03-17 | End: 2020-03-22

## 2020-03-17 NOTE — PROGRESS NOTES
[facility]          Chart reviewed for the following:   Kadeem PIKE LPN, have reviewed the medications and updated the Allergic reactions for [employer]     TIME OUT performed immediately prior to start of procedure:   Kadeem PIKE LPN, have performed the following reviews on [employer] prior to the start of the procedure:            * Patient was identified by name and date of birth   * Agreement that Charmel Rein boot removed and reapplied   * Procedure site verified   * Patient was positioned for comfort  * Verbal consent was given by patient     Time: 1500      Date of procedure: 3/17/2020    Procedure performed by:  JUAN Marinelli    How tolerated by patient: tolerated the procedure well with no complications    Comments:   Cleansed bilateral lower extremities wounds with wound cleanser and gauze, patient tolerated well. Applied adaptic gauze to wounds, covered with gauze, and wrapped both legs lightly with unna boot and coban, patient tolerated well.

## 2020-03-17 NOTE — PROGRESS NOTES
1. Have you been to an emergency room or urgent care clinic since your last visit? no    Hospitalized since your last visit? If yes, where, when, and reason for visit? no  2. Have you seen or consulted any other health care providers outside of the [facility] since your last visit including any procedures, health maintenance items.  If yes, where, when and reason for visit? no

## 2020-03-17 NOTE — PROGRESS NOTES
Melania Cobos    Chief Complaint   Patient presents with    Leg Problem       History and Physical    Melania Cobos is a 68 y.o. female with history of chronic venous insufficiency and varicose veins. She presents to the office today at the request of her home health care nurse for worsening lower extremity edema and leg ulcers. Patient states that she has been dealing with open ulcerations to her right lower leg for quite some time. Just recently her left leg has started to swell more and she does have a small open area on her anterior shin. She states that the skin to her lower left leg was bright beefy red however this seems to have improved. She denies any fever or chills. She is not complaining of any shortness of breath in the office today. She does state that she is elevating her legs at nighttime as she has a hospital bed which allows her to do this. She is using a compression stocking on the left leg and has been in a compression wrap on her right side. She does also have a history of CHF, pulmonary hypertension and aortic stenosis. She states that she has not seen her cardiologist in quite some time. She is not complaining of any chest pain. Past Medical History:   Diagnosis Date    Arthritis     Atrial fibrillation (HCC)     CHF (congestive heart failure) (HCC)     Heart murmur     History of seasonal allergies     HTN (hypertension)     Hypercholesteremia     Moderate aortic stenosis     Pulmonary hypertension (HCC)     Venous insufficiency      Past Surgical History:   Procedure Laterality Date    HX KNEE ARTHROSCOPY      left and right    HX ORTHOPAEDIC      right ankle     Patient Active Problem List   Diagnosis Code    Pain and swelling of lower leg M79.669, M79.89    Obesity (BMI 30.0-34. 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 15 Cap 0    warfarin (COUMADIN) 5 mg tablet take 1 tablet by mouth once daily 90 Tab 1    furosemide (LASIX) 40 mg tablet take 1 tablet by mouth every morning and 1/2 tablet AT 2 P.M. 45 Tab 1    traMADol (ULTRAM) 50 mg tablet Take 1 Tab by mouth every six (6) hours as needed for Pain.  metoprolol tartrate (LOPRESSOR) 100 mg IR tablet Take 1 Tab by mouth two (2) times a day. 60 Tab 6    folic acid 669 mcg tablet Take 400 mcg by mouth daily.  cyanocobalamin, vitamin B-12, (VITAMIN B12 PO) Take 1,000 mcg by mouth daily.  famotidine (PEPCID) 20 mg tablet Take 1 Tab by mouth daily. 30 Tab 0    gabapentin (NEURONTIN) 100 mg capsule Take 1 Cap by mouth nightly. Max Daily Amount: 100 mg. 30 Cap 0    lactobacillus sp. 50 billion cpu (BIO-K PLUS) 50 billion cell -375 mg cap capsule Take 1 Cap by mouth daily. 10 Cap 0    ondansetron hcl (ZOFRAN) 4 mg tablet Take 1 Tab by mouth every eight (8) hours as needed for Nausea. 30 Tab 2    Calcium-Cholecalciferol, D3, (CALCIUM 600 WITH VITAMIN D3) 600 mg(1,500mg) -400 unit chew Take 1 Tab by mouth daily.  simvastatin (ZOCOR) 80 mg tablet TAKE 1 TABLET NIGHTLY 90 Tab 1    cholecalciferol (VITAMIN D3) 1,000 unit cap Take  by mouth daily.  losartan (COZAAR) 100 mg tablet Take 100 mg by mouth daily.  montelukast (SINGULAIR) 10 mg tablet Take 10 mg by mouth daily.  CETIRIZINE HCL (ZYRTEC PO) Take  by mouth.          No Known Allergies    Physical Exam:    Visit Vitals  /70 (BP 1 Location: Left arm, BP Patient Position: Sitting)   Resp 17   Ht 5' 2\" (1.575 m)   Wt 135 lb (61.2 kg)   BMI 24.69 kg/m²      General: elderly female in no acute distress   HEENT: EOMI, no scleral icterus is noted. CV: Irregular, harsh machine like ALYCIA  Pulmonary: No increased work or breathing is noted. Mild crackles at bases bilaterally, L>R. No wheeze or rhonchi. Abdomen: obese, nondistended. Extremities: Warm and well perfused bilaterally. She has 4+pitting edema in the left lower leg with erythema and warmth to touch. There is a small ulcer on the mid anterior calve ~1cm in size. No purulence. RLE edema well controlled with compression wrap. There are 2 superficial ulcers on the mid anterior calve. No purulence or active infection. Neuro: Cranial nerves II through XII are grossly intact       Impression and Plan:  Jarrett Wiley is a 68 y.o. female with chronic venous insufficiency and venous stasis ulcers. She has had some worsening swelling mainly to her left lower extremity. Lower leg is also warm and erythematous. She does state that this seems to have improved over the past week. I discussed that we will cover her with empiric antibiotics for 5 days and prescription was sent to her pharmacy. We also provided wound care in the office today and she was placed in bilateral modified Unna boots. New orders were sent to her home health care provider. I do not suspect DVT as patient is not complaining of any pain and she is on chronic anticoagulation due to atrial fibrillation. She does have some crackles at her lung bases and with a history of CHF, pulmonary hypertension and aortic stenosis I did recommend that she follow-up with her cardiologist as well to make sure she is not having any acute or worsening cardiac issues. Patient expresses understanding to all of this and agrees to the plan. We will have her follow-up in 1 month's time to reassess.   She is absolutely understanding to call the office sooner as needed. Plan was discussed. Patient expresses understanding and agrees. We reviewed the plan with the patient and the patient understands. We also gave the patient appropriate instructions on their disease process and when to call back. Greater than 50% of this visit was spent with face to face discussion. [address] [place] [address] N  PA-C      PLEASE NOTE:  This document has been produced using voice recognition software. Unrecognized errors in transcription may be present.

## 2020-03-18 PROCEDURE — 3331090002 HH PPS REVENUE DEBIT

## 2020-03-18 PROCEDURE — 3331090001 HH PPS REVENUE CREDIT

## 2020-03-19 ENCOUNTER — HOME CARE VISIT (OUTPATIENT)
Dept: SCHEDULING | Facility: HOME HEALTH | Age: 80
End: 2020-03-19
Payer: MEDICARE

## 2020-03-19 ENCOUNTER — OFFICE VISIT (OUTPATIENT)
Dept: CARDIOLOGY CLINIC | Age: 80
End: 2020-03-19

## 2020-03-19 VITALS
HEIGHT: 62 IN | SYSTOLIC BLOOD PRESSURE: 134 MMHG | HEART RATE: 78 BPM | OXYGEN SATURATION: 97 % | BODY MASS INDEX: 24.92 KG/M2 | DIASTOLIC BLOOD PRESSURE: 81 MMHG | WEIGHT: 135.4 LBS | TEMPERATURE: 97.2 F

## 2020-03-19 VITALS
TEMPERATURE: 96.2 F | RESPIRATION RATE: 18 BRPM | SYSTOLIC BLOOD PRESSURE: 142 MMHG | HEART RATE: 98 BPM | OXYGEN SATURATION: 98 % | DIASTOLIC BLOOD PRESSURE: 84 MMHG

## 2020-03-19 DIAGNOSIS — I27.20 PULMONARY HTN (HCC): ICD-10-CM

## 2020-03-19 DIAGNOSIS — I50.32 CHRONIC DIASTOLIC CONGESTIVE HEART FAILURE (HCC): Primary | ICD-10-CM

## 2020-03-19 DIAGNOSIS — I48.20 CHRONIC ATRIAL FIBRILLATION (HCC): ICD-10-CM

## 2020-03-19 DIAGNOSIS — I42.1 MILD HOCM (HYPERTROPHIC OBSTRUCTIVE CARDIOMYOPATHY) (HCC): ICD-10-CM

## 2020-03-19 DIAGNOSIS — I35.1 NONRHEUMATIC AORTIC VALVE INSUFFICIENCY: ICD-10-CM

## 2020-03-19 DIAGNOSIS — Z95.0 PACEMAKER: ICD-10-CM

## 2020-03-19 DIAGNOSIS — C73 PAPILLARY THYROID CARCINOMA (HCC): ICD-10-CM

## 2020-03-19 PROCEDURE — 3331090001 HH PPS REVENUE CREDIT

## 2020-03-19 PROCEDURE — G0299 HHS/HOSPICE OF RN EA 15 MIN: HCPCS

## 2020-03-19 PROCEDURE — 3331090002 HH PPS REVENUE DEBIT

## 2020-03-19 NOTE — PROGRESS NOTES
HISTORY OF PRESENT ILLNESS  Becca Azar is a 68 y.o. female. CHF   The history is provided by the patient. This is a chronic problem. The problem occurs every several days. The problem has been gradually improving. Palpitations    The history is provided by the patient. This is a new problem. The problem occurs every several days. Associated symptoms include malaise/fatigue. Pertinent negatives include no diaphoresis, no fever, no claudication, no orthopnea, no PND, no syncope, no nausea, no vomiting, no leg pain, no weakness, no cough, no hemoptysis and no sputum production. Shortness of Breath   The history is provided by the patient. This is a chronic problem. The problem occurs intermittently. The current episode started more than 1 week ago. The problem has been gradually improving. Associated symptoms include leg swelling. Pertinent negatives include no fever, no ear pain, no neck pain, no cough, no sputum production, no hemoptysis, no wheezing, no PND, no orthopnea, no syncope, no vomiting, no rash, no leg pain and no claudication. Associated medical issues include heart failure. Associated medical issues do not include CAD. Review of Systems   Constitutional: Positive for malaise/fatigue. Negative for chills, diaphoresis, fever and weight loss. HENT: Negative for ear discharge, ear pain, hearing loss, nosebleeds and tinnitus. Eyes: Negative for blurred vision. Respiratory: Negative for cough, hemoptysis, sputum production, wheezing and stridor. Cardiovascular: Positive for palpitations and leg swelling. Negative for orthopnea, claudication, syncope and PND. Gastrointestinal: Negative for heartburn, nausea and vomiting. Musculoskeletal: Negative for myalgias and neck pain. Skin: Negative for itching and rash. Neurological: Negative for tingling, tremors, focal weakness, loss of consciousness and weakness. Psychiatric/Behavioral: Negative for depression and suicidal ideas. Family History   Problem Relation Age of Onset    Cancer Other     Heart Disease Other     Cancer Mother     Heart Disease Mother        Past Medical History:   Diagnosis Date    Arthritis     Atrial fibrillation ([facility]     CHF (congestive heart failure) (HCC)     Heart murmur     History of seasonal allergies     HTN (hypertension)     Hypercholesteremia     Moderate aortic stenosis     Pulmonary hypertension ([facility]     Venous insufficiency        Past Surgical History:   Procedure Laterality Date    HX KNEE ARTHROSCOPY      left and right    HX ORTHOPAEDIC      right ankle       Social History     Tobacco Use    Smoking status: Never Smoker    Smokeless tobacco: Never Used   Substance Use Topics    Alcohol use: No       No Known Allergies    Outpatient Medications Marked as Taking for the 3/19/20 encounter (Office Visit) with Lawrence Vidal MD   Medication Sig Dispense Refill    cephALEXin (KEFLEX) 500 mg capsule Take 1 Cap by mouth three (3) times daily for 5 days. 15 Cap 0    warfarin (COUMADIN) 5 mg tablet take 1 tablet by mouth once daily 90 Tab 1    furosemide (LASIX) 40 mg tablet take 1 tablet by mouth every morning and 1/2 tablet AT 2 P.M. 45 Tab 1    traMADol (ULTRAM) 50 mg tablet Take 1 Tab by mouth every six (6) hours as needed for Pain.  metoprolol tartrate (LOPRESSOR) 100 mg IR tablet Take 1 Tab by mouth two (2) times a day. 60 Tab 6    folic acid 616 mcg tablet Take 400 mcg by mouth daily.  cyanocobalamin, vitamin B-12, (VITAMIN B12 PO) Take 1,000 mcg by mouth daily.  Calcium-Cholecalciferol, D3, (CALCIUM 600 WITH VITAMIN D3) 600 mg(1,500mg) -400 unit chew Take 1 Tab by mouth daily.  simvastatin (ZOCOR) 80 mg tablet TAKE 1 TABLET NIGHTLY 90 Tab 1    cholecalciferol (VITAMIN D3) 1,000 unit cap Take  by mouth daily.  losartan (COZAAR) 100 mg tablet Take 100 mg by mouth daily.  montelukast (SINGULAIR) 10 mg tablet Take 10 mg by mouth daily.  CETIRIZINE HCL (ZYRTEC PO) Take  by mouth. Visit Vitals  /81 (BP 1 Location: Left arm, BP Patient Position: Sitting)   Pulse 78   Temp 97.2 °F (36.2 °C) (Oral)   Ht 5' 2\" (1.575 m)   Wt 61.4 kg (135 lb 6.4 oz)   SpO2 97%   BMI 24.76 kg/m²     Physical Exam   Constitutional: She is oriented to person, place, and time. She appears well-developed and well-nourished. No distress. HENT:   Head: Atraumatic. Mouth/Throat: No oropharyngeal exudate. Eyes: Conjunctivae are normal. Right eye exhibits no discharge. Left eye exhibits no discharge. No scleral icterus. Neck: Normal range of motion. Neck supple. No JVD present. No tracheal deviation present. No thyromegaly present. Cardiovascular: Normal rate. An irregularly irregular rhythm present. Exam reveals no gallop. Murmur (3/6 holosystolic murmur best heard at apex and left parasternal area with no radiationl) heard. Pulmonary/Chest: Effort normal. No stridor. No respiratory distress. She has no wheezes. She has no rales. She exhibits no tenderness. Abdominal: Soft. There is no abdominal tenderness. There is no rebound and no guarding. Musculoskeletal: Normal range of motion. General: No tenderness. Edema: 1+ ble edema. Lymphadenopathy:     She has no cervical adenopathy. Neurological: She is alert and oriented to person, place, and time. She exhibits normal muscle tone. Skin: Skin is warm. She is not diaphoretic. Psychiatric: She has a normal mood and affect. Her behavior is normal.     ekg sinus bradycardia with poor r wave progression. 09/20/19   ECHO ADULT COMPLETE 09/25/2019 9/25/2019    Narrative · Left Ventricle: Normal cavity size and systolic function (ejection   fraction normal). Moderate concentric hypertrophy. Estimated left   ventricular ejection fraction is 61 - 65%. Abnormal left ventricular   septal motion consistent with right ventricular pacing. Interventricular   septal \"bounce\".  Abnormal left ventricular strain. Inconclusive left   ventricular diastolic function. · Right Ventricle: Mildly reduced systolic function. Abnormal right   ventricular septal motion. Diastolic flattening of interventricular septum   consistent with right ventricle volume overload. · Left Atrium: Severely dilated left atrium. · Right Atrium: Dilated right atrium. · Pulmonic Valve: Mild pulmonic valve regurgitation is present. · Tricuspid Valve: Moderate tricuspid valve regurgitation is present. · Pulmonary Artery: Moderate pulmonary hypertension. Pulmonary arterial   systolic pressure is 69 mmHg. · Aortic Valve: Probably trileaflet aortic valve. Aortic valve leaflet   calcification present with reduced excursion. Aortic valve peak gradient   is 47.2 mmHg. Aortic valve mean gradient is 28.5 mmHg. Aortic valve area   is 0.9 cm2. Moderate aortic valve stenosis is present. Mild aortic valve   regurgitation is present. · Moderate mitral annular calcification        Signed by: Mariann Nick MD       ASSESSMENT and PLAN    ICD-10-CM ICD-9-CM    1. Chronic diastolic congestive heart failure (HCC) I50.32 428.32      428.0    2. Chronic atrial fibrillation I48.20 427.31    3. Pacemaker Z95.0 V45.01    4. HOCM (hypertrophic obstructive cardiomyopathy) (HCC) I42.1 425.11    5. Nonrheumatic aortic valve insufficiency I35.1 424.1    6. Pulmonary HTN (HCC) I27.20 416.8    7. Papillary thyroid carcinoma (HCC) C73 193      No orders of the defined types were placed in this encounter. Follow-up and Dispositions    · Return in about 6 months (around 9/19/2020). current treatment plan is effective, no change in therapy  reviewed diet, exercise and weight control  use of aspirin to prevent MI and TIA's discussed. Patient seen for pre op evaluation prior to thyroid surgery for possible Ca. Had recent echo which revealed severe pulm htn.       ANAND reveals HOCM with mitral regurgitation and moderate aortic regurgitation. Underwent recent septal ablation followed by pacemaker insertion at [facility]. Seen for follow-up. Stable CHF on current meds. Weight has remained stable-- continue current meds.   F/u in 6 months

## 2020-03-20 ENCOUNTER — TELEPHONE (OUTPATIENT)
Dept: CARDIOLOGY CLINIC | Age: 80
End: 2020-03-20

## 2020-03-20 PROCEDURE — 3331090001 HH PPS REVENUE CREDIT

## 2020-03-20 PROCEDURE — 3331090002 HH PPS REVENUE DEBIT

## 2020-03-20 NOTE — TELEPHONE ENCOUNTER
Home health calling to let us know patient has not answered the phone today and was to have inr done. Will try again tomorrow. I have called and spoke with patient right after getting off the phone with home health and patient stated she will call them if not she will call to have them do it tomorrow.

## 2020-03-21 PROCEDURE — 3331090001 HH PPS REVENUE CREDIT

## 2020-03-21 PROCEDURE — 3331090002 HH PPS REVENUE DEBIT

## 2020-03-22 PROCEDURE — 3331090002 HH PPS REVENUE DEBIT

## 2020-03-22 PROCEDURE — 3331090001 HH PPS REVENUE CREDIT

## 2020-03-23 ENCOUNTER — TELEPHONE ANTICOAG (OUTPATIENT)
Dept: CARDIOLOGY CLINIC | Age: 80
End: 2020-03-23

## 2020-03-23 ENCOUNTER — HOME CARE VISIT (OUTPATIENT)
Dept: SCHEDULING | Facility: HOME HEALTH | Age: 80
End: 2020-03-23
Payer: MEDICARE

## 2020-03-23 ENCOUNTER — HOME CARE VISIT (OUTPATIENT)
Dept: HOME HEALTH SERVICES | Facility: HOME HEALTH | Age: 80
End: 2020-03-23
Payer: MEDICARE

## 2020-03-23 DIAGNOSIS — I48.91 ATRIAL FIBRILLATION, UNSPECIFIED TYPE (HCC): ICD-10-CM

## 2020-03-23 LAB — INR, EXTERNAL: 1.3

## 2020-03-23 PROCEDURE — G0299 HHS/HOSPICE OF RN EA 15 MIN: HCPCS

## 2020-03-23 PROCEDURE — 3331090001 HH PPS REVENUE CREDIT

## 2020-03-23 PROCEDURE — 3331090002 HH PPS REVENUE DEBIT

## 2020-03-23 RX ORDER — FUROSEMIDE 40 MG/1
TABLET ORAL
Qty: 45 TAB | Refills: 1 | Status: SHIPPED | OUTPATIENT
Start: 2020-03-23 | End: 2020-07-20

## 2020-03-23 NOTE — PATIENT INSTRUCTIONS
Ms. Solomon Kaur is here today for anticoagulation monitoring for the diagnosis of Atrial Fibrillation. Her INR goal is 2.0-3.0 and her current Coumadin dose is 2.5 and 5 mg tabs. Today's findings include an INR of 1.3     Considering Ms. Daugherty's past history, todays findings, and per the coumadin policy/protocol, Ms. Daguherty was instructed to take Coumadin as follows,  2.5 mg daily except on Monday and Thursday 5 mg. She was also instructed to come back in 1 weeks for an INR check. A full discussion of the nature of anticoagulants has been carried out. A full discussion of the need for frequent and regular monitoring, precise dosage adjustment and compliance was stressed. Side effects of potential bleeding were discussed and Ms. Daugherty was instructed to call [phone] if there are any signs of abnormal bleeding. Ms. Jeevan Arteaga was instructed to avoid any OTC items containing aspirin or ibuprofen and prior to starting any new OTC products to consult with her physician or pharmacist to ensure no drug interactions are present. Ms. Jeevan Arteaga was instructed to avoid any major changes in her general diet and to avoid alcohol consumption. .      Ms. Daugherty verbalized her understanding of all instructions and will call the office with any questions, concerns, or signs of abnormal bleeding or blood clot.

## 2020-03-24 VITALS
DIASTOLIC BLOOD PRESSURE: 70 MMHG | OXYGEN SATURATION: 98 % | SYSTOLIC BLOOD PRESSURE: 122 MMHG | RESPIRATION RATE: 18 BRPM | HEART RATE: 80 BPM | TEMPERATURE: 98.1 F

## 2020-03-24 LAB
INR BLD: 1.3 (ref 0.9–1.1)
PT POC: 15.9 SECONDS (ref 11.8–14.9)

## 2020-03-24 PROCEDURE — 3331090002 HH PPS REVENUE DEBIT

## 2020-03-24 PROCEDURE — 3331090001 HH PPS REVENUE CREDIT

## 2020-03-25 PROCEDURE — 3331090001 HH PPS REVENUE CREDIT

## 2020-03-25 PROCEDURE — 3331090002 HH PPS REVENUE DEBIT

## 2020-03-26 PROCEDURE — 3331090001 HH PPS REVENUE CREDIT

## 2020-03-26 PROCEDURE — 3331090002 HH PPS REVENUE DEBIT

## 2020-03-27 ENCOUNTER — HOME CARE VISIT (OUTPATIENT)
Dept: SCHEDULING | Facility: HOME HEALTH | Age: 80
End: 2020-03-27
Payer: MEDICARE

## 2020-03-27 ENCOUNTER — TELEPHONE (OUTPATIENT)
Dept: VASCULAR SURGERY | Age: 80
End: 2020-03-27

## 2020-03-27 PROCEDURE — G0299 HHS/HOSPICE OF RN EA 15 MIN: HCPCS

## 2020-03-27 PROCEDURE — 3331090001 HH PPS REVENUE CREDIT

## 2020-03-27 PROCEDURE — 400014 HH F/U

## 2020-03-27 PROCEDURE — 3331090002 HH PPS REVENUE DEBIT

## 2020-03-27 NOTE — TELEPHONE ENCOUNTER
Contacted patient in regards to upcoming appointments. Due to COVID 19, we will be rescheduling patient's appointments out to a later date. Patient is to call with any new symptoms or concerns. Patient is in agreement with plan. Patient has home health performing wound care at this time and no new problems are noted and patient states legs are doing better.

## 2020-03-28 VITALS
TEMPERATURE: 97.9 F | SYSTOLIC BLOOD PRESSURE: 120 MMHG | DIASTOLIC BLOOD PRESSURE: 68 MMHG | RESPIRATION RATE: 18 BRPM | OXYGEN SATURATION: 98 % | HEART RATE: 80 BPM

## 2020-03-28 PROCEDURE — 3331090001 HH PPS REVENUE CREDIT

## 2020-03-28 PROCEDURE — 3331090002 HH PPS REVENUE DEBIT

## 2020-03-29 PROCEDURE — 3331090002 HH PPS REVENUE DEBIT

## 2020-03-29 PROCEDURE — 3331090001 HH PPS REVENUE CREDIT

## 2020-03-30 ENCOUNTER — HOME CARE VISIT (OUTPATIENT)
Dept: HOME HEALTH SERVICES | Facility: HOME HEALTH | Age: 80
End: 2020-03-30
Payer: MEDICARE

## 2020-03-30 ENCOUNTER — HOME CARE VISIT (OUTPATIENT)
Dept: SCHEDULING | Facility: HOME HEALTH | Age: 80
End: 2020-03-30
Payer: MEDICARE

## 2020-03-30 ENCOUNTER — TELEPHONE ANTICOAG (OUTPATIENT)
Dept: CARDIOLOGY CLINIC | Age: 80
End: 2020-03-30

## 2020-03-30 VITALS
TEMPERATURE: 97.9 F | DIASTOLIC BLOOD PRESSURE: 68 MMHG | OXYGEN SATURATION: 98 % | SYSTOLIC BLOOD PRESSURE: 120 MMHG | RESPIRATION RATE: 18 BRPM | HEART RATE: 80 BPM

## 2020-03-30 DIAGNOSIS — I48.91 ATRIAL FIBRILLATION, UNSPECIFIED TYPE (HCC): ICD-10-CM

## 2020-03-30 LAB
INR BLD: 1.3 (ref 0.9–1.1)
INR, EXTERNAL: 1.3 (ref 2–3)
PT POC: 16.1 SECONDS (ref 11.8–14.9)

## 2020-03-30 PROCEDURE — G0299 HHS/HOSPICE OF RN EA 15 MIN: HCPCS

## 2020-03-30 PROCEDURE — 3331090002 HH PPS REVENUE DEBIT

## 2020-03-30 PROCEDURE — 3331090001 HH PPS REVENUE CREDIT

## 2020-03-31 PROCEDURE — 3331090001 HH PPS REVENUE CREDIT

## 2020-03-31 PROCEDURE — 3331090002 HH PPS REVENUE DEBIT

## 2020-04-01 RX ORDER — METOPROLOL TARTRATE 100 MG/1
TABLET ORAL
Qty: 60 TAB | Refills: 6 | Status: SHIPPED | OUTPATIENT
Start: 2020-04-01 | End: 2020-04-22

## 2020-04-02 ENCOUNTER — HOME CARE VISIT (OUTPATIENT)
Dept: SCHEDULING | Facility: HOME HEALTH | Age: 80
End: 2020-04-02
Payer: MEDICARE

## 2020-04-02 ENCOUNTER — HOME CARE VISIT (OUTPATIENT)
Dept: HOME HEALTH SERVICES | Facility: HOME HEALTH | Age: 80
End: 2020-04-02
Payer: MEDICARE

## 2020-04-02 ENCOUNTER — TELEPHONE ANTICOAG (OUTPATIENT)
Dept: CARDIOLOGY CLINIC | Age: 80
End: 2020-04-02

## 2020-04-02 DIAGNOSIS — I48.91 ATRIAL FIBRILLATION, UNSPECIFIED TYPE (HCC): ICD-10-CM

## 2020-04-02 LAB — INR, EXTERNAL: 1.5 (ref 2–3)

## 2020-04-02 PROCEDURE — G0299 HHS/HOSPICE OF RN EA 15 MIN: HCPCS

## 2020-04-02 NOTE — PATIENT INSTRUCTIONS
Ã¯Â»Â¿  Anticoagulants: After Your Visit  Your Care Instructions  Your doctor prescribed an anticoagulant medicine. Anticoagulants, often called blood thinners, prevent new blood clots from forming and keep existing clots from getting larger. They do not actually thin the blood, but they make the blood take longer to clot. This lowers the risk of a blood clot moving to the lungs (pulmonary embolism) or moving to the brain and causing a stroke. Blood thinners come in two forms. Heparin is given by shot, either under your skin or through a needle in your vein, and starts working right away. Warfarin (Coumadin) comes in pill form and takes longer to work. Your doctor may have you begin taking both forms at the same time. As soon as the pills start to work, you will stop the shots but continue to take the pills. If you have a blood clot in your leg, you may need to take warfarin for several months. People who have heart conditions such as atrial fibrillation often need to take it for the rest of their lives. The right dose of this medicine is different for each person. You will need regular blood tests to see if your dose is correct. Follow-up care is a key part of your treatment and safety. Be sure to make and go to all appointments, and call your doctor if you are having problems. Itâs also a good idea to know your test results and keep a list of the medicines you take. How do you take blood thinners? · Take your medicines exactly as prescribed. Call your doctor if you think you are having a problem with your medicine. · Call your doctor if you are not sure what to do if you missed a dose of blood thinner. ¨ Your doctor can tell you exactly what to do so you do not take too much or too little blood thinner. Then you will be as safe as possible. · Some general rules for what to do if you miss a dose:  ¨ If you remember it in the same day, take the missed dose. Then go back to your regular schedule.   ¨ If it is the next day, or almost time to take the next dose, do not take the missed dose. Do not double the dose to make up for the missed one. At your next regularly scheduled time, take your normal dose. ¨ If you miss your dose for 2 or more days, call your doctor. · To help you stay on schedule, use a calendar to remind you when to take your blood thinner. When you take the medicine, note it on the calendar. · If you are going to give yourself shots, your doctor will give you instructions for how to safely inject the medicine. Follow the directions carefully. · Do not take any vitamins, over-the-counter medicines, or herbal products without talking to your doctor first.  · Avoid contact sports and other activities that could lead to injury. Make your home safe and take other measures to reduce your risk of falling. Always wear a seat belt while in a car. · Do not suddenly change your intake of vitamin Kârich foods, such as broccoli, cabbage, asparagus, lettuce, and spinach. This will help blood thinners work evenly from day to day. · Limit alcohol to 2 drinks a day for men and 1 drink a day for women. Alcohol may interfere with blood thinners. It also increases your risk of falls, which can cause bruising and bleeding. · Tell your dentist, pharmacist, and other health professionals that you take blood thinners. Wear medical alert jewelry that says you take blood thinners. You can buy this at most drugstores. When should you call for help? Call 911 anytime you think you may need emergency care. For example, call if:  · You cough up blood. · You vomit blood or what looks like coffee grounds. · You pass maroon or very bloody stools. Call your doctor now or seek immediate medical care if:  · You have new bruises or blood spots under your skin. · You have a nosebleed. · Your gums bleed when you brush your teeth. · You have blood in your urine.   · Your stools are black and tarlike or have streaks of blood.  · You have vaginal bleeding when you are not having your period, or heavy period bleeding. Watch closely for changes in your health, and be sure to contact your doctor if:  · You have questions about your medicine. Where can you learn more? Go to [employer]  Enter Y213 in the search box to learn more about \"Anticoagulants: After Your Visit. \"   Â© 8221-3487 Healthwise, Incorporated. Care instructions adapted under license by [facility] (which disclaims liability or warranty for this information). This care instruction is for use with your licensed healthcare professional. If you have questions about a medical condition or this instruction, always ask your healthcare professional. [place] [address] any warranty or liability for your use of this information.   Content Version: 3.2.91219; Last Revised: July 1, 2009

## 2020-04-02 NOTE — PROGRESS NOTES
Ms. Janene Chambers is here today for anticoagulation monitoring for the diagnosis of Atrial Fibrillation. Her INR goal is 2.0-3.0 and her current Coumadin dose is 5 mg. Today's findings include an INR of 1.5     Considering Ms. Daugherty's past history, todays findings, and per the coumadin policy/protocol, Ms. Daugherty was instructed to take Coumadin as follows,  Take 7.5 mg Tonight ;5 mg all other days. She was also instructed to come back in 1 weeks for an INR check. A full discussion of the nature of anticoagulants has been carried out. A full discussion of the need for frequent and regular monitoring, precise dosage adjustment and compliance was stressed. Side effects of potential bleeding were discussed and Ms. Daugherty was instructed to call [phone] if there are any signs of abnormal bleeding. Ms. Brennon Hernandez was instructed to avoid any OTC items containing aspirin or ibuprofen and prior to starting any new OTC products to consult with her physician or pharmacist to ensure no drug interactions are present. Ms. Brennon Hernandez was instructed to avoid any major changes in her general diet and to avoid alcohol consumption. .      Ms. Daugherty verbalized her understanding of all instructions and will call the office with any questions, concerns, or signs of abnormal bleeding or blood clot.

## 2020-04-04 VITALS
DIASTOLIC BLOOD PRESSURE: 76 MMHG | HEART RATE: 80 BPM | RESPIRATION RATE: 18 BRPM | SYSTOLIC BLOOD PRESSURE: 122 MMHG | TEMPERATURE: 98.1 F | OXYGEN SATURATION: 99 %

## 2020-04-05 LAB
INR BLD: 1.5 (ref 0.9–1.1)
PT POC: 18.4 SECONDS (ref 11.8–14.9)

## 2020-04-06 ENCOUNTER — HOME CARE VISIT (OUTPATIENT)
Dept: SCHEDULING | Facility: HOME HEALTH | Age: 80
End: 2020-04-06
Payer: MEDICARE

## 2020-04-06 PROCEDURE — G0299 HHS/HOSPICE OF RN EA 15 MIN: HCPCS

## 2020-04-09 ENCOUNTER — HOME CARE VISIT (OUTPATIENT)
Dept: HOME HEALTH SERVICES | Facility: HOME HEALTH | Age: 80
End: 2020-04-09
Payer: MEDICARE

## 2020-04-09 ENCOUNTER — TELEPHONE ANTICOAG (OUTPATIENT)
Dept: CARDIOLOGY CLINIC | Age: 80
End: 2020-04-09

## 2020-04-09 ENCOUNTER — HOME CARE VISIT (OUTPATIENT)
Dept: SCHEDULING | Facility: HOME HEALTH | Age: 80
End: 2020-04-09
Payer: MEDICARE

## 2020-04-09 ENCOUNTER — TELEPHONE (OUTPATIENT)
Dept: CARDIOLOGY CLINIC | Age: 80
End: 2020-04-09

## 2020-04-09 VITALS
SYSTOLIC BLOOD PRESSURE: 170 MMHG | TEMPERATURE: 97.1 F | HEART RATE: 78 BPM | RESPIRATION RATE: 16 BRPM | DIASTOLIC BLOOD PRESSURE: 70 MMHG | OXYGEN SATURATION: 98 %

## 2020-04-09 DIAGNOSIS — I48.0 PAROXYSMAL ATRIAL FIBRILLATION (HCC): ICD-10-CM

## 2020-04-09 LAB — INR, EXTERNAL: 4.1

## 2020-04-09 PROCEDURE — G0299 HHS/HOSPICE OF RN EA 15 MIN: HCPCS

## 2020-04-09 NOTE — PATIENT INSTRUCTIONS
Ã¯Â»Â Anticoagulants: After Your Visit Your Care Instructions Your doctor prescribed an anticoagulant medicine. Anticoagulants, often called blood thinners, prevent new blood clots from forming and keep existing clots from getting larger. They do not actually thin the blood, but they make the blood take longer to clot. This lowers the risk of a blood clot moving to the lungs (pulmonary embolism) or moving to the brain and causing a stroke. Blood thinners come in two forms. Heparin is given by shot, either under your skin or through a needle in your vein, and starts working right away. Warfarin (Coumadin) comes in pill form and takes longer to work. Your doctor may have you begin taking both forms at the same time. As soon as the pills start to work, you will stop the shots but continue to take the pills. If you have a blood clot in your leg, you may need to take warfarin for several months. People who have heart conditions such as atrial fibrillation often need to take it for the rest of their lives. The right dose of this medicine is different for each person. You will need regular blood tests to see if your dose is correct. Follow-up care is a key part of your treatment and safety. Be sure to make and go to all appointments, and call your doctor if you are having problems. Itâs also a good idea to know your test results and keep a list of the medicines you take. How do you take blood thinners? · Take your medicines exactly as prescribed. Call your doctor if you think you are having a problem with your medicine. · Call your doctor if you are not sure what to do if you missed a dose of blood thinner. ¨ Your doctor can tell you exactly what to do so you do not take too much or too little blood thinner. Then you will be as safe as possible. · Some general rules for what to do if you miss a dose: ¨ If you remember it in the same day, take the missed dose. Then go back to your regular schedule. ¨ If it is the next day, or almost time to take the next dose, do not take the missed dose. Do not double the dose to make up for the missed one. At your next regularly scheduled time, take your normal dose. ¨ If you miss your dose for 2 or more days, call your doctor. · To help you stay on schedule, use a calendar to remind you when to take your blood thinner. When you take the medicine, note it on the calendar. · If you are going to give yourself shots, your doctor will give you instructions for how to safely inject the medicine. Follow the directions carefully. · Do not take any vitamins, over-the-counter medicines, or herbal products without talking to your doctor first. 
· Avoid contact sports and other activities that could lead to injury. Make your home safe and take other measures to reduce your risk of falling. Always wear a seat belt while in a car. · Do not suddenly change your intake of vitamin Kârich foods, such as broccoli, cabbage, asparagus, lettuce, and spinach. This will help blood thinners work evenly from day to day. · Limit alcohol to 2 drinks a day for men and 1 drink a day for women. Alcohol may interfere with blood thinners. It also increases your risk of falls, which can cause bruising and bleeding. · Tell your dentist, pharmacist, and other health professionals that you take blood thinners. Wear medical alert jewelry that says you take blood thinners. You can buy this at most drugstores. When should you call for help? Call 911 anytime you think you may need emergency care. For example, call if: 
· You cough up blood. · You vomit blood or what looks like coffee grounds. · You pass maroon or very bloody stools. Call your doctor now or seek immediate medical care if: 
· You have new bruises or blood spots under your skin. · You have a nosebleed. · Your gums bleed when you brush your teeth. · You have blood in your urine. · Your stools are black and tarlike or have streaks of blood. · You have vaginal bleeding when you are not having your period, or heavy period bleeding. Watch closely for changes in your health, and be sure to contact your doctor if: 
· You have questions about your medicine. Where can you learn more? Go to [employer].be Enter O296 in the search box to learn more about \"Anticoagulants: After Your Visit. \" Â© 0719-3041 Healthwise, Incorporated. Care instructions adapted under license by [facility] (which disclaims liability or warranty for this information). This care instruction is for use with your licensed healthcare professional. If you have questions about a medical condition or this instruction, always ask your healthcare professional. [address] any warranty or liability for your use of this information. Content Version: 0.0.75861; Last Revised: July 1, 2009

## 2020-04-09 NOTE — PROGRESS NOTES
Left message for [facility]. Ms. Rene Daily is here today for anticoagulation monitoring for the diagnosis of Atrial Fibrillation. Her INR goal is 2.0-3.0 and her current Coumadin dose is 5 mg. Today's findings include an INR of 4.1 Considering Ms. Daugherty's past history, todays findings, and per the coumadin policy/protocol, Ms. Daugherty was instructed to take Coumadin as follows,  Hold tonight, take 5 mg daily starting Fri night. She was also instructed to come back in 1 weeks for an INR check. A full discussion of the nature of anticoagulants has been carried out. A full discussion of the need for frequent and regular monitoring, precise dosage adjustment and compliance was stressed. Side effects of potential bleeding were discussed and Ms. Daugherty was instructed to call [phone] if there are any signs of abnormal bleeding. Ms. Colleen Nazario was instructed to avoid any OTC items containing aspirin or ibuprofen and prior to starting any new OTC products to consult with her physician or pharmacist to ensure no drug interactions are present. Ms. Colleen Nazario was instructed to avoid any major changes in her general diet and to avoid alcohol consumption. . 
 
 
Ms. Daugherty verbalized her understanding of all instructions and will call the office with any questions, concerns, or signs of abnormal bleeding or blood clot.

## 2020-04-09 NOTE — TELEPHONE ENCOUNTER
Breast biopsy 4/20/20 hold coumadin 5 days prior per Dr. Dorene De La Cruz telephone  Message hold 5 days prior resume 1 day after.

## 2020-04-12 VITALS
DIASTOLIC BLOOD PRESSURE: 78 MMHG | RESPIRATION RATE: 16 BRPM | HEART RATE: 68 BPM | OXYGEN SATURATION: 98 % | SYSTOLIC BLOOD PRESSURE: 126 MMHG | TEMPERATURE: 97.8 F

## 2020-04-13 ENCOUNTER — HOME CARE VISIT (OUTPATIENT)
Dept: SCHEDULING | Facility: HOME HEALTH | Age: 80
End: 2020-04-13
Payer: MEDICARE

## 2020-04-13 PROCEDURE — G0299 HHS/HOSPICE OF RN EA 15 MIN: HCPCS

## 2020-04-16 ENCOUNTER — HOME CARE VISIT (OUTPATIENT)
Dept: SCHEDULING | Facility: HOME HEALTH | Age: 80
End: 2020-04-16
Payer: MEDICARE

## 2020-04-16 VITALS
SYSTOLIC BLOOD PRESSURE: 130 MMHG | DIASTOLIC BLOOD PRESSURE: 60 MMHG | TEMPERATURE: 98 F | HEART RATE: 76 BPM | OXYGEN SATURATION: 99 %

## 2020-04-16 PROCEDURE — G0300 HHS/HOSPICE OF LPN EA 15 MIN: HCPCS

## 2020-04-18 VITALS
TEMPERATURE: 98.2 F | SYSTOLIC BLOOD PRESSURE: 122 MMHG | RESPIRATION RATE: 20 BRPM | OXYGEN SATURATION: 99 % | HEART RATE: 63 BPM | DIASTOLIC BLOOD PRESSURE: 60 MMHG

## 2020-04-21 ENCOUNTER — HOME CARE VISIT (OUTPATIENT)
Dept: SCHEDULING | Facility: HOME HEALTH | Age: 80
End: 2020-04-21
Payer: MEDICARE

## 2020-04-21 VITALS
RESPIRATION RATE: 16 BRPM | OXYGEN SATURATION: 98 % | SYSTOLIC BLOOD PRESSURE: 122 MMHG | DIASTOLIC BLOOD PRESSURE: 70 MMHG | TEMPERATURE: 98.2 F | HEART RATE: 80 BPM

## 2020-04-21 PROCEDURE — G0299 HHS/HOSPICE OF RN EA 15 MIN: HCPCS

## 2020-04-22 RX ORDER — METOPROLOL TARTRATE 100 MG/1
TABLET ORAL
Qty: 60 TAB | Refills: 6 | Status: SHIPPED | OUTPATIENT
Start: 2020-04-22 | End: 2020-10-30

## 2020-04-24 ENCOUNTER — TELEPHONE ANTICOAG (OUTPATIENT)
Dept: CARDIOLOGY CLINIC | Age: 80
End: 2020-04-24

## 2020-04-24 ENCOUNTER — HOME CARE VISIT (OUTPATIENT)
Dept: SCHEDULING | Facility: HOME HEALTH | Age: 80
End: 2020-04-24
Payer: MEDICARE

## 2020-04-24 DIAGNOSIS — I48.0 PAROXYSMAL ATRIAL FIBRILLATION (HCC): ICD-10-CM

## 2020-04-24 LAB — INR, EXTERNAL: 1.7

## 2020-04-24 PROCEDURE — G0299 HHS/HOSPICE OF RN EA 15 MIN: HCPCS

## 2020-04-24 PROCEDURE — 400014 HH F/U

## 2020-04-26 VITALS
RESPIRATION RATE: 16 BRPM | SYSTOLIC BLOOD PRESSURE: 122 MMHG | TEMPERATURE: 97.9 F | OXYGEN SATURATION: 98 % | DIASTOLIC BLOOD PRESSURE: 78 MMHG | HEART RATE: 84 BPM

## 2020-04-26 LAB
INR BLD: 1.7 (ref 0.9–1.1)
PT POC: 20.5 SECONDS (ref 11.8–14.9)

## 2020-04-28 ENCOUNTER — TELEPHONE (OUTPATIENT)
Dept: CARDIOLOGY CLINIC | Age: 80
End: 2020-04-28

## 2020-04-28 ENCOUNTER — HOME CARE VISIT (OUTPATIENT)
Dept: SCHEDULING | Facility: HOME HEALTH | Age: 80
End: 2020-04-28
Payer: MEDICARE

## 2020-04-28 VITALS
TEMPERATURE: 98.2 F | RESPIRATION RATE: 18 BRPM | HEART RATE: 80 BPM | OXYGEN SATURATION: 99 % | DIASTOLIC BLOOD PRESSURE: 78 MMHG | SYSTOLIC BLOOD PRESSURE: 122 MMHG

## 2020-04-28 DIAGNOSIS — I50.32 CHRONIC DIASTOLIC CONGESTIVE HEART FAILURE (HCC): Primary | ICD-10-CM

## 2020-04-28 DIAGNOSIS — R53.1 WEAKNESS: ICD-10-CM

## 2020-04-28 PROCEDURE — G0299 HHS/HOSPICE OF RN EA 15 MIN: HCPCS

## 2020-04-28 RX ORDER — ROSUVASTATIN CALCIUM 10 MG/1
TABLET, FILM COATED ORAL
Qty: 90 TAB | Refills: 1 | Status: ON HOLD | OUTPATIENT
Start: 2020-04-28 | End: 2020-10-19

## 2020-04-28 NOTE — TELEPHONE ENCOUNTER
Called and left home health nurse Kimberly a message that we will be getting magnesium level per NP Cricket Hussein.

## 2020-04-28 NOTE — TELEPHONE ENCOUNTER
Kimberly, home health nurse calling to let us know patient has been complaining of weakness. Is on Lasix 40 mg daily, eating a banana daily, not complaining of any cramps. Kimberly wants to know if we can order a BMP on patient, she is to see her on Friday.

## 2020-05-01 ENCOUNTER — HOME CARE VISIT (OUTPATIENT)
Dept: HOME HEALTH SERVICES | Facility: HOME HEALTH | Age: 80
End: 2020-05-01
Payer: MEDICARE

## 2020-05-01 ENCOUNTER — TELEPHONE ANTICOAG (OUTPATIENT)
Dept: CARDIOLOGY CLINIC | Age: 80
End: 2020-05-01

## 2020-05-01 ENCOUNTER — HOME CARE VISIT (OUTPATIENT)
Dept: SCHEDULING | Facility: HOME HEALTH | Age: 80
End: 2020-05-01
Payer: MEDICARE

## 2020-05-01 VITALS
DIASTOLIC BLOOD PRESSURE: 70 MMHG | SYSTOLIC BLOOD PRESSURE: 110 MMHG | OXYGEN SATURATION: 98 % | HEART RATE: 56 BPM | TEMPERATURE: 97.8 F

## 2020-05-01 DIAGNOSIS — I48.0 PAROXYSMAL ATRIAL FIBRILLATION (HCC): ICD-10-CM

## 2020-05-01 LAB
INR BLD: 1.8 (ref 0.9–1.1)
INR, EXTERNAL: 1.8
PT POC: 21.2 SECONDS (ref 11.8–14.9)

## 2020-05-01 PROCEDURE — G0300 HHS/HOSPICE OF LPN EA 15 MIN: HCPCS

## 2020-05-05 ENCOUNTER — HOME CARE VISIT (OUTPATIENT)
Dept: SCHEDULING | Facility: HOME HEALTH | Age: 80
End: 2020-05-05
Payer: MEDICARE

## 2020-05-05 VITALS
RESPIRATION RATE: 18 BRPM | HEART RATE: 80 BPM | TEMPERATURE: 97.6 F | DIASTOLIC BLOOD PRESSURE: 70 MMHG | OXYGEN SATURATION: 98 % | SYSTOLIC BLOOD PRESSURE: 122 MMHG

## 2020-05-05 PROCEDURE — G0299 HHS/HOSPICE OF RN EA 15 MIN: HCPCS

## 2020-05-08 ENCOUNTER — TELEPHONE ANTICOAG (OUTPATIENT)
Dept: CARDIOLOGY CLINIC | Age: 80
End: 2020-05-08

## 2020-05-08 ENCOUNTER — HOME CARE VISIT (OUTPATIENT)
Dept: HOME HEALTH SERVICES | Facility: HOME HEALTH | Age: 80
End: 2020-05-08
Payer: MEDICARE

## 2020-05-08 ENCOUNTER — HOME CARE VISIT (OUTPATIENT)
Dept: SCHEDULING | Facility: HOME HEALTH | Age: 80
End: 2020-05-08
Payer: MEDICARE

## 2020-05-08 DIAGNOSIS — I48.0 PAROXYSMAL ATRIAL FIBRILLATION (HCC): ICD-10-CM

## 2020-05-08 LAB — INR, EXTERNAL: 2 (ref 2–3)

## 2020-05-08 PROCEDURE — G0300 HHS/HOSPICE OF LPN EA 15 MIN: HCPCS

## 2020-05-08 NOTE — PATIENT INSTRUCTIONS
Ã¯Â»Â¿  Anticoagulants: After Your Visit  Your Care Instructions  Your doctor prescribed an anticoagulant medicine. Anticoagulants, often called blood thinners, prevent new blood clots from forming and keep existing clots from getting larger. They do not actually thin the blood, but they make the blood take longer to clot. This lowers the risk of a blood clot moving to the lungs (pulmonary embolism) or moving to the brain and causing a stroke. Blood thinners come in two forms. Heparin is given by shot, either under your skin or through a needle in your vein, and starts working right away. Warfarin (Coumadin) comes in pill form and takes longer to work. Your doctor may have you begin taking both forms at the same time. As soon as the pills start to work, you will stop the shots but continue to take the pills. If you have a blood clot in your leg, you may need to take warfarin for several months. People who have heart conditions such as atrial fibrillation often need to take it for the rest of their lives. The right dose of this medicine is different for each person. You will need regular blood tests to see if your dose is correct. Follow-up care is a key part of your treatment and safety. Be sure to make and go to all appointments, and call your doctor if you are having problems. Itâs also a good idea to know your test results and keep a list of the medicines you take. How do you take blood thinners? · Take your medicines exactly as prescribed. Call your doctor if you think you are having a problem with your medicine. · Call your doctor if you are not sure what to do if you missed a dose of blood thinner. ¨ Your doctor can tell you exactly what to do so you do not take too much or too little blood thinner. Then you will be as safe as possible. · Some general rules for what to do if you miss a dose:  ¨ If you remember it in the same day, take the missed dose. Then go back to your regular schedule.   ¨ If it is the next day, or almost time to take the next dose, do not take the missed dose. Do not double the dose to make up for the missed one. At your next regularly scheduled time, take your normal dose. ¨ If you miss your dose for 2 or more days, call your doctor. · To help you stay on schedule, use a calendar to remind you when to take your blood thinner. When you take the medicine, note it on the calendar. · If you are going to give yourself shots, your doctor will give you instructions for how to safely inject the medicine. Follow the directions carefully. · Do not take any vitamins, over-the-counter medicines, or herbal products without talking to your doctor first.  · Avoid contact sports and other activities that could lead to injury. Make your home safe and take other measures to reduce your risk of falling. Always wear a seat belt while in a car. · Do not suddenly change your intake of vitamin Kârich foods, such as broccoli, cabbage, asparagus, lettuce, and spinach. This will help blood thinners work evenly from day to day. · Limit alcohol to 2 drinks a day for men and 1 drink a day for women. Alcohol may interfere with blood thinners. It also increases your risk of falls, which can cause bruising and bleeding. · Tell your dentist, pharmacist, and other health professionals that you take blood thinners. Wear medical alert jewelry that says you take blood thinners. You can buy this at most drugstores. When should you call for help? Call 911 anytime you think you may need emergency care. For example, call if:  · You cough up blood. · You vomit blood or what looks like coffee grounds. · You pass maroon or very bloody stools. Call your doctor now or seek immediate medical care if:  · You have new bruises or blood spots under your skin. · You have a nosebleed. · Your gums bleed when you brush your teeth. · You have blood in your urine.   · Your stools are black and tarlike or have streaks of blood.  · You have vaginal bleeding when you are not having your period, or heavy period bleeding. Watch closely for changes in your health, and be sure to contact your doctor if:  · You have questions about your medicine. Where can you learn more? Go to [employer]  Enter H569 in the search box to learn more about \"Anticoagulants: After Your Visit. \"   Â© 9804-8439 Healthwise, Incorporated. Care instructions adapted under license by [employer] (which disclaims liability or warranty for this information). This care instruction is for use with your licensed healthcare professional. If you have questions about a medical condition or this instruction, always ask your healthcare professional. [place] [address] any warranty or liability for your use of this information.   Content Version: 3.7.16457; Last Revised: July 1, 2009

## 2020-05-12 ENCOUNTER — HOME CARE VISIT (OUTPATIENT)
Dept: SCHEDULING | Facility: HOME HEALTH | Age: 80
End: 2020-05-12
Payer: MEDICARE

## 2020-05-12 VITALS
HEART RATE: 80 BPM | RESPIRATION RATE: 16 BRPM | OXYGEN SATURATION: 98 % | DIASTOLIC BLOOD PRESSURE: 78 MMHG | TEMPERATURE: 97.8 F | SYSTOLIC BLOOD PRESSURE: 126 MMHG

## 2020-05-12 PROCEDURE — G0299 HHS/HOSPICE OF RN EA 15 MIN: HCPCS

## 2020-05-14 VITALS
TEMPERATURE: 98.4 F | OXYGEN SATURATION: 99 % | HEART RATE: 69 BPM | DIASTOLIC BLOOD PRESSURE: 68 MMHG | SYSTOLIC BLOOD PRESSURE: 135 MMHG

## 2020-05-15 ENCOUNTER — TELEPHONE ANTICOAG (OUTPATIENT)
Dept: CARDIOLOGY CLINIC | Age: 80
End: 2020-05-15

## 2020-05-15 ENCOUNTER — HOME CARE VISIT (OUTPATIENT)
Dept: HOME HEALTH SERVICES | Facility: HOME HEALTH | Age: 80
End: 2020-05-15
Payer: MEDICARE

## 2020-05-15 ENCOUNTER — HOME CARE VISIT (OUTPATIENT)
Dept: SCHEDULING | Facility: HOME HEALTH | Age: 80
End: 2020-05-15
Payer: MEDICARE

## 2020-05-15 DIAGNOSIS — I48.0 PAROXYSMAL ATRIAL FIBRILLATION (HCC): ICD-10-CM

## 2020-05-15 DIAGNOSIS — N18.9 CHRONIC KIDNEY DISEASE, UNSPECIFIED CKD STAGE: Primary | ICD-10-CM

## 2020-05-15 LAB — INR, EXTERNAL: 2.9 (ref 2–3)

## 2020-05-15 PROCEDURE — G0299 HHS/HOSPICE OF RN EA 15 MIN: HCPCS

## 2020-05-16 VITALS
SYSTOLIC BLOOD PRESSURE: 122 MMHG | TEMPERATURE: 98.3 F | HEART RATE: 80 BPM | OXYGEN SATURATION: 99 % | RESPIRATION RATE: 18 BRPM | DIASTOLIC BLOOD PRESSURE: 78 MMHG

## 2020-05-16 LAB — INR BLD: 2.9 (ref 0.9–1.1)

## 2020-05-18 NOTE — TELEPHONE ENCOUNTER
5/15/20 left message on voice mail for Dr. Terrell Manning nurse. Never received a call. Called back 5/18/20 spoke to Prince Coleman informed patient needed to bridge to lovenox before stopping coumadin. Also spoke to [address] [place] [address] patient refused to have INR today. Surgery is on 5/21/20

## 2020-05-19 ENCOUNTER — HOME CARE VISIT (OUTPATIENT)
Dept: SCHEDULING | Facility: HOME HEALTH | Age: 80
End: 2020-05-19
Payer: MEDICARE

## 2020-05-19 ENCOUNTER — HOME CARE VISIT (OUTPATIENT)
Dept: HOME HEALTH SERVICES | Facility: HOME HEALTH | Age: 80
End: 2020-05-19
Payer: MEDICARE

## 2020-05-19 ENCOUNTER — TELEPHONE ANTICOAG (OUTPATIENT)
Dept: CARDIOLOGY CLINIC | Age: 80
End: 2020-05-19

## 2020-05-19 DIAGNOSIS — I48.0 PAROXYSMAL ATRIAL FIBRILLATION (HCC): ICD-10-CM

## 2020-05-19 LAB — INR, EXTERNAL: 1.6

## 2020-05-19 PROCEDURE — G0299 HHS/HOSPICE OF RN EA 15 MIN: HCPCS

## 2020-05-19 RX ORDER — ENOXAPARIN SODIUM 100 MG/ML
60 INJECTION SUBCUTANEOUS
COMMUNITY
End: 2020-05-19 | Stop reason: SDUPTHER

## 2020-05-19 RX ORDER — ENOXAPARIN SODIUM 100 MG/ML
60 INJECTION SUBCUTANEOUS DAILY
Qty: 3 SYRINGE | Refills: 0 | Status: SHIPPED | OUTPATIENT
Start: 2020-05-19 | End: 2020-06-12 | Stop reason: ALTCHOICE

## 2020-05-19 NOTE — TELEPHONE ENCOUNTER
Instructions given to [facility] nurse and left message on machine for patient to  lovenox @ pharmacy

## 2020-05-20 ENCOUNTER — HOME CARE VISIT (OUTPATIENT)
Dept: SCHEDULING | Facility: HOME HEALTH | Age: 80
End: 2020-05-20
Payer: MEDICARE

## 2020-05-20 VITALS
OXYGEN SATURATION: 98 % | TEMPERATURE: 97.5 F | RESPIRATION RATE: 18 BRPM | HEART RATE: 88 BPM | DIASTOLIC BLOOD PRESSURE: 80 MMHG | SYSTOLIC BLOOD PRESSURE: 122 MMHG

## 2020-05-20 PROCEDURE — G0300 HHS/HOSPICE OF LPN EA 15 MIN: HCPCS

## 2020-05-21 ENCOUNTER — HOME CARE VISIT (OUTPATIENT)
Dept: HOME HEALTH SERVICES | Facility: HOME HEALTH | Age: 80
End: 2020-05-21
Payer: MEDICARE

## 2020-05-21 ENCOUNTER — HOME CARE VISIT (OUTPATIENT)
Dept: SCHEDULING | Facility: HOME HEALTH | Age: 80
End: 2020-05-21
Payer: MEDICARE

## 2020-05-21 PROCEDURE — G0300 HHS/HOSPICE OF LPN EA 15 MIN: HCPCS

## 2020-05-26 ENCOUNTER — HOME CARE VISIT (OUTPATIENT)
Dept: SCHEDULING | Facility: HOME HEALTH | Age: 80
End: 2020-05-26
Payer: MEDICARE

## 2020-05-26 ENCOUNTER — TELEPHONE ANTICOAG (OUTPATIENT)
Dept: CARDIOLOGY CLINIC | Age: 80
End: 2020-05-26

## 2020-05-26 VITALS
OXYGEN SATURATION: 97 % | RESPIRATION RATE: 16 BRPM | HEART RATE: 74 BPM | TEMPERATURE: 97.9 F | DIASTOLIC BLOOD PRESSURE: 80 MMHG | SYSTOLIC BLOOD PRESSURE: 122 MMHG

## 2020-05-26 DIAGNOSIS — I48.0 PAROXYSMAL ATRIAL FIBRILLATION (HCC): ICD-10-CM

## 2020-05-26 LAB
INR BLD: 1.5 (ref 0.9–1.1)
INR, EXTERNAL: 1.9
PT POC: 19.5 SECONDS (ref 11.8–14.9)

## 2020-05-26 PROCEDURE — 400014 HH F/U

## 2020-05-26 PROCEDURE — G0299 HHS/HOSPICE OF RN EA 15 MIN: HCPCS

## 2020-05-27 VITALS
SYSTOLIC BLOOD PRESSURE: 135 MMHG | TEMPERATURE: 98 F | DIASTOLIC BLOOD PRESSURE: 78 MMHG | OXYGEN SATURATION: 99 % | HEART RATE: 78 BPM | TEMPERATURE: 98.9 F | SYSTOLIC BLOOD PRESSURE: 136 MMHG | HEART RATE: 75 BPM | OXYGEN SATURATION: 99 % | DIASTOLIC BLOOD PRESSURE: 65 MMHG

## 2020-05-28 ENCOUNTER — HOSPITAL ENCOUNTER (EMERGENCY)
Age: 80
Discharge: HOME | End: 2020-05-28
Attending: EMERGENCY MEDICINE
Payer: MEDICARE

## 2020-05-28 VITALS
TEMPERATURE: 97.6 F | HEART RATE: 61 BPM | OXYGEN SATURATION: 99 % | SYSTOLIC BLOOD PRESSURE: 110 MMHG | DIASTOLIC BLOOD PRESSURE: 66 MMHG | RESPIRATION RATE: 21 BRPM

## 2020-05-28 DIAGNOSIS — D64.9 ANEMIA, UNSPECIFIED TYPE: ICD-10-CM

## 2020-05-28 DIAGNOSIS — R79.89 ELEVATED SERUM CREATININE: ICD-10-CM

## 2020-05-28 DIAGNOSIS — R79.1 SUBTHERAPEUTIC INTERNATIONAL NORMALIZED RATIO (INR): ICD-10-CM

## 2020-05-28 DIAGNOSIS — R04.0 EPISTAXIS: Primary | ICD-10-CM

## 2020-05-28 LAB
ALBUMIN SERPL-MCNC: 2.7 G/DL (ref 3.4–5)
ALBUMIN/GLOB SERPL: 0.6 {RATIO} (ref 0.8–1.7)
ALP SERPL-CCNC: 144 U/L (ref 45–117)
ALT SERPL-CCNC: 20 U/L (ref 13–56)
ANION GAP SERPL CALC-SCNC: 5 MMOL/L (ref 3–18)
AST SERPL-CCNC: 23 U/L (ref 10–38)
BASOPHILS # BLD: 0 K/UL (ref 0–0.1)
BASOPHILS NFR BLD: 0 % (ref 0–2)
BILIRUB SERPL-MCNC: 0.5 MG/DL (ref 0.2–1)
BUN SERPL-MCNC: 54 MG/DL (ref 7–18)
BUN/CREAT SERPL: 29 (ref 12–20)
CALCIUM SERPL-MCNC: 8.5 MG/DL (ref 8.5–10.1)
CHLORIDE SERPL-SCNC: 97 MMOL/L (ref 100–111)
CO2 SERPL-SCNC: 27 MMOL/L (ref 21–32)
CREAT SERPL-MCNC: 1.89 MG/DL (ref 0.6–1.3)
DIFFERENTIAL METHOD BLD: ABNORMAL
EOSINOPHIL # BLD: 0.1 K/UL (ref 0–0.4)
EOSINOPHIL NFR BLD: 1 % (ref 0–5)
ERYTHROCYTE [DISTWIDTH] IN BLOOD BY AUTOMATED COUNT: 16.7 % (ref 11.6–14.5)
GLOBULIN SER CALC-MCNC: 4.9 G/DL (ref 2–4)
GLUCOSE SERPL-MCNC: 116 MG/DL (ref 74–99)
HCT VFR BLD AUTO: 29.3 % (ref 35–45)
HGB BLD-MCNC: 9 G/DL (ref 12–16)
INR BLD: 1.6 (ref 0.9–1.1)
INR PPP: 1.7 (ref 0.8–1.2)
LYMPHOCYTES # BLD: 0.8 K/UL (ref 0.9–3.6)
LYMPHOCYTES NFR BLD: 15 % (ref 21–52)
MCH RBC QN AUTO: 25.1 PG (ref 24–34)
MCHC RBC AUTO-ENTMCNC: 30.7 G/DL (ref 31–37)
MCV RBC AUTO: 81.6 FL (ref 74–97)
MONOCYTES # BLD: 0.7 K/UL (ref 0.05–1.2)
MONOCYTES NFR BLD: 12 % (ref 3–10)
NEUTS SEG # BLD: 4.1 K/UL (ref 1.8–8)
NEUTS SEG NFR BLD: 72 % (ref 40–73)
PLATELET # BLD AUTO: 280 K/UL (ref 135–420)
PMV BLD AUTO: 10.4 FL (ref 9.2–11.8)
POTASSIUM SERPL-SCNC: 3.9 MMOL/L (ref 3.5–5.5)
PROT SERPL-MCNC: 7.6 G/DL (ref 6.4–8.2)
PROTHROMBIN TIME: 19.6 SEC (ref 11.5–15.2)
RBC # BLD AUTO: 3.59 M/UL (ref 4.2–5.3)
SODIUM SERPL-SCNC: 129 MMOL/L (ref 136–145)
WBC # BLD AUTO: 5.7 K/UL (ref 4.6–13.2)

## 2020-05-28 PROCEDURE — 80053 COMPREHEN METABOLIC PANEL: CPT

## 2020-05-28 PROCEDURE — 85610 PROTHROMBIN TIME: CPT

## 2020-05-28 PROCEDURE — 85025 COMPLETE CBC W/AUTO DIFF WBC: CPT

## 2020-05-28 PROCEDURE — 99285 EMERGENCY DEPT VISIT HI MDM: CPT

## 2020-05-28 PROCEDURE — 74011250637 HC RX REV CODE- 250/637: Performed by: PHYSICIAN ASSISTANT

## 2020-05-28 RX ORDER — OXYMETAZOLINE HCL 0.05 %
2 SPRAY, NON-AEROSOL (ML) NASAL
Status: COMPLETED | OUTPATIENT
Start: 2020-05-28 | End: 2020-05-28

## 2020-05-28 RX ADMIN — OXYMETAZOLINE HCL 2 SPRAY: 0.05 SPRAY NASAL at 10:10

## 2020-05-28 NOTE — DISCHARGE INSTRUCTIONS
Patient Education     HAVE YOUR PT/INR RECHECKED TOMORROW AS SCHEDULED. Follow-up with your primary care physician within 2 days for reassessment. Bring the results from this visit with you for their review. Return to the ED immediately for any new, worsening, or persistent symptoms, including recurrent bleeding, headache, or any other medical concerns. Anemia: Care Instructions  Your Care Instructions     Anemia is a low level of red blood cells, which carry oxygen throughout your body. Many things can cause anemia. Lack of iron is one of the most common causes. Your body needs iron to make hemoglobin, a substance in red blood cells that carries oxygen from the lungs to your body's cells. Without enough iron, the body produces fewer and smaller red blood cells. As a result, your body's cells do not get enough oxygen, and you feel tired and weak. And you may have trouble concentrating. Bleeding is the most common cause of a lack of iron. You may have heavy menstrual bleeding or bleeding caused by conditions such as ulcers, hemorrhoids, or cancer. Regular use of aspirin or other anti-inflammatory medicines (such as ibuprofen) also can cause bleeding in some people. A lack of iron in your diet also can cause anemia, especially at times when the body needs more iron, such as during pregnancy, infancy, and the teen years. Your doctor may have prescribed iron pills. It may take several months of treatment for your iron levels to return to normal. Your doctor also may suggest that you eat foods that are rich in iron, such as meat and beans. There are many other causes of anemia. It is not always due to a lack of iron. Finding the specific cause of your anemia will help your doctor find the right treatment for you. Follow-up care is a key part of your treatment and safety. Be sure to make and go to all appointments, and call your doctor if you are having problems.  It's also a good idea to know your test results and keep a list of the medicines you take. How can you care for yourself at home? · Take your medicines exactly as prescribed. Call your doctor if you think you are having a problem with your medicine. · If your doctor recommends iron pills, take them as directed:  ? Try to take the pills on an empty stomach about 1 hour before or 2 hours after meals. But you may need to take iron with food to avoid an upset stomach. ? Do not take antacids or drink milk or caffeine drinks (such as coffee, tea, or cola) at the same time or within 2 hours of the time that you take your iron. They can make it hard for your body to absorb the iron. ? Vitamin C (from food or supplements) helps your body absorb iron. Try taking iron pills with a glass of orange juice or some other food that is high in vitamin C, such as citrus fruits. ? Iron pills may cause stomach problems, such as heartburn, nausea, diarrhea, constipation, and cramps. Be sure to drink plenty of fluids, and include fruits, vegetables, and fiber in your diet each day. Iron pills often make your bowel movements dark or green. ? If you forget to take an iron pill, do not take a double dose of iron the next time you take a pill. ? Keep iron pills out of the reach of small children. An overdose of iron can be very dangerous. · Follow your doctor's advice about eating iron-rich foods. These include red meat, shellfish, poultry, eggs, beans, raisins, whole-grain bread, and leafy green vegetables. · Steam vegetables to help them keep their iron content. When should you call for help? [id number] anytime you think you may need emergency care. For example, call if:  · You have symptoms of a heart attack. These may include:  ? Chest pain or pressure, or a strange feeling in the chest.  ? Sweating. ? Shortness of breath. ? Nausea or vomiting. ? Pain, pressure, or a strange feeling in the back, neck, jaw, or upper belly or in one or both shoulders or arms.   ? Lightheadedness or sudden weakness. ? A fast or irregular heartbeat. After you call 911, the  may tell you to chew 1 adult-strength or 2 to 4 low-dose aspirin. Wait for an ambulance. Do not try to drive yourself. · You passed out (lost consciousness). Call your doctor now or seek immediate medical care if:  · You have new or increased shortness of breath. · You are dizzy or lightheaded, or you feel like you may faint. · Your fatigue and weakness continue or get worse. · You have any abnormal bleeding, such as:  ? Nosebleeds. ? Vaginal bleeding that is different (heavier, more frequent, at a different time of the month) than what you are used to.  ? Bloody or black stools, or rectal bleeding. ? Bloody or pink urine. Watch closely for changes in your health, and be sure to contact your doctor if:  · You do not get better as expected. Where can you learn more? Go to http://ferminGlobal Grindlizbeth.info/  Enter R301 in the search box to learn more about \"Anemia: Care Instructions. \"  Current as of: November 8, 2019               Content Version: 12.5  © 7899-1094 [employer]. Care instructions adapted under license by [employer] (which disclaims liability or warranty for this information). If you have questions about a medical condition or this instruction, always ask your healthcare professional. [place] [address] any warranty or liability for your use of this information. Patient Education        Nosebleeds: Care Instructions  Your Care Instructions     Nosebleeds are common, especially if you have colds or allergies. Many things can cause a nosebleed. Some nosebleeds stop on their own with pressure. Others need packing. Some get cauterized (sealed). If you have gauze or other packing materials in your nose, you will need to follow up with your doctor to have the packing removed. You may need more treatment if you get nosebleeds a lot.   The doctor has checked you carefully, but problems can develop later. If you notice any problems or new symptoms, get medical treatment right away. Follow-up care is a key part of your treatment and safety. Be sure to make and go to all appointments, and call your doctor if you are having problems. It's also a good idea to know your test results and keep a list of the medicines you take. How can you care for yourself at home? · If you get another nosebleed:  ? Sit up and tilt your head slightly forward. This keeps blood from going down your throat. ? Use your thumb and index finger to pinch your nose shut for 10 minutes. Use a clock. Do not check to see if the bleeding has stopped before the 10 minutes are up. If the bleeding has not stopped, pinch your nose shut for another 10 minutes. ? When the bleeding has stopped, try not to pick, rub, or blow your nose for 12 hours. Avoiding these things helps keep your nose from bleeding again. · If your doctor prescribed antibiotics, take them as directed. Do not stop taking them just because you feel better. You need to take the full course of antibiotics. To prevent nosebleeds  · Do not blow your nose too hard. · Try not to lift or strain after a nosebleed. · Raise your head on a pillow while you sleep. · Put a thin layer of a saline- or water-based nasal gel, such as NasoGel, inside your nose. Put it on the septum, which divides your nostrils. This will prevent dryness that can cause nosebleeds. · Use a vaporizer or humidifier to add moisture to your bedroom. Follow the directions for cleaning the machine. · Do not use aspirin, ibuprofen (Advil, Motrin), or naproxen (Aleve) for 36 to 48 hours after a nosebleed unless your doctor tells you to. You can use acetaminophen (Tylenol) for pain relief. · Talk to your doctor about stopping any other medicines you are taking. Some medicines may make you more likely to get a nosebleed.   · Do not use cold medicines or nasal sprays without first talking to your doctor. They can make your nose dry. When should you call for help? [id number] anytime you think you may need emergency care. For example, call if:  · You passed out (lost consciousness). Call your doctor now or seek immediate medical care if:  · You get another nosebleed and your nose is still bleeding after you have applied pressure 3 times for 10 minutes each time (30 minutes total). · There is a lot of blood running down the back of your throat even after you pinch your nose and tilt your head forward. · You have a fever. · You have sinus pain. Watch closely for changes in your health, and be sure to contact your doctor if:  · You get nosebleeds often, even if they stop. · You do not get better as expected. Where can you learn more? Go to http://fermin-lizbeth.info/  Enter S156 in the search box to learn more about \"Nosebleeds: Care Instructions. \"  Current as of: June 26, 2019               Content Version: 12.5  © 7909-1235 [employer]. Care instructions adapted under license by [employer] (which disclaims liability or warranty for this information). If you have questions about a medical condition or this instruction, always ask your healthcare professional. [address] any warranty or liability for your use of this information.

## 2020-05-28 NOTE — ED PROVIDER NOTES
EMERGENCY DEPARTMENT HISTORY AND PHYSICAL EXAM 
 
9:34 AM 
 
 
Date: 5/28/2020 Patient Name: Demetra Tellez History of Presenting Illness Chief Complaint Patient presents with  Epistaxis History Provided By: Patient Additional History (Context): Demetra Tellez is a 68 y.o. female with hx of CHF, atrial fibrillation, HTN, HLD, and other noted PMH on Coumadin who presents with c/o L sided epistaxis x 1 hour. Pt notes she has applied pressure with moderate relief of bleeding. Denies head injury, dizziness, lightheadedness, swallowing clots. Notes hx of epistaxis in the past, has not had to use nasal packing for previous bleeds. PCP: Elvira Morataya MD 
 
Current Outpatient Medications Medication Sig Dispense Refill  enoxaparin (LOVENOX) 60 mg/0.6 mL injection 60 mg by SubCUTAneous route daily. 3 Syringe 0  Digitek 125 mcg (0.125 mg) tablet take 1 tablet by mouth once daily 90 Tab 1  
 metoprolol tartrate (LOPRESSOR) 100 mg IR tablet take 1 tablet by mouth twice a day 60 Tab 6  warfarin (COUMADIN) 2.5 mg tablet Take 2.5 mg by mouth daily. COUMADIN 2.5 MG DAILY EXCEPT ON MONDAYS AND THURSDAYS TAKE COUMADIN 5 MG.  furosemide (LASIX) 40 mg tablet take 1 tablet by mouth every morning and 1/2 tablet AT 2 P.M. 45 Tab 1  
 warfarin (COUMADIN) 5 mg tablet take 1 tablet by mouth once daily 90 Tab 1  
 traMADol (ULTRAM) 50 mg tablet Take 1 Tab by mouth every six (6) hours as needed for Pain.  folic acid 807 mcg tablet Take 400 mcg by mouth daily.  cyanocobalamin, vitamin B-12, (VITAMIN B12 PO) Take 1,000 mcg by mouth daily.  famotidine (PEPCID) 20 mg tablet Take 1 Tab by mouth daily. 30 Tab 0  
 gabapentin (NEURONTIN) 100 mg capsule Take 1 Cap by mouth nightly. Max Daily Amount: 100 mg. 30 Cap 0  
 lactobacillus sp. 50 billion cpu (BIO-K PLUS) 50 billion cell -375 mg cap capsule Take 1 Cap by mouth daily.  10 Cap 0  
  ondansetron hcl (ZOFRAN) 4 mg tablet Take 1 Tab by mouth every eight (8) hours as needed for Nausea. 30 Tab 2  
 Calcium-Cholecalciferol, D3, (CALCIUM 600 WITH VITAMIN D3) 600 mg(1,500mg) -400 unit chew Take 1 Tab by mouth daily.  simvastatin (ZOCOR) 80 mg tablet TAKE 1 TABLET NIGHTLY 90 Tab 1  cholecalciferol (VITAMIN D3) 1,000 unit cap Take  by mouth daily.  losartan (COZAAR) 100 mg tablet Take 100 mg by mouth daily.  montelukast (SINGULAIR) 10 mg tablet Take 10 mg by mouth daily.  CETIRIZINE HCL (ZYRTEC PO) Take  by mouth. Past History Past Medical History: 
Past Medical History:  
Diagnosis Date  Arthritis  Atrial fibrillation ([address]  CHF (congestive heart failure) [facility]  Heart murmur  History of seasonal allergies  HTN (hypertension)  Hypercholesteremia  Moderate aortic stenosis  Pulmonary hypertension ([address]  Venous insufficiency Past Surgical History: 
Past Surgical History:  
Procedure Laterality Date  HX KNEE ARTHROSCOPY    
 left and right  HX ORTHOPAEDIC    
 right ankle Family History: 
Family History Problem Relation Age of Onset  Cancer Other  Heart Disease Other  Cancer Mother  Heart Disease Mother Social History: 
Social History Tobacco Use  Smoking status: Never Smoker  Smokeless tobacco: Never Used Substance Use Topics  Alcohol use: No  
 Drug use: No  
 
 
Allergies: 
No Known Allergies Review of Systems Review of Systems Constitutional: Negative for chills and fever. HENT:  
     Epistaxis Respiratory: Negative for shortness of breath. Cardiovascular: Negative for chest pain. Gastrointestinal: Negative for abdominal pain, nausea and vomiting. Skin: Negative for rash. Neurological: Negative for weakness. All other systems reviewed and are negative. Physical Exam  
 
Visit Vitals /66 Pulse 61 Temp 97.6 °F (36.4 °C) Resp 21 SpO2 99% Physical Exam 
Vitals signs and nursing note reviewed. Constitutional:   
   General: She is not in acute distress. Appearance: Normal appearance. She is well-developed. She is not ill-appearing or diaphoretic. HENT:  
   Head: Normocephalic and atraumatic. Right Ear: Tympanic membrane normal.  
   Left Ear: Tympanic membrane normal.  
   Nose:  
   Right Nostril: No epistaxis, septal hematoma or occlusion. Left Nostril: Epistaxis (pt cleared large clot from L nare, L nare with minimal bleeding) present. No septal hematoma or occlusion. Mouth/Throat:  
   Mouth: Mucous membranes are moist.  
Eyes:  
   Pupils: Pupils are equal, round, and reactive to light. Neck: Musculoskeletal: Normal range of motion and neck supple. Cardiovascular:  
   Rate and Rhythm: Normal rate and regular rhythm. Heart sounds: Normal heart sounds. No murmur. No friction rub. No gallop. Pulmonary:  
   Effort: Pulmonary effort is normal. No respiratory distress. Breath sounds: Normal breath sounds. No wheezing or rales. Musculoskeletal: Normal range of motion. Skin: 
   General: Skin is warm. Findings: No rash. Neurological:  
   General: No focal deficit present. Mental Status: She is alert and oriented to person, place, and time. Diagnostic Study Results Labs - Recent Results (from the past 12 hour(s)) POC INR Collection Time: 05/28/20  9:23 AM  
Result Value Ref Range INR POC 1.6 (A) 0.9 - 1.1  
CBC WITH AUTOMATED DIFF Collection Time: 05/28/20  9:33 AM  
Result Value Ref Range WBC 5.7 4.6 - 13.2 K/uL  
 RBC 3.59 (L) 4.20 - 5.30 M/uL HGB 9.0 (L) 12.0 - 16.0 g/dL HCT 29.3 (L) 35.0 - 45.0 % MCV 81.6 74.0 - 97.0 FL  
 MCH 25.1 24.0 - 34.0 PG  
 MCHC 30.7 (L) 31.0 - 37.0 g/dL  
 RDW 16.7 (H) 11.6 - 14.5 % PLATELET 042 179 - 276 K/uL MPV 10.4 9.2 - 11.8 FL  
 NEUTROPHILS 72 40 - 73 % LYMPHOCYTES 15 (L) 21 - 52 % MONOCYTES 12 (H) 3 - 10 % EOSINOPHILS 1 0 - 5 % BASOPHILS 0 0 - 2 %  
 ABS. NEUTROPHILS 4.1 1.8 - 8.0 K/UL  
 ABS. LYMPHOCYTES 0.8 (L) 0.9 - 3.6 K/UL  
 ABS. MONOCYTES 0.7 0.05 - 1.2 K/UL  
 ABS. EOSINOPHILS 0.1 0.0 - 0.4 K/UL  
 ABS. BASOPHILS 0.0 0.0 - 0.1 K/UL  
 DF AUTOMATED METABOLIC PANEL, COMPREHENSIVE Collection Time: 05/28/20  9:33 AM  
Result Value Ref Range Sodium 129 (L) 136 - 145 mmol/L Potassium 3.9 3.5 - 5.5 mmol/L Chloride 97 (L) 100 - 111 mmol/L  
 CO2 27 21 - 32 mmol/L Anion gap 5 3.0 - 18 mmol/L Glucose 116 (H) 74 - 99 mg/dL BUN 54 (H) 7.0 - 18 MG/DL Creatinine 1.89 (H) 0.6 - 1.3 MG/DL  
 BUN/Creatinine ratio 29 (H) 12 - 20 GFR est AA 31 (L) >60 ml/min/1.73m2 GFR est non-AA 26 (L) >60 ml/min/1.73m2 Calcium 8.5 8.5 - 10.1 MG/DL Bilirubin, total 0.5 0.2 - 1.0 MG/DL  
 ALT (SGPT) 20 13 - 56 U/L  
 AST (SGOT) 23 10 - 38 U/L Alk. phosphatase 144 (H) 45 - 117 U/L Protein, total 7.6 6.4 - 8.2 g/dL Albumin 2.7 (L) 3.4 - 5.0 g/dL Globulin 4.9 (H) 2.0 - 4.0 g/dL A-G Ratio 0.6 (L) 0.8 - 1.7 PROTHROMBIN TIME + INR Collection Time: 05/28/20  9:33 AM  
Result Value Ref Range Prothrombin time 19.6 (H) 11.5 - 15.2 sec INR 1.7 (H) 0.8 - 1.2 Radiologic Studies - No orders to display Medical Decision Making I am the first provider for this patient. I reviewed the vital signs, available nursing notes, past medical history, past surgical history, family history and social history. Vital Signs-Reviewed the patient's vital signs. Records Reviewed: Nursing Notes and Old Medical Records (Time of Review: 9:34 AM) ED Course: Progress Notes, Reevaluation, and Consults: 
9:54 AM: No active bleeding. Pt notes PT/INR is checked every Friday by home health nurse. 11:20 AM: No further bleeding while observed in the ED. Reviewed results with patient.  Discussed need for close outpatient follow-up this week for reassessment. Discussed strict return precautions, including dizziness, recurrent bleeding, or any other medical concerns. Pt in agreement with plan, ready to go home. Provider Notes (Medical Decision Making): 70-year-old female with history of atrial fibrillation on Coumadin who presents to the ED due to left-sided epistaxis x 1 hour. Afebrile, nontoxic-appearing, vital signs stable. Patient cleared large clot upon arrival to the ED. Minimal bleeding present on examination, resolved with pressure and Afrin. Labs demonstrate anemia, subtherapeutic INR. Patient has PT/INR recheck tomorrow. Observed in the ED for 2 hours without recurrence of bleeding. Stable for discharge with close outpatient follow-up for further assessment Diagnosis Clinical Impression: 1. Epistaxis 2. Anemia, unspecified type 3. Elevated serum creatinine 4. Subtherapeutic international normalized ratio (INR) Disposition: home Follow-up Information Follow up With Specialties Details Why Contact Info [facility] EM[facility]ENC[facility] DEPT Emergency Medicine  If symptoms worsen [address] [place] [id number] 
[phone] Tana Cao MD Family Practice In 2 days  [address] [address] 
[phone] Lana Mercado MD Otolaryngology, Surgery Schedule an appointment as soon as possible for a visit ENT, ear nose and throat [place] [address] [address] [facility] 
[phone] Patient's Medications Start Taking No medications on file Continue Taking CALCIUM-CHOLECALCIFEROL, D3, (CALCIUM 600 WITH VITAMIN D3) 600 MG(1,500MG) -400 UNIT CHEW    Take 1 Tab by mouth daily. CETIRIZINE HCL (ZYRTEC PO)    Take  by mouth. CHOLECALCIFEROL (VITAMIN D3) 1,000 UNIT CAP    Take  by mouth daily. CYANOCOBALAMIN, VITAMIN B-12, (VITAMIN B12 PO)    Take 1,000 mcg by mouth daily. DIGITEK 125 MCG (0.125 MG) TABLET    take 1 tablet by mouth once daily ENOXAPARIN (LOVENOX) 60 MG/0.6 ML INJECTION    60 mg by SubCUTAneous route daily. FAMOTIDINE (PEPCID) 20 MG TABLET    Take 1 Tab by mouth daily. FOLIC ACID 417 MCG TABLET    Take 400 mcg by mouth daily. FUROSEMIDE (LASIX) 40 MG TABLET    take 1 tablet by mouth every morning and 1/2 tablet AT 2 P.M.  
 GABAPENTIN (NEURONTIN) 100 MG CAPSULE    Take 1 Cap by mouth nightly. Max Daily Amount: 100 mg. LACTOBACILLUS SP. 50 BILLION CPU (BIO-K PLUS) 50 BILLION CELL -375 MG CAP CAPSULE    Take 1 Cap by mouth daily. LOSARTAN (COZAAR) 100 MG TABLET    Take 100 mg by mouth daily. METOPROLOL TARTRATE (LOPRESSOR) 100 MG IR TABLET    take 1 tablet by mouth twice a day MONTELUKAST (SINGULAIR) 10 MG TABLET    Take 10 mg by mouth daily. ONDANSETRON HCL (ZOFRAN) 4 MG TABLET    Take 1 Tab by mouth every eight (8) hours as needed for Nausea. SIMVASTATIN (ZOCOR) 80 MG TABLET    TAKE 1 TABLET NIGHTLY  
 TRAMADOL (ULTRAM) 50 MG TABLET    Take 1 Tab by mouth every six (6) hours as needed for Pain. WARFARIN (COUMADIN) 2.5 MG TABLET    Take 2.5 mg by mouth daily. COUMADIN 2.5 MG DAILY EXCEPT ON MONDAYS AND THURSDAYS TAKE COUMADIN 5 MG.  
 WARFARIN (COUMADIN) 5 MG TABLET    take 1 tablet by mouth once daily These Medications have changed No medications on file Stop Taking No medications on file Dictation disclaimer:  Please note that this dictation was completed with Violet, the [employer] voice recognition software. Quite often unanticipated grammatical, syntax, homophones, and other interpretive errors are inadvertently transcribed by the computer software. Please disregard these errors. Please excuse any errors that have escaped final proofreading.

## 2020-05-29 ENCOUNTER — HOME CARE VISIT (OUTPATIENT)
Dept: SCHEDULING | Facility: HOME HEALTH | Age: 80
End: 2020-05-29
Payer: MEDICARE

## 2020-05-29 ENCOUNTER — TELEPHONE (OUTPATIENT)
Dept: CASE MANAGEMENT | Age: 80
End: 2020-05-29

## 2020-05-29 PROCEDURE — G0300 HHS/HOSPICE OF LPN EA 15 MIN: HCPCS

## 2020-05-29 NOTE — TELEPHONE ENCOUNTER
Date/Time:  5/29/2020 11:42 AM  Attempted to reach patient by telephone. Left HIPPA compliant message requesting a return call. Will attempt to reach patient again.

## 2020-05-30 ENCOUNTER — TELEPHONE (OUTPATIENT)
Dept: CASE MANAGEMENT | Age: 80
End: 2020-05-30

## 2020-05-30 VITALS
SYSTOLIC BLOOD PRESSURE: 140 MMHG | OXYGEN SATURATION: 96 % | DIASTOLIC BLOOD PRESSURE: 60 MMHG | HEART RATE: 68 BPM | TEMPERATURE: 99 F

## 2020-06-02 ENCOUNTER — HOME CARE VISIT (OUTPATIENT)
Dept: HOME HEALTH SERVICES | Facility: HOME HEALTH | Age: 80
End: 2020-06-02
Payer: MEDICARE

## 2020-06-02 ENCOUNTER — TELEPHONE ANTICOAG (OUTPATIENT)
Dept: CARDIOLOGY CLINIC | Age: 80
End: 2020-06-02

## 2020-06-02 ENCOUNTER — HOME CARE VISIT (OUTPATIENT)
Dept: SCHEDULING | Facility: HOME HEALTH | Age: 80
End: 2020-06-02
Payer: MEDICARE

## 2020-06-02 DIAGNOSIS — I48.0 PAROXYSMAL ATRIAL FIBRILLATION (HCC): ICD-10-CM

## 2020-06-02 LAB — INR, EXTERNAL: 1.5

## 2020-06-02 PROCEDURE — G0299 HHS/HOSPICE OF RN EA 15 MIN: HCPCS

## 2020-06-04 ENCOUNTER — HOME CARE VISIT (OUTPATIENT)
Dept: SCHEDULING | Facility: HOME HEALTH | Age: 80
End: 2020-06-04
Payer: MEDICARE

## 2020-06-04 VITALS
OXYGEN SATURATION: 98 % | SYSTOLIC BLOOD PRESSURE: 124 MMHG | DIASTOLIC BLOOD PRESSURE: 78 MMHG | TEMPERATURE: 97.9 F | HEART RATE: 80 BPM | RESPIRATION RATE: 16 BRPM

## 2020-06-04 LAB
INR BLD: 1.5 (ref 0.9–1.1)
PT POC: 18.2 SECONDS (ref 11.8–14.9)

## 2020-06-04 PROCEDURE — G0300 HHS/HOSPICE OF LPN EA 15 MIN: HCPCS

## 2020-06-08 VITALS
OXYGEN SATURATION: 98 % | DIASTOLIC BLOOD PRESSURE: 86 MMHG | SYSTOLIC BLOOD PRESSURE: 111 MMHG | TEMPERATURE: 97.6 F | HEART RATE: 91 BPM

## 2020-06-09 ENCOUNTER — TELEPHONE ANTICOAG (OUTPATIENT)
Dept: CARDIOLOGY CLINIC | Age: 80
End: 2020-06-09

## 2020-06-09 ENCOUNTER — HOME CARE VISIT (OUTPATIENT)
Dept: SCHEDULING | Facility: HOME HEALTH | Age: 80
End: 2020-06-09
Payer: MEDICARE

## 2020-06-09 DIAGNOSIS — I48.0 PAROXYSMAL ATRIAL FIBRILLATION (HCC): ICD-10-CM

## 2020-06-09 LAB — INR, EXTERNAL: 6.5

## 2020-06-09 PROCEDURE — G0300 HHS/HOSPICE OF LPN EA 15 MIN: HCPCS

## 2020-06-12 ENCOUNTER — HOME CARE VISIT (OUTPATIENT)
Dept: HOME HEALTH SERVICES | Facility: HOME HEALTH | Age: 80
End: 2020-06-12
Payer: MEDICARE

## 2020-06-12 ENCOUNTER — TELEPHONE ANTICOAG (OUTPATIENT)
Dept: CARDIOLOGY CLINIC | Age: 80
End: 2020-06-12

## 2020-06-12 ENCOUNTER — HOME CARE VISIT (OUTPATIENT)
Dept: SCHEDULING | Facility: HOME HEALTH | Age: 80
End: 2020-06-12
Payer: MEDICARE

## 2020-06-12 DIAGNOSIS — I48.0 PAROXYSMAL ATRIAL FIBRILLATION (HCC): ICD-10-CM

## 2020-06-12 LAB — INR, EXTERNAL: 5.6

## 2020-06-12 PROCEDURE — G0300 HHS/HOSPICE OF LPN EA 15 MIN: HCPCS

## 2020-06-12 NOTE — PROGRESS NOTES
Spoke to Claudia Delarosa she will go over medication and remove warfarin repeat INR on 6/22/20. Will give card for free 30 days of eliquis and try to do pre auth.

## 2020-06-12 NOTE — PROGRESS NOTES
Can we change this patient to Eliquis bid. Her INR are very unstable per Home health nurse she can no follow instructions very well. Thanks

## 2020-06-12 NOTE — PROGRESS NOTES
Please discontinue Coumadin will start Eliquis 5 mg bid. Can you let Nurse Sharmila Cardozo know may check all her medications at home and be sure she doesn't take any Coumadin while on Eliquis

## 2020-06-13 LAB
INR BLD: 5.6 (ref 0.9–1.1)
PT POC: 67.6 SECONDS (ref 11.8–14.9)

## 2020-06-14 VITALS
TEMPERATURE: 98.3 F | DIASTOLIC BLOOD PRESSURE: 60 MMHG | OXYGEN SATURATION: 98 % | SYSTOLIC BLOOD PRESSURE: 120 MMHG | HEART RATE: 71 BPM

## 2020-06-15 ENCOUNTER — HOME CARE VISIT (OUTPATIENT)
Dept: SCHEDULING | Facility: HOME HEALTH | Age: 80
End: 2020-06-15
Payer: MEDICARE

## 2020-06-15 PROCEDURE — G0300 HHS/HOSPICE OF LPN EA 15 MIN: HCPCS

## 2020-06-16 ENCOUNTER — TELEPHONE (OUTPATIENT)
Dept: CARDIOLOGY CLINIC | Age: 80
End: 2020-06-16

## 2020-06-16 ENCOUNTER — HOME CARE VISIT (OUTPATIENT)
Dept: SCHEDULING | Facility: HOME HEALTH | Age: 80
End: 2020-06-16
Payer: MEDICARE

## 2020-06-16 ENCOUNTER — TELEPHONE ANTICOAG (OUTPATIENT)
Dept: CARDIOLOGY CLINIC | Age: 80
End: 2020-06-16

## 2020-06-16 DIAGNOSIS — I48.0 PAROXYSMAL ATRIAL FIBRILLATION (HCC): ICD-10-CM

## 2020-06-16 DIAGNOSIS — I50.32 CHRONIC DIASTOLIC CONGESTIVE HEART FAILURE (HCC): Primary | ICD-10-CM

## 2020-06-16 LAB — INR, EXTERNAL: 2.9

## 2020-06-16 PROCEDURE — G0300 HHS/HOSPICE OF LPN EA 15 MIN: HCPCS

## 2020-06-17 ENCOUNTER — HOME CARE VISIT (OUTPATIENT)
Dept: SCHEDULING | Facility: HOME HEALTH | Age: 80
End: 2020-06-17
Payer: MEDICARE

## 2020-06-17 PROCEDURE — G0155 HHCP-SVS OF CSW,EA 15 MIN: HCPCS

## 2020-06-18 ENCOUNTER — HOME CARE VISIT (OUTPATIENT)
Dept: HOME HEALTH SERVICES | Facility: HOME HEALTH | Age: 80
End: 2020-06-18
Payer: MEDICARE

## 2020-06-18 ENCOUNTER — HOME CARE VISIT (OUTPATIENT)
Dept: SCHEDULING | Facility: HOME HEALTH | Age: 80
End: 2020-06-18
Payer: MEDICARE

## 2020-06-18 ENCOUNTER — TELEPHONE ANTICOAG (OUTPATIENT)
Dept: CARDIOLOGY CLINIC | Age: 80
End: 2020-06-18

## 2020-06-18 DIAGNOSIS — I48.0 PAROXYSMAL ATRIAL FIBRILLATION (HCC): ICD-10-CM

## 2020-06-18 PROBLEM — I48.91 A-FIB (HCC): Status: RESOLVED | Noted: 2019-03-18 | Resolved: 2020-06-18

## 2020-06-18 LAB — INR, EXTERNAL: 1.7

## 2020-06-18 PROCEDURE — G0300 HHS/HOSPICE OF LPN EA 15 MIN: HCPCS

## 2020-06-23 ENCOUNTER — HOME CARE VISIT (OUTPATIENT)
Dept: SCHEDULING | Facility: HOME HEALTH | Age: 80
End: 2020-06-23
Payer: MEDICARE

## 2020-06-23 VITALS
TEMPERATURE: 98.1 F | HEART RATE: 76 BPM | OXYGEN SATURATION: 97 % | RESPIRATION RATE: 18 BRPM | DIASTOLIC BLOOD PRESSURE: 70 MMHG | SYSTOLIC BLOOD PRESSURE: 134 MMHG

## 2020-06-23 PROCEDURE — 400014 HH F/U

## 2020-06-23 PROCEDURE — G0299 HHS/HOSPICE OF RN EA 15 MIN: HCPCS

## 2020-06-24 VITALS
TEMPERATURE: 98.1 F | HEART RATE: 80 BPM | DIASTOLIC BLOOD PRESSURE: 78 MMHG | RESPIRATION RATE: 18 BRPM | SYSTOLIC BLOOD PRESSURE: 122 MMHG | OXYGEN SATURATION: 98 %

## 2020-06-25 ENCOUNTER — HOME CARE VISIT (OUTPATIENT)
Dept: SCHEDULING | Facility: HOME HEALTH | Age: 80
End: 2020-06-25
Payer: MEDICARE

## 2020-06-25 PROCEDURE — G0299 HHS/HOSPICE OF RN EA 15 MIN: HCPCS

## 2020-06-28 VITALS
SYSTOLIC BLOOD PRESSURE: 128 MMHG | TEMPERATURE: 98.3 F | OXYGEN SATURATION: 97 % | HEART RATE: 70 BPM | RESPIRATION RATE: 18 BRPM | DIASTOLIC BLOOD PRESSURE: 80 MMHG

## 2020-06-30 ENCOUNTER — HOME CARE VISIT (OUTPATIENT)
Dept: SCHEDULING | Facility: HOME HEALTH | Age: 80
End: 2020-06-30
Payer: MEDICARE

## 2020-06-30 PROCEDURE — G0299 HHS/HOSPICE OF RN EA 15 MIN: HCPCS

## 2020-07-01 VITALS
TEMPERATURE: 97.7 F | DIASTOLIC BLOOD PRESSURE: 78 MMHG | OXYGEN SATURATION: 98 % | SYSTOLIC BLOOD PRESSURE: 122 MMHG | HEART RATE: 80 BPM | RESPIRATION RATE: 16 BRPM

## 2020-07-08 ENCOUNTER — HOME CARE VISIT (OUTPATIENT)
Dept: SCHEDULING | Facility: HOME HEALTH | Age: 80
End: 2020-07-08
Payer: MEDICARE

## 2020-07-08 VITALS
HEART RATE: 78 BPM | OXYGEN SATURATION: 98 % | RESPIRATION RATE: 16 BRPM | TEMPERATURE: 97.8 F | SYSTOLIC BLOOD PRESSURE: 122 MMHG | DIASTOLIC BLOOD PRESSURE: 80 MMHG

## 2020-07-08 PROCEDURE — G0299 HHS/HOSPICE OF RN EA 15 MIN: HCPCS

## 2020-07-13 ENCOUNTER — HOME CARE VISIT (OUTPATIENT)
Dept: SCHEDULING | Facility: HOME HEALTH | Age: 80
End: 2020-07-13
Payer: MEDICARE

## 2020-07-13 PROCEDURE — G0300 HHS/HOSPICE OF LPN EA 15 MIN: HCPCS

## 2020-07-14 ENCOUNTER — HOME CARE VISIT (OUTPATIENT)
Dept: HOME HEALTH SERVICES | Facility: HOME HEALTH | Age: 80
End: 2020-07-14
Payer: MEDICARE

## 2020-07-14 VITALS
SYSTOLIC BLOOD PRESSURE: 128 MMHG | DIASTOLIC BLOOD PRESSURE: 72 MMHG | OXYGEN SATURATION: 99 % | TEMPERATURE: 98.2 F | HEART RATE: 66 BPM

## 2020-07-20 ENCOUNTER — HOME CARE VISIT (OUTPATIENT)
Dept: SCHEDULING | Facility: HOME HEALTH | Age: 80
End: 2020-07-20
Payer: MEDICARE

## 2020-07-20 PROCEDURE — G0299 HHS/HOSPICE OF RN EA 15 MIN: HCPCS

## 2020-07-22 VITALS
DIASTOLIC BLOOD PRESSURE: 70 MMHG | TEMPERATURE: 97.4 F | HEART RATE: 88 BPM | RESPIRATION RATE: 16 BRPM | OXYGEN SATURATION: 98 % | SYSTOLIC BLOOD PRESSURE: 122 MMHG

## 2020-07-24 ENCOUNTER — HOME CARE VISIT (OUTPATIENT)
Dept: SCHEDULING | Facility: HOME HEALTH | Age: 80
End: 2020-07-24
Payer: MEDICARE

## 2020-07-28 ENCOUNTER — HOSPITAL ENCOUNTER (OUTPATIENT)
Dept: LAB | Age: 80
Discharge: HOME | End: 2020-07-28
Payer: MEDICARE

## 2020-07-28 ENCOUNTER — HOME CARE VISIT (OUTPATIENT)
Dept: SCHEDULING | Facility: HOME HEALTH | Age: 80
End: 2020-07-28
Payer: MEDICARE

## 2020-07-28 DIAGNOSIS — I50.32 CHRONIC DIASTOLIC CONGESTIVE HEART FAILURE (HCC): ICD-10-CM

## 2020-07-28 LAB
ANION GAP SERPL CALC-SCNC: 6 MMOL/L (ref 3–18)
BUN SERPL-MCNC: 59 MG/DL (ref 7–18)
BUN/CREAT SERPL: 28 (ref 12–20)
CALCIUM SERPL-MCNC: 9 MG/DL (ref 8.5–10.1)
CHLORIDE SERPL-SCNC: 93 MMOL/L (ref 100–111)
CO2 SERPL-SCNC: 34 MMOL/L (ref 21–32)
CREAT SERPL-MCNC: 2.14 MG/DL (ref 0.6–1.3)
GLUCOSE SERPL-MCNC: 96 MG/DL (ref 74–99)
POTASSIUM SERPL-SCNC: 3.9 MMOL/L (ref 3.5–5.5)
SODIUM SERPL-SCNC: 133 MMOL/L (ref 136–145)

## 2020-07-28 PROCEDURE — 400014 HH F/U

## 2020-07-28 PROCEDURE — G0299 HHS/HOSPICE OF RN EA 15 MIN: HCPCS

## 2020-07-28 PROCEDURE — 80048 BASIC METABOLIC PNL TOTAL CA: CPT

## 2020-07-28 PROCEDURE — 36415 COLL VENOUS BLD VENIPUNCTURE: CPT

## 2020-07-29 VITALS
HEART RATE: 78 BPM | OXYGEN SATURATION: 98 % | SYSTOLIC BLOOD PRESSURE: 124 MMHG | RESPIRATION RATE: 18 BRPM | DIASTOLIC BLOOD PRESSURE: 80 MMHG | TEMPERATURE: 98.2 F

## 2020-07-30 NOTE — PROGRESS NOTES
Creatinine slightly elevated from base line. Please  call patient is no significant edema or SOB hold diuretics for 3 days the resume 40 mg daily only BMP in 3 weeks.  Please let me know

## 2020-07-31 ENCOUNTER — HOME CARE VISIT (OUTPATIENT)
Dept: SCHEDULING | Facility: HOME HEALTH | Age: 80
End: 2020-07-31
Payer: MEDICARE

## 2020-07-31 PROCEDURE — G0300 HHS/HOSPICE OF LPN EA 15 MIN: HCPCS

## 2020-08-04 ENCOUNTER — HOME CARE VISIT (OUTPATIENT)
Dept: SCHEDULING | Facility: HOME HEALTH | Age: 80
End: 2020-08-04
Payer: MEDICARE

## 2020-08-04 PROCEDURE — G0299 HHS/HOSPICE OF RN EA 15 MIN: HCPCS

## 2020-08-05 VITALS
SYSTOLIC BLOOD PRESSURE: 136 MMHG | TEMPERATURE: 98 F | DIASTOLIC BLOOD PRESSURE: 78 MMHG | OXYGEN SATURATION: 99 % | HEART RATE: 70 BPM

## 2020-08-06 VITALS
OXYGEN SATURATION: 98 % | HEART RATE: 80 BPM | SYSTOLIC BLOOD PRESSURE: 124 MMHG | TEMPERATURE: 98.2 F | RESPIRATION RATE: 18 BRPM | DIASTOLIC BLOOD PRESSURE: 78 MMHG

## 2020-08-07 ENCOUNTER — HOME CARE VISIT (OUTPATIENT)
Dept: HOME HEALTH SERVICES | Facility: HOME HEALTH | Age: 80
End: 2020-08-07
Payer: MEDICARE

## 2020-08-11 ENCOUNTER — HOME CARE VISIT (OUTPATIENT)
Dept: SCHEDULING | Facility: HOME HEALTH | Age: 80
End: 2020-08-11
Payer: MEDICARE

## 2020-08-14 ENCOUNTER — HOME CARE VISIT (OUTPATIENT)
Dept: HOME HEALTH SERVICES | Facility: HOME HEALTH | Age: 80
End: 2020-08-14
Payer: MEDICARE

## 2020-08-17 RX ORDER — FUROSEMIDE 40 MG/1
TABLET ORAL
Qty: 45 TAB | Refills: 0 | Status: SHIPPED | OUTPATIENT
Start: 2020-08-17 | End: 2020-10-30

## 2020-08-18 ENCOUNTER — HOME CARE VISIT (OUTPATIENT)
Dept: SCHEDULING | Facility: HOME HEALTH | Age: 80
End: 2020-08-18
Payer: MEDICARE

## 2020-08-18 PROCEDURE — G0299 HHS/HOSPICE OF RN EA 15 MIN: HCPCS

## 2020-08-21 ENCOUNTER — HOME CARE VISIT (OUTPATIENT)
Dept: SCHEDULING | Facility: HOME HEALTH | Age: 80
End: 2020-08-21
Payer: MEDICARE

## 2020-08-21 VITALS
TEMPERATURE: 97.4 F | SYSTOLIC BLOOD PRESSURE: 124 MMHG | HEART RATE: 70 BPM | DIASTOLIC BLOOD PRESSURE: 80 MMHG | OXYGEN SATURATION: 97 % | RESPIRATION RATE: 18 BRPM

## 2020-08-21 PROCEDURE — G0300 HHS/HOSPICE OF LPN EA 15 MIN: HCPCS

## 2020-08-21 PROCEDURE — 400014 HH F/U

## 2020-08-22 VITALS
HEART RATE: 68 BPM | SYSTOLIC BLOOD PRESSURE: 114 MMHG | TEMPERATURE: 98.4 F | RESPIRATION RATE: 14 BRPM | DIASTOLIC BLOOD PRESSURE: 52 MMHG | OXYGEN SATURATION: 99 %

## 2020-08-24 ENCOUNTER — TELEPHONE (OUTPATIENT)
Dept: CARDIOLOGY CLINIC | Age: 80
End: 2020-08-24

## 2020-08-24 DIAGNOSIS — I50.32 CHRONIC DIASTOLIC CONGESTIVE HEART FAILURE (HCC): Primary | ICD-10-CM

## 2020-08-24 NOTE — TELEPHONE ENCOUNTER
----- Message from Cm Mcgee NP sent at 7/30/2020  1:33 PM EDT -----  Creatinine slightly elevated from base line. Please  call patient is no significant edema or SOB hold diuretics for 3 days the resume 40 mg daily only BMP in 3 weeks.  Please let me know

## 2020-08-25 ENCOUNTER — TELEPHONE (OUTPATIENT)
Dept: CARDIOLOGY CLINIC | Age: 80
End: 2020-08-25

## 2020-08-25 DIAGNOSIS — I50.32 CHRONIC DIASTOLIC CONGESTIVE HEART FAILURE (HCC): Primary | ICD-10-CM

## 2020-08-25 NOTE — TELEPHONE ENCOUNTER
This has been fully explained to the patient, who indicates understanding.  Fax to [address]

## 2020-08-25 NOTE — TELEPHONE ENCOUNTER
Patient states is having swelling in both feet, no SOB but states she needs to take fluid pills to get rid of fluid.

## 2020-08-25 NOTE — TELEPHONE ENCOUNTER
Creatinine levels worsening call patient if you able to reach and ask how is she doing. If no significant SOB or leg swelling. Continue to hold diuretics.

## 2020-08-26 NOTE — TELEPHONE ENCOUNTER
BMP in 2 weeks placed and ordered per written message from [facility] on telephone encounter on 08-.

## 2020-08-29 ENCOUNTER — HOME CARE VISIT (OUTPATIENT)
Dept: SCHEDULING | Facility: HOME HEALTH | Age: 80
End: 2020-08-29
Payer: MEDICARE

## 2020-08-29 PROCEDURE — G0299 HHS/HOSPICE OF RN EA 15 MIN: HCPCS

## 2020-08-30 VITALS
OXYGEN SATURATION: 98 % | TEMPERATURE: 98.2 F | DIASTOLIC BLOOD PRESSURE: 78 MMHG | RESPIRATION RATE: 18 BRPM | HEART RATE: 80 BPM | SYSTOLIC BLOOD PRESSURE: 124 MMHG

## 2020-09-01 ENCOUNTER — HOSPITAL ENCOUNTER (OUTPATIENT)
Dept: LAB | Age: 80
Discharge: HOME | End: 2020-09-01
Payer: MEDICARE

## 2020-09-01 ENCOUNTER — HOME CARE VISIT (OUTPATIENT)
Dept: SCHEDULING | Facility: HOME HEALTH | Age: 80
End: 2020-09-01
Payer: MEDICARE

## 2020-09-01 LAB
ANION GAP SERPL CALC-SCNC: 7 MMOL/L (ref 3–18)
BUN SERPL-MCNC: 86 MG/DL (ref 7–18)
BUN/CREAT SERPL: 34 (ref 12–20)
CALCIUM SERPL-MCNC: 9 MG/DL (ref 8.5–10.1)
CHLORIDE SERPL-SCNC: 93 MMOL/L (ref 100–111)
CO2 SERPL-SCNC: 33 MMOL/L (ref 21–32)
CREAT SERPL-MCNC: 2.53 MG/DL (ref 0.6–1.3)
GLUCOSE SERPL-MCNC: 87 MG/DL (ref 74–99)
POTASSIUM SERPL-SCNC: 3.8 MMOL/L (ref 3.5–5.5)
SODIUM SERPL-SCNC: 133 MMOL/L (ref 136–145)

## 2020-09-01 PROCEDURE — 80048 BASIC METABOLIC PNL TOTAL CA: CPT

## 2020-09-01 PROCEDURE — G0299 HHS/HOSPICE OF RN EA 15 MIN: HCPCS

## 2020-09-02 ENCOUNTER — HOME CARE VISIT (OUTPATIENT)
Dept: SCHEDULING | Facility: HOME HEALTH | Age: 80
End: 2020-09-02
Payer: MEDICARE

## 2020-09-02 VITALS
HEART RATE: 61 BPM | OXYGEN SATURATION: 95 % | SYSTOLIC BLOOD PRESSURE: 116 MMHG | DIASTOLIC BLOOD PRESSURE: 72 MMHG | TEMPERATURE: 97.4 F

## 2020-09-02 PROCEDURE — G0151 HHCP-SERV OF PT,EA 15 MIN: HCPCS

## 2020-09-04 VITALS
OXYGEN SATURATION: 98 % | HEART RATE: 80 BPM | DIASTOLIC BLOOD PRESSURE: 80 MMHG | TEMPERATURE: 97.9 F | SYSTOLIC BLOOD PRESSURE: 122 MMHG | RESPIRATION RATE: 16 BRPM

## 2020-09-09 ENCOUNTER — HOME CARE VISIT (OUTPATIENT)
Dept: SCHEDULING | Facility: HOME HEALTH | Age: 80
End: 2020-09-09
Payer: MEDICARE

## 2020-09-09 VITALS
HEART RATE: 80 BPM | RESPIRATION RATE: 18 BRPM | SYSTOLIC BLOOD PRESSURE: 130 MMHG | DIASTOLIC BLOOD PRESSURE: 68 MMHG | OXYGEN SATURATION: 98 % | TEMPERATURE: 98.3 F

## 2020-09-09 PROCEDURE — G0299 HHS/HOSPICE OF RN EA 15 MIN: HCPCS

## 2020-09-10 ENCOUNTER — HOME CARE VISIT (OUTPATIENT)
Dept: SCHEDULING | Facility: HOME HEALTH | Age: 80
End: 2020-09-10
Payer: MEDICARE

## 2020-09-10 VITALS
TEMPERATURE: 97.8 F | SYSTOLIC BLOOD PRESSURE: 102 MMHG | DIASTOLIC BLOOD PRESSURE: 60 MMHG | OXYGEN SATURATION: 96 % | RESPIRATION RATE: 16 BRPM | HEART RATE: 60 BPM

## 2020-09-10 PROCEDURE — G0157 HHC PT ASSISTANT EA 15: HCPCS

## 2020-09-11 ENCOUNTER — HOME CARE VISIT (OUTPATIENT)
Dept: HOME HEALTH SERVICES | Facility: HOME HEALTH | Age: 80
End: 2020-09-11
Payer: MEDICARE

## 2020-09-14 ENCOUNTER — HOME CARE VISIT (OUTPATIENT)
Dept: HOME HEALTH SERVICES | Facility: HOME HEALTH | Age: 80
End: 2020-09-14
Payer: MEDICARE

## 2020-09-16 ENCOUNTER — HOME CARE VISIT (OUTPATIENT)
Dept: SCHEDULING | Facility: HOME HEALTH | Age: 80
End: 2020-09-16
Payer: MEDICARE

## 2020-09-16 ENCOUNTER — HOME CARE VISIT (OUTPATIENT)
Dept: HOME HEALTH SERVICES | Facility: HOME HEALTH | Age: 80
End: 2020-09-16
Payer: MEDICARE

## 2020-09-16 PROCEDURE — G0299 HHS/HOSPICE OF RN EA 15 MIN: HCPCS

## 2020-09-18 ENCOUNTER — HOME CARE VISIT (OUTPATIENT)
Dept: SCHEDULING | Facility: HOME HEALTH | Age: 80
End: 2020-09-18
Payer: MEDICARE

## 2020-09-18 VITALS
HEART RATE: 60 BPM | DIASTOLIC BLOOD PRESSURE: 70 MMHG | TEMPERATURE: 98.2 F | OXYGEN SATURATION: 99 % | SYSTOLIC BLOOD PRESSURE: 130 MMHG

## 2020-09-18 PROCEDURE — G0151 HHCP-SERV OF PT,EA 15 MIN: HCPCS

## 2020-09-20 VITALS
SYSTOLIC BLOOD PRESSURE: 122 MMHG | HEART RATE: 80 BPM | DIASTOLIC BLOOD PRESSURE: 78 MMHG | RESPIRATION RATE: 18 BRPM | TEMPERATURE: 98.1 F | OXYGEN SATURATION: 98 %

## 2020-09-21 ENCOUNTER — HOME CARE VISIT (OUTPATIENT)
Dept: SCHEDULING | Facility: HOME HEALTH | Age: 80
End: 2020-09-21
Payer: MEDICARE

## 2020-09-21 VITALS
RESPIRATION RATE: 18 BRPM | DIASTOLIC BLOOD PRESSURE: 70 MMHG | HEART RATE: 80 BPM | SYSTOLIC BLOOD PRESSURE: 122 MMHG | TEMPERATURE: 98.2 F | OXYGEN SATURATION: 99 %

## 2020-09-21 PROCEDURE — 400014 HH F/U

## 2020-09-21 PROCEDURE — G0299 HHS/HOSPICE OF RN EA 15 MIN: HCPCS

## 2020-09-25 ENCOUNTER — HOME CARE VISIT (OUTPATIENT)
Dept: SCHEDULING | Facility: HOME HEALTH | Age: 80
End: 2020-09-25
Payer: MEDICARE

## 2020-09-25 PROCEDURE — G0300 HHS/HOSPICE OF LPN EA 15 MIN: HCPCS

## 2020-09-28 VITALS
TEMPERATURE: 97.7 F | DIASTOLIC BLOOD PRESSURE: 71 MMHG | OXYGEN SATURATION: 97 % | HEART RATE: 61 BPM | RESPIRATION RATE: 16 BRPM | SYSTOLIC BLOOD PRESSURE: 122 MMHG

## 2020-09-29 ENCOUNTER — HOME CARE VISIT (OUTPATIENT)
Dept: SCHEDULING | Facility: HOME HEALTH | Age: 80
End: 2020-09-29
Payer: MEDICARE

## 2020-09-29 PROCEDURE — G0299 HHS/HOSPICE OF RN EA 15 MIN: HCPCS

## 2020-10-02 ENCOUNTER — HOME CARE VISIT (OUTPATIENT)
Dept: SCHEDULING | Facility: HOME HEALTH | Age: 80
End: 2020-10-02
Payer: MEDICARE

## 2020-10-02 VITALS
OXYGEN SATURATION: 98 % | RESPIRATION RATE: 18 BRPM | TEMPERATURE: 97.7 F | DIASTOLIC BLOOD PRESSURE: 80 MMHG | HEART RATE: 80 BPM | SYSTOLIC BLOOD PRESSURE: 124 MMHG

## 2020-10-02 PROCEDURE — G0299 HHS/HOSPICE OF RN EA 15 MIN: HCPCS

## 2020-10-04 VITALS
TEMPERATURE: 97.4 F | DIASTOLIC BLOOD PRESSURE: 80 MMHG | OXYGEN SATURATION: 97 % | HEART RATE: 70 BPM | SYSTOLIC BLOOD PRESSURE: 122 MMHG | RESPIRATION RATE: 18 BRPM

## 2020-10-10 ENCOUNTER — HOME CARE VISIT (OUTPATIENT)
Dept: SCHEDULING | Facility: HOME HEALTH | Age: 80
End: 2020-10-10
Payer: MEDICARE

## 2020-10-10 PROCEDURE — G0300 HHS/HOSPICE OF LPN EA 15 MIN: HCPCS

## 2020-10-13 ENCOUNTER — OFFICE VISIT (OUTPATIENT)
Dept: CARDIOLOGY CLINIC | Age: 80
End: 2020-10-13
Payer: MEDICARE

## 2020-10-13 VITALS
WEIGHT: 136.2 LBS | BODY MASS INDEX: 25.06 KG/M2 | HEART RATE: 62 BPM | HEIGHT: 62 IN | TEMPERATURE: 97.7 F | DIASTOLIC BLOOD PRESSURE: 39 MMHG | SYSTOLIC BLOOD PRESSURE: 101 MMHG

## 2020-10-13 DIAGNOSIS — I42.1 MILD HOCM (HYPERTROPHIC OBSTRUCTIVE CARDIOMYOPATHY) (HCC): ICD-10-CM

## 2020-10-13 DIAGNOSIS — C73 PAPILLARY THYROID CARCINOMA (HCC): ICD-10-CM

## 2020-10-13 DIAGNOSIS — I50.32 CHRONIC DIASTOLIC CONGESTIVE HEART FAILURE (HCC): Primary | ICD-10-CM

## 2020-10-13 DIAGNOSIS — Z95.0 PACEMAKER: ICD-10-CM

## 2020-10-13 DIAGNOSIS — N18.9 CHRONIC KIDNEY DISEASE, UNSPECIFIED CKD STAGE: ICD-10-CM

## 2020-10-13 DIAGNOSIS — I48.20 CHRONIC ATRIAL FIBRILLATION (HCC): ICD-10-CM

## 2020-10-13 DIAGNOSIS — I35.1 NONRHEUMATIC AORTIC VALVE INSUFFICIENCY: ICD-10-CM

## 2020-10-13 DIAGNOSIS — I27.20 PULMONARY HTN (HCC): ICD-10-CM

## 2020-10-13 PROCEDURE — 1090F PRES/ABSN URINE INCON ASSESS: CPT | Performed by: INTERNAL MEDICINE

## 2020-10-13 PROCEDURE — G8536 NO DOC ELDER MAL SCRN: HCPCS | Performed by: INTERNAL MEDICINE

## 2020-10-13 PROCEDURE — G8432 DEP SCR NOT DOC, RNG: HCPCS | Performed by: INTERNAL MEDICINE

## 2020-10-13 PROCEDURE — G8420 CALC BMI NORM PARAMETERS: HCPCS | Performed by: INTERNAL MEDICINE

## 2020-10-13 PROCEDURE — 99214 OFFICE O/P EST MOD 30 MIN: CPT | Performed by: INTERNAL MEDICINE

## 2020-10-13 PROCEDURE — G8428 CUR MEDS NOT DOCUMENT: HCPCS | Performed by: INTERNAL MEDICINE

## 2020-10-13 PROCEDURE — 1101F PT FALLS ASSESS-DOCD LE1/YR: CPT | Performed by: INTERNAL MEDICINE

## 2020-10-13 PROCEDURE — G8399 PT W/DXA RESULTS DOCUMENT: HCPCS | Performed by: INTERNAL MEDICINE

## 2020-10-13 NOTE — PROGRESS NOTES
Patient didn't bring medications, verbally reviewed. 1. Have you been to the ER, urgent care clinic since your last visit? Hospitalized since your last visit? Yes [facility]: [facility] Reason for visit: Nose bleed 2. Have you seen or consulted any other health care providers outside of the [address] [place] [address] since your last visit? Include any pap smears or colon screening.  No

## 2020-10-13 NOTE — PROGRESS NOTES
HISTORY OF PRESENT ILLNESS Misti Gambino is a 68 y.o. female. CHF The history is provided by the patient. This is a chronic problem. The problem occurs every several days. The problem has been gradually worsening. Associated symptoms include shortness of breath. Palpitations The history is provided by the patient. This is a new problem. The problem occurs every several days. Associated symptoms include malaise/fatigue and shortness of breath. Pertinent negatives include no diaphoresis, no fever, no claudication, no orthopnea, no PND, no syncope, no nausea, no vomiting, no leg pain, no weakness, no cough, no hemoptysis and no sputum production. Her past medical history is significant for hypertension. Shortness of Breath The history is provided by the patient. This is a chronic problem. The problem occurs intermittently. The current episode started more than 1 week ago. The problem has been gradually worsening. Associated symptoms include leg swelling. Pertinent negatives include no fever, no ear pain, no neck pain, no cough, no sputum production, no hemoptysis, no wheezing, no PND, no orthopnea, no syncope, no vomiting, no rash, no leg pain and no claudication. Associated medical issues include heart failure. Associated medical issues do not include CAD. Hypertension The history is provided by the patient. This is a chronic problem. The problem occurs every several days. The problem has not changed since onset. Associated symptoms include shortness of breath. Review of Systems Constitutional: Positive for malaise/fatigue. Negative for chills, diaphoresis, fever and weight loss. HENT: Negative for ear discharge, ear pain, hearing loss, nosebleeds and tinnitus. Eyes: Negative for blurred vision. Respiratory: Positive for shortness of breath. Negative for cough, hemoptysis, sputum production, wheezing and stridor. Cardiovascular: Positive for palpitations and leg swelling. Negative for orthopnea, claudication, syncope and PND. Gastrointestinal: Negative for heartburn, nausea and vomiting. Musculoskeletal: Negative for myalgias and neck pain. Skin: Negative for itching and rash. Neurological: Negative for tingling, tremors, focal weakness, loss of consciousness and weakness. Psychiatric/Behavioral: Negative for depression and suicidal ideas. Family History Problem Relation Age of Onset  Cancer Other  Heart Disease Other  Cancer Mother  Heart Disease Mother Past Medical History:  
Diagnosis Date  Arthritis  Atrial fibrillation ([address]  CHF (congestive heart failure) ([address]  Heart murmur  History of seasonal allergies  HTN (hypertension)  Hypercholesteremia  Moderate aortic stenosis  Pulmonary hypertension ([address]  Venous insufficiency Past Surgical History:  
Procedure Laterality Date  HX KNEE ARTHROSCOPY    
 left and right  HX ORTHOPAEDIC    
 right ankle Social History Tobacco Use  Smoking status: Never Smoker  Smokeless tobacco: Never Used Substance Use Topics  Alcohol use: No  
 
 
No Known Allergies Outpatient Medications Marked as Taking for the 10/13/20 encounter (Office Visit) with Marc Pierre MD  
Medication Sig Dispense Refill  furosemide (LASIX) 40 mg tablet take 1 tablet by mouth every morning and 1/2 tablet AT 2 P.M. (Patient taking differently: i tab every other day) 45 Tab 0  
 apixaban (Eliquis) 5 mg tablet Take 1 Tab by mouth two (2) times a day. 60 Tab 3  
 traMADol (ULTRAM) 50 mg tablet Take 1 Tab by mouth every six (6) hours as needed for Pain.  cyanocobalamin, vitamin B-12, (VITAMIN B12 PO) Take 1,000 mcg by mouth daily.  famotidine (PEPCID) 20 mg tablet Take 1 Tab by mouth daily.  30 Tab 0  
 lactobacillus sp. 50 billion cpu (BIO-K PLUS) 50 billion cell -375 mg cap capsule Take 1 Cap by mouth daily. 10 Cap 0  
 Calcium-Cholecalciferol, D3, (CALCIUM 600 WITH VITAMIN D3) 600 mg(1,500mg) -400 unit chew Take 1 Tab by mouth daily.  simvastatin (ZOCOR) 80 mg tablet TAKE 1 TABLET NIGHTLY 90 Tab 1  cholecalciferol (VITAMIN D3) 1,000 unit cap Take  by mouth daily.  montelukast (SINGULAIR) 10 mg tablet Take 10 mg by mouth daily. Visit Vitals BP (!) 101/39 (BP 1 Location: Left arm, BP Patient Position: Sitting) Pulse 62 Temp 97.7 °F (36.5 °C) (Temporal) Ht 5' 2\" (1.575 m) Wt 61.8 kg (136 lb 3.2 oz) BMI 24.91 kg/m² Physical Exam  
Constitutional: She is oriented to person, place, and time. She appears well-developed and well-nourished. No distress. HENT:  
Head: Atraumatic. Mouth/Throat: No oropharyngeal exudate. Eyes: Conjunctivae are normal. Right eye exhibits no discharge. Left eye exhibits no discharge. No scleral icterus. Neck: Normal range of motion. Neck supple. No JVD present. No tracheal deviation present. No thyromegaly present. Cardiovascular: Normal rate. An irregularly irregular rhythm present. Exam reveals no gallop. Murmur (3/6 holosystolic murmur best heard at apex and left parasternal area with no radiationl) heard. Pulmonary/Chest: Effort normal. No stridor. No respiratory distress. She has no wheezes. She has no rales. She exhibits no tenderness. Abdominal: Soft. There is no abdominal tenderness. There is no rebound and no guarding. Musculoskeletal: Normal range of motion. General: No tenderness. Edema: 1+ ble edema. Lymphadenopathy:  
  She has no cervical adenopathy. Neurological: She is alert and oriented to person, place, and time. She exhibits normal muscle tone. Skin: Skin is warm. She is not diaphoretic. Psychiatric: She has a normal mood and affect. Her behavior is normal.  
 
ekg sinus bradycardia with poor r wave progression. 09/20/19 ECHO ADULT COMPLETE 09/25/2019 9/25/2019 Narrative · Left Ventricle: Normal cavity size and systolic function (ejection  
fraction normal). Moderate concentric hypertrophy. Estimated left  
ventricular ejection fraction is 61 - 65%. Abnormal left ventricular  
septal motion consistent with right ventricular pacing. Interventricular  
septal \"bounce\". Abnormal left ventricular strain. Inconclusive left  
ventricular diastolic function. · Right Ventricle: Mildly reduced systolic function. Abnormal right  
ventricular septal motion. Diastolic flattening of interventricular septum  
consistent with right ventricle volume overload. · Left Atrium: Severely dilated left atrium. · Right Atrium: Dilated right atrium. · Pulmonic Valve: Mild pulmonic valve regurgitation is present. · Tricuspid Valve: Moderate tricuspid valve regurgitation is present. · Pulmonary Artery: Moderate pulmonary hypertension. Pulmonary arterial  
systolic pressure is 69 mmHg. · Aortic Valve: Probably trileaflet aortic valve. Aortic valve leaflet  
calcification present with reduced excursion. Aortic valve peak gradient  
is 47.2 mmHg. Aortic valve mean gradient is 28.5 mmHg. Aortic valve area  
is 0.9 cm2. Moderate aortic valve stenosis is present. Mild aortic valve  
regurgitation is present. · Moderate mitral annular calcification Signed by: Maggie Shankar MD  
 
 
ASSESSMENT and PLAN 
  ICD-10-CM ICD-9-CM 1. Chronic diastolic congestive heart failure (HCC)  I50.32 428.32   
  428.0 2. Chronic atrial fibrillation (HCC)  I48.20 427.31   
3. Chronic kidney disease, unspecified CKD stage  N18.9 585.9 4. Pacemaker  Z95.0 V45.01   
5. HOCM (hypertrophic obstructive cardiomyopathy) (HCC)  I42.1 425.11   
6. Nonrheumatic aortic valve insufficiency  I35.1 424.1 7. Pulmonary HTN (HCC)  I27.20 416.8 8. Papillary thyroid carcinoma ([facility]  C73 193 No orders of the defined types were placed in this encounter. Follow-up and Dispositions · Return in about 1 month (around 11/13/2020). current treatment plan is effective, no change in therapy 
reviewed diet, exercise and weight control 
use of aspirin to prevent MI and TIA's discussed. Patient seen for pre op evaluation prior to thyroid surgery for possible Ca. Had recent echo which revealed severe pulm htn. ANAND reveals HOCM with mitral regurgitation and moderate aortic regurgitation. Underwent recent septal ablation followed by pacemaker insertion at [facility]. Seen for follow-up. Has worsening CHF- with leg edema extending to upper thigh. Recommend admission for iv diuretics. Meanwhile continue current meds

## 2020-10-15 ENCOUNTER — HOME CARE VISIT (OUTPATIENT)
Dept: SCHEDULING | Facility: HOME HEALTH | Age: 80
End: 2020-10-15
Payer: MEDICARE

## 2020-10-15 ENCOUNTER — APPOINTMENT (OUTPATIENT)
Dept: GENERAL RADIOLOGY | Age: 80
DRG: 291 | End: 2020-10-15
Attending: PHYSICIAN ASSISTANT
Payer: MEDICARE

## 2020-10-15 ENCOUNTER — HOSPITAL ENCOUNTER (INPATIENT)
Age: 80
LOS: 15 days | Discharge: ANOTHER HOSPITAL | DRG: 291 | End: 2020-10-30
Attending: EMERGENCY MEDICINE | Admitting: FAMILY MEDICINE
Payer: MEDICARE

## 2020-10-15 VITALS
TEMPERATURE: 98 F | SYSTOLIC BLOOD PRESSURE: 118 MMHG | OXYGEN SATURATION: 97 % | DIASTOLIC BLOOD PRESSURE: 66 MMHG | HEART RATE: 74 BPM | RESPIRATION RATE: 18 BRPM

## 2020-10-15 DIAGNOSIS — E87.70 HYPERVOLEMIA, UNSPECIFIED HYPERVOLEMIA TYPE: Primary | ICD-10-CM

## 2020-10-15 DIAGNOSIS — I50.31 ACUTE DIASTOLIC CONGESTIVE HEART FAILURE (HCC): ICD-10-CM

## 2020-10-15 DIAGNOSIS — I35.0 NONRHEUMATIC AORTIC VALVE STENOSIS: ICD-10-CM

## 2020-10-15 DIAGNOSIS — R53.83 MALAISE AND FATIGUE: ICD-10-CM

## 2020-10-15 DIAGNOSIS — I27.20 PULMONARY HTN (HCC): ICD-10-CM

## 2020-10-15 DIAGNOSIS — I50.33 ACUTE ON CHRONIC DIASTOLIC CONGESTIVE HEART FAILURE (HCC): ICD-10-CM

## 2020-10-15 DIAGNOSIS — N18.9 CHRONIC RENAL IMPAIRMENT, UNSPECIFIED CKD STAGE: ICD-10-CM

## 2020-10-15 DIAGNOSIS — R53.81 MALAISE AND FATIGUE: ICD-10-CM

## 2020-10-15 PROBLEM — R60.1 ANASARCA: Status: ACTIVE | Noted: 2020-10-15

## 2020-10-15 LAB
ALBUMIN SERPL-MCNC: 3.1 G/DL (ref 3.4–5)
ALBUMIN/GLOB SERPL: 0.5 {RATIO} (ref 0.8–1.7)
ALP SERPL-CCNC: 238 U/L (ref 45–117)
ALT SERPL-CCNC: 27 U/L (ref 13–56)
ANION GAP SERPL CALC-SCNC: 6 MMOL/L (ref 3–18)
AST SERPL-CCNC: 39 U/L (ref 10–38)
BASOPHILS # BLD: 0 K/UL (ref 0–0.1)
BASOPHILS NFR BLD: 1 % (ref 0–2)
BILIRUB SERPL-MCNC: 0.8 MG/DL (ref 0.2–1)
BNP SERPL-MCNC: ABNORMAL PG/ML (ref 0–1800)
BUN SERPL-MCNC: 60 MG/DL (ref 7–18)
BUN/CREAT SERPL: 29 (ref 12–20)
CALCIUM SERPL-MCNC: 9.1 MG/DL (ref 8.5–10.1)
CHLORIDE SERPL-SCNC: 98 MMOL/L (ref 100–111)
CK MB CFR SERPL CALC: 3.9 % (ref 0–4)
CK MB SERPL-MCNC: 1.4 NG/ML (ref 5–25)
CK SERPL-CCNC: 36 U/L (ref 26–192)
CO2 SERPL-SCNC: 29 MMOL/L (ref 21–32)
CREAT SERPL-MCNC: 2.07 MG/DL (ref 0.6–1.3)
DIFFERENTIAL METHOD BLD: ABNORMAL
DIGOXIN SERPL-MCNC: 1.5 NG/ML (ref 0.9–2)
EOSINOPHIL # BLD: 0.1 K/UL (ref 0–0.4)
EOSINOPHIL NFR BLD: 2 % (ref 0–5)
ERYTHROCYTE [DISTWIDTH] IN BLOOD BY AUTOMATED COUNT: 19.3 % (ref 11.6–14.5)
GLOBULIN SER CALC-MCNC: 5.9 G/DL (ref 2–4)
GLUCOSE SERPL-MCNC: 92 MG/DL (ref 74–99)
HCT VFR BLD AUTO: 30.9 % (ref 35–45)
HGB BLD-MCNC: 9.3 G/DL (ref 12–16)
LYMPHOCYTES # BLD: 0.7 K/UL (ref 0.9–3.6)
LYMPHOCYTES NFR BLD: 15 % (ref 21–52)
MAGNESIUM SERPL-MCNC: 2.8 MG/DL (ref 1.6–2.6)
MCH RBC QN AUTO: 21.9 PG (ref 24–34)
MCHC RBC AUTO-ENTMCNC: 30.1 G/DL (ref 31–37)
MCV RBC AUTO: 72.7 FL (ref 74–97)
MONOCYTES # BLD: 0.5 K/UL (ref 0.05–1.2)
MONOCYTES NFR BLD: 11 % (ref 3–10)
NEUTS SEG # BLD: 3.2 K/UL (ref 1.8–8)
NEUTS SEG NFR BLD: 71 % (ref 40–73)
PLATELET # BLD AUTO: 227 K/UL (ref 135–420)
PMV BLD AUTO: 9.6 FL (ref 9.2–11.8)
POTASSIUM SERPL-SCNC: 4 MMOL/L (ref 3.5–5.5)
PROT SERPL-MCNC: 9 G/DL (ref 6.4–8.2)
RBC # BLD AUTO: 4.25 M/UL (ref 4.2–5.3)
SODIUM SERPL-SCNC: 133 MMOL/L (ref 136–145)
TROPONIN I SERPL-MCNC: 0.17 NG/ML (ref 0–0.04)
WBC # BLD AUTO: 4.4 K/UL (ref 4.6–13.2)

## 2020-10-15 PROCEDURE — 83880 ASSAY OF NATRIURETIC PEPTIDE: CPT

## 2020-10-15 PROCEDURE — 74011000250 HC RX REV CODE- 250: Performed by: EMERGENCY MEDICINE

## 2020-10-15 PROCEDURE — 83735 ASSAY OF MAGNESIUM: CPT

## 2020-10-15 PROCEDURE — 71045 X-RAY EXAM CHEST 1 VIEW: CPT

## 2020-10-15 PROCEDURE — 80053 COMPREHEN METABOLIC PANEL: CPT

## 2020-10-15 PROCEDURE — 2709999900 HC NON-CHARGEABLE SUPPLY

## 2020-10-15 PROCEDURE — 74011250637 HC RX REV CODE- 250/637: Performed by: FAMILY MEDICINE

## 2020-10-15 PROCEDURE — 93005 ELECTROCARDIOGRAM TRACING: CPT

## 2020-10-15 PROCEDURE — 74011000258 HC RX REV CODE- 258: Performed by: EMERGENCY MEDICINE

## 2020-10-15 PROCEDURE — 99284 EMERGENCY DEPT VISIT MOD MDM: CPT

## 2020-10-15 PROCEDURE — 65270000029 HC RM PRIVATE

## 2020-10-15 PROCEDURE — 80162 ASSAY OF DIGOXIN TOTAL: CPT

## 2020-10-15 PROCEDURE — 82550 ASSAY OF CK (CPK): CPT

## 2020-10-15 PROCEDURE — 85025 COMPLETE CBC W/AUTO DIFF WBC: CPT

## 2020-10-15 PROCEDURE — 36415 COLL VENOUS BLD VENIPUNCTURE: CPT

## 2020-10-15 PROCEDURE — G0299 HHS/HOSPICE OF RN EA 15 MIN: HCPCS

## 2020-10-15 RX ORDER — MELATONIN
1000 DAILY
Status: DISCONTINUED | OUTPATIENT
Start: 2020-10-16 | End: 2020-10-18

## 2020-10-15 RX ORDER — METOPROLOL TARTRATE 50 MG/1
50 TABLET ORAL 2 TIMES DAILY
Status: DISCONTINUED | OUTPATIENT
Start: 2020-10-15 | End: 2020-10-16

## 2020-10-15 RX ORDER — FUROSEMIDE 10 MG/ML
40 INJECTION INTRAMUSCULAR; INTRAVENOUS
Status: DISCONTINUED | OUTPATIENT
Start: 2020-10-15 | End: 2020-10-15

## 2020-10-15 RX ORDER — TRAMADOL HYDROCHLORIDE 50 MG/1
50 TABLET ORAL
Status: DISCONTINUED | OUTPATIENT
Start: 2020-10-15 | End: 2020-10-22

## 2020-10-15 RX ORDER — METOPROLOL TARTRATE 50 MG/1
100 TABLET ORAL 2 TIMES DAILY
Status: DISCONTINUED | OUTPATIENT
Start: 2020-10-15 | End: 2020-10-15

## 2020-10-15 RX ORDER — ATORVASTATIN CALCIUM 40 MG/1
40 TABLET, FILM COATED ORAL
Status: DISCONTINUED | OUTPATIENT
Start: 2020-10-15 | End: 2020-10-30 | Stop reason: HOSPADM

## 2020-10-15 RX ORDER — FOLIC ACID 1 MG/1
1 TABLET ORAL DAILY
Status: DISCONTINUED | OUTPATIENT
Start: 2020-10-16 | End: 2020-10-30 | Stop reason: HOSPADM

## 2020-10-15 RX ORDER — FAMOTIDINE 20 MG/1
20 TABLET, FILM COATED ORAL DAILY
Status: DISCONTINUED | OUTPATIENT
Start: 2020-10-16 | End: 2020-10-30 | Stop reason: HOSPADM

## 2020-10-15 RX ORDER — MONTELUKAST SODIUM 10 MG/1
10 TABLET ORAL DAILY
Status: DISCONTINUED | OUTPATIENT
Start: 2020-10-16 | End: 2020-10-30 | Stop reason: HOSPADM

## 2020-10-15 RX ORDER — FERROUS SULFATE, DRIED 160(50) MG
1 TABLET, EXTENDED RELEASE ORAL 2 TIMES DAILY WITH MEALS
Status: DISCONTINUED | OUTPATIENT
Start: 2020-10-16 | End: 2020-10-18

## 2020-10-15 RX ORDER — ONDANSETRON 4 MG/1
4 TABLET, FILM COATED ORAL
Status: DISCONTINUED | OUTPATIENT
Start: 2020-10-15 | End: 2020-10-30 | Stop reason: HOSPADM

## 2020-10-15 RX ORDER — GABAPENTIN 100 MG/1
100 CAPSULE ORAL
Status: DISCONTINUED | OUTPATIENT
Start: 2020-10-15 | End: 2020-10-21

## 2020-10-15 RX ADMIN — APIXABAN 5 MG: 5 TABLET, FILM COATED ORAL at 22:18

## 2020-10-15 RX ADMIN — SODIUM CHLORIDE 0.5 MG/HR: 900 INJECTION, SOLUTION INTRAVENOUS at 21:29

## 2020-10-15 RX ADMIN — GABAPENTIN 100 MG: 100 CAPSULE ORAL at 22:18

## 2020-10-15 RX ADMIN — ATORVASTATIN CALCIUM 40 MG: 40 TABLET, FILM COATED ORAL at 22:18

## 2020-10-15 RX ADMIN — METOPROLOL TARTRATE 50 MG: 50 TABLET, FILM COATED ORAL at 22:18

## 2020-10-15 NOTE — ED TRIAGE NOTES
Pt presents to ED via triage from home with complaints of bilateral lower extremity swelling. Past Medical History:  
Diagnosis Date  Arthritis  Atrial fibrillation ([address]  CHF (congestive heart failure) ([address]  Heart murmur  History of seasonal allergies  HTN (hypertension)  Hypercholesteremia  Moderate aortic stenosis  Pulmonary hypertension ([address]  Venous insufficiency

## 2020-10-15 NOTE — ED PROVIDER NOTES
EMERGENCY DEPARTMENT HISTORY AND PHYSICAL EXAM 
 
4:50 PM 
 
 
Date: 10/15/2020 Patient Name: Gil Calvert History of Presenting Illness Chief Complaint Patient presents with  Peripheral Edema History Provided By: Patient Location/Duration/Severity/Modifying factors Patient is a 70-year-old female with a history of HOCM, pulmonary hypertension, congestive heart failure, chronic atrial fibrillation on anticoagulation, aortic stenosis, and hypertension the presents emergency department with increasing swelling for the last 2 weeks and was sent by Dr. Dao Solo for admission to the hospital.  The patient has been having progressive edema and has been taking her medications however not seeing any improvement. The patient has noted shortness of breath however is not very active and takes care of her chronically ill family member at home. Patient denies any fevers, chills, cough, or chest pain. Patient know she is having drainage from her right lower extremity. Patient notes both her lower extremities are increasing in swelling. Patient denies any blood in her stool or any urinary pain. Patient notes she sleeps in the bed and does elevate her legs at night. Patient denies being a smoker, drinker, or drug user. Patient know she has lost weight however she has been having increasing edema in her lower extremities. PCP: Zain Byrne MD 
 
Current Facility-Administered Medications Medication Dose Route Frequency Provider Last Rate Last Dose  bumetanide (BUMEX) 12.5 mg in 0.9% sodium chloride 100 mL infusion  0.5 mg/hr IntraVENous CONTINUOUS Dennie Rolling, MD      
 
Current Outpatient Medications Medication Sig Dispense Refill  furosemide (LASIX) 40 mg tablet take 1 tablet by mouth every morning and 1/2 tablet AT 2 P.M. (Patient taking differently: i tab every other day) 45 Tab 0  
 apixaban (Eliquis) 5 mg tablet Take 1 Tab by mouth two (2) times a day. 60 Tab 3  Digitek 125 mcg (0.125 mg) tablet take 1 tablet by mouth once daily 90 Tab 1  
 metoprolol tartrate (LOPRESSOR) 100 mg IR tablet take 1 tablet by mouth twice a day 60 Tab 6  
 traMADol (ULTRAM) 50 mg tablet Take 1 Tab by mouth every six (6) hours as needed for Pain.  folic acid 895 mcg tablet Take 400 mcg by mouth daily.  cyanocobalamin, vitamin B-12, (VITAMIN B12 PO) Take 1,000 mcg by mouth daily.  famotidine (PEPCID) 20 mg tablet Take 1 Tab by mouth daily. 30 Tab 0  
 gabapentin (NEURONTIN) 100 mg capsule Take 1 Cap by mouth nightly. Max Daily Amount: 100 mg. 30 Cap 0  
 lactobacillus sp. 50 billion cpu (BIO-K PLUS) 50 billion cell -375 mg cap capsule Take 1 Cap by mouth daily. 10 Cap 0  
 ondansetron hcl (ZOFRAN) 4 mg tablet Take 1 Tab by mouth every eight (8) hours as needed for Nausea. 30 Tab 2  
 Calcium-Cholecalciferol, D3, (CALCIUM 600 WITH VITAMIN D3) 600 mg(1,500mg) -400 unit chew Take 1 Tab by mouth daily.  simvastatin (ZOCOR) 80 mg tablet TAKE 1 TABLET NIGHTLY 90 Tab 1  cholecalciferol (VITAMIN D3) 1,000 unit cap Take  by mouth daily.  losartan (COZAAR) 100 mg tablet Take 100 mg by mouth daily.  montelukast (SINGULAIR) 10 mg tablet Take 10 mg by mouth daily.  CETIRIZINE HCL (ZYRTEC PO) Take  by mouth. Past History Past Medical History: 
Past Medical History:  
Diagnosis Date  Arthritis  Atrial fibrillation ([address]  CHF (congestive heart failure) ([address]  Heart murmur  History of seasonal allergies  HTN (hypertension)  Hypercholesteremia  Moderate aortic stenosis  Pulmonary hypertension ([address]  Venous insufficiency Past Surgical History: 
Past Surgical History:  
Procedure Laterality Date  HX KNEE ARTHROSCOPY    
 left and right  HX ORTHOPAEDIC    
 right ankle Family History: 
Family History Problem Relation Age of Onset  Cancer Other  Heart Disease Other  Cancer Mother  Heart Disease Mother Social History: 
Social History Tobacco Use  Smoking status: Never Smoker  Smokeless tobacco: Never Used Substance Use Topics  Alcohol use: No  
 Drug use: No  
 
 
Allergies: 
No Known Allergies Review of Systems Review of Systems Constitutional: Positive for fatigue and unexpected weight change. Negative for activity change and fever. HENT: Negative for congestion and rhinorrhea. Eyes: Negative for visual disturbance. Respiratory: Positive for shortness of breath. Cardiovascular: Positive for leg swelling. Negative for chest pain and palpitations. Gastrointestinal: Negative for abdominal pain, diarrhea, nausea and vomiting. Genitourinary: Negative for dysuria and hematuria. Musculoskeletal: Negative for back pain. Skin: Positive for wound. Negative for rash. Neurological: Negative for dizziness, weakness and light-headedness. All other systems reviewed and are negative. Physical Exam  
 
Visit Vitals BP (!) 119/56 (BP Patient Position: At rest) Pulse 62 Temp 97.7 °F (36.5 °C) Resp 16 Ht 5' 3\" (1.6 m) Wt 64 kg (141 lb) SpO2 96% BMI 24.98 kg/m² Physical Exam 
Vitals signs and nursing note reviewed. Constitutional:   
   General: She is not in acute distress. Appearance: She is well-developed. HENT:  
   Head: Normocephalic and atraumatic. Right Ear: External ear normal.  
   Left Ear: External ear normal.  
   Nose: Nose normal.  
Eyes:  
   General: No scleral icterus. Conjunctiva/sclera: Conjunctivae normal.  
   Pupils: Pupils are equal, round, and reactive to light. Neck: Musculoskeletal: Normal range of motion and neck supple. Thyroid: No thyromegaly. Vascular: No JVD. Trachea: No tracheal deviation. Comments: JVD noted Cardiovascular:  
   Rate and Rhythm: Normal rate and regular rhythm. Heart sounds: Normal heart sounds. No murmur. No friction rub. No gallop. Pulmonary:  
   Effort: Pulmonary effort is normal.  
   Breath sounds: Normal breath sounds. Chest:  
   Chest wall: No tenderness. Abdominal:  
   General: Bowel sounds are normal. There is no distension. Palpations: Abdomen is soft. Tenderness: There is no abdominal tenderness. There is no guarding or rebound. Musculoskeletal: Normal range of motion. General: No tenderness. Right lower leg: Edema present. Left lower leg: Edema present. Comments: Pitting edema with tenderness and erythema bilaterally up to the mid thigh Lymphadenopathy:  
   Cervical: No cervical adenopathy. Skin: 
   General: Skin is warm and dry. Comments: Oozing wound to the right anterior lower extremity Neurological:  
   Mental Status: She is alert and oriented to person, place, and time. Cranial Nerves: No cranial nerve deficit. Coordination: Coordination normal.  
   Comments: No sensory loss, Gait normal, Motor 5/5 Psychiatric:     
   Behavior: Behavior normal.     
   Thought Content: Thought content normal.     
   Judgment: Judgment normal.  
 
 
 
 
Diagnostic Study Results Labs - Recent Results (from the past 12 hour(s)) CBC WITH AUTOMATED DIFF Collection Time: 10/15/20  3:45 PM  
Result Value Ref Range WBC 4.4 (L) 4.6 - 13.2 K/uL  
 RBC 4.25 4.20 - 5.30 M/uL HGB 9.3 (L) 12.0 - 16.0 g/dL HCT 30.9 (L) 35.0 - 45.0 % MCV 72.7 (L) 74.0 - 97.0 FL  
 MCH 21.9 (L) 24.0 - 34.0 PG  
 MCHC 30.1 (L) 31.0 - 37.0 g/dL  
 RDW 19.3 (H) 11.6 - 14.5 % PLATELET 082 611 - 284 K/uL MPV 9.6 9.2 - 11.8 FL  
 NEUTROPHILS 71 40 - 73 % LYMPHOCYTES 15 (L) 21 - 52 % MONOCYTES 11 (H) 3 - 10 % EOSINOPHILS 2 0 - 5 % BASOPHILS 1 0 - 2 %  
 ABS. NEUTROPHILS 3.2 1.8 - 8.0 K/UL  
 ABS. LYMPHOCYTES 0.7 (L) 0.9 - 3.6 K/UL  
 ABS. MONOCYTES 0.5 0.05 - 1.2 K/UL ABS. EOSINOPHILS 0.1 0.0 - 0.4 K/UL  
 ABS. BASOPHILS 0.0 0.0 - 0.1 K/UL  
 DF AUTOMATED METABOLIC PANEL, COMPREHENSIVE Collection Time: 10/15/20  3:45 PM  
Result Value Ref Range Sodium 133 (L) 136 - 145 mmol/L Potassium 4.0 3.5 - 5.5 mmol/L Chloride 98 (L) 100 - 111 mmol/L  
 CO2 29 21 - 32 mmol/L Anion gap 6 3.0 - 18 mmol/L Glucose 92 74 - 99 mg/dL BUN 60 (H) 7.0 - 18 MG/DL Creatinine 2.07 (H) 0.6 - 1.3 MG/DL  
 BUN/Creatinine ratio 29 (H) 12 - 20 GFR est AA 28 (L) >60 ml/min/1.73m2 GFR est non-AA 23 (L) >60 ml/min/1.73m2 Calcium 9.1 8.5 - 10.1 MG/DL Bilirubin, total 0.8 0.2 - 1.0 MG/DL  
 ALT (SGPT) 27 13 - 56 U/L  
 AST (SGOT) 39 (H) 10 - 38 U/L Alk. phosphatase 238 (H) 45 - 117 U/L Protein, total 9.0 (H) 6.4 - 8.2 g/dL Albumin 3.1 (L) 3.4 - 5.0 g/dL Globulin 5.9 (H) 2.0 - 4.0 g/dL A-G Ratio 0.5 (L) 0.8 - 1.7 CARDIAC PANEL,(CK, CKMB & TROPONIN) Collection Time: 10/15/20  3:45 PM  
Result Value Ref Range CK - MB 1.4 <3.6 ng/ml CK-MB Index 3.9 0.0 - 4.0 % CK 36 26 - 192 U/L Troponin-I, QT 0.17 (H) 0.0 - 0.045 NG/ML  
NT-PRO BNP Collection Time: 10/15/20  3:45 PM  
Result Value Ref Range NT pro-BNP 13,139 (H) 0 - 1,800 PG/ML  
MAGNESIUM Collection Time: 10/15/20  3:45 PM  
Result Value Ref Range Magnesium 2.8 (H) 1.6 - 2.6 mg/dL EKG, 12 LEAD, INITIAL Collection Time: 10/15/20  3:47 PM  
Result Value Ref Range Ventricular Rate 61 BPM  
 Atrial Rate 61 BPM  
 P-R Interval 278 ms QRS Duration 148 ms Q-T Interval 432 ms QTC Calculation (Bezet) 434 ms Calculated P Axis 32 degrees Calculated R Axis 127 degrees Calculated T Axis -22 degrees Diagnosis Atrial-paced rhythm with prolonged AV conduction Right bundle branch block T wave abnormality, consider inferior ischemia Abnormal ECG When compared with ECG of 20-SEP-2019 22:01, 
Electronic atrial pacemaker has replaced Atrial fibrillation Radiologic Studies -  
XR CHEST PORT Final Result IMPRESSION:  
  
No acute cardiopulmonary process is evident. No change from prior studies Medical Decision Making I am the first provider for this patient. I reviewed the vital signs, available nursing notes, past medical history, past surgical history, family history and social history. Vital Signs-Reviewed the patient's vital signs. EKG: Paced with a rate of 61, interpreted by me Records Reviewed: Nursing Notes, Old Medical Records, Previous Radiology Studies and Previous Laboratory Studies (Time of Review: 4:50 PM) ED Course: Progress Notes, Reevaluation, and Consults: 
 
  I discussed case with Dr. Nickie Cooper will admit the patient to a telemetry bed. I also reviewed the case with Dr. Sean Mane and he would like the patient to be started on a Bumex drip at 0.5 mg/h. The patient agrees with the plan will proceed with a telemetry admission. Provider Notes (Medical Decision Making): MDM Number of Diagnoses or Management Options Diagnosis management comments: Patient is a 74-year-old female with a history of extensive cardiac disease on anticoagulation as well as diuresis with hypertrophic cardiomyopathy as well as aortic stenosis as well as chronic atrial fibrillation that has worsening edema worsening congestive heart failure per the cardiology notes from 2 days prior. The patient is taking metolazone however notes that her legs are becoming increasingly more swollen. We will give a dose of IV Lasix, follow her cardiac labs, trend her renal function, and discussed the case with her cardiologist and her primary doctor. Estelle Goodman DO 4:57 PM 
 
 
 
Procedures Critical Care Time: Critical Care Time: The services I provided to this patient were to treat and/or prevent clinically significant deterioration that could result in the failure of one or more body systems and/or organ systems due to volume overload requiring bumex gtt . Services included the following: 
-reviewing nursing notes and old charts 
-vital sign assessments 
-direct patient care 
-medication orders and management 
-interpreting and reviewing diagnostic studies/labs 
-re-evaluations 
-documentation time Aggregate critical care time was 35 minutes, which includes only time during which I was engaged in work directly related to the patient's care as described above, whether I was at bedside or elsewhere in the Emergency Department. It did not include time spent performing other reported procedures or the services of residents, students, nurses, or advance practice providers. Elijah Farrell DO 5:55 PM 
 
 
Diagnosis Clinical Impression: 1. Hypervolemia, unspecified hypervolemia type 2. Chronic renal impairment, unspecified CKD stage 3. Malaise and fatigue Disposition: Admit Follow-up Information None Patient's Medications Start Taking No medications on file Continue Taking APIXABAN (ELIQUIS) 5 MG TABLET    Take 1 Tab by mouth two (2) times a day. CALCIUM-CHOLECALCIFEROL, D3, (CALCIUM 600 WITH VITAMIN D3) 600 MG(1,500MG) -400 UNIT CHEW    Take 1 Tab by mouth daily. CETIRIZINE HCL (ZYRTEC PO)    Take  by mouth. CHOLECALCIFEROL (VITAMIN D3) 1,000 UNIT CAP    Take  by mouth daily. CYANOCOBALAMIN, VITAMIN B-12, (VITAMIN B12 PO)    Take 1,000 mcg by mouth daily. DIGITEK 125 MCG (0.125 MG) TABLET    take 1 tablet by mouth once daily FAMOTIDINE (PEPCID) 20 MG TABLET    Take 1 Tab by mouth daily. FOLIC ACID 629 MCG TABLET    Take 400 mcg by mouth daily. FUROSEMIDE (LASIX) 40 MG TABLET    take 1 tablet by mouth every morning and 1/2 tablet AT 2 P.M.  
 GABAPENTIN (NEURONTIN) 100 MG CAPSULE    Take 1 Cap by mouth nightly. Max Daily Amount: 100 mg. LACTOBACILLUS SP. 50 BILLION CPU (BIO-K PLUS) 50 BILLION CELL -375 MG CAP CAPSULE    Take 1 Cap by mouth daily. LOSARTAN (COZAAR) 100 MG TABLET    Take 100 mg by mouth daily. METOPROLOL TARTRATE (LOPRESSOR) 100 MG IR TABLET    take 1 tablet by mouth twice a day MONTELUKAST (SINGULAIR) 10 MG TABLET    Take 10 mg by mouth daily. ONDANSETRON HCL (ZOFRAN) 4 MG TABLET    Take 1 Tab by mouth every eight (8) hours as needed for Nausea. SIMVASTATIN (ZOCOR) 80 MG TABLET    TAKE 1 TABLET NIGHTLY  
 TRAMADOL (ULTRAM) 50 MG TABLET    Take 1 Tab by mouth every six (6) hours as needed for Pain. These Medications have changed No medications on file Stop Taking No medications on file Disclaimer: Sections of this note are dictated using utilizing voice recognition software. Minor typographical errors may be present. If questions arise, please do not hesitate to contact me or call our department.

## 2020-10-16 ENCOUNTER — APPOINTMENT (OUTPATIENT)
Dept: NON INVASIVE DIAGNOSTICS | Age: 80
DRG: 291 | End: 2020-10-16
Attending: NURSE PRACTITIONER
Payer: MEDICARE

## 2020-10-16 ENCOUNTER — APPOINTMENT (OUTPATIENT)
Dept: ULTRASOUND IMAGING | Age: 80
DRG: 291 | End: 2020-10-16
Attending: FAMILY MEDICINE
Payer: MEDICARE

## 2020-10-16 ENCOUNTER — APPOINTMENT (OUTPATIENT)
Dept: CT IMAGING | Age: 80
DRG: 291 | End: 2020-10-16
Attending: FAMILY MEDICINE
Payer: MEDICARE

## 2020-10-16 LAB
ALBUMIN SERPL-MCNC: 2.9 G/DL (ref 3.4–5)
ALBUMIN/GLOB SERPL: 0.6 {RATIO} (ref 0.8–1.7)
ALP SERPL-CCNC: 208 U/L (ref 45–117)
ALT SERPL-CCNC: 23 U/L (ref 13–56)
ANION GAP SERPL CALC-SCNC: 7 MMOL/L (ref 3–18)
AST SERPL-CCNC: 30 U/L (ref 10–38)
ATRIAL RATE: 61 BPM
BASOPHILS # BLD: 0 K/UL (ref 0–0.1)
BASOPHILS NFR BLD: 0 % (ref 0–2)
BILIRUB SERPL-MCNC: 0.8 MG/DL (ref 0.2–1)
BUN SERPL-MCNC: 54 MG/DL (ref 7–18)
BUN/CREAT SERPL: 28 (ref 12–20)
CALCIUM SERPL-MCNC: 9.3 MG/DL (ref 8.5–10.1)
CALCULATED P AXIS, ECG09: 32 DEGREES
CALCULATED R AXIS, ECG10: 127 DEGREES
CALCULATED T AXIS, ECG11: -22 DEGREES
CHLORIDE SERPL-SCNC: 98 MMOL/L (ref 100–111)
CK MB CFR SERPL CALC: 4 % (ref 0–4)
CK MB CFR SERPL CALC: 4.1 % (ref 0–4)
CK MB CFR SERPL CALC: 4.1 % (ref 0–4)
CK MB SERPL-MCNC: 1 NG/ML (ref 5–25)
CK MB SERPL-MCNC: 1.1 NG/ML (ref 5–25)
CK MB SERPL-MCNC: 1.2 NG/ML (ref 5–25)
CK SERPL-CCNC: 25 U/L (ref 26–192)
CK SERPL-CCNC: 27 U/L (ref 26–192)
CK SERPL-CCNC: 29 U/L (ref 26–192)
CO2 SERPL-SCNC: 31 MMOL/L (ref 21–32)
CREAT SERPL-MCNC: 1.95 MG/DL (ref 0.6–1.3)
DIAGNOSIS, 93000: NORMAL
DIFFERENTIAL METHOD BLD: ABNORMAL
DIGOXIN SERPL-MCNC: 1.3 NG/ML (ref 0.9–2)
ECHO AO ROOT DIAM: 3.06 CM
ECHO AR MAX VEL PISA: 397.72 CM/S
ECHO AV AREA PEAK VELOCITY: 0.53 CM2
ECHO AV AREA VTI: 0.58 CM2
ECHO AV AREA/BSA PEAK VELOCITY: 0.3 CM2/M2
ECHO AV AREA/BSA VTI: 0.3 CM2/M2
ECHO AV MEAN GRADIENT: 42.94 MMHG
ECHO AV PEAK GRADIENT: 74.2 MMHG
ECHO AV PEAK VELOCITY: 430.71 CM/S
ECHO AV REGURGITANT PHT: 0.37 S
ECHO AV VTI: 94.73 CM
ECHO IVC PROX: 2.63 CM
ECHO LA AREA 4C: 29.77 CM2
ECHO LA VOL 2C: 104.98 ML (ref 22–52)
ECHO LA VOL 4C: 99 ML (ref 22–52)
ECHO LA VOL BP: 111.97 ML (ref 22–52)
ECHO LA VOL/BSA BIPLANE: 67.18 ML/M2 (ref 16–28)
ECHO LA VOLUME INDEX A2C: 62.98 ML/M2 (ref 16–28)
ECHO LA VOLUME INDEX A4C: 59.39 ML/M2 (ref 16–28)
ECHO LV INTERNAL DIMENSION DIASTOLIC: 3.84 CM (ref 3.9–5.3)
ECHO LV INTERNAL DIMENSION SYSTOLIC: 1.95 CM
ECHO LV IVSD: 1.25 CM (ref 0.6–0.9)
ECHO LV MASS 2D: 165.5 G (ref 67–162)
ECHO LV MASS INDEX 2D: 99.3 G/M2 (ref 43–95)
ECHO LV POSTERIOR WALL DIASTOLIC: 1.25 CM (ref 0.6–0.9)
ECHO LVOT DIAM: 1.75 CM
ECHO LVOT PEAK GRADIENT: 3.61 MMHG
ECHO LVOT PEAK VELOCITY: 94.95 CM/S
ECHO LVOT SV: 55.2 ML
ECHO LVOT VTI: 22.9 CM
ECHO MV A VELOCITY: 100.94 CM/S
ECHO MV AREA PHT: 1.58 CM2
ECHO MV AREA VTI: 0.8 CM2
ECHO MV E DECELERATION TIME (DT): 0.29 S
ECHO MV E VELOCITY: 182.9 CM/S
ECHO MV E/A RATIO: 1.81
ECHO MV MAX VELOCITY: 204.72 CM/S
ECHO MV MEAN GRADIENT: 5.06 MMHG
ECHO MV PEAK GRADIENT: 16.76 MMHG
ECHO MV PRESSURE HALF TIME (PHT): 0.14 S
ECHO MV VTI: 68.93 CM
ECHO TV REGURGITANT MAX VELOCITY: 450.46 CM/S
ECHO TV REGURGITANT PEAK GRADIENT: 81.16 MMHG
EOSINOPHIL # BLD: 0.1 K/UL (ref 0–0.4)
EOSINOPHIL NFR BLD: 2 % (ref 0–5)
ERYTHROCYTE [DISTWIDTH] IN BLOOD BY AUTOMATED COUNT: 19.1 % (ref 11.6–14.5)
GLOBULIN SER CALC-MCNC: 5.2 G/DL (ref 2–4)
GLUCOSE SERPL-MCNC: 78 MG/DL (ref 74–99)
HCT VFR BLD AUTO: 29.4 % (ref 35–45)
HGB BLD-MCNC: 9 G/DL (ref 12–16)
IRON SATN MFR SERPL: 7 % (ref 20–50)
IRON SERPL-MCNC: 27 UG/DL (ref 50–175)
LVOT MG: 1.88 MMHG
LYMPHOCYTES # BLD: 0.8 K/UL (ref 0.9–3.6)
LYMPHOCYTES NFR BLD: 18 % (ref 21–52)
MAGNESIUM SERPL-MCNC: 2.7 MG/DL (ref 1.6–2.6)
MCH RBC QN AUTO: 22.2 PG (ref 24–34)
MCHC RBC AUTO-ENTMCNC: 30.6 G/DL (ref 31–37)
MCV RBC AUTO: 72.4 FL (ref 74–97)
MONOCYTES # BLD: 0.6 K/UL (ref 0.05–1.2)
MONOCYTES NFR BLD: 13 % (ref 3–10)
NEUTS SEG # BLD: 2.9 K/UL (ref 1.8–8)
NEUTS SEG NFR BLD: 67 % (ref 40–73)
P-R INTERVAL, ECG05: 278 MS
PLATELET # BLD AUTO: 217 K/UL (ref 135–420)
PLATELET COMMENTS,PCOM: ABNORMAL
PMV BLD AUTO: 9.6 FL (ref 9.2–11.8)
POTASSIUM SERPL-SCNC: 3.7 MMOL/L (ref 3.5–5.5)
PROT SERPL-MCNC: 8.1 G/DL (ref 6.4–8.2)
Q-T INTERVAL, ECG07: 432 MS
QRS DURATION, ECG06: 148 MS
QTC CALCULATION (BEZET), ECG08: 434 MS
RBC # BLD AUTO: 4.06 M/UL (ref 4.2–5.3)
RBC MORPH BLD: ABNORMAL
SODIUM SERPL-SCNC: 136 MMOL/L (ref 136–145)
TIBC SERPL-MCNC: 402 UG/DL (ref 250–450)
TROPONIN I SERPL-MCNC: 0.15 NG/ML (ref 0–0.04)
TROPONIN I SERPL-MCNC: 0.16 NG/ML (ref 0–0.04)
TROPONIN I SERPL-MCNC: 0.17 NG/ML (ref 0–0.04)
TSH SERPL DL<=0.05 MIU/L-ACNC: 1.98 UIU/ML (ref 0.36–3.74)
VENTRICULAR RATE, ECG03: 61 BPM
WBC # BLD AUTO: 4.4 K/UL (ref 4.6–13.2)

## 2020-10-16 PROCEDURE — 93306 TTE W/DOPPLER COMPLETE: CPT | Performed by: INTERNAL MEDICINE

## 2020-10-16 PROCEDURE — 74011000250 HC RX REV CODE- 250: Performed by: EMERGENCY MEDICINE

## 2020-10-16 PROCEDURE — 97161 PT EVAL LOW COMPLEX 20 MIN: CPT

## 2020-10-16 PROCEDURE — 84443 ASSAY THYROID STIM HORMONE: CPT

## 2020-10-16 PROCEDURE — 65270000029 HC RM PRIVATE

## 2020-10-16 PROCEDURE — 74011000258 HC RX REV CODE- 258: Performed by: EMERGENCY MEDICINE

## 2020-10-16 PROCEDURE — 90000 NO LOS: CPT | Performed by: PHYSICIAN ASSISTANT

## 2020-10-16 PROCEDURE — 74011250637 HC RX REV CODE- 250/637: Performed by: NURSE PRACTITIONER

## 2020-10-16 PROCEDURE — 2709999900 HC NON-CHARGEABLE SUPPLY

## 2020-10-16 PROCEDURE — 80053 COMPREHEN METABOLIC PANEL: CPT

## 2020-10-16 PROCEDURE — 97530 THERAPEUTIC ACTIVITIES: CPT

## 2020-10-16 PROCEDURE — 85025 COMPLETE CBC W/AUTO DIFF WBC: CPT

## 2020-10-16 PROCEDURE — 83735 ASSAY OF MAGNESIUM: CPT

## 2020-10-16 PROCEDURE — 77030038269 HC DRN EXT URIN PURWCK BARD -A

## 2020-10-16 PROCEDURE — 80162 ASSAY OF DIGOXIN TOTAL: CPT

## 2020-10-16 PROCEDURE — 77030011254 HC DRSG HYDRGEL S&N -A

## 2020-10-16 PROCEDURE — 74011250637 HC RX REV CODE- 250/637: Performed by: FAMILY MEDICINE

## 2020-10-16 PROCEDURE — 99223 1ST HOSP IP/OBS HIGH 75: CPT | Performed by: INTERNAL MEDICINE

## 2020-10-16 PROCEDURE — 92526 ORAL FUNCTION THERAPY: CPT

## 2020-10-16 PROCEDURE — 76536 US EXAM OF HEAD AND NECK: CPT

## 2020-10-16 PROCEDURE — 82550 ASSAY OF CK (CPK): CPT

## 2020-10-16 PROCEDURE — 36415 COLL VENOUS BLD VENIPUNCTURE: CPT

## 2020-10-16 PROCEDURE — 71250 CT THORAX DX C-: CPT

## 2020-10-16 PROCEDURE — 92610 EVALUATE SWALLOWING FUNCTION: CPT

## 2020-10-16 PROCEDURE — 83540 ASSAY OF IRON: CPT

## 2020-10-16 PROCEDURE — 77030041076 HC DRSG AG OPTICELL MDII -A

## 2020-10-16 PROCEDURE — 93306 TTE W/DOPPLER COMPLETE: CPT

## 2020-10-16 RX ORDER — METOLAZONE 5 MG/1
5 TABLET ORAL DAILY
Status: COMPLETED | OUTPATIENT
Start: 2020-10-17 | End: 2020-10-17

## 2020-10-16 RX ORDER — METOPROLOL TARTRATE 25 MG/1
25 TABLET, FILM COATED ORAL 2 TIMES DAILY
Status: DISCONTINUED | OUTPATIENT
Start: 2020-10-16 | End: 2020-10-19

## 2020-10-16 RX ORDER — METOLAZONE 5 MG/1
5 TABLET ORAL DAILY
Status: DISCONTINUED | OUTPATIENT
Start: 2020-10-16 | End: 2020-10-16

## 2020-10-16 RX ORDER — METOLAZONE 5 MG/1
5 TABLET ORAL DAILY
Status: DISCONTINUED | OUTPATIENT
Start: 2020-10-17 | End: 2020-10-16

## 2020-10-16 RX ADMIN — OYSTER SHELL CALCIUM WITH VITAMIN D 1 TABLET: 500; 200 TABLET, FILM COATED ORAL at 08:00

## 2020-10-16 RX ADMIN — CHOLECALCIFEROL (VITAMIN D3) 25 MCG (1,000 UNIT) TABLET 1 TABLET: TABLET at 09:58

## 2020-10-16 RX ADMIN — METOLAZONE 5 MG: 5 TABLET ORAL at 10:19

## 2020-10-16 RX ADMIN — APIXABAN 5 MG: 5 TABLET, FILM COATED ORAL at 09:58

## 2020-10-16 RX ADMIN — FAMOTIDINE 20 MG: 20 TABLET ORAL at 09:59

## 2020-10-16 RX ADMIN — APIXABAN 5 MG: 5 TABLET, FILM COATED ORAL at 18:42

## 2020-10-16 RX ADMIN — GABAPENTIN 100 MG: 100 CAPSULE ORAL at 21:45

## 2020-10-16 RX ADMIN — OYSTER SHELL CALCIUM WITH VITAMIN D 1 TABLET: 500; 200 TABLET, FILM COATED ORAL at 16:43

## 2020-10-16 RX ADMIN — SODIUM CHLORIDE 0.5 MG/HR: 900 INJECTION, SOLUTION INTRAVENOUS at 16:42

## 2020-10-16 RX ADMIN — FOLIC ACID 1 MG: 1 TABLET ORAL at 09:58

## 2020-10-16 RX ADMIN — ATORVASTATIN CALCIUM 40 MG: 40 TABLET, FILM COATED ORAL at 21:45

## 2020-10-16 RX ADMIN — MONTELUKAST 10 MG: 10 TABLET, FILM COATED ORAL at 09:58

## 2020-10-16 NOTE — PROGRESS NOTES
Reason for Admission:  Anasarca [R60.1] RUR Score:   21% Plan for utilizing home health:   Patient has no home health orders, in place, at this time. This writer will continue to monitor for potential home health needs and orders. Likelihood of Readmission:   Moderate Do you (patient/family) have any concerns for transition/discharge? NO Transition of Care Plan:  Patient plans to return home with help from her family and caregivers. Her caregiver Adeel Stone will transport her home, upon discharge. Initial assessment completed with patient. Cognitive status of patient: Alert and oriented. Face sheet information confirmed:  yes. The patient designates her caregiver Adeel Stone to participate in her discharge plan and to receive any needed information. This patient lives in a house with her  Niels Breen. Patient is able to navigate steps as needed. She has no steps in her home. Prior to hospitalization, patient was considered to be independent with ADLs/IADLS : yes . Patient has a current ACP document on file: no. This writer completed an ACP, with patient. Patient's caregiver Adeel Stone will be available to transport patient home upon discharge. The patient already has a walker, a cane and a wheelchair, available in the home. Patient is not currently active with home health. Patient has not stayed in a skilled nursing facility or rehab. This patient is on dialysis :no 
 
Currently, the discharge plan is for patient to return home with help from her caregivers. Her caregiver Adeel Stone will transport her home, upon discharge. Patient's  is (Glenny Bush, XY#[phone]). Patient's caregiver/primary decision maker is MARGUERITE Garber#[phone] and SHASHI#[phone]). Patient's other caregiver is EMI Garcia#[phone]). Patient's PCP is Dr. Alize Castro.  Patient is insured through [facility]. The patient states that she can obtain her medications from the pharmacy, and take her medications as directed. This writer will continue to monitor for discharge planning to ensure a safe discharge home from [place] [facility]. Care Management Interventions PCP Verified by CM: Yes(Dr. Chrissy Vargas) Palliative Care Criteria Met (RRAT>21 & CHF Dx)?: No 
Mode of Transport at Discharge: Other (see comment)(Kenji Thomas will transport her home, upon discharge) Transition of Care Consult (CM Consult): Discharge Planning Current Support Network: Lives with Spouse, Has Personal Caregivers Confirm Follow Up Transport: Other (see comment)(Caregiver Jessica Soto) will transport.) Discharge Location Discharge Placement: Home with family assistance Brennon Bailey. MSW Care Manager Pager#: [phone]

## 2020-10-16 NOTE — PROGRESS NOTES
Problem: Impaired Skin Integrity/Pressure Injury Treatment Goal: *Improvement of Existing Pressure Injury Outcome: Progressing Towards Goal 
Goal: *Prevention of pressure injury Description: Document Angel Scale and appropriate interventions in the flowsheet. Outcome: Progressing Towards Goal 
Note: Pressure Injury Interventions: Activity Interventions: Pressure redistribution bed/mattress(bed type) Mobility Interventions: HOB 30 degrees or less, Pressure redistribution bed/mattress (bed type) Nutrition Interventions: Document food/fluid/supplement intake

## 2020-10-16 NOTE — PROGRESS NOTES
Received pt to room 457  via stretcher from the ER in no noted/stated distress. Patient is oriented to the room, skin assessment completed by two RNs and pt is assisted to the bathroom. Call bell within reach, and pt is instructed to call for assistance as needed. Will continue to monitor. unable to assess orientation to person

## 2020-10-16 NOTE — PROGRESS NOTES
TRANSFER - IN REPORT: 
 
Verbal report received from Ameena Henson RN(name) on [employer]  being received from ER(unit) for routine progression of care Report consisted of patients Situation, Background, Assessment and  
Recommendations(SBAR). Information from the following report(s) SBAR, Kardex, ED Summary, Intake/Output and MAR was reviewed with the receiving nurse. Opportunity for questions and clarification was provided. Assessment completed upon patients arrival to unit and care assumed.

## 2020-10-16 NOTE — PROGRESS NOTES
Problem: Dysphagia (Adult) Goal: *Acute Goals and Plan of Care (Insert Text) Outcome: Resolved/Met SPEECH LANGUAGE PATHOLOGY BEDSIDE SWALLOW EVALUATION AND DISCHARGE Patient: Christian Doyle (13 y.o. female) Date: 10/16/2020 Primary Diagnosis: Anasarca [R60.1] Precautions: none PLOF: Per H&P 
 
ASSESSMENT : 
Clinical beside swallow eval completed per MD orders. Pt A&Ox4. Functional communication. Intelligibility >90%. OM examination revealed oral motor structures functional for mastication and deglutition. Presented with thin liquid, puree, and solid trials. Exhibited functional bolus cohesion, manipulation and A-P transit. Further exhibited adequate swallow timing/reflex and hyolaryngeal excursion. Pt able to manipulate and clear with 0 clinical s/s aspiration and/or oropharyngeal dysphagia. Pt safe for Regular solid, thin liquid diet. 0 formal ST needs for dysphagia indicated at this time. No evidence of learning at this time. SLP d/w RN, Idalia Sahu. SLP available for re-evaluation if indicated by MD or concern for SILENT aspiration. Thank you for this referral.  
Anahy Anaya, SLP 
MA, CCC-SLP Speech-Language Pathologist 
  
 
PLAN : 
Recommendations and Planned Interventions: 
No formal ST needs ID'd for dysphagia. Eval only. Discharge Recommendations: None SUBJECTIVE:  
Patient stated It's [id number]. OBJECTIVE:  
 
Past Medical History:  
Diagnosis Date Arthritis Atrial fibrillation ([address] CHF (congestive heart failure) ([address] Heart murmur History of seasonal allergies HTN (hypertension) Hypercholesteremia Moderate aortic stenosis Pulmonary hypertension ([address] Venous insufficiency Past Surgical History:  
Procedure Laterality Date HX KNEE ARTHROSCOPY    
 left and right HX ORTHOPAEDIC    
 right ankle Home Situation:  
Home Situation Home Environment: Apartment One/Two Story Residence: One story Living Alone: No 
 Support Systems: Spouse/Significant Other/Partner Patient Expects to be Discharged to[de-identified] DARYA Current DME Used/Available at Home: None Diet prior to admission: Regular with thin liquids Current Diet:  Regular with thin liquids Cognitive and Communication Status: 
Neurologic State: Alert Orientation Level: Oriented to person Cognition: Decreased attention/concentration, Decreased command following, Memory loss Oral Assessment: 
Oral Assessment Labial: No impairment Dentition: Upper & lower dentures;Edentulous Oral Hygiene: fair Lingual: No impairment P.O. Trials: 
Patient Position: > 60 Vocal quality prior to P.O.: No impairment Consistency Presented: Puree; Solid; Thin liquid How Presented: SLP-fed/presented;Cup/sip;Spoon;Straw;Successive swallows Bolus Acceptance: No impairment Bolus Formation/Control: No impairment Propulsion: No impairment Oral Residue: None Initiation of Swallow: No impairment Laryngeal Elevation: Functional 
Aspiration Signs/Symptoms: None Pharyngeal Phase Characteristics: No impairment, issues, or problems Oral Phase Severity: No impairment Pharyngeal Phase Severity : No impairment PAIN: 
Pain level pre-treatment: 0/10 Pain level post-treatment: 0/10 Pain Intervention(s): Medication (see MAR); Rest, Ice, Repositioning Response to intervention: Nurse notified, See doc flow After evaluation:  
[]            Patient left in no apparent distress sitting up in chair 
[x]            Patient left in no apparent distress in bed 
[x]            Call bell left within reach [x]            Nursing notified []            Family present 
[]            Caregiver present 
[]            Bed alarm activated COMMUNICATION/EDUCATION:  
[]            Aspiration precautions; swallow safety; compensatory techniques. []            Patient/family have participated as able in goal setting and plan of care. []            Patient/family agree to work toward stated goals and plan of care. []            Patient understands intent and goals of therapy; neutral about participation. []            Patient unable to participate in goal setting/plan of care; educ ongoing with interdisciplinary staff [x]         Posted safety precautions in patient's room. Thank you for this referral. 
Sandi Bernardo, SLP 
MA, CCC-SLP Speech-Language Pathologist 
 
Time Calculation: 23 mins

## 2020-10-16 NOTE — H&P
History & Physical 
 
Patient: Katie Castañeda MRN: [id number]  CSN: [id number] YOB: 1940  Age: 68 y.o. Sex: female DOA: 10/15/2020 Chief Complaint:  
Chief Complaint Patient presents with  Peripheral Edema HPI:  
 
Katie Castañeda is a 68 y.o.  female who has history of congestive heart failure atrial fibrillation aortic stenosis/aortic incompetency pulmonary hypertension venous stasis thyroid cancer hypertension hyperlipidemia, pace maker Patient was seen by cardiology was sent to the ER due to progressive worsening of swelling in the lower extremity. Patient used to be on Coumadin was switched from Coumadin to Eliquis by cardiology Patient has poor compliance for follow-up with his primary care. Patient did not follow-up in our office since previous admission to [facility] September 25, 2019 Patient has history of hypertrophic cardiomyopathy with septal ablation Patient has chest x-ray in the ER showed no acute cardiopulmonary disease Has history of initial lab work in the ER white blood cells 4.4 H&H 9.3/30.9 platelet 962 sodium 456 potassium 4 creatinine 2.07 magnesium 2.8 troponin slightly elevated 0.17 pro B and P 13,139 EKG atrial paced rest prolonged AV conduction right bundle branch block T wave abnormality Cardiology was consulted the ER patient started on Bumex 0.5 mg/h Past Medical History:  
Diagnosis Date  Arthritis  Atrial fibrillation ([address]  CHF (congestive heart failure) ([address]  Heart murmur  History of seasonal allergies  HTN (hypertension)  Hypercholesteremia  Moderate aortic stenosis  Pulmonary hypertension ([address]  Venous insufficiency Past Surgical History:  
Procedure Laterality Date  HX KNEE ARTHROSCOPY    
 left and right  HX ORTHOPAEDIC    
 right ankle Family History Problem Relation Age of Onset  Cancer Other  Heart Disease Other  Cancer Mother  Heart Disease Mother Social History Socioeconomic History  Marital status:  Spouse name: Not on file  Number of children: Not on file  Years of education: Not on file  Highest education level: Not on file Tobacco Use  Smoking status: Never Smoker  Smokeless tobacco: Never Used Substance and Sexual Activity  Alcohol use: No  
 Drug use: No  
Social History Narrative Pt has a dog Born in [facility] and moved to [place] years ago Prior to Admission medications Medication Sig Start Date End Date Taking? Authorizing Provider  
apixaban (Eliquis) 5 mg tablet Take 1 Tab by mouth two (2) times a day. 6/12/20  Yes Renetta Vasquez NP Calcium-Cholecalciferol, D3, (CALCIUM 600 WITH VITAMIN D3) 600 mg(1,500mg) -400 unit chew Take 1 Tab by mouth daily. Yes Provider, Historical  
furosemide (LASIX) 40 mg tablet take 1 tablet by mouth every morning and 1/2 tablet AT 2 P.M. Patient taking differently: i tab every other day 8/17/20   Markie Reyes NP Digitek 125 mcg (0.125 mg) tablet take 1 tablet by mouth once daily 4/28/20   Mortimer Santos, MD  
metoprolol tartrate (LOPRESSOR) 100 mg IR tablet take 1 tablet by mouth twice a day 4/22/20   Mortimer Santos, MD  
traMADol [employer] Melara) 50 mg tablet Take 1 Tab by mouth every six (6) hours as needed for Pain. 12/11/18   Provider, Historical  
folic acid 865 mcg tablet Take 400 mcg by mouth daily. Provider, Historical  
cyanocobalamin, vitamin B-12, (VITAMIN B12 PO) Take 1,000 mcg by mouth daily. Provider, Historical  
famotidine (PEPCID) 20 mg tablet Take 1 Tab by mouth daily. 9/26/19   Jaimee Kim MD  
gabapentin (NEURONTIN) 100 mg capsule Take 1 Cap by mouth nightly. Max Daily Amount: 100 mg. 9/25/19   Jaimee Kim MD  
lactobacillus sp. 50 billion cpu (BIO-K PLUS) 50 billion cell -375 mg cap capsule Take 1 Cap by mouth daily.  9/26/19   Jaimee Kim MD  
 ondansetron hcl (ZOFRAN) 4 mg tablet Take 1 Tab by mouth every eight (8) hours as needed for Nausea. 19   Luis Fernando Perera PA-C  
simvastatin (ZOCOR) 80 mg tablet TAKE 1 TABLET NIGHTLY 18   Shoshana Kerns MD  
cholecalciferol (VITAMIN D3) 1,000 unit cap Take  by mouth daily. Chinyere Astorga MD  
losartan (COZAAR) 100 mg tablet Take 100 mg by mouth daily. Provider, Historical  
montelukast (SINGULAIR) 10 mg tablet Take 10 mg by mouth daily. Provider, Historical  
CETIRIZINE HCL (ZYRTEC PO) Take  by mouth. Gauri, MD Chinyere  
 
 
No Known Allergies Review of Systems GENERAL: Patient alert, awake and oriented times 3, able to communicate full sentences and not in distress. HEENT: No change in vision, no earache, tinnitus, sore throat or sinus congestion. NECK: No pain or stiffness. CARDIOVASCULAR: No chest pain or pressure. PULMONARY: No shortness of breath, cough or wheeze. GASTROINTESTINAL: No abdominal pain, nausea, vomiting or diarrhea, melena or       bright red blood per rectum. GENITOURINARY: No urinary frequency, urgency, hesitancy or dysuria. MUSCULOSKELETAL: generalized weakness DERMATOLOGIC: No rash, no itching, no lesions. ENDOCRINE: No polyuria, polydipsia, no heat or cold intolerance. No recent change in    weight. HEMATOLOGICAL: No anemia or easy bruising or bleeding. NEUROLOGIC: No headache, seizures, generalized weakness PSYCHIATRIC: No depression, anxiety, mood disorder, no loss of interest in normal       activity or change in sleep pattern. Physical Exam:  
 
Physical Exam: 
Visit Vitals /67 (BP 1 Location: Left arm, BP Patient Position: At rest) Pulse 61 Temp 97.7 °F (36.5 °C) Resp 18 Ht 5' 3\" (1.6 m) Wt 64 kg (141 lb) SpO2 94% BMI 24.98 kg/m² O2 Device: Room air Temp (24hrs), Av.7 °F (36.5 °C), Min:97.5 °F (36.4 °C), Max:98 °F (36.7 °C) 
  10/15 1901 - 10/16 0700 In: -  
 Out: 200 [Urine:200]   No intake/output data recorded. General:  Alert, no distress, appears stated age. Head:  Normocephalic, without obvious abnormality, atraumatic. Eyes:  Conjunctivae/corneas clear. PERRL, EOMs intact. Nose: Nares normal. No drainage or sinus tenderness. Throat: Lips, mucosa, and tongue dry Neck: Supple, symmetrical, trachea midline, no adenopathy, thyroid: no enlargement/tenderness/nodules,   no JVD. Back:   ROM normal. No CVA tenderness. Lungs:   Clear to auscultation bilaterally. Chest wall:  No tenderness or deformity. Heart:  Regular rate and rhythm, S1, S2 normal, pan systolic on the apex ejection systolic on and aortic area Abdomen: Soft, non-tender. Bowel sounds normal. No masses,  No organomegaly. Extremities: Extremities swelling redness B/L leg ulcer Rt leg sup no drainage Pulses: 2+ and symmetric all extremities. Skin: Skin color, texture, turgor normal.  
Neurologic: CNII-XII intact. No focal motor or sensory deficit. Labs Reviewed: All lab results for the last 24 hours reviewed. CXR and EKG Procedures/imaging: see electronic medical records for all procedures/Xrays and details which were not copied into this note but were reviewed prior to creation of Plan Assessment/Plan Active Problems: 
  Obesity (BMI 30.0-34.9) (10/13/2016) Pulmonary HTN ([facility] (12/21/2016) [facility] (12/21/2016) Mild HOCM (hypertrophic obstructive cardiomyopathy) ([facility] (1/12/2017) Anasarca (10/15/2020) Plan Acute systolic congestive heart failure on top of chronic diastolic congestive heart failure Bumex 0.5 mg/h Follow electrolytes Lopressor 50 mg twice daily with holding parameter patient was on 100 mg twice a day at home Check cardiac enzyme every 8 Paroxysmal A. fib Continue Eliquis 5 mg twice daily Follow-up CBC Hypertension Continue metoprolol 50 mg twice daily Monitor blood pressure Patient is to be on Cozaar at home 100 mg Hyperlipidemia Continue Zocor 80 mg nightly Follow-up liver function Anemia Check iron profile T85 folic acid History of thyroid cancer Patient needs follow-up with ENT as outpatient Check TSH and free T4 Check ct scan chest  
 
Venous stasis ulcer Wound care Pt was seen by vascular as outpatinet Will ask vascular to follow up while pt in the hospital  
 
DVT/GI Prophylaxis: H2B/PPI and Eliquis Time for admission more than 65 minutes  included review outside record test result discussion with ER nursing staff and patient  and documentation coordination of care and  Cardiology consult and vascular consult, wound consult Rachid Victor MD 
10/16/2020 
6:13 AM

## 2020-10-16 NOTE — PROGRESS NOTES
Vascular consult requested Attempted to see patient She was talking on the phone I made my presence known by coming into the room, applying gloves. She didn't make eye contact and remained on her phone call. I waited outside her door over 10 minutes and ultimately walked away as she was still talking I did review her chart. She has leg edema, leg wound. We have seen her in the past and applied unna boots. However, since she is acute CHF we avoid aggressive compression in this setting. I have already contacted wound care who plans to see patient this afternoon and apply appropriate wound care and then to utilize tubi  for compression as she is diuresed. Can transition to unna boots if deemed necessary/appropriate

## 2020-10-16 NOTE — PROGRESS NOTES
Echocardiogram completed. Patient returned to room with armband in place. Report to follow.  
 
[address] [facility]

## 2020-10-16 NOTE — ACP (ADVANCE CARE PLANNING)
Advance Care Planning Advance Care Planning Clinical Specialist 
Conversation Note Date of ACP Conversation: 10/16/20 Conversation Conducted with:  Viet Early ACP Clinical Specialist: Tim Mcmanus, [address] Decision Maker: NO 
 
Current Designated Health Care Decision Maker: N/A If no Decision Maker listed above or available through scanned documents, then: 
 
If no Authorized Decision Maker has previously been identified, then patient chooses [address]: \"Who would you like to name as your primary health care decision-maker? \" Name: Luis Sage   Relationship: Caregiver          Phone number: [phone] and [phone] \"Can this person be reached easily? \" [facility] Care Preferences Hospitalization: \"If your health worsens and it becomes clear that your chance of recovery is unlikely, what would your preference be regarding hospitalization? \" Choice: 
[x]  The patient wants hospitalization 
[]  The patient prefers comfort-focused treatment without hospitalization. [] Yes  [x] No   Educated Patient / Kait Davis regarding differences between Advance Directives and portable DNR orders. Length of ACP Conversation in minutes:   
 
Conversation Outcomes: 
[x] ACP discussion completed 
[] Existing advance directive reviewed with patient; no changes to patient's previously recorded wishes 
 [x] New Advance Directive completed 
 [] Portable Do Not Resuscitate prepared for Provider review and signature 
[] POLST/POST/MOLST/MOST prepared for Provider review and signature Follow-up plan:   
[] Schedule follow-up conversation to continue planning 
[] Referred individual to Provider for additional questions/concerns  
[] Advised patient/agent/surrogate to review completed ACP document and update if needed with changes in condition, patient preferences or care setting  
 
[x] This note routed to one or more involved healthcare providers Brennon Jorge. MS Care Manager Pager#: [phone]

## 2020-10-16 NOTE — ROUTINE PROCESS
Bedside shift change report given to Camille Farrell RN (oncoming nurse) by Jared Terrell RN (offgoing nurse). Report included the following information SBAR, Kardex, ED Summary and MAR.

## 2020-10-16 NOTE — PROGRESS NOTES
conducted an initial consultation and Spiritual Assessment for Freddie Shelley, who is a 68 y.o.,female. Patient's Primary Language is: [place]. According to the patient's EMR Yazidi Affiliation is: [place]. The reason the Patient came to the hospital is:  
Patient Active Problem List  
 Diagnosis Date Noted  Anasarca 10/15/2020  CHF (congestive heart failure) ([facility] 09/20/2019  UTI (urinary tract infection) 09/20/2019  Chronic diastolic congest[facility] failure [facility] 12/12/2017  Aortic regurgitation 06/13/2017  Mitral regurgitation 06/13/2017  
 SOB (shortness of breath) 02/13/2017  Mild HOCM (hypertrophic obstructive cardiomyopathy) ([facility] 01/12/2017  Aortic stenosis 01/12/2017  Pulmonary HTN ([facility] 12/21/2016  Papillary thyroid carcinoma ([facility] 12/21/2016  Lung nodule 11/04/2016  Obesity (BMI 30.0-34.9) 10/13/2016  Pain and swelling of lower leg 06/29/2015 The  provided the following Interventions: 
Introduced myself and initiated a relationship of care and support. Listened empathically. Provided information about Spiritual Care Services. Offered prayer and assurance of continued prayers on patient's behalf. The following outcomes where achieved: 
Patient processed feeling about current hospitalization. Patient expressed gratitude for 's visit. Patient shared how enjoy working on [employer]. Assessment: 
Patient does not have any Synagogue/cultural needs that will affect patient's preferences in health care. Plan: 
Chaplains will continue to follow and will provide pastoral care on an as needed/requested basis. Chaplain JENNIFER Mehta Spiritual Care  
[phone]

## 2020-10-16 NOTE — PROGRESS NOTES
Comprehensive Nutrition Assessment Type and Reason for Visit: Initial, Positive nutrition screen(MST) Nutrition Recommendations/Plan: - Modify supplement: change to Magic Cup BID Nutrition Assessment:  Pt reported good appetite/ po intake PTA; fair/good since admission. Tolerating diet. Viewed lunch tray, ate about 75% of her meal. Had unplanned wt loss PTA; used to trend in the 200 lb range. Agreeable to nutrition supplement; dislikes Ensure drinks, agreeable to [employer] Malnutrition Assessment: 
Malnutrition Status: At risk for malnutrition (specify)(unplanned wt loss during past several years) Nutrition History and Allergies: Past medical hx:  CHF, HTN, hypercholesterolemia. Pt reported experiencing unplanned wt loss PTA. Good appetite/ po intake; usually eats 2-3 meals daily about the same size as inhouse meals. No known food allergies Estimated Daily Nutrient Needs: 
Energy (kcal):  5274-0916 Protein (g):  70-77 Fluid (ml/day):  1 mL/kcal 
 
Nutrition Related Findings:  BM 10/16. +edema; on bumex. taking os-king, vitamin D3, folic acid Wounds:   
Venous statis Current Nutrition Therapies: DIET CARDIAC Regular DIET NUTRITIONAL SUPPLEMENTS All Meals; Ensure [employer] Anthropometric Measures: 
· Height:  5' 3\" (160 cm) · Current Body Wt:  64 kg (141 lb 1.5 oz) · Admission Body Wt:  141 lb 1.5 oz · Usual Body Wt:  207 lb(205-210 lb; from 2012 to 2015) · Ideal Body Wt:  115 lbs:  122.7 % · BMI Category:  Normal weight (BMI 22.0-24.9) age over 72 Nutrition Diagnosis:  
· Unintended weight loss related to (predicted prolonged inadequate energy intake) as evidenced by weight loss(66 lb, 31.9% x 5 years PTA per chart hx) Nutrition Interventions:  
Food and/or Nutrient Delivery: Continue current diet, Modify oral nutrition supplement, Mineral supplement Nutrition Education and Counseling: No recommendations at this time Coordination of Nutrition Care: Continued inpatient monitoring Goals: 
PO nutrition intake will meet >75% of patient's estimated nutrition needs within the next 7 days Nutrition Monitoring and Evaluation:  
Food/Nutrient Intake Outcomes: Food and nutrient intake, Supplement intake, Vitamin/mineral intake Physical Signs/Symptoms Outcomes: Biochemical data, Meal time behavior, Nutrition focused physical findings Discharge Planning:   
Continue oral nutrition supplement, Continue current diet Electronically signed by Denise Lundberg RD on 10/16/2020 at 3:56 PM 
 
Contact: [phone]

## 2020-10-16 NOTE — CONSULTS
[facility] - Consult Note Date of  Admission: 10/15/2020  3:49 PM 
  
Primary Care Physician:  Marine Ames MD 
 
 Plan: 1. Acute on chronic diastolic congestive heart failure-decompensated- unable to quantify diuresis as patient is incontinent. Has BLE edema and elevated NT pro BNP >13.000 - continue diuresis with Bumex drip added metolazone 5 mg po daily x 2days 2. Hypertrophic obstructive cardiomyopathy-S/p alcohol septal ablation 2/19 3. Hx complete heart block - s/p dual chambers permanent pacemaker 2/19 [employer] 4. Hypertension- stable on bb.  monitor with diuresis 5. Paroxysmal  Atrial fibrillation-rate controlled. on Eliquis for stroke prevention 6. Aortic  stenosis - Moderate last echo shows Aortic valve mean gradient is 28.5 mmHg. Aortic valve area is 0.9 cm2. Will obtain echo to reassess AS and PAP - likely needs AVR 7. Pulmonary hypertension-  echo 9/19 shows PAP 69 mmHg 8. BLE edema with venous stasis- Recommended  wound care- further recommendations per medical team   
9. CKD- creatine stable monitor with aggressive diuresis Patient with acute on chronic diastolic CHF and critical aortic stenosis currently on Bumex. Monitor input output and renal indicis. Once CHF improves, will consider right heart catheterization and referral for TAVR. 
 
  
09/20/19 ECHO ADULT COMPLETE 09/25/2019 9/25/2019 Narrative · Left Ventricle: Normal cavity size and systolic function (ejection  
fraction normal). Moderate concentric hypertrophy. Estimated left  
ventricular ejection fraction is 61 - 65%. Abnormal left ventricular  
septal motion consistent with right ventricular pacing. Interventricular  
septal \"bounce\". Abnormal left ventricular strain. Inconclusive left  
ventricular diastolic function. · Right Ventricle: Mildly reduced systolic function. Abnormal right  
ventricular septal motion. Diastolic flattening of interventricular septum consistent with right ventricle volume overload. · Left Atrium: Severely dilated left atrium. · Right Atrium: Dilated right atrium. · Pulmonic Valve: Mild pulmonic valve regurgitation is present. · Tricuspid Valve: Moderate tricuspid valve regurgitation is present. · Pulmonary Artery: Moderate pulmonary hypertension. Pulmonary arterial  
systolic pressure is 69 mmHg. · Aortic Valve: Probably trileaflet aortic valve. Aortic valve leaflet  
calcification present with reduced excursion. Aortic valve peak gradient  
is 47.2 mmHg. Aortic valve mean gradient is 28.5 mmHg. Aortic valve area  
is 0.9 cm2. Moderate aortic valve stenosis is present. Mild aortic valve  
regurgitation is present. · Moderate mitral annular calcification Signed by: Roby Thakkar MD  
 
 
  
  
 
XR Results (most recent): 
Results from [facility] [place], [place] Encounter encounter on 10/15/20 XR CHEST PORT Narrative CHEST PORTABLE 1537 hours COMPARISON: September 20, 2019. INDICATIONS: Chest pain. FINDINGS:  
 
Portable single view chest demonstrates: 
 
Lungs: There is chronic atelectasis in the medial segment of the left lower lobe 
and to a lesser extent the infrahilar region of the right lower lobe. These 
findings are similar to prior studies. Prominent bronchovascular markings are 
evident diffusely. No focal infiltrate is seen. Yonas Haley Cardiac Silhouette And Mediastinal Contours: There is marked enlargement of the 
cardiac contours. The pulmonary outflow tract region is particularly prominent. Dual-lead permanent pacemaker appears satisfactory. This is unchanged. Pleural Spaces: No pneumothorax or pleural effusion evident. Bones And Soft Tissues: Unremarkable for age. Impression IMPRESSION: 
 
No acute cardiopulmonary process is evident. No change from prior studies Assessment:  
 
Hospital Problems  Date Reviewed: 10/15/2020 Codes Class Noted POA Anasarca ICD-10-CM: R60.1 ICD-9-CM: 782.3  10/15/2020 Unknown Mild HOCM (hypertrophic obstructive cardiomyopathy) (HCC) ICD-10-CM: I42.1 ICD-9-CM: 425.11  1/12/2017 Yes Pulmonary HTN ([facility] 75.) ICD-10-CM: I27.20 ICD-9-CM: 416.8  12/21/2016 Yes Papillary thyroid carcinoma ([facility].) ICD-10-CM: H25 ICD-9-CM: 548  12/21/2016 Yes Obesity (BMI 30.0-34.9) ICD-10-CM: M57.1 ICD-9-CM: 278.00  10/13/2016 Yes History of Present Illness: This is a 68 y.o. female admitted for Anasarca [R60.1]. Patient with PMHx of [facility] 59, atrial fibrillation, aortic stenosis/aortic incompetency pulmonary hypertension venous stasis thyroid cancer hypertension hyperlipidemia, pace maker. The patient was seen by Dr. Pop Collins on 10/13/20 and she was advised to come to the ED for admission and IV diuretics. Patient c/o increasing in BLE edema and SOB. She reports she ambulates with a walker. She denies chest pain or chest pressure. She reports compliance with medications. She is also confused and poor historian in the ED initial w/u shows Chest x-ray w/o  acute cardiopulmonary process. Her NT pro BNP is 13.000. creatinine 1.95. troponin 0.17. patient is resting in bed comfortable denies other complains.    
  
 
 
 Past Medical History:  
 
Past Medical History:  
Diagnosis Date  Arthritis  Atrial fibrillation ([facility]  CHF (congestive heart failure) ([facility]  Heart murmur  History of seasonal allergies  HTN (hypertension)  Hypercholesteremia  Moderate aortic stenosis  Pulmonary hypertension ([facility]  Venous insufficiency Social History:  
 
Social History Socioeconomic History  Marital status:  Spouse name: Not on file  Number of children: Not on file  Years of education: Not on file  Highest education level: Not on file Tobacco Use  Smoking status: Never Smoker  Smokeless tobacco: Never Used Substance and Sexual Activity  Alcohol use:  No  
  Drug use: No  
Social History Narrative Pt has a dog Born in [facility] and moved to [place] years ago Family History:  
 
Family History Problem Relation Age of Onset  Cancer Other  Heart Disease Other  Cancer Mother  Heart Disease Mother Medications:  
No Known Allergies Current Facility-Administered Medications Medication Dose Route Frequency  metoprolol tartrate (LOPRESSOR) tablet 25 mg  25 mg Oral BID  [START ON 10/17/2020] metOLazone (ZAROXOLYN) tablet 5 mg  5 mg Oral DAILY  bumetanide (BUMEX) 12.5 mg in 0.9% sodium chloride 100 mL infusion  0.5 mg/hr IntraVENous CONTINUOUS  
 apixaban (ELIQUIS) tablet 5 mg  5 mg Oral BID  calcium-vitamin D (OS-BALTAZAR) 500 mg-200 unit tablet  1 Tab Oral BID WITH MEALS  cholecalciferol (VITAMIN D3) (1000 Units /25 mcg) tablet 1 Tab  1,000 Units Oral DAILY  famotidine (PEPCID) tablet 20 mg  20 mg Oral DAILY  folic acid (FOLVITE) tablet 1 mg  1 mg Oral DAILY  gabapentin (NEURONTIN) capsule 100 mg  100 mg Oral QHS  montelukast (SINGULAIR) tablet 10 mg  10 mg Oral DAILY  ondansetron hcl (ZOFRAN) tablet 4 mg  4 mg Oral Q8H PRN  
 atorvastatin (LIPITOR) tablet 40 mg  40 mg Oral QHS  traMADoL (ULTRAM) tablet 50 mg  50 mg Oral Q6H PRN  
 influenza vaccine 2020-21 (6 mos+)(PF) (FLUARIX/FLULAVAL/FLUZONE QUAD) injection 0.5 mL  0.5 mL IntraMUSCular PRIOR TO DISCHARGE Review Of Systems:  
 
 
 
 
Constitutional: No fever, no chills, no weight loss, no night sweats HEENT: No epistaxis, no nasal drainage, no difficulty in swallowing, no redness in eyes Respiratory:  dyspnea on exertion. Cardiovascular:  dyspnea, no pnd, orthopnea, chronic leg edema. Gastrointestinal: no abd pain, no vomiting, no diarrhea, no bleeding symptoms Genitourinary: No urinary symptoms or hematuria Integument/breast: No ulcers or rashes Musculoskeletal: BLE weakness Neurological: No focal weakness, no seizures, no headaches Behvioral/Psych: No anxiety, no depression Physical Exam:  
 
Visit Vitals BP (!) 121/56 (BP 1 Location: Left arm, BP Patient Position: At rest) Pulse (!) 59 Temp 98.5 °F (36.9 °C) Resp 18 Ht 5' 3\" (1.6 m) Wt 64 kg (141 lb) SpO2 91% BMI 24.98 kg/m² BP Readings from Last 3 Encounters:  
10/16/20 (!) 121/56  
10/15/20 118/66  
10/13/20 (!) 101/39 Pulse Readings from Last 3 Encounters:  
10/16/20 (!) 59  
10/15/20 74  
10/13/20 62 Wt Readings from Last 3 Encounters:  
10/15/20 64 kg (141 lb) 10/13/20 61.8 kg (136 lb 3.2 oz) 03/19/20 61.4 kg (135 lb 6.4 oz) General:  alert, cooperative, no distress, appears stated age, mildly obese, confused Skin: Warm and dry, acyanotic, normal color. Head: Normocephalic, atraumatic. Eyes: Sclerae anicteric, conjunctivae without injection. Neck:  nontender, no nuchal rigidity, no masses, no stridor, no carotid bruit, increased  JVD Lungs: [place] crackles at the bases of  both the left and right lungs Heart:  regular rate and rhythm, S1, S2 normal, systolic murmur: holosystolic 3/6, harsh at lower left sternal border, at apex, throughout the precordium Abdomen:  abdomen is soft without significant tenderness, masses, organomegaly or guarding Extremities:  extremities normal, atraumatic, no cyanosis. +2 BLE edema up to upper thighs. BLE cover with Tubigrip   Peripheral pulses diminished Neurological: grossly intact. No focal abnormalities, moves all extremities well. Psychiatric Affect: The patient is awake, alert and oriented to person and place only. Data Review:  
 
Recent Results (from the past 48 hour(s)) CBC WITH AUTOMATED DIFF Collection Time: 10/15/20  3:45 PM  
Result Value Ref Range WBC 4.4 (L) 4.6 - 13.2 K/uL  
 RBC 4.25 4.20 - 5.30 M/uL HGB 9.3 (L) 12.0 - 16.0 g/dL HCT 30.9 (L) 35.0 - 45.0 % MCV 72.7 (L) 74.0 - 97.0 FL  
 MCH 21.9 (L) 24.0 - 34.0 PG  
 MCHC 30.1 (L) 31.0 - 37.0 g/dL RDW 19.3 (H) 11.6 - 14.5 % PLATELET 405 364 - 718 K/uL MPV 9.6 9.2 - 11.8 FL  
 NEUTROPHILS 71 40 - 73 % LYMPHOCYTES 15 (L) 21 - 52 % MONOCYTES 11 (H) 3 - 10 % EOSINOPHILS 2 0 - 5 % BASOPHILS 1 0 - 2 %  
 ABS. NEUTROPHILS 3.2 1.8 - 8.0 K/UL  
 ABS. LYMPHOCYTES 0.7 (L) 0.9 - 3.6 K/UL  
 ABS. MONOCYTES 0.5 0.05 - 1.2 K/UL  
 ABS. EOSINOPHILS 0.1 0.0 - 0.4 K/UL  
 ABS. BASOPHILS 0.0 0.0 - 0.1 K/UL  
 DF AUTOMATED METABOLIC PANEL, COMPREHENSIVE Collection Time: 10/15/20  3:45 PM  
Result Value Ref Range Sodium 133 (L) 136 - 145 mmol/L Potassium 4.0 3.5 - 5.5 mmol/L Chloride 98 (L) 100 - 111 mmol/L  
 CO2 29 21 - 32 mmol/L Anion gap 6 3.0 - 18 mmol/L Glucose 92 74 - 99 mg/dL BUN 60 (H) 7.0 - 18 MG/DL Creatinine 2.07 (H) 0.6 - 1.3 MG/DL  
 BUN/Creatinine ratio 29 (H) 12 - 20 GFR est AA 28 (L) >60 ml/min/1.73m2 GFR est non-AA 23 (L) >60 ml/min/1.73m2 Calcium 9.1 8.5 - 10.1 MG/DL Bilirubin, total 0.8 0.2 - 1.0 MG/DL  
 ALT (SGPT) 27 13 - 56 U/L  
 AST (SGOT) 39 (H) 10 - 38 U/L Alk. phosphatase 238 (H) 45 - 117 U/L Protein, total 9.0 (H) 6.4 - 8.2 g/dL Albumin 3.1 (L) 3.4 - 5.0 g/dL Globulin 5.9 (H) 2.0 - 4.0 g/dL A-G Ratio 0.5 (L) 0.8 - 1.7 CARDIAC PANEL,(CK, CKMB & TROPONIN) Collection Time: 10/15/20  3:45 PM  
Result Value Ref Range CK - MB 1.4 <3.6 ng/ml CK-MB Index 3.9 0.0 - 4.0 % CK 36 26 - 192 U/L Troponin-I, QT 0.17 (H) 0.0 - 0.045 NG/ML  
NT-PRO BNP Collection Time: 10/15/20  3:45 PM  
Result Value Ref Range NT pro-BNP 13,139 (H) 0 - 1,800 PG/ML  
MAGNESIUM Collection Time: 10/15/20  3:45 PM  
Result Value Ref Range Magnesium 2.8 (H) 1.6 - 2.6 mg/dL DIGOXIN Collection Time: 10/15/20  3:45 PM  
Result Value Ref Range Digoxin level 1.5 0.9 - 2.0 NG/ML  
EKG, 12 LEAD, INITIAL Collection Time: 10/15/20  3:47 PM  
Result Value Ref Range  Ventricular Rate 61 BPM  
 Atrial Rate 61 BPM  
 P-R Interval 278 ms QRS Duration 148 ms Q-T Interval 432 ms QTC Calculation (Bezet) 434 ms Calculated P Axis 32 degrees Calculated R Axis 127 degrees Calculated T Axis -22 degrees Diagnosis Atrial-paced rhythm with prolonged AV conduction Right bundle branch block T wave abnormality, consider inferior ischemia Abnormal ECG When compared with ECG of 20-SEP-2019 22:01, 
Electronic atrial pacemaker has replaced Atrial fibrillation CARDIAC PANEL,(CK, CKMB & TROPONIN) Collection Time: 10/15/20 11:14 PM  
Result Value Ref Range CK - MB 1.2 <3.6 ng/ml CK-MB Index 4.1 (H) 0.0 - 4.0 % CK 29 26 - 192 U/L Troponin-I, QT 0.15 (H) 0.0 - 0.045 NG/ML  
CARDIAC PANEL,(CK, CKMB & TROPONIN) Collection Time: 10/16/20  4:22 AM  
Result Value Ref Range CK - MB 1.1 <3.6 ng/ml CK-MB Index 4.1 (H) 0.0 - 4.0 % CK 27 26 - 192 U/L Troponin-I, QT 0.16 (H) 0.0 - 0.045 NG/ML  
CBC WITH AUTOMATED DIFF Collection Time: 10/16/20  4:22 AM  
Result Value Ref Range WBC 4.4 (L) 4.6 - 13.2 K/uL  
 RBC 4.06 (L) 4.20 - 5.30 M/uL HGB 9.0 (L) 12.0 - 16.0 g/dL HCT 29.4 (L) 35.0 - 45.0 % MCV 72.4 (L) 74.0 - 97.0 FL  
 MCH 22.2 (L) 24.0 - 34.0 PG  
 MCHC 30.6 (L) 31.0 - 37.0 g/dL  
 RDW 19.1 (H) 11.6 - 14.5 % PLATELET 539 864 - 873 K/uL MPV 9.6 9.2 - 11.8 FL  
 NEUTROPHILS 67 40 - 73 % LYMPHOCYTES 18 (L) 21 - 52 % MONOCYTES 13 (H) 3 - 10 % EOSINOPHILS 2 0 - 5 % BASOPHILS 0 0 - 2 %  
 ABS. NEUTROPHILS 2.9 1.8 - 8.0 K/UL  
 ABS. LYMPHOCYTES 0.8 (L) 0.9 - 3.6 K/UL  
 ABS. MONOCYTES 0.6 0.05 - 1.2 K/UL  
 ABS. EOSINOPHILS 0.1 0.0 - 0.4 K/UL  
 ABS. BASOPHILS 0.0 0.0 - 0.1 K/UL  
 DF AUTOMATED PLATELET COMMENTS ADEQUATE PLATELETS    
 RBC COMMENTS MICROCYTOSIS 1+ 
    
 RBC COMMENTS HYPOCHROMIA 1+ 
    
 RBC COMMENTS ANISOCYTOSIS 
1+ METABOLIC PANEL, COMPREHENSIVE Collection Time: 10/16/20  4:22 AM  
Result Value Ref Range Sodium 136 136 - 145 mmol/L Potassium 3.7 3.5 - 5.5 mmol/L Chloride 98 (L) 100 - 111 mmol/L  
 CO2 31 21 - 32 mmol/L Anion gap 7 3.0 - 18 mmol/L Glucose 78 74 - 99 mg/dL BUN 54 (H) 7.0 - 18 MG/DL Creatinine 1.95 (H) 0.6 - 1.3 MG/DL  
 BUN/Creatinine ratio 28 (H) 12 - 20 GFR est AA 30 (L) >60 ml/min/1.73m2 GFR est non-AA 25 (L) >60 ml/min/1.73m2 Calcium 9.3 8.5 - 10.1 MG/DL Bilirubin, total 0.8 0.2 - 1.0 MG/DL  
 ALT (SGPT) 23 13 - 56 U/L  
 AST (SGOT) 30 10 - 38 U/L Alk. phosphatase 208 (H) 45 - 117 U/L Protein, total 8.1 6.4 - 8.2 g/dL Albumin 2.9 (L) 3.4 - 5.0 g/dL Globulin 5.2 (H) 2.0 - 4.0 g/dL A-G Ratio 0.6 (L) 0.8 - 1.7 MAGNESIUM Collection Time: 10/16/20  4:22 AM  
Result Value Ref Range Magnesium 2.7 (H) 1.6 - 2.6 mg/dL TSH 3RD GENERATION Collection Time: 10/16/20  4:22 AM  
Result Value Ref Range TSH 1.98 0.36 - 3.74 uIU/mL  
IRON PROFILE Collection Time: 10/16/20  4:22 AM  
Result Value Ref Range Iron 27 (L) 50 - 175 ug/dL TIBC 402 250 - 450 ug/dL Iron % saturation 7 (L) 20 - 50 % Intake/Output Summary (Last 24 hours) at 10/16/2020 1110 Last data filed at 10/16/2020 1008 Gross per 24 hour Intake 450 ml Output 1900 ml Net -1450 ml  
 
 
Cardiographics:  
 
 
EKG Results Procedure 720 Value Units Date/Time EKG, 12 LEAD, INITIAL [[id number]] Collected:  10/15/20 1547 Order Status:  Completed Updated:  10/15/20 1711 Ventricular Rate 61 BPM   
  Atrial Rate 61 BPM   
  P-R Interval 278 ms QRS Duration 148 ms Q-T Interval 432 ms QTC Calculation (Bezet) 434 ms Calculated P Axis 32 degrees Calculated R Axis 127 degrees Calculated T Axis -22 degrees Diagnosis -- Atrial-paced rhythm with prolonged AV conduction Right bundle branch block T wave abnormality, consider inferior ischemia Abnormal ECG When compared with ECG of 20-SEP-2019 22:01, 
 Electronic atrial pacemaker has replaced Atrial fibrillation 09/20/19 ECHO ADULT COMPLETE 09/25/2019 9/25/2019 Narrative · Left Ventricle: Normal cavity size and systolic function (ejection  
fraction normal). Moderate concentric hypertrophy. Estimated left  
ventricular ejection fraction is 61 - 65%. Abnormal left ventricular  
septal motion consistent with right ventricular pacing. Interventricular  
septal \"bounce\". Abnormal left ventricular strain. Inconclusive left  
ventricular diastolic function. · Right Ventricle: Mildly reduced systolic function. Abnormal right  
ventricular septal motion. Diastolic flattening of interventricular septum  
consistent with right ventricle volume overload. · Left Atrium: Severely dilated left atrium. · Right Atrium: Dilated right atrium. · Pulmonic Valve: Mild pulmonic valve regurgitation is present. · Tricuspid Valve: Moderate tricuspid valve regurgitation is present. · Pulmonary Artery: Moderate pulmonary hypertension. Pulmonary arterial  
systolic pressure is 69 mmHg. · Aortic Valve: Probably trileaflet aortic valve. Aortic valve leaflet  
calcification present with reduced excursion. Aortic valve peak gradient  
is 47.2 mmHg. Aortic valve mean gradient is 28.5 mmHg. Aortic valve area  
is 0.9 cm2. Moderate aortic valve stenosis is present. Mild aortic valve  
regurgitation is present. · Moderate mitral annular calcification Signed by: Blanka Palomino MD  
 
 
 
Signed By: Lisa LARSON Phone [phone] supervised October 16, 2020 I have independently evaluated and examined the patient. All relevant labs and testing data's are reviewed. Care plan discussed and updated after review.  
 
Izzy Henson MD

## 2020-10-16 NOTE — PROGRESS NOTES
Problem: Mobility Impaired (Adult and Pediatric) Goal: *Acute Goals and Plan of Care (Insert Text) Description: Physical Therapy Goals Initiated 10/16/2020 and to be accomplished within 7 day(s) 1. Patient will move from supine to sit and sit to supine , scoot up and down, and roll side to side in bed with modified independence. 2.  Patient will transfer from bed to chair and chair to bed with supervision/set-up using the least restrictive device. 3.  Patient will perform sit to stand with supervision/set-up. 4.  Patient will ambulate with supervision/set-up for 50 feet with the least restrictive device. PLOF: Pt reports independence with dressing and bathing, ambulating with RW. Lives in 1st floor apartment with  and no DEBBIE. Pt reports having someone come in to  after them daily and has someone come into clean 1 time per week. Outcome: Progressing Towards Goal 
 
PHYSICAL THERAPY EVALUATION Patient: Leim Duverney (74 y.o. female) Date: 10/16/2020 Primary Diagnosis: Anasarca [R60.1] Precautions:  Fall PLOF: see above ASSESSMENT : 
Based on the objective data described below, the patient presents with decreased balance reactions, gait deviations, decreased LE strength, increased pain and decreased independence in functional mobility. Pt cleared to participate by RN and agreeable to PT session. Pt received semi-reclined in bed, found to have kristy pads soiled. Pt completing rolling to R with Jose, Willy for supine to sit from trunk. LE strength slightly limited but able to complete movement against gravity. Deferred gross MMT due to reports of pain with pressure on LEs. Pt requesting to use BSC. Sit to stand to RW with Willy with Willy for SPT to [facility]. Toileting with supervision. Pt then completing sit to stand from [facility] with CGA. Ambulating x10 feet around end of bed with CGA-Wlily with RW.  Pt unable to fully stand upright even with cues. Returned to bed with transport arriving for echo. Patient will benefit from skilled intervention to address the above impairments. Patient's rehabilitation potential is considered to be Good Factors which may influence rehabilitation potential include:  
[]         None noted 
[]         Mental ability/status []         Medical condition 
[x]         Home/family situation and support systems 
[]         Safety awareness 
[]         Pain tolerance/management 
[]         Other: PLAN : 
Recommendations and Planned Interventions:  
[x]           Bed Mobility Training             [x]    Neuromuscular Re-Education 
[x]           Transfer Training                   []    Orthotic/Prosthetic Training 
[x]           Gait Training                          []    Modalities [x]           Therapeutic Exercises           []    Edema Management/Control 
[x]           Therapeutic Activities            [x]    Family Training/Education 
[x]           Patient Education 
[]           Other (comment): Frequency/Duration: Patient will be followed by physical therapy 1-2 times per day/4-7 days per week to address goals. Discharge Recommendations: Rehab vs HH with 24 hour assist 
Further Equipment Recommendations for Discharge: Has all equipment at home SUBJECTIVE:  
Patient stated My legs look like they have gotten better.  OBJECTIVE DATA SUMMARY:  
 
Past Medical History:  
Diagnosis Date Arthritis Atrial fibrillation ([address] CHF (congestive heart failure) ([facility] [address] Heart murmur History of seasonal allergies HTN (hypertension) Hypercholesteremia Moderate aortic stenosis Pulmonary hypertension ([address] Venous insufficiency Past Surgical History:  
Procedure Laterality Date HX KNEE ARTHROSCOPY    
 left and right HX ORTHOPAEDIC    
 right ankle Barriers to Learning/Limitations: None Compensate with: N/A Home Situation: Home Situation Home Environment: Apartment # Steps to Enter: 0 One/Two Story Residence: One story Living Alone: No 
Support Systems: Spouse/Significant Other/Partner Patient Expects to be Discharged to[de-identified] [id number] Current DME Used/Available at Home: None Critical Behavior: 
Neurologic State: Alert Orientation Level: Oriented to person Cognition: Follows commands Safety/Judgement: Awareness of environment; Fall prevention Psychosocial 
Patient Behaviors: Calm; Cooperative Purposeful Interaction: No (comment) Pt Identified Daily Priority: Clinical issues (comment) [employer] Process: Attend basic human needs;Create healing environment;Nurture loving kindness; Teaching/learning Caring Interventions: Reassure; Therapeutic modalities Reassure: Caring rounds Therapeutic Modalities: Intentional therapeutic touch Skin Condition/Temp: Cool Skin Integrity: Wound (add Wound LDA); Other (comment)(R shin wound) Skin Integumentary Skin Color: Appropriate for ethnicity Skin Condition/Temp: Cool Skin Integrity: Wound (add Wound LDA); Other (comment)(R shin wound) Varicosities: Absent Strength:   
Strength: Generally decreased, functional(BLEs) Tone & Sensation:  
Tone: Normal 
Sensation: Intact Range Of Motion: 
AROM: Within functional limits(BLEs) Posture: 
Posture (WDL): Exceptions to [facility] Posture Assessment: Forward head Functional Mobility: 
Bed Mobility: 
Rolling: Modified independent Supine to Sit: Minimum assistance;Bed Modified; Additional time Sit to Supine: Minimum assistance(LE management ) Scooting: Contact guard assistance Transfers: 
Sit to Stand: Minimum assistance Stand to Sit: Contact guard assistance Stand Pivot Transfers: Minimum assistance(for RW management ) Balance:  
Sitting: Intact Standing: Impaired; With support Standing - Static: Good Standing - Dynamic : Fair Ambulation/Gait Training: 
Distance (ft): 10 Feet (ft) Assistive Device: Walker, rolling Ambulation - Level of Assistance: Contact guard assistance;Minimal assistance Gait Description (WDL): Exceptions to [facility] Gait Abnormalities: Decreased step clearance;Shuffling gait Base of Support: Center of gravity altered Speed/Nithya: Slow Step Length: Right shortened;Left shortened Therapeutic Exercises:  
Instructed in and completed heel slide and SLR x3 reps per side and ankle pumps x10 reps Pain: 
Pain level pre-treatment: 0/10 Pain level post-treatment: 0/10 Activity Tolerance:  
Fair activity tolerance, no reports of fatigue Please refer to the flowsheet for vital signs taken during this treatment. After treatment:  
[]         Patient left in no apparent distress sitting up in chair 
[x]         Patient left in no apparent distress in bed 
[]         Call bell left within reach 
[]         Nursing notified 
[]         Caregiver present 
[]         Bed alarm activated 
[x]         Transport with patient COMMUNICATION/EDUCATION:  
[x]         Role of Physical Therapy in the acute care setting. [x]         Fall prevention education was provided and the patient/caregiver indicated understanding. [x]         Patient/family have participated as able in goal setting and plan of care. [x]         Patient/family agree to work toward stated goals and plan of care. []         Patient understands intent and goals of therapy, but is neutral about his/her participation. []         Patient is unable to participate in goal setting/plan of care: ongoing with therapy staff. 
[]         Other: Thank you for this referral. 
Lorna Zamudio, PT Time Calculation: 32 mins Eval Complexity: History: MEDIUM  Complexity : 1-2 comorbidities / personal factors will impact the outcome/ POC Exam:MEDIUM Complexity : 3 Standardized tests and measures addressing body structure, function, activity limitation and / or participation in recreation  Presentation: LOW Complexity : Stable, uncomplicated  Clinical Decision Making:Low Complexity low  Overall Complexity:LOW

## 2020-10-16 NOTE — WOUND CARE
Physical Exam 
Musculoskeletal:  
     Legs: 
 
 
Seen by wound care per consulted request, assessment and dressing change performed at this time. Tissue injury listed abovenoted during skin assessment. Treatment plan explained t bedside nurse and Pt agreeable to continue current treatment. At this time wound gel with silver hydro-fiber applied to bed and tegaderm to cover. Klaudia Hernandez in place Wound care education provided to pt at this time. Plan of care reviewed with nursing. Will turn over care to nursing staff at this time Deepti Rboin, Wound Care Department

## 2020-10-16 NOTE — PROGRESS NOTES
Problem: Falls - Risk of 
Goal: *Absence of Falls Description: Document Sintia Britton Fall Risk and appropriate interventions in the flowsheet. Outcome: Progressing Towards Goal 
Note: Fall Risk Interventions: 
Mobility Interventions: Patient to call before getting OOB Medication Interventions: Patient to call before getting OOB Elimination Interventions: Call light in reach, Patient to call for help with toileting needs Problem: Patient Education: Go to Patient Education Activity Goal: Patient/Family Education Outcome: Progressing Towards Goal 
  
Problem: Heart Failure: Day 1 Goal: Off Pathway (Use only if patient is Off Pathway) Outcome: Progressing Towards Goal 
Goal: Activity/Safety Outcome: Progressing Towards Goal 
Goal: Consults, if ordered Outcome: Progressing Towards Goal 
Goal: Diagnostic Test/Procedures Outcome: Progressing Towards Goal 
Goal: Nutrition/Diet Outcome: Progressing Towards Goal 
Goal: Discharge Planning Outcome: Progressing Towards Goal 
Goal: Medications Outcome: Progressing Towards Goal 
Goal: Respiratory Outcome: Progressing Towards Goal 
Goal: Treatments/Interventions/Procedures Outcome: Progressing Towards Goal 
Goal: Psychosocial 
Outcome: Progressing Towards Goal 
Goal: *Oxygen saturation within defined limits Outcome: Progressing Towards Goal 
Goal: *Hemodynamically stable Outcome: Progressing Towards Goal 
Goal: *Optimal pain control at patient's stated goal 
Outcome: Progressing Towards Goal 
Goal: *Anxiety reduced or absent Outcome: Progressing Towards Goal 
  
Problem: Impaired Skin Integrity/Pressure Injury Treatment Goal: *Improvement of Existing Pressure Injury Outcome: Progressing Towards Goal 
Goal: *Prevention of pressure injury Description: Document Angel Scale and appropriate interventions in the flowsheet. Outcome: Progressing Towards Goal 
Note: Pressure Injury Interventions: Activity Interventions: Pressure redistribution bed/mattress(bed type), Increase time out of bed Mobility Interventions: Pressure redistribution bed/mattress (bed type) Nutrition Interventions: Document food/fluid/supplement intake Friction and Shear Interventions: Minimize layers

## 2020-10-17 PROBLEM — I50.33 ACUTE ON CHRONIC DIASTOLIC CONGESTIVE HEART FAILURE (HCC): Status: ACTIVE | Noted: 2017-12-12

## 2020-10-17 LAB
ALBUMIN SERPL-MCNC: 2.8 G/DL (ref 3.4–5)
ALBUMIN/GLOB SERPL: 0.5 {RATIO} (ref 0.8–1.7)
ALP SERPL-CCNC: 204 U/L (ref 45–117)
ALT SERPL-CCNC: 20 U/L (ref 13–56)
ANION GAP SERPL CALC-SCNC: 5 MMOL/L (ref 3–18)
AST SERPL-CCNC: 23 U/L (ref 10–38)
BASOPHILS # BLD: 0 K/UL (ref 0–0.1)
BASOPHILS NFR BLD: 0 % (ref 0–2)
BILIRUB SERPL-MCNC: 0.8 MG/DL (ref 0.2–1)
BUN SERPL-MCNC: 59 MG/DL (ref 7–18)
BUN/CREAT SERPL: 26 (ref 12–20)
CALCIUM SERPL-MCNC: 9.5 MG/DL (ref 8.5–10.1)
CHLORIDE SERPL-SCNC: 91 MMOL/L (ref 100–111)
CO2 SERPL-SCNC: 35 MMOL/L (ref 21–32)
CREAT SERPL-MCNC: 2.27 MG/DL (ref 0.6–1.3)
DIFFERENTIAL METHOD BLD: ABNORMAL
EOSINOPHIL # BLD: 0.1 K/UL (ref 0–0.4)
EOSINOPHIL NFR BLD: 3 % (ref 0–5)
ERYTHROCYTE [DISTWIDTH] IN BLOOD BY AUTOMATED COUNT: 19.1 % (ref 11.6–14.5)
FOLATE SERPL-MCNC: 15.9 NG/ML (ref 3.1–17.5)
GLOBULIN SER CALC-MCNC: 5.2 G/DL (ref 2–4)
GLUCOSE SERPL-MCNC: 88 MG/DL (ref 74–99)
HCT VFR BLD AUTO: 30.9 % (ref 35–45)
HGB BLD-MCNC: 9.4 G/DL (ref 12–16)
LYMPHOCYTES # BLD: 0.9 K/UL (ref 0.9–3.6)
LYMPHOCYTES NFR BLD: 19 % (ref 21–52)
MAGNESIUM SERPL-MCNC: 2.3 MG/DL (ref 1.6–2.6)
MCH RBC QN AUTO: 21.9 PG (ref 24–34)
MCHC RBC AUTO-ENTMCNC: 30.4 G/DL (ref 31–37)
MCV RBC AUTO: 72 FL (ref 74–97)
MONOCYTES # BLD: 0.6 K/UL (ref 0.05–1.2)
MONOCYTES NFR BLD: 12 % (ref 3–10)
NEUTS SEG # BLD: 3.1 K/UL (ref 1.8–8)
NEUTS SEG NFR BLD: 66 % (ref 40–73)
PLATELET # BLD AUTO: 227 K/UL (ref 135–420)
PLATELET COMMENTS,PCOM: ABNORMAL
PMV BLD AUTO: 9.9 FL (ref 9.2–11.8)
POTASSIUM SERPL-SCNC: 3.5 MMOL/L (ref 3.5–5.5)
PROT SERPL-MCNC: 8 G/DL (ref 6.4–8.2)
RBC # BLD AUTO: 4.29 M/UL (ref 4.2–5.3)
RBC MORPH BLD: ABNORMAL
SODIUM SERPL-SCNC: 131 MMOL/L (ref 136–145)
SODIUM UR-SCNC: 96 MMOL/L (ref 20–110)
VIT B12 SERPL-MCNC: 471 PG/ML (ref 211–911)
WBC # BLD AUTO: 4.7 K/UL (ref 4.6–13.2)

## 2020-10-17 PROCEDURE — 84300 ASSAY OF URINE SODIUM: CPT

## 2020-10-17 PROCEDURE — 77030038269 HC DRN EXT URIN PURWCK BARD -A

## 2020-10-17 PROCEDURE — 83935 ASSAY OF URINE OSMOLALITY: CPT

## 2020-10-17 PROCEDURE — 80053 COMPREHEN METABOLIC PANEL: CPT

## 2020-10-17 PROCEDURE — 82607 VITAMIN B-12: CPT

## 2020-10-17 PROCEDURE — 85025 COMPLETE CBC W/AUTO DIFF WBC: CPT

## 2020-10-17 PROCEDURE — 2709999900 HC NON-CHARGEABLE SUPPLY

## 2020-10-17 PROCEDURE — 74011250637 HC RX REV CODE- 250/637: Performed by: NURSE PRACTITIONER

## 2020-10-17 PROCEDURE — 36415 COLL VENOUS BLD VENIPUNCTURE: CPT

## 2020-10-17 PROCEDURE — 77030041076 HC DRSG AG OPTICELL MDII -A

## 2020-10-17 PROCEDURE — 99233 SBSQ HOSP IP/OBS HIGH 50: CPT | Performed by: INTERNAL MEDICINE

## 2020-10-17 PROCEDURE — 83735 ASSAY OF MAGNESIUM: CPT

## 2020-10-17 PROCEDURE — 74011250637 HC RX REV CODE- 250/637: Performed by: FAMILY MEDICINE

## 2020-10-17 PROCEDURE — 65270000029 HC RM PRIVATE

## 2020-10-17 PROCEDURE — 94762 N-INVAS EAR/PLS OXIMTRY CONT: CPT

## 2020-10-17 RX ORDER — LANOLIN ALCOHOL/MO/W.PET/CERES
1 CREAM (GRAM) TOPICAL
Status: DISCONTINUED | OUTPATIENT
Start: 2020-10-18 | End: 2020-10-23

## 2020-10-17 RX ADMIN — FAMOTIDINE 20 MG: 20 TABLET ORAL at 09:08

## 2020-10-17 RX ADMIN — ATORVASTATIN CALCIUM 40 MG: 40 TABLET, FILM COATED ORAL at 21:01

## 2020-10-17 RX ADMIN — MONTELUKAST 10 MG: 10 TABLET, FILM COATED ORAL at 09:08

## 2020-10-17 RX ADMIN — METOPROLOL TARTRATE 25 MG: 25 TABLET, FILM COATED ORAL at 09:08

## 2020-10-17 RX ADMIN — METOPROLOL TARTRATE 25 MG: 25 TABLET, FILM COATED ORAL at 17:34

## 2020-10-17 RX ADMIN — GABAPENTIN 100 MG: 100 CAPSULE ORAL at 21:01

## 2020-10-17 RX ADMIN — METOLAZONE 5 MG: 5 TABLET ORAL at 09:08

## 2020-10-17 RX ADMIN — APIXABAN 5 MG: 5 TABLET, FILM COATED ORAL at 17:34

## 2020-10-17 RX ADMIN — CHOLECALCIFEROL (VITAMIN D3) 25 MCG (1,000 UNIT) TABLET 1 TABLET: TABLET at 09:08

## 2020-10-17 RX ADMIN — APIXABAN 5 MG: 5 TABLET, FILM COATED ORAL at 09:08

## 2020-10-17 RX ADMIN — TRAMADOL HYDROCHLORIDE 50 MG: 50 TABLET, FILM COATED ORAL at 04:27

## 2020-10-17 RX ADMIN — FOLIC ACID 1 MG: 1 TABLET ORAL at 09:08

## 2020-10-17 RX ADMIN — OYSTER SHELL CALCIUM WITH VITAMIN D 1 TABLET: 500; 200 TABLET, FILM COATED ORAL at 17:34

## 2020-10-17 RX ADMIN — OYSTER SHELL CALCIUM WITH VITAMIN D 1 TABLET: 500; 200 TABLET, FILM COATED ORAL at 09:08

## 2020-10-17 RX ADMIN — TRAMADOL HYDROCHLORIDE 50 MG: 50 TABLET, FILM COATED ORAL at 13:51

## 2020-10-17 NOTE — ROUTINE PROCESS
Bedside and Verbal shift change report given to [address] (oncoming nurse) by Pardeep Jones RN (offgoing nurse). Report included the following information SBAR, Kardex and MAR.

## 2020-10-17 NOTE — PROGRESS NOTES
Progress Note Patient: Trae Alexander MRN: [id number]  CSN: [id number] YOB: 1940  Age: 68 y.o. Sex: female DOA: 10/15/2020 LOS:  LOS: 2 days Subjective:  
Pt is feeling better less swelling lower extremities . Had wound care  
and vascular surgery consult . Pt f/u cardiology started on Bumex cr is going up sodium is going down  will discuss with cardiology CT chest 
1. Bilateral hazy groundglass pulmonary opacities with relative sparing of the 
periphery suggestive of pulmonary edema. Very small bilateral pleural effusions. Moderate to marked cardiomegaly. 2.  Similar appearance of enlarged pulmonary artery suggestive of pulmonaryartery hypertension. 3.  Very minimal increased size of medial left upper lobe pulmonary nodule since 2016. Today measuring 6 x 5 mm, previously measuring 5 x 3 mm. Finding is likely 
benign. However given slight change over time suggest follow-up CT in one year. 4.  Extensive atherosclerotic vascular calcification Echo · LV: Estimated LVEF is 65 - 70%. Visually measured ejection fraction. Normal systolic function (ejection fraction normal). Small left ventricle. Mild concentric hypertrophy. Wall motion: normal. Severe (grade 3) left ventricular diastolic dysfunction. · AV: Probably trileaflet aortic valve. Aortic valve mean gradient is 43 mmHg. Aortic valve area is 0.5 cm2. Severe aortic valve stenosis is present. Moderate to severe aortic valve regurgitation is present. · MV: Mitral annular calcification. Mitral valve mean gradient is 5 mmHg. Moderate mitral valve stenosis is present. Mild mitral valve regurgitation is present. · PV: Mild to moderate pulmonic valve regurgitation is present. · TV: Moderate tricuspid valve regurgitation is present. · PA: Severe pulmonary hypertension. Pulmonary arterial systolic pressure is 96 mmHg. · LA: Severely dilated left atrium. Left Atrium volume index is 67 mL/m2. · RV: Dilated right ventricle. Pacing wire present · RA: Dilated right atrium. · IVC: Severely elevated central venous pressure (15+ mmHg); IVC diameter is larger than 21 mm and collapses less than 50% with respiration. Thyroid ultrasound 1. Large irregular mixed echogenicity right thyroid nodule measuring 2.8 cm, 
with multiple punctate echogenic foci, previously biopsied with results 
concerning for papillary thyroid carcinoma. Recommend referral to ENT. 
  
 
 
  
Chief Complaint:  
Chief Complaint Patient presents with  Peripheral Edema Review of systems General: No fevers or chills. Cardiovascular: No chest pain or pressure. Pulmonary: No shortness of breath, cough or wheeze. Gastrointestinal: No abdominal pain, nausea, vomiting or diarrhea. Genitourinary: No urinary frequency, urgency, hesitancy or dysuria. Musculoskeletal: generalized weakness Neurologic: No headache, generalized weakness Objective:  
 
Physical Exam: 
Visit Vitals /60 Pulse 60 Temp 97.6 °F (36.4 °C) Resp 20 Ht 5' 3\" (1.6 m) Wt 64 kg (141 lb) SpO2 95% BMI 24.98 kg/m² General:         Alert, , no acute distress   
HEENT: NC, Atraumatic. PERRLA, anicteric sclerae. Lungs: CTA Bilaterally. No Wheezing/Rhonchi/Rales. Heart:  Regular  rhythm,  systolic  Murmur. Abdomen: Soft, Non distended, Non tender. Extremities: Wound Rt leg clean dressing in place wearing Tubigrip Psych:   Not anxious or agitated. Neurologic:  CN 2-12 grossly intact, Alert and oriented X 3. No acute neurological       
                         Deficits, Intake and Output: 
Current Shift:  10/17 0701 - 10/17 1900 In: 240 [P.O.:240] Out: - Last three shifts:  10/15 1901 - 10/17 0700 In: 690 [P.O.:690] Out: 3200 [APGDO:1577] Labs: Results:  
   
Chemistry Recent Labs 10/17/20 
2184 10/16/20 
0422 10/15/20 
1545 GLU 88 78 92 * 136 133* K 3.5 3.7 4.0  
CL 91* 98* 98* CO2 35* 31 29 BUN 59* 54* 60* CREA 2.27* 1.95* 2.07* CA 9.5 9.3 9.1 AGAP 5 7 6 BUCR 26* 28* 29* * 208* 238*  
TP 8.0 8.1 9.0* ALB 2.8* 2.9* 3.1*  
GLOB 5.2* 5.2* 5.9* AGRAT 0.5* 0.6* 0.5* CBC w/Diff Recent Labs 10/17/20 
6730 10/16/20 
0422 10/15/20 
1545 WBC 4.7 4.4* 4.4*  
RBC 4.29 4.06* 4.25  
HGB 9.4* 9.0* 9.3* HCT 30.9* 29.4* 30.9*  217 227 GRANS 66 67 71 LYMPH 19* 18* 15* EOS 3 2 2 Cardiac Enzymes Recent Labs 10/16/20 
1852 10/16/20 
0422 CPK 25* 27 CKND1 4.0 4.1* Coagulation No results for input(s): PTP, INR, APTT, INREXT in the last 72 hours. Lipid Panel Lab Results Component Value Date/Time Cholesterol, total 142 10/12/2017 10:40 AM  
 HDL Cholesterol 43 10/12/2017 10:40 AM  
 LDL, calculated 82 10/12/2017 10:40 AM  
 VLDL, calculated 14.6 11/20/2009 10:37 AM  
 Triglyceride 86 10/12/2017 10:40 AM  
 CHOL/HDL Ratio 3.9 11/20/2009 10:37 AM  
  
BNP No results for input(s): BNPP in the last 72 hours. Liver Enzymes Recent Labs 10/17/20 
2899 TP 8.0 ALB 2.8* * Thyroid Studies Lab Results Component Value Date/Time TSH 1.98 10/16/2020 04:22 AM  
    
 
Procedures/imaging: see electronic medical records for all procedures/Xrays and details which were not copied into this note but were reviewed prior to creation of Plan Medications:  
Current Facility-Administered Medications Medication Dose Route Frequency  metoprolol tartrate (LOPRESSOR) tablet 25 mg  25 mg Oral BID  bumetanide (BUMEX) 12.5 mg in 0.9% sodium chloride 100 mL infusion  0.5 mg/hr IntraVENous CONTINUOUS  
 apixaban (ELIQUIS) tablet 5 mg  5 mg Oral BID  calcium-vitamin D (OS-BALTAZAR) 500 mg-200 unit tablet  1 Tab Oral BID WITH MEALS  cholecalciferol (VITAMIN D3) (1000 Units /25 mcg) tablet 1 Tab  1,000 Units Oral DAILY  famotidine (PEPCID) tablet 20 mg  20 mg Oral DAILY  folic acid (FOLVITE) tablet 1 mg  1 mg Oral DAILY  gabapentin (NEURONTIN) capsule 100 mg  100 mg Oral QHS  montelukast (SINGULAIR) tablet 10 mg  10 mg Oral DAILY  ondansetron hcl (ZOFRAN) tablet 4 mg  4 mg Oral Q8H PRN  
 atorvastatin (LIPITOR) tablet 40 mg  40 mg Oral QHS  traMADoL (ULTRAM) tablet 50 mg  50 mg Oral Q6H PRN  
 influenza vaccine 2020-21 (6 mos+)(PF) (FLUARIX/FLULAVAL/FLUZONE QUAD) injection 0.5 mL  0.5 mL IntraMUSCular PRIOR TO DISCHARGE Assessment/Plan Active Problems: 
  Obesity (BMI 30.0-34.9) (10/13/2016) Pulmonary HTN ([facility] [address] (12/21/2016) Papillary thyroid carcinoma ([facility] (12/21/2016) Mild HOCM (hypertrophic obstructive cardiomyopathy) ([facility] (1/12/2017) Anasarca (10/15/2020) 
 
plan: 
 
Acute systolic congestive heart failure on top of chronic diastolic congestive heart failure Bumex 0.5 mg/h Follow electrolytes Check cardiac enzyme every 8 
  
Paroxysmal A. fib Continue Eliquis 5 mg twice daily Follow-up CBC 
  
Hypertension BP is low will decrease metoprolol  To 25 mg twice daily Monitor blood pressure Patient is to be on Cozaar at home 100 mg 
  
Hyperlipidemia Continue Zocor 80 mg nightly Follow-up liver function 
  
Anemia Iron saturation low M10 nad folic acid normal 
Will start iron f/u GI as outpatinet  
  
History of thyroid cancer Patient needs follow-up with ENT will call ENT for consult Check TSH and free T4 normal 
  
Venous stasis ulcer Wound care Pt was seen by vascular as outpatinet Continue wound care and f/u vascular surgery consult Cbc, cmp, mag in AM 
Urine sodium urine osmolality and s osmolality Wound care F/u ENT consult Baron Samson MD 
10/17/2020 12:17 PM

## 2020-10-17 NOTE — PROGRESS NOTES
[facility] 
 
 
CARDIOLOGY PROGRESS NOTE 
RECS: 
1. Acute on chronic diastolic congestive heart [id number]-[id number] slowly. Unable to quantify diuresis as patient is incontinent. Has BLE edema and elevated NT pro BNP >13.000 - continue diuresis with Bumex drip added metolazone 5 mg po daily x 2days 2. Hypertrophic obstructive cardiomyopathy-S/p alcohol septal ablation 2/19 at [facility] 3. Hx complete heart block - s/p dual chambers permanent pacemaker 2/19 [employer] 4. Hypertension- stable on bb.  monitor with diuresis 5. Paroxysmal  Atrial fibrillation-rate controlled. on Eliquis for stroke prevention 6. Aortic  stenosis -has become severe now with mean gradient of 43. Was moderate last echo in 9/19 revealed Aortic valve mean gradient is 28.5 mmHg. Aortic valve area is 0.9 cm2.  
7. Mitral regurgitation, mild with mild to moderate mitral stenosis. 8. Pulmonary hypertension-  worsening gradually with pulmonary pressure of 96 now. Echo 9/19 showed PAP 69 mmHg 9. BLE edema with venous stasis- Recommended  wound care- further recommendations per medical team   
10. CKD- creatine stable monitor with aggressive diuresis 11. Hyponatremia and hypochloremia: Secondary to diuresis which will need to be continued for now but watch electrolytes closely may need to use tolvaptan. 
  
Patient with acute on chronic diastolic CHF and critical aortic stenosis currently on Bumex. Monitor input output and renal indices. Once CHF improves, will consider heart catheterization and referral for TAVR. 
  
 
 
EKG Results Procedure 720 Value Units Date/Time EKG, 12 LEAD, INITIAL [[id number]] Collected:  10/15/20 1547 Order Status:  Completed Updated:  10/16/20 1132 Ventricular Rate 61 BPM   
  Atrial Rate 61 BPM   
  P-R Interval 278 ms QRS Duration 148 ms Q-T Interval 432 ms QTC Calculation (Bezet) 434 ms Calculated P Axis 32 degrees Calculated R Axis 127 degrees Calculated T Axis -22 degrees Diagnosis -- Atrial-paced rhythm with prolonged AV conduction Right bundle branch block T wave abnormality, consider inferior ischemia Abnormal ECG When compared with ECG of 20-SEP-2019 22:01, 
Electronic atrial pacemaker has replaced Atrial fibrillation Confirmed by Suzette Arias MD, Colleen Sicard ([id number]) on 10/16/2020 11:32:17 AM 
  
  
 
XR Results (most recent): 
Results from [place] encounter on 10/15/20 XR CHEST PORT Narrative CHEST PORTABLE 1537 hours COMPARISON: September 20, 2019. INDICATIONS: Chest pain. FINDINGS:  
 
Portable single view chest demonstrates: 
 
Lungs: There is chronic atelectasis in the medial segment of the left lower lobe 
and to a lesser extent the infrahilar region of the right lower lobe. These 
findings are similar to prior studies. Prominent bronchovascular markings are 
evident diffusely. No focal infiltrate is seen. Geralene Khanh Cardiac Silhouette And Mediastinal Contours: There is marked enlargement of the 
cardiac contours. The pulmonary outflow tract region is particularly prominent. Dual-lead permanent pacemaker appears satisfactory. This is unchanged. Pleural Spaces: No pneumothorax or pleural effusion evident. Bones And Soft Tissues: Unremarkable for age. Impression IMPRESSION: 
 
No acute cardiopulmonary process is evident. No change from prior studies 10/15/20 ECHO ADULT COMPLETE 10/16/2020 10/16/2020 Narrative · LV: Estimated LVEF is 65 - 70%. Visually measured ejection fraction. Normal systolic function (ejection fraction normal). Small left ventricle. Mild concentric hypertrophy. Wall motion: normal. Severe (grade 3) left  
ventricular diastolic dysfunction. · AV: Probably trileaflet aortic valve. Aortic valve mean gradient is 43  
mmHg. Aortic valve area is 0.5 cm2. Severe aortic valve stenosis is present. Moderate to severe aortic valve regurgitation is present. · MV: Mitral annular calcification. Mitral valve mean gradient is 5 mmHg. Moderate mitral valve stenosis is present. Mild mitral valve regurgitation  
is present. · PV: Mild to moderate pulmonic valve regurgitation is present. · TV: Moderate tricuspid valve regurgitation is present. · PA: Severe pulmonary hypertension. Pulmonary arterial systolic pressure  
is 96 mmHg. · LA: Severely dilated left atrium. Left Atrium volume index is 67 mL/m2. · RV: Dilated right ventricle. Pacing wire present · RA: Dilated right atrium. · IVC: Severely elevated central venous pressure (15+ mmHg); IVC diameter  
is larger than 21 mm and collapses less than 50% with respiration. Signed by: Zulma Pulliam MD  
 
 
  
  
 
 
ASSESSMENT: 
Hospital Problems  Date Reviewed: 10/15/2020 Codes Class Noted ASHWIN Lurca ICD-10-CM: R60.1 ICD-9-CM: 782.3  10/15/2020 Unknown * (Principal) Acute on chronic diastolic congestive heart failure (HCC) ICD-10-CM: I50.33 ICD-9-CM: 428.33, 428.0  12/12/2017 Yes Mitral regurgitation ICD-10-CM: I34.0 ICD-9-CM: 424.0  6/13/2017 Yes Mild HOCM (hypertrophic obstructive cardiomyopathy) (HCC) ICD-10-CM: I42.1 ICD-9-CM: 425.11  1/12/2017 Yes Aortic stenosis ICD-10-CM: I35.0 ICD-9-CM: 424.1  1/12/2017 Yes Pulmonary HTN ([facility] [address] ICD-10-CM: I27.20 ICD-9-CM: 416.8  12/21/2016 Yes Papillary thyroid carcinoma ([facility] [address] ICD-10-CM: R57 ICD-9-CM: 682  12/21/2016 Yes Obesity (BMI 30.0-34.9) ICD-10-CM: N70.9 ICD-9-CM: 278.00  10/13/2016 Yes SUBJECTIVE: 
No chest pain Mild improvement in shortness of breath Improving edema OBJECTIVE: 
 
VS:  
Visit Vitals BP (!) 124/59 Pulse 63 Temp 97.5 °F (36.4 °C) Resp 20 Ht 5' 3\" (1.6 m) Wt 64 kg (141 lb) SpO2 93% BMI 24.98 kg/m² Intake/Output Summary (Last 24 hours) at 10/17/2020 7478 Last data filed at 10/17/2020 1215 Gross per 24 hour Intake 360 ml Output 900 ml Net -540 ml  
 
TELE: DDD pacing General: alert and in no apparent distress HENT: Normocephalic, atraumatic. Normal external eye. Neck :  bruit present, increased JVP Cardiac:  regular rate and rhythm, S1: normal intensity, S2: decreased intensity, systolic murmur: late systolic 3/6, crescendo at 2nd left intercostal space, at 2nd right intercostal space, radiates to carotids, 2nd systolic murmur: holosystolic 3/6, blowing at 2nd right intercostal space, at lower left sternal border, at apex Chest/Lungs:chest clear, no wheezing, rales, normal symmetric air entry Abdomen: Soft, nontender, no masses Extremities: 2+ edema, peripheral pulses not palpable Labs: Results:  
   
Chemistry Recent Labs 10/17/20 
3021 10/16/20 
0422 10/15/20 
1545 GLU 88 78 92 * 136 133* K 3.5 3.7 4.0  
CL 91* 98* 98* CO2 35* 31 29 BUN 59* 54* 60* CREA 2.27* 1.95* 2.07* CA 9.5 9.3 9.1 MG 2.3 2.7* 2.8* AGAP 5 7 6 BUCR 26* 28* 29* * 208* 238*  
TP 8.0 8.1 9.0* ALB 2.8* 2.9* 3.1*  
GLOB 5.2* 5.2* 5.9* AGRAT 0.5* 0.6* 0.5* CBC w/Diff Recent Labs 10/17/20 
8078 10/16/20 
0422 10/15/20 
1545 WBC 4.7 4.4* 4.4*  
RBC 4.29 4.06* 4.25  
HGB 9.4* 9.0* 9.3* HCT 30.9* 29.4* 30.9*  217 227 GRANS 66 67 71 LYMPH 19* 18* 15* EOS 3 2 2 Cardiac Enzymes Recent Labs 10/16/20 
1852 10/16/20 
0422 CPK 25* 27 CKND1 4.0 4.1* Coagulation No results for input(s): PTP, INR, APTT, INREXT in the last 72 hours. Lipid Panel Lab Results Component Value Date/Time Cholesterol, total 142 10/12/2017 10:40 AM  
 HDL Cholesterol 43 10/12/2017 10:40 AM  
 LDL, calculated 82 10/12/2017 10:40 AM  
 VLDL, calculated 14.6 11/20/2009 10:37 AM  
 Triglyceride 86 10/12/2017 10:40 AM  
 CHOL/HDL Ratio 3.9 11/20/2009 10:37 AM  
  
BNP No results for input(s): BNPP in the last 72 hours. Liver Enzymes Recent Labs 10/17/20 
1729 TP 8.0 ALB 2.8* * Digoxin Thyroid Studies Lab Results Component Value Date/Time TSH 1.98 10/16/2020 04:22 AM  
    
 
 
 
Lucía Lau MD    
Contact numbers: 
 5 PM to 9 AM: Answering service at [phone] 
 9 AM to 5 PM: Pager [phone]; if no response, please call through answering service or office. Office number is [phone] or [phone]

## 2020-10-18 ENCOUNTER — HOME CARE VISIT (OUTPATIENT)
Dept: HOME HEALTH SERVICES | Facility: HOME HEALTH | Age: 80
End: 2020-10-18
Payer: MEDICARE

## 2020-10-18 LAB
ALBUMIN SERPL-MCNC: 3 G/DL (ref 3.4–5)
ALBUMIN/GLOB SERPL: 0.5 {RATIO} (ref 0.8–1.7)
ALP SERPL-CCNC: 215 U/L (ref 45–117)
ALT SERPL-CCNC: 19 U/L (ref 13–56)
ANION GAP SERPL CALC-SCNC: 8 MMOL/L (ref 3–18)
AST SERPL-CCNC: 20 U/L (ref 10–38)
BASOPHILS # BLD: 0 K/UL (ref 0–0.1)
BASOPHILS NFR BLD: 0 % (ref 0–2)
BILIRUB SERPL-MCNC: 0.9 MG/DL (ref 0.2–1)
BUN SERPL-MCNC: 60 MG/DL (ref 7–18)
BUN/CREAT SERPL: 29 (ref 12–20)
CALCIUM SERPL-MCNC: 10 MG/DL (ref 8.5–10.1)
CHLORIDE SERPL-SCNC: 82 MMOL/L (ref 100–111)
CO2 SERPL-SCNC: 33 MMOL/L (ref 21–32)
CREAT SERPL-MCNC: 2.05 MG/DL (ref 0.6–1.3)
CREAT UR-MCNC: <13 MG/DL (ref 30–125)
DIFFERENTIAL METHOD BLD: ABNORMAL
EOSINOPHIL # BLD: 0 K/UL (ref 0–0.4)
EOSINOPHIL NFR BLD: 1 % (ref 0–5)
ERYTHROCYTE [DISTWIDTH] IN BLOOD BY AUTOMATED COUNT: 18.7 % (ref 11.6–14.5)
GLOBULIN SER CALC-MCNC: 5.7 G/DL (ref 2–4)
GLUCOSE SERPL-MCNC: 150 MG/DL (ref 74–99)
HCT VFR BLD AUTO: 33.3 % (ref 35–45)
HGB BLD-MCNC: 10.4 G/DL (ref 12–16)
LYMPHOCYTES # BLD: 0.8 K/UL (ref 0.9–3.6)
LYMPHOCYTES NFR BLD: 11 % (ref 21–52)
MAGNESIUM SERPL-MCNC: 2.1 MG/DL (ref 1.6–2.6)
MCH RBC QN AUTO: 22 PG (ref 24–34)
MCHC RBC AUTO-ENTMCNC: 31.2 G/DL (ref 31–37)
MCV RBC AUTO: 70.6 FL (ref 74–97)
MONOCYTES # BLD: 0.7 K/UL (ref 0.05–1.2)
MONOCYTES NFR BLD: 9 % (ref 3–10)
NEUTS SEG # BLD: 5.9 K/UL (ref 1.8–8)
NEUTS SEG NFR BLD: 79 % (ref 40–73)
OSMOLALITY SERPL: 289 MOSM/KG H2O (ref 280–301)
OSMOLALITY UR: 284 MOSM/KG H2O (ref 50–1400)
PLATELET # BLD AUTO: 278 K/UL (ref 135–420)
PMV BLD AUTO: 9.9 FL (ref 9.2–11.8)
POTASSIUM SERPL-SCNC: 3.4 MMOL/L (ref 3.5–5.5)
POTASSIUM UR-SCNC: 24 MMOL/L (ref 12–62)
PROT SERPL-MCNC: 8.7 G/DL (ref 6.4–8.2)
PROT UR-MCNC: 24 MG/DL
PROT/CREAT UR-RTO: ABNORMAL
RBC # BLD AUTO: 4.72 M/UL (ref 4.2–5.3)
SODIUM SERPL-SCNC: 123 MMOL/L (ref 136–145)
SODIUM SERPL-SCNC: 124 MMOL/L (ref 136–145)
SODIUM SERPL-SCNC: 124 MMOL/L (ref 136–145)
SODIUM UR-SCNC: 89 MMOL/L (ref 20–110)
WBC # BLD AUTO: 7.4 K/UL (ref 4.6–13.2)

## 2020-10-18 PROCEDURE — 74011250637 HC RX REV CODE- 250/637: Performed by: NURSE PRACTITIONER

## 2020-10-18 PROCEDURE — 84295 ASSAY OF SERUM SODIUM: CPT

## 2020-10-18 PROCEDURE — 36415 COLL VENOUS BLD VENIPUNCTURE: CPT

## 2020-10-18 PROCEDURE — 74011250637 HC RX REV CODE- 250/637: Performed by: INTERNAL MEDICINE

## 2020-10-18 PROCEDURE — 84300 ASSAY OF URINE SODIUM: CPT

## 2020-10-18 PROCEDURE — 85025 COMPLETE CBC W/AUTO DIFF WBC: CPT

## 2020-10-18 PROCEDURE — 74011250637 HC RX REV CODE- 250/637: Performed by: FAMILY MEDICINE

## 2020-10-18 PROCEDURE — 83930 ASSAY OF BLOOD OSMOLALITY: CPT

## 2020-10-18 PROCEDURE — 2709999900 HC NON-CHARGEABLE SUPPLY

## 2020-10-18 PROCEDURE — 99233 SBSQ HOSP IP/OBS HIGH 50: CPT | Performed by: INTERNAL MEDICINE

## 2020-10-18 PROCEDURE — 65270000029 HC RM PRIVATE

## 2020-10-18 PROCEDURE — 84156 ASSAY OF PROTEIN URINE: CPT

## 2020-10-18 PROCEDURE — 83735 ASSAY OF MAGNESIUM: CPT

## 2020-10-18 PROCEDURE — 51798 US URINE CAPACITY MEASURE: CPT

## 2020-10-18 PROCEDURE — 84133 ASSAY OF URINE POTASSIUM: CPT

## 2020-10-18 PROCEDURE — 83935 ASSAY OF URINE OSMOLALITY: CPT

## 2020-10-18 PROCEDURE — 77030040830 HC CATH URETH FOL MDII -A

## 2020-10-18 PROCEDURE — 80053 COMPREHEN METABOLIC PANEL: CPT

## 2020-10-18 RX ORDER — HYDRALAZINE HYDROCHLORIDE 25 MG/1
25 TABLET, FILM COATED ORAL
Status: DISCONTINUED | OUTPATIENT
Start: 2020-10-18 | End: 2020-10-30 | Stop reason: HOSPADM

## 2020-10-18 RX ORDER — POTASSIUM CHLORIDE 20 MEQ/1
20 TABLET, EXTENDED RELEASE ORAL
Status: COMPLETED | OUTPATIENT
Start: 2020-10-18 | End: 2020-10-18

## 2020-10-18 RX ORDER — ACETAZOLAMIDE 250 MG/1
250 TABLET ORAL 2 TIMES DAILY
Status: COMPLETED | OUTPATIENT
Start: 2020-10-18 | End: 2020-10-19

## 2020-10-18 RX ORDER — POTASSIUM CHLORIDE 20 MEQ/1
40 TABLET, EXTENDED RELEASE ORAL
Status: COMPLETED | OUTPATIENT
Start: 2020-10-18 | End: 2020-10-18

## 2020-10-18 RX ORDER — TOLVAPTAN 15 MG/1
15 TABLET ORAL DAILY
Status: COMPLETED | OUTPATIENT
Start: 2020-10-18 | End: 2020-10-20

## 2020-10-18 RX ADMIN — METOPROLOL TARTRATE 25 MG: 25 TABLET, FILM COATED ORAL at 08:30

## 2020-10-18 RX ADMIN — GABAPENTIN 100 MG: 100 CAPSULE ORAL at 21:33

## 2020-10-18 RX ADMIN — ATORVASTATIN CALCIUM 40 MG: 40 TABLET, FILM COATED ORAL at 21:33

## 2020-10-18 RX ADMIN — TOLVAPTAN 15 MG: 15 TABLET ORAL at 15:43

## 2020-10-18 RX ADMIN — OYSTER SHELL CALCIUM WITH VITAMIN D 1 TABLET: 500; 200 TABLET, FILM COATED ORAL at 08:30

## 2020-10-18 RX ADMIN — CHOLECALCIFEROL (VITAMIN D3) 25 MCG (1,000 UNIT) TABLET 1 TABLET: TABLET at 08:30

## 2020-10-18 RX ADMIN — TRAMADOL HYDROCHLORIDE 50 MG: 50 TABLET, FILM COATED ORAL at 12:28

## 2020-10-18 RX ADMIN — APIXABAN 5 MG: 5 TABLET, FILM COATED ORAL at 08:29

## 2020-10-18 RX ADMIN — ACETAZOLAMIDE 250 MG: 250 TABLET ORAL at 21:32

## 2020-10-18 RX ADMIN — MONTELUKAST 10 MG: 10 TABLET, FILM COATED ORAL at 08:30

## 2020-10-18 RX ADMIN — FOLIC ACID 1 MG: 1 TABLET ORAL at 08:30

## 2020-10-18 RX ADMIN — FERROUS SULFATE TAB 325 MG (65 MG ELEMENTAL FE) 325 MG: 325 (65 FE) TAB at 08:29

## 2020-10-18 RX ADMIN — HYDRALAZINE HYDROCHLORIDE 25 MG: 25 TABLET, FILM COATED ORAL at 15:12

## 2020-10-18 RX ADMIN — APIXABAN 5 MG: 5 TABLET, FILM COATED ORAL at 17:38

## 2020-10-18 RX ADMIN — ONDANSETRON HYDROCHLORIDE 4 MG: 4 TABLET, FILM COATED ORAL at 08:30

## 2020-10-18 RX ADMIN — POTASSIUM CHLORIDE 20 MEQ: 1500 TABLET, EXTENDED RELEASE ORAL at 09:47

## 2020-10-18 RX ADMIN — FAMOTIDINE 20 MG: 20 TABLET ORAL at 08:29

## 2020-10-18 RX ADMIN — ACETAZOLAMIDE 250 MG: 250 TABLET ORAL at 15:12

## 2020-10-18 RX ADMIN — POTASSIUM CHLORIDE 40 MEQ: 1500 TABLET, EXTENDED RELEASE ORAL at 12:25

## 2020-10-18 NOTE — PROGRESS NOTES
[facility] 
 
 
CARDIOLOGY PROGRESS NOTE 
RECS: 
1. Acute on chronic diastolic congestive heart tdhuhwz-fzhmkbggynhct-lqpnhfmdt slowly. Unable to quantify diuresis as patient is incontinent. Edema has significantly improved. Bumex drip held as patient has developed severe hyponatremia. Tolvaptan started. Diamox also started due to metabolic alkalosis. 2. Hypertrophic obstructive cardiomyopathy-S/p alcohol septal ablation 2/19 at [facility] 3. Hx complete heart block - s/p dual chambers permanent pacemaker 2/19 [employer] 4. Hypertension-elevated today. Monitor on diuresis. Would not want to reduce too much and too rapidly due to underlying severe aortic stenosis. 5. Paroxysmal  Atrial fibrillation-rate controlled. on Eliquis for stroke prevention 6. Aortic  stenosis -has become severe now with mean gradient of 43. Was moderate last echo in 9/19 revealed Aortic valve mean gradient is 28.5 mmHg. Aortic valve area is 0.9 cm2.  
7. Mitral regurgitation, mild with mild to moderate mitral stenosis. 8. Pulmonary hypertension-  worsening gradually with pulmonary pressure of 96 now. Echo 9/19 showed PAP 69 mmHg 9. BLE edema with venous stasis- Recommended  wound care- further recommendations per medical team   
10. CKD- creatine stable monitor with aggressive diuresis 11. Hyponatremia and hypochloremia: Significant drop in sodium today. Start tolvaptan. Hopefully will need a few doses only. discussed with Dr. Leo Padron EKG Results Procedure 720 Value Units Date/Time EKG, 12 LEAD, INITIAL [[id number]] Collected:  10/15/20 1547 Order Status:  Completed Updated:  10/16/20 1132 Ventricular Rate 61 BPM   
  Atrial Rate 61 BPM   
  P-R Interval 278 ms QRS Duration 148 ms Q-T Interval 432 ms QTC Calculation (Bezet) 434 ms Calculated P Axis 32 degrees Calculated R Axis 127 degrees Calculated T Axis -22 degrees   Diagnosis --  
 Atrial-paced rhythm with prolonged AV conduction Right bundle branch block T wave abnormality, consider inferior ischemia Abnormal ECG When compared with ECG of 20-SEP-2019 22:01, 
Electronic atrial pacemaker has replaced Atrial fibrillation Confirmed by Felicity Killian MD, [facility] ([id number]) on 10/16/2020 11:32:17 AM 
  
  
 
XR Results (most recent): 
Results from [place] encounter on 10/15/20 XR CHEST PORT Narrative CHEST PORTABLE 1537 hours COMPARISON: September 20, 2019. INDICATIONS: Chest pain. FINDINGS:  
 
Portable single view chest demonstrates: 
 
Lungs: There is chronic atelectasis in the medial segment of the left lower lobe 
and to a lesser extent the infrahilar region of the right lower lobe. These 
findings are similar to prior studies. Prominent bronchovascular markings are 
evident diffusely. No focal infiltrate is seen. Deboraha Buys Cardiac Silhouette And Mediastinal Contours: There is marked enlargement of the 
cardiac contours. The pulmonary outflow tract region is particularly prominent. Dual-lead permanent pacemaker appears satisfactory. This is unchanged. Pleural Spaces: No pneumothorax or pleural effusion evident. Bones And Soft Tissues: Unremarkable for age. Impression IMPRESSION: 
 
No acute cardiopulmonary process is evident. No change from prior studies 10/15/20 ECHO ADULT COMPLETE 10/16/2020 10/16/2020 Narrative · LV: Estimated LVEF is 65 - 70%. Visually measured ejection fraction. Normal systolic function (ejection fraction normal). Small left ventricle. Mild concentric hypertrophy. Wall motion: normal. Severe (grade 3) left  
ventricular diastolic dysfunction. · AV: Probably trileaflet aortic valve. Aortic valve mean gradient is 43  
mmHg. Aortic valve area is 0.5 cm2. Severe aortic valve stenosis is  
present. Moderate to severe aortic valve regurgitation is present. · MV: Mitral annular calcification. Mitral valve mean gradient is 5 mmHg. Moderate mitral valve stenosis is present. Mild mitral valve regurgitation  
is present. · PV: Mild to moderate pulmonic valve regurgitation is present. · TV: Moderate tricuspid valve regurgitation is present. · PA: Severe pulmonary hypertension. Pulmonary arterial systolic pressure  
is 96 mmHg. · LA: Severely dilated left atrium. Left Atrium volume index is 67 mL/m2. · RV: Dilated right ventricle. Pacing wire present · RA: Dilated right atrium. · IVC: Severely elevated central venous pressure (15+ mmHg); IVC diameter  
is larger than 21 mm and collapses less than 50% with respiration. Signed by: Fidel Williamson MD  
 
 
  
  
 
 
ASSESSMENT: 
Hospital Problems  Date Reviewed: 10/15/2020 Codes Class Noted POA Anasarca ICD-10-CM: R60.1 ICD-9-CM: 782.3  10/15/2020 Unknown * (Principal) Acute on chronic diastolic congestive heart failure (HCC) ICD-10-CM: I50.33 ICD-9-CM: 428.33, 428.0  12/12/2017 Yes Mitral regurgitation ICD-10-CM: I34.0 ICD-9-CM: 424.0  6/13/2017 Yes Mild HOCM (hypertrophic obstructive cardiomyopathy) (HCC) ICD-10-CM: I42.1 ICD-9-CM: 425.11  1/12/2017 Yes Aortic stenosis ICD-10-CM: I35.0 ICD-9-CM: 424.1  1/12/2017 Yes Pulmonary HTN ([facility] [address] ICD-10-CM: I27.20 ICD-9-CM: 416.8  12/21/2016 [facility] ICD-10-CM: Z86 ICD-9-CM: 749  12/21/2016 Yes Obesity (BMI 30.0-34.9) ICD-10-CM: H56.1 ICD-9-CM: 278.00  10/13/2016 Yes SUBJECTIVE: 
No chest pain Mild improvement in shortness of breath Improving edema OBJECTIVE: 
 
VS:  
Visit Vitals BP (!) 173/81 Pulse 61 Temp 97.5 °F (36.4 °C) Resp 18 Ht 5' 3\" (1.6 m) Wt 61.1 kg (134 lb 12.8 oz) SpO2 94% BMI 23.88 kg/m² Intake/Output Summary (Last 24 hours) at 10/18/2020 1442 Last data filed at 10/18/2020 1336 Gross per 24 hour Intake 1096.47 ml Output 550 ml Net 546.47 ml  
 
TELE: DDD pacing General: alert and in no apparent distress HENT: Normocephalic, atraumatic. Normal external eye. Neck :  bruit present, increased JVP Cardiac:  regular rate and rhythm, S1: normal intensity, S2: decreased intensity, systolic murmur: late systolic 3/6, crescendo at 2nd left intercostal space, at 2nd right intercostal space, radiates to carotids, 2nd systolic murmur: holosystolic 3/6, blowing at 2nd right intercostal space, at lower left sternal border, at apex Chest/Lungs:chest clear, no wheezing, posterior lower one third bilateral inspiratory rales. Abdomen: Soft, nontender, no masses Extremities:1+ edema, peripheral pulses not palpable Labs: Results:  
   
Chemistry Recent Labs 10/18/20 
1242 10/18/20 
0147 10/17/20 
0332 10/16/20 
0422 GLU  --  150* 88 78 * 123* 131* 136 K  --  3.4* 3.5 3.7 CL  --  82* 91* 98* CO2  --  33* 35* 31 BUN  --  60* 59* 54* CREA  --  2.05* 2.27* 1.95* CA  --  10.0 9.5 9.3 MG  --  2.1 2.3 2.7* AGAP  --  8 5 7 BUCR  --  29* 26* 28* AP  --  215* 204* 208* TP  --  8.7* 8.0 8.1 ALB  --  3.0* 2.8* 2.9*  
GLOB  --  5.7* 5.2* 5.2* AGRAT  --  0.5* 0.5* 0.6* CBC w/Diff Recent Labs 10/18/20 
0147 10/17/20 
5846 10/16/20 
0422 WBC 7.4 4.7 4.4*  
RBC 4.72 4.29 4.06* HGB 10.4* 9.4* 9.0*  
HCT 33.3* 30.9* 29.4*  
 227 217 GRANS 79* 66 67 LYMPH 11* 19* 18* EOS 1 3 2 Cardiac Enzymes Recent Labs 10/16/20 
1852 10/16/20 
0422 CPK 25* 27 CKND1 4.0 4.1* Coagulation No results for input(s): PTP, INR, APTT, INREXT, INREXT in the last 72 hours. Lipid Panel Lab Results Component Value Date/Time  Cholesterol, total 142 10/12/2017 10:40 AM  
 HDL Cholesterol 43 10/12/2017 10:40 AM  
 LDL, calculated 82 10/12/2017 10:40 AM  
 VLDL, calculated 14.6 11/20/2009 10:37 AM  
 Triglyceride 86 10/12/2017 10:40 AM  
 CHOL/HDL Ratio 3.9 11/20/2009 10:37 AM  
  
BNP No results for input(s): BNPP in the last 72 hours. Liver Enzymes Recent Labs 10/18/20 
0147  
TP 8.7* ALB 3.0*  
* Digoxin Thyroid Studies Lab Results Component Value Date/Time TSH 1.98 10/16/2020 04:22 AM  
    
 
 
 
Eugenia Garcia MD    
Contact numbers: 
 5 PM to 9 AM: Answering service at [phone] 
 9 AM to 5 PM: Pager [phone]; if no response, please call through answering service or office. Office number is [phone] or [phone]

## 2020-10-18 NOTE — ROUTINE PROCESS
Bedside shift change report given to Katherine Ramirez RN (oncoming nurse) by Abdiel Garcia RN (offgoing nurse). Report included the following information SBAR, Kardex, ED Summary, Procedure Summary, Intake/Output, MAR, Recent Results and Cardiac Rhythm Paced.

## 2020-10-18 NOTE — PROGRESS NOTES
Seen and examined Full consult to follow Non osmotic ADH stimulation due to metolozone along with disruption of diluton segment due to blockage of NA-CL channel in distal tubule is the main cause of hyponatremia. Without metolozone these issues should resolve and sodium should come up. We can increase solute load to fasten this process as sodium fall is acute(<48 hrs) and doesn't need slow correction. Unfortunately urea is not available in system and salt tabs are undesirable especially with BP issues. Can try tolvaptan but will hold this for now and use only for resistant scenario. Can try fluid restriction. Continue with bumex. Get urine osm,urine lytes in the interim No more thiazides for now Get serum sodium Q6 hrly for now.

## 2020-10-18 NOTE — ROUTINE PROCESS
Bedside and Verbal shift change report given to [address] (oncoming nurse) by Olga Williamson RN (offgoing nurse). Report included the following information SBAR, Kardex and MAR.

## 2020-10-18 NOTE — PROGRESS NOTES
Progress Note Patient: Stacy Tapia MRN: [id number]  CSN: [id number] YOB: 1940  Age: 68 y.o. Sex: female DOA: 10/15/2020 LOS:  LOS: 3 days Subjective:  
Pt sodium is low today received  metolazone yesterday Pt off Bumex drip discussed with nephrology Dr Megan Benedict Pt started on  diamox and Tolvaptan Pt is sleepy this morning . Discussed with nursing staff pt received ultram this morning Will add tylenol prn for pain CT chest 
1. Bilateral hazy groundglass pulmonary opacities with relative sparing of the 
periphery suggestive of pulmonary edema. Very small bilateral pleural effusions. Moderate to marked cardiomegaly. 2.  Similar appearance of enlarged pulmonary artery suggestive of pulmonaryartery hypertension. 3.  Very minimal increased size of medial left upper lobe pulmonary nodule since 2016. Today measuring 6 x 5 mm, previously measuring 5 x 3 mm. Finding is likely 
benign. However given slight change over time suggest follow-up CT in one year. 4.  Extensive atherosclerotic vascular calcification Echo · LV: Estimated LVEF is 65 - 70%. Visually measured ejection fraction. Normal systolic function (ejection fraction normal). Small left ventricle. Mild concentric hypertrophy. Wall motion: normal. Severe (grade 3) left ventricular diastolic dysfunction. · AV: Probably trileaflet aortic valve. Aortic valve mean gradient is 43 mmHg. Aortic valve area is 0.5 cm2. Severe aortic valve stenosis is present. Moderate to severe aortic valve regurgitation is present. · MV: Mitral annular calcification. Mitral valve mean gradient is 5 mmHg. Moderate mitral valve stenosis is present. Mild mitral valve regurgitation is present. · PV: Mild to moderate pulmonic valve regurgitation is present. · TV: Moderate tricuspid valve regurgitation is present. · PA: Severe pulmonary hypertension. Pulmonary arterial systolic pressure is 96 mmHg. · LA: Severely dilated left atrium. Left Atrium volume index is 67 mL/m2. · RV: Dilated right ventricle. Pacing wire present · RA: Dilated right atrium. · IVC: Severely elevated central venous pressure (15+ mmHg); IVC diameter is larger than 21 mm and collapses less than 50% with respiration. Thyroid ultrasound 1. Large irregular mixed echogenicity right thyroid nodule measuring 2.8 cm, 
with multiple punctate echogenic foci, previously biopsied with results 
concerning for papillary thyroid carcinoma. Recommend referral to ENT. 
  
 
 
  
Chief Complaint:  
Chief Complaint Patient presents with  Peripheral Edema Review of systems General: No fevers or chills. sleepy today Cardiovascular: No chest pain or pressure. Pulmonary: No shortness of breath, cough or wheeze. Gastrointestinal: No abdominal pain, nausea, vomiting or diarrhea. Genitourinary: No urinary frequency, urgency, hesitancy or dysuria. Musculoskeletal: generalized weakness Neurologic: No headache, generalized weakness Objective:  
 
Physical Exam: 
Visit Vitals BP (!) 173/81 Pulse 61 Temp 97.5 °F (36.4 °C) Resp 18 Ht 5' 3\" (1.6 m) Wt 61.1 kg (134 lb 12.8 oz) SpO2 94% BMI 23.88 kg/m² General:         Alert, , no acute distress   
HEENT: NC, Atraumatic. PERRLA, anicteric sclerae. Lungs: CTA Bilaterally. No Wheezing/Rhonchi/Rales. Heart:  Regular  rhythm,  systolic  Murmur. Abdomen: Soft, Non distended, Non tender. Extremities: Wound Rt leg clean dressing in place wearing Tubigrip Psych:   Not anxious or agitated. Neurologic:  CN 2-12 grossly intact, Alert and oriented X 3. No acute neurological       
                         Deficits, Intake and Output: 
Current Shift:  10/18 0701 - 10/18 1900 In: 976.5 [P.O.:720; I.V.:256.5] Out: - Last three shifts:  10/16 1901 - 10/18 0700 In: 480 [P.O.:480] Out: 1450 [NBITO:6740] Labs: Results:  
   
Chemistry Recent Labs 10/18/20 [phone] 10/18/20 
0147 10/17/20 
0332 10/16/20 
0422 GLU  --  150* 88 78 * 123* 131* 136 K  --  3.4* 3.5 3.7 CL  --  82* 91* 98* CO2  --  33* 35* 31 BUN  --  60* 59* 54* CREA  --  2.05* 2.27* 1.95* CA  --  10.0 9.5 9.3 AGAP  --  8 5 7 BUCR  --  29* 26* 28* AP  --  215* 204* 208* TP  --  8.7* 8.0 8.1 ALB  --  3.0* 2.8* 2.9*  
GLOB  --  5.7* 5.2* 5.2* AGRAT  --  0.5* 0.5* 0.6* CBC w/Diff Recent Labs 10/18/20 
0147 10/17/20 
8277 10/16/20 
0422 WBC 7.4 4.7 4.4*  
RBC 4.72 4.29 4.06* HGB 10.4* 9.4* 9.0*  
HCT 33.3* 30.9* 29.4*  
 227 217 GRANS 79* 66 67 LYMPH 11* 19* 18* EOS 1 3 2 Cardiac Enzymes Recent Labs 10/16/20 
1852 10/16/20 
0422 CPK 25* 27 CKND1 4.0 4.1* Coagulation No results for input(s): PTP, INR, APTT, INREXT, INREXT in the last 72 hours. Lipid Panel Lab Results Component Value Date/Time Cholesterol, total 142 10/12/2017 10:40 AM  
 HDL Cholesterol 43 10/12/2017 10:40 AM  
 LDL, calculated 82 10/12/2017 10:40 AM  
 VLDL, calculated 14.6 11/20/2009 10:37 AM  
 Triglyceride 86 10/12/2017 10:40 AM  
 CHOL/HDL Ratio 3.9 11/20/2009 10:37 AM  
  
BNP No results for input(s): BNPP in the last 72 hours. Liver Enzymes Recent Labs 10/18/20 
0147  
TP 8.7* ALB 3.0*  
* Thyroid Studies Lab Results Component Value Date/Time TSH 1.98 10/16/2020 04:22 AM  
    
 
Procedures/imaging: see electronic medical records for all procedures/Xrays and details which were not copied into this note but were reviewed prior to creation of Plan Medications:  
Current Facility-Administered Medications Medication Dose Route Frequency  hydrALAZINE (APRESOLINE) tablet 25 mg  25 mg Oral TID PRN  
 tolvaptan (SAMSCA) tablet 15 mg  15 mg Oral DAILY  acetaZOLAMIDE (DIAMOX) tablet 250 mg  250 mg Oral BID  ferrous sulfate tablet 325 mg  1 Tab Oral DAILY WITH BREAKFAST  metoprolol tartrate (LOPRESSOR) tablet 25 mg  25 mg Oral BID  
 apixaban (ELIQUIS) tablet 5 mg  5 mg Oral BID  famotidine (PEPCID) tablet 20 mg  20 mg Oral DAILY  folic acid (FOLVITE) tablet 1 mg  1 mg Oral DAILY  gabapentin (NEURONTIN) capsule 100 mg  100 mg Oral QHS  montelukast (SINGULAIR) tablet 10 mg  10 mg Oral DAILY  ondansetron hcl (ZOFRAN) tablet 4 mg  4 mg Oral Q8H PRN  
 atorvastatin (LIPITOR) tablet 40 mg  40 mg Oral QHS  traMADoL (ULTRAM) tablet 50 mg  50 mg Oral Q6H PRN  
 influenza vaccine 2020-21 (6 mos+)(PF) (FLUARIX/FLULAVAL/FLUZONE QUAD) injection 0.5 mL  0.5 mL IntraMUSCular PRIOR TO DISCHARGE Assessment/Plan Principal Problem: 
  Acute on chronic diastolic congestive heart failure ([facility] (12/12/2017) Active Problems: 
  Obesity (BMI 30.0-34.9) (10/13/2016) Pulmonary HTN ([facility] (12/21/2016) Papillary thyroid carcinoma ([facility] (12/21/2016) Mild HOCM (hypertrophic obstructive cardiomyopathy) ([facility] (1/12/2017) Aortic stenosis (1/12/2017) Mitral regurgitation (6/13/2017) Anasarca (10/15/2020) 
 
 
plan: 
 
Acute systolic congestive heart failure on top of chronic diastolic congestive heart failure D/C  Bumex 0.5 mg/h Diamox 250 mg BID per nephrology recommendation Follow electrolytes Check cardiac enzyme every 8 troponin I slightly up but stable 
  
Hyponatremia/ CKD stage 3 Urine sodium 96 Urine osmolality nd serum osmolality  Normal  
Discussed with nephrology Dr Kristi Pollack Paroxysmal A. fib Continue Eliquis 5 mg twice daily Follow-up CBC 
  
Hypertension 
metoprolol  To 25 mg twice daily Monitor blood pressure Discussed with cardiology will use hydralazine prn Patient is to be on Cozaar at home 100 mg 
  
Hyperlipidemia Continue Zocor 80 mg nightly Follow-up liver function 
  
Anemia Iron saturation low E58 nad folic acid normal 
Will start iron f/u GI as outpatinet  
  
History of thyroid cancer Patient needs follow-up with ENT will call ENT for consult Check TSH and free T4 normal 
  
Venous stasis ulcer Wound care Pt was seen by vascular as outpatinet Continue wound care and f/u vascular surgery consult Cbc, cmp, mag Wound care F/u ENT consult discussed with Dr Burke Walters MD 
10/18/2020 3:22 PM

## 2020-10-18 NOTE — CONSULTS
Consult Note Consult requested by: MD Tati Trujilloalaina is a 68 y.o. female [facility] who is being seen on consult for CKD stage 3 with FIDEL. Chief Complaint Patient presents with  Peripheral Edema Admission diagnosis: Acute on chronic diastolic congestive heart failure ([address] HPI:  68 yr old with female with pmhx of diastolic CHF, severe aortic stenosis,severe aortic valve regurgitation,moderate TR,severe pulmonary  Hypertension,severely dilated LA and right heart failure,A fib,hypertension,hyperlipidemia,CKD stage 3 comes in for LE swelling. Hasn't seen a nephrologist in past. No recent NSAID use. No recent illness. No recent contrast use. No family hx of renal failure. No smoking or illicit drug use in past.No issues with oliguria,hematuria,dysuria etc.Some compliance issue in past with follow ups. Had septal ablation for hypertrophic cardiomyopathy. Had CT chest in ED on 10/16/2020 which showed pulmonary edema. Cardiology was consulted and started on bumex drip. Later metolozone was added. Since this was added there has been a drop in sodium to level of current 123. She feels her swelling has come down quite a bit. Her baseline cr is between 1.5 to 1.8. Currently her cr is at 2.05 Past Medical History:  
Diagnosis Date  Arthritis  Atrial fibrillation ([address]  CHF (congestive heart failure) ([address]  Heart murmur  History of seasonal allergies  HTN (hypertension)  Hypercholesteremia  Moderate aortic stenosis  Pulmonary hypertension ([address]  Venous insufficiency Past Surgical History:  
Procedure Laterality Date  HX KNEE ARTHROSCOPY    
 left and right  HX ORTHOPAEDIC    
 right ankle Social History Socioeconomic History  Marital status:  Spouse name: Not on file  Number of children: Not on file  Years of education: Not on file  Highest education level: Not on file Occupational History  Not on file Social Needs  Financial resource strain: Not on file  Food insecurity Worry: Not on file Inability: Not on file  Transportation needs Medical: Not on file Non-medical: Not on file Tobacco Use  Smoking status: Never Smoker  Smokeless tobacco: Never Used Substance and Sexual Activity  Alcohol use: No  
 Drug use: No  
 Sexual activity: Not on file Lifestyle  Physical activity Days per week: Not on file Minutes per session: Not on file  Stress: Not on file Relationships  Social connections Talks on phone: Not on file Gets together: Not on file Attends Sikh service: Not on file Active member of club or organization: Not on file Attends meetings of clubs or organizations: Not on file Relationship status: Not on file  Intimate partner violence Fear of current or ex partner: Not on file Emotionally abused: Not on file Physically abused: Not on file Forced sexual activity: Not on file Other Topics Concern  Not on file Social History Narrative Pt has a dog Born in [facility] and moved to [address] [facility] years ago Family History Problem Relation Age of Onset  Cancer Other  Heart Disease Other  Cancer Mother  Heart Disease Mother No Known Allergies Home Medications:  
 
Prior to Admission Medications Prescriptions Last Dose Informant Patient Reported? Taking? CETIRIZINE HCL (ZYRTEC PO)   Yes No  
Sig: Take  by mouth. Calcium-Cholecalciferol, D3, (CALCIUM 600 WITH VITAMIN D3) 600 mg(1,500mg) -400 unit chew 10/14/2020 at Unknown time  Yes Yes Sig: Take 1 Tab by mouth daily. Digitek 125 mcg (0.125 mg) tablet   No No  
Sig: take 1 tablet by mouth once daily  
apixaban (Eliquis) 5 mg tablet 10/15/2020 at Unknown time  No Yes Sig: Take 1 Tab by mouth two (2) times a day. cholecalciferol (VITAMIN D3) 1,000 unit cap   Yes No  
Sig: Take  by mouth daily. cyanocobalamin, vitamin B-12, (VITAMIN B12 PO)   Yes No  
Sig: Take 1,000 mcg by mouth daily. famotidine (PEPCID) 20 mg tablet   No No  
Sig: Take 1 Tab by mouth daily. folic acid 497 mcg tablet   Yes No  
Sig: Take 400 mcg by mouth daily. furosemide (LASIX) 40 mg tablet   No No  
Sig: take 1 tablet by mouth every morning and 1/2 tablet AT 2 P.M. Patient taking differently: i tab every other day  
gabapentin (NEURONTIN) 100 mg capsule   No No  
Sig: Take 1 Cap by mouth nightly. Max Daily Amount: 100 mg.  
lactobacillus sp. 50 billion cpu (BIO-K PLUS) 50 billion cell -375 mg cap capsule   No No  
Sig: Take 1 Cap by mouth daily. losartan (COZAAR) 100 mg tablet   Yes No  
Sig: Take 100 mg by mouth daily. metoprolol tartrate (LOPRESSOR) 100 mg IR tablet   No No  
Sig: take 1 tablet by mouth twice a day  
montelukast (SINGULAIR) 10 mg tablet   Yes No  
Sig: Take 10 mg by mouth daily. ondansetron hcl (ZOFRAN) 4 mg tablet   No No  
Sig: Take 1 Tab by mouth every eight (8) hours as needed for Nausea. simvastatin (ZOCOR) 80 mg tablet   No No  
Sig: TAKE 1 TABLET NIGHTLY  
traMADol (ULTRAM) 50 mg tablet   Yes No  
Sig: Take 1 Tab by mouth every six (6) hours as needed for Pain. Facility-Administered Medications: None Current Facility-Administered Medications Medication Dose Route Frequency  hydrALAZINE (APRESOLINE) tablet 25 mg  25 mg Oral TID PRN  
 tolvaptan (SAMSCA) tablet 15 mg  15 mg Oral DAILY  acetaZOLAMIDE (DIAMOX) tablet 250 mg  250 mg Oral BID  ferrous sulfate tablet 325 mg  1 Tab Oral DAILY WITH BREAKFAST  metoprolol tartrate (LOPRESSOR) tablet 25 mg  25 mg Oral BID  
 apixaban (ELIQUIS) tablet 5 mg  5 mg Oral BID  calcium-vitamin D (OS-BALTAZAR) 500 mg-200 unit tablet  1 Tab Oral BID WITH MEALS  cholecalciferol (VITAMIN D3) (1000 Units /25 mcg) tablet 1 Tab  1,000 Units Oral DAILY  famotidine (PEPCID) tablet 20 mg  20 mg Oral DAILY  folic acid (FOLVITE) tablet 1 mg  1 mg Oral DAILY  gabapentin (NEURONTIN) capsule 100 mg  100 mg Oral QHS  montelukast (SINGULAIR) tablet 10 mg  10 mg Oral DAILY  ondansetron hcl (ZOFRAN) tablet 4 mg  4 mg Oral Q8H PRN  
 atorvastatin (LIPITOR) tablet 40 mg  40 mg Oral QHS  traMADoL (ULTRAM) tablet 50 mg  50 mg Oral Q6H PRN  
 influenza vaccine 2020-21 (6 mos+)(PF) (FLUARIX/FLULAVAL/FLUZONE QUAD) injection 0.5 mL  0.5 mL IntraMUSCular PRIOR TO DISCHARGE Review of Systems:  
 
 Complete 10-point review of systems were obtained and discussed in length 
with the patient. Complete review of systems was negative/unremarkable 
except as mentioned in HPI section. Data Review: 
 
Labs: Results:  
   
Chemistry Recent Labs 10/18/20 
0147 10/17/20 
0169 10/16/20 
0422 * 88 78 * 131* 136  
K 3.4* 3.5 3.7 CL 82* 91* 98* CO2 33* 35* 31 BUN 60* 59* 54* CREA 2.05* 2.27* 1.95* CA 10.0 9.5 9.3 AGAP 8 5 7 BUCR 29* 26* 28* * 204* 208*  
TP 8.7* 8.0 8.1 ALB 3.0* 2.8* 2.9*  
GLOB 5.7* 5.2* 5.2* AGRAT 0.5* 0.5* 0.6* CBC w/Diff Recent Labs 10/18/20 
0147 10/17/20 
8051 10/16/20 
0422 WBC 7.4 4.7 4.4*  
RBC 4.72 4.29 4.06* HGB 10.4* 9.4* 9.0*  
HCT 33.3* 30.9* 29.4*  
 227 217 GRANS 79* 66 67 LYMPH 11* 19* 18* EOS 1 3 2 Coagulation No results for input(s): PTP, INR, APTT, INREXT in the last 72 hours. Iron/Ferritin Recent Labs 10/16/20 
0422 IRON 27* BNP No results for input(s): BNPP in the last 72 hours. Cardiac Enzymes Recent Labs 10/16/20 
1852 10/16/20 
0422 CPK 25* 27 CKND1 4.0 4.1* Liver Enzymes Recent Labs 10/18/20 
0147  
TP 8.7* ALB 3.0*  
* Thyroid Studies Lab Results Component Value Date/Time TSH 1.98 10/16/2020 04:22 AM  
    
 
 
Physical Assessment:  
 
Visit Vitals BP (!) 173/81 Pulse 61 Temp 97.5 °F (36.4 °C) Resp 18 Ht 5' 3\" (1.6 m) Wt 62.4 kg (137 lb 8 oz) SpO2 94% BMI 24.36 kg/m² Weight change: -1.588 kg (-3 lb 8 oz) Intake/Output Summary (Last 24 hours) at 10/18/2020 1337 Last data filed at 10/18/2020 1228 Gross per 24 hour Intake 840 ml Output 550 ml Net 290 ml Physical Exam:  
General: comfortable, no acute distress HEENT sclera anicteric, supple neck, no thyromegaly CVS: S1S2 heard,  no rub RS: + air entry b/l, Abd: Soft, Non tender, Not distended, Positive bowel sounds, no organomegaly, no CVA / supra pubic tenderness Neuro: non focal, awake, alert , CN II-XII are grossly intact Extrm: plus 2 edema, no cyanosis, clubbing Skin: no visible  Rash Musculoskeletal: No gross joints or bone deformities Procedures/imaging: see electronic medical records for all procedures, Xrays and details which were not copied into this note but were reviewed prior to creation of Plan Impression & Plan:  
IMPRESSION:  
FIDEL on CKD stage 3 due to cardiorenal syndrome. UA is not active. CKD stage 3 due to cardiorenal syndrome, hypertension Hypercalcemia with elevated globulin. Rule out hypovolemia vs myeloma vs other etiologies Hyponatremia due to metolozone(Non osmotic ADH stimulation due to metolozone along with disruption of diluton segment due to blockage of NA-CL channel in distal tubule) Acute diastolic CHF, on diuresis Severe AS, Right heart failure, Severe pulmonary hypertension. Hypertension LE edema S/p alcoholic septal ablation for hypertrophic cardiomyopathy S/p complete heart block s/p pacer. PLAN:  
 USG kidneys Urine protein/cr ratio, urine osmolality and lytes Get PTH, SPEP, IEP,K/l ratio, PTHrp,Vitamin d levels I and O Daily weights Bladder scans r/o retention Started on tolvaptan and diamox by cardiology. Watch sodium every 6 hrly  For now. I suspect sodium is going to correct by itself as this was mainly thiazide induced and she is not getting it anymore.  But no issue with rapid correction as fall in sodium is rather acute and in past 24 hrs. Dont feel like she needs more than one dose. Follow sodium for now and if it corrects with one dose then stop rest of doses Fluctuations in cr is due to fluid fluctuations. Follow for  Now. Needs OP follow up on her kidney function Monitor daily labs Santino Rojas MD 
October 18, 2020 [facility] Nephrology Office [phone]

## 2020-10-19 ENCOUNTER — APPOINTMENT (OUTPATIENT)
Dept: ULTRASOUND IMAGING | Age: 80
DRG: 291 | End: 2020-10-19
Attending: INTERNAL MEDICINE
Payer: MEDICARE

## 2020-10-19 LAB
25(OH)D3 SERPL-MCNC: 25.4 NG/ML (ref 30–100)
ALBUMIN SERPL-MCNC: 2.6 G/DL (ref 3.4–5)
ALBUMIN/GLOB SERPL: 0.5 {RATIO} (ref 0.8–1.7)
ALP SERPL-CCNC: 171 U/L (ref 45–117)
ALT SERPL-CCNC: 13 U/L (ref 13–56)
ANION GAP SERPL CALC-SCNC: 4 MMOL/L (ref 3–18)
ANION GAP SERPL CALC-SCNC: 6 MMOL/L (ref 3–18)
ANION GAP SERPL CALC-SCNC: 6 MMOL/L (ref 3–18)
ANION GAP SERPL CALC-SCNC: 7 MMOL/L (ref 3–18)
AST SERPL-CCNC: 16 U/L (ref 10–38)
BASOPHILS # BLD: 0 K/UL (ref 0–0.1)
BASOPHILS NFR BLD: 0 % (ref 0–2)
BILIRUB SERPL-MCNC: 0.9 MG/DL (ref 0.2–1)
BUN SERPL-MCNC: 60 MG/DL (ref 7–18)
BUN SERPL-MCNC: 61 MG/DL (ref 7–18)
BUN SERPL-MCNC: 62 MG/DL (ref 7–18)
BUN SERPL-MCNC: 66 MG/DL (ref 7–18)
BUN/CREAT SERPL: 26 (ref 12–20)
BUN/CREAT SERPL: 26 (ref 12–20)
BUN/CREAT SERPL: 27 (ref 12–20)
BUN/CREAT SERPL: 27 (ref 12–20)
CALCIUM SERPL-MCNC: 10 MG/DL (ref 8.5–10.1)
CALCIUM SERPL-MCNC: 9.3 MG/DL (ref 8.5–10.1)
CALCIUM SERPL-MCNC: 9.4 MG/DL (ref 8.5–10.1)
CALCIUM SERPL-MCNC: 9.8 MG/DL (ref 8.5–10.1)
CALCIUM SERPL-MCNC: 9.8 MG/DL (ref 8.5–10.1)
CHLORIDE SERPL-SCNC: 83 MMOL/L (ref 100–111)
CHLORIDE SERPL-SCNC: 84 MMOL/L (ref 100–111)
CO2 SERPL-SCNC: 35 MMOL/L (ref 21–32)
CO2 SERPL-SCNC: 36 MMOL/L (ref 21–32)
CO2 SERPL-SCNC: 36 MMOL/L (ref 21–32)
CO2 SERPL-SCNC: 37 MMOL/L (ref 21–32)
CREAT SERPL-MCNC: 2.23 MG/DL (ref 0.6–1.3)
CREAT SERPL-MCNC: 2.31 MG/DL (ref 0.6–1.3)
CREAT SERPL-MCNC: 2.42 MG/DL (ref 0.6–1.3)
CREAT SERPL-MCNC: 2.44 MG/DL (ref 0.6–1.3)
DIFFERENTIAL METHOD BLD: ABNORMAL
EOSINOPHIL # BLD: 0.1 K/UL (ref 0–0.4)
EOSINOPHIL NFR BLD: 2 % (ref 0–5)
ERYTHROCYTE [DISTWIDTH] IN BLOOD BY AUTOMATED COUNT: 19.1 % (ref 11.6–14.5)
GLOBULIN SER CALC-MCNC: 4.9 G/DL (ref 2–4)
GLUCOSE SERPL-MCNC: 102 MG/DL (ref 74–99)
GLUCOSE SERPL-MCNC: 106 MG/DL (ref 74–99)
GLUCOSE SERPL-MCNC: 75 MG/DL (ref 74–99)
GLUCOSE SERPL-MCNC: 98 MG/DL (ref 74–99)
HCT VFR BLD AUTO: 30.4 % (ref 35–45)
HGB BLD-MCNC: 9.3 G/DL (ref 12–16)
LYMPHOCYTES # BLD: 0.9 K/UL (ref 0.9–3.6)
LYMPHOCYTES NFR BLD: 15 % (ref 21–52)
MAGNESIUM SERPL-MCNC: 2 MG/DL (ref 1.6–2.6)
MCH RBC QN AUTO: 21.9 PG (ref 24–34)
MCHC RBC AUTO-ENTMCNC: 30.6 G/DL (ref 31–37)
MCV RBC AUTO: 71.7 FL (ref 74–97)
MONOCYTES # BLD: 0.8 K/UL (ref 0.05–1.2)
MONOCYTES NFR BLD: 14 % (ref 3–10)
NEUTS SEG # BLD: 4.1 K/UL (ref 1.8–8)
NEUTS SEG NFR BLD: 69 % (ref 40–73)
OSMOLALITY SERPL: 289 MOSM/KG H2O (ref 280–301)
OSMOLALITY UR: 289 MOSM/KG H2O (ref 50–1400)
PLATELET # BLD AUTO: 232 K/UL (ref 135–420)
PLATELET COMMENTS,PCOM: ABNORMAL
PMV BLD AUTO: 9.9 FL (ref 9.2–11.8)
POTASSIUM SERPL-SCNC: 4 MMOL/L (ref 3.5–5.5)
POTASSIUM SERPL-SCNC: 4.1 MMOL/L (ref 3.5–5.5)
POTASSIUM SERPL-SCNC: 4.3 MMOL/L (ref 3.5–5.5)
POTASSIUM SERPL-SCNC: 4.5 MMOL/L (ref 3.5–5.5)
PROT SERPL-MCNC: 7.5 G/DL (ref 6.4–8.2)
PTH-INTACT SERPL-MCNC: 174.8 PG/ML (ref 18.4–88)
RBC # BLD AUTO: 4.24 M/UL (ref 4.2–5.3)
RBC MORPH BLD: ABNORMAL
SODIUM SERPL-SCNC: 124 MMOL/L (ref 136–145)
SODIUM SERPL-SCNC: 125 MMOL/L (ref 136–145)
SODIUM SERPL-SCNC: 126 MMOL/L (ref 136–145)
WBC # BLD AUTO: 5.9 K/UL (ref 4.6–13.2)

## 2020-10-19 PROCEDURE — 83735 ASSAY OF MAGNESIUM: CPT

## 2020-10-19 PROCEDURE — 82784 ASSAY IGA/IGD/IGG/IGM EACH: CPT

## 2020-10-19 PROCEDURE — 80053 COMPREHEN METABOLIC PANEL: CPT

## 2020-10-19 PROCEDURE — 76770 US EXAM ABDO BACK WALL COMP: CPT

## 2020-10-19 PROCEDURE — 36415 COLL VENOUS BLD VENIPUNCTURE: CPT

## 2020-10-19 PROCEDURE — 82306 VITAMIN D 25 HYDROXY: CPT

## 2020-10-19 PROCEDURE — 65270000029 HC RM PRIVATE

## 2020-10-19 PROCEDURE — 99221 1ST HOSP IP/OBS SF/LOW 40: CPT | Performed by: PHYSICIAN ASSISTANT

## 2020-10-19 PROCEDURE — 90686 IIV4 VACC NO PRSV 0.5 ML IM: CPT | Performed by: FAMILY MEDICINE

## 2020-10-19 PROCEDURE — 84295 ASSAY OF SERUM SODIUM: CPT

## 2020-10-19 PROCEDURE — 83930 ASSAY OF BLOOD OSMOLALITY: CPT

## 2020-10-19 PROCEDURE — 82652 VIT D 1 25-DIHYDROXY: CPT

## 2020-10-19 PROCEDURE — 83883 ASSAY NEPHELOMETRY NOT SPEC: CPT

## 2020-10-19 PROCEDURE — 90471 IMMUNIZATION ADMIN: CPT

## 2020-10-19 PROCEDURE — 94762 N-INVAS EAR/PLS OXIMTRY CONT: CPT

## 2020-10-19 PROCEDURE — 97166 OT EVAL MOD COMPLEX 45 MIN: CPT

## 2020-10-19 PROCEDURE — 85025 COMPLETE CBC W/AUTO DIFF WBC: CPT

## 2020-10-19 PROCEDURE — 97535 SELF CARE MNGMENT TRAINING: CPT

## 2020-10-19 PROCEDURE — 74011250637 HC RX REV CODE- 250/637: Performed by: FAMILY MEDICINE

## 2020-10-19 PROCEDURE — 74011250636 HC RX REV CODE- 250/636: Performed by: FAMILY MEDICINE

## 2020-10-19 PROCEDURE — 74011250637 HC RX REV CODE- 250/637: Performed by: NURSE PRACTITIONER

## 2020-10-19 PROCEDURE — 99233 SBSQ HOSP IP/OBS HIGH 50: CPT | Performed by: INTERNAL MEDICINE

## 2020-10-19 PROCEDURE — 83970 ASSAY OF PARATHORMONE: CPT

## 2020-10-19 PROCEDURE — 80048 BASIC METABOLIC PNL TOTAL CA: CPT

## 2020-10-19 PROCEDURE — 74011250637 HC RX REV CODE- 250/637: Performed by: INTERNAL MEDICINE

## 2020-10-19 PROCEDURE — 82397 CHEMILUMINESCENT ASSAY: CPT

## 2020-10-19 RX ORDER — METOPROLOL TARTRATE 25 MG/1
12.5 TABLET, FILM COATED ORAL 2 TIMES DAILY
Status: DISCONTINUED | OUTPATIENT
Start: 2020-10-19 | End: 2020-10-23

## 2020-10-19 RX ORDER — ROSUVASTATIN CALCIUM 10 MG/1
TABLET, FILM COATED ORAL
Qty: 90 TAB | Refills: 1 | OUTPATIENT
Start: 2020-10-19 | End: 2020-10-30

## 2020-10-19 RX ADMIN — GABAPENTIN 100 MG: 100 CAPSULE ORAL at 21:35

## 2020-10-19 RX ADMIN — APIXABAN 5 MG: 5 TABLET, FILM COATED ORAL at 18:18

## 2020-10-19 RX ADMIN — FERROUS SULFATE TAB 325 MG (65 MG ELEMENTAL FE) 325 MG: 325 (65 FE) TAB at 08:36

## 2020-10-19 RX ADMIN — INFLUENZA VIRUS VACCINE 0.5 ML: 15; 15; 15; 15 SUSPENSION INTRAMUSCULAR at 10:34

## 2020-10-19 RX ADMIN — TOLVAPTAN 15 MG: 15 TABLET ORAL at 08:38

## 2020-10-19 RX ADMIN — MONTELUKAST 10 MG: 10 TABLET, FILM COATED ORAL at 08:36

## 2020-10-19 RX ADMIN — ACETAZOLAMIDE 250 MG: 250 TABLET ORAL at 21:35

## 2020-10-19 RX ADMIN — METOPROLOL TARTRATE 12.5 MG: 25 TABLET, FILM COATED ORAL at 18:18

## 2020-10-19 RX ADMIN — APIXABAN 5 MG: 5 TABLET, FILM COATED ORAL at 08:36

## 2020-10-19 RX ADMIN — FAMOTIDINE 20 MG: 20 TABLET ORAL at 08:36

## 2020-10-19 RX ADMIN — FOLIC ACID 1 MG: 1 TABLET ORAL at 08:36

## 2020-10-19 RX ADMIN — ACETAZOLAMIDE 250 MG: 250 TABLET ORAL at 10:33

## 2020-10-19 RX ADMIN — METOPROLOL TARTRATE 25 MG: 25 TABLET, FILM COATED ORAL at 08:36

## 2020-10-19 RX ADMIN — ATORVASTATIN CALCIUM 40 MG: 40 TABLET, FILM COATED ORAL at 21:35

## 2020-10-19 NOTE — PROGRESS NOTES
PT treatment attempted at [phone], pt currently off floor for temporary dialysis cath placement. Will follow up as schedule allows.   
 
Tracy Kennedy PT DPT

## 2020-10-19 NOTE — CONSULTS
Vascular Surgery Consult Patient: Ruel Newell MRN: [id number]  CSN: [id number] YOB: 1940 Age: 68 y.o. Sex: female DOA: 10/15/2020 HPI:  
 
Ruel Newell is a 68 y.o. female who is well known to our practice with history of chronic venous insufficiency and leg ulcers. She was admitted for acute on chronic diastolic heart failure. She has multiple medical problems including paroxysmal A Fib, hypertrophic obstructive CMO s/p septal ablation, complete heart block s/p pacemaker, severe AS, MR regurgitation, HTN, CKD stage 3, and pulmonary HTN. Reportedly she was having increasing leg swelling and right leg ulcer upon admission. Cardiology and Renal have been following patient closely and she has been diuresed aggressively with good improvement in her leg swelling according to patient. Wound care team has been following for right leg ulcer and she is in Tubigrip bilaterally for gentle compression. Today she states she is feeling good and has no complaints. She denies any leg pain. Vascular consultation was requested for further evaluation of her leg swelling and recommendations for wound care. Past Medical History:  
Diagnosis Date  Arthritis  Atrial fibrillation ([address]  CHF (congestive heart failure) ([address]  Heart murmur  History of seasonal allergies  HTN (hypertension)  Hypercholesteremia  Moderate aortic stenosis  Pulmonary hypertension ([address]  Venous insufficiency Past Surgical History:  
Procedure Laterality Date  HX KNEE ARTHROSCOPY    
 left and right  HX ORTHOPAEDIC    
 right ankle Family History Problem Relation Age of Onset  Cancer Other  Heart Disease Other  Cancer Mother  Heart Disease Mother Social History Socioeconomic History  Marital status:  Spouse name: Not on file  Number of children: Not on file  Years of education: Not on file  Highest education level: Not on file Tobacco Use  Smoking status: Never Smoker  Smokeless tobacco: Never Used Substance and Sexual Activity  Alcohol use: No  
 Drug use: No  
Social History Narrative Pt has a dog Born in [facility] and moved to [place] years ago Prior to Admission medications Medication Sig Start Date End Date Taking? Authorizing Provider  
apixaban (Eliquis) 5 mg tablet Take 1 Tab by mouth two (2) times a day. 6/12/20  Yes Renetta Vasquez NP Calcium-Cholecalciferol, D3, (CALCIUM 600 WITH VITAMIN D3) 600 mg(1,500mg) -400 unit chew Take 1 Tab by mouth daily. Yes Provider, Historical  
Digitek 125 mcg (0.125 mg) tablet take 1 tablet by mouth once daily 10/19/20   Mortimer Santos, MD  
furosemide (LASIX) 40 mg tablet take 1 tablet by mouth every morning and 1/2 tablet AT 2 P.M. Patient taking differently: i tab every other day 8/17/20   Markie Reyes NP Digitek 125 mcg (0.125 mg) tablet take 1 tablet by mouth once daily 4/28/20 10/19/20  Mortimer Santos, MD  
metoprolol tartrate (LOPRESSOR) 100 mg IR tablet take 1 tablet by mouth twice a day 4/22/20   Mortimer Santos, MD  
traMADol Zia Melara) 50 mg tablet Take 1 Tab by mouth every six (6) hours as needed for Pain. 12/11/18   Provider, Historical  
folic acid 090 mcg tablet Take 400 mcg by mouth daily. Provider, Historical  
cyanocobalamin, vitamin B-12, (VITAMIN B12 PO) Take 1,000 mcg by mouth daily. Provider, Historical  
famotidine (PEPCID) 20 mg tablet Take 1 Tab by mouth daily. 9/26/19   Jaimee Kim MD  
gabapentin (NEURONTIN) 100 mg capsule Take 1 Cap by mouth nightly. Max Daily Amount: 100 mg. 9/25/19   Jaimee Kim MD  
lactobacillus sp. 50 billion cpu (BIO-K PLUS) 50 billion cell -375 mg cap capsule Take 1 Cap by mouth daily.  9/26/19   Jaimee Kim MD  
ondansetron hcl (ZOFRAN) 4 mg tablet Take 1 Tab by mouth every eight (8) hours as needed for Nausea. 5/16/19   Julio Moncada PA-C  
simvastatin (ZOCOR) 80 mg tablet TAKE 1 TABLET NIGHTLY 9/26/18   Alesha Domingo MD  
cholecalciferol (VITAMIN D3) 1,000 unit cap Take  by mouth daily. Chinyere Astorga MD  
losartan (COZAAR) 100 mg tablet Take 100 mg by mouth daily. Provider, Historical  
montelukast (SINGULAIR) 10 mg tablet Take 10 mg by mouth daily. Provider, Historical  
CETIRIZINE HCL (ZYRTEC PO) Take  by mouth. Other, MD Chinyere  
 
 
No Known Allergies Review of Systems Constitutional: negative HEENT: negative Respiratory: negative Cardiovascular: negative Gastrointestinal: negative Genitourinary:negative Hematologic/lymphatic: negative Musculoskeletal:positive for leg swelling (improved) and right leg ulcer Neurological: negative Behavioral/Psych: negative Endocrine: negative Allergic/Immunologic: negative Physical Exam:  
  
Visit Vitals BP (!) 92/59 (BP 1 Location: Left arm, BP Patient Position: At rest) Pulse 60 Temp 97.5 °F (36.4 °C) Resp 18 Ht 5' 3\" (1.6 m) Wt 134 lb 12.8 oz (61.1 kg) SpO2 96% BMI 23.88 kg/m² Physical Exam: 
General: elderly female in no acute distress HEENT: EOMI, no scleral icterus is noted. Neck supple, trachea midline. No JVD. Pulmonary: No increased work or breathing is noted. No cough. Abdomen:  soft, nondistended. Extremities: Warm and well perfused bilaterally. tubigrip taken down for exam. No significant BLE edema on today's exam. Right leg with dressing to anterior leg ulcer, did not remove on todays exam as pt currently working with PT. No surrounding induration or underlying fluctuance appreciated. No active cellulitis appreciated. Neuro: Cranial nerves II through XII are grossly intact . nonfocal  
 
Data Review: CBC:  
Lab Results Component Value Date/Time  WBC 5.9 10/19/2020 03:21 AM  
 RBC 4.24 10/19/2020 03:21 AM  
 HGB 9.3 (L) 10/19/2020 03:21 AM  
 HCT 30.4 (L) 10/19/2020 03:21 AM  
 PLATELET 475 20/43/2027 03:21 AM  
  
BMP:  
Lab Results Component Value Date/Time Glucose 106 (H) 10/19/2020 08:52 AM  
 Sodium 125 (L) 10/19/2020 08:52 AM  
 Potassium 4.0 10/19/2020 08:52 AM  
 Chloride 83 (L) 10/19/2020 08:52 AM  
 CO2 36 (H) 10/19/2020 08:52 AM  
 BUN 60 (H) 10/19/2020 08:52 AM  
 Creatinine 2.31 (H) 10/19/2020 08:52 AM  
 Calcium 10.0 10/19/2020 08:52 AM  
 
Coagulation:  
Lab Results Component Value Date/Time Prothrombin time 19.6 (H) 05/28/2020 09:33 AM  
 INR 1.7 (H) 05/28/2020 09:33 AM  
 INR, External 1.7 06/18/2020  
 aPTT 66.1 (H) 10/21/2019 02:30 PM  
 
 
 
Assessment/Plan  
 
67 yo female admitted for acute on chronic diastolic heart failure and anasarca. She has multiple medical problems as described above including chronic venous insufficiency and right leg ulcer. Her fluid overload is dramatically improved with diuresis. Leg swelling is much improved. Would continue local wound care per wound care team and Tubigrip for gentle compression. Also recommend leg elevation for swelling. From a vascular standpoint patient seems to be doing well and no further recommendations at this time. Continue current supportive care per primary team. Will remain available as needed. Please call with further questions or concerns. Principal Problem: 
  Acute on chronic diastolic congestive heart failure ([address] (12/12/2017) Active Problems: 
  Obesity (BMI 30.0-34.9) (10/13/2016) Pulmonary HTN ([address] (12/21/2016) Papillary thyroid carcinoma ([address] (12/21/2016) Mild HOCM (hypertrophic obstructive cardiomyopathy) ([address] (1/12/2017) Aortic stenosis (1/12/2017) Mitral regurgitation (6/13/2017) Anasarca (10/15/2020) [address] [place] [address] October 19, 2020

## 2020-10-19 NOTE — PROGRESS NOTES
RENAL DAILY PROGRESS NOTE Patient: Gela Dahl               Sex: female          DOA: 10/15/2020  3:49 PM  
    
YOB: 1940      Age:  68 y.o.        LOS:  LOS: 4 days Subjective:  
 
Gela Dahl is a 68 y.o.  who presents with Anasarca [R60.1]. Asked to evaluate for hyponatremia,and renal failure,hx of diastolic heart failure,aortic stenosis,pulm htn,anasarca,was treated with bumex,metolazone Chief complains: Patient denies nausea, vomiting, diarrhea - Reviewed last 24 hrs events Current Facility-Administered Medications Medication Dose Route Frequency  metoprolol tartrate (LOPRESSOR) tablet 12.5 mg  12.5 mg Oral BID  hydrALAZINE (APRESOLINE) tablet 25 mg  25 mg Oral TID PRN  
 tolvaptan (SAMSCA) tablet 15 mg  15 mg Oral DAILY  acetaZOLAMIDE (DIAMOX) tablet 250 mg  250 mg Oral BID  ferrous sulfate tablet 325 mg  1 Tab Oral DAILY WITH BREAKFAST  apixaban (ELIQUIS) tablet 5 mg  5 mg Oral BID  famotidine (PEPCID) tablet 20 mg  20 mg Oral DAILY  folic acid (FOLVITE) tablet 1 mg  1 mg Oral DAILY  gabapentin (NEURONTIN) capsule 100 mg  100 mg Oral QHS  montelukast (SINGULAIR) tablet 10 mg  10 mg Oral DAILY  ondansetron hcl (ZOFRAN) tablet 4 mg  4 mg Oral Q8H PRN  
 atorvastatin (LIPITOR) tablet 40 mg  40 mg Oral QHS  traMADoL (ULTRAM) tablet 50 mg  50 mg Oral Q6H PRN Objective:  
 
Visit Vitals BP (!) 92/59 (BP 1 Location: Left arm, BP Patient Position: At rest) Pulse 60 Temp 97.5 °F (36.4 °C) Resp 18 Ht 5' 3\" (1.6 m) Wt 61.1 kg (134 lb 12.8 oz) SpO2 96% BMI 23.88 kg/m² Intake/Output Summary (Last 24 hours) at 10/19/2020 1428 Last data filed at 10/19/2020 1311 Gross per 24 hour Intake 700 ml Output 3800 ml Net -3100 ml Physical Examination:  
 
 
RS: Chest is bilateral equal, CVS: S1-S2 heard Abdomen: Soft, Non tender, Not distended, Positive bowel sounds, no organomegaly, no CVA / supra pubic tenderness Extremities: mild edema,  
CNS: Awake HEENT: Head is atraumatic, PERRLA, conjunctiva pink & non icteric. No JVD or carotid bruit Data Review:   
 
Labs:  
 
Hematology:  
Recent Labs 10/19/20 
0321 10/18/20 
0147 10/17/20 
6067 WBC 5.9 7.4 4.7 HGB 9.3* 10.4* 9.4* HCT 30.4* 33.3* 30.9* Chemistry:  
Recent Labs 10/19/20 
8453 10/19/20 
0321 10/19/20 
0037 10/18/20 
1815 10/18/20 
1242 10/18/20 
0147 10/17/20 
5800 BUN 60* 61*  --   --   --  60* 59* CREA 2.31* 2.23*  --   --   --  2.05* 2.27* CA 10.0 9.8  9.8  --   --   --  10.0 9.5 ALB  --  2.6*  --   --   --  3.0* 2.8*  
K 4.0 4.1  --   --   --  3.4* 3.5 * 124* 125* 124* 124* 123* 131* CL 83* 83*  --   --   --  82* 91* CO2 36* 37*  --   --   --  33* 35* * 75  --   --   --  150* 88 Images: 
 
XR (Most Recent). CXR reviewed by me and compared with previous CXR Results from [facility] [place] Encounter encounter on 10/15/20 XR CHEST PORT Narrative CHEST PORTABLE 1537 hours COMPARISON: September 20, 2019. INDICATIONS: Chest pain. FINDINGS:  
 
Portable single view chest demonstrates: 
 
Lungs: There is chronic atelectasis in the medial segment of the left lower lobe 
and to a lesser extent the infrahilar region of the right lower lobe. These 
findings are similar to prior studies. Prominent bronchovascular markings are 
evident diffusely. No focal infiltrate is seen. Thomasena Dilling Cardiac Silhouette And Mediastinal Contours: There is marked enlargement of the 
cardiac contours. The pulmonary outflow tract region is particularly prominent. Dual-lead permanent pacemaker appears satisfactory. This is unchanged. Pleural Spaces: No pneumothorax or pleural effusion evident. Bones And Soft Tissues: Unremarkable for age. Impression IMPRESSION: 
 
No acute cardiopulmonary process is evident. No change from prior studies CT (Most Recent) Results from [facility] [place] Encounter encounter on 10/15/20 CT CHEST WO CONT Narrative EXAM: CT CHEST WO CONT 
 
CLINICAL INDICATION/HISTORY : CHF /h/o pulmonary nodule. COMPARISON: October 24, 2016 TECHNIQUE: Helical scans through the chest was obtained from the thoracic inlet 
to the diaphragm without IV contrast administration. All CT scans at this facility are performed using dose optimization technique as 
appropriate to a performed exam to include automated exposure control, 
adjustment of the mA and/or kV according to patient size (including appropriate 
matching for sight specific examinations)  or use of iterative reconstruction 
technique. FINDINGS:  
 
Lungs: Medial left upper lobe pulmonary nodule measures 6 mm x 5 mm, previously 5 mm x 3 mm. There are hazy bilateral groundglass pulmonary opacities with 
relative sparing of the periphery suggestive of edema. Thyroid and chest wall: Heterogeneous appearance of the thyroid. Left pacer 
battery pack Heart and great vessels: Moderate to marked cardiomegaly. Pacer wires. Mitral 
annulus calcification. Atherosclerotic vascular calcification. Main pulmonary 
artery measures 4.7 cm with dilated right and left pulmonary arteries also. Lymph nodes: Stable 1.3 cm short axis AP window lymph node and 1.2 cm short axis 
precarinal lymph node. Pleura: Small bilateral pleural effusions Upper Abdomen: Atherosclerotic vascular calcification Bones: Unremarkable for age Impression IMPRESSION: 
 
1. Bilateral hazy groundglass pulmonary opacities with relative sparing of the 
periphery suggestive of pulmonary edema. Very small bilateral pleural effusions. Moderate to marked cardiomegaly. 2.  Similar appearance of enlarged pulmonary artery suggestive of pulmonary 
artery hypertension. 3.  Very minimal increased size of medial left upper lobe pulmonary nodule since 2016. Today measuring 6 x 5 mm, previously measuring 5 x 3 mm. Finding is likely 
benign. However given slight change over time suggest follow-up CT in one year. 4.  Extensive atherosclerotic vascular calcification EKG No results found for this or any previous visit. I have personally reviewed the old medical records and patient's labs Plan / Recommendation: 1. Acute/crf stage 4,tr proteinuria,probable cardiorenal,check spot urine for protein/creatinine,spep,renal ultrasound 2.acute/chronic hyponatremia ,related to thiazides,normal serum osmolarity raises the possibility of pseudohyponatremia related to elevated globulins,repeat serum osmolarity,continue fluid restrictions,urine is diluted,continue tolvaptan ,watch for rapid correction D/w Dr. Soniya Alves MD 
Nephrology 10/19/2020

## 2020-10-19 NOTE — ROUTINE PROCESS
Bedside and Verbal shift change report given to SLIME RN (oncoming nurse) by Matty Mosley RN (offgoing nurse). Report included the following information SBAR, Kardex and MAR.

## 2020-10-19 NOTE — PROGRESS NOTES
Physician Progress Note Ashley BONDS #:                  [id number] :                       1940 ADMIT DATE:       10/15/2020 3:49 PM 
[address] [place] DATE: 
RESPONDING 
PROVIDER #:        Pj Phillips MD 
 
 
 
 
QUERY TEXT: 
 
Dear Dr Ritika Pickett Patient admitted with Acute systolic congestive heart failure on top of chronic diastolic congestive heart failure , noted to have CKD in PN If possible, please document in progress notes and discharge summary if you are evaluating and/or treating any of the following: The medical record reflects the following: 
 
Risk Factors:80 y.o.  female who has history of congestive heart failure atrial fibrillation aortic stenosis/aortic incompetency pulmonary hypertension venous stasis thyroid cancer hypertension hyperlipidemia, pace maker Clinical Indicators:PMH CHF, hypertrophic cardiomyopathy with septal ablation and  CKD in MDs HP,creatinine 2.07 GFRs -23 Treatment: Labs monitored carefully,IV bumex gtt with metolazone Thank you Jose Manjarrez RN    Options provided: -- CKD Stage 1 GFR>90 
-- CKD Stage 2 GFR 60-90 
-- CKD Stage 3 GFR 30-59 
-- CKD Stage 4 GFR 15-29 
-- CKD Stage 5 GFR<15 
-- ESRD 
-- Other - I will add my own diagnosis -- Disagree - Not applicable / Not valid -- Disagree - Clinically unable to determine / Unknown 
-- Refer to Clinical Documentation Reviewer PROVIDER RESPONSE TEXT: 
 
This patient has CKD Stage 4. Query created by:  Alex Gambino on 10/16/2020 6:11 PM 
 
 
Electronically signed by:  Pj Phillips MD 10/19/2020 9:41 AM

## 2020-10-19 NOTE — PROGRESS NOTES
[facility] 
 
 
CARDIOLOGY PROGRESS NOTE 
RECS: 
 
 
1. Acute on chronic diastolic congestive heart failure-appears compensated now. SOB and BLE improving well. Diuretics on hold. Continue diamox CO2 improving 2. Hypertrophic obstructive cardiomyopathy-S/p alcohol septal ablation 2/19 at [facility] 3. Hx complete heart block - s/p dual chambers permanent pacemaker 2/19 [employer] 4. Hypertension- borderline low BP reduced BB 5. Paroxysmal  Atrial fibrillation-rate controlled. on Eliquis for stroke prevention 6. Aortic stenosis -has become severe now with mean gradient of 43. Was moderate on  echo in 9/19. Will consider heart catheterization in 1-2 days and referral for TAVR after cath 7. Mitral regurgitation, mild with mild to moderate mitral stenosis. 8. Pulmonary hypertension-  worsening gradually with pulmonary pressure of 96 now. Echo 9/19 showed PAP 69 mmHg 9. BLE edema with venous stasis- Recommended  wound care- further recommendations per medical team   
10. CKD- renal indices stable 11. Hyponatremia and hypochloremia: Secondary to diuresis- now on  Tolvaptan. Hyponatremia improving- reviewed renal consult-- will discontinue tolvapton when Na > 130. Expect alkalosis to improve in the next 24- 48 hrs. bp remains borderline- increase lopressor when bp improves. 
 
  
 
EKG Results Procedure 720 Value Units Date/Time EKG, 12 LEAD, INITIAL [[id number]] Collected:  10/15/20 1547 Order Status:  Completed Updated:  10/16/20 1132 Ventricular Rate 61 BPM   
  Atrial Rate 61 BPM   
  P-R Interval 278 ms QRS Duration 148 ms Q-T Interval 432 ms QTC Calculation (Bezet) 434 ms Calculated P Axis 32 degrees Calculated R Axis 127 degrees Calculated T Axis -22 degrees Diagnosis -- Atrial-paced rhythm with prolonged AV conduction Right bundle branch block T wave abnormality, consider inferior ischemia Abnormal ECG 
 When compared with ECG of 20-SEP-2019 22:01, 
Electronic atrial pacemaker has replaced Atrial fibrillation Confirmed by Domenica Page MD, Vidhi Offer ([id number]) on 10/16/2020 11:32:17 AM 
  
  
 
XR Results (most recent): 
Results from [place] encounter on 10/15/20 XR CHEST PORT Narrative CHEST PORTABLE 1537 hours COMPARISON: September 20, 2019. INDICATIONS: Chest pain. FINDINGS:  
 
Portable single view chest demonstrates: 
 
Lungs: There is chronic atelectasis in the medial segment of the left lower lobe 
and to a lesser extent the infrahilar region of the right lower lobe. These 
findings are similar to prior studies. Prominent bronchovascular markings are 
evident diffusely. No focal infiltrate is seen. [place] Sorrow Cardiac Silhouette And Mediastinal Contours: There is marked enlargement of the 
cardiac contours. The pulmonary outflow tract region is particularly prominent. Dual-lead permanent pacemaker appears satisfactory. This is unchanged. Pleural Spaces: No pneumothorax or pleural effusion evident. Bones And Soft Tissues: Unremarkable for age. Impression IMPRESSION: 
 
No acute cardiopulmonary process is evident. No change from prior studies 10/15/20 ECHO ADULT COMPLETE 10/16/2020 10/16/2020 Narrative · LV: Estimated LVEF is 65 - 70%. Visually measured ejection fraction. Normal systolic function (ejection fraction normal). Small left ventricle. Mild concentric hypertrophy. Wall motion: normal. Severe (grade 3) left  
ventricular diastolic dysfunction. · AV: Probably trileaflet aortic valve. Aortic valve mean gradient is 43  
mmHg. Aortic valve area is 0.5 cm2. Severe aortic valve stenosis is  
present. Moderate to severe aortic valve regurgitation is present. · MV: Mitral annular calcification. Mitral valve mean gradient is 5 mmHg. Moderate mitral valve stenosis is present. Mild mitral valve regurgitation  
is present. · PV: Mild to moderate pulmonic valve regurgitation is present. · TV: Moderate tricuspid valve regurgitation is present. · PA: Severe pulmonary hypertension. Pulmonary arterial systolic pressure  
is 96 mmHg. · LA: Severely dilated left atrium. Left Atrium volume index is 67 mL/m2. · RV: Dilated right ventricle. Pacing wire present · RA: Dilated right atrium. · IVC: Severely elevated central venous pressure (15+ mmHg); IVC diameter  
is larger than 21 mm and collapses less than 50% with respiration. Signed by: Bettye Moulton MD  
 
 
  
  
 
 
ASSESSMENT: 
Hospital Problems  Date Reviewed: 10/15/2020 Codes Class Noted POA Tabitharca ICD-10-CM: R60.1 ICD-9-CM: 782.3  10/15/2020 Unknown * (Principal) Acute on chronic diastolic congestive heart failure (HCC) ICD-10-CM: I50.33 ICD-9-CM: 428.33, 428.0  12/12/2017 Yes Mitral regurgitation ICD-10-CM: I34.0 ICD-9-CM: 424.0  6/13/2017 Yes Mild HOCM (hypertrophic obstructive cardiomyopathy) (HCC) ICD-10-CM: I42.1 ICD-9-CM: 425.11  1/12/2017 Yes Aortic stenosis ICD-10-CM: I35.0 ICD-9-CM: 424.1  1/12/2017 Yes Pulmonary HTN ([facility] ICD-10-CM: I27.20 ICD-9-CM: 416.8  12/21/2016 Yes Papillary thyroid carcinoma [facility] ICD-10-CM: M96 ICD-9-CM: 375  12/21/2016 Yes Obesity (BMI 30.0-34.9) ICD-10-CM: Z97.6 ICD-9-CM: 278.00  10/13/2016 Yes SUBJECTIVE: 
No chest pain Improved of SOB Improving edema OBJECTIVE: 
 
VS:  
Visit Vitals BP (!) 108/54 (BP 1 Location: Left arm, BP Patient Position: At rest) Pulse 61 Temp 97 °F (36.1 °C) Resp 17 Ht 5' 3\" (1.6 m) Wt 61.1 kg (134 lb 12.8 oz) SpO2 95% BMI 23.88 kg/m² Intake/Output Summary (Last 24 hours) at 10/19/2020 2207 Last data filed at 10/19/2020 1871 Gross per 24 hour Intake 836.47 ml Output 3450 ml Net -2613.53 ml TELE: DDD pacing General: alert and in no apparent distress HENT: Normocephalic, atraumatic. Normal external eye. Neck :  bruit present, increased JVP Cardiac:  regular rate and rhythm, S1: normal intensity, S2: decreased intensity, systolic murmur: late systolic 3/6, crescendo at 2nd left intercostal space, at 2nd right intercostal space, radiates to carotids, 2nd systolic murmur: holosystolic 3/6, blowing at 2nd right intercostal space, at lower left sternal border, at apex Chest/Lungs:chest clear, no wheezing, rales, normal symmetric air entry Abdomen: Soft, nontender, no masses Extremities: improved  edema, peripheral pulses not palpable Labs: Results:  
   
Chemistry Recent Labs 10/19/20 
0321 10/19/20 
0037 10/18/20 
1815  10/18/20 
0147 10/17/20 
8704 GLU 75  --   --   --  150* 88 * 125* 124*   < > 123* 131* K 4.1  --   --   --  3.4* 3.5 CL 83*  --   --   --  82* 91* CO2 37*  --   --   --  33* 35* BUN 61*  --   --   --  60* 59* CREA 2.23*  --   --   --  2.05* 2.27* CA 9.8  9.8  --   --   --  10.0 9.5 MG 2.0  --   --   --  2.1 2.3 AGAP 4  --   --   --  8 5 BUCR 27*  --   --   --  29* 26* *  --   --   --  215* 204* TP 7.5  --   --   --  8.7* 8.0 ALB 2.6*  --   --   --  3.0* 2.8*  
GLOB 4.9*  --   --   --  5.7* 5.2* AGRAT 0.5*  --   --   --  0.5* 0.5*  
 < > = values in this interval not displayed. CBC w/Diff Recent Labs 10/19/20 
0321 10/18/20 
0147 10/17/20 
9625 WBC 5.9 7.4 4.7  
RBC 4.24 4.72 4.29 HGB 9.3* 10.4* 9.4* HCT 30.4* 33.3* 30.9*  278 227 GRANS 69 79* 66  
LYMPH 15* 11* 19* EOS 2 1 3 Cardiac Enzymes Recent Labs 10/16/20 
1852 CPK 25* CKND1 4.0 Coagulation No results for input(s): PTP, INR, APTT, INREXT, INREXT in the last 72 hours. Lipid Panel Lab Results Component Value Date/Time  Cholesterol, total 142 10/12/2017 10:40 AM  
 HDL Cholesterol 43 10/12/2017 10:40 AM  
 LDL, calculated 82 10/12/2017 10:40 AM  
 VLDL, calculated 14.6 11/20/2009 10:37 AM  
 Triglyceride 86 10/12/2017 10:40 AM  
 CHOL/HDL Ratio 3.9 11/20/2009 10:37 AM  
  
BNP No results for input(s): BNPP in the last 72 hours. Liver Enzymes Recent Labs 10/19/20 
0321 TP 7.5 ALB 2.6* * Digoxin Thyroid Studies Lab Results Component Value Date/Time TSH 1.98 10/16/2020 04:22 AM  
    
 
 
 
NEO Gillis   supervised 5 PM to 9 AM: Answering service at [phone] 
 9 AM to 5 PM: Pager [phone]--[phone]; if no response, please call through answering service or office. Office number is [phone] or [phone] I have independently evaluated and examined the patient. All relevant labs and testing data's are reviewed. Care plan discussed and updated after review.  
 
Sadie Rosas MD

## 2020-10-19 NOTE — PROGRESS NOTES
Otolaryngology head neck surgery She is seen and examined Consultation dictated Impression : probable papillary thyroid carcinoma right side Plan: Strongly feel patient may benefit from total thyroidectomy. Patient however will need medical clearance from cardiology to ensure that she is a viable candidate for surgery Would request that cardiology comment on this. If patient a reasonable candidate, will make arrangements for total thyroidectomy This discussed with patient however she does appear to be confused and I am not certain that she quite understood the issue. Evelyn San

## 2020-10-19 NOTE — PROGRESS NOTES
Problem: Self Care Deficits Care Plan (Adult) Goal: *Acute Goals and Plan of Care (Insert Text) Description: Occupational Therapy Goals Initiated 10/19/2020 within 7 day(s). 1.  Patient will perform upper body dressing with supervision/set-up. 2.  Patient will perform lower body dressing with minimal assistance/contact guard assist. 
3.  Patient will perform Functional Activities seated EOB with G balance for 5 minutes with modified independence. 4.  Patient will perform toilet transfers with minimal assistance/contact guard assist. 
5.  Patient will perform all aspects of toileting with minimal assistance/contact guard assist. 
 
 
Prior Level of Function: Patient a poor historian this session. Patient reports receiving help with all ADLs at baseline, however, patient unable to report how much assistance she is receiving or how often. Patient reports having personal care aid seven days a week but is unsure of how long they are in her home. Patient reports living with  and states that she is left home alone occasionally. Patient reports using RW during functional mobility. Outcome: Progressing Towards Goal 
  
OCCUPATIONAL THERAPY EVALUATION Patient: David Harden (81 y.o. female) Date: 10/19/2020 Primary Diagnosis: Anasarca [R60.1] Precautions:  Fall, Skin ASSESSMENT : 
Patient cleared by RN to participate in occupational therapy evaluation. Upon entering room patient received supine in bed watching television, alert and agreeable to participate in occupational therapy. Patient pleasantly confused this session, oriented only to self. Patient reoriented to place, situation and time. Patient with decreased command following throughout session, requiring maximum verbal cues for correct hand placement during bed mobility. Patient minimum assistance supine > sit EOB in preparation for functional activity this session.  Patient dynamic sitting balance observed as fair. Patient maximum assistance to don/doff L sock using legs crossed method. Patient reports receiving some help during ADLs from PCA at baseline, however, unable to report exactly how much help she receives. Patient also reports that at times she is left home alone. Patient moderate assistance sit >  preparation for functional mobility this session, minimum assistance stand > sit. Patient observed perseverating during functional mobility this session, reaching for walker multiple times after being instructed to take seated rest break. When asked if she would like to stand again, patient reported \"no\" but continued to reach out for walker. Petey pad observed soiled, patient  moderate assistance during second sit > stand functional transfer for pad change. Patient maximum assistance sit > supine this session to bring legs and trunk onto bed and total assistance x2 to scoot towards HOB 2/2 fatigue. Patient supervision/set-up to wash face supine in bed with HOB raised. Patient left in no apparent distress with bed alarm activated, call bell and phone within reach. Based on the objective data described below, the patient presents with decreased independence, decreased functional mobility, decreased balance and decreased activity tolerance. Patient will benefit from skilled occupational therapy while in the hospital.  
 
Patient will benefit from skilled intervention to address the above impairments. Patient's rehabilitation potential is considered to be Fair Factors which may influence rehabilitation potential include:  
[]             None noted [x]             Mental ability/status [x]             Medical condition [x]             Home/family situation and support systems []             Safety awareness []             Pain tolerance/management 
[]             Other: PLAN : 
Recommendations and Planned Interventions: [x]               Self Care Training                  [x]      Therapeutic Activities [x]               Functional Mobility Training   [x]      Cognitive Retraining 
[x]               Therapeutic Exercises           [x]      Endurance Activities [x]               Balance Training                    []      Neuromuscular Re-Education []               Visual/Perceptual Training     [x]      Home Safety Training 
[x]               Patient Education                   [x]      Family Training/Education []               Other (comment): Frequency/Duration: Patient will be followed by occupational therapy 1-2 times per day/4-7 days per week to address goals. Discharge Recommendations: [facility] vs [facility] and 24/7 supervision Further Equipment Recommendations for Discharge: Tub transfer bench SUBJECTIVE:  
Patient stated Not too long\" when asked how long she is home alone without  or PCA. OBJECTIVE DATA SUMMARY:  
 
Past Medical History:  
Diagnosis Date  Arthritis  Atrial fibrillation ([facility]  CHF (congestive heart failure) ([facility]  Heart murmur  History of seasonal allergies  HTN (hypertension)  Hypercholesteremia  Moderate aortic stenosis  Pulmonary hypertension ([facility]  Venous insufficiency Past Surgical History:  
Procedure Laterality Date  HX KNEE ARTHROSCOPY    
 left and right  HX ORTHOPAEDIC    
 right ankle Barriers to Learning/Limitations: yes;  cognitive and altered mental status (i.e.Sedation, Confusion) Compensate with: visual, verbal, tactile, kinesthetic cues/model Home Situation:  
Home Situation Home Environment: Apartment # Steps to Enter: 0 One/Two Story Residence: One story Living Alone: No 
Support Systems: Spouse/Significant Other/Partner Patient Expects to be Discharged to[de-identified] [facility] Current DME Used/Available at Home: None 
[]  Right hand dominant   []  Left hand dominant Cognitive/Behavioral Status: 
Neurologic State: Alert Orientation Level: Oriented to person;Disoriented to time;Disoriented to situation;Disoriented to place Cognition: Impaired decision making;Decreased attention/concentration;Decreased command following Safety/Judgement: Decreased awareness of environment;Decreased awareness of need for safety; Fall prevention;Home safety Skin: Visual skin appears intact Edema: BLE Vision/Perceptual:   
 
Acuity: Within Defined Limits Corrective Lenses: Glasses Coordination: BUE Fine Motor Skills-Upper: Left Intact; Right Intact Gross Motor Skills-Upper: Left Intact; Right Intact Balance: 
Sitting: Impaired Sitting - Static: Good (unsupported) Sitting - Dynamic: Fair (occasional) Standing: Impaired Standing - Static: Fair;Constant support Strength: BUE Strength: Generally decreased, functional 
 
Tone & Sensation: BUE Tone: Abnormal 
Sensation: Intact Range of Motion: BUE 
 
AROM: Generally decreased, functional 
 
Functional Mobility and Transfers for ADLs: 
Bed Mobility: 
  
Supine to Sit: Minimum assistance Sit to Supine: Maximum assistance Scooting: Total assistance Transfers: 
Sit to Stand: Minimum assistance; Moderate assistance Stand to Sit: Minimum assistance ADL Assessment:  
Feeding: Supervision;Setup Oral Facial Hygiene/Grooming: Supervision;Setup Bathing: Maximum assistance Upper Body Dressing: Minimum assistance Lower Body Dressing: Maximum assistance Toileting: Maximum assistance ADL Intervention: 
  
Grooming Grooming Assistance: Supervision;Set-up Position Performed: (Supine in bed with HOB raised) Washing Face: Supervision;Set-up Lower Body Dressing Assistance Dressing Assistance: Maximum assistance Socks: Maximum assistance Leg Crossed Method Used: Yes Position Performed: Seated edge of bed Cognitive Retraining Orientation Retraining: Reorienting Safety/Judgement: Decreased awareness of environment;Decreased awareness of need for safety; Fall prevention;Home safety Pain: 
Pain level pre-treatment: 0/10 Pain level post-treatment: -/10 Pain Intervention(s): Medication (see MAR) Response to intervention: Nurse notified, See doc flow Activity Tolerance:  
Fair Please refer to the flowsheet for vital signs taken during this treatment. After treatment:  
[] Patient left in no apparent distress sitting up in chair 
[x] Patient left in no apparent distress in bed 
[x] Call bell left within reach [x] Nursing notified 
[] Caregiver present 
[] Bed alarm activated COMMUNICATION/EDUCATION:  
[x] Role of Occupational Therapy in the acute care setting 
[x] Home safety education was provided and the patient/caregiver indicated understanding. [x] Patient/family have participated as able in goal setting and plan of care. [x] Patient/family agree to work toward stated goals and plan of care. [] Patient understands intent and goals of therapy, but is neutral about his/her participation. [] Patient is unable to participate in goal setting and plan of care. Thank you for this referral. 
Eufemia Best Time Calculation: 24 mins A student participated in this treatment session. Per CMS Medicare statements and guidelines I certify that the following was true: 1. I was present and directly observed the entire session. 2. I made all skilled judgments and clinical decisions regarding care. 3. I am the practitioner responsible for assessment, treatment, and documentation. Thank you, Aga Livingston MS, OTR/L Anam Complexity: History: MEDIUM Complexity : Expanded review of history including physical, cognitive and psychosocial  history ;   
Examination: MEDIUM Complexity : 3-5 performance deficits relating to physical, cognitive , or psychosocial skils that result in activity limitations and / or participation restrictions;  
 Decision Making:MEDIUM Complexity : Patient may present with comorbidities that affect occupational performnce. Miniml to moderate modification of tasks or assistance (eg, physical or verbal ) with assesment(s) is necessary to enable patient to complete evaluation

## 2020-10-20 LAB
1,25(OH)2D3 SERPL-MCNC: 13.6 PG/ML (ref 19.9–79.3)
ALBUMIN SERPL-MCNC: 2.6 G/DL (ref 3.4–5)
ALBUMIN/GLOB SERPL: 0.6 {RATIO} (ref 0.8–1.7)
ALP SERPL-CCNC: 166 U/L (ref 45–117)
ALT SERPL-CCNC: 13 U/L (ref 13–56)
ANION GAP SERPL CALC-SCNC: 6 MMOL/L (ref 3–18)
ANION GAP SERPL CALC-SCNC: 6 MMOL/L (ref 3–18)
ANION GAP SERPL CALC-SCNC: 8 MMOL/L (ref 3–18)
ANION GAP SERPL CALC-SCNC: 8 MMOL/L (ref 3–18)
AST SERPL-CCNC: 14 U/L (ref 10–38)
BASOPHILS # BLD: 0 K/UL (ref 0–0.1)
BASOPHILS NFR BLD: 0 % (ref 0–2)
BILIRUB SERPL-MCNC: 0.7 MG/DL (ref 0.2–1)
BUN SERPL-MCNC: 62 MG/DL (ref 7–18)
BUN SERPL-MCNC: 63 MG/DL (ref 7–18)
BUN SERPL-MCNC: 66 MG/DL (ref 7–18)
BUN SERPL-MCNC: 66 MG/DL (ref 7–18)
BUN/CREAT SERPL: 28 (ref 12–20)
BUN/CREAT SERPL: 28 (ref 12–20)
BUN/CREAT SERPL: 29 (ref 12–20)
BUN/CREAT SERPL: 29 (ref 12–20)
CALCIUM SERPL-MCNC: 9.3 MG/DL (ref 8.5–10.1)
CALCIUM SERPL-MCNC: 9.3 MG/DL (ref 8.5–10.1)
CALCIUM SERPL-MCNC: 9.4 MG/DL (ref 8.5–10.1)
CALCIUM SERPL-MCNC: 9.7 MG/DL (ref 8.5–10.1)
CHLORIDE SERPL-SCNC: 85 MMOL/L (ref 100–111)
CHLORIDE SERPL-SCNC: 85 MMOL/L (ref 100–111)
CHLORIDE SERPL-SCNC: 88 MMOL/L (ref 100–111)
CHLORIDE SERPL-SCNC: 93 MMOL/L (ref 100–111)
CHOLEST SERPL-MCNC: 178 MG/DL
CO2 SERPL-SCNC: 32 MMOL/L (ref 21–32)
CO2 SERPL-SCNC: 33 MMOL/L (ref 21–32)
CO2 SERPL-SCNC: 34 MMOL/L (ref 21–32)
CO2 SERPL-SCNC: 35 MMOL/L (ref 21–32)
CREAT SERPL-MCNC: 2.18 MG/DL (ref 0.6–1.3)
CREAT SERPL-MCNC: 2.26 MG/DL (ref 0.6–1.3)
CREAT SERPL-MCNC: 2.28 MG/DL (ref 0.6–1.3)
CREAT SERPL-MCNC: 2.3 MG/DL (ref 0.6–1.3)
DIFFERENTIAL METHOD BLD: ABNORMAL
EOSINOPHIL # BLD: 0.1 K/UL (ref 0–0.4)
EOSINOPHIL NFR BLD: 1 % (ref 0–5)
ERYTHROCYTE [DISTWIDTH] IN BLOOD BY AUTOMATED COUNT: 18.7 % (ref 11.6–14.5)
GLOBULIN SER CALC-MCNC: 4.3 G/DL (ref 2–4)
GLUCOSE BLD STRIP.AUTO-MCNC: 102 MG/DL (ref 70–110)
GLUCOSE BLD STRIP.AUTO-MCNC: 115 MG/DL (ref 70–110)
GLUCOSE BLD STRIP.AUTO-MCNC: 196 MG/DL (ref 70–110)
GLUCOSE SERPL-MCNC: 107 MG/DL (ref 74–99)
GLUCOSE SERPL-MCNC: 119 MG/DL (ref 74–99)
GLUCOSE SERPL-MCNC: 78 MG/DL (ref 74–99)
GLUCOSE SERPL-MCNC: 79 MG/DL (ref 74–99)
HCT VFR BLD AUTO: 28.8 % (ref 35–45)
HDLC SERPL-MCNC: 33 MG/DL (ref 40–60)
HDLC SERPL: 5.4 {RATIO} (ref 0–5)
HGB BLD-MCNC: 8.7 G/DL (ref 12–16)
LDLC SERPL CALC-MCNC: 125.8 MG/DL (ref 0–100)
LIPID PROFILE,FLP: ABNORMAL
LYMPHOCYTES # BLD: 0.7 K/UL (ref 0.9–3.6)
LYMPHOCYTES NFR BLD: 14 % (ref 21–52)
MAGNESIUM SERPL-MCNC: 2.4 MG/DL (ref 1.6–2.6)
MCH RBC QN AUTO: 21.6 PG (ref 24–34)
MCHC RBC AUTO-ENTMCNC: 30.2 G/DL (ref 31–37)
MCV RBC AUTO: 71.6 FL (ref 74–97)
MONOCYTES # BLD: 0.6 K/UL (ref 0.05–1.2)
MONOCYTES NFR BLD: 11 % (ref 3–10)
NEUTS SEG # BLD: 3.8 K/UL (ref 1.8–8)
NEUTS SEG NFR BLD: 74 % (ref 40–73)
PLATELET # BLD AUTO: 216 K/UL (ref 135–420)
PLATELET COMMENTS,PCOM: ABNORMAL
PMV BLD AUTO: 9.7 FL (ref 9.2–11.8)
POTASSIUM SERPL-SCNC: 3.1 MMOL/L (ref 3.5–5.5)
POTASSIUM SERPL-SCNC: 3.4 MMOL/L (ref 3.5–5.5)
POTASSIUM SERPL-SCNC: 3.4 MMOL/L (ref 3.5–5.5)
POTASSIUM SERPL-SCNC: 3.7 MMOL/L (ref 3.5–5.5)
PROT SERPL-MCNC: 6.9 G/DL (ref 6.4–8.2)
RBC # BLD AUTO: 4.02 M/UL (ref 4.2–5.3)
RBC MORPH BLD: ABNORMAL
SODIUM SERPL-SCNC: 125 MMOL/L (ref 136–145)
SODIUM SERPL-SCNC: 126 MMOL/L (ref 136–145)
SODIUM SERPL-SCNC: 129 MMOL/L (ref 136–145)
SODIUM SERPL-SCNC: 133 MMOL/L (ref 136–145)
TRIGL SERPL-MCNC: 96 MG/DL (ref ?–150)
VLDLC SERPL CALC-MCNC: 19.2 MG/DL
WBC # BLD AUTO: 5.2 K/UL (ref 4.6–13.2)

## 2020-10-20 PROCEDURE — 80053 COMPREHEN METABOLIC PANEL: CPT

## 2020-10-20 PROCEDURE — 97535 SELF CARE MNGMENT TRAINING: CPT

## 2020-10-20 PROCEDURE — 74011250637 HC RX REV CODE- 250/637: Performed by: STUDENT IN AN ORGANIZED HEALTH CARE EDUCATION/TRAINING PROGRAM

## 2020-10-20 PROCEDURE — 85025 COMPLETE CBC W/AUTO DIFF WBC: CPT

## 2020-10-20 PROCEDURE — 74011250637 HC RX REV CODE- 250/637: Performed by: FAMILY MEDICINE

## 2020-10-20 PROCEDURE — 36415 COLL VENOUS BLD VENIPUNCTURE: CPT

## 2020-10-20 PROCEDURE — 74011250637 HC RX REV CODE- 250/637: Performed by: INTERNAL MEDICINE

## 2020-10-20 PROCEDURE — 80048 BASIC METABOLIC PNL TOTAL CA: CPT

## 2020-10-20 PROCEDURE — 74011250637 HC RX REV CODE- 250/637: Performed by: NURSE PRACTITIONER

## 2020-10-20 PROCEDURE — 82172 ASSAY OF APOLIPOPROTEIN: CPT

## 2020-10-20 PROCEDURE — 83735 ASSAY OF MAGNESIUM: CPT

## 2020-10-20 PROCEDURE — 65270000029 HC RM PRIVATE

## 2020-10-20 PROCEDURE — 80061 LIPID PANEL: CPT

## 2020-10-20 PROCEDURE — 2709999900 HC NON-CHARGEABLE SUPPLY

## 2020-10-20 PROCEDURE — 82962 GLUCOSE BLOOD TEST: CPT

## 2020-10-20 PROCEDURE — 97530 THERAPEUTIC ACTIVITIES: CPT

## 2020-10-20 PROCEDURE — 74011250636 HC RX REV CODE- 250/636: Performed by: STUDENT IN AN ORGANIZED HEALTH CARE EDUCATION/TRAINING PROGRAM

## 2020-10-20 RX ORDER — ACETAZOLAMIDE 250 MG/1
500 TABLET ORAL ONCE
Status: COMPLETED | OUTPATIENT
Start: 2020-10-20 | End: 2020-10-20

## 2020-10-20 RX ORDER — POTASSIUM CHLORIDE 20 MEQ/1
40 TABLET, EXTENDED RELEASE ORAL
Status: COMPLETED | OUTPATIENT
Start: 2020-10-20 | End: 2020-10-20

## 2020-10-20 RX ORDER — POTASSIUM CHLORIDE 7.45 MG/ML
10 INJECTION INTRAVENOUS
Status: DISCONTINUED | OUTPATIENT
Start: 2020-10-20 | End: 2020-10-20

## 2020-10-20 RX ADMIN — MONTELUKAST 10 MG: 10 TABLET, FILM COATED ORAL at 10:06

## 2020-10-20 RX ADMIN — FAMOTIDINE 20 MG: 20 TABLET ORAL at 10:06

## 2020-10-20 RX ADMIN — GABAPENTIN 100 MG: 100 CAPSULE ORAL at 22:04

## 2020-10-20 RX ADMIN — POTASSIUM CHLORIDE 10 MEQ: 7.46 INJECTION, SOLUTION INTRAVENOUS at 10:07

## 2020-10-20 RX ADMIN — ACETAZOLAMIDE 500 MG: 250 TABLET ORAL at 22:04

## 2020-10-20 RX ADMIN — POTASSIUM CHLORIDE 10 MEQ: 7.46 INJECTION, SOLUTION INTRAVENOUS at 11:55

## 2020-10-20 RX ADMIN — METOPROLOL TARTRATE 12.5 MG: 25 TABLET, FILM COATED ORAL at 10:06

## 2020-10-20 RX ADMIN — TOLVAPTAN 15 MG: 15 TABLET ORAL at 10:07

## 2020-10-20 RX ADMIN — FERROUS SULFATE TAB 325 MG (65 MG ELEMENTAL FE) 325 MG: 325 (65 FE) TAB at 08:28

## 2020-10-20 RX ADMIN — POTASSIUM CHLORIDE 40 MEQ: 1500 TABLET, EXTENDED RELEASE ORAL at 12:48

## 2020-10-20 RX ADMIN — APIXABAN 2.5 MG: 2.5 TABLET, FILM COATED ORAL at 18:18

## 2020-10-20 RX ADMIN — APIXABAN 5 MG: 5 TABLET, FILM COATED ORAL at 10:06

## 2020-10-20 RX ADMIN — FOLIC ACID 1 MG: 1 TABLET ORAL at 10:06

## 2020-10-20 RX ADMIN — POTASSIUM CHLORIDE 10 MEQ: 7.46 INJECTION, SOLUTION INTRAVENOUS at 08:21

## 2020-10-20 RX ADMIN — ATORVASTATIN CALCIUM 40 MG: 40 TABLET, FILM COATED ORAL at 22:04

## 2020-10-20 NOTE — PROGRESS NOTES
[facility] 
 
 
CARDIOLOGY PROGRESS NOTE 
RECS: 
 
 
1. Acute on chronic diastolic congestive heart failure-appears compensated now. SOB and BLE improving well. Diuretics on hold. Alkalosis improving 2. Hypertrophic obstructive cardiomyopathy-S/p alcohol septal ablation 2/19 at [facility] 3. Hx complete heart block - s/p dual chambers permanent pacemaker 2/19 [employer] 4. Hypertension- borderline low BP reduced BB 5. Paroxysmal  Atrial fibrillation-rate controlled. on Eliquis for stroke prevention 6. Aortic stenosis -has become severe now with mean gradient of 43. Was moderate on  echo in 9/19. Will consider right  heart catheterization in 2-3  days and referral for TAVR after cath 7. Mitral regurgitation, mild with mild to moderate mitral stenosis. 8. Pulmonary hypertension-  worsening gradually with pulmonary pressure of 96 now. Echo 9/19 showed PAP 69 mmHg 9. BLE edema with venous stasis- Recommended  wound care- further recommendations per medical team   
10. CKD- renal indices stable 11. Hyponatremia and hypochloremia: Secondary to diuresis-improving l tolvaptan d/c  
 
CHF is improving well. Electrolytes are improving on tolvaptan. Patient diuresing on tolvaptan. Hopefully by tomorrow we can discontinue tolvaptan. Alkalosis stable. Will give 1 more dose of Diamox this evening. 
  
 
EKG Results Procedure 720 Value Units Date/Time EKG, 12 LEAD, INITIAL [[id number]] Collected:  10/15/20 1547 Order Status:  Completed Updated:  10/16/20 1132 Ventricular Rate 61 BPM   
  Atrial Rate 61 BPM   
  P-R Interval 278 ms QRS Duration 148 ms Q-T Interval 432 ms QTC Calculation (Bezet) 434 ms Calculated P Axis 32 degrees Calculated R Axis 127 degrees Calculated T Axis -22 degrees Diagnosis -- Atrial-paced rhythm with prolonged AV conduction Right bundle branch block T wave abnormality, consider inferior ischemia Abnormal ECG When compared with ECG of 20-SEP-2019 22:01, 
Electronic atrial pacemaker has replaced Atrial fibrillation Confirmed by Eric Parks MD, Rob Richard ([id number]) on 10/16/2020 11:32:17 AM 
  
  
 
XR Results (most recent): 
Results from [place] encounter on 10/15/20 XR CHEST PORT Narrative CHEST PORTABLE 1537 hours COMPARISON: September 20, 2019. INDICATIONS: Chest pain. FINDINGS:  
 
Portable single view chest demonstrates: 
 
Lungs: There is chronic atelectasis in the medial segment of the left lower lobe 
and to a lesser extent the infrahilar region of the right lower lobe. These 
findings are similar to prior studies. Prominent bronchovascular markings are 
evident diffusely. No focal infiltrate is seen. Marquis Bees Cardiac Silhouette And Mediastinal Contours: There is marked enlargement of the 
cardiac contours. The pulmonary outflow tract region is particularly prominent. Dual-lead permanent pacemaker appears satisfactory. This is unchanged. Pleural Spaces: No pneumothorax or pleural effusion evident. Bones And Soft Tissues: Unremarkable for age. Impression IMPRESSION: 
 
No acute cardiopulmonary process is evident. No change from prior studies 10/15/20 ECHO ADULT COMPLETE 10/16/2020 10/16/2020 Narrative · LV: Estimated LVEF is 65 - 70%. Visually measured ejection fraction. Normal systolic function (ejection fraction normal). Small left ventricle. Mild concentric hypertrophy. Wall motion: normal. Severe (grade 3) left  
ventricular diastolic dysfunction. · AV: Probably trileaflet aortic valve. Aortic valve mean gradient is 43  
mmHg. Aortic valve area is 0.5 cm2. Severe aortic valve stenosis is  
present. Moderate to severe aortic valve regurgitation is present. · MV: Mitral annular calcification. Mitral valve mean gradient is 5 mmHg. Moderate mitral valve stenosis is present. Mild mitral valve regurgitation  
is present. · PV: Mild to moderate pulmonic valve regurgitation is present. · TV: Moderate tricuspid valve regurgitation is present. · PA: Severe pulmonary hypertension. Pulmonary arterial systolic pressure  
is 96 mmHg. · LA: Severely dilated left atrium. Left Atrium volume index is 67 mL/m2. · RV: Dilated right ventricle. Pacing wire present · RA: Dilated right atrium. · IVC: Severely elevated central venous pressure (15+ mmHg); IVC diameter  
is larger than 21 mm and collapses less than 50% with respiration. Signed by: Houston Duarte MD  
 
 
  
  
 
 
ASSESSMENT: 
Hospital Problems  Date Reviewed: 10/15/2020 Codes Class Noted POA Thelmasarca ICD-10-CM: R60.1 ICD-9-CM: 782.3  10/15/2020 Unknown * (Principal) Acute on chronic diastolic congestive heart failure (HCC) ICD-10-CM: I50.33 ICD-9-CM: 428.33, 428.0  12/12/2017 Yes Mitral regurgitation ICD-10-CM: I34.0 ICD-9-CM: 424.0  6/13/2017 Yes Mild HOCM (hypertrophic obstructive cardiomyopathy) (HCC) ICD-10-CM: I42.1 ICD-9-CM: 425.11  1/12/2017 Yes Aortic stenosis ICD-10-CM: I35.0 ICD-9-CM: 424.1  1/12/2017 Yes Pulmonary HTN ([facility] ICD-10-CM: I27.20 ICD-9-CM: 416.8  12/21/2016 Yes [facility] ICD-10-CM: D10 ICD-9-CM: 330  12/21/2016 Yes Obesity (BMI 30.0-34.9) ICD-10-CM: F37.1 ICD-9-CM: 278.00  10/13/2016 Yes SUBJECTIVE: 
No chest pain Improved of SOB Improving edema OBJECTIVE: 
 
VS:  
Visit Vitals BP (!) 102/58 (BP 1 Location: Left arm, BP Patient Position: At rest) Pulse 60 Temp 97.8 °F (36.6 °C) Resp 18 Ht 5' 3\" (1.6 m) Wt 59.2 kg (130 lb 9.6 oz) SpO2 97% BMI 23.13 kg/m² Intake/Output Summary (Last 24 hours) at 10/20/2020 1428 Last data filed at 10/20/2020 1008 Gross per 24 hour Intake 920 ml Output 3050 ml Net -2130 ml  
 
TELE: DDD pacing General: alert and in no apparent distress HENT: Normocephalic, atraumatic. Normal external eye. Neck :  bruit present, increased JVP Cardiac:  regular rate and rhythm, S1: normal intensity, S2: decreased intensity, systolic murmur: late systolic 3/6, crescendo at 2nd left intercostal space, at 2nd right intercostal space, radiates to carotids, 2nd systolic murmur: holosystolic 3/6, blowing at 2nd right intercostal space, at lower left sternal border, at apex Chest/Lungs:chest clear, no wheezing, rales, normal symmetric air entry Abdomen: Soft, nontender, no masses Extremities: improved  edema, peripheral pulses not palpable Labs: Results:  
   
Chemistry Recent Labs 10/20/20 
1005 10/20/20 
3308 10/19/20 
2101  10/19/20 
0321  10/18/20 
0147 * 78  79 98   < > 75  --  150* * 126*  125* 125*   < > 124*   < > 123* K 3.4* 3.4*  3.1* 4.3   < > 4.1  --  3.4*  
CL 88* 85*  85* 84*   < > 83*  --  82* CO2 35* 33*  34* 35*   < > 37*  --  33* BUN 63* 66*  66* 66*   < > 61*  --  60* CREA 2.28* 2.26*  2.30* 2.44*   < > 2.23*  --  2.05* CA 9.7 9.3  9.4 9.3   < > 9.8  9.8  --  10.0 MG  --  2.4  --   --  2.0  --  2.1 AGAP 6 8  6 6   < > 4  --  8  
BUCR 28* 29*  29* 27*   < > 27*  --  29* AP  --  166*  --   --  171*  --  215* TP  --  6.9  --   --  7.5  --  8.7* ALB  --  2.6*  --   --  2.6*  --  3.0*  
GLOB  --  4.3*  --   --  4.9*  --  5.7* AGRAT  --  0.6*  --   --  0.5*  --  0.5*  
 < > = values in this interval not displayed. CBC w/Diff Recent Labs 10/20/20 
8653 10/19/20 
0321 10/18/20 
0147 WBC 5.2 5.9 7.4  
RBC 4.02* 4.24 4.72  
HGB 8.7* 9.3* 10.4* HCT 28.8* 30.4* 33.3*  
 232 278 GRANS 74* 69 79* LYMPH 14* 15* 11* EOS 1 2 1 Cardiac Enzymes No results for input(s): CPK, CKND1, JONI in the last 72 hours. No lab exists for component: [place] Coagulation No results for input(s): PTP, INR, APTT, INREXT, INREXT in the last 72 hours. Lipid Panel Lab Results Component Value Date/Time Cholesterol, total 178 10/20/2020 10:05 AM  
 HDL Cholesterol 33 (L) 10/20/2020 10:05 AM  
 LDL, calculated 125.8 (H) 10/20/2020 10:05 AM  
 VLDL, calculated 19.2 10/20/2020 10:05 AM  
 Triglyceride 96 10/20/2020 10:05 AM  
 CHOL/HDL Ratio 5.4 (H) 10/20/2020 10:05 AM  
  
BNP No results for input(s): BNPP in the last 72 hours. Liver Enzymes Recent Labs 10/20/20 
1765 TP 6.9 ALB 2.6* * Digoxin Thyroid Studies Lab Results Component Value Date/Time TSH 1.98 10/16/2020 04:22 AM  
    
 
 
 
NEO Gillis I have independently evaluated and examined the patient. All relevant labs and testing data are reviewed. Care plan discussed and updated after review.  
Ruma Lay MD

## 2020-10-20 NOTE — CONSULTS
[address] [facility] Name:  Quirino Perez 
MR#:   [id number] :  1940 ACCOUNT #:  [de-identified] DATE OF SERVICE:  10/19/2020 REASON FOR CONSULTATION:  Papillary thyroid carcinoma. HISTORY OF PRESENT ILLNESS:  This patient is a 82-year-old female who has had difficulties with chronic venous insufficiency, acute on chronic diastolic heart failure, paroxysmal atrial fibrillation, chronic obstructive CMO, complete heart block status post pacemaker, severe atrial stenosis, mitral regurgitation, hypertension, stage III renal failure, and pulmonary hypertension. The patient was admitted for difficulties with significant edema. She has been seen by Vascular. She has also been seen by Cardiology. The patient, however, being evaluated due to a history of papillary thyroid carcinoma. The patient has had previous ultrasound of her thyroid which she has had a number of ultrasounds, most recent biopsy performed 10/12/2016. This was consistent with what appeared to be a papillary thyroid carcinoma. Most recent ultrasound performed earlier this month reveals a regular mixed echogenic mass 2.8 cm in size with multiple punctate echogenic foci on the right side, left side reveals some nodularity present which did not appear to be dramatic. The patient apparently had been seen by another otolaryngologist in 2016, however, was subsequently lost to follow-up. Apparently, the patient was to be seen by myself as well; however, I have never seen her in evaluation. In any event, the patient being seen here for above issues. PAST MEDICAL HISTORY:  Significant for atrial fibrillation, chronic congestive heart failure, heart murmur, hypertension, hypercholesteremia, aortic stenosis, pulmonary hypertension, venous insufficiency. PAST SURGICAL HISTORY:  Includes a right ankle surgery.   The patient has also had knee arthroscopy in the remote past. 
 
 SOCIAL HISTORY:  The patient denies any tobacco or alcohol use. FAMILY HISTORY:  Mother and father are . Most of the history was obtained from the patient's chart since the patient does appear to be somewhat confused. ALLERGIES:  DENIED. CURRENT MEDICATIONS:  Includes Diamox, Eliquis, Lipitor, Pepcid, Folvite, Neurontin, Apresoline, Lopressor, Singulair, Zofran, Ultram. 
 
PHYSICAL EXAMINATION: 
GENERAL:  A well-developed, well-nourished elderly appearing 73-year-old in no distress. HEENT:  Ear exam reveals some cerumen. Tympanic membrane somewhat thickened. The intraoral exam is unremarkable. The intranasal exam reveals no evidence of any mucopurulent discharge. NECK:  Reveals a large thyroid mass noted on the right side. This does appear to be fairly hard. No evidence of any other significant findings are seen. No evidence of any adenopathy is noted. CHEST:  Bilaterally clear. HEART:  Systolic murmur noted. EXTREMITIES:  Significant edema apparently improved. ABDOMEN:  Soft, nontender, no masses palpable. IMPRESSION:  Papillary thyroid carcinoma by history. This has been present for at least the past 4 years. I have had a discussion with the patient; again, however, I am uncertain whether the patient really understood my discussion since she does appear to be mildly confused. The patient may certainly benefit from thyroidectomy, I would be somewhat concerned regarding a longstanding thyroid carcinoma in that this may affect the recurrent laryngeal nerve causing some significant difficulties as well as potential for tracheal invasion. At this point, I feel that we will need evaluation by Cardiology to determine whether the patient may be sufficiently stable enough to undergo a two to three hour surgical procedure.  
 
In addition, the patient will need to be off her Eliquis for 4 days prior to surgery as well if indeed Cardiology feels that she is an appropriate candidate. If indeed the patient is an appropriate candidate, she will need endoscopy preoperatively to ensure that there is no pathology of the recurrent laryngeal nerve at this time. We will follow with the patient and make further recommendations based on Cardiology recommendations. Thank you for consult. Keyur Santana MD 
 
 
[id number] 
D:  10/19/2020 18:34 
T:  10/19/2020 23:40 
JOB #:  [id number]

## 2020-10-20 NOTE — PROGRESS NOTES
Progress Note Patient: Rey Lal MRN: [id number]  CSN: [id number] YOB: 1940  Age: 68 y.o. Sex: female DOA: 10/15/2020 LOS:  LOS: 5 days Subjective: Pt was sleeping when we came to visit her. Sleepy  She was unable to correctly explain where she is or what year it is. She has a history of hyponatremia and is at 124 today. She is taking both tolvaptan and diamox and is still on fluid restriction. Sodium level no change from yesterday 126 Nephrology is following ENT Consulted yesterday Patient responds to voice and repeats her answers. She is oriented to her self. . 
 
Discussed this with nursing staff and they stated she has been confused at night CT chest 
1. Bilateral hazy groundglass pulmonary opacities with relative sparing of the 
periphery suggestive of pulmonary edema. Very small bilateral pleural effusions. Moderate to marked cardiomegaly. 2.  Similar appearance of enlarged pulmonary artery suggestive of pulmonaryartery hypertension. 3.  Very minimal increased size of medial left upper lobe pulmonary nodule since 2016. Today measuring 6 x 5 mm, previously measuring 5 x 3 mm. Finding is likely 
benign. However given slight change over time suggest follow-up CT in one year. 4.  Extensive atherosclerotic vascular calcification Echo · LV: Estimated LVEF is 65 - 70%. Visually measured ejection fraction. Normal systolic function (ejection fraction normal). Small left ventricle. Mild concentric hypertrophy. Wall motion: normal. Severe (grade 3) left ventricular diastolic dysfunction. · AV: Probably trileaflet aortic valve. Aortic valve mean gradient is 43 mmHg. Aortic valve area is 0.5 cm2. Severe aortic valve stenosis is present. Moderate to severe aortic valve regurgitation is present. · MV: Mitral annular calcification. Mitral valve mean gradient is 5 mmHg. Moderate mitral valve stenosis is present.  Mild mitral valve regurgitation is present. · PV: Mild to moderate pulmonic valve regurgitation is present. · TV: Moderate tricuspid valve regurgitation is present. · PA: Severe pulmonary hypertension. Pulmonary arterial systolic pressure is 96 mmHg. · LA: Severely dilated left atrium. Left Atrium volume index is 67 mL/m2. · RV: Dilated right ventricle. Pacing wire present · RA: Dilated right atrium. · IVC: Severely elevated central venous pressure (15+ mmHg); IVC diameter is larger than 21 mm and collapses less than 50% with respiration. Thyroid ultrasound 1. Large irregular mixed echogenicity right thyroid nodule measuring 2.8 cm, 
with multiple punctate echogenic foci, previously biopsied with results 
concerning for papillary thyroid carcinoma. Recommend referral to ENT. 
  
  
Chief Complaint:  
Chief Complaint Patient presents with  Peripheral Edema Review of systems General: No fevers or chills. sleepy today Cardiovascular: No chest pain or pressure. Pulmonary: No shortness of breath, cough or wheeze. Gastrointestinal: No abdominal pain, nausea, vomiting or diarrhea. Genitourinary: No urinary frequency, urgency, hesitancy or dysuria. Musculoskeletal: generalized weakness Neurologic: No headache, generalized weakness Objective:  
 
Physical Exam: 
Visit Vitals BP (!) 106/57 (BP 1 Location: Left arm, BP Patient Position: At rest) Pulse 98 Temp 98.7 °F (37.1 °C) Resp 20 Ht 5' 3\" (1.6 m) Wt 59.2 kg (130 lb 9.6 oz) SpO2 96% BMI 23.13 kg/m² General:         no acute distress, confused HEENT: NC, Atraumatic.  anicteric sclerae. Lungs: CTA Bilaterally. No Wheezing/Rhonchi/Rales. Heart:  Regular  rhythm, systolic  Murmurs Abdomen: Soft, Non distended, Non tender. Extremities: Wound Rt leg clean dressing in place wearing Tubigrip Psych:   Not anxious or agitated. Neurologic:  CN 2-12 grossly intact,confused and not oriented to date or location Intake and Output: Current Shift:  No intake/output data recorded. Last three shifts:  10/18 1901 - 10/20 0700 In: 1280 [P.O.:1280] Out: 6240 [[facility]:[id number]] Labs: Results:  
   
Chemistry Recent Labs 10/20/20 
7140 10/19/20 
2101 10/19/20 
1515  10/19/20 
0321  10/18/20 
0147 GLU 78  79 98 102*   < > 75  --  150* *  125* 125* 126*   < > 124*   < > 123* K 3.4*  3.1* 4.3 4.5   < > 4.1  --  3.4*  
CL 85*  85* 84* 83*   < > 83*  --  82* CO2 33*  34* 35* 36*   < > 37*  --  33* BUN 66*  66* 66* 62*   < > 61*  --  60* CREA 2.26*  2.30* 2.44* 2.42*   < > 2.23*  --  2.05* CA 9.3  9.4 9.3 9.4   < > 9.8  9.8  --  10.0 AGAP 8  6 6 7   < > 4  --  8  
BUCR 29*  29* 27* 26*   < > 27*  --  29* *  --   --   --  171*  --  215* TP 6.9  --   --   --  7.5  --  8.7* ALB 2.6*  --   --   --  2.6*  --  3.0*  
GLOB 4.3*  --   --   --  4.9*  --  5.7* AGRAT 0.6*  --   --   --  0.5*  --  0.5*  
 < > = values in this interval not displayed. CBC w/Diff Recent Labs 10/20/20 
1293 10/19/20 
0321 10/18/20 
0147 WBC 5.2 5.9 7.4  
RBC 4.02* 4.24 4.72  
HGB 8.7* 9.3* 10.4* HCT 28.8* 30.4* 33.3*  
 232 278 GRANS 74* 69 79* LYMPH 14* 15* 11* EOS 1 2 1 Cardiac Enzymes No results for input(s): CPK, CKND1, JONI in the last 72 hours. No lab exists for component: [place] Coagulation No results for input(s): PTP, INR, APTT, INREXT, INREXT in the last 72 hours. Lipid Panel Lab Results Component Value Date/Time Cholesterol, total 142 10/12/2017 10:40 AM  
 HDL Cholesterol 43 10/12/2017 10:40 AM  
 LDL, calculated 82 10/12/2017 10:40 AM  
 VLDL, calculated 14.6 11/20/2009 10:37 AM  
 Triglyceride 86 10/12/2017 10:40 AM  
 CHOL/HDL Ratio 3.9 11/20/2009 10:37 AM  
  
BNP No results for input(s): BNPP in the last 72 hours. Liver Enzymes Recent Labs 10/20/20 
4903 TP 6.9 ALB 2.6* * Thyroid Studies Lab Results Component Value Date/Time TSH 1.98 10/16/2020 04:22 AM  
    
 
Procedures/imaging: see electronic medical records for all procedures/Xrays and details which were not copied into this note but were reviewed prior to creation of Plan Medications:  
Current Facility-Administered Medications Medication Dose Route Frequency  potassium chloride 10 mEq in 100 ml IVPB  10 mEq IntraVENous Q1H  
 metoprolol tartrate (LOPRESSOR) tablet 12.5 mg  12.5 mg Oral BID  hydrALAZINE (APRESOLINE) tablet 25 mg  25 mg Oral TID PRN  
 tolvaptan (SAMSCA) tablet 15 mg  15 mg Oral DAILY  ferrous sulfate tablet 325 mg  1 Tab Oral DAILY WITH BREAKFAST  apixaban (ELIQUIS) tablet 5 mg  5 mg Oral BID  famotidine (PEPCID) tablet 20 mg  20 mg Oral DAILY  folic acid (FOLVITE) tablet 1 mg  1 mg Oral DAILY  gabapentin (NEURONTIN) capsule 100 mg  100 mg Oral QHS  montelukast (SINGULAIR) tablet 10 mg  10 mg Oral DAILY  ondansetron hcl (ZOFRAN) tablet 4 mg  4 mg Oral Q8H PRN  
 atorvastatin (LIPITOR) tablet 40 mg  40 mg Oral QHS  traMADoL (ULTRAM) tablet 50 mg  50 mg Oral Q6H PRN Assessment/Plan Principal Problem: 
  Acute on chronic diastolic congestive heart failure ([facility] (12/12/2017) Active Problems: 
  Obesity (BMI 30.0-34.9) (10/13/2016) Pulmonary HTN ([facility] (12/21/2016) Papillary thyroid carcinoma ([facility] (12/21/2016) Mild HOCM (hypertrophic obstructive cardiomyopathy) ([facility] (1/12/2017) Aortic stenosis (1/12/2017) Mitral regurgitation (6/13/2017) Anasarca (10/15/2020) 
 
 
plan: 
 
Acute systolic congestive heart failure on top of chronic diastolic congestive heart failure D/C  Bumex 0.5 mg/h Diamox 250 mg BID per nephrology recommendation Follow electrolytes BMP every 6 h for 48 h Check cardiac enzyme every 8 troponin I slightly up but stable 
  
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 
-discussed with los Hypokalemia: 
-replete prn per nephrology Paroxysmal A. fib Decrease  Eliquis 2.5 mg twice daily per pharmacy due to renal function Follow-up CBC 
  
Hypertension 
metoprolol  To 25 mg twice daily Monitor blood pressure Discussed with cardiology will use hydralazine prn  
Continue home  Cozaar 100 mg 
  
Hyperlipidemia Continue Zocor 80 mg nightly Follow-up liver function 
  
Anemia Iron saturation low P70 and folic acid normal 
Will start iron f/u GI as outpatinet  
  
History of thyroid cancer Patient needs follow-up with ENT discussed with Dr Blanca Schrader, seen by PA yesterday, awaiting final recs Check TSH and free T4 normal 
  
Venous stasis ulcer Wound care Pt was seen by vascular as outpatinet Continue wound care and f/u vascular surgery consult BMP every 6h  
Cbc, cmp, mag Wound care Umer Garcia MD 
10/20/2020 9:51AM

## 2020-10-20 NOTE — PROGRESS NOTES
Problem: Self Care Deficits Care Plan (Adult) Goal: *Acute Goals and Plan of Care (Insert Text) Description: Occupational Therapy Goals Initiated 10/19/2020 within 7 day(s). 1.  Patient will perform upper body dressing with supervision/set-up. 2.  Patient will perform lower body dressing with minimal assistance/contact guard assist. 
3.  Patient will perform Functional Activities seated EOB with G balance for 5 minutes with modified independence. 4.  Patient will perform toilet transfers with minimal assistance/contact guard assist. 
5.  Patient will perform all aspects of toileting with minimal assistance/contact guard assist. 
 
 
Prior Level of Function: Patient a poor historian this session. Patient reports receiving help with all ADLs at baseline, however, patient unable to report how much assistance she is receiving or how often. Patient reports having personal care aid seven days a week but is unsure of how long they are in her home. Patient reports living with  and states that she is left home alone occasionally. Patient reports using RW during functional mobility. Outcome: Progressing Towards Goal 
OCCUPATIONAL THERAPY TREATMENT Patient: Penny Saucedo (44 y.o. female) Date: 10/20/2020 Diagnosis: Anasarca [R60.1] Acute on chronic diastolic congestive heart failure ([facility] Precautions: Fall, Skin Chart, occupational therapy assessment, plan of care, and goals were reviewed. ASSESSMENT: 
Pt is asleep upon entry with lunch tray in front of her. Pt easily wakes up to tactile cues, demonstrates delay in answering questions and decreased commands following. Pt required initially CGA to self-feed a Magic-cup, due to difficulty initiating tasks, however, progressed to Supervision for self-feeding.  Pt's gown soiled, pt required Min A to change gown with additional time and mult tactile cues, and CGA/Min A for mult grooming tasks. Pt maneuvered to EOB with Mod-max A in preparation for seated ADLs, however, tolerated sitting at EOB for only 1 min prior to beginning lean fwd, requiring Max A to safely return to sup. Opened blinds and turned TV for the pt in order to increased environmental stimuli and improve pt's level of alertness throughout the day. Notified pt's nurse of pt's participation in OT and on recommendation to providing assistance with self-feeding. Progression toward goals: 
[]          Improving appropriately and progressing toward goals [x]          Improving slowly and progressing toward goals 
[]          Not making progress toward goals and plan of care will be adjusted PLAN: 
Patient continues to benefit from skilled intervention to address the above impairments. Continue treatment per established plan of care. Discharge Recommendations:  [place] Further Equipment Recommendations for Discharge:  N/A  
 
SUBJECTIVE:  
Patient stated I am usually not hungry.  OBJECTIVE DATA SUMMARY:  
Cognitive/Behavioral Status: 
Neurologic State: Drowsy Orientation Level: Oriented to person Cognition: Decreased command following, Impaired decision making Safety/Judgement: Decreased awareness of environment, Decreased awareness of need for safety, Fall prevention, Home safety Functional Mobility and Transfers for ADLs: 
 Bed Mobility: 
  
Supine to Sit: Moderate assistance;Maximum assistance Sit to Supine: Maximum assistance Scooting: Total assistance;Assist x2 Balance: 
Sitting: Impaired; With support Sitting - Static: Fair (occasional) Sitting - Dynamic: Fair (occasional); Poor (constant support) Standing: Impaired; With support Standing - Static: Poor ADL Intervention: 
Feeding Food to Mouth: Contact guard assistance;Supervision Drink to Mouth: Contact guard assistance;Supervision Grooming Grooming Assistance: Contact guard assistance Washing Face: Contact guard assistance Brushing/Combing Hair: Minimum assistance Upper Body Dressing Assistance Hospital Gown: Minimum  assistance(increased time) Pain: 
Pain level pre-treatment: 0/10 Pain level post-treatment: 0/10 Activity Tolerance:   
Fair Please refer to the flowsheet for vital signs taken during this treatment. After treatment:  
[]  Patient left in no apparent distress sitting up in chair 
[x]  Patient left in no apparent distress in bed 
[x]  Call bell left within reach [x]  Nursing notified 
[]  Caregiver present [x]  Bed alarm activated COMMUNICATION/EDUCATION:  
[x] Role of Occupational Therapy in the acute care setting 
[x] Home safety education was provided and the patient/caregiver indicated understanding. [x] Patient/family have participated as able in working towards goals and plan of care. [x] Patient/family agree to work toward stated goals and plan of care. [] Patient understands intent and goals of therapy, but is neutral about his/her participation. [] Patient is unable to participate in goal setting and plan of care. Thank you for this referral. 
Fatoumata Ponce MS, OTR/L Time Calculation: 24 mins

## 2020-10-20 NOTE — PROGRESS NOTES
RENAL DAILY PROGRESS NOTE Patient: Yesenia Mosley               Sex: female          DOA: 10/15/2020  3:49 PM  
    
YOB: 1940      Age:  68 y.o.        LOS:  LOS: 5 days Subjective:  
 
Yesenia Mosley is a 68 y.o.  who presents with Anasarca [R60.1]. Asked to evaluate for hyponatremia,and renal failure,hx of diastolic heart failure,aortic stenosis,pulm htn,anasarca,was treated with bumex,metolazone Chief complains: Patient denies nausea, vomiting, diarrhea - Reviewed last 24 hrs events Current Facility-Administered Medications Medication Dose Route Frequency  apixaban (ELIQUIS) tablet 2.5 mg  2.5 mg Oral BID  metoprolol tartrate (LOPRESSOR) tablet 12.5 mg  12.5 mg Oral BID  hydrALAZINE (APRESOLINE) tablet 25 mg  25 mg Oral TID PRN  
 ferrous sulfate tablet 325 mg  1 Tab Oral DAILY WITH BREAKFAST  famotidine (PEPCID) tablet 20 mg  20 mg Oral DAILY  folic acid (FOLVITE) tablet 1 mg  1 mg Oral DAILY  gabapentin (NEURONTIN) capsule 100 mg  100 mg Oral QHS  montelukast (SINGULAIR) tablet 10 mg  10 mg Oral DAILY  ondansetron hcl (ZOFRAN) tablet 4 mg  4 mg Oral Q8H PRN  
 atorvastatin (LIPITOR) tablet 40 mg  40 mg Oral QHS  traMADoL (ULTRAM) tablet 50 mg  50 mg Oral Q6H PRN Objective:  
 
Visit Vitals BP (!) 97/57 (BP 1 Location: Left arm, BP Patient Position: At rest) Pulse 60 Temp 97.1 °F (36.2 °C) Resp 19 Ht 5' 3\" (1.6 m) Wt 59.2 kg (130 lb 9.6 oz) SpO2 98% BMI 23.13 kg/m² Intake/Output Summary (Last 24 hours) at 10/20/2020 1633 Last data filed at 10/20/2020 1541 Gross per 24 hour Intake 920 ml Output 3250 ml Net -2330 ml Physical Examination:  
 
 
RS: Chest is bilateral equal, CVS: S1-S2 heard Abdomen: Soft, Non tender, Not distended, Positive bowel sounds, no organomegaly, no CVA / supra pubic tenderness Extremities: mild edema,  
CNS: Awake HEENT: Head is atraumatic, PERRLA, conjunctiva pink & non icteric. No JVD or carotid bruit Data Review:   
 
Labs:  
 
Hematology:  
Recent Labs 10/20/20 
3257 10/19/20 
0321 10/18/20 
0147 WBC 5.2 5.9 7.4 HGB 8.7* 9.3* 10.4* HCT 28.8* 30.4* 33.3* Chemistry:  
Recent Labs 10/20/20 
1005 10/20/20 
4970 10/19/20 
2101 10/19/20 
1515 10/19/20 
1679 10/19/20 
0321 10/19/20 
0037 10/18/20 
1815 10/18/20 
1242 10/18/20 
0147 BUN 63* 66*  66* 66* 62* 60* 61*  --   --   --  60* CREA 2.28* 2.26*  2.30* 2.44* 2.42* 2.31* 2.23*  --   --   --  2.05* CA 9.7 9.3  9.4 9.3 9.4 10.0 9.8  9.8  --   --   --  10.0 ALB  --  2.6*  --   --   --  2.6*  --   --   --  3.0*  
K 3.4* 3.4*  3.1* 4.3 4.5 4.0 4.1  --   --   --  3.4* * 126*  125* 125* 126* 125* 124* 125* 124* 124* 123* CL 88* 85*  85* 84* 83* 83* 83*  --   --   --  82* CO2 35* 33*  34* 35* 36* 36* 37*  --   --   --  33* * 78  79 98 102* 106* 75  --   --   --  150* Images: 
 
XR (Most Recent). CXR reviewed by me and compared with previous CXR [facility] Encounter encounter on 10/15/20 XR CHEST PORT Narrative CHEST PORTABLE 1537 hours COMPARISON: September 20, 2019. INDICATIONS: Chest pain. FINDINGS:  
 
Portable single view chest demonstrates: 
 
Lungs: There is chronic atelectasis in the medial segment of the left lower lobe 
and to a lesser extent the infrahilar region of the right lower lobe. These 
findings are similar to prior studies. Prominent bronchovascular markings are 
evident diffusely. No focal infiltrate is seen. [place] Cardiac Silhouette And Mediastinal Contours: There is marked enlargement of the 
cardiac contours. The pulmonary outflow tract region is particularly prominent. Dual-lead permanent pacemaker appears satisfactory. This is unchanged. Pleural Spaces: No pneumothorax or pleural effusion evident. Bones And Soft Tissues: Unremarkable for age. Impression IMPRESSION: 
 
No acute cardiopulmonary process is evident. No change from prior studies CT (Most Recent) [facility] 10[facility]15/20 CT CHEST WO CONT Narrative EXAM: CT CHEST WO CONT 
 
CLINICAL INDICATION/HISTORY : CHF /h/o pulmonary nodule. COMPARISON: October 24, 2016 TECHNIQUE: Helical scans through the chest was obtained from the thoracic inlet 
to the diaphragm without IV contrast administration. All CT scans at this facility are performed using dose optimization technique as 
appropriate to a performed exam to include automated exposure control, 
adjustment of the mA and/or kV according to patient size (including appropriate 
matching for sight specific examinations)  or use of iterative reconstruction 
technique. FINDINGS:  
 
Lungs: Medial left upper lobe pulmonary nodule measures 6 mm x 5 mm, previously 5 mm x 3 mm. There are hazy bilateral groundglass pulmonary opacities with 
relative sparing of the periphery suggestive of edema. Thyroid and chest wall: Heterogeneous appearance of the thyroid. Left pacer 
battery pack Heart and great vessels: Moderate to marked cardiomegaly. Pacer wires. Mitral 
annulus calcification. Atherosclerotic vascular calcification. Main pulmonary 
artery measures 4.7 cm with dilated right and left pulmonary arteries also. Lymph nodes: Stable 1.3 cm short axis AP window lymph node and 1.2 cm short axis 
precarinal lymph node. Pleura: Small bilateral pleural effusions Upper Abdomen: Atherosclerotic vascular calcification Bones: Unremarkable for age Impression IMPRESSION: 
 
1. Bilateral hazy groundglass pulmonary opacities with relative sparing of the 
periphery suggestive of pulmonary edema. Very small bilateral pleural effusions. Moderate to marked cardiomegaly. 2.  Similar appearance of enlarged pulmonary artery suggestive of pulmonary 
artery hypertension. 3.  Very minimal increased size of medial left upper lobe pulmonary nodule since 2016. Today measuring 6 x 5 mm, previously measuring 5 x 3 mm. Finding is likely 
benign. However given slight change over time suggest follow-up CT in one year. 4.  Extensive atherosclerotic vascular calcification EKG No results found for this or any previous visit. I have personally reviewed the old medical records and patient's labs Plan / Recommendation: 1. Acute/crf stage 4,tr proteinuria,probable cardiorenal,check spot urine for protein/creatinine,spep, 2.acute/chronic hyponatremia ,related to thiazides,normal serum osmolarity raises the possibility of pseudohyponatremia related to elevated globulins,continue fluid restrictions 3.sec hyperparathyroidism 4.hypokalemia,metabolic alkalosis related to diuretics,replace D/w Dr.gupta Bina Perdomo MD 
Nephrology 10/20/2020

## 2020-10-20 NOTE — ROUTINE PROCESS
Bedside and Verbal shift change report given to Areli Fletcher RN (oncoming nurse) by Serg Pepe RN 
 (offgoing nurse). Report included the following information SBAR, Kardex, Intake/Output, MAR and Recent Results.

## 2020-10-20 NOTE — PROGRESS NOTES
Problem: Mobility Impaired (Adult and Pediatric) Goal: *Acute Goals and Plan of Care (Insert Text) Description: Physical Therapy Goals Initiated 10/16/2020 and to be accomplished within 7 day(s) 1. Patient will move from supine to sit and sit to supine , scoot up and down, and roll side to side in bed with modified independence. 2.  Patient will transfer from bed to chair and chair to bed with supervision/set-up using the least restrictive device. 3.  Patient will perform sit to stand with supervision/set-up. 4.  Patient will ambulate with supervision/set-up for 50 feet with the least restrictive device. PLOF: Pt reports independence with dressing and bathing, ambulating with RW. Lives in 1st floor apartment with  and no DEBBIE. Pt reports having someone come in to  after them daily and has someone come into clean 1 time per week. Outcome: Progressing Towards Goal 
 PHYSICAL THERAPY TREATMENT Patient: Adriane Vuong (83 y.o. female) Date: 10/20/2020 Diagnosis: Anasarca [R60.1] Acute on chronic diastolic congestive heart failure ([facility] [address] Precautions: Fall, Skin ASSESSMENT: 
Patient is cleared by nursing for PT, and patient consents to therapy. Pt supine in bed with HOB elevated. Pt is initially sleeping with lunch in front of her. After she is awake, she reports she is not hungry. At end of session, pt was eating her banana after encouragement. Pt is oriented to herself only and not able to state her birthday correctly. She is disoriented to place, time, and situation. She is very slow with processing today and has difficulty following commands and performing activities. Pt required increased assistance with all mobility. She was maximum assistance with supine to sit. Attempt sit to stands with RW with pt unable to perform. Sit to stands with therapist in front of pt maximum assistance/total assistance. Pt not able to fully stand upright.  Nursing assisted changing linen. Pt ended therapy supine in bed with head of bed elevated, alarm donned, and all needs met. Progression toward goals:   
[]      Improving appropriately and progressing toward goals [x]      Improving slowly and progressing toward goals 
[]      Not making progress toward goals and plan of care will be adjusted PLAN: 
Patient continues to benefit from skilled intervention to address the above impairments. Continue treatment per established plan of care. Discharge Recommendations:  Mars Thomas Further Equipment Recommendations for Discharge:  N/A  
 
SUBJECTIVE:  
Patient stated okay.  OBJECTIVE DATA SUMMARY:  
Critical Behavior: 
Neurologic State: Drowsy Orientation Level: Oriented to person, Disoriented to place/time/situation Cognition: Impaired decision making, Decreased attention/concentration Safety/Judgement: Decreased awareness of environment, Decreased awareness of need for safety, Fall prevention, Home safety Functional Mobility Training: 
Bed Mobility: 
  
Supine to Sit: Maximum assistance Sit to Supine: Maximum assistance Scooting: Total assistance;Assist x2 Transfers: 
Sit to Stand: Maximum assistance; Total assistance(unable to stand fully upright; not able to perform with RW) Stand to Sit: Maximum assistance Balance: 
Sitting: Impaired; With support Sitting - Static: Fair (occasional) Sitting - Dynamic: Fair (occasional); Poor (constant support) Standing: Impaired; With support Standing - Static: Poor Therapeutic Exercises:  
Reviewed and performed ankle pumps to increase blood flow and circulation. Pain: No pain noted before, during, or at end of session. Activity Tolerance:  
poor Please refer to the flowsheet for vital signs taken during this treatment. After treatment:  
[] Patient left in no apparent distress sitting up in chair 
[x] Patient left in no apparent distress in bed [x] Call bell left within reach [x] Nursing notified Grisel 
[x] Personal items in reach 
[] Caregiver present [x] Bed/chair alarm activated 
[] SCDs applied COMMUNICATION/EDUCATION:  
[x]         Role of Physical Therapy in the acute care setting. [x]         Fall prevention education was provided and the patient/caregiver indicated understanding. [x]         Patient/family have participated as able in working toward goals and plan of care. [x]         Patient/family agree to work toward stated goals and plan of care. []         Patient understands intent and goals of therapy, but is neutral about his/her participation. []         Patient is unable to participate in stated goals/plan of care: ongoing with therapy staff. 
[]         Out of bed at least 3-5 times a day with nursing assistance. []         Other: 
 
   
Tanja Hoover, PT, DPT Time Calculation: 24 mins

## 2020-10-20 NOTE — PROGRESS NOTES
Problem: Falls - Risk of 
Goal: *Absence of Falls Description: Document Kishan Sawyer Fall Risk and appropriate interventions in the flowsheet. Outcome: Progressing Towards Goal 
Note: Fall Risk Interventions: 
Mobility Interventions: Bed/chair exit alarm, Patient to call before getting OOB, PT Consult for mobility concerns, Strengthening exercises (ROM-active/passive), Utilize walker, cane, or other assistive device Mentation Interventions: Adequate sleep, hydration, pain control, Bed/chair exit alarm, Door open when patient unattended, More frequent rounding, Increase mobility, Reorient patient, Toileting rounds, Update white board Medication Interventions: Bed/chair exit alarm, Patient to call before getting OOB, Evaluate medications/consider consulting pharmacy Elimination Interventions: Bed/chair exit alarm, Call light in reach, Patient to call for help with toileting needs, Toileting schedule/hourly rounds Problem: Patient Education: Go to Patient Education Activity Goal: Patient/Family Education Outcome: Progressing Towards Goal 
  
Problem: Patient Education: Go to Patient Education Activity Goal: Patient/Family Education Outcome: Progressing Towards Goal 
  
Problem: Heart Failure: Day 4 Goal: Off Pathway (Use only if patient is Off Pathway) Outcome: Progressing Towards Goal 
Goal: Activity/Safety Outcome: Progressing Towards Goal 
Goal: Diagnostic Test/Procedures Outcome: Progressing Towards Goal 
Goal: Nutrition/Diet Outcome: Progressing Towards Goal 
Goal: Discharge Planning Outcome: Progressing Towards Goal 
Goal: Medications Outcome: Progressing Towards Goal 
Goal: Respiratory Outcome: Progressing Towards Goal 
Goal: Treatments/Interventions/Procedures Outcome: Progressing Towards Goal 
Goal: Psychosocial 
Outcome: Progressing Towards Goal 
Goal: *Oxygen saturation within defined limits Outcome: Progressing Towards Goal 
Goal: *Hemodynamically stable Outcome: Progressing Towards Goal 
Goal: *Optimal pain control at patient's stated goal 
Outcome: Progressing Towards Goal 
Goal: *Anxiety reduced or absent Outcome: Progressing Towards Goal 
Goal: *Demonstrates progressive activity Outcome: Progressing Towards Goal 
  
Problem: Heart Failure: Day 5 Goal: Off Pathway (Use only if patient is Off Pathway) Outcome: Progressing Towards Goal 
Goal: Activity/Safety Outcome: Progressing Towards Goal 
Goal: Diagnostic Test/Procedures Outcome: Progressing Towards Goal 
Goal: Nutrition/Diet Outcome: Progressing Towards Goal 
Goal: Discharge Planning Outcome: Progressing Towards Goal 
Goal: Medications Outcome: Progressing Towards Goal 
Goal: Respiratory Outcome: Progressing Towards Goal 
Goal: Treatments/Interventions/Procedures Outcome: Progressing Towards Goal 
Goal: Psychosocial 
Outcome: Progressing Towards Goal 
  
Problem: Heart Failure: Discharge Outcomes Goal: *Demonstrates ability to perform prescribed activity without shortness of breath or discomfort Outcome: Progressing Towards Goal 
Goal: *Left ventricular function assessment completed prior to or during stay, or planned for post-discharge Outcome: Progressing Towards Goal 
Goal: *ACEI prescribed if LVEF less than 40% and no contraindications or ARB prescribed Outcome: Progressing Towards Goal 
Goal: *Verbalizes understanding and describes prescribed diet Outcome: Progressing Towards Goal 
Goal: *Verbalizes understanding/describes prescribed medications Outcome: Progressing Towards Goal 
Goal: *Describes available resources and support systems Description: (eg: Home Health, Palliative Care, Advanced Medical Directive) Outcome: Progressing Towards Goal 
Goal: *Describes smoking cessation resources Outcome: Progressing Towards Goal 
Goal: *Understands and describes signs and symptoms to report to providers(Stroke Metric) Outcome: Progressing Towards Goal 
 Goal: *Describes/verbalizes understanding of follow-up/return appt Description: (eg: to physicians, diabetes treatment coordinator, and other resources Outcome: Progressing Towards Goal 
Goal: *Describes importance of continuing daily weights and changes to report to physician Outcome: Progressing Towards Goal 
  
Problem: Impaired Skin Integrity/Pressure Injury Treatment Goal: *Improvement of Existing Pressure Injury Outcome: Progressing Towards Goal 
Goal: *Prevention of pressure injury Description: Document Angel Scale and appropriate interventions in the flowsheet. Outcome: Progressing Towards Goal 
Note: Pressure Injury Interventions: 
  
 
Moisture Interventions: Absorbent underpads, Apply protective barrier, creams and emollients, Check for incontinence Q2 hours and as needed, Internal/External urinary devices, Maintain skin hydration (lotion/cream), Minimize layers, Moisture barrier Activity Interventions: Increase time out of bed, Pressure redistribution bed/mattress(bed type), PT/OT evaluation Mobility Interventions: HOB 30 degrees or less, Pressure redistribution bed/mattress (bed type), PT/OT evaluation, Turn and reposition approx. every two hours(pillow and wedges) Nutrition Interventions: Document food/fluid/supplement intake, Offer support with meals,snacks and hydration Friction and Shear Interventions: Apply protective barrier, creams and emollients, HOB 30 degrees or less, Minimize layers Problem: Patient Education: Go to Patient Education Activity Goal: Patient/Family Education Outcome: Progressing Towards Goal 
  
Problem: Pressure Injury - Risk of 
Goal: *Prevention of pressure injury Description: Document Angel Scale and appropriate interventions in the flowsheet. Outcome: Progressing Towards Goal 
  
Problem: Patient Education: Go to Patient Education Activity Goal: Patient/Family Education Outcome: Progressing Towards Goal 
  
 Problem: Infection - Risk of, Urinary Catheter-Associated Urinary Tract Infection Goal: *Absence of infection signs and symptoms Outcome: Progressing Towards Goal 
  
Problem: Patient Education: Go to Patient Education Activity Goal: Patient/Family Education Outcome: Progressing Towards Goal

## 2020-10-21 ENCOUNTER — APPOINTMENT (OUTPATIENT)
Dept: CT IMAGING | Age: 80
DRG: 291 | End: 2020-10-21
Attending: FAMILY MEDICINE
Payer: MEDICARE

## 2020-10-21 ENCOUNTER — APPOINTMENT (OUTPATIENT)
Dept: GENERAL RADIOLOGY | Age: 80
DRG: 291 | End: 2020-10-21
Attending: FAMILY MEDICINE
Payer: MEDICARE

## 2020-10-21 PROBLEM — E43 SEVERE PROTEIN-CALORIE MALNUTRITION (HCC): Status: ACTIVE | Noted: 2020-10-21

## 2020-10-21 LAB
ALBUMIN SERPL ELPH-MCNC: 2.9 G/DL (ref 2.9–4.4)
ALBUMIN SERPL-MCNC: 2.8 G/DL (ref 3.4–5)
ALBUMIN/GLOB SERPL: 0.5 {RATIO} (ref 0.8–1.7)
ALBUMIN/GLOB SERPL: 0.7 {RATIO} (ref 0.7–1.7)
ALP SERPL-CCNC: 171 U/L (ref 45–117)
ALPHA1 GLOB SERPL ELPH-MCNC: 0.3 G/DL (ref 0–0.4)
ALPHA2 GLOB SERPL ELPH-MCNC: 1 G/DL (ref 0.4–1)
ALT SERPL-CCNC: 11 U/L (ref 13–56)
ANION GAP SERPL CALC-SCNC: 7 MMOL/L (ref 3–18)
ANION GAP SERPL CALC-SCNC: 7 MMOL/L (ref 3–18)
ANION GAP SERPL CALC-SCNC: 8 MMOL/L (ref 3–18)
APPEARANCE UR: CLEAR
AST SERPL-CCNC: 13 U/L (ref 10–38)
B-GLOBULIN SERPL ELPH-MCNC: 0.9 G/DL (ref 0.7–1.3)
BACTERIA URNS QL MICRO: ABNORMAL /HPF
BILIRUB SERPL-MCNC: 0.7 MG/DL (ref 0.2–1)
BILIRUB UR QL: NEGATIVE
BUN SERPL-MCNC: 54 MG/DL (ref 7–18)
BUN SERPL-MCNC: 56 MG/DL (ref 7–18)
BUN SERPL-MCNC: 58 MG/DL (ref 7–18)
BUN/CREAT SERPL: 29 (ref 12–20)
BUN/CREAT SERPL: 29 (ref 12–20)
BUN/CREAT SERPL: 30 (ref 12–20)
CALCIUM SERPL-MCNC: 9.5 MG/DL (ref 8.5–10.1)
CALCIUM SERPL-MCNC: 9.5 MG/DL (ref 8.5–10.1)
CALCIUM SERPL-MCNC: 9.7 MG/DL (ref 8.5–10.1)
CHLORIDE SERPL-SCNC: 95 MMOL/L (ref 100–111)
CHLORIDE SERPL-SCNC: 97 MMOL/L (ref 100–111)
CHLORIDE SERPL-SCNC: 99 MMOL/L (ref 100–111)
CHLORIDE UR-SCNC: 44 MMOL/L (ref 55–125)
CO2 SERPL-SCNC: 31 MMOL/L (ref 21–32)
CO2 SERPL-SCNC: 31 MMOL/L (ref 21–32)
CO2 SERPL-SCNC: 32 MMOL/L (ref 21–32)
COLOR UR: YELLOW
CREAT SERPL-MCNC: 1.84 MG/DL (ref 0.6–1.3)
CREAT SERPL-MCNC: 1.85 MG/DL (ref 0.6–1.3)
CREAT SERPL-MCNC: 2.01 MG/DL (ref 0.6–1.3)
CREAT UR-MCNC: 30 MG/DL (ref 30–125)
GAMMA GLOB SERPL ELPH-MCNC: 2.1 G/DL (ref 0.4–1.8)
GLOBULIN SER CALC-MCNC: 5.3 G/DL (ref 2–4)
GLOBULIN SER-MCNC: 4.3 G/DL (ref 2.2–3.9)
GLUCOSE SERPL-MCNC: 121 MG/DL (ref 74–99)
GLUCOSE SERPL-MCNC: 97 MG/DL (ref 74–99)
GLUCOSE SERPL-MCNC: 97 MG/DL (ref 74–99)
GLUCOSE UR STRIP.AUTO-MCNC: NEGATIVE MG/DL
HGB UR QL STRIP: ABNORMAL
IGA SERPL-MCNC: 199 MG/DL (ref 64–422)
IGG SERPL-MCNC: 2675 MG/DL (ref 586–1602)
IGM SERPL-MCNC: 65 MG/DL (ref 26–217)
INTERPRETATION SERPL IEP-IMP: ABNORMAL
IRON SATN MFR SERPL: 5 % (ref 20–50)
IRON SERPL-MCNC: 19 UG/DL (ref 50–175)
KAPPA LC FREE SER-MCNC: 98.5 MG/L (ref 3.3–19.4)
KAPPA LC FREE SER-MCNC: 99.9 MG/L (ref 3.3–19.4)
KAPPA LC FREE/LAMBDA FREE SER: 0.74 {RATIO} (ref 0.26–1.65)
KAPPA LC FREE/LAMBDA FREE SER: 0.82 {RATIO} (ref 0.26–1.65)
KETONES UR QL STRIP.AUTO: NEGATIVE MG/DL
LAMBDA LC FREE SERPL-MCNC: 121.7 MG/L (ref 5.7–26.3)
LAMBDA LC FREE SERPL-MCNC: 134 MG/L (ref 5.7–26.3)
LEUKOCYTE ESTERASE UR QL STRIP.AUTO: ABNORMAL
M PROTEIN SERPL ELPH-MCNC: 1.2 G/DL
NITRITE UR QL STRIP.AUTO: NEGATIVE
PH UR STRIP: >8.5 [PH] (ref 5–8)
POTASSIUM SERPL-SCNC: 3.4 MMOL/L (ref 3.5–5.5)
POTASSIUM SERPL-SCNC: 3.5 MMOL/L (ref 3.5–5.5)
POTASSIUM SERPL-SCNC: 3.6 MMOL/L (ref 3.5–5.5)
POTASSIUM UR-SCNC: 44 MMOL/L (ref 12–62)
PROT SERPL-MCNC: 7.2 G/DL (ref 6–8.5)
PROT SERPL-MCNC: 8.1 G/DL (ref 6.4–8.2)
PROT UR STRIP-MCNC: 30 MG/DL
RBC #/AREA URNS HPF: ABNORMAL /HPF (ref 0–5)
SODIUM SERPL-SCNC: 134 MMOL/L (ref 136–145)
SODIUM SERPL-SCNC: 136 MMOL/L (ref 136–145)
SODIUM SERPL-SCNC: 137 MMOL/L (ref 136–145)
SODIUM UR-SCNC: 64 MMOL/L (ref 20–110)
SP GR UR REFRACTOMETRY: 1.01 (ref 1–1.03)
TIBC SERPL-MCNC: 382 UG/DL (ref 250–450)
UROBILINOGEN UR QL STRIP.AUTO: 1 EU/DL (ref 0.2–1)
WBC URNS QL MICRO: ABNORMAL /HPF (ref 0–4)

## 2020-10-21 PROCEDURE — 77030011254 HC DRSG HYDRGEL S&N -A

## 2020-10-21 PROCEDURE — 80053 COMPREHEN METABOLIC PANEL: CPT

## 2020-10-21 PROCEDURE — 74011250637 HC RX REV CODE- 250/637: Performed by: STUDENT IN AN ORGANIZED HEALTH CARE EDUCATION/TRAINING PROGRAM

## 2020-10-21 PROCEDURE — 81001 URINALYSIS AUTO W/SCOPE: CPT

## 2020-10-21 PROCEDURE — 97110 THERAPEUTIC EXERCISES: CPT

## 2020-10-21 PROCEDURE — 82436 ASSAY OF URINE CHLORIDE: CPT

## 2020-10-21 PROCEDURE — 80048 BASIC METABOLIC PNL TOTAL CA: CPT

## 2020-10-21 PROCEDURE — 77030018836 HC SOL IRR NACL ICUM -A

## 2020-10-21 PROCEDURE — 70450 CT HEAD/BRAIN W/O DYE: CPT

## 2020-10-21 PROCEDURE — 2709999900 HC NON-CHARGEABLE SUPPLY

## 2020-10-21 PROCEDURE — 84300 ASSAY OF URINE SODIUM: CPT

## 2020-10-21 PROCEDURE — 36415 COLL VENOUS BLD VENIPUNCTURE: CPT

## 2020-10-21 PROCEDURE — 77030041076 HC DRSG AG OPTICELL MDII -A

## 2020-10-21 PROCEDURE — 83540 ASSAY OF IRON: CPT

## 2020-10-21 PROCEDURE — 84425 ASSAY OF VITAMIN B-1: CPT

## 2020-10-21 PROCEDURE — 65270000029 HC RM PRIVATE

## 2020-10-21 PROCEDURE — 97530 THERAPEUTIC ACTIVITIES: CPT

## 2020-10-21 PROCEDURE — 97535 SELF CARE MNGMENT TRAINING: CPT

## 2020-10-21 PROCEDURE — 99232 SBSQ HOSP IP/OBS MODERATE 35: CPT | Performed by: INTERNAL MEDICINE

## 2020-10-21 PROCEDURE — 82570 ASSAY OF URINE CREATININE: CPT

## 2020-10-21 PROCEDURE — 71045 X-RAY EXAM CHEST 1 VIEW: CPT

## 2020-10-21 PROCEDURE — 84133 ASSAY OF URINE POTASSIUM: CPT

## 2020-10-21 PROCEDURE — 74011250637 HC RX REV CODE- 250/637: Performed by: NURSE PRACTITIONER

## 2020-10-21 PROCEDURE — 74011250637 HC RX REV CODE- 250/637: Performed by: FAMILY MEDICINE

## 2020-10-21 RX ORDER — LANOLIN ALCOHOL/MO/W.PET/CERES
100 CREAM (GRAM) TOPICAL DAILY
Status: DISCONTINUED | OUTPATIENT
Start: 2020-10-22 | End: 2020-10-30 | Stop reason: HOSPADM

## 2020-10-21 RX ORDER — THERA TABS 400 MCG
1 TAB ORAL DAILY
Status: DISCONTINUED | OUTPATIENT
Start: 2020-10-22 | End: 2020-10-30 | Stop reason: HOSPADM

## 2020-10-21 RX ORDER — POTASSIUM CHLORIDE 20 MEQ/1
20 TABLET, EXTENDED RELEASE ORAL
Status: COMPLETED | OUTPATIENT
Start: 2020-10-21 | End: 2020-10-21

## 2020-10-21 RX ORDER — ERGOCALCIFEROL 1.25 MG/1
50000 CAPSULE ORAL
Status: DISCONTINUED | OUTPATIENT
Start: 2020-10-21 | End: 2020-10-30 | Stop reason: HOSPADM

## 2020-10-21 RX ADMIN — ERGOCALCIFEROL 50000 UNITS: 1.25 CAPSULE ORAL at 09:54

## 2020-10-21 RX ADMIN — ATORVASTATIN CALCIUM 40 MG: 40 TABLET, FILM COATED ORAL at 22:47

## 2020-10-21 RX ADMIN — METOPROLOL TARTRATE 12.5 MG: 25 TABLET, FILM COATED ORAL at 09:53

## 2020-10-21 RX ADMIN — POTASSIUM CHLORIDE 20 MEQ: 1500 TABLET, EXTENDED RELEASE ORAL at 09:54

## 2020-10-21 RX ADMIN — MONTELUKAST 10 MG: 10 TABLET, FILM COATED ORAL at 09:54

## 2020-10-21 RX ADMIN — APIXABAN 2.5 MG: 2.5 TABLET, FILM COATED ORAL at 09:54

## 2020-10-21 RX ADMIN — FERROUS SULFATE TAB 325 MG (65 MG ELEMENTAL FE) 325 MG: 325 (65 FE) TAB at 09:53

## 2020-10-21 RX ADMIN — FOLIC ACID 1 MG: 1 TABLET ORAL at 09:54

## 2020-10-21 RX ADMIN — FAMOTIDINE 20 MG: 20 TABLET ORAL at 09:53

## 2020-10-21 RX ADMIN — METOPROLOL TARTRATE 12.5 MG: 25 TABLET, FILM COATED ORAL at 18:45

## 2020-10-21 RX ADMIN — APIXABAN 2.5 MG: 2.5 TABLET, FILM COATED ORAL at 18:46

## 2020-10-21 NOTE — CONSULTS
Nutrition Assessment Type and Reason for Visit: Reassess, Positive nutrition screen, Consult(MST, General Nutrition Management) Nutrition Recommendations/Plan:  
- Change to mechanical soft diet and provide assistance with meals.  
- Monitor adequacy of meal intake and need to provide supplemental enteral nutrition via NGT. - Monitor and replace electrolytes, check magnesium and phosphorus. 
- Increase supplements to Magic Cup TID. - Add multivitamin and thiamine. Nutrition Assessment:  Patient appears weak, lethargic with poor po intake since admission and not consuming meals for at least the past 3 days per nursing, consuming some of magic cup supplements. 8 lb, 5.9% weight loss since admission. NFPE completed. Malnutrition Assessment: 
Malnutrition Status: Severe malnutrition Estimated Daily Nutrient Needs: 
Energy (kcal):  3804-3280 Protein (g):  70-77 Fluid (ml/day):  1 mL/kcal 
 
Nutrition Related Findings:  BM 10/16. Receiving ferrous sulfate, folic acid and cholecalciferol. Current Nutrition Therapies: DIET NUTRITIONAL SUPPLEMENTS Lunch, Dinner; Toya-Sourav DIET CARDIAC Regular; FR 1500ML Anthropometric Measures: 
· Height:  5' 3\" (160 cm) · Current Body Wt:  58.2 kg (128 lb 4.9 oz) · BMI: 22.7 Nutrition Diagnosis: · Severe malnutrition, In context of acute illness or injury related to cognitive or neurological impairment, biting/chewing (masticatory) difficulty, inadequate protein-energy intake, early satiety as evidenced by weight loss greater than or equal to 2% in 1 week, intake 0-25% Nutrition Intervention: 
Food and/or Nutrient Delivery: Modify current diet, Modify oral nutrition supplement, Vitamin supplement, Mineral supplement Nutrition Education and Counseling: Education not indicated Coordination of Nutrition Care: Continued inpatient monitoring, Coordination of community care(patient discussed with nursing) Goals: Nutritional needs will be met through adequate oral intake or nutrition support within the next 7 days. Nutrition Monitoring and Evaluation:  
Food/Nutrient Intake Outcomes: Diet advancement/tolerance, Food and nutrient intake, Supplement intake, Vitamin/mineral intake Physical Signs/Symptoms Outcomes: Biochemical data, Chewing or swallowing, Constipation, Meal time behavior, Nutrition focused physical findings Discharge Planning: Too soon to determine Electronically signed by Meryle Sat, RD, [address]  on 10/21/2020 at 3:09 PM 
 
Contact Number: [phone]

## 2020-10-21 NOTE — PROGRESS NOTES
Problem: Mobility Impaired (Adult and Pediatric) Goal: *Acute Goals and Plan of Care (Insert Text) Description: Physical Therapy Goals Initiated 10/16/2020 and to be accomplished within 7 day(s) 1. Patient will move from supine to sit and sit to supine , scoot up and down, and roll side to side in bed with modified independence. 2.  Patient will transfer from bed to chair and chair to bed with supervision/set-up using the least restrictive device. 3.  Patient will perform sit to stand with supervision/set-up. 4.  Patient will ambulate with supervision/set-up for 50 feet with the least restrictive device. PLOF: Pt reports independence with dressing and bathing, ambulating with RW. Lives in 1st floor apartment with  and no DEBBIE. Pt reports having someone come in to  after them daily and has someone come into clean 1 time per week. Outcome: Progressing Towards Goal 
  
PHYSICAL THERAPY TREATMENT Patient: Alon Phillips (74 y.o. female) Date: 10/21/2020 Diagnosis: Anasarca [R60.1] Acute on chronic diastolic congestive heart failure ([facility] [address] Precautions: Fall, Skin ASSESSMENT: 
RN cleared for Pt to work with PT. Pt found supine in bed, on room air, willing to work with PT. Pt able to state her name, but disoriented x3. Pt assisted up to sitting with modAx1 and additional time. Once sitting, patient able to maintain her balance. Pt required maxAx1 to scoot forward. Pt helped with exercises as she was unable to move LE's against gravity in full ROM. Attempted x2 to assist pt up to standing with RW, pt unable to initiate any pushing through her LE's to stand. Pt helped back supine in bed modAx1, noted L LE pain. Pt rolled with maxAx1 to R/L for yuliet care and fresh bed pads. Pt left semi-reclined in bed, NAD, call bell nearby, no new questions or concerns. RN made aware. Recommend SNF at this time. Progression toward goals: []      Improving appropriately and progressing toward goals 
[]      Improving slowly and progressing toward goals 
[]      Not making progress toward goals and plan of care will be adjusted PLAN: 
Patient continues to benefit from skilled intervention to address the above impairments. Continue treatment per established plan of care. Discharge Recommendations:  Mars Thomas Further Equipment Recommendations for Discharge:  N/A  
 
SUBJECTIVE:  
Patient stated I feel awful.  OBJECTIVE DATA SUMMARY:  
Critical Behavior: 
Neurologic State: Drowsy, Alert Orientation Level: Oriented to person Cognition: Follows commands Safety/Judgement: Fall prevention Functional Mobility Training: 
Bed Mobility: 
  
Supine to Sit: Moderate assistance; Additional time Sit to Supine: Moderate assistance;Assist x1;Additional time Scooting: Maximum assistance Balance: 
Sitting: Intact; With support Sitting - Static: Fair (occasional) Sitting - Dynamic: Fair (occasional) Therapeutic Exercises: Pt performed active-assisted exercises per flow sheet sitting EOB EXERCISE Sets Reps Active Active Assist  
Passive Self ROM Comments Ankle Pumps    [] [] [] [] Quad Sets/Glut Sets    [] [] [] [] Hold for 5 secs Hamstring Sets   [] [] [] [] Short Arc Quads   [] [] [] [] Heel Slides   [] [] [] [] Straight Leg Raises   [] [] [] [] Hip Add   [] [] [] [] Hold for 5 secs, w/ pillow squeeze Long Arc Quads 1 15 [] [x] [] [] Seated Marching 1 15 [] [x] [] []   
Standing Marching   [] [] [] []   
   [] [] [] []   
 
 
Pain: 
Pain level pre-treatment: 5/10 Pain level post-treatment: 5/10 Pain Intervention(s): Medication (see MAR); Rest, Repositioning Response to intervention: Nurse notified, See doc flow Activity Tolerance:  
Pt tolerated mobility well today Please refer to the flowsheet for vital signs taken during this treatment. After treatment: [] Patient left in no apparent distress sitting up in chair 
[x] Patient left in no apparent distress in bed 
[x] Call bell left within reach [x] Nursing notified 
[] Caregiver present 
[] Bed alarm activated 
[] SCDs applied COMMUNICATION/EDUCATION:  
[x]         Role of Physical Therapy in the acute care setting. [x]         Fall prevention education was provided and the patient/caregiver indicated understanding. [x]         Patient/family have participated as able in working toward goals and plan of care. []         Patient/family agree to work toward stated goals and plan of care. []         Patient understands intent and goals of therapy, but is neutral about his/her participation. []         Patient is unable to participate in stated goals/plan of care: ongoing with therapy staff. 
[]         Other: 
 
   
Lynda Livingston Time Calculation: 27 mins

## 2020-10-21 NOTE — ROUTINE PROCESS
Verbal shift change report given to Scotty Ness (oncoming nurse) by Harmony Lira RN (offgoing nurse). Report included the following information SBAR, Kardex, ED Summary, Procedure Summary, Intake/Output, MAR, Recent Results and Cardiac Rhythm Paced. Shirlene Rodriguez

## 2020-10-21 NOTE — PROGRESS NOTES
Problem: Self Care Deficits Care Plan (Adult) Goal: *Acute Goals and Plan of Care (Insert Text) Description: Occupational Therapy Goals Initiated 10/19/2020 within 7 day(s). 1.  Patient will perform upper body dressing with supervision/set-up. 2.  Patient will perform lower body dressing with minimal assistance/contact guard assist. 
3.  Patient will perform Functional Activities seated EOB with G balance for 5 minutes with modified independence. 4.  Patient will perform toilet transfers with minimal assistance/contact guard assist. 
5.  Patient will perform all aspects of toileting with minimal assistance/contact guard assist. 
 
 
Prior Level of Function: Patient a poor historian this session. Patient reports receiving help with all ADLs at baseline, however, patient unable to report how much assistance she is receiving or how often. Patient reports having personal care aid seven days a week but is unsure of how long they are in her home. Patient reports living with  and states that she is left home alone occasionally. Patient reports using RW during functional mobility. Outcome: Progressing Towards Goal 
 OCCUPATIONAL THERAPY TREATMENT Patient: Gela Dahl (45 y.o. female) Date: 10/21/2020 Diagnosis: Anasarca [R60.1] Acute on chronic diastolic congestive heart failure ([facility] [address] Precautions: Fall, Skin Chart, occupational therapy assessment, plan of care, and goals were reviewed. ASSESSMENT: 
Pt sleeping upon entry. Pt arouses to voice and tactile stimulation. Pt oriented to self only. Pt requires max verbal/tactile cues to attend to task. Pt follows commands for simple ADL grooming task at bed level, however, generalized weakness and fatigue requires hand over hand and Max Assist for thoroughness. Attempted self-feeding ADL, however, pt unable to hold utensil and maintain attention to task.  Pt not safe for feeding at this this 2/2 increase lethargy. No c/o pain. HR 60s Luis Grace, aware. Progression toward goals: 
[]          Improving appropriately and progressing toward goals [x]          Improving slowly and progressing toward goals 
[]          Not making progress toward goals and plan of care will be adjusted PLAN: 
Patient continues to benefit from skilled intervention to address the above impairments. Continue treatment per established plan of care. Discharge Recommendations: To Be Determined Further Equipment Recommendations for Discharge:  TBD SUBJECTIVE:  
Patient stated Ellouise Salines.  OBJECTIVE DATA SUMMARY:  
Cognitive/Behavioral Status: 
Neurologic State: Drowsy Orientation Level: Oriented to person Cognition: Decreased attention/concentration Safety/Judgement: Fall prevention Functional Mobility and Transfers for ADLs: 
ADL Intervention: 
Grooming Position Performed: Other (comment)(supine w/HOB raised) Washing Face: Maximum assistance(w/hand over hand assist) Pain: 
Pain level pre-treatment: 0/10 Pain level post-treatment: 0/10 Activity Tolerance:   
Poor Please refer to the flowsheet for vital signs taken during this treatment. After treatment:  
[]  Patient left in no apparent distress sitting up in chair 
[x]  Patient left in no apparent distress in bed 
[x]  Call bell left within reach 
[]  Nursing notified 
[]  Caregiver present 
[]  Bed alarm activated COMMUNICATION/EDUCATION:  
[] Role of Occupational Therapy in the acute care setting 
[] Home safety education was provided and the patient/caregiver indicated understanding. [] Patient/family have participated as able in working towards goals and plan of care. [] Patient/family agree to work toward stated goals and plan of care. [] Patient understands intent and goals of therapy, but is neutral about his/her participation. [x] Patient is unable to participate in goal setting and plan of care 2/2 confusion. Thank you for this referral. 
KAYLA Reyes Time Calculation: 10 mins

## 2020-10-21 NOTE — PROGRESS NOTES
Problem: Falls - Risk of 
Goal: *Absence of Falls Description: Document Joshua Alan Fall Risk and appropriate interventions in the flowsheet. Outcome: Progressing Towards Goal 
Note: Fall Risk Interventions: 
Mobility Interventions: Bed/chair exit alarm Mentation Interventions: Adequate sleep, hydration, pain control Medication Interventions: Bed/chair exit alarm Elimination Interventions: Bed/chair exit alarm Problem: Patient Education: Go to Patient Education Activity Goal: Patient/Family Education Outcome: Progressing Towards Goal 
  
Problem: Patient Education: Go to Patient Education Activity Goal: Patient/Family Education Outcome: Progressing Towards Goal 
  
Problem: Heart Failure: Day 4 Goal: Off Pathway (Use only if patient is Off Pathway) Outcome: Progressing Towards Goal 
Goal: Activity/Safety Outcome: Progressing Towards Goal 
Goal: Diagnostic Test/Procedures Outcome: Progressing Towards Goal 
Goal: Nutrition/Diet Outcome: Progressing Towards Goal 
Goal: Discharge Planning Outcome: Progressing Towards Goal 
Goal: Medications Outcome: Progressing Towards Goal 
Goal: Respiratory Outcome: Progressing Towards Goal 
Goal: Treatments/Interventions/Procedures Outcome: Progressing Towards Goal 
Goal: Psychosocial 
Outcome: Progressing Towards Goal 
Goal: *Oxygen saturation within defined limits Outcome: Progressing Towards Goal 
Goal: *Hemodynamically stable Outcome: Progressing Towards Goal 
Goal: *Optimal pain control at patient's stated goal 
Outcome: Progressing Towards Goal 
Goal: *Anxiety reduced or absent Outcome: Progressing Towards Goal 
Goal: *Demonstrates progressive activity Outcome: Progressing Towards Goal 
  
Problem: Heart Failure: Day 5 Goal: Off Pathway (Use only if patient is Off Pathway) Outcome: Progressing Towards Goal 
Goal: Activity/Safety Outcome: Progressing Towards Goal 
Goal: Diagnostic Test/Procedures Outcome: Progressing Towards Goal 
 Goal: Nutrition/Diet Outcome: Progressing Towards Goal 
Goal: Discharge Planning Outcome: Progressing Towards Goal 
Goal: Medications Outcome: Progressing Towards Goal 
Goal: Respiratory Outcome: Progressing Towards Goal 
Goal: Treatments/Interventions/Procedures Outcome: Progressing Towards Goal 
Goal: Psychosocial 
Outcome: Progressing Towards Goal 
  
Problem: Heart Failure: Discharge Outcomes Goal: *Demonstrates ability to perform prescribed activity without shortness of breath or discomfort Outcome: Progressing Towards Goal 
Goal: *Left ventricular function assessment completed prior to or during stay, or planned for post-discharge Outcome: Progressing Towards Goal 
Goal: *ACEI prescribed if LVEF less than 40% and no contraindications or ARB prescribed Outcome: Progressing Towards Goal 
Goal: *Verbalizes understanding and describes prescribed diet Outcome: Progressing Towards Goal 
Goal: *Verbalizes understanding/describes prescribed medications Outcome: Progressing Towards Goal 
Goal: *Describes available resources and support systems Description: (eg: Home Health, Palliative Care, Advanced Medical Directive) Outcome: Progressing Towards Goal 
Goal: *Describes smoking cessation resources Outcome: Progressing Towards Goal 
Goal: *Understands and describes signs and symptoms to report to providers(Stroke Metric) Outcome: Progressing Towards Goal 
Goal: *Describes/verbalizes understanding of follow-up/return appt Description: (eg: to physicians, diabetes treatment coordinator, and other resources Outcome: Progressing Towards Goal 
Goal: *Describes importance of continuing daily weights and changes to report to physician Outcome: Progressing Towards Goal 
  
Problem: Impaired Skin Integrity/Pressure Injury Treatment Goal: *Improvement of Existing Pressure Injury Outcome: Progressing Towards Goal 
Goal: *Prevention of pressure injury Description: Document Angel Scale and appropriate interventions in the flowsheet. Outcome: Progressing Towards Goal 
  
Problem: Patient Education: Go to Patient Education Activity Goal: Patient/Family Education Outcome: Progressing Towards Goal 
  
Problem: Patient Education: Go to Patient Education Activity Goal: Patient/Family Education Outcome: Progressing Towards Goal 
  
Problem: Nutrition Deficit Goal: *Optimize nutritional status Outcome: Progressing Towards Goal 
  
Problem: Pressure Injury - Risk of 
Goal: *Prevention of pressure injury Description: Document Angel Scale and appropriate interventions in the flowsheet. Outcome: Progressing Towards Goal 
  
Problem: Patient Education: Go to Patient Education Activity Goal: Patient/Family Education Outcome: Progressing Towards Goal 
  
Problem: Infection - Risk of, Urinary Catheter-Associated Urinary Tract Infection Goal: *Absence of infection signs and symptoms Outcome: Progressing Towards Goal 
  
Problem: Patient Education: Go to Patient Education Activity Goal: Patient/Family Education Outcome: Progressing Towards Goal 
  
Problem: Patient Education: Go to Patient Education Activity Goal: Patient/Family Education Outcome: Progressing Towards Goal

## 2020-10-21 NOTE — PROGRESS NOTES
[facility] 
 
 
CARDIOLOGY PROGRESS NOTE 
RECS: 
 
 
1. Acute on chronic diastolic congestive heart failure-appears compensated now. SOB and BLE improved well. Diuretics on hold. Alkalosis resolved 2. Hypertrophic obstructive cardiomyopathy-S/p alcohol septal ablation 2/19 at [facility] 3. Hx complete heart block - s/p dual chambers permanent pacemaker 2/19 [employer] 4. Hypertension- borderline low BP reduced BB 5. Paroxysmal  Atrial fibrillation-rate controlled. on Eliquis for stroke prevention 6. Aortic stenosis -has become severe now with mean gradient of 43. Was moderate on  echo in 9/19. Will consider referral for  TAVR when stable and AMS improves 7. Pulmonary hypertension-  worsening gradually with pulmonary pressure of 96 now. Echo 9/19 showed PAP 69 mmHg 8. CKD- renal indices stable 9. Hyponatremia and hypochloremia: Secondary to diuresis. improved well. 10. AMS- worsening confusion- Head CT ordered by medical team  
 
CHF is significantly improved. However patient is confused and drowsy. Wonder if it is related to electrolyte changes but any significant other neurological etiology should be ruled out. As discussed with Dr. Geraldine Hoff, consider neurology opinion. Seen ENT note by Dr. Sugar Fontenot. Given severe aortic stenosis, any prolonged GI surgery will be high risk. Consider evaluating the extent of malignancy and if her survival is more than a year, consider getting TAVR first. 
We will follow up. 
  
 
EKG Results Procedure 720 Value Units Date/Time EKG, 12 LEAD, INITIAL [[id number]] Collected:  10/15/20 1547 Order Status:  Completed Updated:  10/16/20 1132 Ventricular Rate 61 BPM   
  Atrial Rate 61 BPM   
  P-R Interval 278 ms QRS Duration 148 ms Q-T Interval 432 ms QTC Calculation (Bezet) 434 ms Calculated P Axis 32 degrees Calculated R Axis 127 degrees Calculated T Axis -22 degrees   Diagnosis --  
 Atrial-paced rhythm with prolonged AV conduction Right bundle branch block T wave abnormality, consider inferior ischemia Abnormal ECG When compared with ECG of 20-SEP-2019 22:01, 
Electronic atrial pacemaker has replaced Atrial fibrillation Confirmed by Lauren Hardy MD, [facility] ([id number]) on 10/16/2020 11:32:17 AM 
  
  
 
XR Results (most recent): 
Results from [place] encounter on 10/15/20 XR CHEST PORT Narrative CHEST PORTABLE 1537 hours COMPARISON: September 20, 2019. INDICATIONS: Chest pain. FINDINGS:  
 
Portable single view chest demonstrates: 
 
Lungs: There is chronic atelectasis in the medial segment of the left lower lobe 
and to a lesser extent the infrahilar region of the right lower lobe. These 
findings are similar to prior studies. Prominent bronchovascular markings are 
evident diffusely. No focal infiltrate is seen. Negra Mcgrath Cardiac Silhouette And Mediastinal Contours: There is marked enlargement of the 
cardiac contours. The pulmonary outflow tract region is particularly prominent. Dual-lead permanent pacemaker appears satisfactory. This is unchanged. Pleural Spaces: No pneumothorax or pleural effusion evident. Bones And Soft Tissues: Unremarkable for age. Impression IMPRESSION: 
 
No acute cardiopulmonary process is evident. No change from prior studies 10/15/20 ECHO ADULT COMPLETE 10/16/2020 10/16/2020 Narrative · LV: Estimated LVEF is 65 - 70%. Visually measured ejection fraction. Normal systolic function (ejection fraction normal). Small left ventricle. Mild concentric hypertrophy. Wall motion: normal. Severe (grade 3) left  
ventricular diastolic dysfunction. · AV: Probably trileaflet aortic valve. Aortic valve mean gradient is 43  
mmHg. Aortic valve area is 0.5 cm2. Severe aortic valve stenosis is  
present. Moderate to severe aortic valve regurgitation is present. · MV: Mitral annular calcification. Mitral valve mean gradient is 5 mmHg. Moderate mitral valve stenosis is present. Mild mitral valve regurgitation  
is present. · PV: Mild to moderate pulmonic valve regurgitation is present. · TV: Moderate tricuspid valve regurgitation is present. · PA: Severe pulmonary hypertension. Pulmonary arterial systolic pressure  
is 96 mmHg. · LA: Severely dilated left atrium. Left Atrium volume index is 67 mL/m2. · RV: Dilated right ventricle. Pacing wire present · RA: Dilated right atrium. · IVC: Severely elevated central venous pressure (15+ mmHg); IVC diameter  
is larger than 21 mm and collapses less than 50% with respiration. Signed by: Claire Alvarez MD  
 
 
  
  
 
 
ASSESSMENT: 
Hospital Problems  Date Reviewed: 10/15/2020 Codes Class Noted POA Severe protein-calorie malnutrition ([facility].) ICD-10-CM: L51 ICD-9-CM: 783  10/21/2020 No  
   
 Anasarca ICD-10-CM: R60.1 ICD-9-CM: 782.3  10/15/2020 Unknown * (Principal) Acute on chronic diastolic congestive heart failure (HCC) ICD-10-CM: I50.33 ICD-9-CM: 428.33, 428.0  12/12/2017 Yes Mitral regurgitation ICD-10-CM: I34.0 ICD-9-CM: 424.0  6/13/2017 Yes Mild HOCM (hypertrophic obstructive cardiomyopathy) (HCC) ICD-10-CM: I42.1 ICD-9-CM: 425.11  1/12/2017 Yes Aortic stenosis ICD-10-CM: I35.0 ICD-9-CM: 424.1  1/12/2017 Yes Pulmonary HTN ([facility] 75.) ICD-10-CM: I27.20 ICD-9-CM: 416.8  12/21/2016 Yes Papillary thyroid carcinoma ([facility].) ICD-10-CM: T40 ICD-9-CM: 628  12/21/2016 Yes Obesity (BMI 30.0-34.9) ICD-10-CM: T63.7 ICD-9-CM: 278.00  10/13/2016 Yes SUBJECTIVE: 
 
Improved edema Very lethargic When she talk to me, she was appropriate and recognized me OBJECTIVE: 
 
VS:  
Visit Vitals /68 (BP 1 Location: Left arm, BP Patient Position: At rest) Pulse 60 Temp 97.6 °F (36.4 °C) Resp 18 Ht 5' 3\" (1.6 m) Wt 58.2 kg (128 lb 4.8 oz) SpO2 96% BMI 22.73 kg/m² Intake/Output Summary (Last 24 hours) at 10/21/2020 1535 Last data filed at 10/21/2020 8899 Gross per 24 hour Intake 1200 ml Output 3825 ml Net -2625 ml TELE: DDD pacing General: very lethargic today. Poor appetite HENT: Normocephalic, atraumatic. Normal external eye. Neck :  bruit present, increased JVP Cardiac:  regular rate and rhythm, S1: normal intensity, S2: decreased intensity, systolic murmur: late systolic 3/6, crescendo at 2nd left intercostal space, at 2nd right intercostal space, radiates to carotids, 2nd systolic murmur: holosystolic 3/6, blowing at 2nd right intercostal space, at lower left sternal border, at apex Chest/Lungs:chest clear, no wheezing, rales, normal symmetric air entry Abdomen: Soft, nontender, no masses Extremities: improved  edema, peripheral pulses not palpable Labs: Results:  
   
Chemistry Recent Labs 10/21/20 
0327 10/20/20 
2113 10/20/20 
1005 10/20/20 
0339  10/19/20 
0321 GLU 97 107* 119* 78  79   < > 75 * 133* 129* 126*  125*   < > 124* K 3.4* 3.7 3.4* 3.4*  3.1*   < > 4.1 CL 95* 93* 88* 85*  85*   < > 83* CO2 32 32 35* 33*  34*   < > 37* BUN 58* 62* 63* 66*  66*   < > 61* CREA 2.01* 2.18* 2.28* 2.26*  2.30*   < > 2.23* CA 9.7 9.3 9.7 9.3  9.4   < > 9.8  9.8 MG  --   --   --  2.4  --  2.0 AGAP 7 8 6 8  6   < > 4  
BUCR 29* 28* 28* 29*  29*   < > 27* *  --   --  166*  --  171* TP 8.1  --   --  6.9  --  7.5 ALB 2.8*  --   --  2.6*  --  2.6*  
GLOB 5.3*  --   --  4.3*  --  4.9* AGRAT 0.5*  --   --  0.6*  --  0.5*  
 < > = values in this interval not displayed. CBC w/Diff Recent Labs 10/20/20 
4264 10/19/20 
0321 WBC 5.2 5.9  
RBC 4.02* 4.24  
HGB 8.7* 9.3* HCT 28.8* 30.4*  232 GRANS 74* 69  
LYMPH 14* 15* EOS 1 2 Cardiac Enzymes No results for input(s): CPK, CKND1, JONI in the last 72 hours. No lab exists for component: Shagufta Pali Coagulation No results for input(s): PTP, INR, APTT, INREXT, INREXT in the last 72 hours. Lipid Panel Lab Results Component Value Date/Time Cholesterol, total 178 10/20/2020 10:05 AM  
 HDL Cholesterol 33 (L) 10/20/2020 10:05 AM  
 LDL, calculated 125.8 (H) 10/20/2020 10:05 AM  
 VLDL, calculated 19.2 10/20/2020 10:05 AM  
 Triglyceride 96 10/20/2020 10:05 AM  
 CHOL/HDL Ratio 5.4 (H) 10/20/2020 10:05 AM  
  
BNP No results for input(s): BNPP in the last 72 hours. Liver Enzymes Recent Labs 10/21/20 
0327  
TP 8.1 ALB 2.8* * Digoxin Thyroid Studies Lab Results Component Value Date/Time TSH 1.98 10/16/2020 04:22 AM  
    
 
 
 
NEO Gillis I have independently evaluated and examined the patient. All relevant labs and testing data are reviewed. Care plan discussed and updated after review.  
Eugenia Garcia MD

## 2020-10-21 NOTE — PROGRESS NOTES
RENAL DAILY PROGRESS NOTE Patient: Christen Castellon               Sex: female          DOA: 10/15/2020  3:49 PM  
    
YOB: 1940      Age:  68 y.o.        LOS:  LOS: 6 days Subjective:  
 
Christen Castellon is a 68 y.o.  who presents with Anasarca [R60.1]. Asked to evaluate for hyponatremia,and renal failure,hx of diastolic heart failure,aortic stenosis,pulm htn,anasarca,was treated with bumex,metolazone Chief complains: Patient denies nausea, vomiting, diarrhea - Reviewed last 24 hrs events Current Facility-Administered Medications Medication Dose Route Frequency  ergocalciferol capsule 50,000 Units  50,000 Units Oral Q7D  
 [START ON 10/22/2020] therapeutic multivitamin (THERAGRAN) tablet 1 Tab  1 Tab Oral DAILY  [START ON 10/22/2020] thiamine HCL (B-1) tablet 100 mg  100 mg Oral DAILY  apixaban (ELIQUIS) tablet 2.5 mg  2.5 mg Oral BID  metoprolol tartrate (LOPRESSOR) tablet 12.5 mg  12.5 mg Oral BID  hydrALAZINE (APRESOLINE) tablet 25 mg  25 mg Oral TID PRN  
 ferrous sulfate tablet 325 mg  1 Tab Oral DAILY WITH BREAKFAST  famotidine (PEPCID) tablet 20 mg  20 mg Oral DAILY  folic acid (FOLVITE) tablet 1 mg  1 mg Oral DAILY  montelukast (SINGULAIR) tablet 10 mg  10 mg Oral DAILY  ondansetron hcl (ZOFRAN) tablet 4 mg  4 mg Oral Q8H PRN  
 atorvastatin (LIPITOR) tablet 40 mg  40 mg Oral QHS  traMADoL (ULTRAM) tablet 50 mg  50 mg Oral Q6H PRN Objective:  
 
Visit Vitals /68 (BP 1 Location: Left arm, BP Patient Position: At rest) Pulse 60 Temp 97.6 °F (36.4 °C) Resp 18 Ht 5' 3\" (1.6 m) Wt 58.2 kg (128 lb 4.8 oz) SpO2 96% BMI 22.73 kg/m² Intake/Output Summary (Last 24 hours) at 10/21/2020 1559 Last data filed at 10/21/2020 1831 Gross per 24 hour Intake 1100 ml Output 3225 ml Net -2125 ml Physical Examination:  
 
 
RS: Chest is bilateral equal, CVS: S1-S2 heard Abdomen: Soft, Non tender, Not distended, Positive bowel sounds, no organomegaly, no CVA / supra pubic tenderness Extremities: mild edema,  
CNS: Awake HEENT: Head is atraumatic, PERRLA, conjunctiva pink & non icteric. No JVD or carotid bruit Data Review:   
 
Labs:  
 
Hematology:  
Recent Labs 10/20/20 
5800 10/19/20 
0321 WBC 5.2 5.9 HGB 8.7* 9.3* HCT 28.8* 30.4* Chemistry:  
Recent Labs 10/21/20 
0327 10/20/20 
2113 10/20/20 
1005 10/20/20 
1072 10/19/20 
2101 10/19/20 
1515 10/19/20 
7269 10/19/20 
0321 10/19/20 
0037 10/18/20 
1815 BUN 58* 62* 63* 66*  66* 66* 62* 60* 61*  --   --   
CREA 2.01* 2.18* 2.28* 2.26*  2.30* 2.44* 2.42* 2.31* 2.23*  --   --   
CA 9.7 9.3 9.7 9.3  9.4 9.3 9.4 10.0 9.8  9.8  --   --   
ALB 2.8*  --   --  2.6*  --   --   --  2.6*  --   --   
K 3.4* 3.7 3.4* 3.4*  3.1* 4.3 4.5 4.0 4.1  --   --   
* 133* 129* 126*  125* 125* 126* 125* 124* 125* 124* CL 95* 93* 88* 85*  85* 84* 83* 83* 83*  --   --   
CO2 32 32 35* 33*  34* 35* 36* 36* 37*  --   --   
GLU 97 107* 119* 78  79 98 102* 106* 75  --   --   
  
 
Images: 
 
XR (Most Recent). CXR reviewed by me and compared with previous [place] [facility] [place] XR CHEST PORT Narrative CHEST PORTABLE 1537 hours COMPARISON: September 20, 2019. INDICATIONS: Chest pain. FINDINGS:  
 
Portable single view chest demonstrates: 
 
Lungs: There is chronic atelectasis in the medial segment of the left lower lobe 
and to a lesser extent the infrahilar region of the right lower lobe. These 
findings are similar to prior studies. Prominent bronchovascular markings are 
evident diffusely. No focal infiltrate is seen. Darcy Perez Cardiac Silhouette And Mediastinal Contours: There is marked enlargement of the 
cardiac contours. The pulmonary outflow tract region is particularly prominent. Dual-lead permanent pacemaker appears satisfactory. This is unchanged. Pleural Spaces: No pneumothorax or pleural effusion evident. Bones And Soft Tissues: Unremarkable for age. Impression IMPRESSION: 
 
No acute cardiopulmonary process is evident. No change from prior studies CT (Most Recent) Results from [facility] Encounter encounter on 10/15/20 CT HEAD WO CONT Narrative CT of the head without contrast 
 
HISTORY: Altered mental status COMPARISON: 12/1/12 TECHNIQUE: Helical axial scan to the head was performed from the skull base to 
the vertex without IV contrast administration. All CT scans at this facility performed using dose optimization techniques as 
appreciated to a performed exam, to include automated exposure control, 
adjustment of the mA and or KU according to patient size (including appropriate 
matching for site specific examination), or use of iterative reconstruction 
technique. FINDINGS: 
 
There is moderate global atrophy which shows interval progression since the 
prior study. Further ventricular dilatation is also seen . There is normal 
gray-white matter differentiation. Mild periventricular white matter 
hypodensities also present. There is no evidence of acute intracranial 
hemorrhage,  mass effect or midline shift identified. No skull fracture or extra 
axial fluid collections identified. Visualized sinuses and mastoid air cells 
appear unremarkable. Impression IMPRESSION: 
 
No acute intracranial abnormality. Note: If clinical concern of acute stroke, MRI with diffusion weighted images is 
recommended for better evaluation. Progression of cerebral atrophy and ventricular dilatation as well as 
microvascular disease. Thank you for your referral.  
  
 
EKG No results found for this or any previous visit. I have personally reviewed the old medical records and patient's labs Plan / Recommendation: 1.  Acute/crf stage 4,tr proteinuria,probable cardiorenal,check spot urine for protein/creatinine,spep. improving 2.acute/chronic hyponatremia ,related to thiazides,normal serum osmolarity raises the possibility of pseudohyponatremia related to elevated globulins,continue fluid restrictions. improving 3.sec hyperparathyroidism,ask dr Dudley Hill to check for enlarged parathyroid glands/adenomas during thyroid surgery 4.hypokalemia,metabolic alkalosis related to diuretics,replace potassium D/w Dr.gupta Juliane Carmona MD 
Nephrology 10/21/2020

## 2020-10-21 NOTE — PROGRESS NOTES
Progress Note Patient: Bhupinder Saunders MRN: [id number]  CSN: [id number] YOB: 1940  Age: 68 y.o. Sex: female DOA: 10/15/2020 LOS:  LOS: 6 days Subjective: Pt was sleeping and did not open her eyes when we came into the room. She was unable to correctly explain where she is or what year it is. Patient responds to voice and repeats her answers. She stated that she is very drowsy. Discussed this with nursing staff and they stated she has been confused and very sleepy. She has a history of isotonic hyponatremia and is at 134 today slowly going up. She is being followed closely by Nephrology. She has a history of Thyroid carcinoma and ENT consulted with her 10/19 but she wasn't alert and was confused. CT chest 
1. Bilateral hazy groundglass pulmonary opacities with relative sparing of the 
periphery suggestive of pulmonary edema. Very small bilateral pleural effusions. Moderate to marked cardiomegaly. 2.  Similar appearance of enlarged pulmonary artery suggestive of pulmonaryartery hypertension. 3.  Very minimal increased size of medial left upper lobe pulmonary nodule since 2016. Today measuring 6 x 5 mm, previously measuring 5 x 3 mm. Finding is likely 
benign. However given slight change over time suggest follow-up CT in one year. 4.  Extensive atherosclerotic vascular calcification Echo · LV: Estimated LVEF is 65 - 70%. Visually measured ejection fraction. Normal systolic function (ejection fraction normal). Small left ventricle. Mild concentric hypertrophy. Wall motion: normal. Severe (grade 3) left ventricular diastolic dysfunction. · AV: Probably trileaflet aortic valve. Aortic valve mean gradient is 43 mmHg. Aortic valve area is 0.5 cm2. Severe aortic valve stenosis is present. Moderate to severe aortic valve regurgitation is present. · MV: Mitral annular calcification. Mitral valve mean gradient is 5 mmHg. Moderate mitral valve stenosis is present. Mild mitral valve regurgitation is present. · PV: Mild to moderate pulmonic valve regurgitation is present. · TV: Moderate tricuspid valve regurgitation is present. · PA: Severe pulmonary hypertension. Pulmonary arterial systolic pressure is 96 mmHg. · LA: Severely dilated left atrium. Left Atrium volume index is 67 mL/m2. · RV: Dilated right ventricle. Pacing wire present · RA: Dilated right atrium. · IVC: Severely elevated central venous pressure (15+ mmHg); IVC diameter is larger than 21 mm and collapses less than 50% with respiration. Thyroid ultrasound 1. Large irregular mixed echogenicity right thyroid nodule measuring 2.8 cm, 
with multiple punctate echogenic foci, previously biopsied with results 
concerning for papillary thyroid carcinoma. Recommend referral to ENT. 
  
  
Chief Complaint:  
Chief Complaint Patient presents with  Peripheral Edema Review of systems General: No fevers or chills. sleepy today Cardiovascular: No chest pain or pressure. Pulmonary: No shortness of breath, cough or wheeze. Gastrointestinal: No abdominal pain, nausea, vomiting or diarrhea. Genitourinary: No urinary frequency, urgency, hesitancy or dysuria. Musculoskeletal: generalized weakness Neurologic: No headache, generalized weakness Objective:  
 
Physical Exam: 
Visit Vitals BP (!) 135/55 (BP 1 Location: Left arm) Pulse 77 Temp 97.8 °F (36.6 °C) Resp 19 Ht 5' 3\" (1.6 m) Wt 59.2 kg (130 lb 9.6 oz) SpO2 97% BMI 23.13 kg/m² General:         no acute distress, confused, very sleepy HEENT: NC, Atraumatic.  anicteric sclerae. Lungs: CTA Bilaterally. No Wheezing/Rhonchi/Rales. Heart:  Regular  rhythm, systolic Murmurs Abdomen: Soft, Non distended, Non tender. Extremities: Wound Rt leg clean dressing in place wearing Tubigrip, slow moving and appears weak Psych:   Not anxious or agitated. Neurologic:  CN 2-12 grossly intact,confused and not oriented to date or location Intake and Output: 
Current Shift:  10/21 0701 - 10/21 1900 In: 0 Out: 700 [Urine:700] Last three shifts:  10/19 1901 - 10/21 0700 In: 1300 [P.O.:1300] Out: 4850 [[id number]] Labs: Results:  
   
Chemistry Recent Labs 10/21/20 
0327 10/20/20 
2113 10/20/20 
1005 10/20/20 
0339  10/19/20 
0321 GLU 97 107* 119* 78  79   < > 75 * 133* 129* 126*  125*   < > 124* K 3.4* 3.7 3.4* 3.4*  3.1*   < > 4.1 CL 95* 93* 88* 85*  85*   < > 83* CO2 32 32 35* 33*  34*   < > 37* BUN 58* 62* 63* 66*  66*   < > 61* CREA 2.01* 2.18* 2.28* 2.26*  2.30*   < > 2.23* CA 9.7 9.3 9.7 9.3  9.4   < > 9.8  9.8 AGAP 7 8 6 8  6   < > 4  
BUCR 29* 28* 28* 29*  29*   < > 27* *  --   --  166*  --  171* TP 8.1  --   --  6.9  --  7.5 ALB 2.8*  --   --  2.6*  --  2.6*  
GLOB 5.3*  --   --  4.3*  --  4.9* AGRAT 0.5*  --   --  0.6*  --  0.5*  
 < > = values in this interval not displayed. CBC w/Diff Recent Labs 10/20/20 
4466 10/19/20 
0321 WBC 5.2 5.9  
RBC 4.02* 4.24  
HGB 8.7* 9.3* HCT 28.8* 30.4*  232 GRANS 74* 69  
LYMPH 14* 15* EOS 1 2 Cardiac Enzymes No results for input(s): CPK, CKND1, JONI in the last 72 hours. No lab exists for component: Sae[place] Ko Coagulation No results for input(s): PTP, INR, APTT, INREXT, INREXT in the last 72 hours. Lipid Panel Lab Results Component Value Date/Time Cholesterol, total 178 10/20/2020 10:05 AM  
 HDL Cholesterol 33 (L) 10/20/2020 10:05 AM  
 LDL, calculated 125.8 (H) 10/20/2020 10:05 AM  
 VLDL, calculated 19.2 10/20/2020 10:05 AM  
 Triglyceride 96 10/20/2020 10:05 AM  
 CHOL/HDL Ratio 5.4 (H) 10/20/2020 10:05 AM  
  
BNP No results for input(s): BNPP in the last 72 hours. Liver Enzymes Recent Labs 10/21/20 
0327  
TP 8.1 ALB 2.8* * Thyroid Studies Lab Results Component Value Date/Time TSH 1.98 10/16/2020 04:22 AM  
    
 
Procedures/imaging: see electronic medical records for all procedures/Xrays and details which were not copied into this note but were reviewed prior to creation of Plan Medications:  
Current Facility-Administered Medications Medication Dose Route Frequency  ergocalciferol capsule 50,000 Units  50,000 Units Oral Q7D  
 potassium chloride (K-DUR, KLOR-CON) SR tablet 20 mEq  20 mEq Oral NOW  
 apixaban (ELIQUIS) tablet 2.5 mg  2.5 mg Oral BID  metoprolol tartrate (LOPRESSOR) tablet 12.5 mg  12.5 mg Oral BID  hydrALAZINE (APRESOLINE) tablet 25 mg  25 mg Oral TID PRN  
 ferrous sulfate tablet 325 mg  1 Tab Oral DAILY WITH BREAKFAST  famotidine (PEPCID) tablet 20 mg  20 mg Oral DAILY  folic acid (FOLVITE) tablet 1 mg  1 mg Oral DAILY  montelukast (SINGULAIR) tablet 10 mg  10 mg Oral DAILY  ondansetron hcl (ZOFRAN) tablet 4 mg  4 mg Oral Q8H PRN  
 atorvastatin (LIPITOR) tablet 40 mg  40 mg Oral QHS  traMADoL (ULTRAM) tablet 50 mg  50 mg Oral Q6H PRN Assessment/Plan Principal Problem: 
  Acute on chronic diastolic congestive heart failure ([facility] (12/12/2017) Active Problems: 
  Obesity (BMI 30.0-34.9) (10/13/2016) Pulmonary HTN ([facility] (12/21/2016) Papillary thyroid carcinoma ([facility] (12/21/2016) Mild HOCM (hypertrophic obstructive cardiomyopathy) ([facility] (1/12/2017) Aortic stenosis (1/12/2017) Mitral regurgitation (6/13/2017) Anasarca (10/15/2020) Plan: 
  
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 
Continue limiting fluid intake 1500 cc 
F/u SPEP Mental status changes/ confusion Increase confusion from her base line since admission Urine analysis clean catch CXR Ct scan head no contrast 
discussed with Dr Cobos Body hear failure and sodium improving shouldn't have effect of mental status at this time Will get neurology consult discussed with Dr Sugar Fontenot Nephrology consult 1. Acute/crf stage 4,tr proteinuria,probable cardiorenal,check spot urine for protein/creatinine,spep, 2.acute/chronic hyponatremia ,related to diuretics ,normal serum osmolarity raises the possibility of pseudohyponatremia related to elevated globulins,continue fluid restrictions 3.sec hyperparathyroidism 4.hypokalemia,metabolic alkalosis related to diuretics,replace Drowsiness and weakness 
-hold Gabapentin in hopes of decreasing any sedative effect Hypokalemia: 
-today at 3.4 Will give oral potassium 20 praveen x 1  
-replete prn per nephrology Acute systolic congestive heart failure on top of chronic diastolic congestive heart failure D/C  Bumex 0.5 mg/h Follow electrolytes BMP every 6 h for 48 h Check cardiac enzyme every 8 troponin I slightly up but stable Cardiology Consult and echo Patient following up cardiology per cardiology note acute on chronic diastolic congestive heart failure. Compensated now shortness of breath bilateral lower extremity edema improving diuretic on hold Kalosis is improving Artery stenosis becomes severe mean gradient 43 
echo September 19 was moderate aortic stenosis. Patient may go for cardiac cath as Tuesday 3 days referral for TAVR after cast 
We will discuss with cardiology possibility of worsening kidney kidney function was a cardiac cath Mitral regurg mild to moderate Paroxysmal A. fib Decrease  Eliquis 2.5 mg twice daily per pharmacy due to renal function Follow-up CBC 
  
Hypertension 
metoprolol  To 25 mg twice daily Monitor blood pressure Discussed with cardiology will use hydralazine prn  
Continue home  Cozaar 100 mg 
  
Hyperlipidemia Continue Zocor 80 mg nightly Follow-up liver function 
  
History of Anemia with low iron Obtain iron profile, Z32, and folic acid 
  
History of thyroid cancer 
-Pt seen by ENT who recommended Thyroidectomy when she is able to tolerate surgery 
  
Venous stasis ulcer Wound care Pt was seen by vascular as outpatinet Continue wound care and f/u vascular surgery consult Cbc, cmp, mag Wound care F/u neurology Discussed with Dr Harlen Oppenheim and neurology dr Anila Judge Michel Call 
10/21/2020 9:51AM 
 
The patient was seen and examined independently discussed with student in details , I agree with the student note. With the above changes Anais Walters MD 
10/21/2020

## 2020-10-22 ENCOUNTER — APPOINTMENT (OUTPATIENT)
Dept: GENERAL RADIOLOGY | Age: 80
DRG: 291 | End: 2020-10-22
Attending: STUDENT IN AN ORGANIZED HEALTH CARE EDUCATION/TRAINING PROGRAM
Payer: MEDICARE

## 2020-10-22 ENCOUNTER — APPOINTMENT (OUTPATIENT)
Dept: VASCULAR SURGERY | Age: 80
DRG: 291 | End: 2020-10-22
Attending: STUDENT IN AN ORGANIZED HEALTH CARE EDUCATION/TRAINING PROGRAM
Payer: MEDICARE

## 2020-10-22 LAB
ALBUMIN SERPL ELPH-MCNC: 3 G/DL (ref 2.9–4.4)
ALBUMIN SERPL-MCNC: 2.8 G/DL (ref 3.4–5)
ALBUMIN/GLOB SERPL: 0.5 {RATIO} (ref 0.8–1.7)
ALBUMIN/GLOB SERPL: 0.8 {RATIO} (ref 0.7–1.7)
ALP SERPL-CCNC: 160 U/L (ref 45–117)
ALPHA1 GLOB SERPL ELPH-MCNC: 0.2 G/DL (ref 0–0.4)
ALPHA2 GLOB SERPL ELPH-MCNC: 0.9 G/DL (ref 0.4–1)
ALT SERPL-CCNC: 11 U/L (ref 13–56)
ANION GAP SERPL CALC-SCNC: 7 MMOL/L (ref 3–18)
ANION GAP SERPL CALC-SCNC: 7 MMOL/L (ref 3–18)
APO A-I SERPL-MCNC: 88 MG/DL (ref 116–209)
AST SERPL-CCNC: 12 U/L (ref 10–38)
B-GLOBULIN SERPL ELPH-MCNC: 0.8 G/DL (ref 0.7–1.3)
BASOPHILS # BLD: 0 K/UL (ref 0–0.1)
BASOPHILS NFR BLD: 0 % (ref 0–2)
BILIRUB SERPL-MCNC: 0.9 MG/DL (ref 0.2–1)
BUN SERPL-MCNC: 50 MG/DL (ref 7–18)
BUN SERPL-MCNC: 51 MG/DL (ref 7–18)
BUN/CREAT SERPL: 28 (ref 12–20)
BUN/CREAT SERPL: 28 (ref 12–20)
CALCIUM SERPL-MCNC: 10 MG/DL (ref 8.5–10.1)
CALCIUM SERPL-MCNC: 10 MG/DL (ref 8.5–10.1)
CHLORIDE SERPL-SCNC: 101 MMOL/L (ref 100–111)
CHLORIDE SERPL-SCNC: 98 MMOL/L (ref 100–111)
CO2 SERPL-SCNC: 29 MMOL/L (ref 21–32)
CO2 SERPL-SCNC: 30 MMOL/L (ref 21–32)
CREAT SERPL-MCNC: 1.79 MG/DL (ref 0.6–1.3)
CREAT SERPL-MCNC: 1.8 MG/DL (ref 0.6–1.3)
DIFFERENTIAL METHOD BLD: ABNORMAL
EOSINOPHIL # BLD: 0 K/UL (ref 0–0.4)
EOSINOPHIL NFR BLD: 1 % (ref 0–5)
ERYTHROCYTE [DISTWIDTH] IN BLOOD BY AUTOMATED COUNT: 19.2 % (ref 11.6–14.5)
FOLATE SERPL-MCNC: >20 NG/ML (ref 3.1–17.5)
GAMMA GLOB SERPL ELPH-MCNC: 2 G/DL (ref 0.4–1.8)
GLOBULIN SER CALC-MCNC: 5.4 G/DL (ref 2–4)
GLOBULIN SER-MCNC: 4 G/DL (ref 2.2–3.9)
GLUCOSE SERPL-MCNC: 122 MG/DL (ref 74–99)
GLUCOSE SERPL-MCNC: 91 MG/DL (ref 74–99)
HCT VFR BLD AUTO: 31 % (ref 35–45)
HGB BLD-MCNC: 9.4 G/DL (ref 12–16)
IGA SERPL-MCNC: 190 MG/DL (ref 64–422)
IGG SERPL-MCNC: 2476 MG/DL (ref 586–1602)
IGM SERPL-MCNC: 60 MG/DL (ref 26–217)
INTERPRETATION SERPL IEP-IMP: ABNORMAL
LYMPHOCYTES # BLD: 0.7 K/UL (ref 0.9–3.6)
LYMPHOCYTES NFR BLD: 11 % (ref 21–52)
M PROTEIN SERPL ELPH-MCNC: 1.2 G/DL
MAGNESIUM SERPL-MCNC: 2.7 MG/DL (ref 1.6–2.6)
MCH RBC QN AUTO: 22.2 PG (ref 24–34)
MCHC RBC AUTO-ENTMCNC: 30.3 G/DL (ref 31–37)
MCV RBC AUTO: 73.3 FL (ref 74–97)
MONOCYTES # BLD: 0.8 K/UL (ref 0.05–1.2)
MONOCYTES NFR BLD: 12 % (ref 3–10)
NEUTS SEG # BLD: 5 K/UL (ref 1.8–8)
NEUTS SEG NFR BLD: 76 % (ref 40–73)
PHOSPHATE SERPL-MCNC: 3.4 MG/DL (ref 2.5–4.9)
PLATELET # BLD AUTO: 244 K/UL (ref 135–420)
PMV BLD AUTO: 10 FL (ref 9.2–11.8)
POTASSIUM SERPL-SCNC: 3.1 MMOL/L (ref 3.5–5.5)
POTASSIUM SERPL-SCNC: 3.5 MMOL/L (ref 3.5–5.5)
PROCALCITONIN SERPL-MCNC: 0.35 NG/ML
PROT SERPL-MCNC: 7 G/DL (ref 6–8.5)
PROT SERPL-MCNC: 8.2 G/DL (ref 6.4–8.2)
RBC # BLD AUTO: 4.23 M/UL (ref 4.2–5.3)
SODIUM SERPL-SCNC: 134 MMOL/L (ref 136–145)
SODIUM SERPL-SCNC: 138 MMOL/L (ref 136–145)
VIT B12 SERPL-MCNC: 804 PG/ML (ref 211–911)
WBC # BLD AUTO: 6.5 K/UL (ref 4.6–13.2)

## 2020-10-22 PROCEDURE — 73562 X-RAY EXAM OF KNEE 3: CPT

## 2020-10-22 PROCEDURE — 93970 EXTREMITY STUDY: CPT

## 2020-10-22 PROCEDURE — 36415 COLL VENOUS BLD VENIPUNCTURE: CPT

## 2020-10-22 PROCEDURE — 74011250637 HC RX REV CODE- 250/637: Performed by: STUDENT IN AN ORGANIZED HEALTH CARE EDUCATION/TRAINING PROGRAM

## 2020-10-22 PROCEDURE — 87186 SC STD MICRODIL/AGAR DIL: CPT

## 2020-10-22 PROCEDURE — 2709999900 HC NON-CHARGEABLE SUPPLY

## 2020-10-22 PROCEDURE — 87086 URINE CULTURE/COLONY COUNT: CPT

## 2020-10-22 PROCEDURE — 74011250637 HC RX REV CODE- 250/637: Performed by: FAMILY MEDICINE

## 2020-10-22 PROCEDURE — 84100 ASSAY OF PHOSPHORUS: CPT

## 2020-10-22 PROCEDURE — 74011250637 HC RX REV CODE- 250/637: Performed by: INTERNAL MEDICINE

## 2020-10-22 PROCEDURE — 97164 PT RE-EVAL EST PLAN CARE: CPT

## 2020-10-22 PROCEDURE — 97530 THERAPEUTIC ACTIVITIES: CPT

## 2020-10-22 PROCEDURE — 87635 SARS-COV-2 COVID-19 AMP PRB: CPT

## 2020-10-22 PROCEDURE — 74011250637 HC RX REV CODE- 250/637: Performed by: NURSE PRACTITIONER

## 2020-10-22 PROCEDURE — 87077 CULTURE AEROBIC IDENTIFY: CPT

## 2020-10-22 PROCEDURE — 65270000029 HC RM PRIVATE

## 2020-10-22 PROCEDURE — 80053 COMPREHEN METABOLIC PANEL: CPT

## 2020-10-22 PROCEDURE — 83735 ASSAY OF MAGNESIUM: CPT

## 2020-10-22 PROCEDURE — 99232 SBSQ HOSP IP/OBS MODERATE 35: CPT | Performed by: INTERNAL MEDICINE

## 2020-10-22 PROCEDURE — 85025 COMPLETE CBC W/AUTO DIFF WBC: CPT

## 2020-10-22 PROCEDURE — 82607 VITAMIN B-12: CPT

## 2020-10-22 PROCEDURE — 84145 PROCALCITONIN (PCT): CPT

## 2020-10-22 RX ORDER — ACETAMINOPHEN 325 MG/1
650 TABLET ORAL
Status: DISCONTINUED | OUTPATIENT
Start: 2020-10-22 | End: 2020-10-30 | Stop reason: HOSPADM

## 2020-10-22 RX ADMIN — POTASSIUM BICARBONATE 20 MEQ: 782 TABLET, EFFERVESCENT ORAL at 11:26

## 2020-10-22 RX ADMIN — APIXABAN 2.5 MG: 2.5 TABLET, FILM COATED ORAL at 11:27

## 2020-10-22 RX ADMIN — MONTELUKAST 10 MG: 10 TABLET, FILM COATED ORAL at 11:27

## 2020-10-22 RX ADMIN — APIXABAN 2.5 MG: 2.5 TABLET, FILM COATED ORAL at 18:44

## 2020-10-22 RX ADMIN — METOPROLOL TARTRATE 12.5 MG: 25 TABLET, FILM COATED ORAL at 18:44

## 2020-10-22 RX ADMIN — POTASSIUM BICARBONATE 20 MEQ: 782 TABLET, EFFERVESCENT ORAL at 19:00

## 2020-10-22 RX ADMIN — FAMOTIDINE 20 MG: 20 TABLET ORAL at 11:26

## 2020-10-22 RX ADMIN — FOLIC ACID 1 MG: 1 TABLET ORAL at 11:27

## 2020-10-22 RX ADMIN — THERA TABS 1 TABLET: TAB at 11:27

## 2020-10-22 RX ADMIN — METOPROLOL TARTRATE 12.5 MG: 25 TABLET, FILM COATED ORAL at 11:27

## 2020-10-22 RX ADMIN — POTASSIUM BICARBONATE 20 MEQ: 782 TABLET, EFFERVESCENT ORAL at 16:05

## 2020-10-22 RX ADMIN — FERROUS SULFATE TAB 325 MG (65 MG ELEMENTAL FE) 325 MG: 325 (65 FE) TAB at 11:27

## 2020-10-22 RX ADMIN — ATORVASTATIN CALCIUM 40 MG: 40 TABLET, FILM COATED ORAL at 22:07

## 2020-10-22 RX ADMIN — Medication 100 MG: at 11:27

## 2020-10-22 NOTE — CONSULTS
NEUROLOGY CONSULT NOTE Patient ID: 
Christen Castellon [id number] 
84 y.o. 
1940 Date of Consultation:  October 21, 2020 Referring Physician: Baron Samson MD 
 
Reason for Consultation: AMS Subjective:  
 
 
History of Present Illness:  
 
Ms Christen Castellon is a 68 y.o.  woman  who has history of congestive heart failure, chronic atrial fibrillation on eliquis, aortic stenosis,  pulmonary hypertension,  venous stasis,  thyroid cancer hypertension,  Hyperlipidemia,was admitted on 15 Oct 2020 with worsening CHF. After admition she developed hyponatremia suspected secondary to diuresis. Cardiology team is following and the aortic stenosis is severe, recommending TAVR. I was asked to evaluate the patient for altered mental status. She is in the hospital bed, appears comfortable, states that she is doing ok, not in pain, has no complaints. Patient Active Problem List  
 Diagnosis Date Noted  Severe protein-calorie malnutrition ([address] 10/21/2020  Anasarca 10/15/2020  CHF (congestive heart failure) ([address] 09/20/2019  UTI (urinary tract infection) 09/20/2019  Acute on chronic diastolic congestive heart failure ([address] 12/12/2017  Aortic regurgitation 06/13/2017  Mitral regurgitation 06/13/2017  
 SOB (shortness of breath) 02/13/2017  Mild HOCM (hypertrophic obstructive cardiomyopathy) ([address] 01/12/2017  Aortic stenosis 01/12/2017  Pulmonary HTN ([address] 12/21/2016  Papillary thyroid carcinoma ([address] 12/21/2016  Lung nodule 11/04/2016  Obesity (BMI 30.0-34.9) 10/13/2016  Pain and swelling of lower leg 06/29/2015 Past Medical History:  
Diagnosis Date  Arthritis  Atrial fibrillation ([address]  CHF (congestive heart failure) ([address]  Heart murmur  History of seasonal allergies  HTN (hypertension)  Hypercholesteremia  Moderate aortic stenosis  Pulmonary hypertension ([address]  Venous insufficiency Past Surgical History:  
Procedure Laterality Date  HX KNEE ARTHROSCOPY    
 left and right  HX ORTHOPAEDIC    
 right ankle Prior to Admission medications Medication Sig Start Date End Date Taking? Authorizing Provider  
apixaban (Eliquis) 5 mg tablet Take 1 Tab by mouth two (2) times a day. 6/12/20  Yes Kailey Duval NP Calcium-Cholecalciferol, D3, (CALCIUM 600 WITH VITAMIN D3) 600 mg(1,500mg) -400 unit chew Take 1 Tab by mouth daily. Yes Provider, Historical  
Digitek 125 mcg (0.125 mg) tablet take 1 tablet by mouth once daily 10/19/20   Becca Sanchez MD  
furosemide (LASIX) 40 mg tablet take 1 tablet by mouth every morning and 1/2 tablet AT 2 P.M. Patient taking differently: i tab every other day 8/17/20   Craig Mohs, NP  
metoprolol tartrate (LOPRESSOR) 100 mg IR tablet take 1 tablet by mouth twice a day 4/22/20   Becca Sanchez MD  
traMADol [place]) 50 mg tablet Take 1 Tab by mouth every six (6) hours as needed for Pain. 12/11/18   Provider, Historical  
folic acid 685 mcg tablet Take 400 mcg by mouth daily. Provider, Historical  
cyanocobalamin, vitamin B-12, (VITAMIN B12 PO) Take 1,000 mcg by mouth daily. Provider, Historical  
famotidine (PEPCID) 20 mg tablet Take 1 Tab by mouth daily. 9/26/19   Mary Jules MD  
gabapentin (NEURONTIN) 100 mg capsule Take 1 Cap by mouth nightly. Max Daily Amount: 100 mg. 9/25/19   Mary Jules MD  
lactobacillus sp. 50 billion cpu (BIO-K PLUS) 50 billion cell -375 mg cap capsule Take 1 Cap by mouth daily. 9/26/19   Mary Jules MD  
ondansetron hcl (ZOFRAN) 4 mg tablet Take 1 Tab by mouth every eight (8) hours as needed for Nausea. 5/16/19   Albaro Ortega PA-C  
simvastatin (ZOCOR) 80 mg tablet TAKE 1 TABLET NIGHTLY 9/26/18   Becca Sanchez MD  
cholecalciferol (VITAMIN D3) 1,000 unit cap Take  by mouth daily.     Other, MD Chinyere  
 losartan (COZAAR) 100 mg tablet Take 100 mg by mouth daily. Provider, Historical  
montelukast (SINGULAIR) 10 mg tablet Take 10 mg by mouth daily. Provider, Historical  
CETIRIZINE HCL (ZYRTEC PO) Take  by mouth. Other, MD Chinyere  
 
No Known Allergies Social History Tobacco Use  Smoking status: Never Smoker  Smokeless tobacco: Never Used Substance Use Topics  Alcohol use: No  
  
Family History Problem Relation Age of Onset  Cancer Other  Heart Disease Other  Cancer Mother  Heart Disease Mother Review of Systems Pertinent items are noted in HPI. limited ROS pt fatigues easily and difficulty maintaining attention Objective:  
 
Patient Vitals for the past 8 hrs: 
 BP Temp Pulse Resp SpO2 Height 10/21/20 2034 (!) 115/48 98.1 °F (36.7 °C) 64 17 94 %   
10/21/20 1510 125/68 97.6 °F (36.4 °C) 60 18 96 %   
10/21/20 1500      5' 3\" (1.6 m) General Exam 
No acute distress, oral mucosa is dry, tongue is dark red,dry HEENT: Normocephalic, atraumatic, Sclera anicteric, normal conjunctiva Mucous membranes: normal color and hydration CV: No carotid bruits, Heart: irregular rte and rhythm. RESP:  CTAB Neurologic Exam: MENTAL STATUS:    The patient is somnolent but wakes up easy to voice. She required multiple, constant redirection during the exam. Speech is soft but clear. She follows commands. She states her name, but doesn't remember her children names, she didn't know the sates and after more questions she fatigues and not able to concentrate further. CRANIAL NERVES:   II Pupils are midline, symmetric, both reactive to light and accommodation. III, IV, VI  Extraocular movements are intact and there is no nystagmus. V  Facial sensation is intact to pinprick and light touch. VII  Face is symmetrical.  
VIII - Hearing is present. IX, X, [address] rises symmetrically. Gag is present. Tongue is in the midline. XI - Shoulder shrugging and head turning intact MOTOR:          Tone is normal. Moves both upper extremities symmetric. She wiggle toes bilat lower extremities and has significant tenderness to palpation bilat. No involuntary movements SENSATION:  Sensory examination grossly intact REFLEXES: hyporeflexia pattern COORDINATION: not performed due to pt condition GAIT: Gait is not tested at this time. Data Review:   
Recent Results (from the past 24 hour(s)) METABOLIC PANEL, BASIC Collection Time: 10/20/20  9:13 PM  
Result Value Ref Range Sodium 133 (L) 136 - 145 mmol/L Potassium 3.7 3.5 - 5.5 mmol/L Chloride 93 (L) 100 - 111 mmol/L  
 CO2 32 21 - 32 mmol/L Anion gap 8 3.0 - 18 mmol/L Glucose 107 (H) 74 - 99 mg/dL BUN 62 (H) 7.0 - 18 MG/DL Creatinine 2.18 (H) 0.6 - 1.3 MG/DL  
 BUN/Creatinine ratio 28 (H) 12 - 20 GFR est AA 26 (L) >60 ml/min/1.73m2 GFR est non-AA 22 (L) >60 ml/min/1.73m2 Calcium 9.3 8.5 - 62.4 MG/DL  
METABOLIC PANEL, COMPREHENSIVE Collection Time: 10/21/20  3:27 AM  
Result Value Ref Range Sodium 134 (L) 136 - 145 mmol/L Potassium 3.4 (L) 3.5 - 5.5 mmol/L Chloride 95 (L) 100 - 111 mmol/L  
 CO2 32 21 - 32 mmol/L Anion gap 7 3.0 - 18 mmol/L Glucose 97 74 - 99 mg/dL BUN 58 (H) 7.0 - 18 MG/DL Creatinine 2.01 (H) 0.6 - 1.3 MG/DL  
 BUN/Creatinine ratio 29 (H) 12 - 20 GFR est AA 29 (L) >60 ml/min/1.73m2 GFR est non-AA 24 (L) >60 ml/min/1.73m2 Calcium 9.7 8.5 - 10.1 MG/DL Bilirubin, total 0.7 0.2 - 1.0 MG/DL  
 ALT (SGPT) 11 (L) 13 - 56 U/L  
 AST (SGOT) 13 10 - 38 U/L Alk. phosphatase 171 (H) 45 - 117 U/L Protein, total 8.1 6.4 - 8.2 g/dL Albumin 2.8 (L) 3.4 - 5.0 g/dL Globulin 5.3 (H) 2.0 - 4.0 g/dL A-G Ratio 0.5 (L) 0.8 - 1.7 IRON PROFILE Collection Time: 10/21/20  3:27 AM  
Result Value Ref Range Iron 19 (L) 50 - 175 ug/dL TIBC 382 250 - 450 ug/dL Iron % saturation 5 (L) 20 - 50 % SODIUM, UR, RANDOM Collection Time: 10/21/20  8:15 AM  
Result Value Ref Range Sodium,urine random 64 20 - 110 MMOL/L  
CREATININE, UR, RANDOM Collection Time: 10/21/20  8:15 AM  
Result Value Ref Range Creatinine, urine 30.00 30 - 125 mg/dL CHLORIDE, URINE RANDOM Collection Time: 10/21/20  8:15 AM  
Result Value Ref Range Chloride,urine random 44 (L) 55 - 125 MMOL/L  
POTASSIUM, UR, RANDOM Collection Time: 10/21/20  8:15 AM  
Result Value Ref Range Potassium urine, random 44 12.0 - 62 MMOL/L  
METABOLIC PANEL, BASIC Collection Time: 10/21/20  2:12 PM  
Result Value Ref Range Sodium 136 136 - 145 mmol/L Potassium 3.6 3.5 - 5.5 mmol/L Chloride 97 (L) 100 - 111 mmol/L  
 CO2 31 21 - 32 mmol/L Anion gap 8 3.0 - 18 mmol/L Glucose 97 74 - 99 mg/dL BUN 56 (H) 7.0 - 18 MG/DL Creatinine 1.84 (H) 0.6 - 1.3 MG/DL  
 BUN/Creatinine ratio 30 (H) 12 - 20 GFR est AA 32 (L) >60 ml/min/1.73m2 GFR est non-AA 27 (L) >60 ml/min/1.73m2 Calcium 9.5 8.5 - 10.1 MG/DL URINALYSIS W/ RFLX MICROSCOPIC Collection Time: 10/21/20  3:40 PM  
Result Value Ref Range Color YELLOW Appearance CLEAR Specific gravity 1.013 1.005 - 1.030    
 pH (UA) >8.5 (H) 5.0 - 8.0 Protein 30 (A) NEG mg/dL Glucose Negative NEG mg/dL Ketone Negative NEG mg/dL Bilirubin Negative NEG Blood SMALL (A) NEG Urobilinogen 1.0 0.2 - 1.0 EU/dL Nitrites Negative NEG Leukocyte Esterase SMALL (A) NEG URINE MICROSCOPIC ONLY Collection Time: 10/21/20  3:40 PM  
Result Value Ref Range WBC 6 to 8 0 - 4 /hpf  
 RBC 8 to 10 0 - 5 /hpf Bacteria 3+ (A) NEG /hpf Radiology studies:  
HeadCT: IMPRESSION: 
  
No acute intracranial abnormality. Note: If clinical concern of acute stroke, MRI with diffusion weighted images is 
recommended for better evaluation. Progression of cerebral atrophy and ventricular dilatation as well as 
microvascular disease. 55 minutes were spent on the patient of which more than 50% was spent in coordination of care and counseling (time spent with patient/family face to face, physical exam, reviewing laboratory and imaging investigations, speaking with physicians and nursing staff involved in this patient's care) Assessment: This is a 67 yo woman with history of congestive heart failure, chronic atrial fibrillation on eliquis, aortic stenosis,  pulmonary hypertension,  venous stasis,  thyroid cancer hypertension,  Hyperlipidemia admitted on 15Oct 2020 with CHF exacerbation, found to have severe aortic stenosis requiring TAVR. She developed hyponatremia during admition  which was gradually addressed. Now she was evaluated for episodes of confusional states. Her clinical exam significant for impaired attention, confused thinking, disordered speech is consistent with delirium. The cause of this patient's delirium is multifactorial: severe chronic disease (terminal delirium) dehydration, poor nutrition, recent hyponatremia, also suspect thiamine deficiency,  
 
Plan: - B1 level ordered  
- continue supplement B1 
- reassess volume status and nutritional status - treatment of underlying conditions 
- will follow Tee Zaidi MD 
Adult Neurologist 
10/21/2020

## 2020-10-22 NOTE — PROGRESS NOTES
RENAL DAILY PROGRESS NOTE Patient: Jose Manuel Shrestha               Sex: female          DOA: 10/15/2020  3:49 PM  
    
YOB: 1940      Age:  68 y.o.        LOS:  LOS: 7 days Subjective:  
 
Jose Manuel Shrestha is a 68 y.o.  who presents with Anasarca [R60.1]. Asked to evaluate for hyponatremia,and renal failure,hx of diastolic heart failure,aortic stenosis,pulm htn,anasarca,was treated with bumex,metolazone Chief complains: Patient denies nausea, vomiting, diarrhea - Reviewed last 24 hrs events Current Facility-Administered Medications Medication Dose Route Frequency  potassium bicarb-citric acid (EFFER-K) tablet 20 mEq  20 mEq Oral Q4H  
 ergocalciferol capsule 50,000 Units  50,000 Units Oral Q7D  
 therapeutic multivitamin (THERAGRAN) tablet 1 Tab  1 Tab Oral DAILY  thiamine HCL (B-1) tablet 100 mg  100 mg Oral DAILY  apixaban (ELIQUIS) tablet 2.5 mg  2.5 mg Oral BID  metoprolol tartrate (LOPRESSOR) tablet 12.5 mg  12.5 mg Oral BID  hydrALAZINE (APRESOLINE) tablet 25 mg  25 mg Oral TID PRN  
 ferrous sulfate tablet 325 mg  1 Tab Oral DAILY WITH BREAKFAST  famotidine (PEPCID) tablet 20 mg  20 mg Oral DAILY  folic acid (FOLVITE) tablet 1 mg  1 mg Oral DAILY  montelukast (SINGULAIR) tablet 10 mg  10 mg Oral DAILY  ondansetron hcl (ZOFRAN) tablet 4 mg  4 mg Oral Q8H PRN  
 atorvastatin (LIPITOR) tablet 40 mg  40 mg Oral QHS  traMADoL (ULTRAM) tablet 50 mg  50 mg Oral Q6H PRN Objective:  
 
Visit Vitals /75 Pulse 61 Temp 97.6 °F (36.4 °C) Resp 18 Ht 5' 3\" (1.6 m) Wt 58.2 kg (128 lb 4.8 oz) SpO2 95% BMI 22.73 kg/m² Intake/Output Summary (Last 24 hours) at 10/22/2020 1809 Last data filed at 10/22/2020 1733 Gross per 24 hour Intake 120 ml Output 1500 ml Net -1380 ml Physical Examination:  
 
 
RS: Chest is bilateral equal, CVS: S1-S2 heard Abdomen: Soft, Non tender, Not distended, Positive bowel sounds, no organomegaly, no CVA / supra pubic tenderness Extremities: mild edema,  
CNS: Awake HEENT: Head is atraumatic, PERRLA, conjunctiva pink & non icteric. No JVD or carotid bruit Data Review:   
 
Labs:  
 
Hematology:  
Recent Labs 10/22/20 
0500 10/20/20 
4361 WBC 6.5 5.2 HGB 9.4* 8.7* HCT 31.0* 28.8* Chemistry:  
Recent Labs 10/22/20 
1310 10/22/20 
0500 10/21/20 
2115 10/21/20 
1412 10/21/20 
0327 10/20/20 
2113 10/20/20 
1005 10/20/20 
0339 10/19/20 
2101 BUN 50* 51* 54* 56* 58* 62* 63* 66*  66* 66* CREA 1.79* 1.80* 1.85* 1.84* 2.01* 2.18* 2.28* 2.26*  2.30* 2.44* CA 10.0 10.0 9.5 9.5 9.7 9.3 9.7 9.3  9.4 9.3 ALB  --  2.8*  --   --  2.8*  --   --  2.6*  --   
K 3.5 3.1* 3.5 3.6 3.4* 3.7 3.4* 3.4*  3.1* 4.3  134* 137 136 134* 133* 129* 126*  125* 125*  98* 99* 97* 95* 93* 88* 85*  85* 84* CO2 30 29 31 31 32 32 35* 33*  34* 35* PHOS  --  3.4  --   --   --   --   --   --   --   
* 91 121* 97 97 107* 119* 78  79 98 Images: 
 
XR (Most Recent). CXR reviewed by me and compared with previous CXR Results from [facility] Encounter encounter on 10/15/20 XR PATELLA LT 3 V Narrative Left patella, 3 views HISTORY: Bruising. Pain. Dislocation? Patellofemoral joint remains intact with prominent dorsal patellar spur from 
degenerative disease. Small suprapatellar joint effusion. No prepatellar soft 
tissue edema. Advanced degenerative joint disease in medial knee compartment. Chondrocalcinosis /pseudogout also present. Impression IMPRESSION: No patellar dislocation. Small joint effusion. Moderate degenerative 
joint disease with chondrocalcinosis. CT (Most Recent) Results from [facility] Encounter encounter on 10/15/20 CT HEAD WO CONT Narrative CT of the head without contrast 
 
HISTORY: Altered mental status COMPARISON: 12/1/12 TECHNIQUE: Helical axial scan to the head was performed from the skull base to 
the vertex without IV contrast administration. All CT scans at this facility performed using dose optimization techniques as 
appreciated to a performed exam, to include automated exposure control, 
adjustment of the mA and or KU according to patient size (including appropriate 
matching for site specific examination), or use of iterative reconstruction 
technique. FINDINGS: 
 
There is moderate global atrophy which shows interval progression since the 
prior study. Further ventricular dilatation is also seen . There is normal 
gray-white matter differentiation. Mild periventricular white matter 
hypodensities also present. There is no evidence of acute intracranial 
hemorrhage,  mass effect or midline shift identified. No skull fracture or extra 
axial fluid collections identified. Visualized sinuses and mastoid air cells 
appear unremarkable. Impression IMPRESSION: 
 
No acute intracranial abnormality. Note: If clinical concern of acute stroke, MRI with diffusion weighted images is 
recommended for better evaluation. Progression of cerebral atrophy and ventricular dilatation as well as 
microvascular disease. Thank you for your referral.  
  
 
EKG No results found for this or any previous visit. I have personally reviewed the old medical records and patient's labs Plan / Recommendation: 1. Acute/crf stage 4,tr proteinuria,probable cardiorenal.improving 2.acute/chronic hyponatremia ,related to thiazides,normal serum osmolarity raises the possibility of pseudohyponatremia related to elevated globulins,continue fluid restrictions. improving 3.sec hyperparathyroidism,ask dr Rema Willett to check for enlarged parathyroid glands/adenomas during thyroid surgery 4.hypokalemia,metabolic alkalosis related to diuretics,replace potassium 5-elevated lambda light chains. ? mgus vs multiple myeloma please consult hematology D/w Dr Berna Reina MD 
Nephrology 10/22/2020

## 2020-10-22 NOTE — PROGRESS NOTES
Progress Note Patient: Fran Silva MRN: [id number]  CSN: [id number] YOB: 1940  Age: 68 y.o. Sex: female DOA: 10/15/2020 LOS:  LOS: 7 days Subjective: Pt was sleeping and struggled to open her eyes when I came into the room. She was unable to correctly explain where she is or what year it is. Patient responds to voice but is quickly falls back asleep. She stated that she is very drowsy and that her muscles hurt all over her body. She reports pain in both of her legs. She has a history of isotonic hyponatremia and is at 134 today slowly going up. She is being followed closely by Nephrology. She has a history of Thyroid carcinoma and ENT consulted with her 10/19 but she wasn't alert and was confused. CT chest 
1. Bilateral hazy groundglass pulmonary opacities with relative sparing of the 
periphery suggestive of pulmonary edema. Very small bilateral pleural effusions. Moderate to marked cardiomegaly. 2.  Similar appearance of enlarged pulmonary artery suggestive of pulmonaryartery hypertension. 3.  Very minimal increased size of medial left upper lobe pulmonary nodule since 2016. Today measuring 6 x 5 mm, previously measuring 5 x 3 mm. Finding is likely 
benign. However given slight change over time suggest follow-up CT in one year. 4.  Extensive atherosclerotic vascular calcification Echo · LV: Estimated LVEF is 65 - 70%. Visually measured ejection fraction. Normal systolic function (ejection fraction normal). Small left ventricle. Mild concentric hypertrophy. Wall motion: normal. Severe (grade 3) left ventricular diastolic dysfunction. · AV: Probably trileaflet aortic valve. Aortic valve mean gradient is 43 mmHg. Aortic valve area is 0.5 cm2. Severe aortic valve stenosis is present. Moderate to severe aortic valve regurgitation is present. · MV: Mitral annular calcification. Mitral valve mean gradient is 5 mmHg. Moderate mitral valve stenosis is present. Mild mitral valve regurgitation is present. · PV: Mild to moderate pulmonic valve regurgitation is present. · TV: Moderate tricuspid valve regurgitation is present. · PA: Severe pulmonary hypertension. Pulmonary arterial systolic pressure is 96 mmHg. · LA: Severely dilated left atrium. Left Atrium volume index is 67 mL/m2. · RV: Dilated right ventricle. Pacing wire present · RA: Dilated right atrium. · IVC: Severely elevated central venous pressure (15+ mmHg); IVC diameter is larger than 21 mm and collapses less than 50% with respiration. Thyroid ultrasound 1. Large irregular mixed echogenicity right thyroid nodule measuring 2.8 cm, 
with multiple punctate echogenic foci, previously biopsied with results 
concerning for papillary thyroid carcinoma. Recommend referral to ENT. 
  
  
Chief Complaint:  
Chief Complaint Patient presents with  Peripheral Edema Review of systems General: No fevers or chills. Very sleepy today Cardiovascular: No chest pain or pressure. Pulmonary: No shortness of breath, cough or wheeze. Gastrointestinal: No abdominal pain, nausea, vomiting or diarrhea. Genitourinary: No urinary frequency, urgency, hesitancy or dysuria. Musculoskeletal: generalized weakness and pain in all her muscles. PAin with inability to bed left knee with displaced patella concerning for dislocation. Neurologic: No headache, generalized weakness Objective:  
 
Physical Exam: 
Visit Vitals /71 (BP 1 Location: Right arm, BP Patient Position: At rest) Pulse 75 Temp 98.3 °F (36.8 °C) Resp 17 Ht 5' 3\" (1.6 m) Wt 58.2 kg (128 lb 4.8 oz) SpO2 95% BMI 22.73 kg/m² General:         no acute distress, confused, very sleepy HEENT: NC, Atraumatic.  anicteric sclerae. Lungs: CTA Bilaterally. No Wheezing/Rhonchi/Rales. Heart:  Regular rhythm, systolic Murmurs Abdomen: Soft, Non distended, Non tender. Extremities: Wound Rt leg clean dressing in place wearing Tubigrip, slow moving and weak. Winced when both her legs were touched and examined for edema, but only trace edema noted Psych:   Not anxious or agitated. Neurologic:  Unable to perform CN testing because patient is unable to follow commands, confused and not oriented to date, location, or name Intake and Output: 
Current Shift:  No intake/output data recorded. Last three shifts:  10/20 1901 - 10/22 0700 In: 1100 [P.O.:1100] Out: 9210 [QAOUY:[id number]] Labs: Results:  
   
Chemistry Recent Labs 10/22/20 
0500 10/21/20 
2115 10/21/20 
1412 10/21/20 
0327  10/20/20 
7748 GLU 91 121* 97 97   < > 78  79 * 137 136 134*   < > 126*  125* K 3.1* 3.5 3.6 3.4*   < > 3.4*  3.1*  
CL 98* 99* 97* 95*   < > 85*  85* CO2 29 31 31 32   < > 33*  34* BUN 51* 54* 56* 58*   < > 66*  66* CREA 1.80* 1.85* 1.84* 2.01*   < > 2.26*  2.30* CA 10.0 9.5 9.5 9.7   < > 9.3  9.4 AGAP 7 7 8 7   < > 8  6 BUCR 28* 29* 30* 29*   < > 29*  29* *  --   --  171*  --  166* TP 8.2  --   --  8.1  --  6.9 ALB 2.8*  --   --  2.8*  --  2.6*  
GLOB 5.4*  --   --  5.3*  --  4.3* AGRAT 0.5*  --   --  0.5*  --  0.6*  
 < > = values in this interval not displayed. CBC w/Diff Recent Labs 10/22/20 
0500 10/20/20 
7266 WBC 6.5 5.2  
RBC 4.23 4.02* HGB 9.4* 8.7* HCT 31.0* 28.8*  
 216 GRANS 76* 74* LYMPH 11* 14* EOS 1 1 Cardiac Enzymes No results for input(s): CPK, CKND1, JONI in the last 72 hours. No lab exists for component: [place] [facility] Coagulation No results for input(s): PTP, INR, APTT, INREXT, INREXT in the last 72 hours. Lipid Panel Lab Results Component Value Date/Time  Cholesterol, total 178 10/20/2020 10:05 AM  
 HDL Cholesterol 33 (L) 10/20/2020 10:05 AM  
 LDL, calculated 125.8 (H) 10/20/2020 10:05 AM  
 VLDL, calculated 19.2 10/20/2020 10:05 AM  
 Triglyceride 96 10/20/2020 10:05 AM  
 CHOL/HDL Ratio 5.4 (H) 10/20/2020 10:05 AM  
  
BNP No results for input(s): BNPP in the last 72 hours. Liver Enzymes Recent Labs 10/22/20 
0500 TP 8.2 ALB 2.8* * Thyroid Studies Lab Results Component Value Date/Time TSH 1.98 10/16/2020 04:22 AM  
    
 
Procedures/imaging: see electronic medical records for all procedures/Xrays and details which were not copied into this note but were reviewed prior to creation of Plan Medications:  
Current Facility-Administered Medications Medication Dose Route Frequency  ergocalciferol capsule 50,000 Units  50,000 Units Oral Q7D  
 therapeutic multivitamin (THERAGRAN) tablet 1 Tab  1 Tab Oral DAILY  thiamine HCL (B-1) tablet 100 mg  100 mg Oral DAILY  apixaban (ELIQUIS) tablet 2.5 mg  2.5 mg Oral BID  metoprolol tartrate (LOPRESSOR) tablet 12.5 mg  12.5 mg Oral BID  hydrALAZINE (APRESOLINE) tablet 25 mg  25 mg Oral TID PRN  
 ferrous sulfate tablet 325 mg  1 Tab Oral DAILY WITH BREAKFAST  famotidine (PEPCID) tablet 20 mg  20 mg Oral DAILY  folic acid (FOLVITE) tablet 1 mg  1 mg Oral DAILY  montelukast (SINGULAIR) tablet 10 mg  10 mg Oral DAILY  ondansetron hcl (ZOFRAN) tablet 4 mg  4 mg Oral Q8H PRN  
 atorvastatin (LIPITOR) tablet 40 mg  40 mg Oral QHS  traMADoL (ULTRAM) tablet 50 mg  50 mg Oral Q6H PRN Assessment/Plan Principal Problem: 
  Acute on chronic diastolic congestive heart failure ([facility] [address] (12/12/2017) Active Problems: 
  Obesity (BMI 30.0-34.9) (10/13/2016) Pulmonary HTN ([facility] [address] (12/21/2016) Papillary thyroid carcinoma ([facility] [address] (12/21/2016) Mild HOCM (hypertrophic obstructive cardiomyopathy) ([facility] [address] (1/12/2017) Aortic stenosis (1/12/2017) Mitral regurgitation (6/13/2017) Anasarca (10/15/2020) Severe protein-calorie malnutrition ([facility] [address] (10/21/2020) Plan: 
  
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 
 Continue limiting fluid intake 1500 cc 
F/u SPEP Mental status changes/ confusion Increase confusion from her base line since admission Urine analysis clean catch CXR Ct scan head no contrast 
discussed with Dr Mathieu James hear failure and sodium improving shouldn't have effect of mental status at this time Neurology consult: 
Per Dr. Hyacinth Vasquez: Her clinical exam significant for impaired attention, confused thinking, disordered speech is consistent with delirium. The cause of this patient's delirium is multifactorial: severe chronic disease (terminal delirium) dehydration, poor nutrition, recent hyponatremia, also suspect thiamine deficiency,  
-F/U Thiamine:  
-will consult ID for delirium 
-Bilaterla duplex for calf pain Left Knee deformity: impaired rom 
-concern for disloaction given ipaired rom with no effusion, noted swelling in tibia 
-will consult ortho Per Dr. Morrow Saliva: 1. Acute/crf stage 4,tr proteinuria,probable cardiorenal,check spot urine for protein/creatinine,spep. improving 2.acute/chronic hyponatremia ,related to thiazides,normal serum osmolarity raises the possibility of pseudohyponatremia related to elevated globulins,continue fluid restrictions. improving 3.sec hyperparathyroidism,ask dr Divya Ramos to check for enlarged parathyroid glands/adenomas during thyroid surgery 4.hypokalemia,metabolic alkalosis related to diuretics,replace potassium Drowsiness and weakness 
-hold Gabapentin in hopes of decreasing any sedative effect Hypokalemia: 
-today at 3.1  
-replete prn per nephrology Acute systolic congestive heart failure on top of chronic diastolic congestive heart failure D/C  Bumex 0.5 mg/h Follow electrolytes BMP every 6 h for 48 h Check cardiac enzyme every 8 troponin I slightly up but stable Cardiology Consult and echo: 
Patient following up cardiology per cardiology note acute on chronic diastolic congestive heart failure.   Compensated now shortness of breath bilateral lower extremity edema improving diuretic on hold Kalosis is improving Artery stenosis becomes severe mean gradient 43 
echo September 19 was moderate aortic stenosis. Patient may go for cardiac cath as Tuesday 3 days referral for TAVR after cast 
We will discuss with cardiology possibility of worsening kidney function with a cardiac cath Mitral regurg mild to moderate Paroxysmal A. fib Decrease  Eliquis 2.5 mg twice daily per pharmacy due to renal function Follow-up CBC 
  
Hypertension: 135/71 today 
metoprolol  To 25 mg twice daily Monitor blood pressure Discussed with cardiology will use hydralazine prn  
Continue home  Cozaar 100 mg 
  
Hyperlipidemia Continue Zocor 80 mg nightly Follow-up liver function 
  
History of Anemia with low iron 10/21: 
-Iron: 19 decreased from 27 on 10/16 
-TIBC: 382 
-continue iron repletion and consider IV iron supplementation 
  
History of thyroid cancer 
-Pt seen by ENT who recommended Thyroidectomy when she is able to tolerate surgery 
-Per Cardiology Dr. Nikia Robbins: Given severe aortic stenosis, any prolonged GI surgery will be high risk. Consider evaluating the extent of malignancy and if her survival is more than a year, consider getting TAVR first. 
  
Venous stasis ulcer Wound care Pt was seen by vascular as outpatinet Continue wound care and f/u vascular surgery consult Cbc, cmp, mag Wound care F/u neurology , further recs apprediated, New ID and Ortho consult Latia Salinas Call 
10/22/2020 9:51AM

## 2020-10-22 NOTE — PROGRESS NOTES
Discharge planning update: 
 
Patient's caregiver Dianna December) phoned this writer back to give a SNF choice. They have chosen [facility]. They need a little more time to come up with the other two choices. This writer sent the referral to [facility], via [facility], to start the placement process. This writer will continue to monitor for discharge planning to ensure a safe discharge from [facility]. Brennon Stoddard. MSW Care Manager Pager#: [phone]

## 2020-10-22 NOTE — PROGRESS NOTES
Otolaryngology Head neck surgery 
 vital signs stable afebrile 
 patient seen last evening 
 cardiology note reviewed 
 patient does have a highly likely papillary thyroid carcinoma however diagnosed approximately 4 years prior. Papillary thyroid carcinoma does have a tendency to expand and metastasize however the rate of progression is usually very slow hence her present condition 4 years after diagnosis 
 
 at the present time, I doubt that patient would tolerate surgical intervention. Ideally would like to aggressively treat and remove the carcinoma however I suspect based on cardiology eval, that this might be exceedingly deleterious to her health and would not be able to tolerate the surgical procedure. At this point would hold off with any aggressive surgical plans unless airway compromise is noted. If however patient does well from her planned cardiac procedure and there is significant improvement in cardiac status, it may be reasonable to pursue surgical intervention at that time 
 
 would appreciate cardiac recommendations after her cardiac treatment is completed Excell Antes

## 2020-10-22 NOTE — PROGRESS NOTES
Problem: Mobility Impaired (Adult and Pediatric) Goal: *Acute Goals and Plan of Care (Insert Text) Description: Physical Therapy Goals Re-evaluated 10/22/2020 and to be accomplished within 7 days 1. Patient will move from supine to sit and sit to supine in bed with minimal assistance. 2. Patient transfer from bed to chair and chair to bed with moderate assistance. 3. Patient will perform sit to stand with moderate assistance. 4. Patient will ambulate x 5 feet with moderate assistance with least restrictive device. Initiated 10/16/2020 and to be accomplished within 7 day(s) 1. Patient will move from supine to sit and sit to supine , scoot up and down, and roll side to side in bed with modified independence. 2.  Patient will transfer from bed to chair and chair to bed with supervision/set-up using the least restrictive device. 3.  Patient will perform sit to stand with supervision/set-up. 4.  Patient will ambulate with supervision/set-up for 50 feet with the least restrictive device. PLOF: Pt reports independence with dressing and bathing, ambulating with RW. Lives in 1st floor apartment with  and no DEBBIE. Pt reports having someone come in to  after them daily and has someone come into clean 1 time per week. Outcome: Progressing Towards Goal 
 PHYSICAL THERAPY RE-EVALUATION Patient: Jose Manuel Shrestha (4 y.o. female) Date: 10/22/2020 Primary Diagnosis: Anasarca [R60.1] Precautions:   Fall, Skin PLOF: see above ASSESSMENT : 
Based on the objective data described below, the patient presents with generalized weakness, deconditioning, confusion, impaired activity tolerance, impaired bed mobility, and impaired transfers. Nurse cleared patient for participation in skilled physical therapy. Vascular studies negative for DVT today. Patient received reclined in bed, asleep but aroused by voice, lethargic but agreeable to physical therapy treatment. SpO2 93% on room air. HR 60 bpm at rest. Oriented to person only. Lethargic at beginning of session requiring frequent verbal and tactile cues to maintain attention. Would respond, \"OK\" to request to initiated movement for supine to sit transfer, move LE partially in bed, then forget what task she was performing. Patient more motivated to sit up to try pudding from lunch. Required max verbal and tactile cues, frequent task reminders, and eventually max assist to complete supine to sit transfer with bed modified with HOB elevated. Max assist for scooting to edge of bed. Patient tolerated sitting at edge of bed with CGA to standby assistance. Participated in [employer] and long arc quads x 10 each leg but gross strength very limited with 2-/5 BLE strength. Patient declined standing but motivated by pudding on lunch table. Patient performed dynamic sitting x 10 minutes with bimanual activity for feeding with pudding. Required max assist to bring spoon to mouth using right hand, left hand holding cup, and progressed to min assist. Patient fatigued after sitting activity and requested to return to reclined in bed. Completed sit to supine with max assist. Phlebotomist came in at this time and assisted with scooting in bed max assist x 2. Patient left reclined in bed, bed alarm on, call bell in reach, in room with phlebotomist.  
 
Patient will benefit from skilled intervention to address the above impairments. Patient's rehabilitation potential is considered to be Fair Factors which may influence rehabilitation potential include:  
[]         None noted 
[x]         Mental ability/status [x]         Medical condition 
[]         Home/family situation and support systems 
[]         Safety awareness 
[]         Pain tolerance/management 
[]         Other: PLAN : 
Recommendations and Planned Interventions:  
[x]           Bed Mobility Training             [x]    Neuromuscular Re-Education [x]           Transfer Training                   []    Orthotic/Prosthetic Training 
[x]           Gait Training                          []    Modalities [x]           Therapeutic Exercises           []    Edema Management/Control 
[x]           Therapeutic Activities            [x]    Family Training/Education 
[x]           Patient Education 
[]           Other (comment): Frequency/Duration: Patient will be followed by physical therapy 1-2 times per day/4-7 days per week to address goals. Discharge Recommendations: Mars Thomas Further Equipment Recommendations for Discharge: hospital bed SUBJECTIVE:  
Patient stated You're holding the cup wrong.  OBJECTIVE DATA SUMMARY:  
Hospital course since last seen and reason for re-evaluation: Making slow progress towards therapy goals. Past Medical History:  
Diagnosis Date Arthritis Atrial fibrillation ([facility] CHF (congestive heart failure) ([facility] Heart murmur History of seasonal allergies HTN (hypertension) Hypercholesteremia Moderate aortic stenosis Pulmonary hypertension ([facility] Venous insufficiency Past Surgical History:  
Procedure Laterality Date HX KNEE ARTHROSCOPY    
 left and right HX ORTHOPAEDIC    
 right ankle Barriers to Learning/Limitations: yes;  altered mental status (i.e.Sedation, Confusion) Compensate with: Visual Cues, Verbal Cues, Tactile Cues, and Kinesthetic Cues Home Situation:  
Home Situation Home Environment: Apartment # Steps to Enter: 0 One/Two Story Residence: One story Living Alone: No 
Support Systems: Spouse/Significant Other/Partner Patient [facility] Current DME Used/Available at Home: None Critical Behavior: 
Neurologic State: Lethargic;Confused Orientation Level: Oriented to person;Disoriented to place; Disoriented to situation;Disoriented to time Cognition: Decreased attention/concentration;Decreased command following Safety/Judgement: Fall prevention;Decreased insight into deficits Psychosocial 
Patient Behaviors: Calm;Confused Strength:   
Strength: Generally decreased, functional 
  
Tone & Sensation:  
Tone: Normal 
 
 Range Of Motion: 
AROM: Generally decreased, functional 
  
Functional Mobility: 
Bed Mobility: 
Rolling: Maximum assistance Supine to Sit: Maximum assistance Sit to Supine: Maximum assistance Scooting: Maximum assistance Balance:  
Sitting: Impaired; Without support Sitting - Static: Good (unsupported) Sitting - Dynamic: Fair (occasional) Therapeutic Exercises:  
Long arc quads x 10 AAROM, marches x 10 AAROM Pain: 
Pain level pre-treatment: 0/10 Pain level post-treatment: 0/10 Pain Intervention(s) : Medication (see MAR); Rest,Repositioning Response to intervention: Nurse notified, See doc flow Activity Tolerance:  
fair Please refer to the flowsheet for vital signs taken during this treatment. After treatment:  
[]         Patient left in no apparent distress sitting up in chair 
[x]         Patient left in no apparent distress in bed 
[x]         Call bell left within reach [x]         Nursing notified 
[]         Caregiver present [x]         Bed alarm activated 
[]         SCDs applied COMMUNICATION/EDUCATION:  
[x]         Role of Physical Therapy in the acute care setting. [x]         Fall prevention education was provided and the patient/caregiver indicated understanding. [x]         Patient/family have participated as able in goal setting and plan of care. []         Patient/family agree to work toward stated goals and plan of care. []         Patient understands intent and goals of therapy, but is neutral about his/her participation. []         Patient is unable to participate in goal setting/plan of care: ongoing with therapy staff. 
[]         Other: Thank you for this referral. 
Melissa Thurman, PT, DPT Time Calculation: 34 mins

## 2020-10-22 NOTE — PROGRESS NOTES
Neurology Progress Note Patient ID: 
Praneeth Moreno [id number] 
89 y.o. 
1940 Subjective:  
  
Patient remains quite somnolent and is minimally interactive. Current Facility-Administered Medications Medication Dose Route Frequency  potassium bicarb-citric acid (EFFER-K) tablet 20 mEq  20 mEq Oral Q4H  
 ergocalciferol capsule 50,000 Units  50,000 Units Oral Q7D  
 therapeutic multivitamin (THERAGRAN) tablet 1 Tab  1 Tab Oral DAILY  thiamine HCL (B-1) tablet 100 mg  100 mg Oral DAILY  apixaban (ELIQUIS) tablet 2.5 mg  2.5 mg Oral BID  metoprolol tartrate (LOPRESSOR) tablet 12.5 mg  12.5 mg Oral BID  hydrALAZINE (APRESOLINE) tablet 25 mg  25 mg Oral TID PRN  
 ferrous sulfate tablet 325 mg  1 Tab Oral DAILY WITH BREAKFAST  famotidine (PEPCID) tablet 20 mg  20 mg Oral DAILY  folic acid (FOLVITE) tablet 1 mg  1 mg Oral DAILY  montelukast (SINGULAIR) tablet 10 mg  10 mg Oral DAILY  ondansetron hcl (ZOFRAN) tablet 4 mg  4 mg Oral Q8H PRN  
 atorvastatin (LIPITOR) tablet 40 mg  40 mg Oral QHS  traMADoL (ULTRAM) tablet 50 mg  50 mg Oral Q6H PRN Objective: Active hospital medications were reviewed Lab results and neuroradiology studies from the last 24 hours were reviewed. Prior to Admission medications Medication Sig Start Date End Date Taking? Authorizing Provider  
apixaban (Eliquis) 5 mg tablet Take 1 Tab by mouth two (2) times a day. 6/12/20  Yes Rachel Perez NP Calcium-Cholecalciferol, D3, (CALCIUM 600 WITH VITAMIN D3) 600 mg(1,500mg) -400 unit chew Take 1 Tab by mouth daily. Yes Provider, Historical  
Digitek 125 mcg (0.125 mg) tablet take 1 tablet by mouth once daily 10/19/20   Edison Hopper MD  
furosemide (LASIX) 40 mg tablet take 1 tablet by mouth every morning and 1/2 tablet AT 2 P.M.  
Patient taking differently: i tab every other day 8/17/20   Jacy Shultz NP  
 metoprolol tartrate (LOPRESSOR) 100 mg IR tablet take 1 tablet by mouth twice a day 4/22/20   Vikram Marsh MD  
traMADol Sán[place] Justice) 50 mg tablet Take 1 Tab by mouth every six (6) hours as needed for Pain. 12/11/18   Provider, Historical  
folic acid 218 mcg tablet Take 400 mcg by mouth daily. Provider, Historical  
cyanocobalamin, vitamin B-12, (VITAMIN B12 PO) Take 1,000 mcg by mouth daily. Provider, Historical  
famotidine (PEPCID) 20 mg tablet Take 1 Tab by mouth daily. 9/26/19   Mehdi Martell MD  
gabapentin (NEURONTIN) 100 mg capsule Take 1 Cap by mouth nightly. Max Daily Amount: 100 mg. 9/25/19   Mehdi Martell MD  
lactobacillus sp. 50 billion cpu (BIO-K PLUS) 50 billion cell -375 mg cap capsule Take 1 Cap by mouth daily. 9/26/19   Mehdi Martell MD  
ondansetron hcl (ZOFRAN) 4 mg tablet Take 1 Tab by mouth every eight (8) hours as needed for Nausea. 5/16/19   Ester Storey PA-C  
simvastatin (ZOCOR) 80 mg tablet TAKE 1 TABLET NIGHTLY 9/26/18   Vikram Marsh MD  
cholecalciferol (VITAMIN D3) 1,000 unit cap Take  by mouth daily. Chinyere Astorga MD  
losartan (COZAAR) 100 mg tablet Take 100 mg by mouth daily. Provider, Historical  
montelukast (SINGULAIR) 10 mg tablet Take 10 mg by mouth daily. Provider, Historical  
CETIRIZINE HCL (ZYRTEC PO) Take  by mouth. Chinyere Astorga MD  
 
Patient Vitals for the past 8 hrs: 
 BP Temp Pulse Resp SpO2  
10/22/20 1202 (!) 151/72 98 °F (36.7 °C) 69 17 97 % 10/22/20 0810 139/69 97.5 °F (36.4 °C) 74 16  [id number] 1901 - 10/22 0700 In: 1100 [P.O.:1100] Out: 3416 [OISKA:1220] 10/20 1901 - 10/22 0700 In: 1100 [P.O.:1100] Out: 9319 [Urine:3925]RESULTRCNT(24h)Principal Problem: 
  Acute on chronic diastolic congestive heart failure ([facility] (12/12/2017) Active Problems: 
  Obesity (BMI 30.0-34.9) (10/13/2016) Pulmonary HTN ([facility] (12/21/2016) Papillary thyroid carcinoma ([facility] (12/21/2016) Mild HOCM (hypertrophic obstructive cardiomyopathy) ([facility] (1/12/2017) Aortic stenosis (1/12/2017) Mitral regurgitation (6/13/2017) Anasarca (10/15/2020) Severe protein-calorie malnutrition ([facility] (10/21/2020) General Exam 
No acute distress,  
  
HEENT: Normocephalic, atraumatic, Sclera anicteric, normal conjunctiva Mucous membranes: normal color and hydration  
  
 
Heart: irregular with 4/6 systolic murmur 
  
RESP:  CTAB  
  
Neurologic Exam: 
  
MENTAL STATUS:    The patient is somnolent but will awaken briefly to verbal stimuli, mumbled something briefly but not comprehensible. No overt gaze preference.  
  
             CRANIAL NERVES:   Face appears symmetric, moves upper extremities symmetrically   
 
 
 
Assessment: This is a 69 yo woman with history of congestive heart failure, chronic atrial fibrillation on eliquis, aortic stenosis,  pulmonary hypertension,  venous stasis,  thyroid cancer hypertension,  Hyperlipidemia admitted on 15Oct 2020 with CHF exacerbation, found to have severe aortic stenosis requiring TAVR. She developed hyponatremia during admition  which was gradually addressed. Now she was evaluated for episodes of confusional states. Her clinical exam significant for impaired attention, confused thinking, disordered speech is consistent with delirium.  
  
The cause of this patient's delirium is multifactorial: severe chronic disease (terminal delirium), dehydration, poor nutrition, recent hyponatremia, there is no concern for a primary neurologic process. She had been prescribed ultram at one point which I would absolutely avoid in this patient as this can have very significant CNS side effects. Plan: 1. No further neurologic work-up is indicated, expect further neurologic improvement, if and when systemic issues can be optimized 2. Would avoid Ilana Johnson M.D. Clinical Neurophysiology Neuromuscular Specialist 
 
Signed: 10/22/2020 
12:51 PM 
12:51 PM

## 2020-10-22 NOTE — CONSULTS
Infectious Disease Consultation Note Reason: Metabolic encephalopathy, bacteriuria, evaluate for infection ? cystitis Current abx Prior abx Lines:  
 
 
Assessment : 
 
 
 72-year-old female with a history of HOCM, pulmonary hypertension, congestive heart failure, chronic atrial fibrillation on anticoagulation, aortic stenosis, and hypertension presented to emergency department on 10/15/2020 with increasing swelling for  2 weeks.  
  
Now with altered mentation, bacteriuria Patient presents with a highly complex medical picture. Exact etiology of altered mentation not entirely clear at this time. Patient does have 3+ bacteriuria on urine analysis. Differential diagnoses include evolving cystitis versus undiagnosed viral infection leading to CHF exacerbation versus noninfectious etiology of altered mentation such as medication induced Lack of fever, leukocytosis argues against bacterial infection. Will obtain procalcitonin, follow-up urine cultures, clinical course to definitely evaluate for cystitis. 
  
Recommendations: 
  
1.  Start ceftriaxone 2. Follow-up urine culture 3. Obtain serum procalcitonin 4. Obtain respiratory viral panel PCR, COVID-19 test 
5. Further recommendations based on the above test results, clinical course 
  
Advance Care planning: full code: discussed  with patient/surrogate decision maker: eder garcia: [phone] 
  
  
Above plan was discussed in details with RN. Will d/w primary team. Please call me if any further questions or concerns. Will continue to participate in the care of this patient. Thank you for consultation request.  
 
HPI: 
 
 72-year-old female with a history of HOCM, pulmonary hypertension, congestive heart failure, chronic atrial fibrillation on anticoagulation, aortic stenosis, and hypertension presented to emergency department on 10/15/2020 with increasing swelling for  2 weeks. Currently patient is unable to communicate effectively. I obtained history from review of records, talking to primary team.  Patient was not seen by primary care physician since last hospitalization in September 2019. Patient was recently seen by Dr. Dimas Martino -noted to have significant shortness of breath and sent to hospital for admission. Has history of initial lab work in the ER white blood cells 4.4 H&H 9.3/30.9 platelet 701 sodium 566 potassium 4 creatinine 2.07 magnesium 2.8 troponin slightly elevated 0.17 pro B and P 13,139. EKG atrial paced rest prolonged AV conduction right bundle branch block T wave abnormality. Cardiology was consulted the ER patient started on Bumex 0.5 mg/h. Apparently patient shortness of breath improved. Patient is currently saturating greater than 95% on room air. However she was noted to be more confused. She has not had any fever since admission. Urine analysis/21 did not reveal significant pyuria. However did have 3+ bacteria. There is concern for infection. I have been consulted for further recommendations. 
  
Currently patient does not answer any questions asked. She keeps on smiling. Detailed review systems not feasible. Past Medical History:  
Diagnosis Date  Arthritis  Atrial fibrillation ([address]  CHF (congestive heart failure) ([address]  Heart murmur  History of seasonal allergies  HTN (hypertension)  Hypercholesteremia  Moderate aortic stenosis  Pulmonary hypertension ([address]  Venous insufficiency Past Surgical History:  
Procedure Laterality Date  HX KNEE ARTHROSCOPY    
 left and right  HX ORTHOPAEDIC    
 right ankle  
 
 
home Medication List  
 Details Digitek 125 mcg (0.125 mg) tablet take 1 tablet by mouth once daily 
Qty: 90 Tab, Refills: 1 Details  
apixaban (Eliquis) 5 mg tablet Take 1 Tab by mouth two (2) times a day. Qty: 60 Tab, Refills: 3 Calcium-Cholecalciferol, D3, (CALCIUM 600 WITH VITAMIN D3) 600 mg(1,500mg) -400 unit chew Take 1 Tab by mouth daily. furosemide (LASIX) 40 mg tablet take 1 tablet by mouth every morning and 1/2 tablet AT 2 P.M. 
Qty: 45 Tab, Refills: 0  
  
metoprolol tartrate (LOPRESSOR) 100 mg IR tablet take 1 tablet by mouth twice a day 
Qty: 60 Tab, Refills: 6  
  
traMADol (ULTRAM) 50 mg tablet Take 1 Tab by mouth every six (6) hours as needed for Pain. folic acid 472 mcg tablet Take 400 mcg by mouth daily. cyanocobalamin, vitamin B-12, (VITAMIN B12 PO) Take 1,000 mcg by mouth daily. famotidine (PEPCID) 20 mg tablet Take 1 Tab by mouth daily. Qty: 30 Tab, Refills: 0  
  
gabapentin (NEURONTIN) 100 mg capsule Take 1 Cap by mouth nightly. Max Daily Amount: 100 mg. Qty: 30 Cap, Refills: 0 Associated Diagnoses: Pain and swelling of lower leg, unspecified laterality  
  
lactobacillus sp. 50 billion cpu (BIO-K PLUS) 50 billion cell -375 mg cap capsule Take 1 Cap by mouth daily. Qty: 10 Cap, Refills: 0  
  
ondansetron hcl (ZOFRAN) 4 mg tablet Take 1 Tab by mouth every eight (8) hours as needed for Nausea. Qty: 30 Tab, Refills: 2 Associated Diagnoses: Nausea  
  
simvastatin (ZOCOR) 80 mg tablet TAKE 1 TABLET NIGHTLY Qty: 90 Tab, Refills: 1 Associated Diagnoses: Nonrheumatic aortic valve insufficiency  
  
cholecalciferol (VITAMIN D3) 1,000 unit cap Take  by mouth daily. losartan (COZAAR) 100 mg tablet Take 100 mg by mouth daily. montelukast (SINGULAIR) 10 mg tablet Take 10 mg by mouth daily. CETIRIZINE HCL (ZYRTEC PO) Take  by mouth. Current Facility-Administered Medications Medication Dose Route Frequency  ergocalciferol capsule 50,000 Units  50,000 Units Oral Q7D  
 therapeutic multivitamin (THERAGRAN) tablet 1 Tab  1 Tab Oral DAILY  thiamine HCL (B-1) tablet 100 mg  100 mg Oral DAILY  apixaban (ELIQUIS) tablet 2.5 mg  2.5 mg Oral BID  
  metoprolol tartrate (LOPRESSOR) tablet 12.5 mg  12.5 mg Oral BID  hydrALAZINE (APRESOLINE) tablet 25 mg  25 mg Oral TID PRN  
 ferrous sulfate tablet 325 mg  1 Tab Oral DAILY WITH BREAKFAST  famotidine (PEPCID) tablet 20 mg  20 mg Oral DAILY  folic acid (FOLVITE) tablet 1 mg  1 mg Oral DAILY  montelukast (SINGULAIR) tablet 10 mg  10 mg Oral DAILY  ondansetron hcl (ZOFRAN) tablet 4 mg  4 mg Oral Q8H PRN  
 atorvastatin (LIPITOR) tablet 40 mg  40 mg Oral QHS  traMADoL (ULTRAM) tablet 50 mg  50 mg Oral Q6H PRN Allergies: Patient has no known allergies. Family History Problem Relation Age of Onset  Cancer Other  Heart Disease Other  Cancer Mother  Heart Disease Mother Social History Socioeconomic History  Marital status:  Spouse name: Not on file  Number of children: Not on file  Years of education: Not on file  Highest education level: Not on file Occupational History  Not on file Social Needs  Financial resource strain: Not on file  Food insecurity Worry: Not on file Inability: Not on file  Transportation needs Medical: Not on file Non-medical: Not on file Tobacco Use  Smoking status: Never Smoker  Smokeless tobacco: Never Used Substance and Sexual Activity  Alcohol use: No  
 Drug use: No  
 Sexual activity: Not on file Lifestyle  Physical activity Days per week: Not on file Minutes per session: Not on file  Stress: Not on file Relationships  Social connections Talks on phone: Not on file Gets together: Not on file Attends [employer] service: Not on file Active member of club or organization: Not on file Attends meetings of clubs or organizations: Not on file Relationship status: Not on file  Intimate partner violence Fear of current or ex partner: Not on file Emotionally abused: Not on file Physically abused: Not on file Forced sexual activity: Not on file Other Topics Concern  Not on file Social History Narrative Pt has a dog Born in [facility] and moved to [place] years ago Social History Tobacco Use Smoking Status Never Smoker Smokeless Tobacco Never Used Temp (24hrs), Av.9 °F (36.6 °C), Min:97.5 °F (36.4 °C), Max:98.3 °F (36.8 °C) Visit Vitals /69 (BP 1 Location: Right arm, BP Patient Position: At rest) Pulse 74 Temp 97.5 °F (36.4 °C) Resp 16 Ht 5' 3\" (1.6 m) Wt 58.2 kg (128 lb 4.8 oz) SpO2 95% BMI 22.73 kg/m² ROS: Unable to obtain Physical Exam: 
 
General:         no acute distress, confused, alert, smiling HEENT:           NC, Atraumatic.  anicteric sclerae. Lungs:            CTA Bilaterally. No Wheezing/Rhonchi/Rales. Heart:              Regular rhythm, systolic Murmurs Abdomen:      Soft, Non distended, Non tender. Extremities:   Wound Rt leg clean dressing in place wearing Tubigrip, slow moving and weak. Winced when both her legs were touched and examined for edema, but only trace edema noted Psych:            Not anxious or agitated. Neurologic:    Unable to perform CN testing because patient is unable to follow commands, confused and not oriented to date, location, or name Labs: Results:  
Chemistry Recent Labs 10/22/20 
0500 10/21/20 
2115 10/21/20 
1412 10/21/20 
0327  10/20/20 
1292 GLU 91 121* 97 97   < > 78  79 * 137 136 134*   < > 126*  125* K 3.1* 3.5 3.6 3.4*   < > 3.4*  3.1*  
CL 98* 99* 97* 95*   < > 85*  85* CO2 29 31 31 32   < > 33*  34* BUN 51* 54* 56* 58*   < > 66*  66* CREA 1.80* 1.85* 1.84* 2.01*   < > 2.26*  2.30* CA 10.0 9.5 9.5 9.7   < > 9.3  9.4 AGAP 7 7 8 7   < > 8  6 BUCR 28* 29* 30* 29*   < > 29*  29* *  --   --  171*  --  166* TP 8.2  --   --  8.1  --  6.9 ALB 2.8*  --   --  2.8*  --  2.6*  
GLOB 5.4*  --   --  5.3*  --  4.3* AGRAT 0.5*  --   --  0.5*  --  0.6*  
 < > = values in this interval not displayed. CBC w/Diff Recent Labs 10/22/20 
0500 10/20/20 
0692 WBC 6.5 5.2  
RBC 4.23 4.02* HGB 9.4* 8.7* HCT 31.0* 28.8*  
 216 GRANS 76* 74* LYMPH 11* 14* EOS 1 1 Microbiology No results for input(s): CULT in the last 72 hours. RADIOLOGY: 
 
All available imaging studies/reports in [facility] for this admission were reviewed Dr. Blanco Gómez, Infectious Disease Specialist 
[phone] October 22, 2020 
9:31 AM

## 2020-10-22 NOTE — PROGRESS NOTES
No findings of dislocation in left knee or patella on xray. Discussed findings with Dr Avni Padilla. Ok to discontinue consult at this time per primary.

## 2020-10-22 NOTE — PROGRESS NOTES
[facility]ARDIOLOG[facility] ASSOCIATESNATHANIELC. 
 
 
CARDIOLOGY PROGRESS NOTE 
RECS: 
 
 
1. Acute on chronic diastolic congestive heart failure-appears compensated now. SOB and BLE improved well. Continue to have good urine output. Diuretics on hold. Alkalosis resolved 2. Hypertrophic obstructive cardiomyopathy-S/p alcohol septal ablation 2/19 at [facility] 3. Hx complete heart block - s/p dual chambers permanent pacemaker 2/19 [employer] 4. Paroxysmal  Atrial fibrillation-rate controlled. on Eliquis for stroke prevention 5. Aortic stenosis -has become severe now with mean gradient of 43. Was moderate on  echo in 9/19. Needs transfer to [facility] for TAVR . 6. Pulmonary hypertension-  worsening gradually with pulmonary pressure of 96 now. Echo 9/19 showed PAP 69 mmHg 7. CKD- renal indices stable 8. Hyponatremia and hypochloremia: Secondary to diuresis- better 9. AMS- worsening confusion/drowsiness - possible due to medications side effect gabapentin on hold. Head CT no acute findings 10. Hx of thyroid CA- diagnosed approximately 4 years prior. ENT consulted not a candidate for any aggressive surgical plan unless airway compromise is noted. Patient has AMS-  
CHF stable- will resume diuretics tomorrow. Will discuss with her family and consider transfer to [facility]. EKG Results Procedure 720 Value Units Date/Time EKG, 12 LEAD, INITIAL [[id number]] Collected:  10/15/20 1547 Order Status:  Completed Updated:  10/16/20 1132 Ventricular Rate 61 BPM   
  Atrial Rate 61 BPM   
  P-R Interval 278 ms QRS Duration 148 ms Q-T Interval 432 ms QTC Calculation (Bezet) 434 ms Calculated P Axis 32 degrees Calculated R Axis 127 degrees Calculated T Axis -22 degrees Diagnosis -- Atrial-paced rhythm with prolonged AV conduction Right bundle branch block T wave abnormality, consider inferior ischemia Abnormal ECG When compared with ECG of 20-SEP-2019 22:01, 
 Electronic atrial pacemaker has replaced Atrial fibrillation Confirmed by Zoran Lomeli MD, Lilibeth Bills ([id number]) on 10/16/2020 11:32:17 AM 
  
  
 
XR Results (most recent): 
Results from [facility] encounter on 10/15/20 XR PATELLA LT 3 V Narrative Left patella, 3 views HISTORY: Bruising. Pain. Dislocation? Patellofemoral joint remains intact with prominent dorsal patellar spur from 
degenerative disease. Small suprapatellar joint effusion. No prepatellar soft 
tissue edema. Advanced degenerative joint disease in medial knee compartment. Chondrocalcinosis /pseudogout also present. Impression IMPRESSION: No patellar dislocation. Small joint effusion. Moderate degenerative 
joint disease with chondrocalcinosis. 10/15/20 ECHO ADULT COMPLETE 10/16/2020 10/16/2020 Narrative · LV: Estimated LVEF is 65 - 70%. Visually measured ejection fraction. Normal systolic function (ejection fraction normal). Small left ventricle. Mild concentric hypertrophy. Wall motion: normal. Severe (grade 3) left  
ventricular diastolic dysfunction. · AV: Probably trileaflet aortic valve. Aortic valve mean gradient is 43  
mmHg. Aortic valve area is 0.5 cm2. Severe aortic valve stenosis is  
present. Moderate to severe aortic valve regurgitation is present. · MV: Mitral annular calcification. Mitral valve mean gradient is 5 mmHg. Moderate mitral valve stenosis is present. Mild mitral valve regurgitation  
is present. · PV: Mild to moderate pulmonic valve regurgitation is present. · TV: Moderate tricuspid valve regurgitation is present. · PA: Severe pulmonary hypertension. Pulmonary arterial systolic pressure  
is 96 mmHg. · LA: Severely dilated left atrium. Left Atrium volume index is 67 mL/m2. · RV: Dilated right ventricle. Pacing wire present · RA: Dilated right atrium. · IVC: Severely elevated central venous pressure (15+ mmHg); IVC diameter is larger than 21 mm and collapses less than 50% with respiration. Signed by: Eliza Barajas MD  
 
 
  
  
 
 
ASSESSMENT: 
Hospital Problems  Date Reviewed: 10/15/2020 Codes Class Noted POA Severe protein-calorie malnutrition ([facility] 75.) ICD-10-CM: I27 ICD-9-CM: 617  10/21/2020 No  
   
 Anasarca ICD-10-CM: R60.1 ICD-9-CM: 782.3  10/15/2020 Unknown * (Principal) Acute on chronic diastolic congestive heart failure (HCC) ICD-10-CM: I50.33 ICD-9-CM: 428.33, 428.0  12/12/2017 Yes Mitral regurgitation ICD-10-CM: I34.0 ICD-9-CM: 424.0  6/13/2017 Yes Mild HOCM (hypertrophic obstructive cardiomyopathy) (HCC) ICD-10-CM: I42.1 ICD-9-CM: 425.11  1/12/2017 Yes Aortic stenosis ICD-10-CM: I35.0 ICD-9-CM: 424.1  1/12/2017 Yes Pulmonary HTN ([facility] 75.) ICD-10-CM: I27.20 ICD-9-CM: 416.8  12/21/2016 Yes Papillary thyroid carcinoma ([facility] 75.) ICD-10-CM: R07 ICD-9-CM: 128  12/21/2016 Yes Obesity (BMI 30.0-34.9) ICD-10-CM: E31.6 ICD-9-CM: 278.00  10/13/2016 Yes SUBJECTIVE: 
 
Improved edema 
drowsy but more alert today OBJECTIVE: 
 
VS:  
Visit Vitals /69 (BP 1 Location: Right arm, BP Patient Position: At rest) Pulse 74 Temp 97.5 °F (36.4 °C) Resp 16 Ht 5' 3\" (1.6 m) Wt 58.2 kg (128 lb 4.8 oz) SpO2 95% BMI 22.73 kg/m² Intake/Output Summary (Last 24 hours) at 10/22/2020 1200 Last data filed at 10/22/2020 4665 Gross per 24 hour Intake 240 ml Output 1125 ml Net -885 ml TELE: DDD pacing General: drowsy sleepy today. Poor appetite HENT: Normocephalic, atraumatic. Normal external eye. Neck :  bruit present, increased JVP Cardiac:  regular rate and rhythm, S1: normal intensity, S2: decreased intensity, systolic murmur: late systolic 3/6, crescendo at 2nd left intercostal space, at 2nd right intercostal space, radiates to carotids, 2nd systolic murmur: holosystolic 3/6, blowing at 2nd right intercostal space, at lower left sternal border, at apex Chest/Lungs:rales b/l. Abdomen: Soft, nontender, no masses Extremities: improved  edema, peripheral pulses not palpable Labs: Results:  
   
Chemistry Recent Labs 10/22/20 
0500 10/21/20 
2115 10/21/20 
1412 10/21/20 
0327  10/20/20 
9751 GLU 91 121* 97 97   < > 78  79 * 137 136 134*   < > 126*  125* K 3.1* 3.5 3.6 3.4*   < > 3.4*  3.1*  
CL 98* 99* 97* 95*   < > 85*  85* CO2 29 31 31 32   < > 33*  34* BUN 51* 54* 56* 58*   < > 66*  66* CREA 1.80* 1.85* 1.84* 2.01*   < > 2.26*  2.30* CA 10.0 9.5 9.5 9.7   < > 9.3  9.4 MG 2.7*  --   --   --   --  2.4 PHOS 3.4  --   --   --   --   --   
AGAP 7 7 8 7   < > 8  6 BUCR 28* 29* 30* 29*   < > 29*  29* *  --   --  171*  --  166* TP 8.2  --   --  8.1  --  6.9 ALB 2.8*  --   --  2.8*  --  2.6*  
GLOB 5.4*  --   --  5.3*  --  4.3* AGRAT 0.5*  --   --  0.5*  --  0.6*  
 < > = values in this interval not displayed. CBC w/Diff Recent Labs 10/22/20 
0500 10/20/20 
6656 WBC 6.5 5.2  
RBC 4.23 4.02* HGB 9.4* 8.7* HCT 31.0* 28.8*  
 216 GRANS 76* 74* LYMPH 11* 14* EOS 1 1 Cardiac Enzymes No results for input(s): CPK, CKND1, JONI in the last 72 hours. No lab exists for component: [place] Coagulation No results for input(s): PTP, INR, APTT, INREXT, INREXT in the last 72 hours. Lipid Panel Lab Results Component Value Date/Time Cholesterol, total 178 10/20/2020 10:05 AM  
 HDL Cholesterol 33 (L) 10/20/2020 10:05 AM  
 LDL, calculated 125.8 (H) 10/20/2020 10:05 AM  
 VLDL, calculated 19.2 10/20/2020 10:05 AM  
 Triglyceride 96 10/20/2020 10:05 AM  
 CHOL/HDL Ratio 5.4 (H) 10/20/2020 10:05 AM  
  
BNP No results for input(s): BNPP in the last 72 hours. Liver Enzymes Recent Labs 10/22/20 
0500 TP 8.2 ALB 2.8* * Digoxin Thyroid Studies Lab Results Component Value Date/Time  TSH 1.98 10/16/2020 04:22 AM  
 Nieves Valadez, NP-C  Supervised I have independently evaluated and examined the patient. All relevant labs and testing data's are reviewed. Care plan discussed and updated after review.  
 
Sadie Rosas MD

## 2020-10-22 NOTE — PROGRESS NOTES
Problem: Falls - Risk of 
Goal: *Absence of Falls Description: Document Dianna May Fall Risk and appropriate interventions in the flowsheet. Outcome: Progressing Towards Goal 
Note: Fall Risk Interventions: 
Mobility Interventions: Bed/chair exit alarm, OT consult for ADLs, PT Consult for mobility concerns Mentation Interventions: Bed/chair exit alarm, Door open when patient unattended Medication Interventions: Bed/chair exit alarm Elimination Interventions: Bed/chair exit alarm, Call light in reach Problem: Patient Education: Go to Patient Education Activity Goal: Patient/Family Education Outcome: Progressing Towards Goal 
  
Problem: Patient Education: Go to Patient Education Activity Goal: Patient/Family Education Outcome: Progressing Towards Goal 
  
Problem: Heart Failure: Day 4 Goal: Off Pathway (Use only if patient is Off Pathway) Outcome: Progressing Towards Goal 
Goal: Activity/Safety Outcome: Progressing Towards Goal 
Goal: Diagnostic Test/Procedures Outcome: Progressing Towards Goal 
Goal: Nutrition/Diet Outcome: Progressing Towards Goal 
Goal: Discharge Planning Outcome: Progressing Towards Goal 
Goal: Medications Outcome: Progressing Towards Goal 
Goal: Respiratory Outcome: Progressing Towards Goal 
Goal: Treatments/Interventions/Procedures Outcome: Progressing Towards Goal 
Goal: Psychosocial 
Outcome: Progressing Towards Goal 
Goal: *Oxygen saturation within defined limits Outcome: Progressing Towards Goal 
Goal: *Hemodynamically stable Outcome: Progressing Towards Goal 
Goal: *Optimal pain control at patient's stated goal 
Outcome: Progressing Towards Goal 
Goal: *Anxiety reduced or absent Outcome: Progressing Towards Goal 
Goal: *Demonstrates progressive activity Outcome: Progressing Towards Goal 
  
Problem: Heart Failure: Day 5 Goal: Off Pathway (Use only if patient is Off Pathway) Outcome: Progressing Towards Goal 
Goal: Activity/Safety Outcome: Progressing Towards Goal 
Goal: Diagnostic Test/Procedures Outcome: Progressing Towards Goal 
Goal: Nutrition/Diet Outcome: Progressing Towards Goal 
Goal: Discharge Planning Outcome: Progressing Towards Goal 
Goal: Medications Outcome: Progressing Towards Goal 
Goal: Respiratory Outcome: Progressing Towards Goal 
Goal: Treatments/Interventions/Procedures Outcome: Progressing Towards Goal 
Goal: Psychosocial 
Outcome: Progressing Towards Goal 
  
Problem: Heart Failure: Discharge Outcomes Goal: *Demonstrates ability to perform prescribed activity without shortness of breath or discomfort Outcome: Progressing Towards Goal 
Goal: *Left ventricular function assessment completed prior to or during stay, or planned for post-discharge Outcome: Progressing Towards Goal 
Goal: *ACEI prescribed if LVEF less than 40% and no contraindications or ARB prescribed Outcome: Progressing Towards Goal 
Goal: *Verbalizes understanding and describes prescribed diet Outcome: Progressing Towards Goal 
Goal: *Verbalizes understanding/describes prescribed medications Outcome: Progressing Towards Goal 
Goal: *Describes available resources and support systems Description: (eg: Home Health, Palliative Care, Advanced Medical Directive) Outcome: Progressing Towards Goal 
Goal: *Describes smoking cessation resources Outcome: Progressing Towards Goal 
Goal: *Understands and describes signs and symptoms to report to providers(Stroke Metric) Outcome: Progressing Towards Goal 
Goal: *Describes/verbalizes understanding of follow-up/return appt Description: (eg: to physicians, diabetes treatment coordinator, and other resources Outcome: Progressing Towards Goal 
Goal: *Describes importance of continuing daily weights and changes to report to physician Outcome: Progressing Towards Goal 
  
Problem: Impaired Skin Integrity/Pressure Injury Treatment Goal: *Improvement of Existing Pressure Injury Outcome: Progressing Towards Goal 
Goal: *Prevention of pressure injury Description: Document Angel Scale and appropriate interventions in the flowsheet. Outcome: Progressing Towards Goal 
Note: Pressure Injury Interventions: 
Sensory Interventions: Assess changes in LOC, Keep linens dry and wrinkle-free Moisture Interventions: Absorbent underpads, Apply protective barrier, creams and emollients, Internal/External urinary devices Activity Interventions: Pressure redistribution bed/mattress(bed type), PT/OT evaluation Mobility Interventions: HOB 30 degrees or less, Pressure redistribution bed/mattress (bed type), PT/OT evaluation Nutrition Interventions: Document food/fluid/supplement intake, Offer support with meals,snacks and hydration Friction and Shear Interventions: Apply protective barrier, creams and emollients, HOB 30 degrees or less Problem: Patient Education: Go to Patient Education Activity Goal: Patient/Family Education Outcome: Progressing Towards Goal 
  
Problem: Patient Education: Go to Patient Education Activity Goal: Patient/Family Education Outcome: Progressing Towards Goal 
  
Problem: Nutrition Deficit Goal: *Optimize nutritional status Outcome: Progressing Towards Goal 
  
Problem: Pressure Injury - Risk of 
Goal: *Prevention of pressure injury Description: Document Angel Scale and appropriate interventions in the flowsheet. Outcome: Progressing Towards Goal 
Note: Pressure Injury Interventions: 
Sensory Interventions: Assess changes in LOC, Keep linens dry and wrinkle-free Moisture Interventions: Absorbent underpads, Apply protective barrier, creams and emollients, Internal/External urinary devices Activity Interventions: Pressure redistribution bed/mattress(bed type), PT/OT evaluation Mobility Interventions: HOB 30 degrees or less, Pressure redistribution bed/mattress (bed type), PT/OT evaluation Nutrition Interventions: Document food/fluid/supplement intake, Offer support with meals,snacks and hydration Friction and Shear Interventions: Apply protective barrier, creams and emollients, HOB 30 degrees or less Problem: Patient Education: Go to Patient Education Activity Goal: Patient/Family Education Outcome: Progressing Towards Goal 
  
Problem: Infection - Risk of, Urinary Catheter-Associated Urinary Tract Infection Goal: *Absence of infection signs and symptoms Outcome: Progressing Towards Goal 
  
Problem: Patient Education: Go to Patient Education Activity Goal: Patient/Family Education Outcome: Progressing Towards Goal 
  
Problem: Patient Education: Go to Patient Education Activity Goal: Patient/Family Education Outcome: Progressing Towards Goal

## 2020-10-22 NOTE — PROGRESS NOTES
Discharge planning: 
 
Patient is now being recommended for a skilled nursing facility placement, for short term rehab. This writer phoned patient's caregiver Juan Carlos Bonilla) to discuss the recommendation and the need for their top three choices. [facility] wants to talk this over with patient's  and will get back to this writer, regarding their top three choices. This writer also informed patient's nurse that patient will need a COVID test for placement. She reached out the Dr. Delores Archer who has ordered the COVID test (send out). Patient has a management medicare [employer]), which will require authorization. This writer will continue to monitor for discharge planning to ensure a safe discharge from [facility]. Brennon Bhatti. MS Care Manager Pager#: [phone]

## 2020-10-23 LAB
ALBUMIN SERPL-MCNC: 2.9 G/DL (ref 3.4–5)
ALBUMIN/GLOB SERPL: 0.6 {RATIO} (ref 0.8–1.7)
ALP SERPL-CCNC: 171 U/L (ref 45–117)
ALT SERPL-CCNC: 12 U/L (ref 13–56)
ANION GAP SERPL CALC-SCNC: 9 MMOL/L (ref 3–18)
AST SERPL-CCNC: 18 U/L (ref 10–38)
BILIRUB SERPL-MCNC: 1.5 MG/DL (ref 0.2–1)
BUN SERPL-MCNC: 50 MG/DL (ref 7–18)
BUN/CREAT SERPL: 31 (ref 12–20)
CALCIUM SERPL-MCNC: 10 MG/DL (ref 8.5–10.1)
CHLORIDE SERPL-SCNC: 100 MMOL/L (ref 100–111)
CO2 SERPL-SCNC: 27 MMOL/L (ref 21–32)
CREAT SERPL-MCNC: 1.6 MG/DL (ref 0.6–1.3)
FERRITIN SERPL-MCNC: 62 NG/ML (ref 8–388)
GLOBULIN SER CALC-MCNC: 4.9 G/DL (ref 2–4)
GLUCOSE SERPL-MCNC: 96 MG/DL (ref 74–99)
IRON SATN MFR SERPL: 6 % (ref 20–50)
IRON SERPL-MCNC: 22 UG/DL (ref 50–175)
MAGNESIUM SERPL-MCNC: 2.9 MG/DL (ref 1.6–2.6)
PHOSPHATE SERPL-MCNC: 3 MG/DL (ref 2.5–4.9)
POTASSIUM SERPL-SCNC: 3.2 MMOL/L (ref 3.5–5.5)
PROT SERPL-MCNC: 7.8 G/DL (ref 6.4–8.2)
SODIUM SERPL-SCNC: 136 MMOL/L (ref 136–145)
TIBC SERPL-MCNC: 372 UG/DL (ref 250–450)

## 2020-10-23 PROCEDURE — 80053 COMPREHEN METABOLIC PANEL: CPT

## 2020-10-23 PROCEDURE — 99232 SBSQ HOSP IP/OBS MODERATE 35: CPT | Performed by: INTERNAL MEDICINE

## 2020-10-23 PROCEDURE — 83735 ASSAY OF MAGNESIUM: CPT

## 2020-10-23 PROCEDURE — 74011000258 HC RX REV CODE- 258: Performed by: INTERNAL MEDICINE

## 2020-10-23 PROCEDURE — 82728 ASSAY OF FERRITIN: CPT

## 2020-10-23 PROCEDURE — 36415 COLL VENOUS BLD VENIPUNCTURE: CPT

## 2020-10-23 PROCEDURE — 83540 ASSAY OF IRON: CPT

## 2020-10-23 PROCEDURE — 74011250637 HC RX REV CODE- 250/637: Performed by: STUDENT IN AN ORGANIZED HEALTH CARE EDUCATION/TRAINING PROGRAM

## 2020-10-23 PROCEDURE — 65270000029 HC RM PRIVATE

## 2020-10-23 PROCEDURE — 84100 ASSAY OF PHOSPHORUS: CPT

## 2020-10-23 PROCEDURE — 97535 SELF CARE MNGMENT TRAINING: CPT

## 2020-10-23 PROCEDURE — 82232 ASSAY OF BETA-2 PROTEIN: CPT

## 2020-10-23 PROCEDURE — 2709999900 HC NON-CHARGEABLE SUPPLY

## 2020-10-23 PROCEDURE — 74011250637 HC RX REV CODE- 250/637: Performed by: FAMILY MEDICINE

## 2020-10-23 PROCEDURE — 74011250636 HC RX REV CODE- 250/636: Performed by: INTERNAL MEDICINE

## 2020-10-23 PROCEDURE — 97530 THERAPEUTIC ACTIVITIES: CPT

## 2020-10-23 RX ORDER — POTASSIUM CHLORIDE 20 MEQ/1
40 TABLET, EXTENDED RELEASE ORAL ONCE
Status: COMPLETED | OUTPATIENT
Start: 2020-10-23 | End: 2020-10-23

## 2020-10-23 RX ORDER — BUMETANIDE 1 MG/1
1 TABLET ORAL DAILY
Status: DISCONTINUED | OUTPATIENT
Start: 2020-10-24 | End: 2020-10-23

## 2020-10-23 RX ORDER — MEGESTROL ACETATE 40 MG/1
40 TABLET ORAL DAILY
Status: DISCONTINUED | OUTPATIENT
Start: 2020-10-24 | End: 2020-10-30 | Stop reason: HOSPADM

## 2020-10-23 RX ORDER — METOPROLOL TARTRATE 25 MG/1
25 TABLET, FILM COATED ORAL 2 TIMES DAILY
Status: DISCONTINUED | OUTPATIENT
Start: 2020-10-23 | End: 2020-10-30 | Stop reason: HOSPADM

## 2020-10-23 RX ORDER — POTASSIUM CHLORIDE 20 MEQ/1
20 TABLET, EXTENDED RELEASE ORAL DAILY
Status: DISCONTINUED | OUTPATIENT
Start: 2020-10-23 | End: 2020-10-30 | Stop reason: HOSPADM

## 2020-10-23 RX ORDER — FUROSEMIDE 40 MG/1
40 TABLET ORAL DAILY
Status: DISCONTINUED | OUTPATIENT
Start: 2020-10-24 | End: 2020-10-27

## 2020-10-23 RX ADMIN — FAMOTIDINE 20 MG: 20 TABLET ORAL at 09:10

## 2020-10-23 RX ADMIN — Medication 100 MG: at 09:10

## 2020-10-23 RX ADMIN — METOPROLOL TARTRATE 25 MG: 25 TABLET, FILM COATED ORAL at 09:11

## 2020-10-23 RX ADMIN — THERA TABS 1 TABLET: TAB at 09:11

## 2020-10-23 RX ADMIN — FOLIC ACID 1 MG: 1 TABLET ORAL at 09:11

## 2020-10-23 RX ADMIN — POTASSIUM CHLORIDE 40 MEQ: 1500 TABLET, EXTENDED RELEASE ORAL at 09:11

## 2020-10-23 RX ADMIN — METOPROLOL TARTRATE 25 MG: 25 TABLET, FILM COATED ORAL at 18:39

## 2020-10-23 RX ADMIN — APIXABAN 2.5 MG: 2.5 TABLET, FILM COATED ORAL at 18:40

## 2020-10-23 RX ADMIN — POTASSIUM CHLORIDE 20 MEQ: 1500 TABLET, EXTENDED RELEASE ORAL at 11:16

## 2020-10-23 RX ADMIN — MONTELUKAST 10 MG: 10 TABLET, FILM COATED ORAL at 09:10

## 2020-10-23 RX ADMIN — ATORVASTATIN CALCIUM 40 MG: 40 TABLET, FILM COATED ORAL at 22:41

## 2020-10-23 RX ADMIN — SODIUM CHLORIDE 200 MG: 900 INJECTION, SOLUTION INTRAVENOUS at 11:20

## 2020-10-23 RX ADMIN — APIXABAN 2.5 MG: 2.5 TABLET, FILM COATED ORAL at 09:11

## 2020-10-23 NOTE — PROGRESS NOTES
CARDIOLOGY ASSOCIATESNATHANIELC. 
 
 
CARDIOLOGY PROGRESS NOTE 
RECS: 
 
 
1. Acute on chronic diastolic congestive heart failure-appears compensated now. SOB and BLE improved well. Continue to have good urine output. resume lasix 40 mg po daily 2. Hypertrophic obstructive cardiomyopathy-S/p alcohol septal ablation 2/19 at [facility] 3. Hx complete heart block - s/p dual chambers permanent pacemaker 2/19 [employer] 4. Paroxysmal  Atrial fibrillation-rate controlled. on Eliquis for stroke prevention 5. Aortic stenosis -has become severe now with mean gradient of 43. Was moderate on  echo in 9/19. Needs transfer to [facility] for TAVR . ID consulted by medical team to reassess on admission patient had abnormal UA. 6. Pulmonary hypertension-  worsening gradually with pulmonary pressure of 96 now. Echo 9/19 showed PAP 69 mmHg 7. CKD- renal indices stable 8. Hyponatremia and hypochloremia: Secondary to diuresis- better 9. AMS- worsening confusion/drowsiness - possible due to medications side effect gabapentin on hold. Head CT no acute findings 10. Hx of thyroid CA- diagnosed approximately 4 years prior. ENT consulted not a candidate for any aggressive surgical plan unless airway compromise is noted. Case discussed with her PCP-- awaiting urine culture and resolution of UTI. Recommend transfer to [facility] for TAVR if possible -- if sepsis persists, will consider outpatient referral to [facility]. Attempted to call family. EKG Results Procedure 720 Value Units Date/Time EKG, 12 LEAD, INITIAL [[id number]] Collected:  10/15/20 1547 Order Status:  Completed Updated:  10/16/20 1132 Ventricular Rate 61 BPM   
  Atrial Rate 61 BPM   
  P-R Interval 278 ms QRS Duration 148 ms Q-T Interval 432 ms QTC Calculation (Bezet) 434 ms Calculated P Axis 32 degrees Calculated R Axis 127 degrees Calculated T Axis -22 degrees   Diagnosis --  
 Atrial-paced rhythm with prolonged AV conduction Right bundle branch block T wave abnormality, consider inferior ischemia Abnormal ECG When compared with ECG of 20-SEP-2019 22:01, 
Electronic atrial pacemaker has replaced Atrial fibrillation Confirmed by Floyd James MD, Tony Hidalgo ([id number]) on 10/16/2020 11:32:17 AM 
  
  
 
XR Results (most recent): 
Results from [place] encounter on 10/15/20 XR PATELLA LT 3 V Narrative Left patella, 3 views HISTORY: Bruising. Pain. Dislocation? Patellofemoral joint remains intact with prominent dorsal patellar spur from 
degenerative disease. Small suprapatellar joint effusion. No prepatellar soft 
tissue edema. Advanced degenerative joint disease in medial knee compartment. Chondrocalcinosis /pseudogout also present. Impression IMPRESSION: No patellar dislocation. Small joint effusion. Moderate degenerative 
joint disease with chondrocalcinosis. 10/15/20 ECHO ADULT COMPLETE 10/16/2020 10/16/2020 Narrative · LV: Estimated LVEF is 65 - 70%. Visually measured ejection fraction. Normal systolic function (ejection fraction normal). Small left ventricle. Mild concentric hypertrophy. Wall motion: normal. Severe (grade 3) left  
ventricular diastolic dysfunction. · AV: Probably trileaflet aortic valve. Aortic valve mean gradient is 43  
mmHg. Aortic valve area is 0.5 cm2. Severe aortic valve stenosis is  
present. Moderate to severe aortic valve regurgitation is present. · MV: Mitral annular calcification. Mitral valve mean gradient is 5 mmHg. Moderate mitral valve stenosis is present. Mild mitral valve regurgitation  
is present. · PV: Mild to moderate pulmonic valve regurgitation is present. · TV: Moderate tricuspid valve regurgitation is present. · PA: Severe pulmonary hypertension. Pulmonary arterial systolic pressure  
is 96 mmHg. · LA: Severely dilated left atrium. Left Atrium volume index is 67 mL/m2. · RV: Dilated right ventricle. Pacing wire present · RA: Dilated right atrium. · IVC: Severely elevated central venous pressure (15+ mmHg); IVC diameter  
is larger than 21 mm and collapses less than 50% with respiration. Signed by: Claire Ramon MD  
 
 
  
  
 
 
ASSESSMENT: 
Hospital Problems  Date Reviewed: 10/15/2020 Codes Class Noted POA Severe protein-calorie malnutrition ([facility] 75.) ICD-10-CM: U66 ICD-9-CM: 326  10/21/2020 No  
   
 Anasarca ICD-10-CM: R60.1 ICD-9-CM: 782.3  10/15/2020 Unknown * (Principal) Acute on chronic diastolic congestive heart failure (HCC) ICD-10-CM: I50.33 ICD-9-CM: 428.33, 428.0  12/12/2017 Yes Mitral regurgitation ICD-10-CM: I34.0 ICD-9-CM: 424.0  6/13/2017 Yes Mild HOCM (hypertrophic obstructive cardiomyopathy) ([facility]) ICD-10-CM: I42.1 ICD-9-CM: 425.11  1/12/2017 Yes Aortic stenosis ICD-10-CM: I35.0 ICD-9-CM: 424.1  1/12/2017 Yes Pulmonary HTN ([facility] 75.) ICD-10-CM: I27.20 ICD-9-CM: 416.8  12/21/2016 Yes Papillary thyroid carcinoma ([facility] 75.) ICD-10-CM: O23 ICD-9-CM: 434  12/21/2016 Yes Obesity (BMI 30.0-34.9) ICD-10-CM: P61.0 ICD-9-CM: 278.00  10/13/2016 Yes SUBJECTIVE: 
 
Improved edema. drowsy but more alert today OBJECTIVE: 
 
VS:  
Visit Vitals /73 (BP 1 Location: Right arm, BP Patient Position: At rest) Pulse 66 Temp 97.5 °F (36.4 °C) Resp 19 Ht 5' 3\" (1.6 m) Wt 53.8 kg (118 lb 8 oz) SpO2 95% BMI 20.99 kg/m² Intake/Output Summary (Last 24 hours) at 10/23/2020 1449 Last data filed at 10/23/2020 1248 Gross per 24 hour Intake 450 ml Output 2450 ml Net -2000 ml TELE: DDD pacing General: drowsy sleepy today. Poor appetite HENT: Normocephalic, atraumatic. Normal external eye. Neck :  bruit present, increased JVP Cardiac:  regular rate and rhythm, S1: normal intensity, S2: decreased intensity, systolic murmur: late systolic 3/6, crescendo at 2nd left intercostal space, at 2nd right intercostal space, radiates to carotids, 2nd systolic murmur: holosystolic 3/6, blowing at 2nd right intercostal space, at lower left sternal border, at apex Chest/Lungs:rales b/l. Abdomen: Soft, nontender, no masses Extremities: improved  edema, peripheral pulses not palpable Labs: Results:  
   
Chemistry Recent Labs 10/23/20 
0509 10/22/20 
1310 10/22/20 
0500  10/21/20 
0327 GLU 96 122* 91   < > 97  138 134*   < > 134* K 3.2* 3.5 3.1*   < > 3.4*  
 101 98*   < > 95* CO2 27 30 29   < > 32 BUN 50* 50* 51*   < > 58* CREA 1.60* 1.79* 1.80*   < > 2.01* CA 10.0 10.0 10.0   < > 9.7 MG 2.9*  --  2.7*  --   --   
PHOS 3.0  --  3.4  --   --   
AGAP 9 7 7   < > 7  
BUCR 31* 28* 28*   < > 29* *  --  160*  --  171* TP 7.8  --  8.2  --  8.1 ALB 2.9*  --  2.8*  --  2.8*  
GLOB 4.9*  --  5.4*  --  5.3* AGRAT 0.6*  --  0.5*  --  0.5*  
 < > = values in this interval not displayed. CBC w/Diff Recent Labs 10/22/20 
0500 WBC 6.5  
RBC 4.23  
HGB 9.4* HCT 31.0*  
 GRANS 76* LYMPH 11* EOS 1 Cardiac Enzymes No results for input(s): CPK, CKND1, JONI in the last 72 hours. No lab exists for component: [place] Coagulation No results for input(s): PTP, INR, APTT, INREXT, INREXT in the last 72 hours. Lipid Panel Lab Results Component Value Date/Time Cholesterol, total 178 10/20/2020 10:05 AM  
 HDL Cholesterol 33 (L) 10/20/2020 10:05 AM  
 LDL, calculated 125.8 (H) 10/20/2020 10:05 AM  
 VLDL, calculated 19.2 10/20/2020 10:05 AM  
 Triglyceride 96 10/20/2020 10:05 AM  
 CHOL/HDL Ratio 5.4 (H) 10/20/2020 10:05 AM  
  
BNP No results for input(s): BNPP in the last 72 hours. Liver Enzymes Recent Labs 10/23/20 
9361 TP 7.8 ALB 2.9*  
* Digoxin Thyroid Studies Lab Results Component Value Date/Time TSH 1.98 10/16/2020 04:22 AM  
    
 
 
 
Nieves Valadez NPLuigiC supervised I have independently evaluated and examined the patient. All relevant labs and testing data's are reviewed. Care plan discussed and updated after review.  
 
Delano Viveros MD

## 2020-10-23 NOTE — PROGRESS NOTES
Infectious Disease progress note Reason: Metabolic encephalopathy, bacteriuria, evaluate for infection ? cystitis Current abx Prior abx Lines:  
 
 
Assessment : 
 
 
 21-year-old female with a history of HOCM, pulmonary hypertension, congestive heart failure, chronic atrial fibrillation on anticoagulation, aortic stenosis, and hypertension presented to emergency department on 10/15/2020 with increasing swelling for  2 weeks.  
  
Now with altered mentation, bacteriuria Altered mental status likely metabolic encephalopathy secondary to noninfectious etiology of altered mentation such as medication induced Await COVID-19 test 
 
Lack of fever, leukocytosis, procalcitonin 0.35 argues against bacterial infection. Greater than 100,000 colonies of gram-negative jason in urine culture likely colonizer. Improvement in mental status without antibiotics argues against bacterial cystitis. Improving mental status likely secondary to discontinuation of Ultram, gabapentin suggest altered mentation was likely medication induced Risk of giving antibiotics outweigh the benefit. Hence recommend to monitor patient off antibiotics 
  
Recommendations: 
  
1. Hold antibiotic 2. Follow-up urine culture 3. Follow-up COVID-19 test 
4. Okay to proceed with the planned cardiac procedure if continued clinical improvement off antibiotics in a.m. 
  
Advance Care planning: full code: discussed  with patient/surrogate decision maker: eder garcia: [phone] 
  
  
Above plan was discussed in details with dr. Bill Garcia. Please call me if any further questions or concerns. Will continue to participate in the care of this patient. HPI: 
 
Feels good. Denies chest pain, shortness of breath, abdominal pain. Denies any burning while urinating 
 
 
home Medication List  
 Details Digitek 125 mcg (0.125 mg) tablet take 1 tablet by mouth once daily 
Qty: 90 Tab, Refills: 1 Details apixaban (Eliquis) 5 mg tablet Take 1 Tab by mouth two (2) times a day. Qty: 60 Tab, Refills: 3 Calcium-Cholecalciferol, D3, (CALCIUM 600 WITH VITAMIN D3) 600 mg(1,500mg) -400 unit chew Take 1 Tab by mouth daily. furosemide (LASIX) 40 mg tablet take 1 tablet by mouth every morning and 1/2 tablet AT 2 P.M. 
Qty: 45 Tab, Refills: 0  
  
metoprolol tartrate (LOPRESSOR) 100 mg IR tablet take 1 tablet by mouth twice a day 
Qty: 60 Tab, Refills: 6  
  
traMADol (ULTRAM) 50 mg tablet Take 1 Tab by mouth every six (6) hours as needed for Pain. folic acid 900 mcg tablet Take 400 mcg by mouth daily. cyanocobalamin, vitamin B-12, (VITAMIN B12 PO) Take 1,000 mcg by mouth daily. famotidine (PEPCID) 20 mg tablet Take 1 Tab by mouth daily. Qty: 30 Tab, Refills: 0  
  
gabapentin (NEURONTIN) 100 mg capsule Take 1 Cap by mouth nightly. Max Daily Amount: 100 mg. Qty: 30 Cap, Refills: 0 Associated Diagnoses: Pain and swelling of lower leg, unspecified laterality  
  
lactobacillus sp. 50 billion cpu (BIO-K PLUS) 50 billion cell -375 mg cap capsule Take 1 Cap by mouth daily. Qty: 10 Cap, Refills: 0  
  
ondansetron hcl (ZOFRAN) 4 mg tablet Take 1 Tab by mouth every eight (8) hours as needed for Nausea. Qty: 30 Tab, Refills: 2 Associated Diagnoses: Nausea  
  
simvastatin (ZOCOR) 80 mg tablet TAKE 1 TABLET NIGHTLY Qty: 90 Tab, Refills: 1 Associated Diagnoses: Nonrheumatic aortic valve insufficiency  
  
cholecalciferol (VITAMIN D3) 1,000 unit cap Take  by mouth daily. losartan (COZAAR) 100 mg tablet Take 100 mg by mouth daily. montelukast (SINGULAIR) 10 mg tablet Take 10 mg by mouth daily. CETIRIZINE HCL (ZYRTEC PO) Take  by mouth. Current Facility-Administered Medications Medication Dose Route Frequency  potassium chloride (K-DUR, KLOR-CON) SR tablet 20 mEq  20 mEq Oral DAILY  metoprolol tartrate (LOPRESSOR) tablet 25 mg  25 mg Oral BID  
  iron sucrose (VENOFER) 200 mg in 0.9% sodium chloride 100 mL IVPB  200 mg IntraVENous ONCE  
 acetaminophen (TYLENOL) tablet 650 mg  650 mg Oral Q4H PRN  
 ergocalciferol capsule 50,000 Units  50,000 Units Oral Q7D  
 therapeutic multivitamin (THERAGRAN) tablet 1 Tab  1 Tab Oral DAILY  thiamine HCL (B-1) tablet 100 mg  100 mg Oral DAILY  apixaban (ELIQUIS) tablet 2.5 mg  2.5 mg Oral BID  hydrALAZINE (APRESOLINE) tablet 25 mg  25 mg Oral TID PRN  
 famotidine (PEPCID) tablet 20 mg  20 mg Oral DAILY  folic acid (FOLVITE) tablet 1 mg  1 mg Oral DAILY  montelukast (SINGULAIR) tablet 10 mg  10 mg Oral DAILY  ondansetron hcl (ZOFRAN) tablet 4 mg  4 mg Oral Q8H PRN  
 atorvastatin (LIPITOR) tablet 40 mg  40 mg Oral QHS Allergies: Patient has no known allergies. Temp (24hrs), Av.1 °F (36.7 °C), Min:97 °F (36.1 °C), Max:99 °F (37.2 °C) Visit Vitals BP (!) 159/66 (BP 1 Location: Right arm, BP Patient Position: At rest) Pulse 80 Temp 99 °F (37.2 °C) Resp 19 Ht 5' 3\" (1.6 m) Wt 53.8 kg (118 lb 8 oz) SpO2 95% BMI 20.99 kg/m² ROS: 12 point review of systems obtained details, pertinent positive as mentioned in history of present illness, otherwise negative Physical Exam: 
 
General:         no acute distress, more communicative, alert awake oriented x2 HEENT:           NC, Atraumatic.  anicteric sclerae. Lungs:            CTA Bilaterally. No Wheezing/Rhonchi/Rales. Heart:              Regular rhythm Abdomen:      Soft, Non distended, Non tender. Extremities:   Wound Rt leg clean dressing in place wearing Tubigrip, slow moving and weak. Winced when both her legs were touched and examined for edema, but only trace edema noted Psych:            Not anxious or agitated. Neurologic:     No gross motor or sensory deficits noted Labs: Results:  
Chemistry Recent Labs 10/23/20 
0509 10/22/20 
1310 10/22/20 
0500  10/21/20 
0327 GLU 96 122* 91   < > 97  
  138 134*   < > 134* K 3.2* 3.5 3.1*   < > 3.4*  
 101 98*   < > 95* CO2 27 30 29   < > 32 BUN 50* 50* 51*   < > 58* CREA 1.60* 1.79* 1.80*   < > 2.01* CA 10.0 10.0 10.0   < > 9.7 AGAP 9 7 7   < > 7  
BUCR 31* 28* 28*   < > 29* *  --  160*  --  171* TP 7.8  --  8.2  --  8.1 ALB 2.9*  --  2.8*  --  2.8*  
GLOB 4.9*  --  5.4*  --  5.3* AGRAT 0.6*  --  0.5*  --  0.5*  
 < > = values in this interval not displayed. CBC w/Diff Recent Labs 10/22/20 
0500 WBC 6.5  
RBC 4.23  
HGB 9.4* HCT 31.0*  
 GRANS 76* LYMPH 11* EOS 1 Microbiology Recent Labs 10/22/20 
0830 CULT GRAM NEGATIVE RODS* RADIOLOGY: 
 
All available imaging studies/reports in [facility] for this admission were reviewed Dr. Kristina David, Infectious Disease Specialist 
[phone] October 23, 2020 
9:31 AM

## 2020-10-23 NOTE — PROGRESS NOTES
Progress Note Patient: Amee Pearl MRN: [id number]  CSN: [id number] YOB: 1940  Age: 68 y.o. Sex: female DOA: 10/15/2020 LOS:  LOS: 8 days Subjective: Pt was sleeping but quickly woke up when we walked into the room. She appeared more alert and attentive this morning than she has in the past couple days. She was answering questions very well and was able to correctly say that she is in the hospital. She was still unable to remember what month or year it is. Her CT scan on 10/21 showed no acute intracranial abnormality. Neurology recommended discontinuing Tramadol which was done last night. She seems to be improving since this change. Spoke with Cardiology Dr. Randy Tafoya about the need for transfer to [facility] for TAVR for her aortic stenosis. He will evaluate her over the weekend and assess if she is coherent enough to consent to surgery. Cardiology needs Infectious Disease to reassess her before transfer. Infectious Disease is monitoring her due to recent 3+ bacteruria and is waiting for urine culture and covid test 
 
She has a history of isotonic hyponatremia and is at 136 today. Nephrology found elevated lambda light chains and M spikes  and recommended  hematology to assess for mgus vs multiple myeloma. Spoke with Hematology Dr. Nicho Mcpherson who will consult. She continues to report pain in both of her legs. X-ray of L leg showed small joint effusion but no patellar dislocation. She has a history of Thyroid carcinoma and ENT consulted   with her 10/19 but she wasn't alert and was confused. she was seen by Dr Inderjit Hoover in the past 
 
CT chest 
1. Bilateral hazy groundglass pulmonary opacities with relative sparing of the 
periphery suggestive of pulmonary edema. Very small bilateral pleural effusions. Moderate to marked cardiomegaly. 2.  Similar appearance of enlarged pulmonary artery suggestive of pulmonaryartery hypertension. 3.  Very minimal increased size of medial left upper lobe pulmonary nodule since 2016. Today measuring 6 x 5 mm, previously measuring 5 x 3 mm. Finding is likely 
benign. However given slight change over time suggest follow-up CT in one year. 4.  Extensive atherosclerotic vascular calcification Echo · LV: Estimated LVEF is 65 - 70%. Visually measured ejection fraction. Normal systolic function (ejection fraction normal). Small left ventricle. Mild concentric hypertrophy. Wall motion: normal. Severe (grade 3) left ventricular diastolic dysfunction. · AV: Probably trileaflet aortic valve. Aortic valve mean gradient is 43 mmHg. Aortic valve area is 0.5 cm2. Severe aortic valve stenosis is present. Moderate to severe aortic valve regurgitation is present. · MV: Mitral annular calcification. Mitral valve mean gradient is 5 mmHg. Moderate mitral valve stenosis is present. Mild mitral valve regurgitation is present. · PV: Mild to moderate pulmonic valve regurgitation is present. · TV: Moderate tricuspid valve regurgitation is present. · PA: Severe pulmonary hypertension. Pulmonary arterial systolic pressure is 96 mmHg. · LA: Severely dilated left atrium. Left Atrium volume index is 67 mL/m2. · RV: Dilated right ventricle. Pacing wire present · RA: Dilated right atrium. · IVC: Severely elevated central venous pressure (15+ mmHg); IVC diameter is larger than 21 mm and collapses less than 50% with respiration. Thyroid ultrasound 1. Large irregular mixed echogenicity right thyroid nodule measuring 2.8 cm, 
with multiple punctate echogenic foci, previously biopsied with results 
concerning for papillary thyroid carcinoma. Recommend referral to ENT. 
  
  
Chief Complaint:  
Chief Complaint Patient presents with  Peripheral Edema Review of systems General: No fevers or chills. Cardiovascular: No chest pain or pressure. Pulmonary: No shortness of breath, cough or wheeze. Gastrointestinal: No abdominal pain, nausea, vomiting or diarrhea. Genitourinary: No urinary frequency, urgency, hesitancy or dysuria. Musculoskeletal: generalized weakness and pain her muscles. Neurologic: No headache, generalized weakness Objective:  
 
Physical Exam: 
Visit Vitals /64 (BP 1 Location: Right arm, BP Patient Position: At rest) Pulse 67 Temp 98.1 °F (36.7 °C) Resp 18 Ht 5' 3\" (1.6 m) Wt 53.8 kg (118 lb 8 oz) SpO2 94% BMI 20.99 kg/m² General:         no acute distress, mild confusion but much more alert and attentive. HEENT: NC, Atraumatic.  anicteric sclerae. Lungs: CTA Bilaterally. No Wheezing/Rhonchi/Rales. Heart:  Regular rhythm, systolic Murmurs Abdomen: Soft, Non distended, Non tender. Extremities: Wound Rt leg clean dressing in place wearing Tubigrip, slow moving and weak. Winced when both her legs were touched and examined for edema, but only trace edema noted Psych:   Not anxious or agitated. Neurologic:  Oriented to location, but not oriented to date Intake and Output: 
Current Shift:  No intake/output data recorded. Last three shifts:  10/21 1901 - 10/23 0700 In: 61 [P.O.:60] Out: 2150 [Urine:2150] Labs: Results:  
   
Chemistry Recent Labs 10/23/20 
0509 10/22/20 
1310 10/22/20 
0500  10/21/20 
0327 GLU 96 122* 91   < > 97  138 134*   < > 134* K 3.2* 3.5 3.1*   < > 3.4*  
 101 98*   < > 95* CO2 27 30 29   < > 32 BUN 50* 50* 51*   < > 58* CREA 1.60* 1.79* 1.80*   < > 2.01* CA 10.0 10.0 10.0   < > 9.7 AGAP 9 7 7   < > 7  
BUCR 31* 28* 28*   < > 29* *  --  160*  --  171* TP 7.8  --  8.2  --  8.1 ALB 2.9*  --  2.8*  --  2.8*  
GLOB 4.9*  --  5.4*  --  5.3* AGRAT 0.6*  --  0.5*  --  0.5*  
 < > = values in this interval not displayed. CBC w/Diff Recent Labs 10/22/20 
0500 WBC 6.5  
RBC 4.23  
HGB 9.4* HCT 31.0*  
 GRANS 76* LYMPH 11* EOS 1 Cardiac Enzymes No results for input(s): CPK, CKND1, JONI in the last 72 hours. No lab exists for component: Eneida Holley Coagulation No results for input(s): PTP, INR, APTT, INREXT, INREXT in the last 72 hours. Lipid Panel Lab Results Component Value Date/Time Cholesterol, total 178 10/20/2020 10:05 AM  
 HDL Cholesterol 33 (L) 10/20/2020 10:05 AM  
 LDL, calculated 125.8 (H) 10/20/2020 10:05 AM  
 VLDL, calculated 19.2 10/20/2020 10:05 AM  
 Triglyceride 96 10/20/2020 10:05 AM  
 CHOL/HDL Ratio 5.4 (H) 10/20/2020 10:05 AM  
  
BNP No results for input(s): BNPP in the last 72 hours. Liver Enzymes Recent Labs 10/23/20 
3435 TP 7.8 ALB 2.9*  
* Thyroid Studies Lab Results Component Value Date/Time TSH 1.98 10/16/2020 04:22 AM  
    
 
Procedures/imaging: see electronic medical records for all procedures/Xrays and details which were not copied into this note but were reviewed prior to creation of Plan Medications:  
Current Facility-Administered Medications Medication Dose Route Frequency  potassium chloride (K-DUR, KLOR-CON) SR tablet 40 mEq  40 mEq Oral ONCE  
 acetaminophen (TYLENOL) tablet 650 mg  650 mg Oral Q4H PRN  
 ergocalciferol capsule 50,000 Units  50,000 Units Oral Q7D  
 therapeutic multivitamin (THERAGRAN) tablet 1 Tab  1 Tab Oral DAILY  thiamine HCL (B-1) tablet 100 mg  100 mg Oral DAILY  apixaban (ELIQUIS) tablet 2.5 mg  2.5 mg Oral BID  metoprolol tartrate (LOPRESSOR) tablet 12.5 mg  12.5 mg Oral BID  hydrALAZINE (APRESOLINE) tablet 25 mg  25 mg Oral TID PRN  
 ferrous sulfate tablet 325 mg  1 Tab Oral DAILY WITH BREAKFAST  famotidine (PEPCID) tablet 20 mg  20 mg Oral DAILY  folic acid (FOLVITE) tablet 1 mg  1 mg Oral DAILY  montelukast (SINGULAIR) tablet 10 mg  10 mg Oral DAILY  ondansetron hcl (ZOFRAN) tablet 4 mg  4 mg Oral Q8H PRN  
 atorvastatin (LIPITOR) tablet 40 mg  40 mg Oral QHS Assessment/Plan Principal Problem: 
  Acute on chronic diastolic congestive heart failure ([facility] [address] (12/12/2017) Active Problems: 
  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 
Continue limiting fluid intake 1500 cc Hypokalemia: 
-today at 3.2  
-replete potassium  40 praveen x 1  Start potassium 20 ,eg daily 
cmp in AM 
 
Nephrology Consult: 
Nephrology found elevated lambda light chains and wants hematology to assess for mgus vs multiple myeloma. Spoke with Hematology Dr. Franny Martini who will consult. Mental status changes/ confusion- improving 
-Ct scan head no contrast showed no acute intracranial abnormality 
-Neurology recommended discontinuing Tramadol which was done last night. She seems to be improving since this change. Gabapentin is also on hold for this reason 
-Per Dr. Lisa Martinez: Her clinical exam significant for impaired attention, confused thinking, disordered speech is consistent with delirium. The cause of this patient's delirium is multifactorial: severe chronic disease (terminal delirium) dehydration, poor nutrition, recent hyponatremia, also suspect thiamine deficiency, Left Knee pain  
-X-ray of L leg showed small joint effusion but no patellar dislocation. Acute systolic congestive heart failure on top of chronic diastolic congestive heart failure D/C  Bumex 0.5 mg/h Check cardiac enzyme every 8 troponin on admission  I slightly up but stable Cardiology Consult and echo: 
-per cardiology note: Artery stenosis becomes severe mean gradient 43 -echo September 19 was moderate aortic stenosis. Repeat echo this admission pt has severe aortic stenosis -Mitral regurg mild to moderate 
-Spoke with Cardiology Dr. Magaly Luz about the need for transfer to [facility] for TAVR for her aortic stenosis. He will evaluate her over the weekend and assess if she is coherent enough to consent to surgery. Infectious Disease: 
-Cardiology needs Infectious Disease to reassess her before transfer -Infectious Disease is monitoring her due to recent 3+ bacteruria and is waiting for urine culture 
-awaiting COVID test results Paroxysmal A. fib Decrease  Eliquis 2.5 mg twice daily per pharmacy due to renal function Follow-up CBC 
  
Hypertension: 159/66 today Bp is up today will increase metoprolol to 25 mg BID She used to take 50 mg BID at home  
 use hydralazine prn Pt not on cozaar at home anymore before admission 
  
Hyperlipidemia Continue Zocor 80 mg nightly Follow-up liver function 
  
History of Anemia with low iron 10/21: 
-Iron: 19 decreased from 27 on 10/16 
-TIBC: 382 
-continue iron repletion 
  
History of thyroid cancer 
-Pt seen by ENT who recommended Thyroidectomy when she is able to tolerate surgery 
- repeat ultrasound done this admission no sign of metastatics ct head and chest negative Patient following up cardiology per cardiology note acute on chronic diastolic congestive heart failure. Compensated now shortness of breath bilateral lower extremity edema improving diuretic on hold metabolic alkalosis is improving Artery stenosis becomes severe mean gradient 43 
echo September 19 was moderate aortic stenosis. Patient may  Need  cardiac cathif renal function allow worsening kidney kidney function was a cardiac cath Mitral regurg mild to moderate 
  
  
Venous stasis ulcer Wound care Pt was seen by vascular as outpatinet Continue wound care and f/u vascular surgery consult Cbc, cmp, mag Wound care Angelica Rodriguez Call 
10/23/2020 9:51AM 
 
 The patient was seen and examined independently discussed with student in [address] [place] [address], I agree with the student note. With the above changes Discussed with Dr Karissa Blankenship , cardiology team NP Ampi discussed with nephrology , hematology and ID for coordination of care Will wait for urine culture COVID test , given pt improving mental status off ultram and Gabapentin and normal pro calcitonin will hold on Rocephin 
Possible asymptomatic bacteriuria Ayanna Spaer MD 
10/23/2020

## 2020-10-23 NOTE — PROGRESS NOTES
Spoke with Dr. Maribeth Finch and let him know about the Potassium. Results for Daniella Matias (MRN [id number]) as of 10/23/2020 07:12 Ref. Range 10/23/2020 05:09 Potassium Latest Ref Range: 3.5 - 5.5 mmol/L 3.2 (L) Orders received to give Potassium Chloride 40mEq PO once.

## 2020-10-23 NOTE — PROGRESS NOTES
Problem: Falls - Risk of 
Goal: *Absence of Falls Description: Document Sintia Neto Fall Risk and appropriate interventions in the flowsheet. Outcome: Progressing Towards Goal 
Note: Fall Risk Interventions: 
Mobility Interventions: Bed/chair exit alarm, Patient to call before getting OOB, Utilize walker, cane, or other assistive device Mentation Interventions: Bed/chair exit alarm, Toileting rounds, Update white board Medication Interventions: Bed/chair exit alarm, Patient to call before getting OOB, Teach patient to arise slowly Elimination Interventions: Bed/chair exit alarm, Call light in reach, Elevated toilet seat, Toilet paper/wipes in reach, Patient to call for help with toileting needs, Toileting schedule/hourly rounds, Urinal in reach Problem: Patient Education: Go to Patient Education Activity Goal: Patient/Family Education Outcome: Progressing Towards Goal 
  
Problem: Patient Education: Go to Patient Education Activity Goal: Patient/Family Education Outcome: Progressing Towards Goal 
  
Problem: Heart Failure: Day 4 Goal: Off Pathway (Use only if patient is Off Pathway) Outcome: Progressing Towards Goal 
Goal: Activity/Safety Outcome: Progressing Towards Goal 
Goal: Diagnostic Test/Procedures Outcome: Progressing Towards Goal 
Goal: Nutrition/Diet Outcome: Progressing Towards Goal 
Goal: Discharge Planning Outcome: Progressing Towards Goal 
Goal: Medications Outcome: Progressing Towards Goal 
Goal: Respiratory Outcome: Progressing Towards Goal 
Goal: Treatments/Interventions/Procedures Outcome: Progressing Towards Goal 
Goal: Psychosocial 
Outcome: Progressing Towards Goal 
Goal: *Oxygen saturation within defined limits Outcome: Progressing Towards Goal 
Goal: *Hemodynamically stable Outcome: Progressing Towards Goal 
Goal: *Optimal pain control at patient's stated goal 
Outcome: Progressing Towards Goal 
Goal: *Anxiety reduced or absent Outcome: Progressing Towards Goal 
Goal: *Demonstrates progressive activity Outcome: Progressing Towards Goal 
  
Problem: Heart Failure: Day 5 Goal: Off Pathway (Use only if patient is Off Pathway) Outcome: Progressing Towards Goal 
Goal: Activity/Safety Outcome: Progressing Towards Goal 
Goal: Diagnostic Test/Procedures Outcome: Progressing Towards Goal 
Goal: Nutrition/Diet Outcome: Progressing Towards Goal 
Goal: Discharge Planning Outcome: Progressing Towards Goal 
Goal: Medications Outcome: Progressing Towards Goal 
Goal: Respiratory Outcome: Progressing Towards Goal 
Goal: Treatments/Interventions/Procedures Outcome: Progressing Towards Goal 
Goal: Psychosocial 
Outcome: Progressing Towards Goal 
  
Problem: Heart Failure: Discharge Outcomes Goal: *Demonstrates ability to perform prescribed activity without shortness of breath or discomfort Outcome: Progressing Towards Goal 
Goal: *Left ventricular function assessment completed prior to or during stay, or planned for post-discharge Outcome: Progressing Towards Goal 
Goal: *ACEI prescribed if LVEF less than 40% and no contraindications or ARB prescribed Outcome: Progressing Towards Goal 
Goal: *Verbalizes understanding and describes prescribed diet Outcome: Progressing Towards Goal 
Goal: *Verbalizes understanding/describes prescribed medications Outcome: Progressing Towards Goal 
Goal: *Describes available resources and support systems Description: (eg: Home Health, Palliative Care, Advanced Medical Directive) Outcome: Progressing Towards Goal 
Goal: *Describes smoking cessation resources Outcome: Progressing Towards Goal 
Goal: *Understands and describes signs and symptoms to report to providers(Stroke Metric) Outcome: Progressing Towards Goal 
Goal: *Describes/verbalizes understanding of follow-up/return appt Description: (eg: to physicians, diabetes treatment coordinator, and other resources Outcome: Progressing Towards Goal 
 Goal: *Describes importance of continuing daily weights and changes to report to physician Outcome: Progressing Towards Goal 
  
Problem: Impaired Skin Integrity/Pressure Injury Treatment Goal: *Improvement of Existing Pressure Injury Outcome: Progressing Towards Goal 
Goal: *Prevention of pressure injury Description: Document Angel Scale and appropriate interventions in the flowsheet. Outcome: Progressing Towards Goal 
Note: Pressure Injury Interventions: 
Sensory Interventions: Assess changes in LOC, Assess need for specialty bed, Float heels, Keep linens dry and wrinkle-free, Minimize linen layers, Turn and reposition approx. every two hours (pillows and wedges if needed) Moisture Interventions: Absorbent underpads, Assess need for specialty bed, Check for incontinence Q2 hours and as needed, Minimize layers, Offer toileting Q_hr Activity Interventions: Assess need for specialty bed, Pressure redistribution bed/mattress(bed type) Mobility Interventions: HOB 30 degrees or less Nutrition Interventions: Document food/fluid/supplement intake Friction and Shear Interventions: Apply protective barrier, creams and emollients, HOB 30 degrees or less Problem: Patient Education: Go to Patient Education Activity Goal: Patient/Family Education Outcome: Progressing Towards Goal 
  
Problem: Patient Education: Go to Patient Education Activity Goal: Patient/Family Education Outcome: Progressing Towards Goal 
  
Problem: Nutrition Deficit Goal: *Optimize nutritional status Outcome: Progressing Towards Goal 
  
Problem: Pressure Injury - Risk of 
Goal: *Prevention of pressure injury Description: Document Angel Scale and appropriate interventions in the flowsheet. Outcome: Progressing Towards Goal 
  
Problem: Patient Education: Go to Patient Education Activity Goal: Patient/Family Education Outcome: Progressing Towards Goal 
  
 Problem: Infection - Risk of, Urinary Catheter-Associated Urinary Tract Infection Goal: *Absence of infection signs and symptoms Outcome: Progressing Towards Goal 
  
Problem: Patient Education: Go to Patient Education Activity Goal: Patient/Family Education Outcome: Progressing Towards Goal 
  
Problem: Patient Education: Go to Patient Education Activity Goal: Patient/Family Education Outcome: Progressing Towards Goal

## 2020-10-23 NOTE — PROGRESS NOTES
Problem: Self Care Deficits Care Plan (Adult) Goal: *Acute Goals and Plan of Care (Insert Text) Description: Occupational Therapy Goals Initiated 10/19/2020 within 7 day(s). 1.  Patient will perform upper body dressing with supervision/set-up. 2.  Patient will perform lower body dressing with minimal assistance/contact guard assist. 
3.  Patient will perform Functional Activities seated EOB with G balance for 5 minutes with modified independence. 4.  Patient will perform toilet transfers with minimal assistance/contact guard assist. 
5.  Patient will perform all aspects of toileting with minimal assistance/contact guard assist. 
 
 
Prior Level of Function: Patient a poor historian this session. Patient reports receiving help with all ADLs at baseline, however, patient unable to report how much assistance she is receiving or how often. Patient reports having personal care aid seven days a week but is unsure of how long they are in her home. Patient reports living with  and states that she is left home alone occasionally. Patient reports using RW during functional mobility. Outcome: Progressing Towards Goal 
OCCUPATIONAL THERAPY TREATMENT Patient: Abner Bear (59 y.o. female) Date: 10/23/2020 Diagnosis: Anasarca [R60.1] Acute on chronic diastolic congestive heart failure ([facility] [address] Precautions: Fall Chart, occupational therapy assessment, plan of care, and goals were reviewed. ASSESSMENT: 
Pt is asleep upon entry, easily wakes up to tactile cues and appears overall more alert this session and demonstrates improved commands following. Pt has spilled water and apple sauce, requesting to change sheets and gown. Pt required Mod A and Max VCs to roll in bed for bed linen change. Pt noted to have a small BM, requiring max A for toileting while maintaining sidelying position. Pt required Min A to change gown, following was positioned with HOB elevated and set up to eat magic cup.  Pt appears alert at the end of the session and able to self-feed independently following set-up. Progression toward goals: 
[]          Improving appropriately and progressing toward goals [x]          Improving slowly and progressing toward goals 
[]          Not making progress toward goals and plan of care will be adjusted PLAN: 
Patient continues to benefit from skilled intervention to address the above impairments. Continue treatment per established plan of care. Discharge Recommendations:  Mars Thomas Further Equipment Recommendations for Discharge:  N/A  
 
SUBJECTIVE:  
Patient stated I would like not to stay here all wet. Can we do something about it?  OBJECTIVE DATA SUMMARY:  
Cognitive/Behavioral Status: 
Neurologic State: Alert, Confused Orientation Level: Oriented to person Cognition: Follows commands Safety/Judgement: Fall prevention, Awareness of environment Functional Mobility and Transfers for ADLs: 
 Bed Mobility: 
Rolling: Moderate assistance Supine to Sit: Maximum assistance Sit to Supine: Maximum assistance Scooting: Maximum assistance Balance: 
Sitting: Impaired; With support Sitting - Static: Fair (occasional) Sitting - Dynamic: Poor (constant support) ADL Intervention: 
Feeding Feeding Assistance: Set-up Upper Body Dressing Assistance Hospital Gown: Minimum  assistance Toileting Bowel Hygiene: Maximum assistance Cognitive Retraining Safety/Judgement: Fall prevention; Awareness of environment Pain: 
Pain level pre-treatment: 0/10 Pain level post-treatment: 0/10 Activity Tolerance:   
Fair Please refer to the flowsheet for vital signs taken during this treatment. After treatment:  
[]  Patient left in no apparent distress sitting up in chair 
[x]  Patient left in no apparent distress in bed 
[x]  Call bell left within reach [x]  Nursing notified 
[]  Caregiver present [x]  Bed alarm activated COMMUNICATION/EDUCATION:  
 [x] Role of Occupational Therapy in the acute care setting 
[x] Home safety education was provided and the patient/caregiver indicated understanding. [] Patient/family have participated as able in working towards goals and plan of care. [] Patient/family agree to work toward stated goals and plan of care. [] Patient understands intent and goals of therapy, but is neutral about his/her participation. [x] Patient is unable to participate in goal setting and plan of care. Thank you for this referral. 
TARAS Padron Time Calculation: 13 mins

## 2020-10-23 NOTE — PROGRESS NOTES
RENAL DAILY PROGRESS NOTE Patient: Gela Dahl               Sex: female          DOA: 10/15/2020  3:49 PM  
    
YOB: 1940      Age:  68 y.o.        LOS:  LOS: 8 days Subjective:  
 
Gela Dahl is a 68 y.o.  who presents with Anasarca [R60.1]. Asked to evaluate for hyponatremia,and renal failure,hx of diastolic heart failure,aortic stenosis,pulm htn,anasarca,was treated with bumex,metolazone Chief complains: Patient denies nausea, vomiting, diarrhea - Reviewed last 24 hrs events Current Facility-Administered Medications Medication Dose Route Frequency  potassium chloride (K-DUR, KLOR-CON) SR tablet 40 mEq  40 mEq Oral ONCE  
 acetaminophen (TYLENOL) tablet 650 mg  650 mg Oral Q4H PRN  
 ergocalciferol capsule 50,000 Units  50,000 Units Oral Q7D  
 therapeutic multivitamin (THERAGRAN) tablet 1 Tab  1 Tab Oral DAILY  thiamine HCL (B-1) tablet 100 mg  100 mg Oral DAILY  apixaban (ELIQUIS) tablet 2.5 mg  2.5 mg Oral BID  metoprolol tartrate (LOPRESSOR) tablet 12.5 mg  12.5 mg Oral BID  hydrALAZINE (APRESOLINE) tablet 25 mg  25 mg Oral TID PRN  
 ferrous sulfate tablet 325 mg  1 Tab Oral DAILY WITH BREAKFAST  famotidine (PEPCID) tablet 20 mg  20 mg Oral DAILY  folic acid (FOLVITE) tablet 1 mg  1 mg Oral DAILY  montelukast (SINGULAIR) tablet 10 mg  10 mg Oral DAILY  ondansetron hcl (ZOFRAN) tablet 4 mg  4 mg Oral Q8H PRN  
 atorvastatin (LIPITOR) tablet 40 mg  40 mg Oral QHS Objective:  
 
Visit Vitals /64 (BP 1 Location: Right arm, BP Patient Position: At rest) Pulse 67 Temp 98.1 °F (36.7 °C) Resp 18 Ht 5' 3\" (1.6 m) Wt 53.8 kg (118 lb 8 oz) SpO2 94% BMI 20.99 kg/m² Intake/Output Summary (Last 24 hours) at 10/23/2020 6862 Last data filed at 10/23/2020 5948 Gross per 24 hour Intake 60 ml Output 1450 ml Net -1390 ml Physical Examination:  
 
 
RS: Chest is bilateral equal, 
 CVS: S1-S2 heard Abdomen: Soft, Non tender, Not distended, Positive bowel sounds, no organomegaly, no CVA / supra pubic tenderness Extremities: mild edema,  
CNS: Awake HEENT: Head is atraumatic, PERRLA, conjunctiva pink & non icteric. No JVD or carotid bruit Data Review:   
 
Labs:  
 
Hematology:  
Recent Labs 10/22/20 
0500 WBC 6.5 HGB 9.4* HCT 31.0* Chemistry:  
Recent Labs 10/23/20 
0509 10/22/20 
1310 10/22/20 
0500 10/21/20 
2115 10/21/20 
1412 10/21/20 
0327 10/20/20 
2113 10/20/20 
1005 BUN 50* 50* 51* 54* 56* 58* 62* 63* CREA 1.60* 1.79* 1.80* 1.85* 1.84* 2.01* 2.18* 2.28* CA 10.0 10.0 10.0 9.5 9.5 9.7 9.3 9.7 ALB 2.9*  --  2.8*  --   --  2.8*  --   --   
K 3.2* 3.5 3.1* 3.5 3.6 3.4* 3.7 3.4*  138 134* 137 136 134* 133* 129*  101 98* 99* 97* 95* 93* 88* CO2 27 30 29 31 31 32 32 35* PHOS 3.0  --  3.4  --   --   --   --   --   
GLU 96 122* 91 121* 97 97 107* 119* Images: 
 
XR (Most Recent). CXR reviewed by me and compared with previous CXR Results from [facility] Encounter encounter on 10/15/20 XR PATELLA LT 3 V Narrative Left patella, 3 views HISTORY: Bruising. Pain. Dislocation? Patellofemoral joint remains intact with prominent dorsal patellar spur from 
degenerative disease. Small suprapatellar joint effusion. No prepatellar soft 
tissue edema. Advanced degenerative joint disease in medial knee compartment. Chondrocalcinosis /pseudogout also present. Impression IMPRESSION: No patellar dislocation. Small joint effusion. Moderate degenerative 
joint disease with chondrocalcinosis. CT (Most Recent) Results from [facility] Encounter encounter on 10/15/20 CT HEAD WO CONT Narrative CT of the head without contrast 
 
HISTORY: Altered mental status COMPARISON: 12/1/12 TECHNIQUE: Helical axial scan to the head was performed from the skull base to 
the vertex without IV contrast administration. All CT scans at this facility performed using dose optimization techniques as 
appreciated to a performed exam, to include automated exposure control, 
adjustment of the mA and or KU according to patient size (including appropriate 
matching for site specific examination), or use of iterative reconstruction 
technique. FINDINGS: 
 
There is moderate global atrophy which shows interval progression since the 
prior study. Further ventricular dilatation is also seen . There is normal 
gray-white matter differentiation. Mild periventricular white matter 
hypodensities also present. There is no evidence of acute intracranial 
hemorrhage,  mass effect or midline shift identified. No skull fracture or extra 
axial fluid collections identified. Visualized sinuses and mastoid air cells 
appear unremarkable. Impression IMPRESSION: 
 
No acute intracranial abnormality. Note: If clinical concern of acute stroke, MRI with diffusion weighted images is 
recommended for better evaluation. Progression of cerebral atrophy and ventricular dilatation as well as 
microvascular disease. Thank you for your referral.  
  
 
EKG No results found for this or any previous visit. I have personally reviewed the old medical records and patient's labs Plan / Recommendation: 1. Acute/crf stage 4,tr proteinuria,probable cardiorenal.back to baseline 2.acute/chronic hyponatremia ,related to thiazides. normal serum osmolarity raises the possibility of pseudohyponatremia related to elevated globulins,continue fluid restrictions. improving 3.sec hyperparathyroidism,ask dr Missy Hood to check for enlarged parathyroid glands/adenomas during thyroid surgery 4.hypokalemia,metabolic alkalosis related to diuretics,replace potassium 5-elevated lambda light chains. ? mgus vs multiple myeloma . consult hematology D/w Dr Allison Ward MD 
Nephrology 10/23/2020

## 2020-10-23 NOTE — CONSULTS
[facility] Name:  Tomas Marsh 
MR#:   [id number] :  1940 ACCOUNT #:  [de-identified] DATE OF SERVICE:  10/23/2020 REFERRING PHYSICIAN:  Cristela Mayfield MD 
 
REASON FOR EVALUATION:  Abnormal serum chemistry, elevated free light chains. HISTORY OF PRESENT ILLNESS:  The patient is a 70-year-old woman with multiple comorbidities including history of congestive heart failure, atrial fibrillation, severe aortic stenosis, and pulmonary hypertension. She came into the hospital with her cardiac issues and has been recommended aortic valve repair. While in the hospital, she has also been assessed for increased somnolence and delirium. She was evaluated by Neurology and has been on B complex vitamins with avoidance of sedatives. The patient was evaluated by Nephrology for renal insufficiency and had multiple lab tests done for hyponatremia and renal insufficiency, creatinine 1.6, GFR 30 mL/min. 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 She had elevated both free kappa and lambda light chains, kappa light chain 99 mg/L and lambda light chain 121 mg/L. We have been asked to evaluate and comment on these test results. The patient is very lethargic this morning. She responds to her name by opening her eyes. She is unable to communicate or answer any questions. She has had issues with unable to follow up in the office as well as comply with her medications as outpatient. Review of rest of the labs also note iron deficiency anemia. There is no documentation of recent changes in bowel habits or bleeding per rectum in her medical records. Most of the above information is through her medical records. PAST MEDICAL HISTORY:  Congestive heart failure, hypertension, hyperlipidemia, aortic stenosis, pulmonary hypertension, atrial fibrillation, osteoarthritis. PAST SURGICAL HISTORY:  Right ankle surgery, bilateral knee arthroscopic surgery. FAMILY HISTORY:  Details of family history unavailable. SOCIAL HISTORY:  Has been brought from home. Has not been ever a tobacco user. REVIEW OF SYSTEMS:  Details cannot be obtained. The patient is extremely sleepy, opens her eyes to the name, not able to participate in any questions. PHYSICAL EXAMINATION: 
GENERAL:  Frail, chronically ill-appearing elderly woman lying in bed, afebrile, heart rate 82, blood pressure 124/64. HEENT:  Pallor is noted. No jaundice. No palpable lymph nodes in the neck or the axilla. MUSCULOSKELETAL:  Generalized loss of muscle mass noted. LUNGS:  Bilateral breath sounds audible. CARDIAC:  First and second heart sounds irregular. ABDOMEN:  Soft, cannot appreciate any masses or organomegaly. NEUROLOGIC:  Lethargic, unable to complete a full exam. 
 
LABORATORY DATA:  Hemoglobin 9.4, MCV 73, white count 6500, platelets 248,633. BUN 50, creatinine 1.6, GFR 30 mL/min, total protein 7.8, albumin 2.9, free kappa light chains 99, free lambda light chains 121, IgG 2675, IgA 199, IgM 65, vitamin D 25, iron saturation 5%, serum iron 19, B12 804, folate more than 20. IMPRESSION: 
1. Anemia, multifactorial, low iron stores consistent with iron deficiency as well as anemia of chronic disease. 2.  Renal insufficiency. 3.  IgG lambda monoclonal spike. 4.  No evidence of hypogammaglobulinemia. RECOMMENDATIONS:  The patient has an IgG lambda spike which very well may be monoclonal gammopathy of unknown significance or MGUS. Further workup would require a bone marrow aspiration biopsy; however, with her multiple comorbidities, not feasible at this time. I would suggest adding beta-2 microglobulin levels to her chemistry. I 
 
 have discontinued her oral iron. She has been written for Venofer which will ensure compliance and avoid GI side effects of oral iron. When she is stable and if feasible, may proceed or consider bone marrow aspiration biopsy at a later time, suspicion for an aggressive plasma cell neoplasm is low at this time. Thank you Dr. Roshan Llanes for requesting my opinion in the patient's care. Pau Patricio MD 
 
 
[id number] 
D:  10/23/2020 8:51 
T:  10/23/2020 10:31 
JOB #:  [id number] CC:  Juancarlos Woodson MD

## 2020-10-23 NOTE — PROGRESS NOTES
Problem: Mobility Impaired (Adult and Pediatric) Goal: *Acute Goals and Plan of Care (Insert Text) Description: Physical Therapy Goals Re-evaluated 10/22/2020 and to be accomplished within 7 days 1. Patient will move from supine to sit and sit to supine in bed with minimal assistance. 2. Patient transfer from bed to chair and chair to bed with moderate assistance. 3. Patient will perform sit to stand with moderate assistance. 4. Patient will ambulate x 5 feet with moderate assistance with least restrictive device. Initiated 10/16/2020 and to be accomplished within 7 day(s) 1. Patient will move from supine to sit and sit to supine , scoot up and down, and roll side to side in bed with modified independence. 2.  Patient will transfer from bed to chair and chair to bed with supervision/set-up using the least restrictive device. 3.  Patient will perform sit to stand with supervision/set-up. 4.  Patient will ambulate with supervision/set-up for 50 feet with the least restrictive device. PLOF: Pt reports independence with dressing and bathing, ambulating with RW. Lives in 1st floor apartment with  and no DEBBIE. Pt reports having someone come in to  after them daily and has someone come into clean 1 time per week. Outcome: Progressing Towards Goal 
  
PHYSICAL THERAPY TREATMENT Patient: Rey Lal (54 y.o. female) Date: 10/23/2020 Diagnosis: Anasarca [R60.1] Acute on chronic diastolic congestive heart failure ([facility] Precautions: Fall ASSESSMENT: 
Pt found supine in bed, on room air, willing to work with PT. Pt reports she is in pain \"all over\" but getting up might help her. Pt is oriented to her name only, unaware she is in the hospital. Pt required hand-over-hand cueing to reach for grab bar to roll. Pt required maxA for rolling and supine-sit transfer.  Once sitting, pt able to maintain her seated balance with UE support. Pt able to sit EOB for ~15 minutes and performed exercises per flow sheet. Pt reports dizziness and sitting that ceased after time. Pt helped back supine in bed MaxAx1 and scooted dependently up towards [address]. Pt left supine in bed, call bell nearby, no new questions or concerns. Recommend SNF at this time. Progression toward goals:  
[]      Improving appropriately and progressing toward goals [x]      Improving slowly and progressing toward goals 
[]      Not making progress toward goals and plan of care will be adjusted PLAN: 
Patient continues to benefit from skilled intervention to address the above impairments. Continue treatment per established plan of care. Discharge Recommendations:  Mars Thomas Further Equipment Recommendations for Discharge:  N/A  
 
SUBJECTIVE:  
Patient stated i'm old, so watch out! Maria Luisa Dhillon OBJECTIVE DATA SUMMARY:  
Critical Behavior: 
Neurologic State: Confused Orientation Level: Oriented to person Cognition: Follows commands, Poor safety awareness Safety/Judgement: Fall prevention, Awareness of environment Functional Mobility Training: 
Bed Mobility: 
Supine-sit Max assist 
Sit-supine: Max assist 
 
Therapeutic Exercises: Pt performed exercises sitting EOB EXERCISE Sets Reps Active Active Assist  
Passive Self ROM Comments Ankle Pumps    [] [] [] [] Quad Sets/Glut Sets    [] [] [] [] Hold for 5 secs Hamstring Sets   [] [] [] [] Short Arc Quads   [] [] [] [] Heel Slides   [] [] [] [] Straight Leg Raises   [] [] [] [] Hip Add   [] [] [] [] Hold for 5 secs, w/ pillow squeeze Long Arc Quads 1 7 [x] [] [] [] Seated Marching   [] [] [] []   
Standing Marching   [] [] [] []   
   [] [] [] []   
 
 
Pain: 
Pain level pre-treatment: 10/10 Pain level post-treatment: 10/10 Pain Intervention(s): Medication (see MAR); Rest, Repositioning Response to intervention: Nurse notified, See doc flow Activity Tolerance: Pt tolerated mobility Please refer to the flowsheet for vital signs taken during this treatment. After treatment:  
[] Patient left in no apparent distress sitting up in chair 
[x] Patient left in no apparent distress in bed 
[x] Call bell left within reach [x] Nursing notified 
[] Caregiver present 
[] Bed alarm activated 
[] SCDs applied COMMUNICATION/EDUCATION:  
[x]         Role of Physical Therapy in the acute care setting. [x]         Fall prevention education was provided and the patient/caregiver indicated understanding. [x]         Patient/family have participated as able in working toward goals and plan of care. []         Patient/family agree to work toward stated goals and plan of care. []         Patient understands intent and goals of therapy, but is neutral about his/her participation. []         Patient is unable to participate in stated goals/plan of care: ongoing with therapy staff. 
[]         Other: 
 
   
Harry Rosenbaum Time Calculation: 23 mins

## 2020-10-23 NOTE — ROUTINE PROCESS
Bedside and Verbal shift change report given to Huy Paulino (oncoming nurse) by Kieran Martinez (offgoing nurse). Report included the following information SBAR, Kardex, MAR and Recent Results. SITUATION: 
Code Status: Prior Reason for Admission: Anasarca [R60.1] Hospital day: 8 Problem List:  
   
Hospital Problems  Date Reviewed: 10/15/2020 Codes Class Noted POA Severe protein[facility]calorie [facility] ICD-10-CM: U46 ICD-9-CM: 798  10/21/2020 No  
   
 Anasarca ICD-10-CM: R60.1 ICD-9-CM: 782.3  10/15/2020 Unknown * (Principal) Acute on chronic diastolic congestive heart failure (HCC) ICD-10-CM: I50.33 ICD-9-CM: 428.33, 428.0  12/12/2017 Yes Mitral regurgitation ICD-10-CM: I34.0 ICD-9-CM: 424.0  6/13/2017 Yes Mild HOCM (hypertrophic obstructive cardiomyopathy) (HCC) ICD-10-CM: I42.1 ICD-9-CM: 425.11  1/12/2017 Yes Aortic stenosis ICD-10-CM: I35.0 ICD-9-CM: 424.1  1/12/2017 Yes Pulmonary HTN ([facility] ICD-10-CM: I27.20 ICD-9-CM: 416.8  12/21/2016 Yes Papillary thyroid carcinoma ([facility] ICD-10-CM: D85 ICD-9-CM: 751  12/21/2016 Yes Obesity (BMI 30.0-34.9) ICD-10-CM: V48.9 ICD-9-CM: 278.00  10/13/2016 Yes BACKGROUND: 
 Past Medical History:  
Past Medical History:  
Diagnosis Date  Arthritis  Atrial fibrillation ([facility]  CHF (congestive heart failure) ([facility]  Heart murmur  History of seasonal allergies  HTN (hypertension)  Hypercholesteremia  Moderate aortic stenosis  Pulmonary hypertension ([facility]  Venous insufficiency Patient taking anticoagulants no Patient has a defibrillator: no  
 History of shots YES for example, flu, pneumonia, tetanus Isolation History NO for example, MRSA, CDiff ASSESSMENT: 
Changes in Assessment Throughout Shift: None Significant Changes in 24 hours (for example, RR/code, fall) Patient has Central Line: no  
 
 Patient has Layton Cath: yes Reasons if yes: Incontinent Mobility Issues PT 
IV Patency OR Checklist 
Pending Tests Last Vitals: 
Vitals w/ MEWS Score (last day) Date/Time MEWS Score Pulse Resp Temp BP Level of Consciousness SpO2  
 10/23/20 0426  1  67  18  98.1 °F (36.7 °C)  124/64  Alert  94 % 10/23/20 0400  1  69  17  97 °F (36.1 °C)  (!) 149/66  Alert  96 % 10/23/20 0013  1  68  17  98.7 °F (37.1 °C)  135/70  Alert  96 % 10/22/20 1949  1  64  18  98.1 °F (36.7 °C)  (!) 131/52  Alert  97 % 10/22/20 1618  1  61  18  97.6 °F (36.4 °C)  131/75  Alert  95 % 10/22/20 1202  1  69  17  98 °F (36.7 °C)  (!) 151/72  Alert  97 % 10/22/20 0810  1  74  16  97.5 °F (36.4 °C)  139/69  Alert    
 10/22/20 0402  1  75  17  98.3 °F (36.8 °C)  135/71  Alert    
 10/22/20 0019  1  73  17  98 °F (36.7 °C)  (!) 130/57  Alert  95 % PAIN Pain Assessment Pain Intensity 1: 0 (10/23/20 0435) Pain Location 1: Abdomen Pain Intervention(s) 1: Medication (see MAR) Patient Stated Pain Goal: 0 Intervention effective: N/A Time of last intervention: N/A Reassessment Completed: N/A Other actions taken for pain: Distraction Last 3 Weights: 
Last 3 Recorded Weights in this Encounter 10/20/20 0544 10/21/20 1048 10/22/20 2212 Weight: 59.2 kg (130 lb 9.6 oz) 58.2 kg (128 lb 4.8 oz) 53.8 kg (118 lb 8 oz) Weight change: -4.445 kg (-9 lb 12.8 oz) INTAKE/OUPUT Current Shift: 10/22 1901 - 10/23 0700 In: 61 [P.O.:60] Out: 300 [Urine:300] Last three shifts: 10/21 0701 - 10/22 1900 In: 360 [P.O.:360] Out: 3125 [[id number]] RECOMMENDATIONS AND DISCHARGE PLANNING Patient needs and requests: Assistance with ADLs Pending tests/procedures: Labs Discharge plan for patient: Home Discharge planning Needs or Barriers: None Estimated Discharge Date: 10/30 Posted on Whiteboard in [address] [place] [address] Room: yes \"HEALS\" SAFETY CHECK 
 A safety check occurred in the patient's room between off going nurse and oncoming nurse listed above. The safety check included the below items: 
 
H High Alert Medications Verify all high alert medication drips (heparin, PCA, etc.) E Equipment Suction is set up for ALL patients (with yisel) Red plugs utilized for all equipment (IV pumps, etc.) WOWs wiped down at end of shift. Room stocked with oxygen, suction, and other unit-specific supplies A Alarms Bed alarm is set for fall risk patients Ensure chair alarm is in place and activated if patient is up in a chair L Lines Check IV for any infiltration Layton bag is empty if patient has a Layton Tubing and IV bags are labeled Dionicioaxel Guzman Safety Room is clean, patient is clean, and equipment is clean. Hallways are clear from equipment besides carts. Fall bracelet on for fall risk patients Ensure room is clear and free of clutter Suction is set up for ALL patients (with yisel) Hallways are clear from equipment besides carts. Isolation precautions followed, supplies available outside room, sign posted Vanita Huerta

## 2020-10-23 NOTE — PROGRESS NOTES
Nutrition Assessment Type and Reason for Visit: Reassess, Positive nutrition screen, Consult(MST, General Nutrition Management) Nutrition Recommendations/Plan: - Modify supplement: increase Magic Cup to 4 times daily - Megace started - Encourage po intake of meals/ supplements. Assistance with meals as needed Nutrition Assessment:  Pt lethargic at time of visit. poor po intake of meals per RN report, only eating a few bites. . eats/ likes magic cup supplements. Tolerating diet. Discussed report with Dr Sarah Meyers; discussed recommendation for NGT placement and supplement EN support; per MD, not medically appropriate, will hold off. MD started pt on Megace for appetite stimulant Malnutrition Assessment: 
Malnutrition Status: Severe malnutrition Estimated Daily Nutrient Needs: 
Energy (kcal):  4470-1472 Protein (g):  70-77 Fluid (ml/day):  1 mL/kcal 
 
Nutrition Related Findings:  BM 10/23. Receiving MVI, thiamine, KCl, folic acid, ergocalciferol. K low, being replaced. Mg high, Phos WNL. Current Nutrition Therapies: DIET DYSPHAGIA MECH ALTERED (NDD2) FR 1500ML 
DIET NUTRITIONAL SUPPLEMENTS Lunch, Dinner, Breakfast; Obed Anthropometric Measures: 
· Height:  5' 3\" (160 cm) · Current Body Wt:  53.8 kg (118 lb 9.7 oz) · BMI: 21 
 
Nutrition Diagnosis: · Severe malnutrition, In context of acute illness or injury related to cognitive or neurological impairment, biting/chewing (masticatory) difficulty, inadequate protein-energy intake, early satiety as evidenced by weight loss greater than or equal to 2% in 1 week, intake 0-25% Nutrition Intervention: 
Food and/or Nutrient Delivery: Continue current diet, Mineral supplement, Vitamin supplement, Modify oral nutrition supplement Nutrition Education and Counseling: Education not indicated Coordination of Nutrition Care: Continued inpatient monitoring, Coordination of community care(pt discussed with nursing and MD) Goals: 
Nutritional needs will be met through adequate oral intake or nutrition support within the next 7 days. Nutrition Monitoring and Evaluation:  
Food/Nutrient Intake Outcomes: Diet advancement/tolerance, Food and nutrient intake, Supplement intake, Vitamin/mineral intake Physical Signs/Symptoms Outcomes: Biochemical data, Chewing or swallowing, Constipation, Meal time behavior, Nutrition focused physical findings Discharge Planning: Too soon to determine Electronically signed by Aristeo Stevens RD on 10/23/2020 at 3:46 PM 
 
Contact Number: [phone]

## 2020-10-23 NOTE — CONSULTS
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 Suggest add B2 MG, bone marrow asp and biopsy if feasible and stable. Consider IV iron, d/c oral iron Note dictated.

## 2020-10-23 NOTE — PROGRESS NOTES
Problem: Falls - Risk of 
Goal: *Absence of Falls Description: Document Cindy Toure Fall Risk and appropriate interventions in the flowsheet. Outcome: Not Progressing Towards Goal 
Note: Fall Risk Interventions: 
Mobility Interventions: Bed/chair exit alarm Mentation Interventions: Adequate sleep, hydration, pain control, Door open when patient unattended Medication Interventions: Bed/chair exit alarm Elimination Interventions: Bed/chair exit alarm Problem: Patient Education: Go to Patient Education Activity Goal: Patient/Family Education Outcome: Not Progressing Towards Goal 
  
Problem: Patient Education: Go to Patient Education Activity Goal: Patient/Family Education Outcome: Not Progressing Towards Goal 
  
Problem: Heart Failure: Day 4 Goal: Off Pathway (Use only if patient is Off Pathway) Outcome: Not Progressing Towards Goal 
Goal: Activity/Safety Outcome: Not Progressing Towards Goal 
Goal: Diagnostic Test/Procedures Outcome: Not Progressing Towards Goal 
Goal: Nutrition/Diet Outcome: Not Progressing Towards Goal 
Goal: Discharge Planning Outcome: Not Progressing Towards Goal 
Goal: Medications Outcome: Not Progressing Towards Goal 
Goal: Respiratory Outcome: Not Progressing Towards Goal 
Goal: Treatments/Interventions/Procedures Outcome: Not Progressing Towards Goal 
Goal: Psychosocial 
Outcome: Not Progressing Towards Goal 
Goal: *Oxygen saturation within defined limits Outcome: Not Progressing Towards Goal 
Goal: *Hemodynamically stable Outcome: Not Progressing Towards Goal 
Goal: *Optimal pain control at patient's stated goal 
Outcome: Not Progressing Towards Goal 
Goal: *Anxiety reduced or absent Outcome: Not Progressing Towards Goal 
Goal: *Demonstrates progressive activity Outcome: Not Progressing Towards Goal 
  
Problem: Heart Failure: Day 5 Goal: Off Pathway (Use only if patient is Off Pathway) Outcome: Not Progressing Towards Goal 
Goal: Activity/Safety Outcome: Not Progressing Towards Goal 
Goal: Diagnostic Test/Procedures Outcome: Not Progressing Towards Goal 
Goal: Nutrition/Diet Outcome: Not Progressing Towards Goal 
Goal: Discharge Planning Outcome: Not Progressing Towards Goal 
Goal: Medications Outcome: Not Progressing Towards Goal 
Goal: Respiratory Outcome: Not Progressing Towards Goal 
Goal: Treatments/Interventions/Procedures Outcome: Not Progressing Towards Goal 
Goal: Psychosocial 
Outcome: Not Progressing Towards Goal 
  
Problem: Heart Failure: Discharge Outcomes Goal: *Demonstrates ability to perform prescribed activity without shortness of breath or discomfort Outcome: Not Progressing Towards Goal 
Goal: *Left ventricular function assessment completed prior to or during stay, or planned for post-discharge Outcome: Not Progressing Towards Goal 
Goal: *ACEI prescribed if LVEF less than 40% and no contraindications or ARB prescribed Outcome: Not Progressing Towards Goal 
Goal: *Verbalizes understanding and describes prescribed diet Outcome: Not Progressing Towards Goal 
Goal: *Verbalizes understanding/describes prescribed medications Outcome: Not Progressing Towards Goal 
Goal: *Describes available resources and support systems Description: (eg: Home Health, Palliative Care, Advanced Medical Directive) Outcome: Not Progressing Towards Goal 
Goal: *Describes smoking cessation resources Outcome: Not Progressing Towards Goal 
Goal: *Understands and describes signs and symptoms to report to providers(Stroke Metric) Outcome: Not Progressing Towards Goal 
Goal: *Describes/verbalizes understanding of follow-up/return appt Description: (eg: to physicians, diabetes treatment coordinator, and other resources Outcome: Not Progressing Towards Goal 
Goal: *Describes importance of continuing daily weights and changes to report to physician Outcome: Not Progressing Towards Goal 
  
Problem: Impaired Skin Integrity/Pressure Injury Treatment Goal: *Improvement of Existing Pressure Injury Outcome: Not Progressing Towards Goal 
Goal: *Prevention of pressure injury Description: Document Angel Scale and appropriate interventions in the flowsheet. Outcome: Not Progressing Towards Goal 
  
Problem: Patient Education: Go to Patient Education Activity Goal: Patient/Family Education Outcome: Not Progressing Towards Goal 
  
Problem: Patient Education: Go to Patient Education Activity Goal: Patient/Family Education Outcome: Not Progressing Towards Goal 
  
Problem: Nutrition Deficit Goal: *Optimize nutritional status Outcome: Not Progressing Towards Goal 
  
Problem: Pressure Injury - Risk of 
Goal: *Prevention of pressure injury Description: Document Angel Scale and appropriate interventions in the flowsheet. Outcome: Not Progressing Towards Goal 
  
Problem: Patient Education: Go to Patient Education Activity Goal: Patient/Family Education Outcome: Not Progressing Towards Goal 
  
Problem: Infection - Risk of, Urinary Catheter-Associated Urinary Tract Infection Goal: *Absence of infection signs and symptoms Outcome: Not Progressing Towards Goal 
  
Problem: Patient Education: Go to Patient Education Activity Goal: Patient/Family Education Outcome: Not Progressing Towards Goal 
  
Problem: Patient Education: Go to Patient Education Activity Goal: Patient/Family Education Outcome: Not Progressing Towards Goal

## 2020-10-24 LAB
ALBUMIN SERPL-MCNC: 2.8 G/DL (ref 3.4–5)
ALBUMIN/GLOB SERPL: 0.5 {RATIO} (ref 0.8–1.7)
ALP SERPL-CCNC: 166 U/L (ref 45–117)
ALT SERPL-CCNC: 11 U/L (ref 13–56)
ANION GAP SERPL CALC-SCNC: 7 MMOL/L (ref 3–18)
AST SERPL-CCNC: 21 U/L (ref 10–38)
B2 MICROGLOB SERPL-MCNC: 8.9 MG/L (ref 0.6–2.4)
BASOPHILS # BLD: 0 K/UL (ref 0–0.1)
BASOPHILS NFR BLD: 0 % (ref 0–2)
BILIRUB SERPL-MCNC: 1 MG/DL (ref 0.2–1)
BUN SERPL-MCNC: 51 MG/DL (ref 7–18)
BUN/CREAT SERPL: 32 (ref 12–20)
CALCIUM SERPL-MCNC: 10.2 MG/DL (ref 8.5–10.1)
CHLORIDE SERPL-SCNC: 102 MMOL/L (ref 100–111)
CO2 SERPL-SCNC: 24 MMOL/L (ref 21–32)
CREAT SERPL-MCNC: 1.58 MG/DL (ref 0.6–1.3)
DIFFERENTIAL METHOD BLD: ABNORMAL
EOSINOPHIL # BLD: 0.1 K/UL (ref 0–0.4)
EOSINOPHIL NFR BLD: 1 % (ref 0–5)
ERYTHROCYTE [DISTWIDTH] IN BLOOD BY AUTOMATED COUNT: 19.9 % (ref 11.6–14.5)
GLOBULIN SER CALC-MCNC: 5.6 G/DL (ref 2–4)
GLUCOSE SERPL-MCNC: 105 MG/DL (ref 74–99)
HCT VFR BLD AUTO: 35.4 % (ref 35–45)
HGB BLD-MCNC: 10.8 G/DL (ref 12–16)
LYMPHOCYTES # BLD: 1.2 K/UL (ref 0.9–3.6)
LYMPHOCYTES NFR BLD: 13 % (ref 21–52)
MAGNESIUM SERPL-MCNC: 2.7 MG/DL (ref 1.6–2.6)
MCH RBC QN AUTO: 22.6 PG (ref 24–34)
MCHC RBC AUTO-ENTMCNC: 30.5 G/DL (ref 31–37)
MCV RBC AUTO: 74.1 FL (ref 74–97)
MONOCYTES # BLD: 1 K/UL (ref 0.05–1.2)
MONOCYTES NFR BLD: 11 % (ref 3–10)
NEUTS SEG # BLD: 6.8 K/UL (ref 1.8–8)
NEUTS SEG NFR BLD: 75 % (ref 40–73)
PHOSPHATE SERPL-MCNC: 2.4 MG/DL (ref 2.5–4.9)
PLATELET # BLD AUTO: 261 K/UL (ref 135–420)
PMV BLD AUTO: 10.2 FL (ref 9.2–11.8)
POTASSIUM SERPL-SCNC: 3.8 MMOL/L (ref 3.5–5.5)
PROT SERPL-MCNC: 8.4 G/DL (ref 6.4–8.2)
RBC # BLD AUTO: 4.78 M/UL (ref 4.2–5.3)
SARS-COV-2, COV2NT: NOT DETECTED
SODIUM SERPL-SCNC: 133 MMOL/L (ref 136–145)
SOURCE, COVRS: NORMAL
SPECIMEN TYPE, XMCV1T: NORMAL
WBC # BLD AUTO: 9.1 K/UL (ref 4.6–13.2)

## 2020-10-24 PROCEDURE — 74011250636 HC RX REV CODE- 250/636: Performed by: FAMILY MEDICINE

## 2020-10-24 PROCEDURE — 74011250637 HC RX REV CODE- 250/637: Performed by: FAMILY MEDICINE

## 2020-10-24 PROCEDURE — 74011250637 HC RX REV CODE- 250/637: Performed by: NURSE PRACTITIONER

## 2020-10-24 PROCEDURE — 65270000029 HC RM PRIVATE

## 2020-10-24 PROCEDURE — 74011250637 HC RX REV CODE- 250/637: Performed by: STUDENT IN AN ORGANIZED HEALTH CARE EDUCATION/TRAINING PROGRAM

## 2020-10-24 PROCEDURE — 80053 COMPREHEN METABOLIC PANEL: CPT

## 2020-10-24 PROCEDURE — 85025 COMPLETE CBC W/AUTO DIFF WBC: CPT

## 2020-10-24 PROCEDURE — 36415 COLL VENOUS BLD VENIPUNCTURE: CPT

## 2020-10-24 PROCEDURE — 83735 ASSAY OF MAGNESIUM: CPT

## 2020-10-24 PROCEDURE — 2709999900 HC NON-CHARGEABLE SUPPLY

## 2020-10-24 PROCEDURE — 84100 ASSAY OF PHOSPHORUS: CPT

## 2020-10-24 PROCEDURE — 74011000250 HC RX REV CODE- 250: Performed by: FAMILY MEDICINE

## 2020-10-24 RX ADMIN — POTASSIUM CHLORIDE 20 MEQ: 1500 TABLET, EXTENDED RELEASE ORAL at 10:12

## 2020-10-24 RX ADMIN — APIXABAN 2.5 MG: 2.5 TABLET, FILM COATED ORAL at 10:12

## 2020-10-24 RX ADMIN — MONTELUKAST 10 MG: 10 TABLET, FILM COATED ORAL at 10:12

## 2020-10-24 RX ADMIN — CEFTRIAXONE SODIUM 1 G: 1 INJECTION, POWDER, FOR SOLUTION INTRAMUSCULAR; INTRAVENOUS at 16:25

## 2020-10-24 RX ADMIN — Medication 100 MG: at 10:12

## 2020-10-24 RX ADMIN — MEGESTROL ACETATE 40 MG: 40 TABLET ORAL at 10:12

## 2020-10-24 RX ADMIN — FAMOTIDINE 20 MG: 20 TABLET ORAL at 10:12

## 2020-10-24 RX ADMIN — FUROSEMIDE 40 MG: 40 TABLET ORAL at 10:12

## 2020-10-24 RX ADMIN — METOPROLOL TARTRATE 25 MG: 25 TABLET, FILM COATED ORAL at 18:25

## 2020-10-24 RX ADMIN — THERA TABS 1 TABLET: TAB at 10:12

## 2020-10-24 RX ADMIN — APIXABAN 2.5 MG: 2.5 TABLET, FILM COATED ORAL at 18:25

## 2020-10-24 RX ADMIN — FOLIC ACID 1 MG: 1 TABLET ORAL at 10:12

## 2020-10-24 RX ADMIN — METOPROLOL TARTRATE 25 MG: 25 TABLET, FILM COATED ORAL at 10:12

## 2020-10-24 RX ADMIN — ATORVASTATIN CALCIUM 40 MG: 40 TABLET, FILM COATED ORAL at 22:14

## 2020-10-24 NOTE — ROUTINE PROCESS
Bedside and Verbal shift change report given to Dawn Conley (oncoming nurse) by Megan Hernandez (offgoing nurse). Report included the following information SBAR, Kardex, MAR and Recent Results. SITUATION: 
Code Status: Prior Reason for Admission: Anasarca [R60.1] Hospital day: 5 Problem List:  
   
Hospital Problems  Date Reviewed: 10/15/2020 Codes Class Noted POA Severe protein-calorie malnutrition ([facility] ICD-10-CM: U94 ICD-9-CM: 104  10/21/2020 No  
   
 Anasarca ICD-10-CM: R60.1 ICD-9-CM: 782.3  10/15/2020 Unknown * (Principal) Acute on chronic diastolic congestive heart failure (HCC) ICD-10-CM: I50.33 ICD-9-CM: 428.33, 428.0  12/12/2017 Yes Mitral regurgitation ICD-10-CM: I34.0 ICD-9-CM: 424.0  6/13/2017 Yes Mild HOCM (hypertrophic obstructive cardiomyopathy) (HCC) ICD-10-CM: I42.1 ICD-9-CM: 425.11  1/12/2017 Yes Aortic stenosis ICD-10-CM: I35.0 ICD-9-CM: 424.1  1/12/2017 Yes Pulmonary HTN ([facility] ICD-10-CM: I27.20 ICD-9-CM: 416.8  12/21/2016 Yes [facility] ICD-10-CM: Q49 ICD-9-CM: 967  12/21/2016 Yes Obesity (BMI 30.0-34.9) ICD-10-CM: U43.3 ICD-9-CM: 278.00  10/13/2016 Yes BACKGROUND: 
 Past Medical History:  
Past Medical History:  
Diagnosis Date  Arthritis  Atrial fibrillation ([facility]  CHF (congestive heart failure) ([facility]  Heart murmur  History of seasonal allergies  HTN (hypertension)  Hypercholesteremia  Moderate aortic stenosis  Pulmonary hypertension ([facility]  Venous insufficiency Patient taking anticoagulants no Patient has a defibrillator: no  
 History of shots YES for example, flu, pneumonia, tetanus Isolation History NO for example, MRSA, CDiff ASSESSMENT: 
Changes in Assessment Throughout Shift: None Significant Changes in 24 hours (for example, RR/code, fall) Patient has Central Line: no  
 
 Patient has Layton Cath: yes Reasons if yes: Incontinent Mobility Issues PT 
IV Patency OR Checklist 
Pending Tests Last Vitals: 
Vitals w/ MEWS Score (last day) Date/Time MEWS Score Pulse Resp Temp BP Level of Consciousness SpO2  
 10/24/20 0442  1  63  17  96.9 °F (36.1 °C)  (!) 128/59  Alert  99 % 10/23/20 2343  1  61  17  96.8 °F (36 °C)  (!) 120/57  Alert  100 % 10/23/20 1943  1  67  17  96.8 °F (36 °C)  (!) 129/54  Alert  99 % 10/23/20 1500  1  65  17  98.4 °F (36.9 °C)  124/61  Alert  96 % 10/23/20 1112  1  66  19  97.5 °F (36.4 °C)  128/73  Alert  95 % 10/23/20 0808  1  80  19  99 °F (37.2 °C)  (!) 159/66  Alert  95 % 10/23/20 0426  1  67  18  98.1 °F (36.7 °C)  124/64  Alert  94 % 10/23/20 0400  1  69  17  97 °F (36.1 °C)  (!) 149/66  Alert  96 % 10/23/20 0013  1  68  17  98.7 °F (37.1 °C)  135/70  Alert  96 % PAIN Pain Assessment Pain Intensity 1: 0 (10/24/20 0442) Pain Location 1: Abdomen Pain Intervention(s) 1: Medication (see MAR) Patient Stated Pain Goal: 0 Intervention effective: N/A Time of last intervention: N/A Reassessment Completed: N/A Other actions taken for pain: Distraction Last 3 Weights: 
Last 3 Recorded Weights in this Encounter 10/21/20 1048 10/22/20 2212 10/23/20 2240 Weight: 58.2 kg (128 lb 4.8 oz) 53.8 kg (118 lb 8 oz) 58 kg (127 lb 14.4 oz) Weight change: 4.264 kg (9 lb 6.4 oz) INTAKE/OUPUT Current Shift: No intake/output data recorded. Last three shifts: 10/22 1901 - 10/24 0700 In: 1410 [P.O.:1410] Out: 7619 [Urine:2425] RECOMMENDATIONS AND DISCHARGE PLANNING Patient needs and requests: Assistance with ADLs Pending tests/procedures: Labs Discharge plan for patient: Home Discharge planning Needs or Barriers: None Estimated Discharge Date: 10/30 Posted on Whiteboard in [address] [place] [address] Room: yes \"HEALS\" SAFETY CHECK 
 A safety check occurred in the patient's room between off going nurse and oncoming nurse listed above. The safety check included the below items: 
 
H High Alert Medications Verify all high alert medication drips (heparin, PCA, etc.) E Equipment Suction is set up for ALL patients (with yisel) Red plugs utilized for all equipment (IV pumps, etc.) WOWs wiped down at end of shift. Room stocked with oxygen, suction, and other unit-specific supplies A Alarms Bed alarm is set for fall risk patients Ensure chair alarm is in place and activated if patient is up in a chair L Lines Check IV for any infiltration Layton bag is empty if patient has a Layton Tubing and IV bags are labeled Michael Snowjazlyn Safety Room is clean, patient is clean, and equipment is clean. Hallways are clear from equipment besides carts. Fall bracelet on for fall risk patients Ensure room is clear and free of clutter Suction is set up for ALL patients (with yisel) Hallways are clear from equipment besides carts. Isolation precautions followed, supplies available outside room, sign posted [employer]

## 2020-10-24 NOTE — PROGRESS NOTES
Problem: Falls - Risk of 
Goal: *Absence of Falls Description: Document Fredy Almaraz Fall Risk and appropriate interventions in the flowsheet. Outcome: Not Progressing Towards Goal 
Note: Fall Risk Interventions: 
Mobility Interventions: Bed/chair exit alarm Mentation Interventions: Adequate sleep, hydration, pain control, Bed/chair exit alarm, Door open when patient unattended Medication Interventions: Bed/chair exit alarm Elimination Interventions: Bed/chair exit alarm, Patient to call for help with toileting needs, Toileting schedule/hourly rounds, Call light in reach Problem: Patient Education: Go to Patient Education Activity Goal: Patient/Family Education Outcome: Not Progressing Towards Goal 
  
Problem: Patient Education: Go to Patient Education Activity Goal: Patient/Family Education Outcome: Not Progressing Towards Goal 
  
Problem: Heart Failure: Day 4 Goal: Off Pathway (Use only if patient is Off Pathway) Outcome: Not Progressing Towards Goal 
Goal: Activity/Safety Outcome: Not Progressing Towards Goal 
Goal: Diagnostic Test/Procedures Outcome: Not Progressing Towards Goal 
Goal: Nutrition/Diet Outcome: Not Progressing Towards Goal 
Goal: Discharge Planning Outcome: Not Progressing Towards Goal 
Goal: Medications Outcome: Not Progressing Towards Goal 
Goal: Respiratory Outcome: Not Progressing Towards Goal 
Goal: Treatments/Interventions/Procedures Outcome: Not Progressing Towards Goal 
Goal: Psychosocial 
Outcome: Not Progressing Towards Goal 
Goal: *Oxygen saturation within defined limits Outcome: Not Progressing Towards Goal 
Goal: *Hemodynamically stable Outcome: Not Progressing Towards Goal 
Goal: *Optimal pain control at patient's stated goal 
Outcome: Not Progressing Towards Goal 
Goal: *Anxiety reduced or absent Outcome: Not Progressing Towards Goal 
Goal: *Demonstrates progressive activity Outcome: Not Progressing Towards Goal 
  
 Problem: Heart Failure: Day 5 Goal: Off Pathway (Use only if patient is Off Pathway) Outcome: Not Progressing Towards Goal 
Goal: Activity/Safety Outcome: Not Progressing Towards Goal 
Goal: Diagnostic Test/Procedures Outcome: Not Progressing Towards Goal 
Goal: Nutrition/Diet Outcome: Not Progressing Towards Goal 
Goal: Discharge Planning Outcome: Not Progressing Towards Goal 
Goal: Medications Outcome: Not Progressing Towards Goal 
Goal: Respiratory Outcome: Not Progressing Towards Goal 
Goal: Treatments/Interventions/Procedures Outcome: Not Progressing Towards Goal 
Goal: Psychosocial 
Outcome: Not Progressing Towards Goal 
  
Problem: Heart Failure: Discharge Outcomes Goal: *Demonstrates ability to perform prescribed activity without shortness of breath or discomfort Outcome: Not Progressing Towards Goal 
Goal: *Left ventricular function assessment completed prior to or during stay, or planned for post-discharge Outcome: Not Progressing Towards Goal 
Goal: *ACEI prescribed if LVEF less than 40% and no contraindications or ARB prescribed Outcome: Not Progressing Towards Goal 
Goal: *Verbalizes understanding and describes prescribed diet Outcome: Not Progressing Towards Goal 
Goal: *Verbalizes understanding/describes prescribed medications Outcome: Not Progressing Towards Goal 
Goal: *Describes available resources and support systems Description: (eg: Home Health, Palliative Care, Advanced Medical Directive) Outcome: Not Progressing Towards Goal 
Goal: *Describes smoking cessation resources Outcome: Not Progressing Towards Goal 
Goal: *Understands and describes signs and symptoms to report to providers(Stroke Metric) Outcome: Not Progressing Towards Goal 
Goal: *Describes/verbalizes understanding of follow-up/return appt Description: (eg: to physicians, diabetes treatment coordinator, and other resources Outcome: Not Progressing Towards Goal 
 Goal: *Describes importance of continuing daily weights and changes to report to physician Outcome: Not Progressing Towards Goal 
  
Problem: Impaired Skin Integrity/Pressure Injury Treatment Goal: *Improvement of Existing Pressure Injury Outcome: Not Progressing Towards Goal 
Goal: *Prevention of pressure injury Description: Document Angel Scale and appropriate interventions in the flowsheet. Outcome: Not Progressing Towards Goal 
  
Problem: Patient Education: Go to Patient Education Activity Goal: Patient/Family Education Outcome: Not Progressing Towards Goal 
  
Problem: Patient Education: Go to Patient Education Activity Goal: Patient/Family Education Outcome: Not Progressing Towards Goal 
  
Problem: Nutrition Deficit Goal: *Optimize nutritional status Outcome: Not Progressing Towards Goal 
  
Problem: Pressure Injury - Risk of 
Goal: *Prevention of pressure injury Description: Document Angel Scale and appropriate interventions in the flowsheet. Outcome: Not Progressing Towards Goal 
  
Problem: Patient Education: Go to Patient Education Activity Goal: Patient/Family Education Outcome: Not Progressing Towards Goal 
  
Problem: Infection - Risk of, Urinary Catheter-Associated Urinary Tract Infection Goal: *Absence of infection signs and symptoms Outcome: Not Progressing Towards Goal 
  
Problem: Patient Education: Go to Patient Education Activity Goal: Patient/Family Education Outcome: Not Progressing Towards Goal 
  
Problem: Patient Education: Go to Patient Education Activity Goal: Patient/Family Education Outcome: Not Progressing Towards Goal

## 2020-10-24 NOTE — PROGRESS NOTES
RENAL DAILY PROGRESS NOTE Patient: Alon Phillips               Sex: female          DOA: 10/15/2020  3:49 PM  
    
YOB: 1940      Age:  68 y.o.        LOS:  LOS: 9 days Subjective:  
 
Alon Phillips is a 68 y.o.  who presents with Anasarca [R60.1]. Asked to evaluate for hyponatremia,and renal failure,hx of diastolic heart failure,aortic stenosis,pulm htn,anasarca,was treated with bumex,metolazone Chief complains: Patient denies nausea, vomiting, diarrhea - Reviewed last 24 hrs events Current Facility-Administered Medications Medication Dose Route Frequency  potassium chloride (K-DUR, KLOR-CON) SR tablet 20 mEq  20 mEq Oral DAILY  metoprolol tartrate (LOPRESSOR) tablet 25 mg  25 mg Oral BID  megestroL (MEGACE) tablet 40 mg  40 mg Oral DAILY  furosemide (LASIX) tablet 40 mg  40 mg Oral DAILY  acetaminophen (TYLENOL) tablet 650 mg  650 mg Oral Q4H PRN  
 ergocalciferol capsule 50,000 Units  50,000 Units Oral Q7D  
 therapeutic multivitamin (THERAGRAN) tablet 1 Tab  1 Tab Oral DAILY  thiamine HCL (B-1) tablet 100 mg  100 mg Oral DAILY  apixaban (ELIQUIS) tablet 2.5 mg  2.5 mg Oral BID  hydrALAZINE (APRESOLINE) tablet 25 mg  25 mg Oral TID PRN  
 famotidine (PEPCID) tablet 20 mg  20 mg Oral DAILY  folic acid (FOLVITE) tablet 1 mg  1 mg Oral DAILY  montelukast (SINGULAIR) tablet 10 mg  10 mg Oral DAILY  ondansetron hcl (ZOFRAN) tablet 4 mg  4 mg Oral Q8H PRN  
 atorvastatin (LIPITOR) tablet 40 mg  40 mg Oral QHS Objective:  
 
Visit Vitals /68 (BP 1 Location: Right arm, BP Patient Position: At rest) Pulse 68 Temp 97 °F (36.1 °C) Resp 16 Ht 5' 3\" (1.6 m) Wt 58 kg (127 lb 14.4 oz) SpO2 97% BMI 22.66 kg/m² Intake/Output Summary (Last 24 hours) at 10/24/2020 1449 Last data filed at 10/24/2020 1322 Gross per 24 hour Intake 1280 ml Output 975 ml Net 305 ml Physical Examination: RS: Chest is bilateral equal, CVS: S1-S2 heard Abdomen: Soft, Non tender, Not distended, Positive bowel sounds, no organomegaly, no CVA / supra pubic tenderness Extremities: mild edema,  
CNS: Awake HEENT: Head is atraumatic, PERRLA, conjunctiva pink & non icteric. No JVD or carotid bruit Data Review:   
 
Labs:  
 
Hematology:  
Recent Labs 10/24/20 
0510 10/22/20 
0500 WBC 9.1 6.5 HGB 10.8* 9.4* HCT 35.4 31.0* Chemistry:  
Recent Labs 10/24/20 
0510 10/23/20 
0509 10/22/20 
1310 10/22/20 
0500 10/21/20 
2115 BUN 51* 50* 50* 51* 54* CREA 1.58* 1.60* 1.79* 1.80* 1.85* CA 10.2* 10.0 10.0 10.0 9.5 ALB 2.8* 2.9*  --  2.8*  --   
K 3.8 3.2* 3.5 3.1* 3.5 * 136 138 134* 137  100 101 98* 99* CO2 24 27 30 29 31 PHOS 2.4* 3.0  --  3.4  --   
* 96 122* 91 121* Images: 
 
XR (Most Recent). CXR reviewed by me and compared with previous CXR [place] [facility] [place] XR PATELLA LT 3 V Narrative Left patella, 3 views HISTORY: Bruising. Pain. Dislocation? Patellofemoral joint remains intact with prominent dorsal patellar spur from 
degenerative disease. Small suprapatellar joint effusion. No prepatellar soft 
tissue edema. Advanced degenerative joint disease in medial knee compartment. Chondrocalcinosis /pseudogout also present. Impression IMPRESSION: No patellar dislocation. Small joint effusion. Moderate degenerative 
joint disease with chondrocalcinosis. CT (Most Recent) [place] [facility] [place] 10[place]15/20 CT HEAD WO CONT Narrative CT of the head without contrast 
 
HISTORY: Altered mental status COMPARISON: 12/1/12 TECHNIQUE: Helical axial scan to the head was performed from the skull base to 
the vertex without IV contrast administration.  
 
All CT scans at this facility performed using dose optimization techniques as 
 appreciated to a performed exam, to include automated exposure control, 
adjustment of the mA and or KU according to patient size (including appropriate 
matching for site specific examination), or use of iterative reconstruction 
technique. FINDINGS: 
 
There is moderate global atrophy which shows interval progression since the 
prior study. Further ventricular dilatation is also seen . There is normal 
gray-white matter differentiation. Mild periventricular white matter 
hypodensities also present. There is no evidence of acute intracranial 
hemorrhage,  mass effect or midline shift identified. No skull fracture or extra 
axial fluid collections identified. Visualized sinuses and mastoid air cells 
appear unremarkable. Impression IMPRESSION: 
 
No acute intracranial abnormality. Note: If clinical concern of acute stroke, MRI with diffusion weighted images is 
recommended for better evaluation. Progression of cerebral atrophy and ventricular dilatation as well as 
microvascular disease. Thank you for your referral.  
  
 
EKG No results found for this or any previous visit. I have personally reviewed the old medical records and patient's labs Plan / Recommendation: 1. Acute/crf stage 4,tr proteinuria,probable cardiorenal.back to baseline 2.acute/chronic hyponatremia ,related to thiazides. normal serum osmolarity raises the possibility of pseudohyponatremia related to elevated globulins,continue fluid restrictions. improving 3.sec hyperparathyroidism,ask dr Alondra Corona to check for enlarged parathyroid glands/adenomas during thyroid surgery 4.hypokalemia,metabolic alkalosis related to diuretics,replace potassium 5-elevated lambda light chains. ? mgus D/w Dr Logan Grigsby MD 
Nephrology 10/24/2020

## 2020-10-24 NOTE — PROGRESS NOTES
Progress Note Patient: Amee Pearl MRN: [id number]  CSN: [id number] YOB: 1940  Age: 68 y.o. Sex: female DOA: 10/15/2020 LOS:  LOS: 9 days Subjective:  
Pt still confused couldn't remember where she is , month or the year . Poor appetite  Discussed with nursing staff she eats only magic CUP urine culture positive for more than 100,000 gram negative rods . Urine looks like colonized per ID Will try Rocephin IV to see if her mental status improved Pending final result of urine culture. Pending Cardiology evaluation dr Randy Tafoya for transfer to [facility] 
 
 pt was seen bu hematology oncology Pt started on  IV iron  F/u bone marrow biopsy at later time Pending cardiology clearance pt will need total thyroidectomy Chief Complaint:  
Chief Complaint Patient presents with  Peripheral Edema Review of systems General: No fevers or chills. Cardiovascular: No chest pain or pressure. Pulmonary: No shortness of breath, cough or wheeze. Gastrointestinal: No abdominal pain, nausea, vomiting or diarrhea. Genitourinary: No urinary frequency, urgency, hesitancy or dysuria. Musculoskeletal: generalized weakness and pain her muscles. Neurologic: No headache, generalized weakness Objective:  
 
Physical Exam: 
Visit Vitals /68 (BP 1 Location: Right arm, BP Patient Position: At rest) Pulse 68 Temp 97 °F (36.1 °C) Resp 16 Ht 5' 3\" (1.6 m) Wt 58 kg (127 lb 14.4 oz) SpO2 97% BMI 22.66 kg/m² General:         no acute distress,still confused HEENT:           NC, Atraumatic.  anicteric sclerae. Lungs:            CTA Bilaterally. No Wheezing/Rhonchi/Rales. Heart:              Regular rhythm, systolic Murmurs Abdomen:      Soft, Non distended, Non tender. Extremities:   Wound Rt leg clean dressing in place wearing Tubigrip, slow moving and weak.  Winced when both her legs were touched and examined for edema, but only trace edema noted Psych:            Not anxious or agitated. Neurologic:    confused  
  
Intake and Output: 
Current Shift:  10/24 0701 - 10/24 1900 In: 200 [P.O.:200] Out: 200 [Urine:200] Last three shifts:  10/22 1901 - 10/24 0700 In: 6236 [P.O.:1650] Out: 8932 [Urine:2425] Labs: Results:  
   
Chemistry Recent Labs 10/24/20 
0510 10/23/20 
0509 10/22/20 
1310 10/22/20 
0500 * 96 122* 91 * 136 138 134* K 3.8 3.2* 3.5 3.1*  
 100 101 98* CO2 24 27 30 29 BUN 51* 50* 50* 51* CREA 1.58* 1.60* 1.79* 1.80* CA 10.2* 10.0 10.0 10.0 AGAP 7 9 7 7 BUCR 32* 31* 28* 28* * 171*  --  160* TP 8.4* 7.8  --  8.2 ALB 2.8* 2.9*  --  2.8*  
GLOB 5.6* 4.9*  --  5.4* AGRAT 0.5* 0.6*  --  0.5* CBC w/Diff Recent Labs 10/24/20 
0510 10/22/20 
0500 WBC 9.1 6.5  
RBC 4.78 4.23  
HGB 10.8* 9.4* HCT 35.4 31.0*  
 244 GRANS 75* 76* LYMPH 13* 11* EOS 1 1 Cardiac Enzymes No results for input(s): CPK, CKND1, JONI in the last 72 hours. No lab exists for component: David Husbands Coagulation No results for input(s): PTP, INR, APTT, INREXT in the last 72 hours. Lipid Panel Lab Results Component Value Date/Time Cholesterol, total 178 10/20/2020 10:05 AM  
 HDL Cholesterol 33 (L) 10/20/2020 10:05 AM  
 LDL, calculated 125.8 (H) 10/20/2020 10:05 AM  
 VLDL, calculated 19.2 10/20/2020 10:05 AM  
 Triglyceride 96 10/20/2020 10:05 AM  
 CHOL/HDL Ratio 5.4 (H) 10/20/2020 10:05 AM  
  
BNP No results for input(s): BNPP in the last 72 hours. Liver Enzymes Recent Labs 10/24/20 
0510 TP 8.4* ALB 2.8* * Thyroid Studies Lab Results Component Value Date/Time TSH 1.98 10/16/2020 04:22 AM  
    
 
Procedures/imaging: see electronic medical records for all procedures/Xrays and details which were not copied into this note but were reviewed prior to creation of Plan Medications: Current Facility-Administered Medications Medication Dose Route Frequency  cefTRIAXone (ROCEPHIN) 1 g in sterile water (preservative free) 10 mL IV syringe  1 g IntraVENous Q24H  potassium chloride (K-DUR, KLOR-CON) SR tablet 20 mEq  20 mEq Oral DAILY  metoprolol tartrate (LOPRESSOR) tablet 25 mg  25 mg Oral BID  megestroL (MEGACE) tablet 40 mg  40 mg Oral DAILY  furosemide (LASIX) tablet 40 mg  40 mg Oral DAILY  acetaminophen (TYLENOL) tablet 650 mg  650 mg Oral Q4H PRN  
 ergocalciferol capsule 50,000 Units  50,000 Units Oral Q7D  
 therapeutic multivitamin (THERAGRAN) tablet 1 Tab  1 Tab Oral DAILY  thiamine HCL (B-1) tablet 100 mg  100 mg Oral DAILY  apixaban (ELIQUIS) tablet 2.5 mg  2.5 mg Oral BID  hydrALAZINE (APRESOLINE) tablet 25 mg  25 mg Oral TID PRN  
 famotidine (PEPCID) tablet 20 mg  20 mg Oral DAILY  folic acid (FOLVITE) tablet 1 mg  1 mg Oral DAILY  montelukast (SINGULAIR) tablet 10 mg  10 mg Oral DAILY  ondansetron hcl (ZOFRAN) tablet 4 mg  4 mg Oral Q8H PRN  
 atorvastatin (LIPITOR) tablet 40 mg  40 mg Oral QHS Assessment/Plan Principal Problem: 
  Ac[facility] on chronic diastolic congestive [facility] (12/12/2017) Active Problems: 
  Obesity (BMI 30.0-34.9) (10/13/2016) Pulmonary HTN ([facility] (12/21/2016) Papillary thyroid carcinoma ([facility] (12/21/2016) Mild HOCM (hypertrophic obstructive cardiomyopathy) ([facility] (1/12/2017) Aortic stenosis (1/12/2017) Mitral regurgitation (6/13/2017) Anasarca (10/15/2020) Severe protein-calorie malnutrition ([facility] (10/21/2020) Plan: 
Mental status changes/ confusion- improving 
-Ct scan head no contrast showed no acute intracranial abnormality 
-Neurology recommended discontinuing Tramadol which was done t. She seems to be improving since but still ahs significant confusion  Gabapentin is also on hold for this reason -Per Dr. Sierra Velasco: Her clinical exam significant for impaired attention, confused thinking, disordered speech is consistent with delirium. 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  
 B2 Microglobulin pending  
bone marrow asp and biopsy if feasible and pst remians stable  stable. 
 
 Acute systolic congestive heart failure on top of chronic diastolic congestive heart failure / sever aortic stenosis D/C  Bumex 0.5 mg/h Check cardiac enzyme every 8 troponin on admission  slightly up 
 pending Dr LINO [facility] evaluation [facility] Hypertension:  
Improved on  metoprolol to 25 mg BID She used to take 50 mg BID at home  
 use hydralazine prn Pt not on cozaar at home anymore before admission 
  
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 
Continue limiting fluid intake 1500 cc 
  
Hypokalemia: 
-Resolved 
-topotassium 20 praveen daily 
-contiinue to monitor lab Venous stasis ulcer Wound care Pt was seen by vascular as outpatinet Continue wound care and f/u vascular surgery consult Paroxysmal A. fib Decrease  Eliquis 2.5 mg twice daily per pharmacy due to renal function Follow-up CBC Left Knee pain  
-X-ray of L leg showed small joint effusion but no patellar dislocation Hyperlipidemia Continue Zocor 80 mg nightly Follow-up liver function 
  
History of Anemia with low iron 10/21: 
-Iron: 19 decreased from 27 on 10/16 
-TIBC: 382 
-continue iron repletion History of thyroid cancer -Pt seen by ENT who recommended Thyroidectomy when she is able to tolerate surgery 
- repeat ultrasound done this admission no sign of metastatics ct head and chest negative Pending cardiology clearance after TAVR Aortic stenosis becomes severe mean gradient 43 
echo September 19 was moderate aortic stenosis.  Patient may  Need  cardiac cathif renal function allow Mitral regurg mild to moderate Start Rocephin f/u urine culture Monitor mebntal status F/u cardiology recommendation and evaluation per Dr Antonino Hallman in AM 
Pt and ot evaluation and treatment Wound care F/U bone marrow Biopsy and thyroidectomy after TAVR Rachid Victor MD 
10/24/2020 3:00 PM

## 2020-10-24 NOTE — PROGRESS NOTES
Problem: Falls - Risk of 
Goal: *Absence of Falls Description: Document Nury Thompson Fall Risk and appropriate interventions in the flowsheet. Outcome: Progressing Towards Goal 
Note: Fall Risk Interventions: 
Mobility Interventions: Bed/chair exit alarm, Patient to call before getting OOB, Utilize walker, cane, or other assistive device Mentation Interventions: Adequate sleep, hydration, pain control, Bed/chair exit alarm, Door open when patient unattended, Reorient patient, More frequent rounding, Toileting rounds, Update white board Medication Interventions: Bed/chair exit alarm, Patient to call before getting OOB, Teach patient to arise slowly Elimination Interventions: Bed/chair exit alarm, Call light in reach, Patient to call for help with toileting needs, Toilet paper/wipes in reach, Toileting schedule/hourly rounds, Urinal in reach Problem: Patient Education: Go to Patient Education Activity Goal: Patient/Family Education Outcome: Progressing Towards Goal 
  
Problem: Patient Education: Go to Patient Education Activity Goal: Patient/Family Education Outcome: Progressing Towards Goal 
  
Problem: Heart Failure: Day 4 Goal: Off Pathway (Use only if patient is Off Pathway) Outcome: Progressing Towards Goal 
Goal: Activity/Safety Outcome: Progressing Towards Goal 
Goal: Diagnostic Test/Procedures Outcome: Progressing Towards Goal 
Goal: Nutrition/Diet Outcome: Progressing Towards Goal 
Goal: Discharge Planning Outcome: Progressing Towards Goal 
Goal: Medications Outcome: Progressing Towards Goal 
Goal: Respiratory Outcome: Progressing Towards Goal 
Goal: Treatments/Interventions/Procedures Outcome: Progressing Towards Goal 
Goal: Psychosocial 
Outcome: Progressing Towards Goal 
Goal: *Oxygen saturation within defined limits Outcome: Progressing Towards Goal 
Goal: *Hemodynamically stable Outcome: Progressing Towards Goal 
Goal: *Optimal pain control at patient's stated goal 
 Outcome: Progressing Towards Goal 
Goal: *Anxiety reduced or absent Outcome: Progressing Towards Goal 
Goal: *Demonstrates progressive activity Outcome: Progressing Towards Goal 
  
Problem: Heart Failure: Day 5 Goal: Off Pathway (Use only if patient is Off Pathway) Outcome: Progressing Towards Goal 
Goal: Activity/Safety Outcome: Progressing Towards Goal 
Goal: Diagnostic Test/Procedures Outcome: Progressing Towards Goal 
Goal: Nutrition/Diet Outcome: Progressing Towards Goal 
Goal: Discharge Planning Outcome: Progressing Towards Goal 
Goal: Medications Outcome: Progressing Towards Goal 
Goal: Respiratory Outcome: Progressing Towards Goal 
Goal: Treatments/Interventions/Procedures Outcome: Progressing Towards Goal 
Goal: Psychosocial 
Outcome: Progressing Towards Goal 
  
Problem: Heart Failure: Discharge Outcomes Goal: *Demonstrates ability to perform prescribed activity without shortness of breath or discomfort Outcome: Progressing Towards Goal 
Goal: *Left ventricular function assessment completed prior to or during stay, or planned for post-discharge Outcome: Progressing Towards Goal 
Goal: *ACEI prescribed if LVEF less than 40% and no contraindications or ARB prescribed Outcome: Progressing Towards Goal 
Goal: *Verbalizes understanding and describes prescribed diet Outcome: Progressing Towards Goal 
Goal: *Verbalizes understanding/describes prescribed medications Outcome: Progressing Towards Goal 
Goal: *Describes available resources and support systems Description: (eg: Home Health, Palliative Care, Advanced Medical Directive) Outcome: Progressing Towards Goal 
Goal: *Describes smoking cessation resources Outcome: Progressing Towards Goal 
Goal: *Understands and describes signs and symptoms to report to providers(Stroke Metric) Outcome: Progressing Towards Goal 
Goal: *Describes/verbalizes understanding of follow-up/return appt Description: (eg: to physicians, diabetes treatment coordinator, and other resources Outcome: Progressing Towards Goal 
Goal: *Describes importance of continuing daily weights and changes to report to physician Outcome: Progressing Towards Goal 
  
Problem: Impaired Skin Integrity/Pressure Injury Treatment Goal: *Improvement of Existing Pressure Injury Outcome: Progressing Towards Goal 
Goal: *Prevention of pressure injury Description: Document Angel Scale and appropriate interventions in the flowsheet. Outcome: Progressing Towards Goal 
  
Problem: Patient Education: Go to Patient Education Activity Goal: Patient/Family Education Outcome: Progressing Towards Goal 
  
Problem: Patient Education: Go to Patient Education Activity Goal: Patient/Family Education Outcome: Progressing Towards Goal 
  
Problem: Nutrition Deficit Goal: *Optimize nutritional status Outcome: Progressing Towards Goal 
  
Problem: Pressure Injury - Risk of 
Goal: *Prevention of pressure injury Description: Document Angel Scale and appropriate interventions in the flowsheet. Outcome: Progressing Towards Goal 
  
Problem: Patient Education: Go to Patient Education Activity Goal: Patient/Family Education Outcome: Progressing Towards Goal 
  
Problem: Infection - Risk of, Urinary Catheter-Associated Urinary Tract Infection Goal: *Absence of infection signs and symptoms Outcome: Progressing Towards Goal 
  
Problem: Patient Education: Go to Patient Education Activity Goal: Patient/Family Education Outcome: Progressing Towards Goal 
  
Problem: Patient Education: Go to Patient Education Activity Goal: Patient/Family Education Outcome: Progressing Towards Goal

## 2020-10-25 LAB
ALBUMIN SERPL-MCNC: 2.8 G/DL (ref 3.4–5)
ALBUMIN/GLOB SERPL: 0.5 {RATIO} (ref 0.8–1.7)
ALP SERPL-CCNC: 181 U/L (ref 45–117)
ALT SERPL-CCNC: 14 U/L (ref 13–56)
ANION GAP SERPL CALC-SCNC: 6 MMOL/L (ref 3–18)
AST SERPL-CCNC: 21 U/L (ref 10–38)
BASOPHILS # BLD: 0 K/UL (ref 0–0.1)
BASOPHILS NFR BLD: 0 % (ref 0–2)
BILIRUB SERPL-MCNC: 0.8 MG/DL (ref 0.2–1)
BUN SERPL-MCNC: 58 MG/DL (ref 7–18)
BUN/CREAT SERPL: 38 (ref 12–20)
CALCIUM SERPL-MCNC: 10.5 MG/DL (ref 8.5–10.1)
CHLORIDE SERPL-SCNC: 103 MMOL/L (ref 100–111)
CO2 SERPL-SCNC: 28 MMOL/L (ref 21–32)
CREAT SERPL-MCNC: 1.54 MG/DL (ref 0.6–1.3)
DIFFERENTIAL METHOD BLD: ABNORMAL
EOSINOPHIL # BLD: 0.2 K/UL (ref 0–0.4)
EOSINOPHIL NFR BLD: 2 % (ref 0–5)
ERYTHROCYTE [DISTWIDTH] IN BLOOD BY AUTOMATED COUNT: 20.1 % (ref 11.6–14.5)
GLOBULIN SER CALC-MCNC: 5.5 G/DL (ref 2–4)
GLUCOSE SERPL-MCNC: 101 MG/DL (ref 74–99)
HCT VFR BLD AUTO: 33.5 % (ref 35–45)
HGB BLD-MCNC: 10.1 G/DL (ref 12–16)
LYMPHOCYTES # BLD: 1.3 K/UL (ref 0.9–3.6)
LYMPHOCYTES NFR BLD: 17 % (ref 21–52)
MAGNESIUM SERPL-MCNC: 2.5 MG/DL (ref 1.6–2.6)
MCH RBC QN AUTO: 22.3 PG (ref 24–34)
MCHC RBC AUTO-ENTMCNC: 30.1 G/DL (ref 31–37)
MCV RBC AUTO: 74 FL (ref 74–97)
MONOCYTES # BLD: 0.6 K/UL (ref 0.05–1.2)
MONOCYTES NFR BLD: 8 % (ref 3–10)
NEUTS SEG # BLD: 5.7 K/UL (ref 1.8–8)
NEUTS SEG NFR BLD: 73 % (ref 40–73)
PHOSPHATE SERPL-MCNC: 2.7 MG/DL (ref 2.5–4.9)
PLATELET # BLD AUTO: 263 K/UL (ref 135–420)
PLATELET COMMENTS,PCOM: ABNORMAL
PMV BLD AUTO: 10.1 FL (ref 9.2–11.8)
POTASSIUM SERPL-SCNC: 3.5 MMOL/L (ref 3.5–5.5)
PROT SERPL-MCNC: 8.3 G/DL (ref 6.4–8.2)
RBC # BLD AUTO: 4.53 M/UL (ref 4.2–5.3)
RBC MORPH BLD: ABNORMAL
SODIUM SERPL-SCNC: 137 MMOL/L (ref 136–145)
WBC # BLD AUTO: 7.8 K/UL (ref 4.6–13.2)

## 2020-10-25 PROCEDURE — 74011250637 HC RX REV CODE- 250/637: Performed by: FAMILY MEDICINE

## 2020-10-25 PROCEDURE — 2709999900 HC NON-CHARGEABLE SUPPLY

## 2020-10-25 PROCEDURE — 36415 COLL VENOUS BLD VENIPUNCTURE: CPT

## 2020-10-25 PROCEDURE — 84100 ASSAY OF PHOSPHORUS: CPT

## 2020-10-25 PROCEDURE — 74011250636 HC RX REV CODE- 250/636: Performed by: FAMILY MEDICINE

## 2020-10-25 PROCEDURE — 80053 COMPREHEN METABOLIC PANEL: CPT

## 2020-10-25 PROCEDURE — 74011250637 HC RX REV CODE- 250/637: Performed by: NURSE PRACTITIONER

## 2020-10-25 PROCEDURE — 74011000250 HC RX REV CODE- 250: Performed by: FAMILY MEDICINE

## 2020-10-25 PROCEDURE — 77030041076 HC DRSG AG OPTICELL MDII -A

## 2020-10-25 PROCEDURE — 65270000029 HC RM PRIVATE

## 2020-10-25 PROCEDURE — 83735 ASSAY OF MAGNESIUM: CPT

## 2020-10-25 PROCEDURE — 85025 COMPLETE CBC W/AUTO DIFF WBC: CPT

## 2020-10-25 PROCEDURE — 74011250637 HC RX REV CODE- 250/637: Performed by: STUDENT IN AN ORGANIZED HEALTH CARE EDUCATION/TRAINING PROGRAM

## 2020-10-25 RX ADMIN — ATORVASTATIN CALCIUM 40 MG: 40 TABLET, FILM COATED ORAL at 21:51

## 2020-10-25 RX ADMIN — MONTELUKAST 10 MG: 10 TABLET, FILM COATED ORAL at 09:18

## 2020-10-25 RX ADMIN — CEFTRIAXONE SODIUM 1 G: 1 INJECTION, POWDER, FOR SOLUTION INTRAMUSCULAR; INTRAVENOUS at 15:52

## 2020-10-25 RX ADMIN — APIXABAN 2.5 MG: 2.5 TABLET, FILM COATED ORAL at 09:18

## 2020-10-25 RX ADMIN — Medication 100 MG: at 09:18

## 2020-10-25 RX ADMIN — MEGESTROL ACETATE 40 MG: 40 TABLET ORAL at 09:18

## 2020-10-25 RX ADMIN — FAMOTIDINE 20 MG: 20 TABLET ORAL at 09:18

## 2020-10-25 RX ADMIN — POTASSIUM CHLORIDE 20 MEQ: 1500 TABLET, EXTENDED RELEASE ORAL at 09:18

## 2020-10-25 RX ADMIN — ACETAMINOPHEN 650 MG: 325 TABLET ORAL at 21:51

## 2020-10-25 RX ADMIN — FOLIC ACID 1 MG: 1 TABLET ORAL at 09:18

## 2020-10-25 RX ADMIN — METOPROLOL TARTRATE 25 MG: 25 TABLET, FILM COATED ORAL at 10:34

## 2020-10-25 RX ADMIN — THERA TABS 1 TABLET: TAB at 09:19

## 2020-10-25 RX ADMIN — METOPROLOL TARTRATE 25 MG: 25 TABLET, FILM COATED ORAL at 17:34

## 2020-10-25 RX ADMIN — FUROSEMIDE 40 MG: 40 TABLET ORAL at 09:18

## 2020-10-25 RX ADMIN — APIXABAN 2.5 MG: 2.5 TABLET, FILM COATED ORAL at 17:34

## 2020-10-25 NOTE — PROGRESS NOTES
Progress Note Patient: Freddie Shelley MRN: [id number]  CSN: [id number] YOB: 1940  Age: 68 y.o. Sex: female DOA: 10/15/2020 LOS:  LOS: 10 days Subjective:  
Pt more alert today trying to eat her lunch denied chest pain pt has generalized weakness . COVID negative Pt on Rocephin urine culture pending COVID 19 negative Pt seen by Dr Carmela Tracy yesterday. Dr Emmett Monique was supposed to evaluate pt for transfer this week end . Pt can not take decision she still confused but improved today . Pt started on Lasix 40 mg daily. pt was seen bu hematology oncology Pt started on  IV iron  F/u bone marrow biopsy at later time Pending cardiology clearance pt will need total thyroidectomy Chief Complaint:  
Chief Complaint Patient presents with  Peripheral Edema Review of systems General: No fevers or chills. Cardiovascular: No chest pain or pressure. Pulmonary: No shortness of breath, cough or wheeze. Gastrointestinal: No abdominal pain, nausea, vomiting or diarrhea. Genitourinary: No urinary frequency, urgency, hesitancy or dysuria. Musculoskeletal: generalized weakness and pain her muscles. Neurologic: No headache, generalized weakness Objective:  
 
Physical Exam: 
Visit Vitals BP (!) 124/57 (BP 1 Location: Right arm, BP Patient Position: At rest) Pulse 73 Temp 98.8 °F (37.1 °C) Resp 16 Ht 5' 3\" (1.6 m) Wt 57.3 kg (126 lb 4.8 oz) SpO2 98% BMI 22.37 kg/m² General:         no acute distress more alert today,still confused HEENT:           NC, Atraumatic.  anicteric sclerae. Lungs:            CTA Bilaterally. No Wheezing/Rhonchi/Rales. Heart:              Regular rhythm, systolic Murmurs Abdomen:      Soft, Non distended, Non tender. Extremities:   Wound Rt leg clean dressing in place wearing Tubigrip, slow moving and weak. Winced when both her legs were touched and examined for edema, but only trace edema noted Psych:            Not anxious or agitated. Neurologic:    confused  
  
Intake and Output: 
Current Shift:  10/25 0701 - 10/25 1900 In: 535 [P.O.:535] Out: 800 [Urine:800] Last three shifts:  10/23 1901 - 10/25 0700 In: 2005 [P.O.:1480; I.V.:10] Out: 1575 [ADEJ:6027] Labs: Results:  
   
Chemistry Recent Labs 10/25/20 
4409 10/24/20 
0510 10/23/20 
7401 * 105* 96  133* 136  
K 3.5 3.8 3.2*  
 102 100 CO2 28 24 27 BUN 58* 51* 50* CREA 1.54* 1.58* 1.60* CA 10.5* 10.2* 10.0 AGAP 6 7 9 BUCR 38* 32* 31* * 166* 171* TP 8.3* 8.4* 7.8 ALB 2.8* 2.8* 2.9*  
GLOB 5.5* 5.6* 4.9* AGRAT 0.5* 0.5* 0.6* CBC w/Diff Recent Labs 10/25/20 
0342 10/24/20 
0510 WBC 7.8 9.1  
RBC 4.53 4.78 HGB 10.1* 10.8* HCT 33.5* 35.4  261 GRANS 73 75* LYMPH 17* 13* EOS 2 1 Cardiac Enzymes No results for input(s): CPK, CKND1, JONI in the last 72 hours. No lab exists for component: Nelly Castrowall Coagulation No results for input(s): PTP, INR, APTT, INREXT, INREXT in the last 72 hours. Lipid Panel Lab Results Component Value Date/Time Cholesterol, total 178 10/20/2020 10:05 AM  
 HDL Cholesterol 33 (L) 10/20/2020 10:05 AM  
 LDL, calculated 125.8 (H) 10/20/2020 10:05 AM  
 VLDL, calculated 19.2 10/20/2020 10:05 AM  
 Triglyceride 96 10/20/2020 10:05 AM  
 CHOL/HDL Ratio 5.4 (H) 10/20/2020 10:05 AM  
  
BNP No results for input(s): BNPP in the last 72 hours. Liver Enzymes Recent Labs 10/25/20 
0272 TP 8.3* ALB 2.8* * Thyroid Studies Lab Results Component Value Date/Time TSH 1.98 10/16/2020 04:22 AM  
    
 
Procedures/imaging: see electronic medical records for all procedures/Xrays and details which were not copied into this note but were reviewed prior to creation of Plan Medications:  
Current Facility-Administered Medications Medication Dose Route Frequency  cefTRIAXone (ROCEPHIN) 1 g in sterile water (preservative free) 10 mL IV syringe  1 g IntraVENous Q24H  potassium chloride (K-DUR, KLOR-CON) SR tablet 20 mEq  20 mEq Oral DAILY  metoprolol tartrate (LOPRESSOR) tablet 25 mg  25 mg Oral BID  megestroL (MEGACE) tablet 40 mg  40 mg Oral DAILY  furosemide (LASIX) tablet 40 mg  40 mg Oral DAILY  acetaminophen (TYLENOL) tablet 650 mg  650 mg Oral Q4H PRN  
 ergocalciferol capsule 50,000 Units  50,000 Units Oral Q7D  
 therapeutic multivitamin (THERAGRAN) tablet 1 Tab  1 Tab Oral DAILY  thiamine HCL (B-1) tablet 100 mg  100 mg Oral DAILY  apixaban (ELIQUIS) tablet 2.5 mg  2.5 mg Oral BID  hydrALAZINE (APRESOLINE) tablet 25 mg  25 mg Oral TID PRN  
 famotidine (PEPCID) tablet 20 mg  20 mg Oral DAILY  folic acid (FOLVITE) tablet 1 mg  1 mg Oral DAILY  montelukast (SINGULAIR) tablet 10 mg  10 mg Oral DAILY  ondansetron hcl (ZOFRAN) tablet 4 mg  4 mg Oral Q8H PRN  
 atorvastatin (LIPITOR) tablet 40 mg  40 mg Oral QHS Assessment/Plan Principal Problem: 
  Acute on chronic diastolic congestive heart failure ([facility] [address] (12/12/2017) Active Problems: 
  Obesity (BMI 30.0-34.9) (10/13/2016) Pulmonary HTN ([address] (12/21/2016) Papillary thyroid carcinoma ([facility] [address] (12/21/2016) Mild HOCM (hypertrophic obstructive cardiomyopathy) ([facility] [address] (1/12/2017) Aortic stenosis (1/12/2017) Mitral regurgitation (6/13/2017) Anasarca (10/15/2020) Severe protein-calorie malnutrition ([facility] [address] (10/21/2020) Plan: 
 
Mental status changes/ confusion- improving 
-Ct scan head no contrast showed no acute intracranial abnormality 
-Neurology recommended discontinuing Tramadol which was done t.  She seems to be improving since but still ahs significant confusion  Gabapentin is also on hold for this reason 
-Per Dr. Lidia Tenorio: Her clinical exam significant for impaired attention, confused thinking, disordered speech is consistent with delirium. 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  
 B2 Microglobulin 8.9 
bone marrow asp and biopsy if feasible and pst remians stable  stable. 
 
 Acute systolic congestive heart failure on top of chronic diastolic congestive heart failure / sever aortic stenosis D/C  Bumex 0.5 mg/h Check cardiac enzyme every 8 troponin on admission  slightly up Hypertension:  
Improved on  metoprolol to 25 mg BID She used to take 50 mg BID at home  
 use hydralazine prn Pt not on cozaar at home anymore before admission 
  
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 
  
Hypokalemia: 
-Resolved 
-topotassium 20 praveen daily 
-contiinue to monitor lab Venous stasis ulcer Wound care Pt was seen by vascular as outpatinet Continue wound care and f/u vascular surgery consult Paroxysmal A. fib Decrease  Eliquis 2.5 mg twice daily per pharmacy due to renal function Follow-up CBC Left Knee pain  
-X-ray of L leg showed small joint effusion but no patellar dislocation Hyperlipidemia Continue Zocor 80 mg nightly Follow-up liver function 
  
History of Anemia with low iron 10/21: 
-Iron: 19 decreased from 27 on 10/16 
-TIBC: 382 
-continue iron repletion started on Iv iron per hematology History of thyroid cancer 
-Pt seen by ENT who recommended Thyroidectomy when she is able to tolerate surgery - repeat ultrasound done this admission no sign of metastatics ct head and chest negative Pending cardiology clearance after TAVR Aortic stenosis becomes severe mean gradient 43 
echo September 19 was moderate aortic stenosis.  Patient may  Need  cardiac cath if  renal function allow Mitral regurg mild to moderate Start Rocephin f/u urine culture Monitor mental status F/u cardiology recommendation Discussed with her Buddifelicitas Early at [phone] He explained her  is sick too , and she has a fried care giver Clemetine Kanner his on the face sheet for emergency contact . Son will contact other son to help pt with medical decision of needed before transfer Cbc, cmp, mag in AM 
Pt and ot evaluation and treatment Wound care F/U bone marrow Biopsy and thyroidectomy after TAVR 
F/u family meeting to assist with pt medical decision Claudeen Horse, MD 
10/25/2020 2:24 PM

## 2020-10-25 NOTE — PROGRESS NOTES
Problem: Heart Failure: Day 5 Goal: Nutrition/Diet Outcome: Progressing Towards Goal 
Goal: Medications Outcome: Progressing Towards Goal 
Goal: Respiratory Outcome: Progressing Towards Goal 
  
Problem: Falls - Risk of 
Goal: *Absence of Falls Description: Document Theo Yang Fall Risk and appropriate interventions in the flowsheet. Outcome: Not Progressing Towards Goal 
Note: Fall Risk Interventions: 
Mobility Interventions: Bed/chair exit alarm Mentation Interventions: Adequate sleep, hydration, pain control Medication Interventions: Bed/chair exit alarm Elimination Interventions: Bed/chair exit alarm Problem: Patient Education: Go to Patient Education Activity Goal: Patient/Family Education Outcome: Not Progressing Towards Goal 
  
Problem: Patient Education: Go to Patient Education Activity Goal: Patient/Family Education Outcome: Not Progressing Towards Goal 
  
Problem: Heart Failure: Day 4 Goal: Off Pathway (Use only if patient is Off Pathway) Outcome: Not Progressing Towards Goal 
Goal: Activity/Safety Outcome: Not Progressing Towards Goal 
Goal: Diagnostic Test/Procedures Outcome: Not Progressing Towards Goal 
Goal: Nutrition/Diet Outcome: Not Progressing Towards Goal 
Goal: Discharge Planning Outcome: Not Progressing Towards Goal 
Goal: Medications Outcome: Not Progressing Towards Goal 
Goal: Respiratory Outcome: Not Progressing Towards Goal 
Goal: Treatments/Interventions/Procedures Outcome: Not Progressing Towards Goal 
Goal: Psychosocial 
Outcome: Not Progressing Towards Goal 
Goal: *Oxygen saturation within defined limits Outcome: Not Progressing Towards Goal 
Goal: *Hemodynamically stable Outcome: Not Progressing Towards Goal 
Goal: *Optimal pain control at patient's stated goal 
Outcome: Not Progressing Towards Goal 
Goal: *Anxiety reduced or absent Outcome: Not Progressing Towards Goal 
Goal: *Demonstrates progressive activity Outcome: Not Progressing Towards Goal 
  
Problem: Heart Failure: Day 5 Goal: Off Pathway (Use only if patient is Off Pathway) Outcome: Not Progressing Towards Goal 
Goal: Activity/Safety Outcome: Not Progressing Towards Goal 
Goal: Diagnostic Test/Procedures Outcome: Not Progressing Towards Goal 
Goal: Discharge Planning Outcome: Not Progressing Towards Goal 
Goal: Treatments/Interventions/Procedures Outcome: Not Progressing Towards Goal 
Goal: Psychosocial 
Outcome: Not Progressing Towards Goal 
  
Problem: Heart Failure: Discharge Outcomes Goal: *Demonstrates ability to perform prescribed activity without shortness of breath or discomfort Outcome: Not Progressing Towards Goal 
Goal: *Left ventricular function assessment completed prior to or during stay, or planned for post-discharge Outcome: Not Progressing Towards Goal 
Goal: *ACEI prescribed if LVEF less than 40% and no contraindications or ARB prescribed Outcome: Not Progressing Towards Goal 
Goal: *Verbalizes understanding and describes prescribed diet Outcome: Not Progressing Towards Goal 
Goal: *Verbalizes understanding/describes prescribed medications Outcome: Not Progressing Towards Goal 
Goal: *Describes available resources and support systems Description: (eg: Home Health, Palliative Care, Advanced Medical Directive) Outcome: Not Progressing Towards Goal 
Goal: *Describes smoking cessation resources Outcome: Not Progressing Towards Goal 
Goal: *Understands and describes signs and symptoms to report to providers(Stroke Metric) Outcome: Not Progressing Towards Goal 
Goal: *Describes/verbalizes understanding of follow-up/return appt Description: (eg: to physicians, diabetes treatment coordinator, and other resources Outcome: Not Progressing Towards Goal 
Goal: *Describes importance of continuing daily weights and changes to report to physician Outcome: Not Progressing Towards Goal 
  
 Problem: Impaired Skin Integrity/Pressure Injury Treatment Goal: *Improvement of Existing Pressure Injury Outcome: Not Progressing Towards Goal 
Goal: *Prevention of pressure injury Description: Document Angel Scale and appropriate interventions in the flowsheet. Outcome: Not Progressing Towards Goal 
Note: Pressure Injury Interventions: 
Sensory Interventions: Assess changes in LOC, Assess need for specialty bed, Check visual cues for pain, Float heels, Keep linens dry and wrinkle-free, Minimize linen layers, Pressure redistribution bed/mattress (bed type), Turn and reposition approx. every two hours (pillows and wedges if needed) Moisture Interventions: Absorbent underpads, Internal/External urinary devices Activity Interventions: Pressure redistribution bed/mattress(bed type) Mobility Interventions: Pressure redistribution bed/mattress (bed type) Nutrition Interventions: Document food/fluid/supplement intake Friction and Shear Interventions: Apply protective barrier, creams and emollients, Minimize layers Problem: Patient Education: Go to Patient Education Activity Goal: Patient/Family Education Outcome: Not Progressing Towards Goal 
  
Problem: Patient Education: Go to Patient Education Activity Goal: Patient/Family Education Outcome: Not Progressing Towards Goal 
  
Problem: Nutrition Deficit Goal: *Optimize nutritional status Outcome: Not Progressing Towards Goal 
  
Problem: Pressure Injury - Risk of 
Goal: *Prevention of pressure injury Description: Document Angel Scale and appropriate interventions in the flowsheet.  
Outcome: Not Progressing Towards Goal 
Note: Pressure Injury Interventions: 
Sensory Interventions: Assess changes in LOC, Assess need for specialty bed, Check visual cues for pain, Float heels, Keep linens dry and wrinkle-free, Minimize linen layers, Pressure redistribution bed/mattress (bed type), Turn and reposition approx. every two hours (pillows and wedges if needed) Moisture Interventions: Absorbent underpads, Internal/External urinary devices Activity Interventions: Pressure redistribution bed/mattress(bed type) Mobility Interventions: Pressure redistribution bed/mattress (bed type) Nutrition Interventions: Document food/fluid/supplement intake Friction and Shear Interventions: Apply protective barrier, creams and emollients, Minimize layers Problem: Patient Education: Go to Patient Education Activity Goal: Patient/Family Education Outcome: Not Progressing Towards Goal 
  
Problem: Infection - Risk of, Urinary Catheter-Associated Urinary Tract Infection Goal: *Absence of infection signs and symptoms Outcome: Not Progressing Towards Goal 
  
Problem: Patient Education: Go to Patient Education Activity Goal: Patient/Family Education Outcome: Not Progressing Towards Goal 
  
Problem: Patient Education: Go to Patient Education Activity Goal: Patient/Family Education Outcome: Not Progressing Towards Goal

## 2020-10-25 NOTE — PROGRESS NOTES
[facility], [address] [place] Phone:[phone] Fax: [phone] CARDIOLOGY PROGRESS NOTE 
RECS: 
1. Acute on chronic diastolic congestive heart failure-appears compensated now. SOB and BLE improved well. Continue lasix 40 mg po daily 2. Hypertrophic obstructive cardiomyopathy-S/p alcohol septal ablation 2/19 at [facility] 3. Hx complete heart block - s/p dual chambers permanent pacemaker 2/19 [employer]  
4. Paroxysmal  Atrial fibrillation-rate controlled. on Eliquis for stroke prevention. 5. Aortic stenosis -has become severe now with mean gradient of 43. Was moderate on  echo in 9/19. Will plan to transfer to [facility] for balloon valvuloplasty and possible TAVR once non cardiac issues managed. Discussed with Dr. Isabella Killian. 6. Pulmonary hypertension-  worsening gradually with pulmonary pressure of 96 now. Echo 9/19 showed PAP 69 mmHg 7. CKD- renal indices stable 8. Hyponatremia and hypochloremia: Secondary to diuresis- better 9. AMS- worsening confusion/drowsiness - possible due to medications side effect gabapentin on hold. Head CT no acute findings 10. Hx of thyroid CA- diagnosed approximately 4 years prior. ENT consulted not a candidate for any aggressive surgical plan unless airway compromise is noted. ASSESSMENT: 
Hospital Problems  Date Reviewed: 10/15/2020 Codes Class Noted POA Severe protein-calorie malnutrition ([facility] [address] ICD-10-CM: D02 ICD-9-CM: 794  10/21/2020 No  
   
 Anasarca ICD-10-CM: R60.1 ICD-9-CM: 782.3  10/15/2020 Unknown * (Principal) Acute on chronic diastolic congestive heart failure (HCC) ICD-10-CM: I50.33 ICD-9-CM: 428.33, 428.0  12/12/2017 Yes Mitral regurgitation ICD-10-CM: I34.0 ICD-9-CM: 424.0  6/13/2017 Yes Mild HOCM (hypertrophic obstructive cardiomyopathy) (HCC) ICD-10-CM: I42.1 ICD-9-CM: 425.11  1/12/2017 Yes Aortic stenosis ICD-10-CM: I35.0 ICD-9-CM: 424.1  1/12/2017 Yes Pulmonary HTN ([facility] ICD-10-CM: I27.20 ICD-9-CM: 416.8  12/21/2016 Yes Papillary thyroid carcinoma ([facility] ICD-10-CM: C37 ICD-9-CM: 748  12/21/2016 Yes Obesity (BMI 30.0-34.9) ICD-10-CM: H47.9 ICD-9-CM: 278.00  10/13/2016 Yes SUBJECTIVE: 
C/o dyspnea. Oriented to person. Oriented to year. Not oriented to place. OBJECTIVE: 
 
VS:  
Visit Vitals BP (!) 124/57 (BP 1 Location: Right arm, BP Patient Position: At rest) Pulse 73 Temp 98.8 °F (37.1 °C) Resp 16 Ht 5' 3\" (1.6 m) Wt 57.3 kg (126 lb 4.8 oz) SpO2 98% BMI 22.37 kg/m² Intake/Output Summary (Last 24 hours) at 10/25/2020 1222 Last data filed at 10/25/2020 1059 Gross per 24 hour Intake 845 ml Output 1600 ml Net -755 ml TELE: normal sinus rhythm General: No acute distress HENT: Normocephalic, atraumatic. Neck :  Supple Cardiac:  Normal S1/S2 Chest/Lungs:Clear to auscultation Abdomen: Soft Extremities:  No edema Labs: Results:  
   
Chemistry Recent Labs 10/25/20 
1785 10/24/20 
0510 10/23/20 
1424 * 105* 96  133* 136  
K 3.5 3.8 3.2*  
 102 100 CO2 28 24 27 BUN 58* 51* 50* CREA 1.54* 1.58* 1.60* CA 10.5* 10.2* 10.0 MG 2.5 2.7* 2.9*  
PHOS 2.7 2.4* 3.0 AGAP 6 7 9 BUCR 38* 32* 31* * 166* 171* TP 8.3* 8.4* 7.8 ALB 2.8* 2.8* 2.9*  
GLOB 5.5* 5.6* 4.9* AGRAT 0.5* 0.5* 0.6* CBC w/Diff Recent Labs 10/25/20 
4272 10/24/20 
0510 WBC 7.8 9.1  
RBC 4.53 4.78 HGB 10.1* 10.8* HCT 33.5* 35.4  261 GRANS 73 75* LYMPH 17* 13* EOS 2 1 Cardiac Enzymes No results for input(s): CPK, CKND1, JONI in the last 72 hours. No lab exists for component: Jessica Eagles Coagulation No results for input(s): PTP, INR, APTT, INREXT in the last 72 hours. Lipid Panel Lab Results Component Value Date/Time  Cholesterol, total 178 10/20/2020 10:05 AM  
 HDL Cholesterol 33 (L) 10/20/2020 10:05 AM  
 LDL, calculated 125.8 (H) 10/20/2020 10:05 AM  
 VLDL, calculated 19.2 10/20/2020 10:05 AM  
 Triglyceride 96 10/20/2020 10:05 AM  
 CHOL/HDL Ratio 5.4 (H) 10/20/2020 10:05 AM  
  
BNP No results for input(s): BNPP in the last 72 hours. Liver Enzymes Recent Labs 10/25/20 
0253 TP 8.3* ALB 2.8* * Digoxin Thyroid Studies Lab Results Component Value Date/Time TSH 1.98 10/16/2020 04:22 AM  
    
 
 
 
Sebastián Potter MD   Pager # [phone]

## 2020-10-25 NOTE — PROGRESS NOTES
PT orders received and chart reviewed. Pt currently on PT caseload. Will acknowledge new order and continue to follow.   
Thank you for this referral.  
 
Graeme Merrill PT DPT

## 2020-10-25 NOTE — PROGRESS NOTES
New OT order received and chart reviewed. Patient currently on OT caseload; order acknowledged.    
 
Thank you for the referral.  
Glendy Varghese MS, OTR/L

## 2020-10-25 NOTE — PROGRESS NOTES
Problem: Heart Failure: Day 4 Goal: Activity/Safety Outcome: Progressing Towards Goal 
Goal: Diagnostic Test/Procedures Outcome: Progressing Towards Goal 
Goal: Nutrition/Diet Outcome: Progressing Towards Goal 
Goal: Discharge Planning Outcome: Progressing Towards Goal 
Goal: Treatments/Interventions/Procedures Outcome: Progressing Towards Goal 
Goal: Psychosocial 
Outcome: Progressing Towards Goal 
  
Problem: Heart Failure: Day 5 Goal: Activity/Safety Outcome: Progressing Towards Goal 
Goal: Nutrition/Diet Outcome: Progressing Towards Goal 
Goal: Discharge Planning Outcome: Progressing Towards Goal 
Goal: Medications Outcome: Progressing Towards Goal 
Goal: Respiratory Outcome: Progressing Towards Goal 
Goal: Treatments/Interventions/Procedures Outcome: Progressing Towards Goal 
Goal: Psychosocial 
Outcome: Progressing Towards Goal 
  
Problem: Heart Failure: Discharge Outcomes Goal: *Verbalizes understanding/describes prescribed medications Outcome: Progressing Towards Goal 
Goal: *Describes available resources and support systems Description: (eg: Home Health, Palliative Care, Advanced Medical Directive) Outcome: Progressing Towards Goal 
Goal: *Describes/verbalizes understanding of follow-up/return appt Description: (eg: to physicians, diabetes treatment coordinator, and other resources Outcome: Progressing Towards Goal

## 2020-10-25 NOTE — PROGRESS NOTES
Otolaryngology Head neck surgery 
 patient remains confused 
 cardiac evaluation and treatment ongoing. I will follow distantly however should patient's cardiac situation dramatically improve and she be stable for surgical intervention, strongly feel that  her situation necessitates total thyroidectomy and central neck dissection. at present time cardiology does not feel the patient is medically stable to proceed with general anesthesia for 2-3 hours hence will avoid intervention at this setting Lynsey Cooper

## 2020-10-26 ENCOUNTER — TELEPHONE (OUTPATIENT)
Dept: CARDIOLOGY CLINIC | Age: 80
End: 2020-10-26

## 2020-10-26 LAB
ALBUMIN SERPL-MCNC: 2.8 G/DL (ref 3.4–5)
ALBUMIN/GLOB SERPL: 0.6 {RATIO} (ref 0.8–1.7)
ALP SERPL-CCNC: 211 U/L (ref 45–117)
ALT SERPL-CCNC: 17 U/L (ref 13–56)
ANION GAP SERPL CALC-SCNC: 8 MMOL/L (ref 3–18)
AST SERPL-CCNC: 28 U/L (ref 10–38)
BACTERIA SPEC CULT: ABNORMAL
BACTERIA SPEC CULT: ABNORMAL
BASOPHILS # BLD: 0 K/UL (ref 0–0.1)
BASOPHILS NFR BLD: 0 % (ref 0–2)
BILIRUB SERPL-MCNC: 1.6 MG/DL (ref 0.2–1)
BUN SERPL-MCNC: 60 MG/DL (ref 7–18)
BUN/CREAT SERPL: 39 (ref 12–20)
CALCIUM SERPL-MCNC: 9.3 MG/DL (ref 8.5–10.1)
CC UR VC: ABNORMAL
CHLORIDE SERPL-SCNC: 102 MMOL/L (ref 100–111)
CO2 SERPL-SCNC: 25 MMOL/L (ref 21–32)
CREAT SERPL-MCNC: 1.52 MG/DL (ref 0.6–1.3)
DIFFERENTIAL METHOD BLD: ABNORMAL
EOSINOPHIL # BLD: 0.1 K/UL (ref 0–0.4)
EOSINOPHIL NFR BLD: 2 % (ref 0–5)
ERYTHROCYTE [DISTWIDTH] IN BLOOD BY AUTOMATED COUNT: 20.2 % (ref 11.6–14.5)
GLOBULIN SER CALC-MCNC: 4.6 G/DL (ref 2–4)
GLUCOSE SERPL-MCNC: 79 MG/DL (ref 74–99)
HCT VFR BLD AUTO: 31.2 % (ref 35–45)
HGB BLD-MCNC: 9.7 G/DL (ref 12–16)
LYMPHOCYTES # BLD: 1.4 K/UL (ref 0.9–3.6)
LYMPHOCYTES NFR BLD: 19 % (ref 21–52)
MAGNESIUM SERPL-MCNC: 2.4 MG/DL (ref 1.6–2.6)
MCH RBC QN AUTO: 22.7 PG (ref 24–34)
MCHC RBC AUTO-ENTMCNC: 31.1 G/DL (ref 31–37)
MCV RBC AUTO: 72.9 FL (ref 74–97)
MONOCYTES # BLD: 0.6 K/UL (ref 0.05–1.2)
MONOCYTES NFR BLD: 8 % (ref 3–10)
NEUTS SEG # BLD: 5.2 K/UL (ref 1.8–8)
NEUTS SEG NFR BLD: 71 % (ref 40–73)
PHOSPHATE SERPL-MCNC: 3.1 MG/DL (ref 2.5–4.9)
PLATELET # BLD AUTO: 252 K/UL (ref 135–420)
PMV BLD AUTO: 9.9 FL (ref 9.2–11.8)
POTASSIUM SERPL-SCNC: 3.8 MMOL/L (ref 3.5–5.5)
PROT SERPL-MCNC: 7.4 G/DL (ref 6.4–8.2)
PTH RELATED PROT SERPL-SCNC: <2 PMOL/L
RBC # BLD AUTO: 4.28 M/UL (ref 4.2–5.3)
SERVICE CMNT-IMP: ABNORMAL
SODIUM SERPL-SCNC: 135 MMOL/L (ref 136–145)
WBC # BLD AUTO: 7.3 K/UL (ref 4.6–13.2)

## 2020-10-26 PROCEDURE — 74011250637 HC RX REV CODE- 250/637: Performed by: NURSE PRACTITIONER

## 2020-10-26 PROCEDURE — 97535 SELF CARE MNGMENT TRAINING: CPT

## 2020-10-26 PROCEDURE — 74011250637 HC RX REV CODE- 250/637: Performed by: STUDENT IN AN ORGANIZED HEALTH CARE EDUCATION/TRAINING PROGRAM

## 2020-10-26 PROCEDURE — 74011250636 HC RX REV CODE- 250/636: Performed by: FAMILY MEDICINE

## 2020-10-26 PROCEDURE — 65270000029 HC RM PRIVATE

## 2020-10-26 PROCEDURE — 97168 OT RE-EVAL EST PLAN CARE: CPT

## 2020-10-26 PROCEDURE — 74011000250 HC RX REV CODE- 250: Performed by: FAMILY MEDICINE

## 2020-10-26 PROCEDURE — 74011250637 HC RX REV CODE- 250/637: Performed by: FAMILY MEDICINE

## 2020-10-26 PROCEDURE — 97530 THERAPEUTIC ACTIVITIES: CPT

## 2020-10-26 PROCEDURE — 85025 COMPLETE CBC W/AUTO DIFF WBC: CPT

## 2020-10-26 PROCEDURE — 99232 SBSQ HOSP IP/OBS MODERATE 35: CPT | Performed by: INTERNAL MEDICINE

## 2020-10-26 PROCEDURE — 36415 COLL VENOUS BLD VENIPUNCTURE: CPT

## 2020-10-26 PROCEDURE — 80053 COMPREHEN METABOLIC PANEL: CPT

## 2020-10-26 PROCEDURE — 84100 ASSAY OF PHOSPHORUS: CPT

## 2020-10-26 PROCEDURE — 83735 ASSAY OF MAGNESIUM: CPT

## 2020-10-26 RX ADMIN — FAMOTIDINE 20 MG: 20 TABLET ORAL at 08:54

## 2020-10-26 RX ADMIN — THERA TABS 1 TABLET: TAB at 08:54

## 2020-10-26 RX ADMIN — APIXABAN 2.5 MG: 2.5 TABLET, FILM COATED ORAL at 18:01

## 2020-10-26 RX ADMIN — ATORVASTATIN CALCIUM 40 MG: 40 TABLET, FILM COATED ORAL at 21:52

## 2020-10-26 RX ADMIN — APIXABAN 2.5 MG: 2.5 TABLET, FILM COATED ORAL at 08:54

## 2020-10-26 RX ADMIN — Medication 100 MG: at 08:54

## 2020-10-26 RX ADMIN — POTASSIUM CHLORIDE 20 MEQ: 1500 TABLET, EXTENDED RELEASE ORAL at 08:54

## 2020-10-26 RX ADMIN — METOPROLOL TARTRATE 25 MG: 25 TABLET, FILM COATED ORAL at 08:54

## 2020-10-26 RX ADMIN — FOLIC ACID 1 MG: 1 TABLET ORAL at 08:54

## 2020-10-26 RX ADMIN — MONTELUKAST 10 MG: 10 TABLET, FILM COATED ORAL at 08:54

## 2020-10-26 RX ADMIN — METOPROLOL TARTRATE 25 MG: 25 TABLET, FILM COATED ORAL at 18:01

## 2020-10-26 RX ADMIN — MEGESTROL ACETATE 40 MG: 40 TABLET ORAL at 08:54

## 2020-10-26 RX ADMIN — FUROSEMIDE 40 MG: 40 TABLET ORAL at 08:54

## 2020-10-26 RX ADMIN — CEFTRIAXONE SODIUM 1 G: 1 INJECTION, POWDER, FOR SOLUTION INTRAMUSCULAR; INTRAVENOUS at 15:02

## 2020-10-26 NOTE — PROGRESS NOTES
Problem: Falls - Risk of 
Goal: *Absence of Falls Description: Document Catrachocristóbal Purviskonstantin Fall Risk and appropriate interventions in the flowsheet. Outcome: Progressing Towards Goal 
Note: Fall Risk Interventions: 
Mobility Interventions: Bed/chair exit alarm Mentation Interventions: Adequate sleep, hydration, pain control Medication Interventions: Bed/chair exit alarm Elimination Interventions: Bed/chair exit alarm

## 2020-10-26 NOTE — PROGRESS NOTES
Infectious Disease progress note Reason: Metabolic encephalopathy, bacteriuria, evaluate for infection ? cystitis Current abx Prior abx Ceftriaxone since 10/24/20 Lines:  
 
 
Assessment : 
 
 
 68-year-old female with a history of HOCM, pulmonary hypertension, congestive heart failure, chronic atrial fibrillation on anticoagulation, aortic stenosis, and hypertension presented to emergency department on 10/15/2020 with increasing swelling for  2 weeks.  
  
Now with altered mentation, bacteriuria Altered mental status likely metabolic encephalopathy secondary to noninfectious etiology of altered mentation such as medication induced alongwith probable evolving e.coli/proteus cystitis Urine culture 10/22->2000 colonies of E. coli, Proteus. Status post ceftriaxone since 10/24/2020 since persistent lethargy, concern for evolving cystitis. Susceptibilities of E. coli, Proteus reviewed today. Both bacteria susceptible to ceftriaxone. Improved mentation noted today. Discussed with cardiology team.  Plans for aortic valve procedure once infection cleared 
  
Recommendations: 
  
1. Continue ceftriaxone till 10/30/2020 2. Okay to proceed with the planned cardiac procedure next week if continued clinical improvement noted after completion of antibiotics on 10/30 
  
Advance Care planning: full code: discussed  with patient/surrogate decision maker: eder garcia: [phone] 
  
  
Above plan was discussed in details with dr. Sarah Meyers, cardiology nurse practitioner. Please call me if any further questions or concerns. Will continue to participate in the care of this patient. HPI: 
 
Feels good. Denies chest pain, shortness of breath, abdominal pain. Denies any burning while urinating 
 
 
home Medication List  
 Details Digitek 125 mcg (0.125 mg) tablet take 1 tablet by mouth once daily 
Qty: 90 Tab, Refills: 1 Details apixaban (Eliquis) 5 mg tablet Take 1 Tab by mouth two (2) times a day. Qty: 60 Tab, Refills: 3 Calcium-Cholecalciferol, D3, (CALCIUM 600 WITH VITAMIN D3) 600 mg(1,500mg) -400 unit chew Take 1 Tab by mouth daily. furosemide (LASIX) 40 mg tablet take 1 tablet by mouth every morning and 1/2 tablet AT 2 P.M. 
Qty: 45 Tab, Refills: 0  
  
metoprolol tartrate (LOPRESSOR) 100 mg IR tablet take 1 tablet by mouth twice a day 
Qty: 60 Tab, Refills: 6  
  
traMADol (ULTRAM) 50 mg tablet Take 1 Tab by mouth every six (6) hours as needed for Pain. folic acid 307 mcg tablet Take 400 mcg by mouth daily. cyanocobalamin, vitamin B-12, (VITAMIN B12 PO) Take 1,000 mcg by mouth daily. famotidine (PEPCID) 20 mg tablet Take 1 Tab by mouth daily. Qty: 30 Tab, Refills: 0  
  
gabapentin (NEURONTIN) 100 mg capsule Take 1 Cap by mouth nightly. Max Daily Amount: 100 mg. Qty: 30 Cap, Refills: 0 Associated Diagnoses: Pain and swelling of lower leg, unspecified laterality  
  
lactobacillus sp. 50 billion cpu (BIO-K PLUS) 50 billion cell -375 mg cap capsule Take 1 Cap by mouth daily. Qty: 10 Cap, Refills: 0  
  
ondansetron hcl (ZOFRAN) 4 mg tablet Take 1 Tab by mouth every eight (8) hours as needed for Nausea. Qty: 30 Tab, Refills: 2 Associated Diagnoses: Nausea  
  
simvastatin (ZOCOR) 80 mg tablet TAKE 1 TABLET NIGHTLY Qty: 90 Tab, Refills: 1 Associated Diagnoses: Nonrheumatic aortic valve insufficiency  
  
cholecalciferol (VITAMIN D3) 1,000 unit cap Take  by mouth daily. losartan (COZAAR) 100 mg tablet Take 100 mg by mouth daily. montelukast (SINGULAIR) 10 mg tablet Take 10 mg by mouth daily. CETIRIZINE HCL (ZYRTEC PO) Take  by mouth. Current Facility-Administered Medications Medication Dose Route Frequency  cefTRIAXone (ROCEPHIN) 1 g in sterile water (preservative free) 10 mL IV syringe  1 g IntraVENous Q24H  potassium chloride (K-DUR, KLOR-CON) SR tablet 20 mEq  20 mEq Oral DAILY  metoprolol tartrate (LOPRESSOR) tablet 25 mg  25 mg Oral BID  megestroL (MEGACE) tablet 40 mg  40 mg Oral DAILY  furosemide (LASIX) tablet 40 mg  40 mg Oral DAILY  acetaminophen (TYLENOL) tablet 650 mg  650 mg Oral Q4H PRN  
 ergocalciferol capsule 50,000 Units  50,000 Units Oral Q7D  
 therapeutic multivitamin (THERAGRAN) tablet 1 Tab  1 Tab Oral DAILY  thiamine HCL (B-1) tablet 100 mg  100 mg Oral DAILY  apixaban (ELIQUIS) tablet 2.5 mg  2.5 mg Oral BID  hydrALAZINE (APRESOLINE) tablet 25 mg  25 mg Oral TID PRN  
 famotidine (PEPCID) tablet 20 mg  20 mg Oral DAILY  folic acid (FOLVITE) tablet 1 mg  1 mg Oral DAILY  montelukast (SINGULAIR) tablet 10 mg  10 mg Oral DAILY  ondansetron hcl (ZOFRAN) tablet 4 mg  4 mg Oral Q8H PRN  
 atorvastatin (LIPITOR) tablet 40 mg  40 mg Oral QHS Allergies: Patient has no known allergies. Temp (24hrs), Av.8 °F (36.6 °C), Min:97 °F (36.1 °C), Max:98.8 °F (37.1 °C) Visit Vitals /62 (BP 1 Location: Left leg, BP Patient Position: At rest) Pulse 79 Temp 97 °F (36.1 °C) Resp 16 Ht 5' 3\" (1.6 m) Wt 57.3 kg (126 lb 4.8 oz) SpO2 97% BMI 22.37 kg/m² ROS: 12 point review of systems obtained details, pertinent positive as mentioned in history of present illness, otherwise negative Physical Exam: 
 
General:         no acute distress, more communicative, alert awake oriented x2 HEENT:           NC, Atraumatic.  anicteric sclerae. Lungs:            CTA Bilaterally. No Wheezing/Rhonchi/Rales. Heart:              Regular rhythm Abdomen:      Soft, Non distended, Non tender. Extremities:   Wound Rt leg clean dressing in place wearing Tubigrip, slow moving and weak. Winced when both her legs were touched and examined for edema, but only trace edema noted Psych:            Not anxious or agitated. Neurologic:     No gross motor or sensory deficits noted Labs: Results:  
Chemistry Recent Labs 10/26/20 
0505 10/25/20 
6106 10/24/20 
0510 GLU 79 101* 105* * 137 133* K 3.8 3.5 3.8  103 102 CO2 25 28 24 BUN 60* 58* 51* CREA 1.52* 1.54* 1.58* CA 9.3 10.5* 10.2* AGAP 8 6 7 BUCR 39* 38* 32* * 181* 166* TP 7.4 8.3* 8.4* ALB 2.8* 2.8* 2.8*  
GLOB 4.6* 5.5* 5.6* AGRAT 0.6* 0.5* 0.5* CBC w/Diff Recent Labs 10/26/20 
0505 10/25/20 
9617 10/24/20 
0510 WBC 7.3 7.8 9.1  
RBC 4.28 4.53 4.78 HGB 9.7* 10.1* 10.8* HCT 31.2* 33.5* 35.4  263 261 GRANS 71 73 75* LYMPH 19* 17* 13* EOS 2 2 1 Microbiology No results for input(s): CULT in the last 72 hours. RADIOLOGY: 
 
All available imaging studies/reports in [facility] care for this admission were reviewed Dr. Nataliya Paul, Infectious Disease Specialist 
[phone] October 26, 2020 
9:31 AM

## 2020-10-26 NOTE — TELEPHONE ENCOUNTER
Pt's caretaker, Mr. Cintron Hem called stating that Pt is currently at  and they are requesting a verbal authorization from Dr. Jyoti Harry in order to proceed with procedure. Dr. Jyoti Harry saw pt during rounds this morning.

## 2020-10-26 NOTE — PROGRESS NOTES
[facility] Progress Note CARDIOLOGY PROGRESS NOTE 
RECS: 
 
 
1. Acute on chronic diastolic congestive heart failure-appears compensated now. SOB and BLE improved well. Continue lasix 40 mg po daily 2. Hypertrophic obstructive cardiomyopathy-S/p alcohol septal ablation 2/19 at [facility] 3. Hx complete heart block - s/p dual chambers permanent pacemaker 2/19 [employer]  
4. Paroxysmal  Atrial fibrillation-rate controlled. on Eliquis for stroke prevention. 5. Aortic stenosis -has become severe now with mean gradient of 43. Was moderate on  echo in 9/19. Will plan to transfer to [facility] for balloon valvuloplasty and possible TAVR once non cardiac issues managed. Discussed with Dr. Michele So. 6. Pulmonary hypertension-  worsening gradually with pulmonary pressure of 96 now. Echo 9/19 showed PAP 69 mmHg 7. CKD- renal indices stable 8. Hyponatremia and hypochloremia: Secondary to diuresis- improved 9. AMS- worsening confusion/drowsiness - possible due to medications side effect gabapentin on hold. Head CT no acute findings- better 10. UTI- being managed by ID. On ciprofloxacin she will finish Tx on Friday 11. Hx of thyroid CA- diagnosed approximately 4 years prior. ENT consulted not a candidate for any aggressive surgical plan unless airway compromise is noted. Patient awaiting [facility] for TAVR. Okay to resume po lasix. ASSESSMENT: 
Hospital Problems  Date Reviewed: 10/15/2020 Codes Class Noted POA Severe protein-calorie malnutrition ([facility] [address] ICD-10-CM: M79 ICD-9-CM: 558  10/21/2020 No  
   
 Anasarca ICD-10-CM: R60.1 ICD-9-CM: 782.3  10/15/2020 Unknown * (Principal) Acute on chronic diastolic congestive heart failure (HCC) ICD-10-CM: I50.33 ICD-9-CM: 428.33, 428.0  12/12/2017 Yes Mitral regurgitation ICD-10-CM: I34.0 ICD-9-CM: 424.0  6/13/2017 Yes Mild HOCM (hypertrophic obstructive cardiomyopathy) (HCC) ICD-10-CM: I42.1 ICD-9-CM: 425.11  1/12/2017 Yes Aortic stenosis ICD-10-CM: I35.0 ICD-9-CM: 424.1  1/12/2017 Yes Pulmonary HTN ([facility] ICD-10-CM: I27.20 ICD-9-CM: 416.8  12/21/2016 Yes Papillary thyroid carcinoma ([facility] ICD-10-CM: V05 ICD-9-CM: 104  12/21/2016 Yes Obesity (BMI 30.0-34.9) ICD-10-CM: W27.0 ICD-9-CM: 278.00  10/13/2016 Yes SUBJECTIVE: 
 
C/o dyspnea. Oriented to person. Oriented to year. Oriented to place OBJECTIVE: 
 
VS:  
Visit Vitals /60 (BP 1 Location: Right arm, BP Patient Position: At rest) Pulse 76 Temp 97.1 °F (36.2 °C) Resp 16 Ht 5' 3\" (1.6 m) Wt 57.3 kg (126 lb 4.8 oz) SpO2 98% BMI 22.37 kg/m² Intake/Output Summary (Last 24 hours) at 10/26/2020 1045 Last data filed at 10/26/2020 3597 Gross per 24 hour Intake 720 ml Output 1500 ml Net -780 ml  
 
  
TELE: DDD pacing 
  
General:alert and oriented today able to answer questions HENT: Normocephalic, atraumatic. Normal external eye. Neck :  bruit present, increased JVP Cardiac:  regular rate and rhythm, S1: normal intensity, S2: decreased intensity, systolic murmur: late systolic 3/6, crescendo at 2nd left intercostal space, Chest/Lungs:clear to ausculation Abdomen: Soft, nontender, no masses Extremities: improved  edema, peripheral pulses not palpable Labs: Results:  
   
Chemistry Recent Labs 10/26/20 
0505 10/25/20 
0385 10/24/20 
0510 GLU 79 101* 105* * 137 133* K 3.8 3.5 3.8  103 102 CO2 25 28 24 BUN 60* 58* 51* CREA 1.52* 1.54* 1.58* CA 9.3 10.5* 10.2* MG 2.4 2.5 2.7* PHOS 3.1 2.7 2.4* AGAP 8 6 7 BUCR 39* 38* 32* * 181* 166* TP 7.4 8.3* 8.4* ALB 2.8* 2.8* 2.8*  
GLOB 4.6* 5.5* 5.6* AGRAT 0.6* 0.5* 0.5* CBC w/Diff Recent Labs 10/26/20 
0505 10/25/20 
7876 10/24/20 
0510 WBC 7.3 7.8 9.1  
RBC 4.28 4.53 4.78 HGB 9.7* 10.1* 10.8* HCT 31.2* 33.5* 35.4  263 261 GRANS 71 73 75* LYMPH 19* 17* 13* EOS 2 2 1 Cardiac Enzymes No results for input(s): CPK, CKND1, JONI in the last 72 hours. No lab exists for component: Shagufta Pali Coagulation No results for input(s): PTP, INR, APTT, INREXT, INREXT in the last 72 hours. Lipid Panel Lab Results Component Value Date/Time Cholesterol, total 178 10/20/2020 10:05 AM  
 HDL Cholesterol 33 (L) 10/20/2020 10:05 AM  
 LDL, calculated 125.8 (H) 10/20/2020 10:05 AM  
 VLDL, calculated 19.2 10/20/2020 10:05 AM  
 Triglyceride 96 10/20/2020 10:05 AM  
 CHOL/HDL Ratio 5.4 (H) 10/20/2020 10:05 AM  
  
BNP No results for input(s): BNPP in the last 72 hours. Liver Enzymes Recent Labs 10/26/20 
0505 TP 7.4 ALB 2.8* * Digoxin Thyroid Studies Lab Results Component Value Date/Time TSH 1.98 10/16/2020 04:22 AM  
    
 
 
 
Nieves Valadez NP-C supervised I have independently evaluated and examined the patient. All relevant labs and testing data's are reviewed. Care plan discussed and updated after review.  
 
Fletcher Peabody MD

## 2020-10-26 NOTE — PROGRESS NOTES
Problem: Mobility Impaired (Adult and Pediatric) Goal: *Acute Goals and Plan of Care (Insert Text) Description: Physical Therapy Goals Re-evaluated 10/22/2020 and to be accomplished within 7 days 1. Patient will move from supine to sit and sit to supine in bed with minimal assistance. 2. Patient transfer from bed to chair and chair to bed with moderate assistance. 3. Patient will perform sit to stand with moderate assistance. 4. Patient will ambulate x 5 feet with moderate assistance with least restrictive device. PLOF: Pt reports independence with dressing and bathing, ambulating with RW. Lives in 1st floor apartment with  and no DEBBIE. Pt reports having someone come in to  after them daily and has someone come into clean 1 time per week. Outcome: Progressing Towards Goal 
 
 
 PHYSICAL THERAPY TREATMENT Patient: Christian Doyle (52 y.o. female) Date: 10/26/2020 Diagnosis: Anasarca [R60.1] Acute on chronic diastolic congestive heart failure ([facility] [address] Precautions: Fall ASSESSMENT: 
Patient received awake in bed agreeable to PT session with OT co-treatment to maximize patient safety and functional mobility. She is only oriented to herself; reoriented to time and place. Patient follows commands with increased verbal/tactile cues for sequencing. She moves very slowly with minimal assistance from supine to sit. Pt painful to touch along left lower leg. Cued patient to place hands on the bed to scoot forward towards EOB with CGA to prepare for transfer. Patient found soiled with BM, agreeable to complete pericare in standing. Patient performs 2 sit to stand trials with moderate assistance x2 to extend hips and trunk, able to achieve about 75-80% standing. She stood for about 10 seconds and is dependent for incontinent care. Assisted patient from sit to supine with moderate A to lift LE's and minimal assistance at trunk.  She performs rolling r/l with minimal assistance to continue with pericare and to change bed linens. At end of session patient left with call bell within reach and bed alarm activated. Will continue with POC to progress towards functional mobility goals. Progression toward goals:  
[]      Improving appropriately and progressing toward goals [x]      Improving slowly and progressing toward goals 
[]      Not making progress toward goals and plan of care will be adjusted PLAN: 
Patient continues to benefit from skilled intervention to address the above impairments. Continue treatment per established plan of care. Discharge Recommendations:  M[facility] Thomas Further Equipment Recommendations for Discharge:  TBD at next level care SUBJECTIVE:  
Patient stated It's hard to lift my legs.  OBJECTIVE DATA SUMMARY:  
Critical Behavior: 
Neurologic State: Alert, Confused Orientation Level: Oriented to person, Disoriented to place, Disoriented to time Cognition: Follows commands Safety/Judgement: Fall prevention Functional Mobility Training: 
Bed Mobility: 
Rolling: Minimum assistance; Additional time Supine to Sit: Minimum assistance; Additional time Sit to Supine: Moderate assistance at LE's; min A at trunk Scooting: Contact guard assistance; Additional time(to EOB) Transfers: 
Sit to Stand: Moderate assistance;Assist x2 Balance: 
Sitting: Impaired Sitting - Static: Good (unsupported) Sitting - Dynamic: Fair (occasional) Standing: Impaired; With support Standing - Static: Poor Pain: 
Pain level pre-treatment: -/10; pt is painful to touch along left lower leg Pain level post-treatment: 0/10 Pain Intervention(s): Medication (see MAR); Rest, Ice, Repositioning Response to intervention: Nurse notified, See doc flow Activity Tolerance:  
Fair Please refer to the flowsheet for vital signs taken during this treatment. After treatment: [] Patient left in no apparent distress sitting up in chair 
[x] Patient left in no apparent distress in bed 
[x] Call bell left within reach 
[] Nursing notified 
[] Caregiver present [x] Bed alarm activated 
[] SCDs applied COMMUNICATION/EDUCATION:  
[x]         Role of Physical Therapy in the acute care setting. [x]         Fall prevention education was provided and the patient/caregiver indicated understanding. [x]         Patient/family have participated as able in working toward goals and plan of care. []         Patient/family agree to work toward stated goals and plan of care. []         Patient understands intent and goals of therapy, but is neutral about his/her participation. []         Patient is unable to participate in stated goals/plan of care: ongoing with therapy staff. 
[]         Other: 
 
   
Adela Bneedict, PT Time Calculation: 39 mins

## 2020-10-26 NOTE — PROGRESS NOTES
Problem: Falls - Risk of 
Goal: *Absence of Falls Description: Document Lisandra Drew Fall Risk and appropriate interventions in the flowsheet. Outcome: Progressing Towards Goal 
Note: Fall Risk Interventions: 
Mobility Interventions: Bed/chair exit alarm Mentation Interventions: Adequate sleep, hydration, pain control Medication Interventions: Bed/chair exit alarm Elimination Interventions: Bed/chair exit alarm Problem: Patient Education: Go to Patient Education Activity Goal: Patient/Family Education Outcome: Progressing Towards Goal 
  
Problem: Patient Education: Go to Patient Education Activity Goal: Patient/Family Education Outcome: Progressing Towards Goal 
  
Problem: Heart Failure: Day 4 Goal: Off Pathway (Use only if patient is Off Pathway) Outcome: Progressing Towards Goal 
Goal: Activity/Safety Outcome: Progressing Towards Goal 
Goal: Diagnostic Test/Procedures Outcome: Progressing Towards Goal 
Goal: Nutrition/Diet Outcome: Progressing Towards Goal 
Goal: Discharge Planning Outcome: Progressing Towards Goal 
Goal: Medications Outcome: Progressing Towards Goal 
Goal: Respiratory Outcome: Progressing Towards Goal 
Goal: Treatments/Interventions/Procedures Outcome: Progressing Towards Goal 
Goal: Psychosocial 
Outcome: Progressing Towards Goal 
Goal: *Oxygen saturation within defined limits Outcome: Progressing Towards Goal 
Goal: *Hemodynamically stable Outcome: Progressing Towards Goal 
Goal: *Optimal pain control at patient's stated goal 
Outcome: Progressing Towards Goal 
Goal: *Anxiety reduced or absent Outcome: Progressing Towards Goal 
Goal: *Demonstrates progressive activity Outcome: Progressing Towards Goal 
  
Problem: Heart Failure: Day 5 Goal: Off Pathway (Use only if patient is Off Pathway) Outcome: Progressing Towards Goal 
Goal: Activity/Safety Outcome: Progressing Towards Goal 
Goal: Diagnostic Test/Procedures Outcome: Progressing Towards Goal 
 Goal: Nutrition/Diet Outcome: Progressing Towards Goal 
Goal: Discharge Planning Outcome: Progressing Towards Goal 
Goal: Medications Outcome: Progressing Towards Goal 
Goal: Respiratory Outcome: Progressing Towards Goal 
Goal: Treatments/Interventions/Procedures Outcome: Progressing Towards Goal 
Goal: Psychosocial 
Outcome: Progressing Towards Goal 
  
Problem: Heart Failure: Discharge Outcomes Goal: *Demonstrates ability to perform prescribed activity without shortness of breath or discomfort Outcome: Progressing Towards Goal 
Goal: *Left ventricular function assessment completed prior to or during stay, or planned for post-discharge Outcome: Progressing Towards Goal 
Goal: *ACEI prescribed if LVEF less than 40% and no contraindications or ARB prescribed Outcome: Progressing Towards Goal 
Goal: *Verbalizes understanding and describes prescribed diet Outcome: Progressing Towards Goal 
Goal: *Verbalizes understanding/describes prescribed medications Outcome: Progressing Towards Goal 
Goal: *Describes available resources and support systems Description: (eg: Home Health, Palliative Care, Advanced Medical Directive) Outcome: Progressing Towards Goal 
Goal: *Describes smoking cessation resources Outcome: Progressing Towards Goal 
Goal: *Understands and describes signs and symptoms to report to providers(Stroke Metric) Outcome: Progressing Towards Goal 
Goal: *Describes/verbalizes understanding of follow-up/return appt Description: (eg: to physicians, diabetes treatment coordinator, and other resources Outcome: Progressing Towards Goal 
Goal: *Describes importance of continuing daily weights and changes to report to physician Outcome: Progressing Towards Goal 
  
Problem: Impaired Skin Integrity/Pressure Injury Treatment Goal: *Improvement of Existing Pressure Injury Outcome: Progressing Towards Goal 
Goal: *Prevention of pressure injury Description: Document Angel Scale and appropriate interventions in the flowsheet. Outcome: Progressing Towards Goal 
  
Problem: Patient Education: Go to Patient Education Activity Goal: Patient/Family Education Outcome: Progressing Towards Goal 
  
Problem: Patient Education: Go to Patient Education Activity Goal: Patient/Family Education Outcome: Progressing Towards Goal 
  
Problem: Nutrition Deficit Goal: *Optimize nutritional status Outcome: Progressing Towards Goal 
  
Problem: Pressure Injury - Risk of 
Goal: *Prevention of pressure injury Description: Document Angel Scale and appropriate interventions in the flowsheet. Outcome: Progressing Towards Goal 
  
Problem: Patient Education: Go to Patient Education Activity Goal: Patient/Family Education Outcome: Progressing Towards Goal 
  
Problem: Infection - Risk of, Urinary Catheter-Associated Urinary Tract Infection Goal: *Absence of infection signs and symptoms Outcome: Progressing Towards Goal 
  
Problem: Patient Education: Go to Patient Education Activity Goal: Patient/Family Education Outcome: Progressing Towards Goal 
  
Problem: Patient Education: Go to Patient Education Activity Goal: Patient/Family Education Outcome: Progressing Towards Goal

## 2020-10-26 NOTE — PROGRESS NOTES
Progress Note Patient: Surjit Fisher MRN: [id number]  CSN: [id number] YOB: 1940  Age: 68 y.o. Sex: female DOA: 10/15/2020 LOS:  LOS: 11 days Subjective:  
Pt more alert today and responsive. She is still confused and cannot answer all questions correctly. Pt has generalized weakness and pain. COVID negative Pt is currently on Rocephin for a UTI. Waiting on urine culture and ID to consult. Cardiology Dr Floridalma Hassan saw her yesterday. She was restarted on lasix. Dr. Floridalma Hassan wants to wait until all her non cardiac problems are resolved before transferring her to [facility]. Son was contacted yesterday and is consulting with family to try to work out what they want to do. Pt was seen by hematology oncology. Pt started on  IV iron. F/u bone marrow biopsy and condition as outpatient at later time. Dr Oletha Mortimer does not feel like it is the highest priority at this time considering all her other current issues. Pending cardiology clearance pt will need total thyroidectomy Chief Complaint:  
Chief Complaint Patient presents with  Peripheral Edema Review of systems General: No fevers or chills. Cardiovascular: No chest pain or pressure. Pulmonary: No shortness of breath, cough or wheeze. Gastrointestinal: No abdominal pain, nausea, vomiting or diarrhea. Genitourinary: No urinary frequency, urgency, hesitancy or dysuria. Musculoskeletal: generalized weakness and severe pain in her legs Neurologic: No headache, generalized weakness Objective:  
 
Physical Exam: 
Visit Vitals /62 (BP 1 Location: Left leg, BP Patient Position: At rest) Pulse 79 Temp 97 °F (36.1 °C) Resp 16 Ht 5' 3\" (1.6 m) Wt 57.3 kg (126 lb 4.8 oz) SpO2 97% BMI 22.37 kg/m² General:         no acute distress more alert today and able to respond to questions, still confused HEENT:           NC, Atraumatic.  anicteric sclerae. Lungs:            CTA Bilaterally. No Wheezing/Rhonchi/Rales. Heart:              Regular rhythm, systolic Murmurs Abdomen:      Soft, Non distended, Non tender. Extremities:   Wound Rt leg clean dressing in place wearing Tubigrip, slow moving and weak. Winced when both her legs were touched and examined for edema, but only trace edema noted Psych:            Not anxious or agitated. Neurologic:    confused. Alert but not oriented to place or date 
  
Intake and Output: 
Current Shift:  10/26 0701 - 10/26 1900 In: -  
Out: [place] Last three shifts:  10/24 1901 - 10/26 0700 In: 1115 [P.O.:1115] Out: 1550 [FTJMT:1860] Labs: Results:  
   
Chemistry Recent Labs 10/26/20 
0505 10/25/20 
5168 10/24/20 
0510 GLU 79 101* 105* * 137 133* K 3.8 3.5 3.8  103 102 CO2 25 28 24 BUN 60* 58* 51* CREA 1.52* 1.54* 1.58* CA 9.3 10.5* 10.2* AGAP 8 6 7 BUCR 39* 38* 32* * 181* 166* TP 7.4 8.3* 8.4* ALB 2.8* 2.8* 2.8*  
GLOB 4.6* 5.5* 5.6* AGRAT 0.6* 0.5* 0.5* CBC w/Diff Recent Labs 10/26/20 
0505 10/25/20 
3717 10/24/20 
0510 WBC 7.3 7.8 9.1  
RBC 4.28 4.53 4.78 HGB 9.7* 10.1* 10.8* HCT 31.2* 33.5* 35.4  263 261 GRANS 71 73 75* LYMPH 19* 17* 13* EOS 2 2 1 Cardiac Enzymes No results for input(s): CPK, CKND1, JONI in the last 72 hours. No lab exists for component: Rich Catching Coagulation No results for input(s): PTP, INR, APTT, INREXT, INREXT in the last 72 hours. Lipid Panel Lab Results Component Value Date/Time Cholesterol, total 178 10/20/2020 10:05 AM  
 HDL Cholesterol 33 (L) 10/20/2020 10:05 AM  
 LDL, calculated 125.8 (H) 10/20/2020 10:05 AM  
 VLDL, calculated 19.2 10/20/2020 10:05 AM  
 Triglyceride 96 10/20/2020 10:05 AM  
 CHOL/HDL Ratio 5.4 (H) 10/20/2020 10:05 AM  
  
BNP No results for input(s): BNPP in the last 72 hours. Liver Enzymes Recent Labs 10/26/20 
0505 TP 7.4 ALB 2.8* * Thyroid Studies Lab Results Component Value Date/Time TSH 1.98 10/16/2020 04:22 AM  
    
 
Procedures/imaging: see electronic medical records for all procedures/Xrays and details which were not copied into this note but were reviewed prior to creation of Plan Medications:  
Current Facility-Administered Medications Medication Dose Route Frequency  cefTRIAXone (ROCEPHIN) 1 g in sterile water (preservative free) 10 mL IV syringe  1 g IntraVENous Q24H  potassium chloride (K-DUR, KLOR-CON) SR tablet 20 mEq  20 mEq Oral DAILY  metoprolol tartrate (LOPRESSOR) tablet 25 mg  25 mg Oral BID  megestroL (MEGACE) tablet 40 mg  40 mg Oral DAILY  furosemide (LASIX) tablet 40 mg  40 mg Oral DAILY  acetaminophen (TYLENOL) tablet 650 mg  650 mg Oral Q4H PRN  
 ergocalciferol capsule 50,000 Units  50,000 Units Oral Q7D  
 therapeutic multivitamin (THERAGRAN) tablet 1 Tab  1 Tab Oral DAILY  thiamine HCL (B-1) tablet 100 mg  100 mg Oral DAILY  apixaban (ELIQUIS) tablet 2.5 mg  2.5 mg Oral BID  hydrALAZINE (APRESOLINE) tablet 25 mg  25 mg Oral TID PRN  
 famotidine (PEPCID) tablet 20 mg  20 mg Oral DAILY  folic acid (FOLVITE) tablet 1 mg  1 mg Oral DAILY  montelukast (SINGULAIR) tablet 10 mg  10 mg Oral DAILY  ondansetron hcl (ZOFRAN) tablet 4 mg  4 mg Oral Q8H PRN  
 atorvastatin (LIPITOR) tablet 40 mg  40 mg Oral QHS Assessment/Plan Principal Problem: 
  Acute on chronic diastolic congestive heart failure ([facility] (12/12/2017) Active Problems: 
  Obesity (BMI 30.0-34.9) (10/13/2016) Pulmonary HTN ([facility] (12/21/2016) Papillary thyroid carcinoma ([facility] (12/21/2016) Mild HOCM (hypertrophic obstructive cardiomyopathy) ([facility] (1/12/2017) Aortic stenosis (1/12/2017) Mitral regurgitation (6/13/2017) Anasarca (10/15/2020) Severe protein-calorie malnutrition ([facility] (10/21/2020) Plan: Mental status changes/ confusion- slowly improving. Possible delirium on top of prior dementia 
-Ct scan head no contrast showed no acute intracranial abnormality 
-Neurology recommended discontinuing Tramadol which was done. She seems to be improving since but still has significant confusion. Gabapentin is also on hold for this reason 
-Per Dr. Jimenez Ortiz: Her clinical exam significant for impaired attention, confused thinking, disordered speech is consistent with delirium. The cause of this patient's delirium is multifactorial: severe chronic disease (terminal delirium) dehydration, poor nutrition, recent hyponatremia, also suspect thiamine deficiency,thiamin loevel pending 
-Urine culture more than 100,00 gram negative. start Rocephin trial to see if mental status changes improve. Discussed with ID Dr Hanna Madden will f/u culture and sensitivity . Pt will benefit from ABT since she is going for cardiac surgery 
-Discussed with nutritional consult increase Magic cup to 4 times per day 
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  
 B2 Microglobulin 8.9 
bone marrow asp and biopsy if feasible and pst remains stable Discussed with Dr Marcio Celeste today  Will f/u as outoatinet 
 
 Acute systolic congestive heart failure on top of chronic diastolic congestive heart failure / severe aortic stenosis D/C  Bumex 0.5 mg/h Lasix restarted per Cardiology Check cardiac enzyme every 8 troponin on admission  slightly up Hypertension:  
Improved on  metoprolol to 25 mg BID She used to take 50 mg BID at home  
 use hydralazine prn Pt not on cozaar at home anymore before admission 
  
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 
  
Hypokalemia: 
-Resolved 
-po potassium 20 praveen daily 
-contiinue to monitor lab Venous stasis ulcer Wound care Pt was seen by vascular as outpatinet Continue wound care and f/u vascular surgery consult Paroxysmal A. fib Decrease  Eliquis 2.5 mg twice daily per pharmacy due to renal function Follow-up CBC Left Knee pain  
-X-ray of L leg showed small joint effusion but no patellar dislocation Hyperlipidemia Continue Zocor 80 mg nightly Follow-up liver function 
  
History of Anemia with low iron 10/21: 
-Iron: 19 decreased from 27 on 10/16 
-TIBC: 382 
-continue iron repletion started on Iv iron per hematology History of thyroid cancer 
-Pt seen by ENT who recommended Thyroidectomy when she is able to tolerate surgery 
- repeat ultrasound done this admission no sign of metastatics ct head and chest negative   
-Pending cardiology clearance after TAVR Cardiology consult 
-Aortic Stenosis has now become severe with mean gradient of 43. Was moderate on echo 9/19. Will plan to transfer to [facility] for balloon valvuloplasty and possible TAVR once non cardiac issues managed. 
-waiting for ID to clear pt to assure that she doesn't have an infection before undergoing cardiac surgery Started Rocephin f/u urine culture Monitor mental status F/u cardiology recommendation Discussed with her SON yesterday  Kath Leahy at [phone] He explained her  is sick too , and she has a friend care giver Wendie hardin his on the face sheet for emergency contact . Son will contact other son to help pt with medical decision of needed before transfer Cbc, cmp, mag in AM 
PT and OT evaluation and treatment Wound care F/U bone marrow Biopsy and thyroidectomy after TAVR 
F/u family meeting to assist with pt medical decision Latia Salinas Call 
10/26/2020 1:30 PM 
 
 
The patient was seen and examined independently discussed with student  , I agree with the student note with the above changes Discussed with ID Dr Gianna Bolden, hematology oncology Dr Melisa Clark Ra, MD 
10/26/2020

## 2020-10-26 NOTE — PROGRESS NOTES
Problem: Self Care Deficits Care Plan (Adult) Goal: *Acute Goals and Plan of Care (Insert Text) Description: Occupational Therapy Goals Initiated 10/19/2020. Pt re-evaluated 10/26/2020. Goals will remain the same with addition to goal #6. To be achieved within 7 day(s). 1.  Patient will perform upper body dressing with supervision/set-up. 2.  Patient will perform lower body dressing with minimal assistance/contact guard assist. 
3.  Patient will perform Functional Activities seated EOB with G balance for 5 minutes with modified independence. 4.  Patient will perform toilet transfers with minimal assistance/contact guard assist. 
5.  Patient will perform all aspects of toileting with minimal assistance/contact guard assist. 
6.  Patient will perform bed mobility with supervision in preparation for further self-care. Prior Level of Function: Patient a poor historian this session. Patient reports receiving help with all ADLs at baseline, however, patient unable to report how much assistance she is receiving or how often. Patient reports having personal care aid seven days a week but is unsure of how long they are in her home. Patient reports living with  and states that she is left home alone occasionally. Patient reports using RW during functional mobility. Outcome: Progressing Towards Goal 
 OCCUPATIONAL THERAPY RE-EVALUATION Patient: Misti Gambino (89 y.o. female) Date: 10/26/2020 Primary Diagnosis: Anasarca [R60.1] Precautions:  Fall ASSESSMENT : 
Pt cleared to participate in OT re-evaluation by RN. Upon entering room, pt supine with HOB elevated, alert, and agreeable to therapy session. Pt seen in conjunction with PT to increase pt participation and to maximize safety of patient and staff members.  Based on the objective data described below, the patient presents with  impaired cognition, decreased strength, decreased activity tolerance, decreased functional sitting balance, decreased standing balance, decreased ability to perform functional transfers, and decreased independence in ADLs, increasing the need for assistance from others. Pt only oriented to person; provided reorientation to place and time. Pt able to perform sup>sit with min assist and additional time in prep for further self-care. Noted pt with soiled chux pad and hospital gown. Pt requiring max assist for pericare when sitting EOB. Attempted to stand pt twice this session, in prep for transfers to [facility] with each attempt requiring moderate assist x2. Pt unable to fully  upright position, therefore deferred functional transfers at this time for safety, and returned pt to bed to complete ADLs. Pt able to roll bilaterally with min assist needing max assist for toilet hygiene at bed level. Pt requiring max assist for bathing at bed level using wipes with pt donning new gown with min assist. Pt repositioned to be in a neutral alignment in bed. With [facility] elevated >45*, pt able to feed self with setup. At this time, goals will remain the same for the pt to achieve with the addition of goal #6. The pt will benefit from further OT services, in order to maximize her ADL performance and decrease the risk for complications associated with decreased functional activity. At the end of the session, pt left resting comfortably in bed, call-bell in reach, with all needs met. Patient will benefit from skilled intervention to address the above impairments. Patient's rehabilitation potential is considered to be Good Factors which may influence rehabilitation potential include:  
[]             None noted [x]             Mental ability/status [x]             Medical condition []             Home/family situation and support systems []             Safety awareness []             Pain tolerance/management 
[]             Other: PLAN : 
 Recommendations and Planned Interventions:  
[x]               Self Care Training                  [x]      Therapeutic Activities [x]               Functional Mobility Training   [x]      Cognitive Retraining 
[x]               Therapeutic Exercises           [x]      Endurance Activities [x]               Balance Training                    []      Neuromuscular Re-Education []               Visual/Perceptual Training     [x]      Home Safety Training 
[x]               Patient Education                   [x]      Family Training/Education []               Other (comment): Frequency/Duration: Patient will be followed by occupational therapy 1-2 times per day/4-7 days per week to address goals. Discharge Recommendations: Mars Thomas Further Equipment Recommendations for Discharge: TBD at the next level of care SUBJECTIVE:  
Patient stated my arms are not the problem\" OBJECTIVE DATA SUMMARY:  
Hospital course since last seen and reason for reevaluation: Pt is due for re-evaluation. Pt making slow progress towards OT goals. OT will continue to follow the pt for further intervention during this hospitalization, in order to maximize her ADL performance and overall functional independence. Past Medical History:  
Diagnosis Date Arthritis Atrial fibrillation ([address] CHF (congestive heart failure) ([address] Heart murmur History of seasonal allergies HTN (hypertension) Hypercholesteremia Moderate aortic stenosis Pulmonary hypertension ([address] Venous insufficiency Past Surgical History:  
Procedure Laterality Date HX KNEE ARTHROSCOPY    
 left and right HX ORTHOPAEDIC    
 right ankle Barriers to Learning/Limitations: yes;  cognitive, sensory deficits-vision/hearing/speech, and altered mental status (i.e.Sedation, Confusion) Compensate with: visual, verbal, tactile, kinesthetic cues/model Home Situation:  
Home Situation Home Environment: Apartment # Steps to Enter: 0 One/Two Story Residence: One story Living Alone: No 
Support Systems: Spouse/Significant Other/Partner Patient Expects to be Discharged to[de-identified] [id number] Current DME Used/Available at Home: None 
[]  Right hand dominant   []  Left hand dominant Cognitive/Behavioral Status: 
Neurologic State: Alert;Confused Orientation Level: Oriented to person;Disoriented to place; Disoriented to time Cognition: Follows commands Safety/Judgement: Fall prevention Skin: Visible skin appeared intact Edema: None noted Vision/Perceptual:   
Pt unable to tell the time on the clock Coordination: BUE Coordination: Generally decreased, functional 
Fine Motor Skills-Upper: Left Intact; Right Intact Gross Motor Skills-Upper: Left Intact; Right Intact Balance: 
Sitting: Impaired Sitting - Static: Good (unsupported) Sitting - Dynamic: Fair (occasional) Standing: Impaired; With support Standing - Static: Poor Strength: BUE Strength: Generally decreased, functional 
 
 
Tone & Sensation: BUE Tone: Normal 
Sensation: Intact Range of Motion: BUE 
AROM: Generally decreased, functional 
 
 
Functional Mobility and Transfers for ADLs: 
Bed Mobility: 
Rolling: Minimum assistance; Additional time Supine to Sit: Minimum assistance; Additional time Sit to Supine: Moderate assistance Scooting: Contact guard assistance; Additional time(to EOB) Transfers: 
Sit to Stand: Moderate assistance;Assist x2 (Two standing trials performed this session; pt unable to fully stand. Deferred functional transfers at this time for safety as pt presented with poor standing balance) ADL Assessment:  
Feeding: Setup Oral Facial Hygiene/Grooming: Setup; Additional time Bathing: Maximum assistance Upper Body Dressing: Minimum assistance Lower Body Dressing: Maximum assistance Toileting: Maximum assistance ADL Intervention: 
Feeding Feeding Assistance: Set-up Food to Mouth: Set-up Drink to Mouth: Set-up Grooming Grooming Assistance: Set-up Position Performed: Seated edge of bed Washing Hands: Set-up Upper Body Bathing Bathing Assistance: Moderate assistance Lower Body Bathing Bathing Assistance: Maximum assistance Perineal  : Maximum assistance Position Performed: (Seated EOB) Lower Body : Maximum assistance Position Performed: Seated edge of bed Upper Body Dressing Assistance Dressing Assistance: Minimum assistance Hospital Gown: Minimum  assistance Toileting Toileting Assistance: Total assistance(dependent) Bladder Hygiene: Maximum assistance Bowel Hygiene: Total assistance (dependent) Clothing Management: Maximum assistance Cognitive Retraining Safety/Judgement: Fall prevention Pain: 
Pain level pre-treatment: 0/10 Pain level post-treatment: 0/10 Pain Intervention(s): Medication (see MAR); Rest, Ice, Repositioning Response to intervention: Nurse notified, See doc flow Activity Tolerance:  
Good Please refer to the flowsheet for vital signs taken during this treatment. After treatment:  
[] Patient left in no apparent distress sitting up in chair 
[x] Patient left in no apparent distress in bed 
[x] Call bell left within reach [x] Nursing notified 
[] Caregiver present 
[] Bed alarm activated COMMUNICATION/EDUCATION:  
[x] Role of Occupational Therapy in the acute care setting 
[] Home safety education was provided and the patient/caregiver indicated understanding. [x] Patient/family have participated as able in goal setting and plan of care. [x] Patient/family agree to work toward stated goals and plan of care. [] Patient understands intent and goals of therapy, but is neutral about his/her participation. [] Patient is unable to participate in goal setting and plan of care. Thank you for this referral. 
Tony Campbell MS, OTR/L Time Calculation: 39 mins

## 2020-10-26 NOTE — PROGRESS NOTES
RENAL DAILY PROGRESS NOTE Subjective:  
 
 
Complaint: no complaints offered Overnight events noted 
no nausea, vomiting, chest pain, short of breath, cough, seizure. IMPRESSION:  
· FIDEL on CKD stage 3 due to cardiorenal syndrome. UA is not active. USG kidneys on 10/19/2020- Right kidney size:  8.5 x 3.8 x 3.6 cm. 
- Left kidney size:  9.9 x 4.9 x 4.2 cm. · CKD stage 3 due to cardiorenal syndrome, hypertension. SPEP with M spike to 1.2. Baseline cr between 1.5 to 1.8. · Hypercalcemia with elevated globulin. ?myeloma. · Hyponatremia · Acute diastolic CHF, on diuresis · Severe AS, Right heart failure, Severe pulmonary hypertension. · Hypertension · LE edema · S/p alcoholic septal ablation for hypertrophic cardiomyopathy · S/p complete heart block s/p pacer. · Secondary hyperparathyoidism with PTH , intact of 174.8 PLAN:  
 Hematology-oncology considering bone marrow biopsy. Positive M spike noted Hyponatremia probably due to reset osmostat? . Serum osmolality is normal but this is in setting of high BUN which is an ineffective osmole. Infact this is true hyponatremia as it is falsely measured as normal due to high BUN. Effective serum osmolality if we exclude BUN is appropriately low indicating hypotonicity and true hyponatremia. Much better post tolvaptan and after discontinuation of metolazone. I would not re challenge her again in future with thiazide. BP is stable Avoid nephrotoxic agents I and O Daily weights Dose meds per cr clearance I dont think she is a candidate for any parathyroid surgery. If calcium becomes an issue then would try very low dose sensipar. Follow for now. Current Facility-Administered Medications Medication Dose Route Frequency  cefTRIAXone (ROCEPHIN) 1 g in sterile water (preservative free) 10 mL IV syringe  1 g IntraVENous Q24H  potassium chloride (K-DUR, KLOR-CON) SR tablet 20 mEq  20 mEq Oral DAILY  metoprolol tartrate (LOPRESSOR) tablet 25 mg  25 mg Oral BID  megestroL (MEGACE) tablet 40 mg  40 mg Oral DAILY  furosemide (LASIX) tablet 40 mg  40 mg Oral DAILY  acetaminophen (TYLENOL) tablet 650 mg  650 mg Oral Q4H PRN  
 ergocalciferol capsule 50,000 Units  50,000 Units Oral Q7D  
 therapeutic multivitamin (THERAGRAN) tablet 1 Tab  1 Tab Oral DAILY  thiamine HCL (B-1) tablet 100 mg  100 mg Oral DAILY  apixaban (ELIQUIS) tablet 2.5 mg  2.5 mg Oral BID  hydrALAZINE (APRESOLINE) tablet 25 mg  25 mg Oral TID PRN  
 famotidine (PEPCID) tablet 20 mg  20 mg Oral DAILY  folic acid (FOLVITE) tablet 1 mg  1 mg Oral DAILY  montelukast (SINGULAIR) tablet 10 mg  10 mg Oral DAILY  ondansetron hcl (ZOFRAN) tablet 4 mg  4 mg Oral Q8H PRN  
 atorvastatin (LIPITOR) tablet 40 mg  40 mg Oral QHS Review of Symptoms: comprehensive ROS negative except above. Objective:  
 
Patient Vitals for the past 24 hrs: 
 Temp Pulse Resp BP SpO2  
10/26/20 1118 97.9 °F (36.6 °C) 60 16 126/60 99 % 10/26/20 0854 97.1 °F (36.2 °C) 76 16 124/60 98 % 10/26/20 0436 97 °F (36.1 °C) 79 16 119/62 97 % 10/26/20 0045 97.6 °F (36.4 °C) 74 18 135/61 98 % 10/25/20 2008 97.1 °F (36.2 °C) 74 18 (!) 132/57 99 % 10/25/20 1538 98.5 °F (36.9 °C) 66 20 112/66 97 % Weight change:  
 
 10/24 1901 - 10/26 0700 In: 1115 [P.O.:1115] Out: 1550 [TARVA:1136] Intake/Output Summary (Last 24 hours) at 10/26/2020 1144 Last data filed at 10/26/2020 9225 Gross per 24 hour Intake 500 ml Output 1500 ml Net -1000 ml Physical Exam:  
General: comfortable, no acute distress HEENT sclera anicteric, supple neck, no thyromegaly CVS: S1S2 heard,  no rub. SM in AA 
RS: + air entry b/l, Abd: Soft, Non tender, Not distended, Positive bowel sounds, no organomegaly, no CVA / supra pubic tenderness Neuro: non focal, awake, alert , CN II-XII are grossly intact Extrm: edema, no cyanosis, clubbing Skin: no visible  Rash Musculoskeletal: No gross joints or bone deformities Data Review:  
 
LABS:  
Hematology:  
Recent Labs 10/26/20 
0505 10/25/20 
5075 10/24/20 
0510 WBC 7.3 7.8 9.1 HGB 9.7* 10.1* 10.8* HCT 31.2* 33.5* 35.4 Chemistry:  
Recent Labs 10/26/20 
0505 10/25/20 
7604 10/24/20 
0510 BUN 60* 58* 51* CREA 1.52* 1.54* 1.58* CA 9.3 10.5* 10.2* ALB 2.8* 2.8* 2.8*  
K 3.8 3.5 3.8 * 137 133*  103 102 CO2 25 28 24 PHOS 3.1 2.7 2.4*  
GLU 79 101* 105* Procedures/imaging: see electronic medical records for all procedures, Xrays and details which were not copied into this note but were reviewed prior to creation of Plan Kiya Anna MD 
10/26/2020 
11:44 AM

## 2020-10-26 NOTE — TELEPHONE ENCOUNTER
Called caretaker back to understanding more of what procedure they are talking about. Looks lie they will be speaking with NP [facility] about this now. This encounter was created in error - please disregard.

## 2020-10-26 NOTE — PROGRESS NOTES
Received a call from Mr David Benites and Mr Teodora Simms  in reference  Mrs Casey [place] transfer to [facility] for [facility]  Patient  and caregiver agree with transfer.

## 2020-10-27 LAB
ALBUMIN SERPL-MCNC: 2.7 G/DL (ref 3.4–5)
ALBUMIN/GLOB SERPL: 0.5 {RATIO} (ref 0.8–1.7)
ALP SERPL-CCNC: 206 U/L (ref 45–117)
ALT SERPL-CCNC: 18 U/L (ref 13–56)
ANION GAP SERPL CALC-SCNC: 8 MMOL/L (ref 3–18)
AST SERPL-CCNC: 25 U/L (ref 10–38)
BASOPHILS # BLD: 0 K/UL (ref 0–0.1)
BASOPHILS NFR BLD: 0 % (ref 0–2)
BILIRUB SERPL-MCNC: 0.7 MG/DL (ref 0.2–1)
BUN SERPL-MCNC: 62 MG/DL (ref 7–18)
BUN/CREAT SERPL: 37 (ref 12–20)
CALCIUM SERPL-MCNC: 9.6 MG/DL (ref 8.5–10.1)
CHLORIDE SERPL-SCNC: 97 MMOL/L (ref 100–111)
CO2 SERPL-SCNC: 26 MMOL/L (ref 21–32)
CREAT SERPL-MCNC: 1.69 MG/DL (ref 0.6–1.3)
DIFFERENTIAL METHOD BLD: ABNORMAL
EOSINOPHIL # BLD: 0.1 K/UL (ref 0–0.4)
EOSINOPHIL NFR BLD: 2 % (ref 0–5)
ERYTHROCYTE [DISTWIDTH] IN BLOOD BY AUTOMATED COUNT: 20.2 % (ref 11.6–14.5)
GLOBULIN SER CALC-MCNC: 5.5 G/DL (ref 2–4)
GLUCOSE SERPL-MCNC: 76 MG/DL (ref 74–99)
HCT VFR BLD AUTO: 31.9 % (ref 35–45)
HGB BLD-MCNC: 9.7 G/DL (ref 12–16)
LYMPHOCYTES # BLD: 1.3 K/UL (ref 0.9–3.6)
LYMPHOCYTES NFR BLD: 18 % (ref 21–52)
MAGNESIUM SERPL-MCNC: 2.3 MG/DL (ref 1.6–2.6)
MCH RBC QN AUTO: 22.2 PG (ref 24–34)
MCHC RBC AUTO-ENTMCNC: 30.4 G/DL (ref 31–37)
MCV RBC AUTO: 73 FL (ref 74–97)
MONOCYTES # BLD: 0.7 K/UL (ref 0.05–1.2)
MONOCYTES NFR BLD: 9 % (ref 3–10)
NEUTS SEG # BLD: 5.3 K/UL (ref 1.8–8)
NEUTS SEG NFR BLD: 71 % (ref 40–73)
PHOSPHATE SERPL-MCNC: 3.7 MG/DL (ref 2.5–4.9)
PLATELET # BLD AUTO: 256 K/UL (ref 135–420)
PLATELET COMMENTS,PCOM: ABNORMAL
PMV BLD AUTO: 9.7 FL (ref 9.2–11.8)
POTASSIUM SERPL-SCNC: 3.4 MMOL/L (ref 3.5–5.5)
PROT SERPL-MCNC: 8.2 G/DL (ref 6.4–8.2)
RBC # BLD AUTO: 4.37 M/UL (ref 4.2–5.3)
RBC MORPH BLD: ABNORMAL
SODIUM SERPL-SCNC: 131 MMOL/L (ref 136–145)
WBC # BLD AUTO: 7.4 K/UL (ref 4.6–13.2)

## 2020-10-27 PROCEDURE — 85025 COMPLETE CBC W/AUTO DIFF WBC: CPT

## 2020-10-27 PROCEDURE — 99232 SBSQ HOSP IP/OBS MODERATE 35: CPT | Performed by: INTERNAL MEDICINE

## 2020-10-27 PROCEDURE — 80053 COMPREHEN METABOLIC PANEL: CPT

## 2020-10-27 PROCEDURE — 36415 COLL VENOUS BLD VENIPUNCTURE: CPT

## 2020-10-27 PROCEDURE — 74011250637 HC RX REV CODE- 250/637: Performed by: NURSE PRACTITIONER

## 2020-10-27 PROCEDURE — 74011250637 HC RX REV CODE- 250/637: Performed by: INTERNAL MEDICINE

## 2020-10-27 PROCEDURE — 74011250637 HC RX REV CODE- 250/637: Performed by: STUDENT IN AN ORGANIZED HEALTH CARE EDUCATION/TRAINING PROGRAM

## 2020-10-27 PROCEDURE — 65270000029 HC RM PRIVATE

## 2020-10-27 PROCEDURE — 83735 ASSAY OF MAGNESIUM: CPT

## 2020-10-27 PROCEDURE — 74011250637 HC RX REV CODE- 250/637: Performed by: FAMILY MEDICINE

## 2020-10-27 PROCEDURE — 74011250636 HC RX REV CODE- 250/636: Performed by: FAMILY MEDICINE

## 2020-10-27 PROCEDURE — 84100 ASSAY OF PHOSPHORUS: CPT

## 2020-10-27 PROCEDURE — 2709999900 HC NON-CHARGEABLE SUPPLY

## 2020-10-27 PROCEDURE — 74011000250 HC RX REV CODE- 250: Performed by: FAMILY MEDICINE

## 2020-10-27 RX ORDER — POTASSIUM CHLORIDE 20 MEQ/1
40 TABLET, EXTENDED RELEASE ORAL
Status: COMPLETED | OUTPATIENT
Start: 2020-10-27 | End: 2020-10-27

## 2020-10-27 RX ORDER — FUROSEMIDE 20 MG/1
20 TABLET ORAL DAILY
Status: DISCONTINUED | OUTPATIENT
Start: 2020-10-28 | End: 2020-10-29

## 2020-10-27 RX ADMIN — METOPROLOL TARTRATE 25 MG: 25 TABLET, FILM COATED ORAL at 09:41

## 2020-10-27 RX ADMIN — Medication 100 MG: at 09:41

## 2020-10-27 RX ADMIN — MEGESTROL ACETATE 40 MG: 40 TABLET ORAL at 09:42

## 2020-10-27 RX ADMIN — MONTELUKAST 10 MG: 10 TABLET, FILM COATED ORAL at 09:41

## 2020-10-27 RX ADMIN — POTASSIUM CHLORIDE 40 MEQ: 1500 TABLET, EXTENDED RELEASE ORAL at 12:43

## 2020-10-27 RX ADMIN — FAMOTIDINE 20 MG: 20 TABLET ORAL at 09:41

## 2020-10-27 RX ADMIN — APIXABAN 2.5 MG: 2.5 TABLET, FILM COATED ORAL at 09:41

## 2020-10-27 RX ADMIN — THERA TABS 1 TABLET: TAB at 09:41

## 2020-10-27 RX ADMIN — FOLIC ACID 1 MG: 1 TABLET ORAL at 09:41

## 2020-10-27 RX ADMIN — POTASSIUM CHLORIDE 20 MEQ: 1500 TABLET, EXTENDED RELEASE ORAL at 09:41

## 2020-10-27 RX ADMIN — FUROSEMIDE 40 MG: 40 TABLET ORAL at 09:41

## 2020-10-27 RX ADMIN — ATORVASTATIN CALCIUM 40 MG: 40 TABLET, FILM COATED ORAL at 22:34

## 2020-10-27 RX ADMIN — APIXABAN 2.5 MG: 2.5 TABLET, FILM COATED ORAL at 17:22

## 2020-10-27 RX ADMIN — CEFTRIAXONE SODIUM 1 G: 1 INJECTION, POWDER, FOR SOLUTION INTRAMUSCULAR; INTRAVENOUS at 17:22

## 2020-10-27 NOTE — PROGRESS NOTES
Nutrition Assessment Type and Reason for Visit: Reassess, Positive nutrition screen, Consult(MST, General Nutrition Management) Nutrition Recommendations/Plan:  
- Continue current nutrition interventions: po diet, nutrition supplement, appetite stimulant Nutrition Assessment:  Pt asleep/lethargic at time of visit. Has variable meal intake, 15-90% of recent meals per chart documentation. consuming magic cup supplements; 100% per chart. tolerating diet. Malnutrition Assessment: 
Malnutrition Status: Severe malnutrition Estimated Daily Nutrient Needs: 
Energy (kcal):  8144-6002 Protein (g):  70-77 Fluid (ml/day):  1 mL/kcal 
 
Nutrition Related Findings:  BM 10/26. Receiving ergocalciferol, folic acid, MVI, thaimine. K low, being replaced. Phos and Mg wnl. On megace medication Current Nutrition Therapies: DIET DYSPHAGIA MECH ALTERED (NDD2) FR 1500ML 
DIET NUTRITIONAL SUPPLEMENTS Breakfast, Lunch, Dinner, Cruz's; Obed Anthropometric Measures: 
· Height:  5' 3\" (160 cm) · Current Body Wt:  53.8 kg (118 lb 9.7 oz) · BMI: 21 
 
Nutrition Diagnosis: · Severe malnutrition, In context of acute illness or injury related to cognitive or neurological impairment, biting/chewing (masticatory) difficulty, inadequate protein-energy intake, early satiety as evidenced by weight loss greater than or equal to 2% in 1 week, intake 0-25% Nutrition Intervention: 
Food and/or Nutrient Delivery: Continue current diet, Mineral supplement, Vitamin supplement, Continue oral nutrition supplement Nutrition Education and Counseling: Education not indicated Coordination of Nutrition Care: Continued inpatient monitoring Goals: 
Nutritional needs will be met through adequate oral intake or nutrition support within the next 7 days.     
 
Nutrition Monitoring and Evaluation: 
Food/Nutrient Intake Outcomes: Diet advancement/tolerance, Food and nutrient intake, Supplement intake, Vitamin/mineral intake Physical Signs/Symptoms Outcomes: Biochemical data, Chewing or swallowing, Constipation, Meal time behavior, Nutrition focused physical findings Discharge Planning:   
Continue oral nutrition supplement, Continue current diet Electronically signed by Sanam Nolasco RD on 10/27/2020 at 2:46 PM 
 
Contact Number: [phone]

## 2020-10-27 NOTE — PROGRESS NOTES
Problem: Falls - Risk of 
Goal: *Absence of Falls Description: Document Chepe Stack Fall Risk and appropriate interventions in the flowsheet. Outcome: Progressing Towards Goal 
Note: Fall Risk Interventions: 
Mobility Interventions: Bed/chair exit alarm Mentation Interventions: Adequate sleep, hydration, pain control Medication Interventions: Bed/chair exit alarm Elimination Interventions: Bed/chair exit alarm Problem: Patient Education: Go to Patient Education Activity Goal: Patient/Family Education Outcome: Progressing Towards Goal 
  
Problem: Patient Education: Go to Patient Education Activity Goal: Patient/Family Education Outcome: Progressing Towards Goal 
  
Problem: Heart Failure: Day 4 Goal: Off Pathway (Use only if patient is Off Pathway) Outcome: Progressing Towards Goal 
Goal: Activity/Safety Outcome: Progressing Towards Goal 
Goal: Diagnostic Test/Procedures Outcome: Progressing Towards Goal 
Goal: Nutrition/Diet Outcome: Progressing Towards Goal 
Goal: Discharge Planning Outcome: Progressing Towards Goal 
Goal: Medications Outcome: Progressing Towards Goal 
Goal: Respiratory Outcome: Progressing Towards Goal 
Goal: Treatments/Interventions/Procedures Outcome: Progressing Towards Goal 
Goal: Psychosocial 
Outcome: Progressing Towards Goal 
Goal: *Oxygen saturation within defined limits Outcome: Progressing Towards Goal 
Goal: *Hemodynamically stable Outcome: Progressing Towards Goal 
Goal: *Optimal pain control at patient's stated goal 
Outcome: Progressing Towards Goal 
Goal: *Anxiety reduced or absent Outcome: Progressing Towards Goal 
Goal: *Demonstrates progressive activity Outcome: Progressing Towards Goal 
  
Problem: Heart Failure: Day 5 Goal: Off Pathway (Use only if patient is Off Pathway) Outcome: Progressing Towards Goal 
Goal: Activity/Safety Outcome: Progressing Towards Goal 
Goal: Diagnostic Test/Procedures Outcome: Progressing Towards Goal 
 Goal: Nutrition/Diet Outcome: Progressing Towards Goal 
Goal: Discharge Planning Outcome: Progressing Towards Goal 
Goal: Medications Outcome: Progressing Towards Goal 
Goal: Respiratory Outcome: Progressing Towards Goal 
Goal: Treatments/Interventions/Procedures Outcome: Progressing Towards Goal 
Goal: Psychosocial 
Outcome: Progressing Towards Goal 
  
Problem: Heart Failure: Discharge Outcomes Goal: *Demonstrates ability to perform prescribed activity without shortness of breath or discomfort Outcome: Progressing Towards Goal 
Goal: *Left ventricular function assessment completed prior to or during stay, or planned for post-discharge Outcome: Progressing Towards Goal 
Goal: *ACEI prescribed if LVEF less than 40% and no contraindications or ARB prescribed Outcome: Progressing Towards Goal 
Goal: *Verbalizes understanding and describes prescribed diet Outcome: Progressing Towards Goal 
Goal: *Verbalizes understanding/describes prescribed medications Outcome: Progressing Towards Goal 
Goal: *Describes available resources and support systems Description: (eg: Home Health, Palliative Care, Advanced Medical Directive) Outcome: Progressing Towards Goal 
Goal: *Describes smoking cessation resources Outcome: Progressing Towards Goal 
Goal: *Understands and describes signs and symptoms to report to providers(Stroke Metric) Outcome: Progressing Towards Goal 
Goal: *Describes/verbalizes understanding of follow-up/return appt Description: (eg: to physicians, diabetes treatment coordinator, and other resources Outcome: Progressing Towards Goal 
Goal: *Describes importance of continuing daily weights and changes to report to physician Outcome: Progressing Towards Goal 
  
Problem: Impaired Skin Integrity/Pressure Injury Treatment Goal: *Improvement of Existing Pressure Injury Outcome: Progressing Towards Goal 
Goal: *Prevention of pressure injury Description: Document Angel Scale and appropriate interventions in the flowsheet. Outcome: Progressing Towards Goal 
  
Problem: Patient Education: Go to Patient Education Activity Goal: Patient/Family Education Outcome: Progressing Towards Goal 
  
Problem: Patient Education: Go to Patient Education Activity Goal: Patient/Family Education Outcome: Progressing Towards Goal 
  
Problem: Nutrition Deficit Goal: *Optimize nutritional status Outcome: Progressing Towards Goal 
  
Problem: Pressure Injury - Risk of 
Goal: *Prevention of pressure injury Description: Document Angel Scale and appropriate interventions in the flowsheet. Outcome: Progressing Towards Goal 
  
Problem: Patient Education: Go to Patient Education Activity Goal: Patient/Family Education Outcome: Progressing Towards Goal 
  
Problem: Infection - Risk of, Urinary Catheter-Associated Urinary Tract Infection Goal: *Absence of infection signs and symptoms Outcome: Progressing Towards Goal 
  
Problem: Patient Education: Go to Patient Education Activity Goal: Patient/Family Education Outcome: Progressing Towards Goal 
  
Problem: Patient Education: Go to Patient Education Activity Goal: Patient/Family Education Outcome: Progressing Towards Goal

## 2020-10-27 NOTE — ROUTINE PROCESS
Bedside shift change report given to Laila Byrd RN (oncoming nurse) by Deepika Zheng RN (offgoing nurse). Report included the following information SBAR, Kardex, Intake/Output, MAR, Recent Results and Quality Measures.

## 2020-10-27 NOTE — PROGRESS NOTES
Problem: Falls - Risk of 
Goal: *Absence of Falls Description: Document Dwayne Burt Fall Risk and appropriate interventions in the flowsheet. Outcome: Progressing Towards Goal 
Note: Fall Risk Interventions: 
Mobility Interventions: Bed/chair exit alarm, Communicate number of staff needed for ambulation/transfer, Patient to call before getting OOB, PT Consult for mobility concerns, PT Consult for assist device competence, Utilize walker, cane, or other assistive device, OT consult for ADLs Mentation Interventions: Adequate sleep, hydration, pain control, Bed/chair exit alarm, Door open when patient unattended, More frequent rounding, Reorient patient, Toileting rounds, Update white board Medication Interventions: Bed/chair exit alarm Elimination Interventions: Bed/chair exit alarm, Call light in reach, Patient to call for help with toileting needs, Toileting schedule/hourly rounds Problem: Pressure Injury - Risk of 
Goal: *Prevention of pressure injury Description: Document Angel Scale and appropriate interventions in the flowsheet. Outcome: Progressing Towards Goal 
Note: Pressure Injury Interventions: 
Sensory Interventions: Assess changes in LOC Moisture Interventions: Internal/External urinary devices, Maintain skin hydration (lotion/cream) Activity Interventions: Assess need for specialty bed, PT/OT evaluation Mobility Interventions: Assess need for specialty bed, HOB 30 degrees or less, PT/OT evaluation, Turn and reposition approx. every two hours(pillow and wedges) Nutrition Interventions: Document food/fluid/supplement intake Friction and Shear Interventions: HOB 30 degrees or less, Lift sheet, Lift team/patient mobility team, Minimize layers, Transferring/repositioning devices Problem: Infection - Risk of, Urinary Catheter-Associated Urinary Tract Infection Goal: *Absence of infection signs and symptoms Outcome: Progressing Towards Goal

## 2020-10-27 NOTE — PROGRESS NOTES
RENAL DAILY PROGRESS NOTE Subjective:  
 
 
Complaint: no complaints offered Overnight events noted 
no nausea, vomiting, chest pain, short of breath, cough, seizure. IMPRESSION:  
· FIDEL on CKD stage 3 due to cardiorenal syndrome. UA is not active. USG kidneys on 10/19/2020- Right kidney size:  8.5 x 3.8 x 3.6 cm. 
- Left kidney size:  9.9 x 4.9 x 4.2 cm. · CKD stage 3 due to cardiorenal syndrome, hypertension. SPEP with M spike to 1.2. Baseline cr between 1.5 to 1.8. · Hypercalcemia with elevated globulin. ?myeloma. · Hyponatremia with hypokalemia · Acute diastolic CHF, on diuresis · Severe AS, Right heart failure, Severe pulmonary hypertension. · Hypertension · LE edema · S/p alcoholic septal ablation for hypertrophic cardiomyopathy · S/p complete heart block s/p pacer. · Secondary hyperparathyoidism with PTH , intact of 174.8 PLAN:  
Transfer to [facility] for aortic valve procedure Hematology-oncology considering bone marrow biopsy. Positive M spike noted Hyponatremia probably due to reset osmostat? . Serum osmolality is normal but this is in setting of high BUN which is an ineffective osmole. Infact this is true hyponatremia as it is falsely measured as normal due to high BUN. Effective serum osmolality if we exclude BUN is appropriately low indicating hypotonicity and true hyponatremia. Much better post tolvaptan and after discontinuation of metolazone. I would not re challenge her again in future with thiazide. Replete K. Decrease lasix to 20 mg po daily BP is stable Avoid nephrotoxic agents I and O Daily weights Dose meds per cr clearance I dont think she is a candidate for any parathyroid surgery. If calcium becomes an issue then would try very low dose sensipar. Follow for now. Current Facility-Administered Medications Medication Dose Route Frequency  [START ON 10/28/2020] furosemide (LASIX) tablet 20 mg  20 mg Oral DAILY  potassium chloride (K-DUR, KLOR-CON) SR tablet 40 mEq  40 mEq Oral NOW  cefTRIAXone (ROCEPHIN) 1 g in sterile water (preservative free) 10 mL IV syringe  1 g IntraVENous Q24H  potassium chloride (K-DUR, KLOR-CON) SR tablet 20 mEq  20 mEq Oral DAILY  metoprolol tartrate (LOPRESSOR) tablet 25 mg  25 mg Oral BID  megestroL (MEGACE) tablet 40 mg  40 mg Oral DAILY  acetaminophen (TYLENOL) tablet 650 mg  650 mg Oral Q4H PRN  
 ergocalciferol capsule 50,000 Units  50,000 Units Oral Q7D  
 therapeutic multivitamin (THERAGRAN) tablet 1 Tab  1 Tab Oral DAILY  thiamine HCL (B-1) tablet 100 mg  100 mg Oral DAILY  apixaban (ELIQUIS) tablet 2.5 mg  2.5 mg Oral BID  hydrALAZINE (APRESOLINE) tablet 25 mg  25 mg Oral TID PRN  
 famotidine (PEPCID) tablet 20 mg  20 mg Oral DAILY  folic acid (FOLVITE) tablet 1 mg  1 mg Oral DAILY  montelukast (SINGULAIR) tablet 10 mg  10 mg Oral DAILY  ondansetron hcl (ZOFRAN) tablet 4 mg  4 mg Oral Q8H PRN  
 atorvastatin (LIPITOR) tablet 40 mg  40 mg Oral QHS Review of Symptoms: comprehensive ROS negative except above. Objective:  
 
Patient Vitals for the past 24 hrs: 
 Temp Pulse Resp BP SpO2  
10/27/20 1155 98.1 °F (36.7 °C) 70 18 114/61 97 % 10/27/20 0845 97.9 °F (36.6 °C) 69 17 137/66 97 % 10/27/20 0402 97.1 °F (36.2 °C) 65 16 (!) 131/59 99 % 10/26/20 2052 97.1 °F (36.2 °C) 62 18 (!) 106/51 100 % 10/26/20 1801  69  121/61   
10/26/20 1527 98.1 °F (36.7 °C) 64 16 129/64 98 % Weight change:  
 
 10/25 1901 - 10/27 0700 In: 600 [P.O.:600] Out: 2450 [Urine:2450] Intake/Output Summary (Last 24 hours) at 10/27/2020 1219 Last data filed at 10/27/2020 1212 Gross per 24 hour Intake 720 ml Output 1700 ml Net -980 ml Physical Exam:  
General: comfortable, no acute distress HEENT sclera anicteric, supple neck, no thyromegaly CVS: S1S2 heard,  no rub. SM in AA 
RS: + air entry b/l, Abd: Soft, Non tender, Not distended, Positive bowel sounds, no organomegaly, no CVA / supra pubic tenderness Neuro: non focal, awake, alert , CN II-XII are grossly intact Extrm: edema, no cyanosis, clubbing Skin: no visible  Rash Musculoskeletal: No gross joints or bone deformities Data Review:  
 
LABS:  
Hematology:  
Recent Labs 10/27/20 
0116 10/26/20 
0505 10/25/20 
7036 WBC 7.4 7.3 7.8 HGB 9.7* 9.7* 10.1* HCT 31.9* 31.2* 33.5* Chemistry:  
Recent Labs 10/27/20 
0116 10/26/20 
0505 10/25/20 
0843 BUN 62* 60* 58* CREA 1.69* 1.52* 1.54* CA 9.6 9.3 10.5* ALB 2.7* 2.8* 2.8*  
K 3.4* 3.8 3.5 * 135* 137 CL 97* 102 103 CO2 26 25 28 PHOS 3.7 3.1 2.7 GLU 76 79 101* Procedures/imaging: see electronic medical records for all procedures, Xrays and details which were not copied into this note but were reviewed prior to creation of Plan Varsha Beck MD 
10/27/2020

## 2020-10-27 NOTE — PROGRESS NOTES
[facility] Progress Note CARDIOLOGY PROGRESS NOTE 
RECS: 
 
 
1. Acute on chronic diastolic congestive heart failure-appears compensated now. SOB and BLE improved well. Continue lasix 40 mg po daily . Watch electrolytes. 2. Hypertrophic obstructive cardiomyopathy-S/p alcohol septal ablation 2/19 at [facility] 3. Hx complete heart block - s/p dual chambers permanent pacemaker 2/19 [employer]  
4. Paroxysmal  Atrial fibrillation-rate controlled. on Eliquis for stroke prevention. 5. Aortic stenosis -has become severe now with mean gradient of 43. Was moderate on  echo in 9/19. Will plan to transfer to [facility] for balloon valvuloplasty and possible TAVR once non cardiac issues managed. Discussed with Dr. Vale Owen. 6. Pulmonary hypertension-  worsening gradually with pulmonary pressure of 96 now. Echo 9/19 showed PAP 69 mmHg 7. CKD- renal indices stable 8. Hyponatremia and hypochloremia: Secondary to diuresis- improved 9. AMS-improving well. Head CT no acute findings- better 10. UTI- being managed by ID. On ciprofloxacin she will finish Tx on Friday 11. Hx of thyroid CA- diagnosed approximately 4 years prior. ENT consulted not a candidate for any aggressive surgical plan unless airway compromise is noted. Patient awaiting [facility] for TAVR. Start PT as tolerated. Out of bed and ambulation. ASSESSMENT: 
Hospital Problems  Date Reviewed: 10/15/2020 Codes Class Noted POA Severe protein-calorie malnutrition ([facility] [address] ICD-10-CM: D55 ICD-9-CM: 060  10/21/2020 No  
   
 Anasarca ICD-10-CM: R60.1 ICD-9-CM: 782.3  10/15/2020 Unknown * (Principal) Acute on chronic diastolic congestive heart failure (HCC) ICD-10-CM: I50.33 ICD-9-CM: 428.33, 428.0  12/12/2017 Yes Mitral regurgitation ICD-10-CM: I34.0 ICD-9-CM: 424.0  6/13/2017 Yes Mild HOCM (hypertrophic obstructive cardiomyopathy) (HCC) ICD-10-CM: I42.1 ICD-9-CM: 425.11  1/12/2017 Yes Aortic stenosis ICD-10-CM: I35.0 ICD-9-CM: 424.1  1/12/2017 Yes Pulmonary HTN ([facility] ICD-10-CM: I27.20 ICD-9-CM: 416.8  12/21/2016 Yes Papillary thyroid carcinoma ([facility] ICD-10-CM: V15 ICD-9-CM: 420  12/21/2016 Yes Obesity (BMI 30.0-34.9) ICD-10-CM: T34.6 ICD-9-CM: 278.00  10/13/2016 Yes SUBJECTIVE: 
 
C/o dyspnea and weakness. Oriented to person. Oriented to year. Oriented to place OBJECTIVE: 
 
VS:  
Visit Vitals /66 Pulse 69 Temp 97.9 °F (36.6 °C) Resp 17 Ht 5' 3\" (1.6 m) Wt 58.4 kg (128 lb 12 oz) SpO2 97% BMI 22.81 kg/m² Intake/Output Summary (Last 24 hours) at 10/27/2020 1104 Last data filed at 10/27/2020 1017 Gross per 24 hour Intake 720 ml Output 1500 ml Net -780 ml  
 
  
TELE: DDD pacing 
  
General:alert and oriented today able to answer questions HENT: Normocephalic, atraumatic. Normal external eye. Neck :  bruit present, increased JVP Cardiac:  regular rate and rhythm, S1: normal intensity, S2: decreased intensity, systolic murmur: late systolic 3/6, crescendo at 2nd left intercostal space, Chest/Lungs:clear to ausculation Abdomen: Soft, nontender, no masses Extremities: improved  edema, peripheral pulses not palpable Labs: Results:  
   
Chemistry Recent Labs 10/27/20 
0116 10/26/20 
0505 10/25/20 
2035 GLU 76 79 101* * 135* 137  
K 3.4* 3.8 3.5 CL 97* 102 103 CO2 26 25 28 BUN 62* 60* 58* CREA 1.69* 1.52* 1.54* CA 9.6 9.3 10.5* MG 2.3 2.4 2.5 PHOS 3.7 3.1 2.7 AGAP 8 8 6 BUCR 37* 39* 38* * 211* 181* TP 8.2 7.4 8.3* ALB 2.7* 2.8* 2.8*  
GLOB 5.5* 4.6* 5.5* AGRAT 0.5* 0.6* 0.5* CBC w/Diff Recent Labs 10/27/20 
0116 10/26/20 
0505 10/25/20 
6191 WBC 7.4 7.3 7.8  
RBC 4.37 4.28 4.53 HGB 9.7* 9.7* 10.1* HCT 31.9* 31.2* 33.5*  
 252 263 GRANS 71 71 73 LYMPH 18* 19* 17* EOS 2 2 2 Cardiac Enzymes No results for input(s): CPK, CKND1, JONI in the last 72 hours. No lab exists for component: Dominick Gleason Coagulation No results for input(s): PTP, INR, APTT, INREXT, INREXT in the last 72 hours. Lipid Panel Lab Results Component Value Date/Time Cholesterol, total 178 10/20/2020 10:05 AM  
 HDL Cholesterol 33 (L) 10/20/2020 10:05 AM  
 LDL, calculated 125.8 (H) 10/20/2020 10:05 AM  
 VLDL, calculated 19.2 10/20/2020 10:05 AM  
 Triglyceride 96 10/20/2020 10:05 AM  
 CHOL/HDL Ratio 5.4 (H) 10/20/2020 10:05 AM  
  
BNP No results for input(s): BNPP in the last 72 hours. Liver Enzymes Recent Labs 10/27/20 
0116  
TP 8.2 ALB 2.7* * Digoxin Thyroid Studies Lab Results Component Value Date/Time  TSH 1.98 10/16/2020 04:22 AM  
    
 
 
 
Governor MD Mando

## 2020-10-27 NOTE — PROGRESS NOTES
Progress Note Patient: Trae Alexander MRN: [id number]  CSN: [id number] YOB: 1940  Age: 68 y.o. Sex: female DOA: 10/15/2020 LOS:  LOS: 12 days Subjective:  
Pt more alert today and responsive. She is still confused and cannot answer all questions correctly. She is aware of who she is but not of the date or location. Today she c/o headache, chills, and dizziness that started yesterday. Pt has generalized weakness and pain. COVID negative Pt is currently on Rocephin for a UTI being managed by ID until 10/30/2020. She has been restarted on lasix by Cardiology who wants to wait until all her non cardiac problems are resolved before transferring her to [place] [address] for valvuloplasty and TAVR. Cardiology received a call from Mr Danni Cooper (caregiver) and Mr Lisa Wagner () who agree to her transfer to [facility] for TAVR. On 10/27/2020, patient sitting up in bed during breakfast and when I reviewed the consideration for improving her heart fill, she replied that she wanted to leave this world with his many things fixed as possible including a desire to fix her heart. Pt was seen by hematology oncology. Pt started on  IV iron. F/u bone marrow biopsy and condition as outpatient at later time. Dr Yeny Bhat does not feel like it is the highest priority at this time considering all her other current issues. Pending cardiology clearance pt will need total thyroidectomy per ENT consult. Chief Complaint:  
Chief Complaint Patient presents with  Peripheral Edema Review of systems General:reports difficulty sleeping and chills Cardiovascular: No chest pain or pressure. Pulmonary: No shortness of breath, cough or wheeze. Gastrointestinal: No abdominal pain, nausea, vomiting or diarrhea. Genitourinary: No urinary frequency, urgency, hesitancy or dysuria. Musculoskeletal: generalized weakness and severe pain in her legs. Reports dizziness Neurologic: No headache, generalized weakness Objective:  
 
Physical Exam: 
Visit Vitals BP (!) 131/59 (BP 1 Location: Right arm, BP Patient Position: At rest) Pulse 65 Temp 97.1 °F (36.2 °C) Resp 16 Ht 5' 3\" (1.6 m) Wt 58.4 kg (128 lb 12 oz) SpO2 99% BMI 22.81 kg/m² General:         no acute distress more alert today and able to respond to questions, still confused HEENT:           NC, Atraumatic.  anicteric sclerae. Lungs:            CTA Bilaterally. No Wheezing/Rhonchi/Rales. Heart:              Regular rhythm, systolic Murmurs Abdomen:      Soft, Non distended, Non tender. Extremities:   Wound Rt leg clean dressing in place wearing Tubigrip, slow moving and weak. Winced when both her legs were touched and examined for edema, but only trace edema noted Psych:            Not anxious or agitated. Neurologic:    confused. Alert but not oriented to place or date 
  
Intake and Output: 
Current Shift:  No intake/output data recorded. Last three shifts:  10/25 1901 - 10/27 0700 In: 600 [P.O.:600] Out: 2450 [Urine:2450] Labs: Results:  
   
Chemistry Recent Labs 10/27/20 
0116 10/26/20 
0505 10/25/20 
5509 GLU 76 79 101* * 135* 137  
K 3.4* 3.8 3.5 CL 97* 102 103 CO2 26 25 28 BUN 62* 60* 58* CREA 1.69* 1.52* 1.54* CA 9.6 9.3 10.5* AGAP 8 8 6 BUCR 37* 39* 38* * 211* 181* TP 8.2 7.4 8.3* ALB 2.7* 2.8* 2.8*  
GLOB 5.5* 4.6* 5.5* AGRAT 0.5* 0.6* 0.5* CBC w/Diff Recent Labs 10/27/20 
0116 10/26/20 
0505 10/25/20 
8550 WBC 7.4 7.3 7.8  
RBC 4.37 4.28 4.53 HGB 9.7* 9.7* 10.1* HCT 31.9* 31.2* 33.5*  
 252 263 GRANS 71 71 73 LYMPH 18* 19* 17* EOS 2 2 2 Cardiac Enzymes No results for input(s): CPK, CKND1, JONI in the last 72 hours. No lab exists for component: Shagufta Pryor Coagulation No results for input(s): PTP, INR, APTT, INREXT, INREXT in the last 72 hours. Lipid Panel Lab Results Component Value Date/Time Cholesterol, total 178 10/20/2020 10:05 AM  
 HDL Cholesterol 33 (L) 10/20/2020 10:05 AM  
 LDL, calculated 125.8 (H) 10/20/2020 10:05 AM  
 VLDL, calculated 19.2 10/20/2020 10:05 AM  
 Triglyceride 96 10/20/2020 10:05 AM  
 CHOL/HDL Ratio 5.4 (H) 10/20/2020 10:05 AM  
  
BNP No results for input(s): BNPP in the last 72 hours. Liver Enzymes Recent Labs 10/27/20 
0116  
TP 8.2 ALB 2.7* * Thyroid Studies Lab Results Component Value Date/Time TSH 1.98 10/16/2020 04:22 AM  
    
 
Procedures/imaging: see electronic medical records for all procedures/Xrays and details which were not copied into this note but were reviewed prior to creation of Plan Medications:  
Current Facility-Administered Medications Medication Dose Route Frequency  cefTRIAXone (ROCEPHIN) 1 g in sterile water (preservative free) 10 mL IV syringe  1 g IntraVENous Q24H  potassium chloride (K-DUR, KLOR-CON) SR tablet 20 mEq  20 mEq Oral DAILY  metoprolol tartrate (LOPRESSOR) tablet 25 mg  25 mg Oral BID  megestroL (MEGACE) tablet 40 mg  40 mg Oral DAILY  furosemide (LASIX) tablet 40 mg  40 mg Oral DAILY  acetaminophen (TYLENOL) tablet 650 mg  650 mg Oral Q4H PRN  
 ergocalciferol capsule 50,000 Units  50,000 Units Oral Q7D  
 therapeutic multivitamin (THERAGRAN) tablet 1 Tab  1 Tab Oral DAILY  thiamine HCL (B-1) tablet 100 mg  100 mg Oral DAILY  apixaban (ELIQUIS) tablet 2.5 mg  2.5 mg Oral BID  hydrALAZINE (APRESOLINE) tablet 25 mg  25 mg Oral TID PRN  
 famotidine (PEPCID) tablet 20 mg  20 mg Oral DAILY  folic acid (FOLVITE) tablet 1 mg  1 mg Oral DAILY  montelukast (SINGULAIR) tablet 10 mg  10 mg Oral DAILY  ondansetron hcl (ZOFRAN) tablet 4 mg  4 mg Oral Q8H PRN  
 atorvastatin (LIPITOR) tablet 40 mg  40 mg Oral QHS Assessment/Plan Principal Problem: 
  Acute on chronic diastolic congestive heart failure ([address] (12/12/2017) Active Problems: 
  Obesity (BMI 30.0-34.9) (10/13/2016) Pulmonary HTN ([facility] [address] (12/21/2016) Papillary thyroid carcinoma ([facility] (12/21/2016) Mild HOCM (hypertrophic obstructive cardiomyopathy) ([facility] (1/12/2017) Aortic stenosis (1/12/2017) Mitral regurgitation (6/13/2017) Anasarca (10/15/2020) Severe protein-calorie malnutrition ([facility] [address] (10/21/2020) Plan: 
 
Mental status changes/ confusion- slowly improving. Possible delirium on top of prior dementia 
-Ct scan head no contrast showed no acute intracranial abnormality 
-Neurology recommended discontinuing Tramadol which was done. She seems to be improving since but still has significant confusion. Gabapentin is also on hold for this reason 
-Per Dr. Yusuf Torre: Her clinical exam significant for impaired attention, confused thinking, disordered speech is consistent with delirium. The cause of this patient's delirium is multifactorial: severe chronic disease (terminal delirium) dehydration, poor nutrition, recent hyponatremia, also suspect thiamine deficiency, thiamine level pending 
-Urine culture more than 100,00 gram negative. start Rocephin trial to see if mental status changes improve. Discussed with ID Dr Marcellus Linn will f/u culture and sensitivity . Pt will benefit from ABT since she is going for cardiac surgery 
-Discussed with nutritional consult increase Magic cup to 4 times per day 
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  
 B2 Microglobulin 8.9 
bone marrow asp and biopsy if feasible and pst remains stable Discussed with Dr Pili Haley. Will f/u as outpatient 
 
 Acute systolic congestive heart failure on top of chronic diastolic congestive heart failure / severe aortic stenosis D/C  Bumex 0.5 mg/h Lasix restarted per Cardiology Check cardiac enzyme every 8 troponin on admission  slightly up Hypertension:  
Improved on  metoprolol to 25 mg BID  
 She used to take 50 mg BID at home  
 use hydralazine prn Pt not on cozaar at home anymore before admission 
  
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 
-Discontinue Layton catheter due to improved hyponatremia and improved mental status, continue bladder checks as needed 
  
Hypokalemia: 
-potassium is 3.4 today 
-po potassium 20 praveen daily 
-continue to monitor lab Venous stasis ulcer Wound care Pt was seen by vascular as outpatinet Continue wound care and f/u vascular surgery consult Paroxysmal A. fib Decrease  Eliquis 2.5 mg twice daily per pharmacy due to renal function Follow-up CBC Left Knee pain  
-X-ray of L leg showed small joint effusion but no patellar dislocation Hyperlipidemia Continue Zocor 80 mg nightly Follow-up liver function 
  
History of Anemia with low iron 10/21: 
-Iron: 19 decreased from 27 on 10/16 
-TIBC: 382 
-continue iron repletion started on Iv iron per hematology History of thyroid cancer 
-Pt seen by ENT who recommended Thyroidectomy when she is able to tolerate surgery 
- repeat ultrasound done this admission no sign of metastatics ct head and chest negative   
-Pending cardiology clearance after TAVR Cardiology consult 
-Aortic Stenosis has now become severe with mean gradient of 43. Was moderate on echo 9/19. Will plan to transfer to [facility] for balloon valvuloplasty and possible TAVR once non cardiac issues managed. 
-waiting for ID to clear pt to assure that she doesn't have an infection before undergoing cardiac surgery. Expect clearance on 10/30/2020. Follow-up urine culture Monitor mental status F/u cardiology recommendation Discussed with her SON yesterday 10/26/2020 Helena Mckeon at [phone] He explained her  is sick too , and she has a friend care giver Sydni hardin his on the face sheet for emergency contact .  Son will contact other son to help pt with medical decision of needed before transfer Cbc, cmp, mag in AM 
PT and OT evaluation and treatment Wound care F/U bone marrow Biopsy and thyroidectomy after TAVR Disposition: Anticipate reengaging with eduard and Dr. Meche Villarreal tomorrow after discussing the case with Dr. Claire Hung  who will be on the service tomorrow; 
 
Adam Pedro Call 
10/27/2020 1:30 PM 
 
 
I have examined the patient and agree with the note above by PA student Adam houser. I have made changes to the note. Jeanine Marie MD 
OfficeL [phone]

## 2020-10-27 NOTE — PROGRESS NOTES
Infectious Disease progress note Reason: Metabolic encephalopathy, bacteriuria, evaluate for infection ? cystitis Current abx Prior abx Ceftriaxone since 10/24/20 Lines:  
 
 
Assessment : 
 
 
 68-year-old female with a history of HOCM, pulmonary hypertension, congestive heart failure, chronic atrial fibrillation on anticoagulation, aortic stenosis, and hypertension presented to emergency department on 10/15/2020 with increasing swelling for  2 weeks.  
  
Now with altered mentation, bacteriuria Altered mental status likely metabolic encephalopathy secondary to noninfectious etiology of altered mentation such as medication induced alongwith probable evolving e.coli/proteus cystitis Urine culture 10/22->2000 colonies of E. coli, Proteus. Status post ceftriaxone since 10/24/2020 since persistent lethargy, concern for evolving cystitis. Susceptibilities of E. coli, Proteus reviewed today. Both bacteria susceptible to ceftriaxone. Continued episodes of confusion noted today Discussed with cardiology team.  Plans for aortic valve procedure once infection cleared 
  
Recommendations: 
  
1. Continue ceftriaxone till 10/30/2020 2. Okay to proceed with the planned cardiac procedure next week if continued clinical improvement noted after completion of antibiotics on 10/30 
  
Advance Care planning: full code: discussed  with patient/surrogate decision maker: eder garcia: [phone] 
  
  
Above plan was discussed in details with primary team. Please call me if any further questions or concerns. Will continue to participate in the care of this patient. HPI: 
 
Thinks that she is at home in [place]. Denies chest pain, shortness of breath, abdominal pain. home Medication List  
 Details Digitek 125 mcg (0.125 mg) tablet take 1 tablet by mouth once daily 
Qty: 90 Tab, Refills: 1 Details  
apixaban (Eliquis) 5 mg tablet Take 1 Tab by mouth two (2) times a day. Qty: 60 Tab, Refills: 3 Calcium-Cholecalciferol, D3, (CALCIUM 600 WITH VITAMIN D3) 600 mg(1,500mg) -400 unit chew Take 1 Tab by mouth daily. furosemide (LASIX) 40 mg tablet take 1 tablet by mouth every morning and 1/2 tablet AT 2 P.M. 
Qty: 45 Tab, Refills: 0  
  
metoprolol tartrate (LOPRESSOR) 100 mg IR tablet take 1 tablet by mouth twice a day 
Qty: 60 Tab, Refills: 6  
  
traMADol (ULTRAM) 50 mg tablet Take 1 Tab by mouth every six (6) hours as needed for Pain. folic acid 757 mcg tablet Take 400 mcg by mouth daily. cyanocobalamin, vitamin B-12, (VITAMIN B12 PO) Take 1,000 mcg by mouth daily. famotidine (PEPCID) 20 mg tablet Take 1 Tab by mouth daily. Qty: 30 Tab, Refills: 0  
  
gabapentin (NEURONTIN) 100 mg capsule Take 1 Cap by mouth nightly. Max Daily Amount: 100 mg. Qty: 30 Cap, Refills: 0 Associated Diagnoses: Pain and swelling of lower leg, unspecified laterality  
  
lactobacillus sp. 50 billion cpu (BIO-K PLUS) 50 billion cell -375 mg cap capsule Take 1 Cap by mouth daily. Qty: 10 Cap, Refills: 0  
  
ondansetron hcl (ZOFRAN) 4 mg tablet Take 1 Tab by mouth every eight (8) hours as needed for Nausea. Qty: 30 Tab, Refills: 2 Associated Diagnoses: Nausea  
  
simvastatin (ZOCOR) 80 mg tablet TAKE 1 TABLET NIGHTLY Qty: 90 Tab, Refills: 1 Associated Diagnoses: Nonrheumatic aortic valve insufficiency  
  
cholecalciferol (VITAMIN D3) 1,000 unit cap Take  by mouth daily. losartan (COZAAR) 100 mg tablet Take 100 mg by mouth daily. montelukast (SINGULAIR) 10 mg tablet Take 10 mg by mouth daily. CETIRIZINE HCL (ZYRTEC PO) Take  by mouth. Current Facility-Administered Medications Medication Dose Route Frequency  cefTRIAXone (ROCEPHIN) 1 g in sterile water (preservative free) 10 mL IV syringe  1 g IntraVENous Q24H  potassium chloride (K-DUR, KLOR-CON) SR tablet 20 mEq  20 mEq Oral DAILY  metoprolol tartrate (LOPRESSOR) tablet 25 mg  25 mg Oral BID  megestroL (MEGACE) tablet 40 mg  40 mg Oral DAILY  furosemide (LASIX) tablet 40 mg  40 mg Oral DAILY  acetaminophen (TYLENOL) tablet 650 mg  650 mg Oral Q4H PRN  
 ergocalciferol capsule 50,000 Units  50,000 Units Oral Q7D  
 therapeutic multivitamin (THERAGRAN) tablet 1 Tab  1 Tab Oral DAILY  thiamine HCL (B-1) tablet 100 mg  100 mg Oral DAILY  apixaban (ELIQUIS) tablet 2.5 mg  2.5 mg Oral BID  hydrALAZINE (APRESOLINE) tablet 25 mg  25 mg Oral TID PRN  
 famotidine (PEPCID) tablet 20 mg  20 mg Oral DAILY  folic acid (FOLVITE) tablet 1 mg  1 mg Oral DAILY  montelukast (SINGULAIR) tablet 10 mg  10 mg Oral DAILY  ondansetron hcl (ZOFRAN) tablet 4 mg  4 mg Oral Q8H PRN  
 atorvastatin (LIPITOR) tablet 40 mg  40 mg Oral QHS Allergies: Patient has no known allergies. Temp (24hrs), Av.6 °F (36.4 °C), Min:97.1 °F (36.2 °C), Max:98.1 °F (36.7 °C) Visit Vitals /66 Pulse 69 Temp 97.9 °F (36.6 °C) Resp 17 Ht 5' 3\" (1.6 m) Wt 58.4 kg (128 lb 12 oz) SpO2 97% BMI 22.81 kg/m² ROS: Unable to obtain since confused Physical Exam: 
 
General:         no acute distress,  communicative, pleasantly confused HEENT:           NC, Atraumatic.  anicteric sclerae. Lungs:            CTA Bilaterally. No Wheezing/Rhonchi/Rales. Heart:              Regular rhythm Abdomen:      Soft, Non distended, Non tender. Extremities:    no edema Psych:            Not anxious or agitated. Neurologic:     No gross motor or sensory deficits noted Labs: Results:  
Chemistry Recent Labs 10/27/20 
0116 10/26/20 
0505 10/25/20 
1457 GLU 76 79 101* * 135* 137  
K 3.4* 3.8 3.5 CL 97* 102 103 CO2 26 25 28 BUN 62* 60* 58* CREA 1.69* 1.52* 1.54* CA 9.6 9.3 10.5* AGAP 8 8 6 BUCR 37* 39* 38* * 211* 181* TP 8.2 7.4 8.3* ALB 2.7* 2.8* 2.8*  
GLOB 5.5* 4.6* 5.5* AGRAT 0.5* 0.6* 0.5*  
 CBC w/Diff Recent Labs 10/27/20 
0116 10/26/20 
0505 10/25/20 
7207 WBC 7.4 7.3 7.8  
RBC 4.37 4.28 4.53 HGB 9.7* 9.7* 10.1* HCT 31.9* 31.2* 33.5*  
 252 263 GRANS 71 71 73 LYMPH 18* 19* 17* EOS 2 2 2 Microbiology No results for input(s): CULT in the last 72 hours. RADIOLOGY: 
 
All available imaging studies/reports in [facility] for this admission were reviewed Dr. Blanco Gómez, Infectious Disease Specialist 
[phone] October 27, 2020 
9:31 AM

## 2020-10-28 ENCOUNTER — APPOINTMENT (OUTPATIENT)
Dept: CT IMAGING | Age: 80
DRG: 291 | End: 2020-10-28
Attending: FAMILY MEDICINE
Payer: MEDICARE

## 2020-10-28 LAB
ALBUMIN SERPL-MCNC: 2.6 G/DL (ref 3.4–5)
ALBUMIN/GLOB SERPL: 0.5 {RATIO} (ref 0.8–1.7)
ALP SERPL-CCNC: 202 U/L (ref 45–117)
ALT SERPL-CCNC: 18 U/L (ref 13–56)
ANION GAP SERPL CALC-SCNC: 6 MMOL/L (ref 3–18)
AST SERPL-CCNC: 24 U/L (ref 10–38)
BASOPHILS # BLD: 0 K/UL (ref 0–0.1)
BASOPHILS NFR BLD: 0 % (ref 0–2)
BILIRUB SERPL-MCNC: 0.7 MG/DL (ref 0.2–1)
BUN SERPL-MCNC: 58 MG/DL (ref 7–18)
BUN/CREAT SERPL: 34 (ref 12–20)
CALCIUM SERPL-MCNC: 9.3 MG/DL (ref 8.5–10.1)
CHLORIDE SERPL-SCNC: 99 MMOL/L (ref 100–111)
CO2 SERPL-SCNC: 26 MMOL/L (ref 21–32)
CREAT SERPL-MCNC: 1.7 MG/DL (ref 0.6–1.3)
DIFFERENTIAL METHOD BLD: ABNORMAL
EOSINOPHIL # BLD: 0.1 K/UL (ref 0–0.4)
EOSINOPHIL NFR BLD: 1 % (ref 0–5)
ERYTHROCYTE [DISTWIDTH] IN BLOOD BY AUTOMATED COUNT: 20.1 % (ref 11.6–14.5)
GLOBULIN SER CALC-MCNC: 4.9 G/DL (ref 2–4)
GLUCOSE SERPL-MCNC: 94 MG/DL (ref 74–99)
HCT VFR BLD AUTO: 32 % (ref 35–45)
HGB BLD-MCNC: 9.8 G/DL (ref 12–16)
LYMPHOCYTES # BLD: 1 K/UL (ref 0.9–3.6)
LYMPHOCYTES NFR BLD: 14 % (ref 21–52)
MAGNESIUM SERPL-MCNC: 2.5 MG/DL (ref 1.6–2.6)
MCH RBC QN AUTO: 22.3 PG (ref 24–34)
MCHC RBC AUTO-ENTMCNC: 30.6 G/DL (ref 31–37)
MCV RBC AUTO: 72.7 FL (ref 74–97)
MONOCYTES # BLD: 0.7 K/UL (ref 0.05–1.2)
MONOCYTES NFR BLD: 10 % (ref 3–10)
NEUTS SEG # BLD: 5.2 K/UL (ref 1.8–8)
NEUTS SEG NFR BLD: 75 % (ref 40–73)
PHOSPHATE SERPL-MCNC: 3.9 MG/DL (ref 2.5–4.9)
PLATELET # BLD AUTO: 256 K/UL (ref 135–420)
PMV BLD AUTO: 10.1 FL (ref 9.2–11.8)
POTASSIUM SERPL-SCNC: 4.7 MMOL/L (ref 3.5–5.5)
PROT SERPL-MCNC: 7.5 G/DL (ref 6.4–8.2)
RBC # BLD AUTO: 4.4 M/UL (ref 4.2–5.3)
SODIUM SERPL-SCNC: 131 MMOL/L (ref 136–145)
WBC # BLD AUTO: 7 K/UL (ref 4.6–13.2)

## 2020-10-28 PROCEDURE — 74011250637 HC RX REV CODE- 250/637: Performed by: INTERNAL MEDICINE

## 2020-10-28 PROCEDURE — 74011250637 HC RX REV CODE- 250/637: Performed by: STUDENT IN AN ORGANIZED HEALTH CARE EDUCATION/TRAINING PROGRAM

## 2020-10-28 PROCEDURE — 65270000029 HC RM PRIVATE

## 2020-10-28 PROCEDURE — 51798 US URINE CAPACITY MEASURE: CPT

## 2020-10-28 PROCEDURE — 74011250636 HC RX REV CODE- 250/636: Performed by: FAMILY MEDICINE

## 2020-10-28 PROCEDURE — 74176 CT ABD & PELVIS W/O CONTRAST: CPT

## 2020-10-28 PROCEDURE — 74011250637 HC RX REV CODE- 250/637: Performed by: FAMILY MEDICINE

## 2020-10-28 PROCEDURE — 2709999900 HC NON-CHARGEABLE SUPPLY

## 2020-10-28 PROCEDURE — 97164 PT RE-EVAL EST PLAN CARE: CPT

## 2020-10-28 PROCEDURE — 36415 COLL VENOUS BLD VENIPUNCTURE: CPT

## 2020-10-28 PROCEDURE — 77030040830 HC CATH URETH FOL MDII -A

## 2020-10-28 PROCEDURE — 83735 ASSAY OF MAGNESIUM: CPT

## 2020-10-28 PROCEDURE — 84100 ASSAY OF PHOSPHORUS: CPT

## 2020-10-28 PROCEDURE — 85025 COMPLETE CBC W/AUTO DIFF WBC: CPT

## 2020-10-28 PROCEDURE — 97535 SELF CARE MNGMENT TRAINING: CPT

## 2020-10-28 PROCEDURE — 97530 THERAPEUTIC ACTIVITIES: CPT

## 2020-10-28 PROCEDURE — 99232 SBSQ HOSP IP/OBS MODERATE 35: CPT | Performed by: INTERNAL MEDICINE

## 2020-10-28 PROCEDURE — 74011000250 HC RX REV CODE- 250: Performed by: FAMILY MEDICINE

## 2020-10-28 PROCEDURE — 83935 ASSAY OF URINE OSMOLALITY: CPT

## 2020-10-28 PROCEDURE — 80053 COMPREHEN METABOLIC PANEL: CPT

## 2020-10-28 RX ORDER — POTASSIUM CHLORIDE 20 MEQ/1
20 TABLET, EXTENDED RELEASE ORAL DAILY
Qty: 30 TAB | Refills: 0 | Status: SHIPPED | OUTPATIENT
Start: 2020-10-29

## 2020-10-28 RX ORDER — ACETAMINOPHEN 325 MG/1
650 TABLET ORAL
Qty: 30 TAB | Refills: 0 | Status: SHIPPED | OUTPATIENT
Start: 2020-10-28

## 2020-10-28 RX ORDER — THERA TABS 400 MCG
1 TAB ORAL DAILY
Qty: 30 TAB | Refills: 0 | Status: SHIPPED | OUTPATIENT
Start: 2020-10-29

## 2020-10-28 RX ORDER — ERGOCALCIFEROL 1.25 MG/1
50000 CAPSULE ORAL
Qty: 4 CAP | Refills: 0 | Status: SHIPPED | OUTPATIENT
Start: 2020-11-04

## 2020-10-28 RX ORDER — ATORVASTATIN CALCIUM 40 MG/1
40 TABLET, FILM COATED ORAL
Qty: 30 TAB | Refills: 0 | Status: SHIPPED | OUTPATIENT
Start: 2020-10-28

## 2020-10-28 RX ORDER — MEGESTROL ACETATE 40 MG/1
40 TABLET ORAL DAILY
Qty: 30 TAB | Refills: 0 | Status: SHIPPED | OUTPATIENT
Start: 2020-10-29

## 2020-10-28 RX ORDER — LANOLIN ALCOHOL/MO/W.PET/CERES
100 CREAM (GRAM) TOPICAL DAILY
Qty: 30 TAB | Refills: 0 | Status: SHIPPED | OUTPATIENT
Start: 2020-10-29

## 2020-10-28 RX ORDER — FUROSEMIDE 20 MG/1
20 TABLET ORAL DAILY
Qty: 30 TAB | Refills: 0 | Status: SHIPPED | OUTPATIENT
Start: 2020-10-28

## 2020-10-28 RX ORDER — SODIUM CHLORIDE 1 G/1
1 TABLET ORAL 2 TIMES DAILY
Status: DISCONTINUED | OUTPATIENT
Start: 2020-10-28 | End: 2020-10-30 | Stop reason: HOSPADM

## 2020-10-28 RX ORDER — FOLIC ACID 1 MG/1
1 TABLET ORAL DAILY
Qty: 30 TAB | Refills: 0 | Status: SHIPPED | OUTPATIENT
Start: 2020-10-29

## 2020-10-28 RX ORDER — HYDRALAZINE HYDROCHLORIDE 25 MG/1
25 TABLET, FILM COATED ORAL
Qty: 30 TAB | Refills: 0 | Status: SHIPPED | OUTPATIENT
Start: 2020-10-28

## 2020-10-28 RX ORDER — METOPROLOL TARTRATE 25 MG/1
25 TABLET, FILM COATED ORAL 2 TIMES DAILY
Qty: 60 TAB | Refills: 0 | Status: SHIPPED | OUTPATIENT
Start: 2020-10-28

## 2020-10-28 RX ADMIN — MEGESTROL ACETATE 40 MG: 40 TABLET ORAL at 09:07

## 2020-10-28 RX ADMIN — CEFTRIAXONE SODIUM 1 G: 1 INJECTION, POWDER, FOR SOLUTION INTRAMUSCULAR; INTRAVENOUS at 15:34

## 2020-10-28 RX ADMIN — ACETAMINOPHEN 650 MG: 325 TABLET ORAL at 17:10

## 2020-10-28 RX ADMIN — APIXABAN 2.5 MG: 2.5 TABLET, FILM COATED ORAL at 09:07

## 2020-10-28 RX ADMIN — SODIUM CHLORIDE 1000 MG: 1 TABLET ORAL at 18:13

## 2020-10-28 RX ADMIN — THERA TABS 1 TABLET: TAB at 09:07

## 2020-10-28 RX ADMIN — Medication 100 MG: at 09:07

## 2020-10-28 RX ADMIN — ERGOCALCIFEROL 50000 UNITS: 1.25 CAPSULE ORAL at 09:07

## 2020-10-28 RX ADMIN — METOPROLOL TARTRATE 25 MG: 25 TABLET, FILM COATED ORAL at 09:07

## 2020-10-28 RX ADMIN — ATORVASTATIN CALCIUM 40 MG: 40 TABLET, FILM COATED ORAL at 21:23

## 2020-10-28 RX ADMIN — APIXABAN 2.5 MG: 2.5 TABLET, FILM COATED ORAL at 17:10

## 2020-10-28 RX ADMIN — FOLIC ACID 1 MG: 1 TABLET ORAL at 09:07

## 2020-10-28 RX ADMIN — FAMOTIDINE 20 MG: 20 TABLET ORAL at 09:07

## 2020-10-28 RX ADMIN — MONTELUKAST 10 MG: 10 TABLET, FILM COATED ORAL at 09:07

## 2020-10-28 RX ADMIN — FUROSEMIDE 20 MG: 20 TABLET ORAL at 09:07

## 2020-10-28 RX ADMIN — METOPROLOL TARTRATE 25 MG: 25 TABLET, FILM COATED ORAL at 17:11

## 2020-10-28 RX ADMIN — POTASSIUM CHLORIDE 20 MEQ: 1500 TABLET, EXTENDED RELEASE ORAL at 09:07

## 2020-10-28 NOTE — PROGRESS NOTES
Problem: Self Care Deficits Care Plan (Adult) Goal: *Acute Goals and Plan of Care (Insert Text) Description: Occupational Therapy Goals Initiated 10/19/2020. Pt re-evaluated 10/26/2020. Goals will remain the same with addition to goal #6. To be achieved within 7 day(s). 1.  Patient will perform upper body dressing with supervision/set-up. 2.  Patient will perform lower body dressing with minimal assistance/contact guard assist. 
3.  Patient will perform Functional Activities seated EOB with G balance for 5 minutes with modified independence. 4.  Patient will perform toilet transfers with minimal assistance/contact guard assist. 
5.  Patient will perform all aspects of toileting with minimal assistance/contact guard assist. 
6.  Patient will perform bed mobility with supervision in preparation for further self-care. Prior Level of Function: Patient a poor historian this session. Patient reports receiving help with all ADLs at baseline, however, patient unable to report how much assistance she is receiving or how often. Patient reports having personal care aid seven days a week but is unsure of how long they are in her home. Patient reports living with  and states that she is left home alone occasionally. Patient reports using RW during functional mobility. Outcome: Progressing Towards Goal 
 OCCUPATIONAL THERAPY TREATMENT Patient: David Harden (89 y.o. female) Date: 10/28/2020 Diagnosis: Anasarca [R60.1] Acute on chronic diastolic congestive heart failure ([facility] [address] Precautions: Fall PLOF: Patient a poor historian this session. Patient reports receiving help with all ADLs at baseline, however, patient unable to report how much assistance she is receiving or how often. Patient reports having personal care aid seven days a week but is unsure of how long they are in her home.  Patient reports living with  and states that she is left home alone occasionally. Patient reports using RW during functional mobility. Chart, occupational therapy assessment, plan of care, and goals were reviewed. ASSESSMENT: 
Pt cleared by RN for OT tx at this time. PT co tx to maximize pt safety and participation. Pt presented supine in bed upon therapist arrival and agreeable for participation. Pt requesting to utilize [facility] and maneuvered supine-EOB with SBA and increased time. STS transfer x 2 trials MAX A x 2 and unable to come to full stance. Attempted BSC transfer unsuccessfully 2/2 poor balance and inability to advance R LE. As pt was at partial stance therapist noted bloody chux with clot present, therapist placed soiled chux aside and notified [facility] staff. Pt returned to supine position in bed sit-supine MIN A and MAX A x 2 scooting to HOB. Pt able to roll to R for therapist to place and position bedpan. Pt left with HOB slightly elevated, on bedpan, and call bell placed within reach, NSG made aware. Progression toward goals: 
[]          Improving appropriately and progressing toward goals [x]          Improving slowly and progressing toward goals 
[]          Not making progress toward goals and plan of care will be adjusted PLAN: 
Patient continues to benefit from skilled intervention to address the above impairments. Continue treatment per established plan of care. Discharge Recommendations: SNF Further Equipment Recommendations for Discharge: TBA pending progress SUBJECTIVE:  
Patient stated  I need to get home to my packages. \" OBJECTIVE DATA SUMMARY:  
Cognitive/Behavioral Status: 
Neurologic State: Confused Orientation Level: Oriented to person, Disoriented to place, Disoriented to situation, Disoriented to time Cognition: Follows commands Safety/Judgement: Fall prevention Functional Mobility and Transfers for ADLs: 
 Bed Mobility: 
  
Supine to Sit: Stand-by assistance; Additional time Sit to Supine: Minimum assistance; Additional time Scooting: Maximum assistance;Assist x2(scooting to [facility]) Transfers: 
Sit to Stand: Maximum assistance;Assist x2 put unable to come to full stand Stand to Sit: Maximum assistance;Assist x2 Attempted BSC transfer with SPT. Deferred transfer at this time for safety as pt presented with poor standing balance and unable to advance R LE. Balance:Sitting: Impaired Sitting - Static: Good (unsupported) Sitting - Dynamic: Fair (occasional) Standing: Impaired; With support Standing - Static: Poor Pain: 
Pt reports having headache and cervical discomfort Activity Tolerance:   
Fair Please refer to the flowsheet for vital signs taken during this treatment. After treatment:  
[]  Patient left in no apparent distress sitting up in chair 
[x]  Patient left in no apparent distress in bed 
[x]  Call bell left within reach [x]  Nursing notified 
[]  Caregiver present 
[]  Bed alarm activated COMMUNICATION/EDUCATION:  
[] Role of Occupational Therapy in the acute care setting 
[] Home safety education was provided and the patient/caregiver indicated understanding. [x] Patient/family have participated as able in working towards goals and plan of care. [] Patient/family agree to work toward stated goals and plan of care. [] Patient understands intent and goals of therapy, but is neutral about his/her participation. [] Patient is unable to participate in goal setting and plan of care. Thank you for this referral. 
KAYLA Conroy Time Calculation: 32 mins

## 2020-10-28 NOTE — PROGRESS NOTES
Dr. Yaakov Ace was informed of  
 
 10/28/20 0530 Urine Assessment  
$$ Bladder Scan (mL of urine) 307 mL He wants to repeat bladder scan post void and new order of straight catheterization for residual urine of >250 ml x 24 hours was received. He was also informed of small amount of bleeding from urethra from irritation from pulling out of galaviz catheter last night and Results for Lisa Barrow (MRN [id number]) as of 10/28/2020 06:56 Ref. Range 10/28/2020 04:50 Sodium Latest Ref Range: 136 - 145 mmol/L 131 (L) No new order was received.

## 2020-10-28 NOTE — PROGRESS NOTES
Problem: Falls - Risk of 
Goal: *Absence of Falls Description: Document Makayla Pickard Fall Risk and appropriate interventions in the flowsheet. Outcome: Progressing Towards Goal 
Note: Fall Risk Interventions: 
Mobility Interventions: Bed/chair exit alarm, Patient to call before getting OOB, Utilize walker, cane, or other assistive device Mentation Interventions: Bed/chair exit alarm, Door open when patient unattended, Reorient patient, Toileting rounds, Update white board Medication Interventions: Bed/chair exit alarm, Patient to call before getting OOB, Teach patient to arise slowly Elimination Interventions: Bed/chair exit alarm, Call light in reach, Patient to call for help with toileting needs, Toilet paper/wipes in reach, Toileting schedule/hourly rounds Problem: Patient Education: Go to Patient Education Activity Goal: Patient/Family Education Outcome: Progressing Towards Goal 
  
Problem: Patient Education: Go to Patient Education Activity Goal: Patient/Family Education Outcome: Progressing Towards Goal 
  
Problem: Heart Failure: Discharge Outcomes Goal: *Demonstrates ability to perform prescribed activity without shortness of breath or discomfort Outcome: Progressing Towards Goal 
Goal: *Left ventricular function assessment completed prior to or during stay, or planned for post-discharge Outcome: Progressing Towards Goal 
Goal: *ACEI prescribed if LVEF less than 40% and no contraindications or ARB prescribed Outcome: Progressing Towards Goal 
Goal: *Verbalizes understanding and describes prescribed diet Outcome: Progressing Towards Goal 
Goal: *Verbalizes understanding/describes prescribed medications Outcome: Progressing Towards Goal 
Goal: *Describes available resources and support systems Description: (eg: Home Health, Palliative Care, Advanced Medical Directive) Outcome: Progressing Towards Goal 
Goal: *Describes smoking cessation resources Outcome: Progressing Towards Goal 
Goal: *Understands and describes signs and symptoms to report to providers(Stroke Metric) Outcome: Progressing Towards Goal 
Goal: *Describes/verbalizes understanding of follow-up/return appt Description: (eg: to physicians, diabetes treatment coordinator, and other resources Outcome: Progressing Towards Goal 
Goal: *Describes importance of continuing daily weights and changes to report to physician Outcome: Progressing Towards Goal 
  
Problem: Impaired Skin Integrity/Pressure Injury Treatment Goal: *Improvement of Existing Pressure Injury Outcome: Progressing Towards Goal 
Goal: *Prevention of pressure injury Description: Document Angel Scale and appropriate interventions in the flowsheet. Outcome: Progressing Towards Goal 
Note: Pressure Injury Interventions: 
Sensory Interventions: Assess changes in LOC, Assess need for specialty bed, Float heels, Keep linens dry and wrinkle-free, Minimize linen layers, Turn and reposition approx. every two hours (pillows and wedges if needed) Moisture Interventions: Absorbent underpads, Assess need for specialty bed, Check for incontinence Q2 hours and as needed, Internal/External urinary devices, Minimize layers, Offer toileting Q_hr Activity Interventions: Assess need for specialty bed, Pressure redistribution bed/mattress(bed type) Mobility Interventions: Assess need for specialty bed, Float heels, HOB 30 degrees or less, Pressure redistribution bed/mattress (bed type), Turn and reposition approx. every two hours(pillow and wedges) Nutrition Interventions: Document food/fluid/supplement intake Friction and Shear Interventions: HOB 30 degrees or less, Lift sheet, Lift team/patient mobility team, Minimize layers, Transferring/repositioning devices Problem: Patient Education: Go to Patient Education Activity Goal: Patient/Family Education Outcome: Progressing Towards Goal 
  
 Problem: Patient Education: Go to Patient Education Activity Goal: Patient/Family Education Outcome: Progressing Towards Goal 
  
Problem: Nutrition Deficit Goal: *Optimize nutritional status Outcome: Progressing Towards Goal 
  
Problem: Pressure Injury - Risk of 
Goal: *Prevention of pressure injury Description: Document Angel Scale and appropriate interventions in the flowsheet. Outcome: Progressing Towards Goal 
Note: Pressure Injury Interventions: 
Sensory Interventions: Assess changes in LOC, Assess need for specialty bed, Float heels, Keep linens dry and wrinkle-free, Minimize linen layers, Turn and reposition approx. every two hours (pillows and wedges if needed) Moisture Interventions: Absorbent underpads, Assess need for specialty bed, Check for incontinence Q2 hours and as needed, Internal/External urinary devices, Minimize layers, Offer toileting Q_hr Activity Interventions: Assess need for specialty bed, Pressure redistribution bed/mattress(bed type) Mobility Interventions: Assess need for specialty bed, Float heels, HOB 30 degrees or less, Pressure redistribution bed/mattress (bed type), Turn and reposition approx. every two hours(pillow and wedges) Nutrition Interventions: Document food/fluid/supplement intake Friction and Shear Interventions: HOB 30 degrees or less, Lift sheet, Lift team/patient mobility team, Minimize layers, Transferring/repositioning devices Problem: Patient Education: Go to Patient Education Activity Goal: Patient/Family Education Outcome: Progressing Towards Goal 
  
Problem: Infection - Risk of, Urinary Catheter-Associated Urinary Tract Infection Goal: *Absence of infection signs and symptoms Outcome: Progressing Towards Goal 
  
Problem: Patient Education: Go to Patient Education Activity Goal: Patient/Family Education Outcome: Progressing Towards Goal 
  
Problem: Patient Education: Go to Patient Education Activity Goal: Patient/Family Education Outcome: Progressing Towards Goal

## 2020-10-28 NOTE — PROGRESS NOTES
Paged Dr. Frederick Lal regarding 10/28/20 5411 Urine Assessment  
$$ Bladder Scan (mL of urine) 307 mL  
awaiting return call.

## 2020-10-28 NOTE — PROGRESS NOTES
Problem: Falls - Risk of 
Goal: *Absence of Falls Description: Document Obdulia Selby Fall Risk and appropriate interventions in the flowsheet. Outcome: Progressing Towards Goal 
Note: Fall Risk Interventions: 
Mobility Interventions: Bed/chair exit alarm, Patient to call before getting OOB, Utilize walker, cane, or other assistive device Mentation Interventions: Bed/chair exit alarm, Door open when patient unattended, Reorient patient, Toileting rounds, Update white board Medication Interventions: Bed/chair exit alarm, Patient to call before getting OOB, Teach patient to arise slowly Elimination Interventions: Bed/chair exit alarm, Call light in reach, Patient to call for help with toileting needs, Toilet paper/wipes in reach, Toileting schedule/hourly rounds

## 2020-10-28 NOTE — DISCHARGE SUMMARY
Discharge Summary Patient: Vanda Vance MRN: [id number]  SSN: [id number] YOB: 1940  Age: 68 y.o. Sex: female Admit Date: 10/15/2020 Discharge Date: 10/28/2020 Admission Diagnoses: Anasarca [R60.1] Discharge Diagnoses:  
Problem List as of 10/28/2020 Date Reviewed: 10/15/2020 Codes Class Noted - Resolved Severe protein-calorie malnutrition ([facility].) ICD-10-CM: Y03 ICD-9-CM: 273  10/21/2020 - Present Anasarca ICD-10-CM: R60.1 ICD-9-CM: 782.3  10/15/2020 - Present CHF (congestive heart failure) ([facility]) ICD-10-CM: I50.9 ICD-9-CM: 428.0  9/20/2019 - Present UTI (urinary tract infection) ICD-10-CM: N39.0 ICD-9-CM: 599.0  9/20/2019 - Present * (Principal) Acute on chronic diastolic congestive heart failure (HCC) ICD-10-CM: I50.33 ICD-9-CM: 428.33, 428.0  12/12/2017 - Present Aortic regurgitation ICD-10-CM: I35.1 ICD-9-CM: 424.1  6/13/2017 - Present Mitral regurgitation ICD-10-CM: I34.0 ICD-9-CM: 424.0  6/13/2017 - Present SOB (shortness of breath) ICD-10-CM: R06.02 
ICD-9-CM: 786.05  2/13/2017 - Present Mild HOCM (hypertrophic obstructive cardiomyopathy) ([facility]) ICD-10-CM: I42.1 ICD-9-CM: 425.11  1/12/2017 - Present Aortic stenosis ICD-10-CM: I35.0 ICD-9-CM: 424.1  1/12/2017 - Present Pulmonary HTN ([facility].) ICD-10-CM: I27.20 ICD-9-CM: 416.8  12/21/2016 - Present Papillary thyroid carcinoma ([facility].) ICD-10-CM: S36 ICD-9-CM: 541  12/21/2016 - Present Lung nodule ICD-10-CM: R91.1 ICD-9-CM: 793.11  11/4/2016 - Present Obesity (BMI 30.0-34.9) ICD-10-CM: L39.0 ICD-9-CM: 278.00  10/13/2016 - Present Pain and swelling of lower leg ICD-10-CM: M79.669, M79.89 ICD-9-CM: 729.5, 729.81  6/29/2015 - Present RESOLVED: A-fib [facility]) ICD-10-CM: I48.91 
ICD-9-CM: 427.31  3/18/2019 - 6/18/2020 Discharge Condition: Stable HPI: 
 Pt presented to ED with increased pitting edema in bilateral lower extremities X 2 weeks. She had been taking bumex without improvement. She has a history of aortic stenosis, worsening heart failure, HOCM, CKD, and thyroid cancer. She had shortness of breath but denied fevers, chills, cough, or chest pain. She was also having drainage from her right lower extremity. She was admitted to be monitored. She subsequently developed hyponatremia  And was corrected slowly  Still running low normal and mental status changes. She was treated with fluid intake restrictions and d/c bumex and her sodium returned to normal but her mental state has not completely returned. She is more oriented, but continues to be mildly confused. She developed a UTI and is being treated with rocephin until 10/30. Cardiology noted worsening aortic stenosis  severe aortic stenosis with mean gradient of 43 and recommended valvuloplasty with possible TAVR. This is the reason for her transfer to [facility]. Upon completion of this surgery, she needs to f/u with ENT regarding her thyroid cancer for a thyroidectomy. She also needs to follow up with hematology to discuss IgG lambda, M spike. Further f/u with nephrology is needed for CKD. Hospital Course:  
  
Mental status changes/ confusion- slowly improving. Possible delirium on top of prior dementia 
-Ct scan head no contrast showed no acute intracranial abnormality 
-Neurology recommended discontinuing Tramadol which was done. She seemed to improve since but still has significant confusion.   Gabapentin is also on hold for this reason 
-Per Dr. Gil Fulton: Her clinical exam significant for impaired attention, confused thinking, disordered speech is consistent with delirium. The cause of this patient's delirium is multifactorial: severe chronic disease (terminal delirium) dehydration, poor nutrition, recent hyponatremia, also suspect thiamine deficiency,thiamin level was normal  
 
  
 UTI/ Cystitis/ most likely colonization/ urine retention Continue Rocephin until 10/30 since she is going for cardiac procedure per ID recommednation F/u Ct scan abdomen and pelvis without contrast  
Discussed with ID Dr Rossi Abdul reviewed ct scan will reinsert galaviz catheter voiding trial at [address] before discharge Pt cleared for transfer discussed with Dr Mary zimmerman pt he will arrange transport today     
  
Rt Leg venous stasis ulcer Edema improved Continue wound care and f/u vascular surgery consult 
  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  
 B2 Microglobulin 8.9 
bone marrow asp and biopsy if feasible and pst remains stable Will f/u as outpatient 
  
Acute systolic congestive heart failure on top of chronic diastolic congestive heart failure / severe aortic stenosis/ PAF 
bumex d/c and Lasix restarted per Cardiology Continue Eliquis Discussed with Dr Mathieu James  Pt stable for transport he requested transport today to start treatment for aortic stenosis  
  
Hypertension:  
 Metoprolol to 25 mg BID  
 use hydralazine prn  
  
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 
  
Hypokalemia: 
-Resolved 
-po potassium 20 praveen daily Paroxysmal A. fib Decreased  Eliquis 2.5 mg twice daily per pharmacy due to renal function 
  
Left Knee pain  
-X-ray of L leg showed small joint effusion but no patellar dislocation 
  
Hyperlipidemia Continue Zocor 80 mg nightly Normal AST and ALT 
  
History of Anemia with low iron 10/21: 
-Iron: 19 decreased from 27 on 10/16 
-TIBC: 382 
-continue iron repletion History of thyroid cancer 
-Pt seen by ENT who recommended Thyroidectomy when she is able to tolerate surgery 
- repeat ultrasound done this admission no sign of metastatics ct head and chest negative   
-Pending cardiology clearance after TAVR 
  
Cardiology consult 
-Aortic Stenosis has now become severe with mean gradient of 43.  Was moderate on echo 9/19.  
-pt will need valvuloplasty and possible TAVR 
  
Might need SNF or rehab depends on her response to treatment Discussed with her [facility] at [phone] over the weekend . Discussed transfer with Mr. Mirian Torres and Mr Saritha Velazquez today. Family in agreement for transfere They understand the risks and benefits and agreed to transfer. F/U bone marrow Biopsy and thyroidectomy after TAVR Consults: Cardiology, Hematology/Oncology, Infectious Disease, Nephrology, Neurology, Otolaryngology and Orthopedics Significant Diagnostic Studies:  
 
CT chest 10/16 1.  Bilateral hazy groundglass pulmonary opacities with relative sparing of the 
periphery suggestive of pulmonary edema. Very small bilateral pleural effusions. Moderate to marked cardiomegaly. 2.  Similar appearance of enlarged pulmonary artery suggestive of pulmonaryartery hypertension. 3.  Very minimal increased size of medial left upper lobe pulmonary nodule since 2016. Today measuring 6 x 5 mm, previously measuring 5 x 3 mm. Finding is likely 
benign. However given slight change over time suggest follow-up CT in one year. 4.  Extensive atherosclerotic vascular calcification 
  
Echo 10/16 · LV: Estimated LVEF is 65 - 70%. Visually measured ejection fraction. Normal systolic function (ejection fraction normal). Small left ventricle. Mild concentric hypertrophy. Wall motion: normal. Severe (grade 3) left ventricular diastolic dysfunction. · AV: Probably trileaflet aortic valve. Aortic valve mean gradient is 43 mmHg. Aortic valve area is 0.5 cm2. Severe aortic valve stenosis is present. Moderate to severe aortic valve regurgitation is present. · MV: Mitral annular calcification. Mitral valve mean gradient is 5 mmHg. Moderate mitral valve stenosis is present. Mild mitral valve regurgitation is present. · PV: Mild to moderate pulmonic valve regurgitation is present. · TV: Moderate tricuspid valve regurgitation is present. · PA: Severe pulmonary hypertension. Pulmonary arterial systolic pressure is 96 mmHg. · LA: Severely dilated left atrium. Left Atrium volume index is 67 mL/m2. · RV: Dilated right ventricle. Pacing wire present · RA: Dilated right atrium. · IVC: Severely elevated central venous pressure (15+ mmHg); IVC diameter is larger than 21 mm and collapses less than 50% with respiration. 
  
Thyroid ultrasound 10/16 
-Large irregular mixed echogenicity right thyroid nodule measuring 2.8 cm, 
with multiple punctate echogenic foci, previously biopsied with results 
concerning for papillary thyroid carcinoma. Recommend referral to ENT. US retroperitoneum 10/19  
1. No renal mass. 2.  Right kidney mid interpolar region parenchymal calcification. 3.  No hydronephrosis. 4.  Grossly unremarkable left kidney. CT head 10/21: No acute intracranial abnormality. Lt patella XR 10/22: No patellar dislocation. Small joint effusion. Moderate degenerative joint disease with chondrocalcinosis. CT scan abdomen and pelvis 10/28 IMPRESSION: 
1. No hydronephrosis. 2.  Right kidney is atrophic. 3.  Moderate bladder distention. 4.  Multichamber cardiac enlargement and findings of heart failure including 
dilated IVC, pulmonary edema and mild anasarca. 5.  Aortic valve calcifications may predispose patient to aortic stenosis. 6.  Unchanged left adrenal adenoma. 7.  Small sliding hiatal hernia. 8.  Diverticulosis. 9.  Moderate atherosclerosis with multifocal visceral ostia stenosis likely. 
  
 
Recent Results (from the past 24 hour(s)) PHOSPHORUS Collection Time: 10/28/20  4:50 AM  
Result Value Ref Range Phosphorus 3.9 2.5 - 4.9 MG/DL MAGNESIUM Collection Time: 10/28/20  4:50 AM  
Result Value Ref Range Magnesium 2.5 1.6 - 2.6 mg/dL CBC WITH AUTOMATED DIFF Collection Time: 10/28/20  4:50 AM  
Result Value Ref Range WBC 7.0 4.6 - 13.2 K/uL  
 RBC 4.40 4.20 - 5.30 M/uL HGB 9.8 (L) 12.0 - 16.0 g/dL HCT 32.0 (L) 35.0 - 45.0 % MCV 72.7 (L) 74.0 - 97.0 FL  
 MCH 22.3 (L) 24.0 - 34.0 PG  
 MCHC 30.6 (L) 31.0 - 37.0 g/dL RDW 20.1 (H) 11.6 - 14.5 % PLATELET 308 584 - 025 K/uL MPV 10.1 9.2 - 11.8 FL  
 NEUTROPHILS 75 (H) 40 - 73 % LYMPHOCYTES 14 (L) 21 - 52 % MONOCYTES 10 3 - 10 % EOSINOPHILS 1 0 - 5 % BASOPHILS 0 0 - 2 %  
 ABS. NEUTROPHILS 5.2 1.8 - 8.0 K/UL  
 ABS. LYMPHOCYTES 1.0 0.9 - 3.6 K/UL  
 ABS. MONOCYTES 0.7 0.05 - 1.2 K/UL  
 ABS. EOSINOPHILS 0.1 0.0 - 0.4 K/UL  
 ABS. BASOPHILS 0.0 0.0 - 0.1 K/UL  
 DF AUTOMATED METABOLIC PANEL, COMPREHENSIVE Collection Time: 10/28/20  4:50 AM  
Result Value Ref Range Sodium 131 (L) 136 - 145 mmol/L Potassium 4.7 3.5 - 5.5 mmol/L Chloride 99 (L) 100 - 111 mmol/L  
 CO2 26 21 - 32 mmol/L Anion gap 6 3.0 - 18 mmol/L Glucose 94 74 - 99 mg/dL BUN 58 (H) 7.0 - 18 MG/DL Creatinine 1.70 (H) 0.6 - 1.3 MG/DL  
 BUN/Creatinine ratio 34 (H) 12 - 20 GFR est AA 35 (L) >60 ml/min/1.73m2 GFR est non-AA 29 (L) >60 ml/min/1.73m2 Calcium 9.3 8.5 - 10.1 MG/DL Bilirubin, total 0.7 0.2 - 1.0 MG/DL  
 ALT (SGPT) 18 13 - 56 U/L  
 AST (SGOT) 24 10 - 38 U/L Alk. phosphatase 202 (H) 45 - 117 U/L Protein, total 7.5 6.4 - 8.2 g/dL Albumin 2.6 (L) 3.4 - 5.0 g/dL Globulin 4.9 (H) 2.0 - 4.0 g/dL A-G Ratio 0.5 (L) 0.8 - 1.7 Results Procedure Component Value Units Date/Time RESPIRATORY PANEL,PCR,NASOPHARYNGEAL [[id number]] Collected:  10/22/20 1500 Order Status:  Canceled Specimen:  NASOPHARYNGEAL SWAB CULTURE, URINE [[id number]]  (Abnormal)  (Susceptibility) Collected:  10/22/20 0830 Order Status:  Completed Specimen:  Urine from Clean catch Updated:  10/26/20 8473 Special Requests: NO SPECIAL REQUESTS [place] Count --     
  >100,000 COLONIES/mL Culture result: ESCHERICHIA COLI (PREDOMINATING) PROTEUS MIRABILIS Susceptibility Escherichia coli KIKI Amikacin ($) Susceptible Ampicillin ($) Resistant Ampicillin/sulbactam ($) Intermediate Cefazolin ($) Susceptible Cefepime ($$) Susceptible Cefoxitin Susceptible Ceftazidime ($) Susceptible Ceftriaxone ($) Susceptible Ciprofloxacin ($) Susceptible Gentamicin ($) Susceptible Levofloxacin ($) Susceptible Meropenem ($$) Susceptible Nitrofurantoin Susceptible Piperacillin/Tazobac ($) Susceptible Tobramycin ($) Susceptible Trimeth/Sulfa Resistant Susceptibility Proteus mirabilis KIKI Amikacin ($) Susceptible Ampicillin ($) Susceptible Ampicillin/sulbactam ($) Susceptible Cefazolin ($) Susceptible Cefepime ($$) Susceptible Cefoxitin Susceptible Ceftazidime ($) Susceptible Ceftriaxone ($) Susceptible Ciprofloxacin ($) Susceptible Gentamicin ($) Susceptible Levofloxacin ($) Susceptible Meropenem ($$) Susceptible Nitrofurantoin Resistant Piperacillin/Tazobac ($) Susceptible Tobramycin ($) Susceptible Trimeth/Sulfa Susceptible Physical Examination:  
GENERAL ASSESSMENT: no acute distress SKIN: normal color, no lesions HEAD: normocephalic EYES: normal eyes, normal lids, lids normal, without swelling and no strabismus or nystagmus NOSE: normal external appearance and nares patent MOUTH: normal mouth and throat CHEST: CTAB, no rales, no rhonchi, no wheezes, respiratory effort normal with no retractions HEART: regular rate and rhythm, normal N1/M6, systolic murmurs ABDOMEN: soft, non-distended, no masses, no hepatosplenomegaly, tenderness to palpation of suprapubic area EXTREMITY: Wound Rt leg clean dressing in place wearing Tubigrip, slow moving and weak.  Winced when both her legs were touched and examined for edema, but only trace edema noted NEURO: cranial nerves 2-12 normal, confused. Awake but inattentive and not oriented to place or date Disposition: transfer to [facility] for valvuloplasty and possible TAVR Discharge Medications:  
Current Discharge Medication List  
  
START taking these medications Details  
atorvastatin (LIPITOR) 40 mg tablet Take 1 Tab by mouth nightly. Qty: 30 Tab, Refills: 0  
  
acetaminophen (TYLENOL) 325 mg tablet Take 2 Tabs by mouth every four (4) hours as needed for Pain. Qty: 30 Tab, Refills: 0  
  
cefTRIAXone 1 gram 1 g IV syringe 1 g by IntraVENous route every twenty-four (24) hours. Stop date after  10/30 dose Qty: 2 Dose, Refills: 0  
  
ergocalciferol (ERGOCALCIFEROL) 1,250 mcg (50,000 unit) capsule Take 1 Cap by mouth every seven (7) days. Qty: 4 Cap, Refills: 0  
  
hydrALAZINE (APRESOLINE) 25 mg tablet Take 1 Tab by mouth three (3) times daily as needed for Other (SBP above 160). Qty: 30 Tab, Refills: 0  
  
megestroL (MEGACE) 40 mg tablet Take 1 Tab by mouth daily. Qty: 30 Tab, Refills: 0  
  
potassium chloride (K-DUR, KLOR-CON) 20 mEq tablet Take 1 Tab by mouth daily. Qty: 30 Tab, Refills: 0  
  
therapeutic multivitamin (THERAGRAN) tablet Take 1 Tab by mouth daily. Qty: 30 Tab, Refills: 0  
  
thiamine HCL (B-1) 100 mg tablet Take 1 Tab by mouth daily. Qty: 30 Tab, Refills: 0 CONTINUE these medications which have CHANGED Details  
apixaban (ELIQUIS) 2.5 mg tablet Take 1 Tab by mouth two (2) times a day. Qty: 60 Tab, Refills: 0  
  
folic acid (FOLVITE) 1 mg tablet Take 1 Tab by mouth daily. Qty: 30 Tab, Refills: 0  
  
furosemide (LASIX) 20 mg tablet Take 1 Tab by mouth daily. Qty: 30 Tab, Refills: 0  
  
metoprolol tartrate (LOPRESSOR) 25 mg tablet Take 1 Tab by mouth two (2) times a day. Qty: 60 Tab, Refills: 0 CONTINUE these medications which have NOT CHANGED Details cyanocobalamin, vitamin B-12, (VITAMIN B12 PO) Take 1,000 mcg by mouth daily. ondansetron hcl (ZOFRAN) 4 mg tablet Take 1 Tab by mouth every eight (8) hours as needed for Nausea. Qty: 30 Tab, Refills: 2 Associated Diagnoses: Nausea  
  
montelukast (SINGULAIR) 10 mg tablet Take 10 mg by mouth daily. STOP taking these medications Calcium-Cholecalciferol, D3, (CALCIUM 600 WITH VITAMIN D3) 600 mg(1,500mg) -400 unit chew Comments:  
Reason for Stopping:   
   
 Digitek 125 mcg (0.125 mg) tablet Comments:  
Reason for Stopping:   
   
 traMADol (ULTRAM) 50 mg tablet Comments:  
Reason for Stopping:   
   
 famotidine (PEPCID) 20 mg tablet Comments:  
Reason for Stopping:   
   
 gabapentin (NEURONTIN) 100 mg capsule Comments:  
Reason for Stopping:   
   
 lactobacillus sp. 50 billion cpu (BIO-K PLUS) 50 billion cell -375 mg cap capsule Comments:  
Reason for Stopping:   
   
 simvastatin (ZOCOR) 80 mg tablet Comments:  
Reason for Stopping:   
   
 cholecalciferol (VITAMIN D3) 1,000 unit cap Comments:  
Reason for Stopping:   
   
 losartan (COZAAR) 100 mg tablet Comments:  
Reason for Stopping: CETIRIZINE HCL (ZYRTEC PO) Comments:  
Reason for Stopping:   
   
  
 
 
Activity: Activity as tolerated Diet: dysphagia diet Wound Care: monitor would and f/u with vascular surgery Discussed with pt with Dr Bryson Litten from internal medicine who also accepted with Dr Dr Acquanetta Libman By: Ricco Barone Call October 28, 2020 The patient was seen and examined independently discussed with student, I agree with the student note. With above changes Time for discharge included discussion with doc family , cardiology and ID coordinating care with nursing staff and [facility] more than 65 minutes Amarjit Gonzalez MD 
10/28/2020

## 2020-10-28 NOTE — PROGRESS NOTES
Progress Note Patient: Praneeth Moreno MRN: [id number]  CSN: [id number] YOB: 1940  Age: 68 y.o. Sex: female DOA: 10/15/2020 LOS:  LOS: 13 days Subjective:  
Pt more alert confused on and off seen by neurology her sx consist ant with delirium should continue to improve Pt might has underlying dementia Pt is currently on Rocephin for a UTI. F/U ID most likely has cystitis Dr. Chapo Tan recommedned continue Rocephin until 10/30 Discussed with Dr Chapo Tan this morning pending ID clearance pt was accepted by  Cardiology Dr Driss Vallejo for [facility] Dr Chapo Tan recommended ct abdomen and pelvis no contrast statif no hydronephrosis pt will be cleared fro transport Pt had galaviz's catheter out had urine residual 300 cc . Discussed with nursing staff will continue bladder scan x24h straight cath if urine residual more than 250 Pt was seen by hematology oncology. Pt started on  IV iron. F/u bone marrow biopsyas outpatient at later time. Dr Barrett Fabian does not feel like it is the highest priority at this time considering all her other current issues. Pending cardiology clearance pt will need total thyroidectomy Chief Complaint:  
Chief Complaint Patient presents with  Peripheral Edema Review of systems General: No fevers or chills. Cardiovascular: No chest pain or pressure. Pulmonary: No shortness of breath, cough or wheeze. Gastrointestinal: No abdominal pain, nausea, vomiting or diarrhea. Genitourinary: No urinary frequency, urgency, hesitancy or dysuria. Musculoskeletal: generalized weakness and severe pain in her legs Neurologic: No headache, generalized weakness Objective:  
 
Physical Exam: 
Visit Vitals /62 (BP 1 Location: Right arm, BP Patient Position: At rest) Pulse 80 Temp 98.3 °F (36.8 °C) Resp 19 Ht 5' 3\" (1.6 m) Wt 54.4 kg (120 lb) SpO2 95% BMI 21.26 kg/m² General:         no acute distress more alert today and able to respond to questions, still confused HEENT:           NC, Atraumatic.  anicteric sclerae. Lungs:            CTA Bilaterally. No Wheezing/Rhonchi/Rales. Heart:              Regular rhythm, systolic Murmurs Abdomen:      Soft, Non distended, Non tender. Extremities:   Wound Rt leg clean dressing in place wearing Tubigrip, slow moving and weak. Winced when both her legs were touched and examined for edema, but only trace edema noted Psych:            Not anxious or agitated. Neurologic:    confused. Alert but not oriented to place or date 
  
Intake and Output: 
Current Shift:  10/28 0701 - 10/28 1900 In: 195 [P.O.:195] Out: - Last three shifts:  10/26 1901 - 10/28 0700 In: 960 [P.O.:960] Out: 2500 [Urine:2500] Labs: Results:  
   
Chemistry Recent Labs 10/28/20 
5927 10/27/20 
0116 10/26/20 
0505 GLU 94 76 79 * 131* 135* K 4.7 3.4* 3.8 CL 99* 97* 102 CO2 26 26 25 BUN 58* 62* 60* CREA 1.70* 1.69* 1.52* CA 9.3 9.6 9.3 AGAP 6 8 8 BUCR 34* 37* 39* * 206* 211* TP 7.5 8.2 7.4 ALB 2.6* 2.7* 2.8*  
GLOB 4.9* 5.5* 4.6* AGRAT 0.5* 0.5* 0.6* CBC w/Diff Recent Labs 10/28/20 
9723 10/27/20 
0116 10/26/20 
0505 WBC 7.0 7.4 7.3 RBC 4.40 4.37 4.28 HGB 9.8* 9.7* 9.7* HCT 32.0* 31.9* 31.2*  
 256 252 GRANS 75* 71 71 LYMPH 14* 18* 19* EOS 1 2 2 Cardiac Enzymes No results for input(s): CPK, CKND1, JONI in the last 72 hours. No lab exists for component: Shagufta Pryor Coagulation No results for input(s): PTP, INR, APTT, INREXT, INREXT in the last 72 hours. Lipid Panel Lab Results Component Value Date/Time  Cholesterol, total 178 10/20/2020 10:05 AM  
 HDL Cholesterol 33 (L) 10/20/2020 10:05 AM  
 LDL, calculated 125.8 (H) 10/20/2020 10:05 AM  
 VLDL, calculated 19.2 10/20/2020 10:05 AM  
 Triglyceride 96 10/20/2020 10:05 AM  
 CHOL/HDL Ratio 5.4 (H) 10/20/2020 10:05 AM  
  
 BNP No results for input(s): BNPP in the last 72 hours. Liver Enzymes Recent Labs 10/28/20 
0450 TP 7.5 ALB 2.6* * Thyroid Studies Lab Results Component Value Date/Time TSH 1.98 10/16/2020 04:22 AM  
    
 
Procedures/imaging: see electronic medical records for all procedures/Xrays and details which were not copied into this note but were reviewed prior to creation of Plan Medications:  
Current Facility-Administered Medications Medication Dose Route Frequency  furosemide (LASIX) tablet 20 mg  20 mg Oral DAILY  cefTRIAXone (ROCEPHIN) 1 g in sterile water (preservative free) 10 mL IV syringe  1 g IntraVENous Q24H  potassium chloride (K-DUR, KLOR-CON) SR tablet 20 mEq  20 mEq Oral DAILY  metoprolol tartrate (LOPRESSOR) tablet 25 mg  25 mg Oral BID  megestroL (MEGACE) tablet 40 mg  40 mg Oral DAILY  acetaminophen (TYLENOL) tablet 650 mg  650 mg Oral Q4H PRN  
 ergocalciferol capsule 50,000 Units  50,000 Units Oral Q7D  
 therapeutic multivitamin (THERAGRAN) tablet 1 Tab  1 Tab Oral DAILY  thiamine HCL (B-1) tablet 100 mg  100 mg Oral DAILY  apixaban (ELIQUIS) tablet 2.5 mg  2.5 mg Oral BID  hydrALAZINE (APRESOLINE) tablet 25 mg  25 mg Oral TID PRN  
 famotidine (PEPCID) tablet 20 mg  20 mg Oral DAILY  folic acid (FOLVITE) tablet 1 mg  1 mg Oral DAILY  montelukast (SINGULAIR) tablet 10 mg  10 mg Oral DAILY  ondansetron hcl (ZOFRAN) tablet 4 mg  4 mg Oral Q8H PRN  
 atorvastatin (LIPITOR) tablet 40 mg  40 mg Oral QHS Assessment/Plan Principal Problem: 
  Acute on chronic diastolic congestive heart failure ([facility] (12/12/2017) Active Problems: 
  Obesity (BMI 30.0-34.9) (10/13/2016) Pulmonary HTN ([facility] (12/21/2016) Papillary thyroid carcinoma ([facility] (12/21/2016) Mild HOCM (hypertrophic obstructive cardiomyopathy) ([facility] (1/12/2017) Aortic stenosis (1/12/2017) Mitral regurgitation (6/13/2017) Isaura (10/15/2020) Severe protein-calorie malnutrition ([facility] (10/21/2020) Plan: 
 
Mental status changes/ confusion- slowly improving. Possible delirium on top of prior dementia 
-Ct scan head no contrast showed no acute intracranial abnormality 
-Neurology recommended discontinuing Tramadol which was done. She seems to be improving since but still has significant confusion. Gabapentin is also on hold for this reason 
-Per Dr. Damon Piper: Her clinical exam significant for impaired attention, confused thinking, disordered speech is consistent with delirium. The cause of this patient's delirium is multifactorial: severe chronic disease (terminal delirium) dehydration, poor nutrition, recent hyponatremia, also suspect thiamine deficiency,thiamin level was normal  
-Urine culture more than 100,00 gram negative. start Rocephin trial to see if mental status changes improve. . Pt will benefit from ABT since she is going for cardiac surgery stop date 10/230 
-Discussed with nutritional consult increase Magic cup to 4 times per day 
-Megace, monitor oral intake UTI/ Cystitis/ most likely colonization Continue Rocephin until 10/30 since she is going for cardiac procedure per ID recommednation F/u Ct scan abdomen and pelvis without contrast  
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  
 B2 Microglobulin 8.9 
bone marrow asp and biopsy if feasible and pst remains stable Discussed with Dr Wilmer Ivy today  Will f/u as outoatinet 
 
 Acute systolic congestive heart failure on top of chronic diastolic congestive heart failure / severe aortic stenosis/ PAF 
 D/C  Bumex 0.5 mg/h Lasix restarted per Cardiology Check cardiac enzyme every 8 troponin on admission  slightly up Pt has been stable Continue Eliquis Hypertension:  
 Metoprolol to 25 mg BID  
 She used to take 50 mg BID at home  
 use hydralazine prn Pt not on cozaar at home anymore before admission 
  
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 
  
Hypokalemia: 
-Resolved 
-po potassium 20 praveen daily 
-contiinue to monitor lab Venous stasis ulcer Wound care Pt was seen by vascular as outpatinet Continue wound care and f/u vascular surgery consult Paroxysmal A. fib Decrease  Eliquis 2.5 mg twice daily per pharmacy due to renal function Follow-up CBC Left Knee pain  
-X-ray of L leg showed small joint effusion but no patellar dislocation Hyperlipidemia Continue Zocor 80 mg nightly Follow-up liver function 
  
History of Anemia with low iron 10/21: 
-Iron: 19 decreased from 27 on 10/16 
-TIBC: 382 
-continue iron repletion started on Iv iron per hematology History of thyroid cancer 
-Pt seen by ENT who recommended Thyroidectomy when she is able to tolerate surgery 
- repeat ultrasound done this admission no sign of metastatics ct head and chest negative   
-Pending cardiology clearance after TAVR Cardiology consult 
-Aortic Stenosis has now become severe with mean gradient of 43. Was moderate on echo 9/19. Will plan to transfer to [facility] for balloon valvuloplasty and possible TAVR once non cardiac issues managed. 
-waiting for ID to clear pt to assure that she doesn't have an infection before undergoing cardiac surgery Continue rocephin until 10/30 monitor pt off ABT Monitor mental status F/u cardiology recommendation Might need SNF or rehab depends on her response to treatment Discussed with her SON Анна Mack at [phone] over the weekend will call again today  and care given Mr Rich Victoria  and  Russ Still his on the face sheet for emergency contact . Continue monitor lab PT and OT evaluation and treatment Wound care F/U bone marrow Biopsy and thyroidectomy after TAVR Discussed with nursing staff , ID Dr Jonathon Colbert, pt and  , caregiver F/u CT scan abdomen and pelvis stat  And clearance Discussed with Dr Jeffery Sánchez once ID clear her he will arrange transport .  Pt was accepted by Dr Yang Guerin MD 
10/28/2020 9:00 M

## 2020-10-28 NOTE — PROGRESS NOTES
Infectious Disease progress note Reason: Metabolic encephalopathy, bacteriuria, evaluate for infection ? cystitis Current abx Prior abx Ceftriaxone since 10/24/20 Lines:  
 
 
Assessment : 
 
 
 62-year-old female with a history of HOCM, pulmonary hypertension, congestive heart failure, chronic atrial fibrillation on anticoagulation, aortic stenosis, and hypertension presented to emergency department on 10/15/2020 with increasing swelling for  2 weeks.  
  
Now with altered mentation, bacteriuria Altered mental status likely metabolic encephalopathy secondary to noninfectious etiology of altered mentation such as medication induced  
 
probable evolving e.coli/proteus cystitis Urine culture 10/22->2000 colonies of E. coli, Proteus. Status post ceftriaxone since 10/24/2020 since persistent lethargy, concern for evolving cystitis. Susceptibilities of E. coli, Proteus reviewed today. Both bacteria susceptible to ceftriaxone. Continued episodes of confusion noted today. No evidence of hydronephrosis, complicated UTI noted on CT abdomen/pelvis 10/28. Management of urinary tract infection complicated due to urinary retention. Greater than 300 cc post void residual urine volume noted on bladder scan. Need for Layton catheter Plans for aortic valve procedure once infection cleared 
  
Recommendations: 
  
1. Continue ceftriaxone till 10/30/2020 2. Okay to proceed with the planned cardiac procedure next week if continued clinical improvement noted after completion of antibiotics on 10/30 3. Recommend voiding trial after few days to determine if Layton catheter can be removed Advance Care planning: full code: discussed  with patient/surrogate decision maker: eder garcia: [phone] 
  
  
Above plan was discussed in details with primary team. Please call me if any further questions or concerns. Will continue to participate in the care of this patient.  
 
 
HPI: 
 
 Thinks that she is at home in [place]. Denies chest pain, shortness of breath, abdominal pain. home Medication List  
 Details Digitek 125 mcg (0.125 mg) tablet take 1 tablet by mouth once daily 
Qty: 90 Tab, Refills: 1 Details  
apixaban (Eliquis) 5 mg tablet Take 1 Tab by mouth two (2) times a day. Qty: 60 Tab, Refills: 3 Calcium-Cholecalciferol, D3, (CALCIUM 600 WITH VITAMIN D3) 600 mg(1,500mg) -400 unit chew Take 1 Tab by mouth daily. furosemide (LASIX) 40 mg tablet take 1 tablet by mouth every morning and 1/2 tablet AT 2 P.M. 
Qty: 45 Tab, Refills: 0  
  
metoprolol tartrate (LOPRESSOR) 100 mg IR tablet take 1 tablet by mouth twice a day 
Qty: 60 Tab, Refills: 6  
  
traMADol (ULTRAM) 50 mg tablet Take 1 Tab by mouth every six (6) hours as needed for Pain. folic acid 503 mcg tablet Take 400 mcg by mouth daily. cyanocobalamin, vitamin B-12, (VITAMIN B12 PO) Take 1,000 mcg by mouth daily. famotidine (PEPCID) 20 mg tablet Take 1 Tab by mouth daily. Qty: 30 Tab, Refills: 0  
  
gabapentin (NEURONTIN) 100 mg capsule Take 1 Cap by mouth nightly. Max Daily Amount: 100 mg. Qty: 30 Cap, Refills: 0 Associated Diagnoses: Pain and swelling of lower leg, unspecified laterality  
  
lactobacillus sp. 50 billion cpu (BIO-K PLUS) 50 billion cell -375 mg cap capsule Take 1 Cap by mouth daily. Qty: 10 Cap, Refills: 0  
  
ondansetron hcl (ZOFRAN) 4 mg tablet Take 1 Tab by mouth every eight (8) hours as needed for Nausea. Qty: 30 Tab, Refills: 2 Associated Diagnoses: Nausea  
  
simvastatin (ZOCOR) 80 mg tablet TAKE 1 TABLET NIGHTLY Qty: 90 Tab, Refills: 1 Associated Diagnoses: Nonrheumatic aortic valve insufficiency  
  
cholecalciferol (VITAMIN D3) 1,000 unit cap Take  by mouth daily. losartan (COZAAR) 100 mg tablet Take 100 mg by mouth daily. montelukast (SINGULAIR) 10 mg tablet Take 10 mg by mouth daily. CETIRIZINE HCL (ZYRTEC PO) Take  by mouth. Current Facility-Administered Medications Medication Dose Route Frequency  furosemide (LASIX) tablet 20 mg  20 mg Oral DAILY  cefTRIAXone (ROCEPHIN) 1 g in sterile water (preservative free) 10 mL IV syringe  1 g IntraVENous Q24H  potassium chloride (K-DUR, KLOR-CON) SR tablet 20 mEq  20 mEq Oral DAILY  metoprolol tartrate (LOPRESSOR) tablet 25 mg  25 mg Oral BID  megestroL (MEGACE) tablet 40 mg  40 mg Oral DAILY  acetaminophen (TYLENOL) tablet 650 mg  650 mg Oral Q4H PRN  
 ergocalciferol capsule 50,000 Units  50,000 Units Oral Q7D  
 therapeutic multivitamin (THERAGRAN) tablet 1 Tab  1 Tab Oral DAILY  thiamine HCL (B-1) tablet 100 mg  100 mg Oral DAILY  apixaban (ELIQUIS) tablet 2.5 mg  2.5 mg Oral BID  hydrALAZINE (APRESOLINE) tablet 25 mg  25 mg Oral TID PRN  
 famotidine (PEPCID) tablet 20 mg  20 mg Oral DAILY  folic acid (FOLVITE) tablet 1 mg  1 mg Oral DAILY  montelukast (SINGULAIR) tablet 10 mg  10 mg Oral DAILY  ondansetron hcl (ZOFRAN) tablet 4 mg  4 mg Oral Q8H PRN  
 atorvastatin (LIPITOR) tablet 40 mg  40 mg Oral QHS Allergies: Patient has no known allergies. Temp (24hrs), Av.1 °F (36.7 °C), Min:97.6 °F (36.4 °C), Max:98.4 °F (36.9 °C) Visit Vitals /62 (BP 1 Location: Right arm, BP Patient Position: At rest) Pulse 80 Temp 98.3 °F (36.8 °C) Resp 19 Ht 5' 3\" (1.6 m) Wt 54.4 kg (120 lb) SpO2 95% BMI 21.26 kg/m² ROS: Unable to obtain since confused Physical Exam: 
 
General:         no acute distress,  communicative, pleasantly confused HEENT:           NC, Atraumatic.  anicteric sclerae. Lungs:            CTA Bilaterally. No Wheezing/Rhonchi/Rales. Heart:              Regular rhythm Abdomen:      Soft, Non distended, Non tender. Extremities:    no edema Psych:            Not anxious or agitated. Neurologic:     No gross motor or sensory deficits noted Labs: Results:  
Chemistry Recent Labs 10/28/20 4953 10/27/20 
0116 10/26/20 
0505 GLU 94 76 79 * 131* 135* K 4.7 3.4* 3.8 CL 99* 97* 102 CO2 26 26 25 BUN 58* 62* 60* CREA 1.70* 1.69* 1.52* CA 9.3 9.6 9.3 AGAP 6 8 8 BUCR 34* 37* 39* * 206* 211* TP 7.5 8.2 7.4 ALB 2.6* 2.7* 2.8*  
GLOB 4.9* 5.5* 4.6* AGRAT 0.5* 0.5* 0.6* CBC w/Diff Recent Labs 10/28/20 
4818 10/27/20 
0116 10/26/20 
0505 WBC 7.0 7.4 7.3 RBC 4.40 4.37 4.28 HGB 9.8* 9.7* 9.7* HCT 32.0* 31.9* 31.2*  
 256 252 GRANS 75* 71 71 LYMPH 14* 18* 19* EOS 1 2 2 Microbiology No results for input(s): CULT in the last 72 hours. RADIOLOGY: 
 
All available imaging studies/reports in [facility] for this admission were reviewed Dr. Filipe Sanchez, Infectious Disease Specialist 
[phone] October 28, 2020 
9:31 AM

## 2020-10-28 NOTE — PROGRESS NOTES
Progress Note Patient: Annie Sage MRN: [id number]  CSN: [id number] YOB: 1940  Age: 68 y.o. Sex: female DOA: 10/15/2020 LOS:  LOS: 13 days Subjective:  
Pt is still confused and cannot answer all questions correctly. She has difficulty opening her eyes and loses focus easily. She is aware of who she is but not of the date or location. Today she c/o headache and reports mild suprapubic pain. COVID negative Pt is currently on Rocephin for a UTI being managed by ID until 10/30/2020. She has been restarted on lasix by Cardiology who wants to wait for ID to sign off before transferring her to [facility] for valvuloplasty and TAVR. ID wants to get a CT scan of abdomen/pelvis before signing off on her transfer. Pt was seen by hematology oncology. Pt started on  IV iron. F/u bone marrow biopsy and condition as outpatient at later time. Dr Zoran Fitzpatrick does not feel like it is the highest priority at this time considering all her other current issues. Pending cardiology clearance pt will need total thyroidectomy per ENT consult. Chief Complaint:  
Chief Complaint Patient presents with  Peripheral Edema Review of systems General:reports difficulty sleeping Cardiovascular: No chest pain or pressure. Pulmonary: No shortness of breath, cough or wheeze. Gastrointestinal: No abdominal pain, nausea, vomiting or diarrhea. Genitourinary: No urinary frequency, urgency, hesitancy or dysuria. Musculoskeletal: generalized weakness and severe pain in her legs Neurologic: reports headache and generalized weakness Objective:  
 
Physical Exam: 
Visit Vitals /62 (BP 1 Location: Right arm, BP Patient Position: At rest) Pulse 80 Temp 98.3 °F (36.8 °C) Resp 19 Ht 5' 3\" (1.6 m) Wt 54.4 kg (120 lb) SpO2 95% BMI 21.26 kg/m² General:         no acute distress, still confused and having difficulty answering questions HEENT:           NC, Atraumatic.  anicteric sclerae. Lungs:            CTA Bilaterally. No Wheezing/Rhonchi/Rales. Heart:              Regular rhythm, systolic Murmurs Abdomen:      Soft, Non distended, suprapubic tenderness Extremities:   Wound Rt leg clean dressing in place wearing Tubigrip, slow moving and weak. Winced when both her legs were touched and examined for edema, but only trace edema noted Psych:            Not anxious or agitated. Neurologic:    confused. Awake, but inattentive and not oriented to place or date 
  
Intake and Output: 
Current Shift:  10/28 0701 - 10/28 1900 In: 195 [P.O.:195] Out: - Last three shifts:  10/26 1901 - 10/28 0700 In: 960 [P.O.:960] Out: 2500 [Urine:2500] Labs: Results:  
   
Chemistry Recent Labs 10/28/20 
3201 10/27/20 
0116 10/26/20 
0505 GLU 94 76 79 * 131* 135* K 4.7 3.4* 3.8 CL 99* 97* 102 CO2 26 26 25 BUN 58* 62* 60* CREA 1.70* 1.69* 1.52* CA 9.3 9.6 9.3 AGAP 6 8 8 BUCR 34* 37* 39* * 206* 211* TP 7.5 8.2 7.4 ALB 2.6* 2.7* 2.8*  
GLOB 4.9* 5.5* 4.6* AGRAT 0.5* 0.5* 0.6* CBC w/Diff Recent Labs 10/28/20 
2829 10/27/20 
0116 10/26/20 
0505 WBC 7.0 7.4 7.3 RBC 4.40 4.37 4.28 HGB 9.8* 9.7* 9.7* HCT 32.0* 31.9* 31.2*  
 256 252 GRANS 75* 71 71 LYMPH 14* 18* 19* EOS 1 2 2 Cardiac Enzymes No results for input(s): CPK, CKND1, JONI in the last 72 hours. No lab exists for component: Dominick Gleason Coagulation No results for input(s): PTP, INR, APTT, INREXT, INREXT in the last 72 hours. Lipid Panel Lab Results Component Value Date/Time Cholesterol, total 178 10/20/2020 10:05 AM  
 HDL Cholesterol 33 (L) 10/20/2020 10:05 AM  
 LDL, calculated 125.8 (H) 10/20/2020 10:05 AM  
 VLDL, calculated 19.2 10/20/2020 10:05 AM  
 Triglyceride 96 10/20/2020 10:05 AM  
 CHOL/HDL Ratio 5.4 (H) 10/20/2020 10:05 AM  
  
BNP No results for input(s): BNPP in the last 72 hours. Liver Enzymes Recent Labs 10/28/20 
0450 TP 7.5 ALB 2.6* * Thyroid Studies Lab Results Component Value Date/Time TSH 1.98 10/16/2020 04:22 AM  
    
 
Procedures/imaging: see electronic medical records for all procedures/Xrays and details which were not copied into this note but were reviewed prior to creation of Plan Medications:  
Current Facility-Administered Medications Medication Dose Route Frequency  furosemide (LASIX) tablet 20 mg  20 mg Oral DAILY  cefTRIAXone (ROCEPHIN) 1 g in sterile water (preservative free) 10 mL IV syringe  1 g IntraVENous Q24H  potassium chloride (K-DUR, KLOR-CON) SR tablet 20 mEq  20 mEq Oral DAILY  metoprolol tartrate (LOPRESSOR) tablet 25 mg  25 mg Oral BID  megestroL (MEGACE) tablet 40 mg  40 mg Oral DAILY  acetaminophen (TYLENOL) tablet 650 mg  650 mg Oral Q4H PRN  
 ergocalciferol capsule 50,000 Units  50,000 Units Oral Q7D  
 therapeutic multivitamin (THERAGRAN) tablet 1 Tab  1 Tab Oral DAILY  thiamine HCL (B-1) tablet 100 mg  100 mg Oral DAILY  apixaban (ELIQUIS) tablet 2.5 mg  2.5 mg Oral BID  hydrALAZINE (APRESOLINE) tablet 25 mg  25 mg Oral TID PRN  
 famotidine (PEPCID) tablet 20 mg  20 mg Oral DAILY  folic acid (FOLVITE) tablet 1 mg  1 mg Oral DAILY  montelukast (SINGULAIR) tablet 10 mg  10 mg Oral DAILY  ondansetron hcl (ZOFRAN) tablet 4 mg  4 mg Oral Q8H PRN  
 atorvastatin (LIPITOR) tablet 40 mg  40 mg Oral QHS Assessment/Plan Principal Problem: 
  Acute on chronic diastolic congestive heart failure ([facility] (12/12/2017) Active Problems: 
  Obesity (BMI 30.0-34.9) (10/13/2016) Pulmonary HTN ([facility] (12/21/2016) Papillary thyroid carcinoma ([facility] (12/21/2016) Mild HOCM (hypertrophic obstructive cardiomyopathy) ([facility] (1/12/2017) Aortic stenosis (1/12/2017) Mitral regurgitation (6/13/2017) Anasarca (10/15/2020) Severe protein-calorie malnutrition ([facility] (10/21/2020) Plan: 
 
Mental status changes/ confusion- slowly improving. Possible delirium on top of prior dementia 
-Ct scan head no contrast showed no acute intracranial abnormality 
-Neurology recommended discontinuing Tramadol which was done. She seems to be improving since but still has significant confusion. Gabapentin is also on hold for this reason 
-Per Dr. Quintin Silver: Her clinical exam significant for impaired attention, confused thinking, disordered speech is consistent with delirium. The cause of this patient's delirium is multifactorial: severe chronic disease (terminal delirium) dehydration, poor nutrition, recent hyponatremia, also suspect thiamine deficiency, thiamine level pending 
-Urine culture more than 100,00 gram negative. start Rocephin trial to see if mental status changes improve. Discussed with ID Dr Adria Link will f/u culture and sensitivity . Pt will benefit from ABT since she is going for cardiac surgery 
-Discussed with nutritional consult increase Magic cup to 4 times per day 
-Start Megace, monitor oral intake spoke with Dr. 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  
 B2 Microglobulin 8.9 
bone marrow asp and biopsy if feasible and pst remains stable Discussed with Dr Tru Stevenson. Will f/u as outpatient 
 
 Acute systolic congestive heart failure on top of chronic diastolic congestive heart failure / severe aortic stenosis D/C  Bumex 0.5 mg/h Lasix restarted per Cardiology Check cardiac enzyme every 8 troponin on admission  slightly up Hypertension:  
Improved on  metoprolol to 25 mg BID She used to take 50 mg BID at home  
 use hydralazine prn Pt not on cozaar at home anymore before admission 
  
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 
  
Hypokalemia: 
-potassium is 4.7 today 
-po potassium 20 praveen daily 
-continue to monitor lab Venous stasis ulcer Wound care Pt was seen by vascular as outpatinet Continue wound care and f/u vascular surgery consult Paroxysmal A. fib Decrease  Eliquis 2.5 mg twice daily per pharmacy due to renal function Follow-up CBC Left Knee pain  
-X-ray of L leg showed small joint effusion but no patellar dislocation Hyperlipidemia Continue Zocor 80 mg nightly Follow-up liver function 
  
History of Anemia with low iron 10/21: 
-Iron: 19 decreased from 27 on 10/16 
-TIBC: 382 
-continue iron repletion started on Iv iron per hematology History of thyroid cancer 
-Pt seen by ENT who recommended Thyroidectomy when she is able to tolerate surgery 
- repeat ultrasound done this admission no sign of metastatics ct head and chest negative   
-Pending cardiology clearance after TAVR Cardiology consult 
-Aortic Stenosis has now become severe with mean gradient of 43. Was moderate on echo 9/19. plan to transfer to [facility] for balloon valvuloplasty and possible TAVR once non cardiac issues managed. 
-waiting for ID to clear pt to assure that she doesn't have an infection before undergoing cardiac surgery. Expect clearance on 10/30/2020. Transfer plan: 
-Discussed with her Epi Segura at [phone]. He explained her  is sick too , and she has a friend care giver Krystal crouchnton his on the face sheet for emergency contact 
-contacted both Mr. Glenna Mercado (caregiver) and Mr. Eliza Mcduffie () today and hey approved her transfer and understand the risks and benefits. They stated they will call back with any questions. 
-waiting for CT abd/pelvis results for ID to approve transfer Follow-up urine culture Monitor mental status Cbc, cmp, mag in AM 
 PT and OT evaluation and treatment Wound care F/U bone marrow Biopsy and thyroidectomy after TAVR Marcelo Needs Call 
10/28/2020 10:30 AM

## 2020-10-28 NOTE — PROGRESS NOTES
[facility] Progress Note CARDIOLOGY PROGRESS NOTE 
RECS: 
 
 
1. Acute on chronic diastolic congestive heart failure-appears compensated now. SOB and BLE improved well. Continue low dose lasix po. Monitor electrolytes closely 2. Hypertrophic obstructive cardiomyopathy-S/p alcohol septal ablation 2/19 at [facility] 3. Hx complete heart block - s/p dual chambers permanent pacemaker 2/19 [employer]  
4. Paroxysmal  Atrial fibrillation-rate controlled. on Eliquis for stroke prevention. 5. Aortic stenosis -has become severe now with mean gradient of 43. Was moderate on  echo in 9/19. Will plan to transfer to [facility] for balloon valvuloplasty and possible TAVR once cleared by ID. Discussed with Dr. Yossi Niño. 6. Pulmonary hypertension-  worsening gradually with pulmonary pressure of 96 now. Echo 9/19 showed PAP 69 mmHg 7. CKD- renal indices stable 8. AMS-improving well. Head CT no acute findings- better 9. UTI- being managed by ID. On iv antibiotics she will finish Tx on Friday. CT abdomen and Pelvis pending 10. Hx of thyroid CA- diagnosed approximately 4 years prior. ENT consulted not a candidate for any aggressive surgical plan unless airway compromise is noted. 11. Hyponatremia-renal following. S/p Tolvaptan ASSESSMENT: 
Hospital Problems  Date Reviewed: 10/15/2020 Codes Class Noted POA Severe protein-calorie malnutrition ([facility] ICD-10-CM: O15 ICD-9-CM: 844  10/21/2020 No  
   
 Anasarca ICD-10-CM: R60.1 ICD-9-CM: 782.3  10/15/2020 Unknown * (Principal) Acute on chronic diastolic congestive heart failure (HCC) ICD-10-CM: I50.33 ICD-9-CM: 428.33, 428.0  12/12/2017 Yes Mitral regurgitation ICD-10-CM: I34.0 ICD-9-CM: 424.0  6/13/2017 Yes Mild HOCM (hypertrophic obstructive cardiomyopathy) (HCC) ICD-10-CM: I42.1 ICD-9-CM: 425.11  1/12/2017 Yes Aortic stenosis ICD-10-CM: I35.0 ICD-9-CM: 424.1  1/12/2017 Yes Pulmonary HTN ([facility] ICD-10-CM: I27.20 ICD-9-CM: 416.8  12/21/2016 Yes Papillary thyroid carcinoma ([facility] ICD-10-CM: F93 ICD-9-CM: 846  12/21/2016 Yes Obesity (BMI 30.0-34.9) ICD-10-CM: Z26.6 ICD-9-CM: 278.00  10/13/2016 Yes SUBJECTIVE: 
 
C/o dyspnea and weakness. Alert and oriented x3 except intermittent confusion OBJECTIVE: 
 
VS:  
Visit Vitals /62 (BP 1 Location: Right arm, BP Patient Position: At rest) Pulse 80 Temp 98.3 °F (36.8 °C) Resp 19 Ht 5' 3\" (1.6 m) Wt 54.4 kg (120 lb) SpO2 95% BMI 21.26 kg/m² Intake/Output Summary (Last 24 hours) at 10/28/2020 1048 Last data filed at 10/28/2020 5027 Gross per 24 hour Intake 435 ml Output 1000 ml Net -565 ml  
 
  
TELE: DDD pacing 
  
General:alert and oriented today able to answer questions appropriately HENT: Normocephalic, atraumatic. Normal external eye. Neck :  bruit present, increased JVP Cardiac:  regular rate and rhythm, S1: normal intensity, S2: decreased intensity, systolic murmur: late systolic 3/6, crescendo at 2nd left intercostal space, Chest/Lungs:clear to ausculation Abdomen: Soft, nontender, no masses Extremities: improved  edema, peripheral pulses not palpable Labs: Results:  
   
Chemistry Recent Labs 10/28/20 
0116 10/27/20 
0116 10/26/20 
0505 GLU 94 76 79 * 131* 135* K 4.7 3.4* 3.8 CL 99* 97* 102 CO2 26 26 25 BUN 58* 62* 60* CREA 1.70* 1.69* 1.52* CA 9.3 9.6 9.3 MG 2.5 2.3 2.4 PHOS 3.9 3.7 3.1 AGAP 6 8 8 BUCR 34* 37* 39* * 206* 211* TP 7.5 8.2 7.4 ALB 2.6* 2.7* 2.8*  
GLOB 4.9* 5.5* 4.6* AGRAT 0.5* 0.5* 0.6* CBC w/Diff Recent Labs 10/28/20 
9141 10/27/20 
0116 10/26/20 
0505 WBC 7.0 7.4 7.3 RBC 4.40 4.37 4.28 HGB 9.8* 9.7* 9.7* HCT 32.0* 31.9* 31.2*  
 256 252 GRANS 75* 71 71 LYMPH 14* 18* 19* EOS 1 2 2 Cardiac Enzymes No results for input(s): CPK, CKND1, JONI in the last 72 hours. 
 
No lab exists for component: Alicia Karly Coagulation No results for input(s): PTP, INR, APTT, INREXT, INREXT in the last 72 hours. Lipid Panel Lab Results Component Value Date/Time Cholesterol, total 178 10/20/2020 10:05 AM  
 HDL Cholesterol 33 (L) 10/20/2020 10:05 AM  
 LDL, calculated 125.8 (H) 10/20/2020 10:05 AM  
 VLDL, calculated 19.2 10/20/2020 10:05 AM  
 Triglyceride 96 10/20/2020 10:05 AM  
 CHOL/HDL Ratio 5.4 (H) 10/20/2020 10:05 AM  
  
BNP No results for input(s): BNPP in the last 72 hours. Liver Enzymes Recent Labs 10/28/20 
0450 TP 7.5 ALB 2.6* * Digoxin Thyroid Studies Lab Results Component Value Date/Time TSH 1.98 10/16/2020 04:22 AM  
    
 
 
 
NEO Gillis I have independently evaluated and examined the patient. All relevant labs and testing data are reviewed. Care plan discussed and updated after review.  
Georgiana Avendaño MD

## 2020-10-28 NOTE — ROUTINE PROCESS
Bedside and Verbal shift change report given to Nuno Moran RN (oncoming nurse) by Jose Mitchell (offgoing nurse). Report included the following information SBAR, Kardex, MAR and Recent Results. SITUATION: 
Code Status: Prior Reason for Admission: Anasarca [R60.1] Hospital day: 15 Problem List:  
   
Hospital Problems  Date Reviewed: 10/15/2020 Codes Class Noted POA Severe protein-calorie malnutrition ([facility] ICD-10-CM: K72 ICD-9-CM: 223  10/21/2020 No  
   
 Anasarca ICD-10-CM: R60.1 ICD-9-CM: 782.3  10/15/2020 Unknown * (Principal) Acute on chronic diastolic congestive heart failure (HCC) ICD-10-CM: I50.33 ICD-9-CM: 428.33, 428.0  12/12/2017 Yes Mitral regurgitation ICD-10-CM: I34.0 ICD-9-CM: 424.0  6/13/2017 Yes Mild HOCM (hypertrophic obstructive cardiomyopathy) (HCC) ICD-10-CM: I42.1 ICD-9-CM: 425.11  1/12/2017 Yes Aortic stenosis ICD-10-CM: I35.0 ICD-9-CM: 424.1  1/12/2017 Yes Pulmonary HTN ([facility] ICD-10-CM: I27.20 ICD-9-CM: 416.8  12/21/2016 Yes Papillary thyroid carcinoma ([facility] ICD-10-CM: X13 ICD-9-CM: 626  12/21/2016 Yes Obesity (BMI 30.0-34.9) ICD-10-CM: G11.8 ICD-9-CM: 278.00  10/13/2016 Yes BACKGROUND: 
 Past Medical History:  
Past Medical History:  
Diagnosis Date  Arthritis  Atrial fibrillation ([facility]  CHF (congestive heart failure) ([facility]  Heart murmur  History of seasonal allergies  HTN (hypertension)  Hypercholesteremia  Moderate aortic stenosis  Pulmonary hypertension ([facility]  Venous insufficiency Patient taking anticoagulants no Patient has a defibrillator: no  
 History of shots YES for example, flu, pneumonia, tetanus Isolation History NO for example, MRSA, CDiff ASSESSMENT: 
Changes in Assessment Throughout Shift: None Significant Changes in 24 hours (for example, RR/code, fall) Patient has Central Line: no  
 
Patient has Layton Cath: No 
Mobility Issues PT 
IV Patency OR Checklist 
Pending Tests Last Vitals: 
Vitals w/ MEWS Score (last day) Date/Time MEWS Score Pulse Resp Temp BP Level of Consciousness SpO2  
 10/28/20 0403  1  66  17  98.4 °F (36.9 °C)  (!) 134/59  Alert  96 % 10/28/20 0020  1  72  17  98.1 °F (36.7 °C)  132/63  Alert  97 % 10/27/20 2001  1  65  20  98.2 °F (36.8 °C)  135/67  Alert  99 % 10/27/20 1641  1  62  20  97.6 °F (36.4 °C)  (!) 118/56  Alert  98 % 10/27/20 1155  1  70  18  98.1 °F (36.7 °C)  114/61  Alert  97 % 10/27/20 0845  1  69  17  97.9 °F (36.6 °C)  137/66  Alert  97 % 10/27/20 0402  1  65  16  97.1 °F (36.2 °C)  (!) 131/59  Alert  99 % PAIN Pain Assessment Pain Intensity 1: 0 (10/28/20 0530) Pain Location 1: Abdomen Pain Intervention(s) 1: Medication (see MAR) Patient Stated Pain Goal: 0 Intervention effective: N/A Time of last intervention: N/A Reassessment Completed: N/A Other actions taken for pain: Distraction Last 3 Weights: 
Last 3 Recorded Weights in this Encounter 10/24/20 2007 10/27/20 0183 10/28/20 0718 Weight: 57.3 kg (126 lb 4.8 oz) 58.4 kg (128 lb 12 oz) 54.4 kg (120 lb) Weight change: -3.968 kg (-8 lb 12 oz) INTAKE/OUPUT Current Shift: 10/27 1901 - 10/28 0700 In: 240 [P.O.:240] Out: 300 [Urine:300] Last three shifts: 10/26 0701 - 10/27 1900 In: 720 [P.O.:720] Out: 3150 [BVGXC:[id number]] RECOMMENDATIONS AND DISCHARGE PLANNING Patient needs and requests: Assistance with ADLs Pending tests/procedures: Labs Discharge plan for patient: Home Discharge planning Needs or Barriers: None Estimated Discharge Date: 10/30 Posted on Whiteboard in [address] [place] [address] Room: yes \"HEALS\" SAFETY CHECK A safety check occurred in the patient's room between off going nurse and oncoming nurse listed above. The safety check included the below items: 
 
H High Alert Medications Verify all high alert medication drips (heparin, PCA, etc.) E Equipment Suction is set up for ALL patients (with yisel) Red plugs utilized for all equipment (IV pumps, etc.) WOWs wiped down at end of shift. Room stocked with oxygen, suction, and other unit-specific supplies A Alarms Bed alarm is set for fall risk patients Ensure chair alarm is in place and activated if patient is up in a chair L Lines Check IV for any infiltration Layton bag is empty if patient has a Layton Tubing and IV bags are labeled Kathy Fonseca Safety Room is clean, patient is clean, and equipment is clean. Hallways are clear from equipment besides carts. Fall bracelet on for fall risk patients Ensure room is clear and free of clutter Suction is set up for ALL patients (with yisel) Hallways are clear from equipment besides carts. Isolation precautions followed, supplies available outside room, sign posted Pallavi Solorzano

## 2020-10-28 NOTE — HOME CARE
Noted and confirmed with  Yuliet Forte) that Active patient with [facility] will be transferring to [facility] today, [facility] central intake and scheduling notified, [facility] will follow. CHIKIS CARVER.

## 2020-10-28 NOTE — PROGRESS NOTES
Problem: Mobility Impaired (Adult and Pediatric) Goal: *Acute Goals and Plan of Care (Insert Text) Description: Physical Therapy Goals Re-evaluated 10/22/2020 and to be accomplished within 7 days 1. Patient will move from supine to sit and sit to supine in bed with minimal assistance. 2. Patient transfer from bed to chair and chair to bed with moderate assistance. 3. Patient will perform sit to stand with moderate assistance. 4. Patient will ambulate x 5 feet with moderate assistance with least restrictive device. Initiated 10/16/2020 and to be accomplished within 7 day(s) 1. Patient will move from supine to sit and sit to supine , scoot up and down, and roll side to side in bed with modified independence. 2.  Patient will transfer from bed to chair and chair to bed with supervision/set-up using the least restrictive device. 3.  Patient will perform sit to stand with supervision/set-up. 4.  Patient will ambulate with supervision/set-up for 50 feet with the least restrictive device. PLOF: Pt reports independence with dressing and bathing, ambulating with RW. Lives in 1st floor apartment with  and no DEBBIE. Pt reports having someone come in to  after them daily and has someone come into clean 1 time per week. Outcome: Progressing Towards Goal 
PHYSICAL THERAPY TREATMENT Patient: Alon Phillips (89 y.o. female) Date: 10/28/2020 Diagnosis: Anasarca [R60.1] Acute on chronic diastolic congestive heart failure ([facility] [address] Precautions: Fall ASSESSMENT: 
Patient agreeable to PT session co-treatment with OT to maximize pt safety and functional mobility. She is pleasantly confused and cooperative. Patient demonstrates improvement with bed mobility, needing less assistance with supine<>sit transfers. Pt c/o pain in B feet and lower legs. Verbal cues with manual assistance provided to bend knees while sitting EOB to prepare for transfer.  Educated patient on [address] for SPT to [facility]. Patient performs 2 trials of sit to stand with maximum assistance x2, unable to achieve full stand. She also presents with impaired standing balance, poor tolerance to weight bearing, and inability to progress right LE. Unsuccessful transfer to [facility]. Pt found to have bloody chux pad with clot, changed chux pad and notified RN at end of session. Patient performs supine to sit with minimal assistance at LE's. She is able to roll to her right with stand by assistance and additional time in order to place bed pan. Patient left on bed pan with call bell within reach. Will continue with POC in order to progress towards goals. Progression toward goals:  
[]      Improving appropriately and progressing toward goals [x]      Improving slowly and progressing toward goals 
[]      Not making progress toward goals and plan of care will be adjusted PLAN: 
Patient continues to benefit from skilled intervention to address the above impairments. Continue treatment per established plan of care. Discharge Recommendations:  Mars Thomas Further Equipment Recommendations for Discharge:  defer to facility SUBJECTIVE:  
Patient stated I wanted to go home to get my package OBJECTIVE DATA SUMMARY:  
Critical Behavior: 
Neurologic State: Confused Orientation Level: Oriented to person, Disoriented to place, Disoriented to situation, Disoriented to time Cognition: Follows commands Safety/Judgement: Fall prevention Functional Mobility Training: 
Bed Mobility: 
  
Supine to Sit: Stand-by assistance; Additional time Sit to Supine: Minimum assistance; Additional time Scooting: Maximum assistance;Assist x2(to HOB) Transfers: 
Sit to Stand: Maximum assistance;Assist x2 Stand to Sit: Maximum assistance;Assist x2 Balance: 
Sitting: Impaired Sitting - Static: Good (unsupported) Sitting - Dynamic: Fair (occasional) Standing: Impaired; With support Standing - Static: Poor Pain: 
Pain level pre-treatment: -/10 patient reports pain in B lower legs, michaelle not rate on pain scale Pain level post-treatment: -/10 Pain Intervention(s): Medication (see MAR); Rest, Ice, Repositioning-  
Response to intervention: Nurse notified, See doc flow Activity Tolerance:  
Fair; limited by BLE pain Please refer to the flowsheet for vital signs taken during this treatment. After treatment:  
[] Patient left in no apparent distress sitting up in chair 
[x] Patient left in no apparent distress in bed 
[x] Call bell left within reach [x] Nursing notified 
[] Caregiver present 
[] Bed alarm activated 
[] SCDs applied COMMUNICATION/EDUCATION:  
[]         Role of Physical Therapy in the acute care setting. [x]         Fall prevention education was provided and the patient/caregiver indicated understanding. [x]         Patient/family have participated as able in working toward goals and plan of care. [x]         Patient/family agree to work toward stated goals and plan of care. []         Patient understands intent and goals of therapy, but is neutral about his/her participation. []         Patient is unable to participate in stated goals/plan of care: ongoing with therapy staff. 
[]         Other: 
 
   
Denita Chairez, PT Time Calculation: 33 mins

## 2020-10-28 NOTE — PROGRESS NOTES
Called into room by CN stated tht pt was on the bedside commode with PT/OT and they noted the patient to pass approx quarter size dark color blood clot looking mass. Upon assessment noted pt to have vaginal bleeding. No rectal bleeding or protrusions noted. Provider assistant at the bedside as well at the time. Per assistant stated tht she would inform provider. Will continue to monitor

## 2020-10-29 LAB
CREAT UR-MCNC: 72 MG/DL (ref 30–125)
CREAT UR-MCNC: 75 MG/DL (ref 30–125)
MAGNESIUM SERPL-MCNC: 2.5 MG/DL (ref 1.6–2.6)
OSMOLALITY UR: 580 MOSM/KG H2O (ref 50–1400)
PHOSPHATE SERPL-MCNC: 4.2 MG/DL (ref 2.5–4.9)
POTASSIUM UR-SCNC: 79 MMOL/L (ref 12–62)
PROT UR-MCNC: 108 MG/DL
PROT/CREAT UR-RTO: 1.4
SODIUM UR-SCNC: <5 MMOL/L (ref 20–110)

## 2020-10-29 PROCEDURE — 36415 COLL VENOUS BLD VENIPUNCTURE: CPT

## 2020-10-29 PROCEDURE — 84100 ASSAY OF PHOSPHORUS: CPT

## 2020-10-29 PROCEDURE — 82570 ASSAY OF URINE CREATININE: CPT

## 2020-10-29 PROCEDURE — 2709999900 HC NON-CHARGEABLE SUPPLY

## 2020-10-29 PROCEDURE — 74011250637 HC RX REV CODE- 250/637: Performed by: FAMILY MEDICINE

## 2020-10-29 PROCEDURE — 99232 SBSQ HOSP IP/OBS MODERATE 35: CPT | Performed by: INTERNAL MEDICINE

## 2020-10-29 PROCEDURE — 74011250637 HC RX REV CODE- 250/637: Performed by: INTERNAL MEDICINE

## 2020-10-29 PROCEDURE — 83735 ASSAY OF MAGNESIUM: CPT

## 2020-10-29 PROCEDURE — 74011250636 HC RX REV CODE- 250/636: Performed by: FAMILY MEDICINE

## 2020-10-29 PROCEDURE — 74011250636 HC RX REV CODE- 250/636: Performed by: INTERNAL MEDICINE

## 2020-10-29 PROCEDURE — 74011000250 HC RX REV CODE- 250: Performed by: FAMILY MEDICINE

## 2020-10-29 PROCEDURE — 84133 ASSAY OF URINE POTASSIUM: CPT

## 2020-10-29 PROCEDURE — 74011250637 HC RX REV CODE- 250/637: Performed by: STUDENT IN AN ORGANIZED HEALTH CARE EDUCATION/TRAINING PROGRAM

## 2020-10-29 PROCEDURE — 84300 ASSAY OF URINE SODIUM: CPT

## 2020-10-29 PROCEDURE — 97164 PT RE-EVAL EST PLAN CARE: CPT

## 2020-10-29 PROCEDURE — 65270000029 HC RM PRIVATE

## 2020-10-29 PROCEDURE — 84156 ASSAY OF PROTEIN URINE: CPT

## 2020-10-29 RX ORDER — SODIUM CHLORIDE 9 MG/ML
50 INJECTION, SOLUTION INTRAVENOUS CONTINUOUS
Status: DISCONTINUED | OUTPATIENT
Start: 2020-10-29 | End: 2020-10-30 | Stop reason: HOSPADM

## 2020-10-29 RX ADMIN — MEGESTROL ACETATE 40 MG: 40 TABLET ORAL at 08:45

## 2020-10-29 RX ADMIN — FAMOTIDINE 20 MG: 20 TABLET ORAL at 08:44

## 2020-10-29 RX ADMIN — Medication 100 MG: at 08:44

## 2020-10-29 RX ADMIN — SODIUM CHLORIDE 50 ML/HR: 900 INJECTION, SOLUTION INTRAVENOUS at 17:13

## 2020-10-29 RX ADMIN — SODIUM CHLORIDE 1000 MG: 1 TABLET ORAL at 17:14

## 2020-10-29 RX ADMIN — CEFTRIAXONE SODIUM 1 G: 1 INJECTION, POWDER, FOR SOLUTION INTRAMUSCULAR; INTRAVENOUS at 15:19

## 2020-10-29 RX ADMIN — ATORVASTATIN CALCIUM 40 MG: 40 TABLET, FILM COATED ORAL at 23:17

## 2020-10-29 RX ADMIN — SODIUM CHLORIDE 1000 MG: 1 TABLET ORAL at 08:44

## 2020-10-29 RX ADMIN — FUROSEMIDE 20 MG: 20 TABLET ORAL at 08:44

## 2020-10-29 RX ADMIN — FOLIC ACID 1 MG: 1 TABLET ORAL at 08:44

## 2020-10-29 RX ADMIN — POTASSIUM CHLORIDE 20 MEQ: 1500 TABLET, EXTENDED RELEASE ORAL at 08:44

## 2020-10-29 RX ADMIN — APIXABAN 2.5 MG: 2.5 TABLET, FILM COATED ORAL at 08:44

## 2020-10-29 RX ADMIN — THERA TABS 1 TABLET: TAB at 08:44

## 2020-10-29 RX ADMIN — METOPROLOL TARTRATE 25 MG: 25 TABLET, FILM COATED ORAL at 17:14

## 2020-10-29 RX ADMIN — APIXABAN 2.5 MG: 2.5 TABLET, FILM COATED ORAL at 17:14

## 2020-10-29 RX ADMIN — METOPROLOL TARTRATE 25 MG: 25 TABLET, FILM COATED ORAL at 08:44

## 2020-10-29 RX ADMIN — ACETAMINOPHEN 650 MG: 325 TABLET ORAL at 08:44

## 2020-10-29 RX ADMIN — MONTELUKAST 10 MG: 10 TABLET, FILM COATED ORAL at 08:44

## 2020-10-29 NOTE — PROGRESS NOTES
[facility] Progress Note CARDIOLOGY PROGRESS NOTE 
RECS: 
 
 
1. Acute on chronic diastolic congestive heart failure-appears compensated now. SOB and BLE improved well. 2. Hypertrophic obstructive cardiomyopathy-S/p alcohol septal ablation 2/19 at [facility]. s/p dual chambers permanent pacemaker 2/19 [employer]  
3. Paroxysmal  Atrial fibrillation-rate controlled. on Eliquis for stroke prevention. 4. Aortic stenosis -has become severe now with mean gradient of 43. Was moderate on  echo in 9/19. Will plan to transfer to [facility] for balloon valvuloplasty and possible TAVR once cleared by ID. Discussed with Dr. Michael Fagan. 5. Pulmonary hypertension-  worsening gradually with pulmonary pressure of 96 now. Echo 9/19 showed PAP 69 mmHg 6. CKD- renal indices stable 7. AMS-improving well. Head CT no acute findings- better 8. UTI- being managed by ID. On iv antibiotics she will finish Tx on Friday. CT abdomen and Pelvis-  No hydronephrosis. 9. Hx of thyroid CA- diagnosed approximately 4 years prior. ENT consulted not a candidate for any aggressive surgical plan unless airway compromise is noted. 10. Hyponatremia-renal following. S/p Tolvaptan Patient awaiting ID clearance prior to transfer for TAVR. Currently on gentle hydration for mild elevated creatinine. ASSESSMENT: 
Hospital Problems  Date Reviewed: 10/15/2020 Codes Class Noted POA Severe protein-calorie malnutrition ([facility] [address] ICD-10-CM: L06 ICD-9-CM: 305  10/21/2020 No  
   
 Anasarca ICD-10-CM: R60.1 ICD-9-CM: 782.3  10/15/2020 Unknown * (Principal) Acute on chronic diastolic congestive heart failure (HCC) ICD-10-CM: I50.33 ICD-9-CM: 428.33, 428.0  12/12/2017 Yes Mitral regurgitation ICD-10-CM: I34.0 ICD-9-CM: 424.0  6/13/2017 Yes Mild HOCM (hypertrophic obstructive cardiomyopathy) (HCC) ICD-10-CM: I42.1 ICD-9-CM: 425.11  1/12/2017 Yes Aortic stenosis ICD-10-CM: I35.0 ICD-9-CM: 424.1  1/12/2017 Yes Pulmonary HTN ([facility] ICD-10-CM: I27.20 ICD-9-CM: 416.8  12/21/2016 Yes Papillary thyroid carcinoma ([facility] ICD-10-CM: O91 ICD-9-CM: 417  12/21/2016 Yes Obesity (BMI 30.0-34.9) ICD-10-CM: U68.4 ICD-9-CM: 278.00  10/13/2016 Yes SUBJECTIVE: 
 
C/o dyspnea and weakness. Alert and oriented x3 except intermittent confusion OBJECTIVE: 
 
VS:  
Visit Vitals BP (!) 105/56 (BP 1 Location: Right arm, BP Patient Position: At rest) Pulse 68 Temp 97.7 °F (36.5 °C) Resp 19 Ht 5' 3\" (1.6 m) Wt 61.8 kg (136 lb 4.8 oz) SpO2 95% BMI 24.14 kg/m² Intake/Output Summary (Last 24 hours) at 10/29/2020 1227 Last data filed at 10/29/2020 1129 Gross per 24 hour Intake 1150 ml Output 2000 ml Net -850 ml  
 
  
TELE: DDD pacing 
  
General:alert and oriented today able to answer questions appropriately HENT: Normocephalic, atraumatic. Normal external eye. Neck :  bruit present, increased JVP Cardiac:  regular rate and rhythm, S1: normal intensity, S2: decreased intensity, systolic murmur: late systolic 3/6, crescendo at 2nd left intercostal space, Chest/Lungs:clear to ausculation Abdomen: Soft, nontender, no masses Extremities: improved  edema, peripheral pulses not palpable Labs: Results:  
   
Chemistry Recent Labs 10/29/20 
5550 10/28/20 
0450 10/27/20 
0116 GLU  --  94 76 NA  --  131* 131* K  --  4.7 3.4*  
CL  --  99* 97* CO2  --  26 26 BUN  --  58* 62* CREA  --  1.70* 1.69* CA  --  9.3 9.6 MG 2.5 2.5 2.3 PHOS 4.2 3.9 3.7 AGAP  --  6 8 BUCR  --  34* 40* AP  --  202* 206* TP  --  7.5 8.2 ALB  --  2.6* 2.7*  
GLOB  --  4.9* 5.5* AGRAT  --  0.5* 0.5* CBC w/Diff Recent Labs 10/28/20 
0450 10/27/20 
0116 WBC 7.0 7.4  
RBC 4.40 4.37 HGB 9.8* 9.7* HCT 32.0* 31.9*  
 256 GRANS 75* 71 LYMPH 14* 18* EOS 1 2  
  
 Cardiac Enzymes No results for input(s): CPK, CKND1, JONI in the last 72 hours. No lab exists for component: Loyce Factor Coagulation No results for input(s): PTP, INR, APTT, INREXT, INREXT in the last 72 hours. Lipid Panel Lab Results Component Value Date/Time Cholesterol, total 178 10/20/2020 10:05 AM  
 HDL Cholesterol 33 (L) 10/20/2020 10:05 AM  
 LDL, calculated 125.8 (H) 10/20/2020 10:05 AM  
 VLDL, calculated 19.2 10/20/2020 10:05 AM  
 Triglyceride 96 10/20/2020 10:05 AM  
 CHOL/HDL Ratio 5.4 (H) 10/20/2020 10:05 AM  
  
BNP No results for input(s): BNPP in the last 72 hours. Liver Enzymes Recent Labs 10/28/20 
0450 TP 7.5 ALB 2.6* * Digoxin Thyroid Studies Lab Results Component Value Date/Time TSH 1.98 10/16/2020 04:22 AM  
    
 
 
 
Nieves Valadez NP-C supervised I have independently evaluated and examined the patient. All relevant labs and testing data's are reviewed. Care plan discussed and updated after review.  
 
Yrn Gill MD

## 2020-10-29 NOTE — PROGRESS NOTES
Problem: Mobility Impaired (Adult and Pediatric) Goal: *Acute Goals and Plan of Care (Insert Text) Description: Physical Therapy Goals Re-evaluated 10/29/2020 and to be accomplished within 7 days 1. Patient will move from supine to sit and sit to supine in bed with stand by assistance. 2. Patient transfer from bed to chair and chair to bed with moderate assistance. 3. Patient will perform sit to stand with moderate assistance. 4. Patient will ambulate x 5 feet with moderate assistance with least restrictive device. Initiated 10/16/2020 and to be accomplished within 7 day(s) 1. Patient will move from supine to sit and sit to supine , scoot up and down, and roll side to side in bed with modified independence. 2.  Patient will transfer from bed to chair and chair to bed with supervision/set-up using the least restrictive device. 3.  Patient will perform sit to stand with supervision/set-up. 4.  Patient will ambulate with supervision/set-up for 50 feet with the least restrictive device. PLOF: Pt reports independence with dressing and bathing, ambulating with RW. Lives in 1st floor apartment with  and no DEBBIE. Pt reports having someone come in to  after them daily and has someone come into clean 1 time per week. Outcome: Progressing Towards Goal 
PHYSICAL THERAPY RE-EVALUATION Patient: Trae Alexander (52 y.o. female) Date: 10/29/2020 Primary Diagnosis: Anasarca [R60.1] Precautions:   Fall PLOF: see above ASSESSMENT : 
Based on the objective data described below, the patient presents with decreased strength, balance and activity tolerance resulting in decreased independence with functional mobility. Pt required increased encouragement to participate today and declined attempting to stand. Pt performed supine LE exercises and then transferred supine to sit with min A.   Pt set edge of bed for 3 minutes before stating that she needed to return to bed. Pt transferred sit to supine with min A. Pt was left in bed with needs in reach. Patient will benefit from skilled intervention to address the above impairments. Patient's rehabilitation potential is considered to be Fair Factors which may influence rehabilitation potential include:  
[]         None noted 
[]         Mental ability/status [x]         Medical condition 
[x]         Home/family situation and support systems 
[]         Safety awareness 
[]         Pain tolerance/management 
[]         Other: PLAN : 
Recommendations and Planned Interventions:  
[x]           Bed Mobility Training             []    Neuromuscular Re-Education 
[x]           Transfer Training                   []    Orthotic/Prosthetic Training 
[x]           Gait Training                          []    Modalities [x]           Therapeutic Exercises           []    Edema Management/Control 
[x]           Therapeutic Activities            []    Family Training/Education 
[x]           Patient Education 
[]           Other (comment): Frequency/Duration: Patient will be followed by physical therapy 1-2 times per day/4-7 days per week to address goals. Discharge Recommendations: Mars Thomas Further Equipment Recommendations for Discharge: N/A  
 
SUBJECTIVE:  
Patient stated I don't want to do any of this today.  OBJECTIVE DATA SUMMARY:  
Hospital course since last seen and reason for re-evaluation: pt is pending transfer to [facility] for cardiac procedure Past Medical History:  
Diagnosis Date Arthritis Atrial fibrillation ([facility] [address] CHF (congestive heart failure) ([facility] [address] Heart murmur History of seasonal allergies HTN (hypertension) Hypercholesteremia Moderate aortic stenosis Pulmonary hypertension ([address] Venous insufficiency Past Surgical History:  
Procedure Laterality Date HX KNEE ARTHROSCOPY    
 left and right HX ORTHOPAEDIC    
 right ankle Barriers to Learning/Limitations: yes;  altered mental status (i.e.Sedation, Confusion) Compensate with: Verbal Cues and Tactile Cues Home Situation:  
Home Situation Home Environment: Apartment # Steps to Enter: 0 One/Two Story Residence: One story Living Alone: No 
Support Systems: Spouse/Significant Other/Partner Patient Expects to be Discharged to[de-identified] [id number] Current DME Used/Available at Home: None Critical Behavior: 
Neurologic State: Confused Orientation Level: Oriented to person Cognition: Follows commands Strength:   
Strength: (grossly 3/5 B Les) Tone & Sensation:  
Tone: Normal 
Sensation: Impaired Range Of Motion: 
AROM: Generally decreased, functional 
Functional Mobility: 
Bed Mobility: 
Rolling: Moderate assistance Supine to Sit: Minimum assistance Sit to Supine: Minimum assistance Transfers: 
Sit to Stand: (refused) Balance:  
Sitting - Static: Good (unsupported) Sitting - Dynamic: Fair (occasional) Therapeutic Exercises: Ankle pumps, heel slides, glute sets x5 Pain: 
Pain level pre-treatment: 3/10 Pain level post-treatment: 3/10 Pain Intervention(s) :  Rest, Repositioning Response to intervention: Nurse notified, See doc flow Activity Tolerance:  
Poor+ Please refer to the flowsheet for vital signs taken during this treatment. After treatment:  
[]         Patient left in no apparent distress sitting up in chair 
[x]         Patient left in no apparent distress in bed 
[x]         Call bell left within reach [x]         Nursing notified 
[]         Caregiver present 
[]         Bed alarm activated 
[]         SCDs applied COMMUNICATION/EDUCATION:  
[x]         Role of Physical Therapy in the acute care setting. [x]         Fall prevention education was provided and the patient/caregiver indicated understanding. [x]         Patient/family have participated as able in goal setting and plan of care. []         Patient/family agree to work toward stated goals and plan of care. []         Patient understands intent and goals of therapy, but is neutral about his/her participation. []         Patient is unable to participate in goal setting/plan of care: ongoing with therapy staff. [x]         Other: educated patient on transfer techniques Thank you for this referral. 
Marc Schreiber, PT Time Calculation: 14 mins

## 2020-10-29 NOTE — PROGRESS NOTES
RENAL DAILY PROGRESS NOTE Subjective:  
 
 
Complaint: c/o neck pain. Wants heating pad Overnight events noted 
no nausea, vomiting, chest pain, short of breath, cough, seizure. IMPRESSION:  
· FIDEL on CKD stage 3 due to cardiorenal syndrome. UA is not active. USG kidneys on 10/19/2020- Right kidney size:  8.5 x 3.8 x 3.6 cm. 
- Left kidney size:  9.9 x 4.9 x 4.2 cm. · CKD stage 3 due to cardiorenal syndrome, hypertension. SPEP with M spike to 1.2. Baseline cr between 1.5 to 1.8. 
· Urinary retention as noted on CT scan of abdomen done 10/28 · Hypercalcemia with elevated globulin. ?myeloma. · Hyponatremia with hypokalemia · Acute diastolic CHF, on diuresis · Severe AS, Right heart failure, Severe pulmonary hypertension. · Hypertension · LE edema · S/p alcoholic septal ablation for hypertrophic cardiomyopathy · S/p complete heart block s/p pacer. · Secondary hyperparathyoidism with PTH , intact of 174.8 PLAN:  
Transfer to [facility] for aortic valve procedure planned Hematology-oncology considering bone marrow biopsy as OP. Positive M spike noted Hyponatremia probably due to reset osmostat? . Serum osmolality is normal but this is in setting of high BUN which is an ineffective osmole. Infact this is true hyponatremia as it is falsely measured as normal due to high BUN. Effective serum osmolality if we exclude BUN is appropriately low indicating hypotonicity and true hyponatremia. Much better post tolvaptan and after discontinuation of metolazone. I would not re challenge her again in future with thiazide. Increase solute intake and c/w fluid restriction. If Urine osmolality>500 then probably will need another dose of tolvaptan but this is not a long term solution and might need urea powder as OP or salt tabs C/w low dose lasix BP is stable Avoid nephrotoxic agents I and O Daily weights Dose meds per cr clearance I dont think she is a candidate for any parathyroid surgery. If calcium becomes an issue then would try very low dose sensipar. Follow for now. Addendum: 
Urine sodium is extremely low Hold lasix Give gentle hydration Current Facility-Administered Medications Medication Dose Route Frequency  sodium chloride soluble tablet 1,000 mg  1 g Oral BID  furosemide (LASIX) tablet 20 mg  20 mg Oral DAILY  cefTRIAXone (ROCEPHIN) 1 g in sterile water (preservative free) 10 mL IV syringe  1 g IntraVENous Q24H  potassium chloride (K-DUR, KLOR-CON) SR tablet 20 mEq  20 mEq Oral DAILY  metoprolol tartrate (LOPRESSOR) tablet 25 mg  25 mg Oral BID  megestroL (MEGACE) tablet 40 mg  40 mg Oral DAILY  acetaminophen (TYLENOL) tablet 650 mg  650 mg Oral Q4H PRN  
 ergocalciferol capsule 50,000 Units  50,000 Units Oral Q7D  
 therapeutic multivitamin (THERAGRAN) tablet 1 Tab  1 Tab Oral DAILY  thiamine HCL (B-1) tablet 100 mg  100 mg Oral DAILY  apixaban (ELIQUIS) tablet 2.5 mg  2.5 mg Oral BID  hydrALAZINE (APRESOLINE) tablet 25 mg  25 mg Oral TID PRN  
 famotidine (PEPCID) tablet 20 mg  20 mg Oral DAILY  folic acid (FOLVITE) tablet 1 mg  1 mg Oral DAILY  montelukast (SINGULAIR) tablet 10 mg  10 mg Oral DAILY  ondansetron hcl (ZOFRAN) tablet 4 mg  4 mg Oral Q8H PRN  
 atorvastatin (LIPITOR) tablet 40 mg  40 mg Oral QHS Review of Symptoms: comprehensive ROS negative except above. Objective:  
 
Patient Vitals for the past 24 hrs: 
 Temp Pulse Resp BP SpO2  
10/29/20 1129 97.7 °F (36.5 °C) 68 19 (!) 105/56 95 % 10/29/20 0800 97.8 °F (36.6 °C) 69 18 115/65 93 % 10/29/20 0427 97.4 °F (36.3 °C) 67 17 (!) 147/77 99 % 10/29/20 0028 97.4 °F (36.3 °C) 62 17 124/71 99 % 10/28/20 1947 97.4 °F (36.3 °C) 68 20 107/60 96 % 10/28/20 1711  75  122/68   
10/28/20 1606 97.8 °F (36.6 °C) 71 20 126/69 96 % Weight change: 7.394 kg (16 lb 4.8 oz) 10/27 1901 - 10/29 0700 In: 8011 [P.O.:1135] Out: 6201 [NPOFR:3828] Intake/Output Summary (Last 24 hours) at 10/29/2020 1201 Last data filed at 10/29/2020 1129 Gross per 24 hour Intake 1150 ml Output 2000 ml Net -850 ml Physical Exam:  
General: comfortable, no acute distress HEENT sclera anicteric, supple neck, no thyromegaly CVS: S1S2 heard,  no rub. SM in AA 
RS: + air entry b/l, Abd: Soft, Non tender, Not distended, Positive bowel sounds, no organomegaly, no CVA / supra pubic tenderness Neuro: non focal, awake, alert , CN II-XII are grossly intact Extrm: mild edema, no cyanosis, clubbing Skin: no visible  Rash Musculoskeletal: No gross joints or bone deformities Data Review:  
 
LABS:  
Hematology:  
Recent Labs 10/28/20 
0450 10/27/20 
0116 WBC 7.0 7.4 HGB 9.8* 9.7* HCT 32.0* 31.9* Chemistry:  
Recent Labs 10/29/20 
6793 10/28/20 
0450 10/27/20 
0116 BUN  --  58* 62* CREA  --  1.70* 1.69* CA  --  9.3 9.6 ALB  --  2.6* 2.7* K  --  4.7 3.4* NA  --  131* 131* CL  --  99* 97* CO2  --  26 26 PHOS 4.2 3.9 3.7 GLU  --  94 76 Procedures/imaging: see electronic medical records for all procedures, Xrays and details which were not copied into this note but were reviewed prior to creation of Plan Jose Truong MD 
10/29/2020

## 2020-10-29 NOTE — PROGRESS NOTES
Infectious Disease progress note Reason: Metabolic encephalopathy, bacteriuria, evaluate for infection ? cystitis Current abx Prior abx Ceftriaxone since 10/24/20 Lines:  
 
 
Assessment : 
 
 
 82-year-old female with a history of HOCM, pulmonary hypertension, congestive heart failure, chronic atrial fibrillation on anticoagulation, aortic stenosis, and hypertension presented to emergency department on 10/15/2020 with increasing swelling for  2 weeks.  
  
Now with altered mentation, bacteriuria Altered mental status likely metabolic encephalopathy secondary to noninfectious etiology of altered mentation such as medication induced  
 
probable evolving e.coli/proteus cystitis Urine culture 10/22->2000 colonies of E. coli, Proteus. Status post ceftriaxone since 10/24/2020 since persistent lethargy, concern for evolving cystitis. Susceptibilities of E. coli, Proteus reviewed today. Both bacteria susceptible to ceftriaxone. No evidence of hydronephrosis, complicated UTI noted on CT abdomen/pelvis 10/28. Management of urinary tract infection complicated due to urinary retention. Greater than 300 cc post void residual urine volume noted on bladder scan. Need for Layton catheter Improved mentation noted today Plans for aortic valve procedure once infection cleared 
  
Recommendations: 
  
1. Discontinue ceftriaxone after tomorrow's dose 2. Okay to proceed with the planned cardiac procedure next week if continued clinical improvement noted after completion of antibiotics on 10/30 3. Recommend voiding trial after few days to determine if Layton catheter can be removed Will sign off. Please call if any new questions or concerns Advance Care planning: full code: discussed  with patient/surrogate decision maker: eder gacria: [phone] HPI: 
 
Feels fine. Denies chest pain, shortness of breath, abdominal pain. home Medication List  
 Details Digitek 125 mcg (0.125 mg) tablet take 1 tablet by mouth once daily 
Qty: 90 Tab, Refills: 1 Details  
apixaban (Eliquis) 5 mg tablet Take 1 Tab by mouth two (2) times a day. Qty: 60 Tab, Refills: 3 Calcium-Cholecalciferol, D3, (CALCIUM 600 WITH VITAMIN D3) 600 mg(1,500mg) -400 unit chew Take 1 Tab by mouth daily. furosemide (LASIX) 40 mg tablet take 1 tablet by mouth every morning and 1/2 tablet AT 2 P.M. 
Qty: 45 Tab, Refills: 0  
  
metoprolol tartrate (LOPRESSOR) 100 mg IR tablet take 1 tablet by mouth twice a day 
Qty: 60 Tab, Refills: 6  
  
traMADol (ULTRAM) 50 mg tablet Take 1 Tab by mouth every six (6) hours as needed for Pain. folic acid 914 mcg tablet Take 400 mcg by mouth daily. cyanocobalamin, vitamin B-12, (VITAMIN B12 PO) Take 1,000 mcg by mouth daily. famotidine (PEPCID) 20 mg tablet Take 1 Tab by mouth daily. Qty: 30 Tab, Refills: 0  
  
gabapentin (NEURONTIN) 100 mg capsule Take 1 Cap by mouth nightly. Max Daily Amount: 100 mg. Qty: 30 Cap, Refills: 0 Associated Diagnoses: Pain and swelling of lower leg, unspecified laterality  
  
lactobacillus sp. 50 billion cpu (BIO-K PLUS) 50 billion cell -375 mg cap capsule Take 1 Cap by mouth daily. Qty: 10 Cap, Refills: 0  
  
ondansetron hcl (ZOFRAN) 4 mg tablet Take 1 Tab by mouth every eight (8) hours as needed for Nausea. Qty: 30 Tab, Refills: 2 Associated Diagnoses: Nausea  
  
simvastatin (ZOCOR) 80 mg tablet TAKE 1 TABLET NIGHTLY Qty: 90 Tab, Refills: 1 Associated Diagnoses: Nonrheumatic aortic valve insufficiency  
  
cholecalciferol (VITAMIN D3) 1,000 unit cap Take  by mouth daily. losartan (COZAAR) 100 mg tablet Take 100 mg by mouth daily. montelukast (SINGULAIR) 10 mg tablet Take 10 mg by mouth daily. CETIRIZINE HCL (ZYRTEC PO) Take  by mouth. Current Facility-Administered Medications Medication Dose Route Frequency  sodium chloride soluble tablet 1,000 mg  1 g Oral BID  furosemide (LASIX) tablet 20 mg  20 mg Oral DAILY  cefTRIAXone (ROCEPHIN) 1 g in sterile water (preservative free) 10 mL IV syringe  1 g IntraVENous Q24H  potassium chloride (K-DUR, KLOR-CON) SR tablet 20 mEq  20 mEq Oral DAILY  metoprolol tartrate (LOPRESSOR) tablet 25 mg  25 mg Oral BID  megestroL (MEGACE) tablet 40 mg  40 mg Oral DAILY  acetaminophen (TYLENOL) tablet 650 mg  650 mg Oral Q4H PRN  
 ergocalciferol capsule 50,000 Units  50,000 Units Oral Q7D  
 therapeutic multivitamin (THERAGRAN) tablet 1 Tab  1 Tab Oral DAILY  thiamine HCL (B-1) tablet 100 mg  100 mg Oral DAILY  apixaban (ELIQUIS) tablet 2.5 mg  2.5 mg Oral BID  hydrALAZINE (APRESOLINE) tablet 25 mg  25 mg Oral TID PRN  
 famotidine (PEPCID) tablet 20 mg  20 mg Oral DAILY  folic acid (FOLVITE) tablet 1 mg  1 mg Oral DAILY  montelukast (SINGULAIR) tablet 10 mg  10 mg Oral DAILY  ondansetron hcl (ZOFRAN) tablet 4 mg  4 mg Oral Q8H PRN  
 atorvastatin (LIPITOR) tablet 40 mg  40 mg Oral QHS Allergies: Patient has no known allergies. Temp (24hrs), Av.7 °F (36.5 °C), Min:97.4 °F (36.3 °C), Max:98.1 °F (36.7 °C) Visit Vitals /65 (BP 1 Location: Right arm, BP Patient Position: At rest) Pulse 69 Temp 97.8 °F (36.6 °C) Resp 18 Ht 5' 3\" (1.6 m) Wt 61.8 kg (136 lb 4.8 oz) SpO2 93% BMI 24.14 kg/m² ROS: Unable to obtain since confused Physical Exam: 
 
General:         no acute distress,  communicative, oriented to time, place, person HEENT:           NC, Atraumatic.  anicteric sclerae. Lungs:            CTA Bilaterally. No Wheezing/Rhonchi/Rales. Heart:              Regular rhythm Abdomen:      Soft, Non distended, Non tender. Extremities:    no edema Psych:            Not anxious or agitated. Neurologic:     No gross motor or sensory deficits noted Labs: Results:  
Chemistry Recent Labs 10/28/20 2602 10/27/20 
0116 GLU 94 76 * 131* K 4.7 3.4*  
CL 99* 97* CO2 26 26 BUN 58* 62* CREA 1.70* 1.69* CA 9.3 9.6 AGAP 6 8 BUCR 34* 37* * 206* TP 7.5 8.2 ALB 2.6* 2.7*  
GLOB 4.9* 5.5* AGRAT 0.5* 0.5* CBC w/Diff Recent Labs 10/28/20 
0450 10/27/20 
0116 WBC 7.0 7.4  
RBC 4.40 4.37 HGB 9.8* 9.7* HCT 32.0* 31.9*  
 256 GRANS 75* 71 LYMPH 14* 18* EOS 1 2 Microbiology No results for input(s): CULT in the last 72 hours. RADIOLOGY: 
 
All available imaging studies/reports in [facility] for this admission were reviewed Dr. Roddie Lanes, Infectious Disease Specialist 
[phone] October 29, 2020 
9:31 AM

## 2020-10-29 NOTE — PROGRESS NOTES
Problem: Falls - Risk of 
Goal: *Absence of Falls Description: Document Theo Yang Fall Risk and appropriate interventions in the flowsheet. Outcome: Progressing Towards Goal 
Note: Fall Risk Interventions: 
Mobility Interventions: Bed/chair exit alarm, Patient to call before getting OOB, Utilize walker, cane, or other assistive device Mentation Interventions: Bed/chair exit alarm, Door open when patient unattended, Reorient patient, Room close to nurse's station, Toileting rounds, Update white board Medication Interventions: Bed/chair exit alarm, Teach patient to arise slowly, Patient to call before getting OOB Elimination Interventions: Bed/chair exit alarm, Call light in reach, Patient to call for help with toileting needs, Toilet paper/wipes in reach, Toileting schedule/hourly rounds, Urinal in reach Problem: Patient Education: Go to Patient Education Activity Goal: Patient/Family Education Outcome: Progressing Towards Goal 
  
Problem: Patient Education: Go to Patient Education Activity Goal: Patient/Family Education Outcome: Progressing Towards Goal 
  
Problem: Heart Failure: Discharge Outcomes Goal: *Demonstrates ability to perform prescribed activity without shortness of breath or discomfort Outcome: Progressing Towards Goal 
Goal: *Left ventricular function assessment completed prior to or during stay, or planned for post-discharge Outcome: Progressing Towards Goal 
Goal: *ACEI prescribed if LVEF less than 40% and no contraindications or ARB prescribed Outcome: Progressing Towards Goal 
Goal: *Verbalizes understanding and describes prescribed diet Outcome: Progressing Towards Goal 
Goal: *Verbalizes understanding/describes prescribed medications Outcome: Progressing Towards Goal 
Goal: *Describes available resources and support systems Description: (eg: Home Health, Palliative Care, Advanced Medical Directive) Outcome: Progressing Towards Goal 
 Goal: *Describes smoking cessation resources Outcome: Progressing Towards Goal 
Goal: *Understands and describes signs and symptoms to report to providers(Stroke Metric) Outcome: Progressing Towards Goal 
Goal: *Describes/verbalizes understanding of follow-up/return appt Description: (eg: to physicians, diabetes treatment coordinator, and other resources Outcome: Progressing Towards Goal 
Goal: *Describes importance of continuing daily weights and changes to report to physician Outcome: Progressing Towards Goal 
  
Problem: Impaired Skin Integrity/Pressure Injury Treatment Goal: *Improvement of Existing Pressure Injury Outcome: Progressing Towards Goal 
Goal: *Prevention of pressure injury Description: Document Angel Scale and appropriate interventions in the flowsheet. Outcome: Progressing Towards Goal 
Note: Pressure Injury Interventions: 
Sensory Interventions: Assess changes in LOC, Assess need for specialty bed, Float heels, Keep linens dry and wrinkle-free, Minimize linen layers, Pressure redistribution bed/mattress (bed type), Turn and reposition approx. every two hours (pillows and wedges if needed) Moisture Interventions: Absorbent underpads, Assess need for specialty bed, Check for incontinence Q2 hours and as needed, Internal/External urinary devices, Minimize layers, Offer toileting Q_hr Activity Interventions: Assess need for specialty bed, Pressure redistribution bed/mattress(bed type) Mobility Interventions: Assess need for specialty bed, Float heels, HOB 30 degrees or less, Turn and reposition approx. every two hours(pillow and wedges) Nutrition Interventions: Document food/fluid/supplement intake Friction and Shear Interventions: HOB 30 degrees or less, Minimize layers Problem: Patient Education: Go to Patient Education Activity Goal: Patient/Family Education Outcome: Progressing Towards Goal 
  
Problem: Patient Education: Go to Patient Education Activity Goal: Patient/Family Education Outcome: Progressing Towards Goal 
  
Problem: Nutrition Deficit Goal: *Optimize nutritional status Outcome: Progressing Towards Goal 
  
Problem: Pressure Injury - Risk of 
Goal: *Prevention of pressure injury Description: Document Angel Scale and appropriate interventions in the flowsheet. Outcome: Progressing Towards Goal 
  
Problem: Patient Education: Go to Patient Education Activity Goal: Patient/Family Education Outcome: Progressing Towards Goal 
  
Problem: Infection - Risk of, Urinary Catheter-Associated Urinary Tract Infection Goal: *Absence of infection signs and symptoms Outcome: Progressing Towards Goal 
  
Problem: Patient Education: Go to Patient Education Activity Goal: Patient/Family Education Outcome: Progressing Towards Goal 
  
Problem: Patient Education: Go to Patient Education Activity Goal: Patient/Family Education Outcome: Progressing Towards Goal

## 2020-10-29 NOTE — ROUTINE PROCESS
Bedside and Verbal shift change report given to Jane Falk RN (oncoming nurse) by Sona Dow (offgoing nurse). Report included the following information SBAR, Kardex, MAR and Recent Results. SITUATION: 
Code Status: Prior Reason for Admission: Anasarca [R60.1] Hospital day: 15 Problem List:  
   
Hospital Problems  Date Reviewed: 10/15/2020 Codes Class Noted POA Severe protein[facility]calorie malnutrition ([facility] ICD-10-CM: A32 ICD-9-CM: 658  10/21/2020 No  
   
 Anasarca ICD-10-CM: R60.1 ICD-9-CM: 782.3  10/15/2020 Unknown * (Principal) Acute on chronic diastolic congestive heart failure (HCC) ICD-10-CM: I50.33 ICD-9-CM: 428.33, 428.0  12/12/2017 Yes Mitral regurgitation ICD-10-CM: I34.0 ICD-9-CM: 424.0  6/13/2017 Yes Mild HOCM (hypertrophic obstructive cardiomyopathy) (HCC) ICD-10-CM: I42.1 ICD-9-CM: 425.11  1/12/2017 Yes Aortic stenosis ICD-10-CM: I35.0 ICD-9-CM: 424.1  1/12/2017 Yes Pulmonary HTN ([facility] ICD-10-CM: I27.20 ICD-9-CM: 416.8  12/21/2016 Yes Papillary thyroid carcinoma ([facility] ICD-10-CM: S25 ICD-9-CM: 963  12/21/2016 Yes Obesity (BMI 30.0-34.9) ICD-10-CM: U07.0 ICD-9-CM: 278.00  10/13/2016 Yes BACKGROUND: 
 Past Medical History:  
Past Medical History:  
Diagnosis Date  Arthritis  Atrial fibrillation ([facility]  CHF (congestive heart failure) ([facility]  Heart murmur  History of seasonal allergies  HTN (hypertension)  Hypercholesteremia  Moderate aortic stenosis  Pulmonary hypertension ([facility]  Venous insufficiency Patient taking anticoagulants no Patient has a defibrillator: no  
 History of shots YES for example, flu, pneumonia, tetanus Isolation History NO for example, MRSA, CDiff ASSESSMENT: 
Changes in Assessment Throughout Shift: None Significant Changes in 24 hours (for example, RR/code, fall) Patient has Central Line: no  
 
Patient has Layton Cath: No 
Mobility Issues PT 
IV Patency OR Checklist 
Pending Tests Last Vitals: 
Vitals w/ MEWS Score (last day) Date/Time MEWS Score Pulse Resp Temp BP Level of Consciousness SpO2  
 10/29/20 0427  1  67  17  97.4 °F (36.3 °C)  (!) 147/77  Alert  99 % 10/29/20 0028  1  62  17  97.4 °F (36.3 °C)  124/71  Alert  99 % 10/28/20 1947  1  68  20  97.4 °F (36.3 °C)  107/60  Alert  96 % 10/28/20 1711    75      122/68      
 10/28/20 1606  1  71  20  97.8 °F (36.6 °C)  126/69  Alert  96 % 10/28/20 1151  1  82  18  98.1 °F (36.7 °C)  (!) 153/66  Alert  97 % 10/28/20 0822  1  80  19  98.3 °F (36.8 °C)  139/62  Alert  95 % 10/28/20 0403  1  66  17  98.4 °F (36.9 °C)  (!) 134/59  Alert  96 % 10/28/20 0020  1  72  17  98.1 °F (36.7 °C)  132/63  Alert  97 % PAIN Pain Assessment Pain Intensity 1: 0 (10/29/20 0217) Pain Location 1: Foot Pain Intervention(s) 1: Medication (see MAR) Patient Stated Pain Goal: 0 Intervention effective: N/A Time of last intervention: N/A Reassessment Completed: N/A Other actions taken for pain: Distraction Last 3 Weights: 
Last 3 Recorded Weights in this Encounter 10/27/20 2646 10/28/20 3368 10/29/20 9335 Weight: 58.4 kg (128 lb 12 oz) 54.4 kg (120 lb) 61.8 kg (136 lb 4.8 oz) Weight change: 7.394 kg (16 lb 4.8 oz) INTAKE/OUPUT Current Shift: No intake/output data recorded. Last three shifts: 10/27 1901 - 10/29 0700 In: 1913 [P.O.:1135] Out: 0064 [[facility]:[id number]] RECOMMENDATIONS AND DISCHARGE PLANNING Patient needs and requests: Assistance with ADLs Pending tests/procedures: Labs Discharge plan for patient: Home Discharge planning Needs or Barriers: None Estimated Discharge Date: 10/30 Posted on Whiteboard in [address] [place] [address] Room: yes \"HEALS\" SAFETY CHECK A safety check occurred in the patient's room between off going nurse and oncoming nurse listed above.  
 
The safety check included the below items: 
 
H 
 High Alert Medications Verify all high alert medication drips (heparin, PCA, etc.) E Equipment Suction is set up for ALL patients (with yisel) Red plugs utilized for all equipment (IV pumps, etc.) WOWs wiped down at end of shift. Room stocked with oxygen, suction, and other unit-specific supplies A Alarms Bed alarm is set for fall risk patients Ensure chair alarm is in place and activated if patient is up in a chair L Lines Check IV for any infiltration Layton bag is empty if patient has a Layton Tubing and IV bags are labeled [facility] Safety Room is clean, patient is clean, and equipment is clean. Hallways are clear from equipment besides carts. Fall bracelet on for fall risk patients Ensure room is clear and free of clutter Suction is set up for ALL patients (with yisel) Hallways are clear from equipment besides carts. Isolation precautions followed, supplies available outside room, sign posted Roger Lagos

## 2020-10-29 NOTE — PROGRESS NOTES
Progress Note Patient: Fran Silva MRN: [id number]  CSN: [id number] YOB: 1940  Age: 68 y.o. Sex: female DOA: 10/15/2020 LOS:  LOS: 14 days Subjective:  
Pt more alert confused on and off. Her sx are consistent with delirium should continue to improve. Pt might has underlying dementia. She is not alert or oriented to anything besides herself today. Pt is currently on Rocephin for a UTI. F/U ID most likely has cystitis Dr. Delisa Perera recommedned continue Rocephin until 10/30 ID clearance received yesterday. accepted by  Cardiology Dr Roya Hall for [facility] yesterday. Dr Delisa Perera recommended ct abdomen and pelvis no contrast which showed no hydronephrosis. pt will be cleared for transport to [facility]. Afebrile throughout hospital stay. ID has cleared patient for t Pt was seen by hematology oncology. F/u bone marrow biopsy as outpatient at later time. Dr Jose Kerr does not feel like it is the highest priority at this time considering all her other current issues. Pending cardiology clearance pt will need total thyroidectomy Chief Complaint:  
Chief Complaint Patient presents with  Peripheral Edema Review of systems General: No fevers or chills. Cardiovascular: No chest pain or pressure. Pulmonary: No shortness of breath, cough or wheeze. Gastrointestinal: No abdominal pain, nausea, vomiting or diarrhea. Genitourinary: No urinary frequency, urgency, hesitancy or dysuria. Musculoskeletal: generalized weakness and severe pain in her legs Neurologic: No headache, generalized weakness Objective:  
 
Physical Exam: 
Visit Vitals /65 (BP 1 Location: Right arm, BP Patient Position: At rest) Pulse 69 Temp 97.8 °F (36.6 °C) Resp 18 Ht 5' 3\" (1.6 m) Wt 61.8 kg (136 lb 4.8 oz) SpO2 93% BMI 24.14 kg/m² General:         no acute distress, still confused HEENT:           NC, Atraumatic.  anicteric sclerae. Lungs:            CTA Bilaterally. No Wheezing/Rhonchi/Rales. Heart:              Regular rhythm, systolic Murmurs Abdomen:      Soft, Non distended, Non tender. Extremities:   Wound Rt leg clean dressing in place wearing Tubigrip, slow moving and weak. Winced when both her legs were touched and examined for edema, but only trace edema noted Psych:            Not anxious or agitated. Neurologic:    confused, not oriented to place or date 
  
Intake and Output: 
Current Shift:  10/29 0701 - 10/29 1900 In: 230 [P.O.:230] Out: 300 [Urine:300] Last three shifts:  10/27 1901 - 10/29 0700 In: 0557 [P.O.:1135] Out: 3124 [EHPHA:2132] Labs: Results:  
   
Chemistry Recent Labs 10/28/20 
0450 10/27/20 
0116 GLU 94 76 * 131* K 4.7 3.4*  
CL 99* 97* CO2 26 26 BUN 58* 62* CREA 1.70* 1.69* CA 9.3 9.6 AGAP 6 8 BUCR 34* 37* * 206* TP 7.5 8.2 ALB 2.6* 2.7*  
GLOB 4.9* 5.5* AGRAT 0.5* 0.5* CBC w/Diff Recent Labs 10/28/20 
0450 10/27/20 
0116 WBC 7.0 7.4  
RBC 4.40 4.37 HGB 9.8* 9.7* HCT 32.0* 31.9*  
 256 GRANS 75* 71 LYMPH 14* 18* EOS 1 2 Cardiac Enzymes No results for input(s): CPK, CKND1, JONI in the last 72 hours. No lab exists for component: [place] Coagulation No results for input(s): PTP, INR, APTT, INREXT, INREXT in the last 72 hours. Lipid Panel Lab Results Component Value Date/Time Cholesterol, total 178 10/20/2020 10:05 AM  
 HDL Cholesterol 33 (L) 10/20/2020 10:05 AM  
 LDL, calculated 125.8 (H) 10/20/2020 10:05 AM  
 VLDL, calculated 19.2 10/20/2020 10:05 AM  
 Triglyceride 96 10/20/2020 10:05 AM  
 CHOL/HDL Ratio 5.4 (H) 10/20/2020 10:05 AM  
  
BNP No results for input(s): BNPP in the last 72 hours. Liver Enzymes Recent Labs 10/28/20 
0450 TP 7.5 ALB 2.6* * Thyroid Studies Lab Results Component Value Date/Time  TSH 1.98 10/16/2020 04:22 AM  
    
 
 Procedures/imaging: see electronic medical records for all procedures/Xrays and details which were not copied into this note but were reviewed prior to creation of Plan Medications:  
Current Facility-Administered Medications Medication Dose Route Frequency  sodium chloride soluble tablet 1,000 mg  1 g Oral BID  furosemide (LASIX) tablet 20 mg  20 mg Oral DAILY  cefTRIAXone (ROCEPHIN) 1 g in sterile water (preservative free) 10 mL IV syringe  1 g IntraVENous Q24H  potassium chloride (K-DUR, KLOR-CON) SR tablet 20 mEq  20 mEq Oral DAILY  metoprolol tartrate (LOPRESSOR) tablet 25 mg  25 mg Oral BID  megestroL (MEGACE) tablet 40 mg  40 mg Oral DAILY  acetaminophen (TYLENOL) tablet 650 mg  650 mg Oral Q4H PRN  
 ergocalciferol capsule 50,000 Units  50,000 Units Oral Q7D  
 therapeutic multivitamin (THERAGRAN) tablet 1 Tab  1 Tab Oral DAILY  thiamine HCL (B-1) tablet 100 mg  100 mg Oral DAILY  apixaban (ELIQUIS) tablet 2.5 mg  2.5 mg Oral BID  hydrALAZINE (APRESOLINE) tablet 25 mg  25 mg Oral TID PRN  
 famotidine (PEPCID) tablet 20 mg  20 mg Oral DAILY  folic acid (FOLVITE) tablet 1 mg  1 mg Oral DAILY  montelukast (SINGULAIR) tablet 10 mg  10 mg Oral DAILY  ondansetron hcl (ZOFRAN) tablet 4 mg  4 mg Oral Q8H PRN  
 atorvastatin (LIPITOR) tablet 40 mg  40 mg Oral QHS Assessment/Plan Principal Problem: 
  Acute on chronic diastolic congestive heart failure ([facility] (12/12/2017) Active Problems: 
  Obesity (BMI 30.0-34.9) (10/13/2016) Pulmonary HTN ([facility] (12/21/2016) Papillary thyroid carcinoma ([facility] (12/21/2016) Mild HOCM (hypertrophic obstructive cardiomyopathy) ([facility] (1/12/2017) Aortic stenosis (1/12/2017) Mitral regurgitation (6/13/2017) Anasarca (10/15/2020) Severe protein-calorie malnutrition ([facility] (10/21/2020) Plan: 
 
Mental status changes/ confusion- slowly improving. Possible delirium on top of prior dementia -10/21 Ct scan head no contrast showed no acute intracranial abnormality 
-Neurology recommended discontinuing Tramadol which was done. Gabapentin is also on hold for this reason 
-Per Dr. Hyacinth Vasquez: Her clinical exam significant for impaired attention, confused thinking, disordered speech is consistent with delirium. The cause of this patient's delirium is multifactorial. 
 
UTI/ Cystitis/ most likely colonization/ urine retention Continue Rocephin until 10/30 since she is going for cardiac procedure per ID recommednation Pt cleared for transfer discussed with Dr Valery De Paz accepted pt he will arrange transport CT abdomen/pelvis 10/28: No evidence of hydronephrosis, complicated UTI noted Moderate bladder distention Management of urinary tract infection complicated due to urinary retention. Greater than 300 cc post void residual urine volume noted on bladder scan. Need for Layton catheter ID Recommended voiding trial after few days to determine if Layton catheter can be removed 
  
Rt Leg venous stasis ulcer Edema improved Continue wound care and f/u vascular surgery consult 
  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  
 B2 Microglobulin 8.9 
bone marrow asp and biopsy if feasible and pst remains stable Will f/u as outpatient 
  
Acute systolic congestive heart failure on top of chronic diastolic congestive heart failure / severe aortic stenosis/ PAF 
bumex d/c and Lasix restarted per Cardiology Continue Eliquis  
Discussed with Dr Mathieu James  Pt stable for transport he requested transport today to start treatment for aortic stenosis  
  
Hypertension:  
 Metoprolol to 25 mg BID  
 use hydralazine prn  
  
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 
  
Hypokalemia: 
-Resolved 
-po potassium 20 praveen daily 
  
Paroxysmal A. fib Decreased  Eliquis 2.5 mg twice daily per pharmacy due to renal function 
  
Left Knee pain -X-ray of L leg showed small joint effusion but no patellar dislocation 
  
Hyperlipidemia Continue Zocor 80 mg nightly Normal AST and ALT 
  
History of Anemia with low iron 10/21: 
-Iron: 19 decreased from 27 on 10/16 
-TIBC: 382 
-continue iron repletion  
  
History of thyroid cancer 
-Pt seen by ENT who recommended Thyroidectomy when she is able to tolerate surgery 
- repeat ultrasound done this admission no sign of metastatics ct head and chest negative   
-Pending cardiology clearance after TAVR 
  
Cardiology consult 
-Aortic Stenosis has now become severe with mean gradient of 43. Was moderate on echo 9/19.  
-pt will need valvuloplasty and possible TAVR 
-still working on trasnport to [facility] for surgery [facility] Call 
10/29/2020 10:15 AM 
 
 
 
Discussed with nursing, still awaiting bed at [facility], posisbly tomorrow. EMTALA on file and ready. I agree with above with noted changes MD Aretha Mendoza Dr.

## 2020-10-29 NOTE — PROGRESS NOTES
Problem: Falls - Risk of 
Goal: *Absence of Falls Description: Document Deloris Osorio Fall Risk and appropriate interventions in the flowsheet. Outcome: Progressing Towards Goal 
Note: Fall Risk Interventions: 
Mobility Interventions: Bed/chair exit alarm, Patient to call before getting OOB, Utilize walker, cane, or other assistive device Mentation Interventions: Bed/chair exit alarm, Door open when patient unattended, Reorient patient, Room close to nurse's station, Toileting rounds, Update white board Medication Interventions: Bed/chair exit alarm, Teach patient to arise slowly, Patient to call before getting OOB Elimination Interventions: Bed/chair exit alarm, Call light in reach, Patient to call for help with toileting needs, Toilet paper/wipes in reach, Toileting schedule/hourly rounds, Urinal in reach

## 2020-10-29 NOTE — PROGRESS NOTES
Nutrition Assessment Type and Reason for Visit: Reassess, Positive nutrition screen, Consult(MST, General Nutrition Management) Nutrition Recommendations/Plan:  
- Continue supplements, decrease to Magic Cup TID. Nutrition Assessment:  Tolerating diet with improved appetite, consuming most of recent meals. Lethargic after breakfast this morning, appetite/meal intake much improved since admission per nursing. Likes and is consuming magic cup supplements. Malnutrition Assessment: 
Malnutrition Status: Severe malnutrition Estimated Daily Nutrient Needs: 
Energy (kcal):  6889-6461 Protein (g):  70-77 Fluid (ml/day):  1 mL/kcal 
 
Nutrition Related Findings:  BM 10/29 Current Nutrition Therapies: DIET NUTRITIONAL SUPPLEMENTS Breakfast, Lunch, Dinner, Cruz's; Toya-Sourav DIET DYSPHAGIA MECH ALTERED (NDD2) FR 1200ML Anthropometric Measures: 
· Height:  5' 3\" (160 cm) · Current Body Wt:  61.8 kg (136 lb 3.9 oz) · BMI: 24.1 Nutrition Diagnosis: · Severe malnutrition, In context of acute illness or injury related to cognitive or neurological impairment, biting/chewing (masticatory) difficulty, inadequate protein-energy intake, early satiety as evidenced by weight loss greater than or equal to 2% in 1 week, intake 0-25% Nutrition Intervention: 
Food and/or Nutrient Delivery: Continue current diet, Modify oral nutrition supplement Nutrition Education and Counseling: Education not indicated Coordination of Nutrition Care: Continue to monitor while inpatient, Coordination of community care Goals: 
PO nutrition intake will meet >75% of patient estimated nutritional needs within the next 7 days. Nutrition Monitoring and Evaluation:  
Behavioral-Environmental Outcomes: None identified Food/Nutrient Intake Outcomes: Food and nutrient intake, Supplement intake Physical Signs/Symptoms Outcomes: Nutrition focused physical findings, Meal time behavior Discharge Planning: Continue oral nutrition supplement, Continue current diet Electronically signed by Alma Lay RD, [address]  on 10/29/2020 at 11:41 AM 
 
Contact Number: [phone]

## 2020-10-30 VITALS
TEMPERATURE: 98.5 F | HEART RATE: 64 BPM | OXYGEN SATURATION: 97 % | BODY MASS INDEX: 24.15 KG/M2 | HEIGHT: 63 IN | WEIGHT: 136.3 LBS | DIASTOLIC BLOOD PRESSURE: 63 MMHG | SYSTOLIC BLOOD PRESSURE: 124 MMHG | RESPIRATION RATE: 18 BRPM

## 2020-10-30 LAB
ANION GAP SERPL CALC-SCNC: 6 MMOL/L (ref 3–18)
BUN SERPL-MCNC: 66 MG/DL (ref 7–18)
BUN/CREAT SERPL: 31 (ref 12–20)
CALCIUM SERPL-MCNC: 9.2 MG/DL (ref 8.5–10.1)
CHLORIDE SERPL-SCNC: 101 MMOL/L (ref 100–111)
CO2 SERPL-SCNC: 23 MMOL/L (ref 21–32)
CREAT SERPL-MCNC: 2.15 MG/DL (ref 0.6–1.3)
GLUCOSE SERPL-MCNC: 88 MG/DL (ref 74–99)
MAGNESIUM SERPL-MCNC: 2.5 MG/DL (ref 1.6–2.6)
PHOSPHATE SERPL-MCNC: 4.6 MG/DL (ref 2.5–4.9)
POTASSIUM SERPL-SCNC: 5 MMOL/L (ref 3.5–5.5)
SODIUM SERPL-SCNC: 130 MMOL/L (ref 136–145)

## 2020-10-30 PROCEDURE — 36415 COLL VENOUS BLD VENIPUNCTURE: CPT

## 2020-10-30 PROCEDURE — 83735 ASSAY OF MAGNESIUM: CPT

## 2020-10-30 PROCEDURE — 84100 ASSAY OF PHOSPHORUS: CPT

## 2020-10-30 PROCEDURE — 80048 BASIC METABOLIC PNL TOTAL CA: CPT

## 2020-10-30 NOTE — HOME CARE
Notified [facility] central intake and scheduling that patient transferred to [facility]. Valerie Wyatt

## 2020-10-30 NOTE — PROGRESS NOTES
Physician Progress Note Eleazar Hughes 
CSN #:                  [id number] :                       1940 ADMIT DATE:       10/15/2020 3:49 PM 
[address] [place] DATE: 
RESPONDING 
PROVIDER #:        Ami Duong MD 
 
 
 
 
QUERY TEXT: 
 
Dear Dr Edgar Nava Pt admitted with Acute on chronic diastolic congestive heart failure and has possible  underlying  dementia documented. Pt noted to metabolic encephalopathy in PN   . If possible, please document in the progress notes and discharge summary if you are evaluating and / or treating any of the following: The medical record reflects the following: 
Risk Factors: Pt presented to ED with increased pitting edema in bilateral lower extremities X 2 weeks. She had been taking bumex without improvement. She has a history of aortic stenosis, worsening heart failure, HOCM, CKD, and thyroid cancer. She had shortness of breath but denied fevers, chills, cough, or chest pain. She was also having drainage from her right lower extremity. She was admitted to be monitored Clinical Indicators: ID MD PN Altered mental status likely metabolic encephalopathy secondary to noninfectious etiology of altered mentation such as medication induced probable evolving e.coli/proteus cystitis  Card. MD VEGA AMS-improving well. Head CT no acute findings- better UTI- being managed by ID. On iv antibiotics she will finish Tx on Friday. CT abdomen and Pelvis pending   IM MD PN- Pt more alert confused on and off seen by neurology her sx consist ant with delirium should continue to improve Pt might has underlying dementia Nephrp MD VEGA FIDEL on CKD 3 ? Hypercalcemia with elevated globulin. ?myeloma. Hyponatremia with hypokalemia ? Treatment: Neurology consult ,CT head, IV flds IV Rocephin, Labs carefully monitored Thank you Micha Urbina RN   Options provided: 
-- Dementia without behavioral disturbance 
-- Dementia with behavioral disturbance -- Other - I will add my own diagnosis -- Disagree - Not applicable / Not valid -- Disagree - Clinically unable to determine / Unknown 
-- Refer to Clinical Documentation Reviewer PROVIDER RESPONSE TEXT: 
 
This patient has dementia without behavioral disturbances. Query created by:  Michel Rhodes on 10/29/2020 11:50 AM 
 
 
Electronically signed by:  Viji Velasco MD 10/29/2020 9:29 PM

## 2020-10-31 LAB — VIT B1 BLD-SCNC: 106.5 NMOL/L (ref 66.5–200)

## 2020-11-11 ENCOUNTER — HOME CARE VISIT (OUTPATIENT)
Dept: SCHEDULING | Facility: HOME HEALTH | Age: 80
End: 2020-11-11
Payer: MEDICARE

## 2020-11-11 PROCEDURE — 400014 HH F/U

## 2020-11-11 PROCEDURE — G0299 HHS/HOSPICE OF RN EA 15 MIN: HCPCS

## 2020-11-12 VITALS
TEMPERATURE: 97.6 F | OXYGEN SATURATION: 98 % | HEART RATE: 78 BPM | DIASTOLIC BLOOD PRESSURE: 60 MMHG | SYSTOLIC BLOOD PRESSURE: 118 MMHG | RESPIRATION RATE: 18 BRPM

## 2020-11-12 PROCEDURE — A6212 FOAM DRG <=16 SQ IN W/BORDER: HCPCS

## 2020-11-13 ENCOUNTER — HOME CARE VISIT (OUTPATIENT)
Dept: SCHEDULING | Facility: HOME HEALTH | Age: 80
End: 2020-11-13
Payer: MEDICARE

## 2020-11-13 ENCOUNTER — HOME CARE VISIT (OUTPATIENT)
Dept: HOME HEALTH SERVICES | Facility: HOME HEALTH | Age: 80
End: 2020-11-13
Payer: MEDICARE

## 2020-11-13 VITALS
RESPIRATION RATE: 16 BRPM | TEMPERATURE: 98.3 F | SYSTOLIC BLOOD PRESSURE: 110 MMHG | OXYGEN SATURATION: 90 % | HEART RATE: 64 BPM | DIASTOLIC BLOOD PRESSURE: 60 MMHG

## 2020-11-13 PROCEDURE — G0151 HHCP-SERV OF PT,EA 15 MIN: HCPCS

## 2020-11-14 ENCOUNTER — HOME CARE VISIT (OUTPATIENT)
Dept: SCHEDULING | Facility: HOME HEALTH | Age: 80
End: 2020-11-14
Payer: MEDICARE

## 2020-11-14 PROCEDURE — G0299 HHS/HOSPICE OF RN EA 15 MIN: HCPCS

## 2020-11-15 ENCOUNTER — HOME CARE VISIT (OUTPATIENT)
Dept: HOME HEALTH SERVICES | Facility: HOME HEALTH | Age: 80
End: 2020-11-15
Payer: MEDICARE

## 2020-11-15 VITALS
TEMPERATURE: 98.9 F | OXYGEN SATURATION: 95 % | HEART RATE: 52 BPM | DIASTOLIC BLOOD PRESSURE: 56 MMHG | SYSTOLIC BLOOD PRESSURE: 108 MMHG | RESPIRATION RATE: 16 BRPM

## 2020-11-16 ENCOUNTER — HOME CARE VISIT (OUTPATIENT)
Dept: SCHEDULING | Facility: HOME HEALTH | Age: 80
End: 2020-11-16

## 2020-11-16 ENCOUNTER — HOME CARE VISIT (OUTPATIENT)
Dept: SCHEDULING | Facility: HOME HEALTH | Age: 80
End: 2020-11-16
Payer: MEDICARE

## 2020-11-16 PROCEDURE — G0299 HHS/HOSPICE OF RN EA 15 MIN: HCPCS

## 2020-11-17 ENCOUNTER — HOME CARE VISIT (OUTPATIENT)
Dept: HOME HEALTH SERVICES | Facility: HOME HEALTH | Age: 80
End: 2020-11-17
Payer: MEDICARE

## 2020-11-18 ENCOUNTER — HOME CARE VISIT (OUTPATIENT)
Dept: SCHEDULING | Facility: HOME HEALTH | Age: 80
End: 2020-11-18
Payer: MEDICARE

## 2020-11-18 VITALS
SYSTOLIC BLOOD PRESSURE: 110 MMHG | HEART RATE: 60 BPM | OXYGEN SATURATION: 94 % | TEMPERATURE: 99.3 F | DIASTOLIC BLOOD PRESSURE: 58 MMHG

## 2020-11-18 PROCEDURE — G0157 HHC PT ASSISTANT EA 15: HCPCS

## 2020-11-18 PROCEDURE — G0152 HHCP-SERV OF OT,EA 15 MIN: HCPCS

## 2020-11-19 ENCOUNTER — HOME CARE VISIT (OUTPATIENT)
Dept: SCHEDULING | Facility: HOME HEALTH | Age: 80
End: 2020-11-19
Payer: MEDICARE

## 2020-11-19 VITALS
OXYGEN SATURATION: 96 % | TEMPERATURE: 98.9 F | SYSTOLIC BLOOD PRESSURE: 113 MMHG | DIASTOLIC BLOOD PRESSURE: 55 MMHG | HEART RATE: 65 BPM

## 2020-11-19 PROCEDURE — 400014 HH F/U

## 2020-11-19 PROCEDURE — G0152 HHCP-SERV OF OT,EA 15 MIN: HCPCS

## 2020-11-20 ENCOUNTER — HOME CARE VISIT (OUTPATIENT)
Dept: HOME HEALTH SERVICES | Facility: HOME HEALTH | Age: 80
End: 2020-11-20
Payer: MEDICARE

## 2020-11-21 ENCOUNTER — HOME CARE VISIT (OUTPATIENT)
Dept: HOME HEALTH SERVICES | Facility: HOME HEALTH | Age: 80
End: 2020-11-21
Payer: MEDICARE

## 2020-11-22 VITALS — HEART RATE: 61 BPM | DIASTOLIC BLOOD PRESSURE: 64 MMHG | TEMPERATURE: 98.3 F | SYSTOLIC BLOOD PRESSURE: 111 MMHG

## 2020-11-22 VITALS
OXYGEN SATURATION: 96 % | TEMPERATURE: 97.7 F | RESPIRATION RATE: 16 BRPM | DIASTOLIC BLOOD PRESSURE: 60 MMHG | HEART RATE: 61 BPM | SYSTOLIC BLOOD PRESSURE: 100 MMHG

## 2020-11-23 ENCOUNTER — HOME CARE VISIT (OUTPATIENT)
Dept: SCHEDULING | Facility: HOME HEALTH | Age: 80
End: 2020-11-23
Payer: MEDICARE

## 2020-11-23 VITALS
RESPIRATION RATE: 20 BRPM | OXYGEN SATURATION: 98 % | HEART RATE: 88 BPM | DIASTOLIC BLOOD PRESSURE: 78 MMHG | SYSTOLIC BLOOD PRESSURE: 132 MMHG | TEMPERATURE: 98.7 F

## 2020-11-23 PROCEDURE — G0157 HHC PT ASSISTANT EA 15: HCPCS

## 2020-11-23 PROCEDURE — G0158 HHC OT ASSISTANT EA 15: HCPCS

## 2020-11-24 ENCOUNTER — HOME CARE VISIT (OUTPATIENT)
Dept: SCHEDULING | Facility: HOME HEALTH | Age: 80
End: 2020-11-24
Payer: MEDICARE

## 2020-11-24 VITALS
OXYGEN SATURATION: 92 % | SYSTOLIC BLOOD PRESSURE: 128 MMHG | RESPIRATION RATE: 17 BRPM | TEMPERATURE: 98.4 F | HEART RATE: 89 BPM | DIASTOLIC BLOOD PRESSURE: 60 MMHG

## 2020-11-24 PROCEDURE — G0300 HHS/HOSPICE OF LPN EA 15 MIN: HCPCS

## 2020-11-25 ENCOUNTER — HOME CARE VISIT (OUTPATIENT)
Dept: SCHEDULING | Facility: HOME HEALTH | Age: 80
End: 2020-11-25
Payer: MEDICARE

## 2020-11-25 VITALS
DIASTOLIC BLOOD PRESSURE: 62 MMHG | TEMPERATURE: 99 F | OXYGEN SATURATION: 98 % | SYSTOLIC BLOOD PRESSURE: 128 MMHG | RESPIRATION RATE: 17 BRPM | HEART RATE: 64 BPM

## 2020-11-25 VITALS
HEART RATE: 88 BPM | OXYGEN SATURATION: 98 % | DIASTOLIC BLOOD PRESSURE: 78 MMHG | RESPIRATION RATE: 18 BRPM | SYSTOLIC BLOOD PRESSURE: 132 MMHG | TEMPERATURE: 98.7 F

## 2020-11-25 PROCEDURE — G0157 HHC PT ASSISTANT EA 15: HCPCS

## 2020-11-25 PROCEDURE — G0158 HHC OT ASSISTANT EA 15: HCPCS

## 2020-11-27 ENCOUNTER — HOME CARE VISIT (OUTPATIENT)
Dept: SCHEDULING | Facility: HOME HEALTH | Age: 80
End: 2020-11-27
Payer: MEDICARE

## 2020-11-27 PROCEDURE — G0157 HHC PT ASSISTANT EA 15: HCPCS

## 2020-11-27 PROCEDURE — G0300 HHS/HOSPICE OF LPN EA 15 MIN: HCPCS

## 2020-11-29 VITALS
OXYGEN SATURATION: 97 % | RESPIRATION RATE: 17 BRPM | DIASTOLIC BLOOD PRESSURE: 60 MMHG | SYSTOLIC BLOOD PRESSURE: 142 MMHG | TEMPERATURE: 98.9 F | HEART RATE: 71 BPM

## 2020-11-30 ENCOUNTER — HOME CARE VISIT (OUTPATIENT)
Dept: SCHEDULING | Facility: HOME HEALTH | Age: 80
End: 2020-11-30
Payer: MEDICARE

## 2020-11-30 VITALS
SYSTOLIC BLOOD PRESSURE: 132 MMHG | OXYGEN SATURATION: 98 % | DIASTOLIC BLOOD PRESSURE: 78 MMHG | TEMPERATURE: 98 F | RESPIRATION RATE: 20 BRPM | HEART RATE: 88 BPM

## 2020-11-30 VITALS
TEMPERATURE: 97.7 F | HEART RATE: 78 BPM | SYSTOLIC BLOOD PRESSURE: 133 MMHG | OXYGEN SATURATION: 98 % | DIASTOLIC BLOOD PRESSURE: 67 MMHG | RESPIRATION RATE: 18 BRPM

## 2020-11-30 PROCEDURE — G0157 HHC PT ASSISTANT EA 15: HCPCS

## 2020-11-30 PROCEDURE — G0158 HHC OT ASSISTANT EA 15: HCPCS

## 2020-12-01 ENCOUNTER — HOME CARE VISIT (OUTPATIENT)
Dept: SCHEDULING | Facility: HOME HEALTH | Age: 80
End: 2020-12-01
Payer: MEDICARE

## 2020-12-01 ENCOUNTER — HOME CARE VISIT (OUTPATIENT)
Dept: HOME HEALTH SERVICES | Facility: HOME HEALTH | Age: 80
End: 2020-12-01
Payer: MEDICARE

## 2020-12-02 ENCOUNTER — HOME CARE VISIT (OUTPATIENT)
Dept: SCHEDULING | Facility: HOME HEALTH | Age: 80
End: 2020-12-02
Payer: MEDICARE

## 2020-12-02 VITALS
DIASTOLIC BLOOD PRESSURE: 99 MMHG | RESPIRATION RATE: 18 BRPM | OXYGEN SATURATION: 98 % | HEART RATE: 90 BPM | TEMPERATURE: 98.7 F | SYSTOLIC BLOOD PRESSURE: 142 MMHG

## 2020-12-02 VITALS
TEMPERATURE: 98.4 F | SYSTOLIC BLOOD PRESSURE: 126 MMHG | DIASTOLIC BLOOD PRESSURE: 68 MMHG | OXYGEN SATURATION: 100 % | RESPIRATION RATE: 17 BRPM | HEART RATE: 65 BPM

## 2020-12-02 VITALS
OXYGEN SATURATION: 96 % | SYSTOLIC BLOOD PRESSURE: 130 MMHG | RESPIRATION RATE: 17 BRPM | DIASTOLIC BLOOD PRESSURE: 60 MMHG | HEART RATE: 66 BPM | TEMPERATURE: 98.4 F

## 2020-12-02 VITALS
SYSTOLIC BLOOD PRESSURE: 141 MMHG | TEMPERATURE: 97.7 F | DIASTOLIC BLOOD PRESSURE: 76 MMHG | HEART RATE: 65 BPM | OXYGEN SATURATION: 98 % | RESPIRATION RATE: 18 BRPM

## 2020-12-02 PROCEDURE — G0158 HHC OT ASSISTANT EA 15: HCPCS

## 2020-12-02 PROCEDURE — G0157 HHC PT ASSISTANT EA 15: HCPCS

## 2020-12-04 ENCOUNTER — HOME CARE VISIT (OUTPATIENT)
Dept: HOME HEALTH SERVICES | Facility: HOME HEALTH | Age: 80
End: 2020-12-04
Payer: MEDICARE

## 2020-12-07 ENCOUNTER — HOME CARE VISIT (OUTPATIENT)
Dept: SCHEDULING | Facility: HOME HEALTH | Age: 80
End: 2020-12-07
Payer: MEDICARE

## 2020-12-07 VITALS
DIASTOLIC BLOOD PRESSURE: 88 MMHG | OXYGEN SATURATION: 98 % | TEMPERATURE: 98 F | SYSTOLIC BLOOD PRESSURE: 135 MMHG | RESPIRATION RATE: 20 BRPM | HEART RATE: 89 BPM

## 2020-12-07 PROCEDURE — G0158 HHC OT ASSISTANT EA 15: HCPCS

## 2020-12-08 ENCOUNTER — HOME CARE VISIT (OUTPATIENT)
Dept: SCHEDULING | Facility: HOME HEALTH | Age: 80
End: 2020-12-08
Payer: MEDICARE

## 2020-12-08 VITALS
HEART RATE: 80 BPM | RESPIRATION RATE: 18 BRPM | TEMPERATURE: 97.3 F | SYSTOLIC BLOOD PRESSURE: 124 MMHG | OXYGEN SATURATION: 98 % | DIASTOLIC BLOOD PRESSURE: 80 MMHG

## 2020-12-08 VITALS
TEMPERATURE: 98.2 F | OXYGEN SATURATION: 94 % | HEART RATE: 68 BPM | RESPIRATION RATE: 17 BRPM | DIASTOLIC BLOOD PRESSURE: 68 MMHG | SYSTOLIC BLOOD PRESSURE: 130 MMHG

## 2020-12-08 PROCEDURE — G0151 HHCP-SERV OF PT,EA 15 MIN: HCPCS

## 2020-12-08 PROCEDURE — G0299 HHS/HOSPICE OF RN EA 15 MIN: HCPCS

## 2020-12-09 ENCOUNTER — HOME CARE VISIT (OUTPATIENT)
Dept: SCHEDULING | Facility: HOME HEALTH | Age: 80
End: 2020-12-09
Payer: MEDICARE

## 2020-12-09 PROCEDURE — G0152 HHCP-SERV OF OT,EA 15 MIN: HCPCS

## 2020-12-10 ENCOUNTER — HOME CARE VISIT (OUTPATIENT)
Dept: HOME HEALTH SERVICES | Facility: HOME HEALTH | Age: 80
End: 2020-12-10
Payer: MEDICARE

## 2020-12-10 VITALS
HEART RATE: 61 BPM | TEMPERATURE: 97.3 F | OXYGEN SATURATION: 95 % | SYSTOLIC BLOOD PRESSURE: 129 MMHG | DIASTOLIC BLOOD PRESSURE: 70 MMHG

## 2020-12-16 ENCOUNTER — HOME CARE VISIT (OUTPATIENT)
Dept: HOME HEALTH SERVICES | Facility: HOME HEALTH | Age: 80
End: 2020-12-16
Payer: MEDICARE

## 2024-09-23 NOTE — PROGRESS NOTES
Problem: Falls - Risk of 
Goal: *Absence of Falls Description: Document Catracho Arana Fall Risk and appropriate interventions in the flowsheet. Outcome: Progressing Towards Goal 
Note: Fall Risk Interventions: 
Mobility Interventions: Bed/chair exit alarm, OT consult for ADLs, Patient to call before getting OOB, PT Consult for mobility concerns Mentation Interventions: Bed/chair exit alarm, Door open when patient unattended Medication Interventions: Bed/chair exit alarm, Patient to call before getting OOB Elimination Interventions: Bed/chair exit alarm, Call light in reach Problem: Patient Education: Go to Patient Education Activity Goal: Patient/Family Education Outcome: Progressing Towards Goal 
  
Problem: Patient Education: Go to Patient Education Activity Goal: Patient/Family Education Outcome: Progressing Towards Goal 
  
Problem: Heart Failure: Day 4 Goal: Off Pathway (Use only if patient is Off Pathway) Outcome: Progressing Towards Goal 
Goal: Activity/Safety Outcome: Progressing Towards Goal 
Goal: Diagnostic Test/Procedures Outcome: Progressing Towards Goal 
Goal: Nutrition/Diet Outcome: Progressing Towards Goal 
Goal: Discharge Planning Outcome: Progressing Towards Goal 
Goal: Medications Outcome: Progressing Towards Goal 
Goal: Respiratory Outcome: Progressing Towards Goal 
Goal: Treatments/Interventions/Procedures Outcome: Progressing Towards Goal 
Goal: Psychosocial 
Outcome: Progressing Towards Goal 
Goal: *Oxygen saturation within defined limits Outcome: Progressing Towards Goal 
Goal: *Hemodynamically stable Outcome: Progressing Towards Goal 
Goal: *Optimal pain control at patient's stated goal 
Outcome: Progressing Towards Goal 
Goal: *Anxiety reduced or absent Outcome: Progressing Towards Goal 
Goal: *Demonstrates progressive activity Outcome: Progressing Towards Goal 
  
Problem: Heart Failure: Day 5 Goal: Off Pathway (Use only if patient is Off Pathway) Outcome: Progressing Towards Goal 
Goal: Activity/Safety Outcome: Progressing Towards Goal 
Goal: Diagnostic Test/Procedures Outcome: Progressing Towards Goal 
Goal: Nutrition/Diet Outcome: Progressing Towards Goal 
Goal: Discharge Planning Outcome: Progressing Towards Goal 
Goal: Medications Outcome: Progressing Towards Goal 
Goal: Respiratory Outcome: Progressing Towards Goal 
Goal: Treatments/Interventions/Procedures Outcome: Progressing Towards Goal 
Goal: Psychosocial 
Outcome: Progressing Towards Goal 
  
Problem: Heart Failure: Discharge Outcomes Goal: *Demonstrates ability to perform prescribed activity without shortness of breath or discomfort Outcome: Progressing Towards Goal 
Goal: *Left ventricular function assessment completed prior to or during stay, or planned for post-discharge Outcome: Progressing Towards Goal 
Goal: *ACEI prescribed if LVEF less than 40% and no contraindications or ARB prescribed Outcome: Progressing Towards Goal 
Goal: *Verbalizes understanding and describes prescribed diet Outcome: Progressing Towards Goal 
Goal: *Verbalizes understanding/describes prescribed medications Outcome: Progressing Towards Goal 
Goal: *Describes available resources and support systems Description: (eg: Home Health, Palliative Care, Advanced Medical Directive) Outcome: Progressing Towards Goal 
Goal: *Describes smoking cessation resources Outcome: Progressing Towards Goal 
Goal: *Understands and describes signs and symptoms to report to providers(Stroke Metric) Outcome: Progressing Towards Goal 
Goal: *Describes/verbalizes understanding of follow-up/return appt Description: (eg: to physicians, diabetes treatment coordinator, and other resources Outcome: Progressing Towards Goal 
Goal: *Describes importance of continuing daily weights and changes to report to physician Outcome: Progressing Towards Goal 
  
Problem: Impaired Skin Integrity/Pressure Injury Treatment Goal: *Improvement of Existing Pressure Injury Outcome: Progressing Towards Goal 
Goal: *Prevention of pressure injury Description: Document Angel Scale and appropriate interventions in the flowsheet. Outcome: Progressing Towards Goal 
  
Problem: Patient Education: Go to Patient Education Activity Goal: Patient/Family Education Outcome: Progressing Towards Goal 
  
Problem: Patient Education: Go to Patient Education Activity Goal: Patient/Family Education Outcome: Progressing Towards Goal 
  
Problem: Nutrition Deficit Goal: *Optimize nutritional status Outcome: Progressing Towards Goal 
  
Problem: Pressure Injury - Risk of 
Goal: *Prevention of pressure injury Description: Document Angel Scale and appropriate interventions in the flowsheet. Outcome: Progressing Towards Goal 
  
Problem: Patient Education: Go to Patient Education Activity Goal: Patient/Family Education Outcome: Progressing Towards Goal 
  
Problem: Infection - Risk of, Urinary Catheter-Associated Urinary Tract Infection Goal: *Absence of infection signs and symptoms Outcome: Progressing Towards Goal 
  
Problem: Patient Education: Go to Patient Education Activity Goal: Patient/Family Education Outcome: Progressing Towards Goal 
  
 97.5

## 2025-04-13 NOTE — ROUTINE PROCESS
Bedside shift change report given to Selina Zaman RN (oncoming nurse) by Chidi Patterson RN (offgoing nurse). Report included the following information SBAR, Kardex, ED Summary, Procedure Summary, Intake/Output, MAR, Recent Results and Cardiac Rhythm Paced. DISPLAY PLAN FREE TEXT DISPLAY PLAN FREE TEXT DISPLAY PLAN FREE TEXT DISPLAY PLAN FREE TEXT DISPLAY PLAN FREE TEXT DISPLAY PLAN FREE TEXT DISPLAY PLAN FREE TEXT DISPLAY PLAN FREE TEXT DISPLAY PLAN FREE TEXT DISPLAY PLAN FREE TEXT DISPLAY PLAN FREE TEXT DISPLAY PLAN FREE TEXT DISPLAY PLAN FREE TEXT DISPLAY PLAN FREE TEXT DISPLAY PLAN FREE TEXT DISPLAY PLAN FREE TEXT DISPLAY PLAN FREE TEXT DISPLAY PLAN FREE TEXT DISPLAY PLAN FREE TEXT DISPLAY PLAN FREE TEXT DISPLAY PLAN FREE TEXT DISPLAY PLAN FREE TEXT DISPLAY PLAN FREE TEXT DISPLAY PLAN FREE TEXT DISPLAY PLAN FREE TEXT DISPLAY PLAN FREE TEXT DISPLAY PLAN FREE TEXT DISPLAY PLAN FREE TEXT DISPLAY PLAN FREE TEXT DISPLAY PLAN FREE TEXT DISPLAY PLAN FREE TEXT DISPLAY PLAN FREE TEXT DISPLAY PLAN FREE TEXT DISPLAY PLAN FREE TEXT DISPLAY PLAN FREE TEXT DISPLAY PLAN FREE TEXT DISPLAY PLAN FREE TEXT DISPLAY PLAN FREE TEXT DISPLAY PLAN FREE TEXT DISPLAY PLAN FREE TEXT DISPLAY PLAN FREE TEXT DISPLAY PLAN FREE TEXT DISPLAY PLAN FREE TEXT DISPLAY PLAN FREE TEXT DISPLAY PLAN FREE TEXT DISPLAY PLAN FREE TEXT DISPLAY PLAN FREE TEXT DISPLAY PLAN FREE TEXT DISPLAY PLAN FREE TEXT DISPLAY PLAN FREE TEXT DISPLAY PLAN FREE TEXT DISPLAY PLAN FREE TEXT DISPLAY PLAN FREE TEXT DISPLAY PLAN FREE TEXT DISPLAY PLAN FREE TEXT DISPLAY PLAN FREE TEXT DISPLAY PLAN FREE TEXT DISPLAY PLAN FREE TEXT DISPLAY PLAN FREE TEXT DISPLAY PLAN FREE TEXT DISPLAY PLAN FREE TEXT DISPLAY PLAN FREE TEXT DISPLAY PLAN FREE TEXT DISPLAY PLAN FREE TEXT DISPLAY PLAN FREE TEXT